# Patient Record
Sex: FEMALE | Race: OTHER | Employment: OTHER | URBAN - METROPOLITAN AREA
[De-identification: names, ages, dates, MRNs, and addresses within clinical notes are randomized per-mention and may not be internally consistent; named-entity substitution may affect disease eponyms.]

---

## 2023-12-21 ENCOUNTER — APPOINTMENT (OUTPATIENT)
Dept: CT IMAGING | Age: 78
DRG: 024 | End: 2023-12-21
Payer: MEDICARE

## 2023-12-21 ENCOUNTER — APPOINTMENT (OUTPATIENT)
Dept: GENERAL RADIOLOGY | Age: 78
DRG: 024 | End: 2023-12-21
Payer: MEDICARE

## 2023-12-21 ENCOUNTER — HOSPITAL ENCOUNTER (INPATIENT)
Age: 78
LOS: 5 days | Discharge: HOME HEALTH | DRG: 024 | End: 2023-12-26
Attending: EMERGENCY MEDICINE | Admitting: INTERNAL MEDICINE
Payer: MEDICARE

## 2023-12-21 ENCOUNTER — APPOINTMENT (OUTPATIENT)
Dept: INTERVENTIONAL RADIOLOGY/VASCULAR | Age: 78
DRG: 024 | End: 2023-12-21
Payer: MEDICARE

## 2023-12-21 DIAGNOSIS — I63.9 ISCHEMIC CEREBROVASCULAR ACCIDENT (CVA) (HCC): Primary | ICD-10-CM

## 2023-12-21 DIAGNOSIS — I63.412 STROKE DUE TO EMBOLISM OF LEFT MIDDLE CEREBRAL ARTERY (HCC): ICD-10-CM

## 2023-12-21 DIAGNOSIS — R00.0 WIDE-COMPLEX TACHYCARDIA: ICD-10-CM

## 2023-12-21 LAB
ALBUMIN SERPL-MCNC: 3.9 G/DL (ref 3.5–5.2)
ALP SERPL-CCNC: 80 U/L (ref 35–104)
ALT SERPL-CCNC: 25 U/L (ref 0–32)
ANION GAP SERPL CALCULATED.3IONS-SCNC: 18 MMOL/L (ref 7–16)
AST SERPL-CCNC: 28 U/L (ref 0–31)
BASOPHILS # BLD: 0.08 K/UL (ref 0–0.2)
BASOPHILS NFR BLD: 1 % (ref 0–2)
BILIRUB SERPL-MCNC: 1.2 MG/DL (ref 0–1.2)
BUN SERPL-MCNC: 26 MG/DL (ref 6–23)
CALCIUM SERPL-MCNC: 9.2 MG/DL (ref 8.6–10.2)
CHLORIDE SERPL-SCNC: 104 MMOL/L (ref 98–107)
CO2 SERPL-SCNC: 18 MMOL/L (ref 22–29)
CREAT SERPL-MCNC: 1.3 MG/DL (ref 0.5–1)
EOSINOPHIL # BLD: 0.22 K/UL (ref 0.05–0.5)
EOSINOPHILS RELATIVE PERCENT: 2 % (ref 0–6)
ERYTHROCYTE [DISTWIDTH] IN BLOOD BY AUTOMATED COUNT: 13.2 % (ref 11.5–15)
GFR SERPL CREATININE-BSD FRML MDRD: 42 ML/MIN/1.73M2
GLUCOSE SERPL-MCNC: 140 MG/DL (ref 74–99)
HCT VFR BLD AUTO: 40 % (ref 34–48)
HGB BLD-MCNC: 13.1 G/DL (ref 11.5–15.5)
IMM GRANULOCYTES # BLD AUTO: 0.06 K/UL (ref 0–0.58)
IMM GRANULOCYTES NFR BLD: 1 % (ref 0–5)
INR PPP: 1.3
LYMPHOCYTES NFR BLD: 1.61 K/UL (ref 1.5–4)
LYMPHOCYTES RELATIVE PERCENT: 13 % (ref 20–42)
MAGNESIUM SERPL-MCNC: 2.2 MG/DL (ref 1.6–2.6)
MCH RBC QN AUTO: 30.3 PG (ref 26–35)
MCHC RBC AUTO-ENTMCNC: 32.8 G/DL (ref 32–34.5)
MCV RBC AUTO: 92.6 FL (ref 80–99.9)
MONOCYTES NFR BLD: 0.63 K/UL (ref 0.1–0.95)
MONOCYTES NFR BLD: 5 % (ref 2–12)
NEUTROPHILS NFR BLD: 80 % (ref 43–80)
NEUTS SEG NFR BLD: 10.17 K/UL (ref 1.8–7.3)
PLATELET # BLD AUTO: 267 K/UL (ref 130–450)
PMV BLD AUTO: 10.1 FL (ref 7–12)
POTASSIUM SERPL-SCNC: 4.1 MMOL/L (ref 3.5–5)
PROT SERPL-MCNC: 7.4 G/DL (ref 6.4–8.3)
PROTHROMBIN TIME: 14 SEC (ref 9.3–12.4)
RBC # BLD AUTO: 4.32 M/UL (ref 3.5–5.5)
SODIUM SERPL-SCNC: 140 MMOL/L (ref 132–146)
TROPONIN I SERPL HS-MCNC: 24 NG/L (ref 0–9)
TROPONIN I SERPL HS-MCNC: 25 NG/L (ref 0–9)
WBC OTHER # BLD: 12.8 K/UL (ref 4.5–11.5)

## 2023-12-21 PROCEDURE — 84484 ASSAY OF TROPONIN QUANT: CPT

## 2023-12-21 PROCEDURE — 70450 CT HEAD/BRAIN W/O DYE: CPT

## 2023-12-21 PROCEDURE — 03CG3ZZ EXTIRPATION OF MATTER FROM INTRACRANIAL ARTERY, PERCUTANEOUS APPROACH: ICD-10-PCS | Performed by: PSYCHIATRY & NEUROLOGY

## 2023-12-21 PROCEDURE — 2060000000 HC ICU INTERMEDIATE R&B

## 2023-12-21 PROCEDURE — 70498 CT ANGIOGRAPHY NECK: CPT

## 2023-12-21 PROCEDURE — 7100000001 HC PACU RECOVERY - ADDTL 15 MIN

## 2023-12-21 PROCEDURE — 2000000000 HC ICU R&B

## 2023-12-21 PROCEDURE — G0269 OCCLUSIVE DEVICE IN VEIN ART: HCPCS

## 2023-12-21 PROCEDURE — 85025 COMPLETE CBC W/AUTO DIFF WBC: CPT

## 2023-12-21 PROCEDURE — 0042T CT BRAIN PERFUSION: CPT

## 2023-12-21 PROCEDURE — 7100000000 HC PACU RECOVERY - FIRST 15 MIN

## 2023-12-21 PROCEDURE — 76377 3D RENDER W/INTRP POSTPROCES: CPT

## 2023-12-21 PROCEDURE — 4A133B1 MONITORING OF ARTERIAL PRESSURE, PERIPHERAL, PERCUTANEOUS APPROACH: ICD-10-PCS | Performed by: ANESTHESIOLOGY

## 2023-12-21 PROCEDURE — 70496 CT ANGIOGRAPHY HEAD: CPT

## 2023-12-21 PROCEDURE — 6360000004 HC RX CONTRAST MEDICATION: Performed by: RADIOLOGY

## 2023-12-21 PROCEDURE — B41F1ZZ FLUOROSCOPY OF RIGHT LOWER EXTREMITY ARTERIES USING LOW OSMOLAR CONTRAST: ICD-10-PCS | Performed by: PSYCHIATRY & NEUROLOGY

## 2023-12-21 PROCEDURE — 96365 THER/PROPH/DIAG IV INF INIT: CPT

## 2023-12-21 PROCEDURE — 6360000002 HC RX W HCPCS: Performed by: EMERGENCY MEDICINE

## 2023-12-21 PROCEDURE — 80053 COMPREHEN METABOLIC PANEL: CPT

## 2023-12-21 PROCEDURE — 3700000001 HC ADD 15 MINUTES (ANESTHESIA)

## 2023-12-21 PROCEDURE — 2580000003 HC RX 258: Performed by: EMERGENCY MEDICINE

## 2023-12-21 PROCEDURE — 4A133J1 MONITORING OF ARTERIAL PULSE, PERIPHERAL, PERCUTANEOUS APPROACH: ICD-10-PCS | Performed by: ANESTHESIOLOGY

## 2023-12-21 PROCEDURE — 85610 PROTHROMBIN TIME: CPT

## 2023-12-21 PROCEDURE — 36005 INJECTION EXT VENOGRAPHY: CPT

## 2023-12-21 PROCEDURE — 93005 ELECTROCARDIOGRAM TRACING: CPT | Performed by: EMERGENCY MEDICINE

## 2023-12-21 PROCEDURE — 5A2204Z RESTORATION OF CARDIAC RHYTHM, SINGLE: ICD-10-PCS | Performed by: INTERNAL MEDICINE

## 2023-12-21 PROCEDURE — 2580000003 HC RX 258: Performed by: PSYCHIATRY & NEUROLOGY

## 2023-12-21 PROCEDURE — 99223 1ST HOSP IP/OBS HIGH 75: CPT | Performed by: PSYCHIATRY & NEUROLOGY

## 2023-12-21 PROCEDURE — 3700000000 HC ANESTHESIA ATTENDED CARE

## 2023-12-21 PROCEDURE — 6360000004 HC RX CONTRAST MEDICATION: Performed by: PSYCHIATRY & NEUROLOGY

## 2023-12-21 PROCEDURE — 6360000002 HC RX W HCPCS: Performed by: PSYCHIATRY & NEUROLOGY

## 2023-12-21 PROCEDURE — 83735 ASSAY OF MAGNESIUM: CPT

## 2023-12-21 PROCEDURE — 61645 PERQ ART M-THROMBECT &/NFS: CPT

## 2023-12-21 PROCEDURE — 75820 VEIN X-RAY ARM/LEG: CPT

## 2023-12-21 PROCEDURE — C1887 CATHETER, GUIDING: HCPCS

## 2023-12-21 PROCEDURE — 03HC33Z INSERTION OF INFUSION DEVICE INTO LEFT RADIAL ARTERY, PERCUTANEOUS APPROACH: ICD-10-PCS | Performed by: ANESTHESIOLOGY

## 2023-12-21 PROCEDURE — C1757 CATH, THROMBECTOMY/EMBOLECT: HCPCS

## 2023-12-21 PROCEDURE — 99285 EMERGENCY DEPT VISIT HI MDM: CPT

## 2023-12-21 PROCEDURE — 93005 ELECTROCARDIOGRAM TRACING: CPT

## 2023-12-21 RX ORDER — ONDANSETRON 2 MG/ML
4 INJECTION INTRAMUSCULAR; INTRAVENOUS EVERY 6 HOURS PRN
Status: DISCONTINUED | OUTPATIENT
Start: 2023-12-21 | End: 2023-12-26 | Stop reason: HOSPADM

## 2023-12-21 RX ORDER — ONDANSETRON 4 MG/1
4 TABLET, ORALLY DISINTEGRATING ORAL EVERY 8 HOURS PRN
Status: DISCONTINUED | OUTPATIENT
Start: 2023-12-21 | End: 2023-12-26 | Stop reason: HOSPADM

## 2023-12-21 RX ORDER — 0.9 % SODIUM CHLORIDE 0.9 %
1000 INTRAVENOUS SOLUTION INTRAVENOUS ONCE
Status: DISCONTINUED | OUTPATIENT
Start: 2023-12-21 | End: 2023-12-26 | Stop reason: HOSPADM

## 2023-12-21 RX ORDER — ATORVASTATIN CALCIUM 40 MG/1
80 TABLET, FILM COATED ORAL NIGHTLY
Status: DISCONTINUED | OUTPATIENT
Start: 2023-12-21 | End: 2023-12-26 | Stop reason: HOSPADM

## 2023-12-21 RX ORDER — ASPIRIN 300 MG/1
300 SUPPOSITORY RECTAL DAILY
Status: DISCONTINUED | OUTPATIENT
Start: 2023-12-22 | End: 2023-12-23

## 2023-12-21 RX ORDER — LABETALOL HYDROCHLORIDE 5 MG/ML
5 INJECTION, SOLUTION INTRAVENOUS EVERY 10 MIN PRN
Status: DISCONTINUED | OUTPATIENT
Start: 2023-12-21 | End: 2023-12-22

## 2023-12-21 RX ORDER — ACETAMINOPHEN 325 MG/1
650 TABLET ORAL EVERY 4 HOURS PRN
Status: DISCONTINUED | OUTPATIENT
Start: 2023-12-21 | End: 2023-12-26 | Stop reason: HOSPADM

## 2023-12-21 RX ORDER — AMIODARONE HYDROCHLORIDE 50 MG/ML
INJECTION, SOLUTION INTRAVENOUS
Status: DISPENSED
Start: 2023-12-21 | End: 2023-12-22

## 2023-12-21 RX ORDER — SODIUM CHLORIDE 9 MG/ML
INJECTION, SOLUTION INTRAVENOUS PRN
Status: DISCONTINUED | OUTPATIENT
Start: 2023-12-21 | End: 2023-12-26 | Stop reason: HOSPADM

## 2023-12-21 RX ORDER — SODIUM CHLORIDE 0.9 % (FLUSH) 0.9 %
5-40 SYRINGE (ML) INJECTION EVERY 12 HOURS SCHEDULED
Status: DISCONTINUED | OUTPATIENT
Start: 2023-12-21 | End: 2023-12-26 | Stop reason: HOSPADM

## 2023-12-21 RX ORDER — SODIUM CHLORIDE 9 MG/ML
INJECTION, SOLUTION INTRAVENOUS
Status: COMPLETED
Start: 2023-12-21 | End: 2023-12-21

## 2023-12-21 RX ORDER — DILTIAZEM HYDROCHLORIDE 5 MG/ML
10 INJECTION INTRAVENOUS ONCE
Status: DISCONTINUED | OUTPATIENT
Start: 2023-12-21 | End: 2023-12-22

## 2023-12-21 RX ORDER — SODIUM CHLORIDE 0.9 % (FLUSH) 0.9 %
5-40 SYRINGE (ML) INJECTION PRN
Status: DISCONTINUED | OUTPATIENT
Start: 2023-12-21 | End: 2023-12-26 | Stop reason: HOSPADM

## 2023-12-21 RX ADMIN — LABETALOL HYDROCHLORIDE 5 MG: 5 INJECTION, SOLUTION INTRAVENOUS at 20:53

## 2023-12-21 RX ADMIN — SODIUM CHLORIDE, PRESERVATIVE FREE 10 ML: 5 INJECTION INTRAVENOUS at 20:56

## 2023-12-21 RX ADMIN — AMIODARONE HYDROCHLORIDE 1 MG/MIN: 50 INJECTION, SOLUTION INTRAVENOUS at 19:06

## 2023-12-21 RX ADMIN — AMIODARONE HYDROCHLORIDE 150 MG: 50 INJECTION, SOLUTION INTRAVENOUS at 18:53

## 2023-12-21 RX ADMIN — IOPAMIDOL 30 ML: 612 INJECTION, SOLUTION INTRAVENOUS at 20:24

## 2023-12-21 RX ADMIN — IOPAMIDOL 75 ML: 755 INJECTION, SOLUTION INTRAVENOUS at 18:33

## 2023-12-22 ENCOUNTER — APPOINTMENT (OUTPATIENT)
Dept: CT IMAGING | Age: 78
DRG: 024 | End: 2023-12-22
Payer: MEDICARE

## 2023-12-22 ENCOUNTER — APPOINTMENT (OUTPATIENT)
Age: 78
DRG: 024 | End: 2023-12-22
Attending: PSYCHIATRY & NEUROLOGY
Payer: MEDICARE

## 2023-12-22 ENCOUNTER — APPOINTMENT (OUTPATIENT)
Dept: GENERAL RADIOLOGY | Age: 78
DRG: 024 | End: 2023-12-22
Payer: MEDICARE

## 2023-12-22 PROBLEM — I63.9 ISCHEMIC CEREBROVASCULAR ACCIDENT (CVA) (HCC): Status: ACTIVE | Noted: 2023-12-22

## 2023-12-22 LAB
ANION GAP SERPL CALCULATED.3IONS-SCNC: 12 MMOL/L (ref 7–16)
BASOPHILS # BLD: 0.07 K/UL (ref 0–0.2)
BASOPHILS NFR BLD: 1 % (ref 0–2)
BUN SERPL-MCNC: 22 MG/DL (ref 6–23)
CALCIUM SERPL-MCNC: 8.5 MG/DL (ref 8.6–10.2)
CHLORIDE SERPL-SCNC: 108 MMOL/L (ref 98–107)
CHOLEST SERPL-MCNC: 159 MG/DL
CO2 SERPL-SCNC: 21 MMOL/L (ref 22–29)
CREAT SERPL-MCNC: 1.1 MG/DL (ref 0.5–1)
ECHO AO ASC DIAM: 2.7 CM
ECHO AO ASCENDING AORTA INDEX: 1.59 CM/M2
ECHO AO ROOT DIAM: 2.4 CM
ECHO AO ROOT INDEX: 1.41 CM/M2
ECHO AV AREA PEAK VELOCITY: 1.4 CM2
ECHO AV AREA VTI: 1.4 CM2
ECHO AV AREA/BSA PEAK VELOCITY: 0.8 CM2/M2
ECHO AV AREA/BSA VTI: 0.8 CM2/M2
ECHO AV CUSP MM: 1.6 CM
ECHO AV MEAN GRADIENT: 2 MMHG
ECHO AV MEAN VELOCITY: 0.7 M/S
ECHO AV PEAK GRADIENT: 4 MMHG
ECHO AV PEAK VELOCITY: 1 M/S
ECHO AV VELOCITY RATIO: 0.6
ECHO AV VTI: 17.3 CM
ECHO BSA: 1.72 M2
ECHO EST RA PRESSURE: 3 MMHG
ECHO LA VOL A-L A2C: 74 ML (ref 22–52)
ECHO LA VOL A-L A4C: 45 ML (ref 22–52)
ECHO LA VOL MOD A2C: 68 ML (ref 22–52)
ECHO LA VOL MOD A4C: 43 ML (ref 22–52)
ECHO LA VOLUME AREA LENGTH: 58 ML
ECHO LA VOLUME INDEX A-L A2C: 44 ML/M2 (ref 16–34)
ECHO LA VOLUME INDEX A-L A4C: 26 ML/M2 (ref 16–34)
ECHO LA VOLUME INDEX AREA LENGTH: 34 ML/M2 (ref 16–34)
ECHO LA VOLUME INDEX MOD A2C: 40 ML/M2 (ref 16–34)
ECHO LA VOLUME INDEX MOD A4C: 25 ML/M2 (ref 16–34)
ECHO LV EDV A2C: 49 ML
ECHO LV EDV A4C: 65 ML
ECHO LV EDV BP: 58 ML (ref 56–104)
ECHO LV EDV INDEX A4C: 38 ML/M2
ECHO LV EDV INDEX BP: 34 ML/M2
ECHO LV EDV NDEX A2C: 29 ML/M2
ECHO LV EJECTION FRACTION A2C: 42 %
ECHO LV EJECTION FRACTION A4C: 47 %
ECHO LV EJECTION FRACTION BIPLANE: 46 % (ref 55–100)
ECHO LV ESV A2C: 28 ML
ECHO LV ESV A4C: 34 ML
ECHO LV ESV BP: 31 ML (ref 19–49)
ECHO LV ESV INDEX A2C: 16 ML/M2
ECHO LV ESV INDEX A4C: 20 ML/M2
ECHO LV ESV INDEX BP: 18 ML/M2
ECHO LV FRACTIONAL SHORTENING: 21 % (ref 28–44)
ECHO LV INTERNAL DIMENSION DIASTOLE INDEX: 2.47 CM/M2
ECHO LV INTERNAL DIMENSION DIASTOLIC: 4.2 CM (ref 3.9–5.3)
ECHO LV INTERNAL DIMENSION SYSTOLIC INDEX: 1.94 CM/M2
ECHO LV INTERNAL DIMENSION SYSTOLIC: 3.3 CM
ECHO LV ISOVOLUMETRIC RELAXATION TIME (IVRT): 87.7 MS
ECHO LV IVSD: 1.1 CM (ref 0.6–0.9)
ECHO LV IVSS: 1.2 CM
ECHO LV MASS 2D: 189.2 G (ref 67–162)
ECHO LV MASS INDEX 2D: 111.3 G/M2 (ref 43–95)
ECHO LV POSTERIOR WALL DIASTOLIC: 1.4 CM (ref 0.6–0.9)
ECHO LV POSTERIOR WALL SYSTOLIC: 1.4 CM
ECHO LV RELATIVE WALL THICKNESS RATIO: 0.67
ECHO LVOT AREA: 2.3 CM2
ECHO LVOT AV VTI INDEX: 0.64
ECHO LVOT DIAM: 1.7 CM
ECHO LVOT MEAN GRADIENT: 1 MMHG
ECHO LVOT PEAK GRADIENT: 2 MMHG
ECHO LVOT PEAK VELOCITY: 0.6 M/S
ECHO LVOT STROKE VOLUME INDEX: 14.7 ML/M2
ECHO LVOT SV: 25 ML
ECHO LVOT VTI: 11 CM
ECHO MV A VELOCITY: 0.28 M/S
ECHO MV AREA PHT: 4 CM2
ECHO MV AREA VTI: 0.8 CM2
ECHO MV E DECELERATION TIME (DT): 131 MS
ECHO MV E VELOCITY: 1.2 M/S
ECHO MV E/A RATIO: 4.29
ECHO MV LVOT VTI INDEX: 2.99
ECHO MV MAX VELOCITY: 1.5 M/S
ECHO MV MEAN GRADIENT: 3 MMHG
ECHO MV MEAN VELOCITY: 0.8 M/S
ECHO MV PEAK GRADIENT: 9 MMHG
ECHO MV PRESSURE HALF TIME (PHT): 55.2 MS
ECHO MV VTI: 32.9 CM
ECHO RIGHT VENTRICULAR SYSTOLIC PRESSURE (RVSP): 32 MMHG
ECHO RV TAPSE: 1.9 CM (ref 1.7–?)
ECHO TV REGURGITANT MAX VELOCITY: 2.7 M/S
ECHO TV REGURGITANT PEAK GRADIENT: 29 MMHG
EKG ATRIAL RATE: 127 BPM
EKG ATRIAL RATE: 186 BPM
EKG ATRIAL RATE: 72 BPM
EKG P AXIS: -4 DEGREES
EKG P AXIS: 71 DEGREES
EKG P-R INTERVAL: 180 MS
EKG P-R INTERVAL: 188 MS
EKG Q-T INTERVAL: 274 MS
EKG Q-T INTERVAL: 306 MS
EKG Q-T INTERVAL: 458 MS
EKG QRS DURATION: 120 MS
EKG QRS DURATION: 70 MS
EKG QRS DURATION: 88 MS
EKG QTC CALCULATION (BAZETT): 444 MS
EKG QTC CALCULATION (BAZETT): 482 MS
EKG QTC CALCULATION (BAZETT): 501 MS
EKG R AXIS: -128 DEGREES
EKG R AXIS: 40 DEGREES
EKG R AXIS: 60 DEGREES
EKG T AXIS: 110 DEGREES
EKG T AXIS: 83 DEGREES
EKG T AXIS: 89 DEGREES
EKG VENTRICULAR RATE: 127 BPM
EKG VENTRICULAR RATE: 186 BPM
EKG VENTRICULAR RATE: 72 BPM
EOSINOPHIL # BLD: 0.29 K/UL (ref 0.05–0.5)
EOSINOPHILS RELATIVE PERCENT: 3 % (ref 0–6)
ERYTHROCYTE [DISTWIDTH] IN BLOOD BY AUTOMATED COUNT: 13.3 % (ref 11.5–15)
GFR SERPL CREATININE-BSD FRML MDRD: 50 ML/MIN/1.73M2
GLUCOSE SERPL-MCNC: 98 MG/DL (ref 74–99)
HBA1C MFR BLD: 5.7 % (ref 4–5.6)
HCT VFR BLD AUTO: 34.7 % (ref 34–48)
HDLC SERPL-MCNC: 40 MG/DL
HGB BLD-MCNC: 11.3 G/DL (ref 11.5–15.5)
IMM GRANULOCYTES # BLD AUTO: 0.06 K/UL (ref 0–0.58)
IMM GRANULOCYTES NFR BLD: 1 % (ref 0–5)
LDLC SERPL CALC-MCNC: 97 MG/DL
LYMPHOCYTES NFR BLD: 1.78 K/UL (ref 1.5–4)
LYMPHOCYTES RELATIVE PERCENT: 16 % (ref 20–42)
MAGNESIUM SERPL-MCNC: 2.2 MG/DL (ref 1.6–2.6)
MCH RBC QN AUTO: 30.5 PG (ref 26–35)
MCHC RBC AUTO-ENTMCNC: 32.6 G/DL (ref 32–34.5)
MCV RBC AUTO: 93.8 FL (ref 80–99.9)
MONOCYTES NFR BLD: 0.8 K/UL (ref 0.1–0.95)
MONOCYTES NFR BLD: 7 % (ref 2–12)
NEUTROPHILS NFR BLD: 74 % (ref 43–80)
NEUTS SEG NFR BLD: 8.47 K/UL (ref 1.8–7.3)
PARTIAL THROMBOPLASTIN TIME: 28.5 SEC (ref 24.5–35.1)
PARTIAL THROMBOPLASTIN TIME: 38.3 SEC (ref 24.5–35.1)
PARTIAL THROMBOPLASTIN TIME: 45.7 SEC (ref 24.5–35.1)
PHOSPHATE SERPL-MCNC: 4.7 MG/DL (ref 2.5–4.5)
PLATELET # BLD AUTO: 236 K/UL (ref 130–450)
PMV BLD AUTO: 10.7 FL (ref 7–12)
POTASSIUM SERPL-SCNC: 3.9 MMOL/L (ref 3.5–5)
RBC # BLD AUTO: 3.7 M/UL (ref 3.5–5.5)
SODIUM SERPL-SCNC: 141 MMOL/L (ref 132–146)
TRIGL SERPL-MCNC: 112 MG/DL
TROPONIN I SERPL HS-MCNC: 25 NG/L (ref 0–9)
TSH SERPL DL<=0.05 MIU/L-ACNC: 2.96 UIU/ML (ref 0.27–4.2)
VLDLC SERPL CALC-MCNC: 22 MG/DL
WBC OTHER # BLD: 11.5 K/UL (ref 4.5–11.5)

## 2023-12-22 PROCEDURE — 99232 SBSQ HOSP IP/OBS MODERATE 35: CPT | Performed by: PSYCHIATRY & NEUROLOGY

## 2023-12-22 PROCEDURE — 92610 EVALUATE SWALLOWING FUNCTION: CPT | Performed by: SPEECH-LANGUAGE PATHOLOGIST

## 2023-12-22 PROCEDURE — 70450 CT HEAD/BRAIN W/O DYE: CPT

## 2023-12-22 PROCEDURE — 2580000003 HC RX 258: Performed by: PSYCHIATRY & NEUROLOGY

## 2023-12-22 PROCEDURE — 99291 CRITICAL CARE FIRST HOUR: CPT | Performed by: CLINICAL NURSE SPECIALIST

## 2023-12-22 PROCEDURE — 80048 BASIC METABOLIC PNL TOTAL CA: CPT

## 2023-12-22 PROCEDURE — 6360000002 HC RX W HCPCS: Performed by: CLINICAL NURSE SPECIALIST

## 2023-12-22 PROCEDURE — APPSS180 APP SPLIT SHARED TIME > 60 MINUTES: Performed by: NURSE PRACTITIONER

## 2023-12-22 PROCEDURE — 92523 SPEECH SOUND LANG COMPREHEN: CPT | Performed by: SPEECH-LANGUAGE PATHOLOGIST

## 2023-12-22 PROCEDURE — 6360000002 HC RX W HCPCS: Performed by: PSYCHIATRY & NEUROLOGY

## 2023-12-22 PROCEDURE — 97162 PT EVAL MOD COMPLEX 30 MIN: CPT

## 2023-12-22 PROCEDURE — 2580000003 HC RX 258: Performed by: EMERGENCY MEDICINE

## 2023-12-22 PROCEDURE — 80061 LIPID PANEL: CPT

## 2023-12-22 PROCEDURE — 51798 US URINE CAPACITY MEASURE: CPT

## 2023-12-22 PROCEDURE — 84100 ASSAY OF PHOSPHORUS: CPT

## 2023-12-22 PROCEDURE — 6370000000 HC RX 637 (ALT 250 FOR IP): Performed by: PSYCHIATRY & NEUROLOGY

## 2023-12-22 PROCEDURE — 51701 INSERT BLADDER CATHETER: CPT

## 2023-12-22 PROCEDURE — 93010 ELECTROCARDIOGRAM REPORT: CPT | Performed by: INTERNAL MEDICINE

## 2023-12-22 PROCEDURE — 6360000002 HC RX W HCPCS: Performed by: EMERGENCY MEDICINE

## 2023-12-22 PROCEDURE — 71045 X-RAY EXAM CHEST 1 VIEW: CPT

## 2023-12-22 PROCEDURE — 84484 ASSAY OF TROPONIN QUANT: CPT

## 2023-12-22 PROCEDURE — 93306 TTE W/DOPPLER COMPLETE: CPT | Performed by: INTERNAL MEDICINE

## 2023-12-22 PROCEDURE — 99223 1ST HOSP IP/OBS HIGH 75: CPT | Performed by: INTERNAL MEDICINE

## 2023-12-22 PROCEDURE — 85730 THROMBOPLASTIN TIME PARTIAL: CPT

## 2023-12-22 PROCEDURE — 97530 THERAPEUTIC ACTIVITIES: CPT

## 2023-12-22 PROCEDURE — 83735 ASSAY OF MAGNESIUM: CPT

## 2023-12-22 PROCEDURE — 2580000003 HC RX 258: Performed by: CLINICAL NURSE SPECIALIST

## 2023-12-22 PROCEDURE — 85025 COMPLETE CBC W/AUTO DIFF WBC: CPT

## 2023-12-22 PROCEDURE — 93005 ELECTROCARDIOGRAM TRACING: CPT

## 2023-12-22 PROCEDURE — C8929 TTE W OR WO FOL WCON,DOPPLER: HCPCS

## 2023-12-22 PROCEDURE — 6360000004 HC RX CONTRAST MEDICATION: Performed by: PSYCHIATRY & NEUROLOGY

## 2023-12-22 PROCEDURE — 84443 ASSAY THYROID STIM HORMONE: CPT

## 2023-12-22 PROCEDURE — 97166 OT EVAL MOD COMPLEX 45 MIN: CPT

## 2023-12-22 PROCEDURE — 83036 HEMOGLOBIN GLYCOSYLATED A1C: CPT

## 2023-12-22 PROCEDURE — 2000000000 HC ICU R&B

## 2023-12-22 RX ORDER — SODIUM CHLORIDE 9 MG/ML
INJECTION, SOLUTION INTRAVENOUS CONTINUOUS
Status: DISCONTINUED | OUTPATIENT
Start: 2023-12-22 | End: 2023-12-23

## 2023-12-22 RX ORDER — LABETALOL HYDROCHLORIDE 5 MG/ML
10 INJECTION, SOLUTION INTRAVENOUS EVERY 10 MIN PRN
Status: DISCONTINUED | OUTPATIENT
Start: 2023-12-22 | End: 2023-12-23

## 2023-12-22 RX ORDER — HEPARIN SODIUM 10000 [USP'U]/100ML
5-30 INJECTION, SOLUTION INTRAVENOUS CONTINUOUS
Status: DISCONTINUED | OUTPATIENT
Start: 2023-12-22 | End: 2023-12-23

## 2023-12-22 RX ADMIN — AMIODARONE HYDROCHLORIDE 0.5 MG/MIN: 50 INJECTION, SOLUTION INTRAVENOUS at 18:14

## 2023-12-22 RX ADMIN — SODIUM CHLORIDE, PRESERVATIVE FREE 10 ML: 5 INJECTION INTRAVENOUS at 20:20

## 2023-12-22 RX ADMIN — PERFLUTREN 1.5 ML: 6.52 INJECTION, SUSPENSION INTRAVENOUS at 08:14

## 2023-12-22 RX ADMIN — HEPARIN SODIUM 12 UNITS/KG/HR: 10000 INJECTION, SOLUTION INTRAVENOUS at 09:28

## 2023-12-22 RX ADMIN — AMIODARONE HYDROCHLORIDE 0.5 MG/MIN: 50 INJECTION, SOLUTION INTRAVENOUS at 00:33

## 2023-12-22 RX ADMIN — LABETALOL HYDROCHLORIDE 10 MG: 5 INJECTION, SOLUTION INTRAVENOUS at 23:45

## 2023-12-22 RX ADMIN — ASPIRIN 300 MG: 300 SUPPOSITORY RECTAL at 09:18

## 2023-12-22 RX ADMIN — SODIUM CHLORIDE: 9 INJECTION, SOLUTION INTRAVENOUS at 12:41

## 2023-12-22 NOTE — CONSULTS
Stroke Neurology Consult Note  12/21/2023 7:19 PM  Pt Name: Nikos Mojica  MRN: [id number]  YOB: 1945  Date of evaluation: 12/21/2023  Primary Care Physician: Poornima, Pcp          Stroke referral received from Nito Lehman, * based upon patient's presenting stroke like symptomology. Nikos Mojica is a 69 y.o. female who presents with . Acute Left MCA syndrome    Had to be cardioverted and was in afib   ABC needed to be perform before continuing stroke alert    LKW 1 per son, but grandson who is a minor saw her at 5    Does not see a doctor         Prior Functional Status(Modified Shen Scale):  0=No symptoms at all    No Known Allergies    Medications  Prior to Admission medications    Not on File         No past medical history on file. No past surgical history on file.   Social History     Socioeconomic History    Marital status: Single     Spouse name: Not on file    Number of children: Not on file    Years of education: Not on file    Highest education level: Not on file   Occupational History    Not on file   Tobacco Use    Smoking status: Not on file    Smokeless tobacco: Not on file   Substance and Sexual Activity    Alcohol use: Not on file    Drug use: Not on file    Sexual activity: Not on file   Other Topics Concern    Not on file   Social History Narrative    Not on file     Social Determinants of Health     Financial Resource Strain: Not on file   Food Insecurity: Not on file   Transportation Needs: Not on file   Physical Activity: Not on file   Stress: Not on file   Social Connections: Not on file   Intimate Partner Violence: Not on file   Housing Stability: Not on file       OBJECTIVE  Vitals:    12/21/23 1809 12/21/23 1844 12/21/23 1900 12/21/23 1907   BP: (!) 151/95 125/88 (!) 126/97 (!) 131/107   Pulse: (!) 126 91 95 89   Resp: 18 16 20 20   SpO2: 98% 98% 98% 99%     NIH Stroke Scale at time of initial evaluation:   NIHSS Stroke Scale  Interval: Baseline  Level of

## 2023-12-22 NOTE — CONSULTS
Inpatient Cardiology Consultation      Reason for Consult: Stroke, Atrial Fibrillation    Consulting Physician: Dr. Espinoza Blocker    Requesting Physician: Dr. Erik Torres     Date of Consultation: 12/22/2023    HISTORY OF PRESENT ILLNESS:   Ms. Nataliia Bryan is a 69year old  female who is previously not known to [facility] Physicians in [place], [place]. Her medical history includes remote tobacco use. Ms. Nataliia Bryan ago presented to [facility] ED on 12/21/2023 with complaints of right hemiparesis. Please note this information was obtained with the patient's current medical chart as Ms. Nataliia Bryan is unable to ascertain the events that led to her presentation and subsequent admission. There is no family present at the bedside. According to ED documentation she was LKW approximately 5 hours prior to presentation, and was found by her son to have right-sided hemiparesis with left eye deviation. EMS was summoned. Upon arrival to the ED her VS were oral temperature 97.4-187-23-99% RA-136/100. EKG with WCT with . She underwent CT of the brain, CT head, CTA head and neck with findings as stated below. She underwent a successful DCCV with 200 J and then received Amiodarone 150 mg bolus, 450 mg bolus and was placed on an IV Amiodarone infusion. She was deemed not to be candidate for TNK. For her left MCA occlusion interventional neurology was consulted and she underwent IR mechanical intracranial thrombectomy on 12/21/2023. She was recommended to have ASA post procedure only. She underwent repeat CT of the head on 12/22/2023 and was then placed on IV heparin per Interventional Radiology. She was admitted to the NICU. Cardiology was consulted for management of stroke and Atrial Fibrillation. She is currently on  mg suppository daily. Please note: past medical records were reviewed per electronic medical record (EMR) - see detailed reports under Past Medical/ Surgical History.    Past

## 2023-12-22 NOTE — PROCEDURES
NEUROINTERVENTION PROCEDURE NOTE    PATIENT NAME: Turner Grewal  MRN: [id number]  : 1945  DATE OF PROCEDURE: 23    Stroke Metrics  NIHSS prior to procedure: unknown  IV TNK Administered: [] Yes  [x]  No  Consent obtained: [x] Yes  []  No  by Dr Bonnei Levin from son  Pedal Pulses checked: +2 bilaterally    Neurointerventionalist: Brenton Valdivia MD  1st assistant Bhargavi Cruz      Time Event Device Notes    Access site puncture   Location: right femoral        1st pass Stent retriever and suction TICI Reperfusion ndgndrndanddndend:nd nd2nd  Access site closure  Sheath pulled and  Angioseal used to close arterial puncture. Puncture site cleansed and dry dressing applied. No bleeding, swelling or complications noted, no change in pulses. IA tPA adminstered: [] Yes  [x]  No         Post-procedure NIHSS unable to assess due to anesthesia/sedation      CT head completed. Patient transported to [facility]  and handoff report given to nurse at bedside    Family updated: [x] Yes  []  No    Blood pressure parameters: SBP <140    Antiplatelet Recommendations:  Aspirin am    Any additional follow up scans:   Follow up CT scan in the morning 0600 or 0700

## 2023-12-22 NOTE — H&P
posterior   portions of the left MCA territory surrounding a core of infarction in the   central left MCA territory. Volume of mismatch is 68 cc with a mismatch   ratio of 8.6.   3. Occlusion of the proximal M2 branch of the superior division of the left   MCA. 4. Minimal, less than 50% stenosis of the origin and proximal portions of the   right internal carotid artery from primarily noncalcified plaque along the   medial wall. 5. No stenosis of the left carotid bifurcation or the vertebral arteries. CTA NECK W CONTRAST   Final Result   Addendum (preliminary) 1 of 1   ADDENDUM:   The report attached for this examination is incorrect. The study was   interpreted as part of the complaining CTA of the head neck, but was not   linked to that exam.         Final   Unremarkable CTA of the neck. CT BRAIN PERFUSION   Final Result      XR CHEST PORTABLE    (Results Pending)   IR MECHANICAL ART THROMBECTOMY INTRACRANIAL    (Results Pending)   IR INJ VENOGRAM EXTREMITY    (Results Pending)   IR VENOGRAM LOWER EXTREMITY RIGHT    (Results Pending)   CT HEAD WO CONTRAST    (Results Pending)            Resident's Assessment and Plan     Assessment and Plan:    Acute Left MCA stroke, M2 Occlusion S/P Thrombectomy:   - Patient was found by her son with her last well known time at 1 PM, she had a fall with right sided weakness and left stroke. Patient had sponteanous eye opening but was aphasic and unable to follow commands. She had no abnormal body movements, no head trauma, no LOC, bowel/bladder incontinence. In the ED CT findings showed left MCA stroke. She had thrombectomy and was transferred to neuro ICU. Plan:   - follow neurovascular checks. - on aspirin and lipitor  - repeat CT tomorrow as per neurology  - follow a1c, TSH and lipid panel    Wide complex sustained V tach, S/P Cardioversion   - Patient had wide complex tachycardia in ED and was cardioverted.  (200 J) X1, She was given amiodarone

## 2023-12-22 NOTE — PLAN OF CARE
Problem: Discharge Planning  Goal: Discharge to home or other facility with appropriate resources  Recent Flowsheet Documentation  Taken 12/21/2023 2109 by Yobani Perez RN  Discharge to home or other facility with appropriate resources: Identify barriers to discharge with patient and caregiver     Problem: Safety - Adult  Goal: Free from fall injury  Outcome: Progressing     Problem: ABCDS Injury Assessment  Goal: Absence of physical injury  Outcome: Progressing

## 2023-12-22 NOTE — ACP (ADVANCE CARE PLANNING)
Advance Care Planning   Healthcare Decision Maker:    Primary Decision Maker: Arsalan Devi Child - [phone]    Secondary Decision Maker: Joseph Douglas - [phone]    Click here to complete Healthcare Decision Makers including selection of the Healthcare Decision Maker Relationship (ie \"Primary\"). Spoke with patient's son, he tells me patient is not , no other children.

## 2023-12-22 NOTE — BRIEF OP NOTE
Brief Postoperative Note      Patient: Floyd Delaney  YOB: 1945  MRN: [id number]    Date of Procedure: 2023      Neurointerventional POST Procedure Note    This is a brief post operative note that serves as immediate documentation to communicate with other clinicians and update them about the procedure  For Billing and complete documentation purposes the COMPLETE operative report/diagnostic imaging report is placed in PACS. To review this document please go under the images tab and click on the relevant IR procedure. Date of Service: 23      Patient Name: Floyd Delaney   : 1945  Medical record number:  [id number]        Procedure: Left MCA thrombectomy  Physician: Jorge Gabriel MD  Assistant: Shruthi JUDD  Access:Right Femoral artery       Right Femoral vein was accessed  Vessels injected: Left ICA   Hemostasis: achieved  8F angioseal  Anesthesia: Please see flowchart  Specimens:           Blood loss: 50 ml   Contrast Material:  please see dictation in PACS  Fluoro time: Please see dictation in PACS      Diagnosis/Findings:   1. Left MCA superior Dominant M2 occlusion s/p thrombectomy with TICI 3 recanalization   2. DynaCT negative for ICH     Plan:  1. Q15 min neuro checks x2 hours, Q30 for 6 hours and q1h for 24 hours and then q4h/q6h till discharge   2. Maintain SBP <140  4. CT head in the morning to decide on anticoagulation for Atrial Fibrillation   5. STAT CT head for any neurological decline and contact me immediately  6.  Antiplatelet Recs ASA alone          Electronically signed by Jorge Gabriel MD on 2023 at 8:15 PM

## 2023-12-22 NOTE — CARE COORDINATION
Patient with both expressive and receptive aphasia. Son at bedside with antione. Patient from home, lives with son, Edgar Turpin. Typically completely independent, she does not use assistive devices in the home. 4 steps to enter, bed and bath on the second floor. No PCP, son tells me she does not go to the doctors, no on any home medications. No GREGORIO history. We discussed possible need for rehab, he is in agreement with ARU at [facility]. E's. Will follow up after therapy evals. Son works out of town sometimes, but grandson, Sadia Callahan is 23 and able to offer hands on support at home during the time he is away. Case Management Assessment  Initial Evaluation    Date/Time of Evaluation: 12/22/2023 2:39 PM  Assessment Completed by: Theo Porras    If patient is discharged prior to next notation, then this note serves as note for discharge by case management. Patient Name: Herrera Huffman                   YOB: 1945  Diagnosis: Stroke due to embolism of left middle cerebral artery [facility]) [I63.412]  Ischemic cerebrovascular accident (CVA) [facility]) [I63.9]                   Date / Time: 12/21/2023  5:56 PM    Patient Admission Status: Inpatient   Readmission Risk (Low < 19, Mod (19-27), High > 27): Readmission Risk Score: 15    Current PCP: No, Pcp  PCP verified by CM? No    Chart Reviewed: Yes      History Provided by: Child/Family  Patient Orientation: Unable to Assess    Patient Cognition: Alert    Hospitalization in the last 30 days (Readmission):  No    If yes, Readmission Assessment in  Navigator will be completed.     Advance Directives:      Code Status: Full Code   Patient's Primary Decision Maker is: Legal Next of Kin    Primary Decision MakerJocristian Fairbanks Child - [phone]    Secondary Decision Maker: Eulogio Vallejo - [phone]    Discharge Planning:    Patient lives with: Children, Family Members Type of Home: House  Primary Care Giver: Family  Patient Support Systems include:

## 2023-12-22 NOTE — PLAN OF CARE
Problem: Discharge Planning  Goal: Discharge to home or other facility with appropriate resources  Outcome: Progressing     Problem: Pain  Goal: Verbalizes/displays adequate comfort level or baseline comfort level  Outcome: Progressing     Problem: Safety - Adult  Goal: Free from fall injury  12/22/2023 1113 by Roxana Begum RN  Outcome: Progressing  12/22/2023 0445 by Leonidas Jimenez RN  Outcome: Progressing     Problem: Skin/Tissue Integrity  Goal: Absence of new skin breakdown  Description: 1. Monitor for areas of redness and/or skin breakdown  2. Assess vascular access sites hourly  3. Every 4-6 hours minimum:  Change oxygen saturation probe site  4. Every 4-6 hours:  If on nasal continuous positive airway pressure, respiratory therapy assess nares and determine need for appliance change or resting period.   Outcome: Progressing     Problem: ABCDS Injury Assessment  Goal: Absence of physical injury  12/22/2023 1113 by Roxana Begum RN  Outcome: Progressing  12/22/2023 0445 by Leonidas Jimenez RN  Outcome: Progressing     Problem: Neurosensory - Adult  Goal: Achieves stable or improved neurological status  Outcome: Progressing  Goal: Absence of seizures  Outcome: Progressing  Goal: Achieves maximal functionality and self care  Outcome: Progressing     Problem: Chronic Conditions and Co-morbidities  Goal: Patient's chronic conditions and co-morbidity symptoms are monitored and maintained or improved  Outcome: Progressing

## 2023-12-23 LAB
ANION GAP SERPL CALCULATED.3IONS-SCNC: 13 MMOL/L (ref 7–16)
B PARAP IS1001 DNA NPH QL NAA+NON-PROBE: NOT DETECTED
B PERT DNA SPEC QL NAA+PROBE: NOT DETECTED
BASOPHILS # BLD: 0.06 K/UL (ref 0–0.2)
BASOPHILS NFR BLD: 1 % (ref 0–2)
BUN SERPL-MCNC: 12 MG/DL (ref 6–23)
C PNEUM DNA NPH QL NAA+NON-PROBE: NOT DETECTED
CALCIUM SERPL-MCNC: 8.3 MG/DL (ref 8.6–10.2)
CHLORIDE SERPL-SCNC: 112 MMOL/L (ref 98–107)
CO2 SERPL-SCNC: 17 MMOL/L (ref 22–29)
CREAT SERPL-MCNC: 0.8 MG/DL (ref 0.5–1)
EOSINOPHIL # BLD: 0.38 K/UL (ref 0.05–0.5)
EOSINOPHILS RELATIVE PERCENT: 4 % (ref 0–6)
ERYTHROCYTE [DISTWIDTH] IN BLOOD BY AUTOMATED COUNT: 13.4 % (ref 11.5–15)
FLUAV RNA NPH QL NAA+NON-PROBE: NOT DETECTED
FLUBV RNA NPH QL NAA+NON-PROBE: NOT DETECTED
GFR SERPL CREATININE-BSD FRML MDRD: >60 ML/MIN/1.73M2
GLUCOSE SERPL-MCNC: 96 MG/DL (ref 74–99)
HADV DNA NPH QL NAA+NON-PROBE: NOT DETECTED
HCOV 229E RNA NPH QL NAA+NON-PROBE: NOT DETECTED
HCOV HKU1 RNA NPH QL NAA+NON-PROBE: NOT DETECTED
HCOV NL63 RNA NPH QL NAA+NON-PROBE: NOT DETECTED
HCOV OC43 RNA NPH QL NAA+NON-PROBE: NOT DETECTED
HCT VFR BLD AUTO: 32.2 % (ref 34–48)
HGB BLD-MCNC: 10.3 G/DL (ref 11.5–15.5)
HMPV RNA NPH QL NAA+NON-PROBE: NOT DETECTED
HPIV1 RNA NPH QL NAA+NON-PROBE: NOT DETECTED
HPIV2 RNA NPH QL NAA+NON-PROBE: NOT DETECTED
HPIV3 RNA NPH QL NAA+NON-PROBE: NOT DETECTED
HPIV4 RNA NPH QL NAA+NON-PROBE: NOT DETECTED
IMM GRANULOCYTES # BLD AUTO: 0.05 K/UL (ref 0–0.58)
IMM GRANULOCYTES NFR BLD: 1 % (ref 0–5)
LYMPHOCYTES NFR BLD: 1.75 K/UL (ref 1.5–4)
LYMPHOCYTES RELATIVE PERCENT: 16 % (ref 20–42)
M PNEUMO DNA NPH QL NAA+NON-PROBE: NOT DETECTED
MAGNESIUM SERPL-MCNC: 2 MG/DL (ref 1.6–2.6)
MCH RBC QN AUTO: 30.7 PG (ref 26–35)
MCHC RBC AUTO-ENTMCNC: 32 G/DL (ref 32–34.5)
MCV RBC AUTO: 95.8 FL (ref 80–99.9)
MONOCYTES NFR BLD: 0.72 K/UL (ref 0.1–0.95)
MONOCYTES NFR BLD: 7 % (ref 2–12)
NEUTROPHILS NFR BLD: 73 % (ref 43–80)
NEUTS SEG NFR BLD: 7.92 K/UL (ref 1.8–7.3)
PARTIAL THROMBOPLASTIN TIME: 95.8 SEC (ref 24.5–35.1)
PHOSPHATE SERPL-MCNC: 3.1 MG/DL (ref 2.5–4.5)
PLATELET # BLD AUTO: 218 K/UL (ref 130–450)
PMV BLD AUTO: 10.7 FL (ref 7–12)
POTASSIUM SERPL-SCNC: 3.7 MMOL/L (ref 3.5–5)
RBC # BLD AUTO: 3.36 M/UL (ref 3.5–5.5)
RSV RNA NPH QL NAA+NON-PROBE: NOT DETECTED
RV+EV RNA NPH QL NAA+NON-PROBE: NOT DETECTED
SARS-COV-2 RNA NPH QL NAA+NON-PROBE: NOT DETECTED
SODIUM SERPL-SCNC: 142 MMOL/L (ref 132–146)
SPECIMEN DESCRIPTION: NORMAL
TROPONIN I SERPL HS-MCNC: 23 NG/L (ref 0–9)
WBC OTHER # BLD: 10.9 K/UL (ref 4.5–11.5)

## 2023-12-23 PROCEDURE — 6370000000 HC RX 637 (ALT 250 FOR IP): Performed by: CLINICAL NURSE SPECIALIST

## 2023-12-23 PROCEDURE — 31720 CLEARANCE OF AIRWAYS: CPT

## 2023-12-23 PROCEDURE — 2580000003 HC RX 258: Performed by: CLINICAL NURSE SPECIALIST

## 2023-12-23 PROCEDURE — 2580000003 HC RX 258: Performed by: PSYCHIATRY & NEUROLOGY

## 2023-12-23 PROCEDURE — 0202U NFCT DS 22 TRGT SARS-COV-2: CPT

## 2023-12-23 PROCEDURE — 80048 BASIC METABOLIC PNL TOTAL CA: CPT

## 2023-12-23 PROCEDURE — 6370000000 HC RX 637 (ALT 250 FOR IP): Performed by: INTERNAL MEDICINE

## 2023-12-23 PROCEDURE — 99231 SBSQ HOSP IP/OBS SF/LOW 25: CPT | Performed by: CLINICAL NURSE SPECIALIST

## 2023-12-23 PROCEDURE — 85025 COMPLETE CBC W/AUTO DIFF WBC: CPT

## 2023-12-23 PROCEDURE — 2060000000 HC ICU INTERMEDIATE R&B

## 2023-12-23 PROCEDURE — 83735 ASSAY OF MAGNESIUM: CPT

## 2023-12-23 PROCEDURE — 85730 THROMBOPLASTIN TIME PARTIAL: CPT

## 2023-12-23 PROCEDURE — 84100 ASSAY OF PHOSPHORUS: CPT

## 2023-12-23 PROCEDURE — 84484 ASSAY OF TROPONIN QUANT: CPT

## 2023-12-23 PROCEDURE — 6370000000 HC RX 637 (ALT 250 FOR IP)

## 2023-12-23 PROCEDURE — 99232 SBSQ HOSP IP/OBS MODERATE 35: CPT | Performed by: INTERNAL MEDICINE

## 2023-12-23 PROCEDURE — 92526 ORAL FUNCTION THERAPY: CPT | Performed by: SPEECH-LANGUAGE PATHOLOGIST

## 2023-12-23 RX ORDER — BENZONATATE 100 MG/1
100 CAPSULE ORAL 3 TIMES DAILY PRN
Status: DISCONTINUED | OUTPATIENT
Start: 2023-12-23 | End: 2023-12-26 | Stop reason: HOSPADM

## 2023-12-23 RX ORDER — AMIODARONE HYDROCHLORIDE 200 MG/1
400 TABLET ORAL 2 TIMES DAILY
Status: DISCONTINUED | OUTPATIENT
Start: 2023-12-23 | End: 2023-12-26 | Stop reason: HOSPADM

## 2023-12-23 RX ORDER — POTASSIUM CHLORIDE 20 MEQ/1
40 TABLET, EXTENDED RELEASE ORAL
Status: DISCONTINUED | OUTPATIENT
Start: 2023-12-23 | End: 2023-12-26 | Stop reason: HOSPADM

## 2023-12-23 RX ORDER — ASPIRIN 81 MG/1
81 TABLET, CHEWABLE ORAL DAILY
Status: DISCONTINUED | OUTPATIENT
Start: 2023-12-23 | End: 2023-12-23

## 2023-12-23 RX ORDER — METOPROLOL SUCCINATE 25 MG/1
12.5 TABLET, EXTENDED RELEASE ORAL DAILY
Status: DISCONTINUED | OUTPATIENT
Start: 2023-12-23 | End: 2023-12-26 | Stop reason: HOSPADM

## 2023-12-23 RX ORDER — LANOLIN ALCOHOL/MO/W.PET/CERES
3 CREAM (GRAM) TOPICAL NIGHTLY PRN
Status: DISCONTINUED | OUTPATIENT
Start: 2023-12-23 | End: 2023-12-26 | Stop reason: HOSPADM

## 2023-12-23 RX ORDER — GUAIFENESIN 400 MG/1
400 TABLET ORAL 3 TIMES DAILY
Status: DISCONTINUED | OUTPATIENT
Start: 2023-12-23 | End: 2023-12-26 | Stop reason: HOSPADM

## 2023-12-23 RX ORDER — LABETALOL HYDROCHLORIDE 5 MG/ML
10 INJECTION, SOLUTION INTRAVENOUS EVERY 6 HOURS PRN
Status: DISCONTINUED | OUTPATIENT
Start: 2023-12-23 | End: 2023-12-26 | Stop reason: HOSPADM

## 2023-12-23 RX ADMIN — GUAIFENESIN 400 MG: 400 TABLET ORAL at 14:58

## 2023-12-23 RX ADMIN — GUAIFENESIN 400 MG: 400 TABLET ORAL at 10:00

## 2023-12-23 RX ADMIN — MELATONIN 3 MG ORAL TABLET 3 MG: 3 TABLET ORAL at 21:56

## 2023-12-23 RX ADMIN — ASPIRIN 81 MG: 81 TABLET, CHEWABLE ORAL at 08:44

## 2023-12-23 RX ADMIN — SACUBITRIL AND VALSARTAN 1 TABLET: 24; 26 TABLET, FILM COATED ORAL at 08:44

## 2023-12-23 RX ADMIN — SACUBITRIL AND VALSARTAN 1 TABLET: 24; 26 TABLET, FILM COATED ORAL at 20:40

## 2023-12-23 RX ADMIN — POTASSIUM CHLORIDE 40 MEQ: 20 TABLET, EXTENDED RELEASE ORAL at 11:46

## 2023-12-23 RX ADMIN — AMIODARONE HYDROCHLORIDE 400 MG: 200 TABLET ORAL at 20:41

## 2023-12-23 RX ADMIN — APIXABAN 5 MG: 5 TABLET, FILM COATED ORAL at 20:42

## 2023-12-23 RX ADMIN — GUAIFENESIN 400 MG: 400 TABLET ORAL at 20:40

## 2023-12-23 RX ADMIN — ATORVASTATIN CALCIUM 80 MG: 40 TABLET, FILM COATED ORAL at 20:40

## 2023-12-23 RX ADMIN — SODIUM CHLORIDE, PRESERVATIVE FREE 10 ML: 5 INJECTION INTRAVENOUS at 08:44

## 2023-12-23 RX ADMIN — APIXABAN 5 MG: 5 TABLET, FILM COATED ORAL at 08:44

## 2023-12-23 RX ADMIN — SODIUM CHLORIDE, PRESERVATIVE FREE 10 ML: 5 INJECTION INTRAVENOUS at 20:43

## 2023-12-23 RX ADMIN — METOPROLOL SUCCINATE 12.5 MG: 25 TABLET, EXTENDED RELEASE ORAL at 08:44

## 2023-12-23 RX ADMIN — BENZONATATE 100 MG: 100 CAPSULE ORAL at 12:03

## 2023-12-23 RX ADMIN — AMIODARONE HYDROCHLORIDE 400 MG: 200 TABLET ORAL at 08:44

## 2023-12-23 NOTE — PLAN OF CARE
Problem: Discharge Planning  Goal: Discharge to home or other facility with appropriate resources  12/23/2023 0908 by Uzma Mcmahan RN  Outcome: Progressing  Flowsheets (Taken 12/23/2023 0800)  Discharge to home or other facility with appropriate resources: Identify barriers to discharge with patient and caregiver     Problem: Pain  Goal: Verbalizes/displays adequate comfort level or baseline comfort level  12/23/2023 0908 by Uzma Mcmahan RN  Outcome: Progressing  Flowsheets (Taken 12/23/2023 0800)  Verbalizes/displays adequate comfort level or baseline comfort level: Encourage patient to monitor pain and request assistance     Problem: Safety - Adult  Goal: Free from fall injury  12/23/2023 0908 by Uzma Mcmahan RN  Outcome: Progressing     Problem: Skin/Tissue Integrity  Goal: Absence of new skin breakdown  Description: 1. Monitor for areas of redness and/or skin breakdown  2. Assess vascular access sites hourly  3. Every 4-6 hours minimum:  Change oxygen saturation probe site  4. Every 4-6 hours:  If on nasal continuous positive airway pressure, respiratory therapy assess nares and determine need for appliance change or resting period.   Outcome: Progressing     Problem: ABCDS Injury Assessment  Goal: Absence of physical injury  Outcome: Progressing     Problem: Neurosensory - Adult  Goal: Achieves stable or improved neurological status  12/23/2023 0908 by Uzma Mcmahan RN  Outcome: Progressing     Problem: Neurosensory - Adult  Goal: Absence of seizures  12/23/2023 0908 by Uzma Mcmahan RN  Outcome: Progressing

## 2023-12-23 NOTE — PLAN OF CARE
Problem: Discharge Planning  Goal: Discharge to home or other facility with appropriate resources  12/22/2023 2041 by Linette Grier  Outcome: Progressing  12/22/2023 1113 by Yamel Borja RN  Outcome: Progressing     Problem: Pain  Goal: Verbalizes/displays adequate comfort level or baseline comfort level  12/22/2023 2041 by Linette Grier  Outcome: Progressing  12/22/2023 1113 by Yamel Borja RN  Outcome: Progressing     Problem: Safety - Adult  Goal: Free from fall injury  12/22/2023 2041 by Linette Grier  Outcome: Progressing  12/22/2023 1113 by Yamel Borja RN  Outcome: Progressing     Problem: Skin/Tissue Integrity  Goal: Absence of new skin breakdown  Description: 1. Monitor for areas of redness and/or skin breakdown  2. Assess vascular access sites hourly  3. Every 4-6 hours minimum:  Change oxygen saturation probe site  4. Every 4-6 hours:  If on nasal continuous positive airway pressure, respiratory therapy assess nares and determine need for appliance change or resting period.   12/22/2023 1113 by Yamel Borja RN  Outcome: Progressing     Problem: ABCDS Injury Assessment  Goal: Absence of physical injury  12/22/2023 1113 by Yamel Borja RN  Outcome: Progressing     Problem: Neurosensory - Adult  Goal: Achieves stable or improved neurological status  12/22/2023 2041 by Linette Grier  Outcome: Progressing  12/22/2023 1113 by Yamel Borja RN  Outcome: Progressing  Goal: Absence of seizures  12/22/2023 2041 by Linette Grier  Outcome: Progressing  12/22/2023 1113 by Yamel Borja RN  Outcome: Progressing  Goal: Achieves maximal functionality and self care  12/22/2023 2041 by Linette Grier  Outcome: Progressing  12/22/2023 1113 by Yamel Borja RN  Outcome: Progressing     Problem: Chronic Conditions and Co-morbidities  Goal: Patient's chronic conditions and co-morbidity symptoms are monitored and maintained or improved  12/22/2023 1113 by Yamel Borja RN  Outcome: Progressing

## 2023-12-24 LAB
ANION GAP SERPL CALCULATED.3IONS-SCNC: 13 MMOL/L (ref 7–16)
ANION GAP SERPL CALCULATED.3IONS-SCNC: 16 MMOL/L (ref 7–16)
BASOPHILS # BLD: 0.07 K/UL (ref 0–0.2)
BASOPHILS NFR BLD: 1 % (ref 0–2)
BUN SERPL-MCNC: 7 MG/DL (ref 6–23)
BUN SERPL-MCNC: 7 MG/DL (ref 6–23)
CALCIUM SERPL-MCNC: 8.6 MG/DL (ref 8.6–10.2)
CALCIUM SERPL-MCNC: 8.9 MG/DL (ref 8.6–10.2)
CHLORIDE SERPL-SCNC: 108 MMOL/L (ref 98–107)
CHLORIDE SERPL-SCNC: 109 MMOL/L (ref 98–107)
CO2 SERPL-SCNC: 17 MMOL/L (ref 22–29)
CO2 SERPL-SCNC: 19 MMOL/L (ref 22–29)
CREAT SERPL-MCNC: 0.8 MG/DL (ref 0.5–1)
CREAT SERPL-MCNC: 0.9 MG/DL (ref 0.5–1)
EOSINOPHIL # BLD: 0.45 K/UL (ref 0.05–0.5)
EOSINOPHILS RELATIVE PERCENT: 4 % (ref 0–6)
ERYTHROCYTE [DISTWIDTH] IN BLOOD BY AUTOMATED COUNT: 13.1 % (ref 11.5–15)
GFR SERPL CREATININE-BSD FRML MDRD: >60 ML/MIN/1.73M2
GFR SERPL CREATININE-BSD FRML MDRD: >60 ML/MIN/1.73M2
GLUCOSE SERPL-MCNC: 102 MG/DL (ref 74–99)
GLUCOSE SERPL-MCNC: 121 MG/DL (ref 74–99)
HCT VFR BLD AUTO: 37.2 % (ref 34–48)
HGB BLD-MCNC: 11.8 G/DL (ref 11.5–15.5)
IMM GRANULOCYTES # BLD AUTO: 0.06 K/UL (ref 0–0.58)
IMM GRANULOCYTES NFR BLD: 1 % (ref 0–5)
LYMPHOCYTES NFR BLD: 1.54 K/UL (ref 1.5–4)
LYMPHOCYTES RELATIVE PERCENT: 13 % (ref 20–42)
MAGNESIUM SERPL-MCNC: 2 MG/DL (ref 1.6–2.6)
MCH RBC QN AUTO: 29.7 PG (ref 26–35)
MCHC RBC AUTO-ENTMCNC: 31.7 G/DL (ref 32–34.5)
MCV RBC AUTO: 93.7 FL (ref 80–99.9)
MONOCYTES NFR BLD: 0.79 K/UL (ref 0.1–0.95)
MONOCYTES NFR BLD: 7 % (ref 2–12)
NEUTROPHILS NFR BLD: 76 % (ref 43–80)
NEUTS SEG NFR BLD: 9.22 K/UL (ref 1.8–7.3)
PHOSPHATE SERPL-MCNC: 2.5 MG/DL (ref 2.5–4.5)
PLATELET # BLD AUTO: 256 K/UL (ref 130–450)
PMV BLD AUTO: 10.2 FL (ref 7–12)
POTASSIUM SERPL-SCNC: 3.8 MMOL/L (ref 3.5–5)
POTASSIUM SERPL-SCNC: 4.4 MMOL/L (ref 3.5–5)
RBC # BLD AUTO: 3.97 M/UL (ref 3.5–5.5)
SODIUM SERPL-SCNC: 141 MMOL/L (ref 132–146)
SODIUM SERPL-SCNC: 141 MMOL/L (ref 132–146)
WBC OTHER # BLD: 12.1 K/UL (ref 4.5–11.5)

## 2023-12-24 PROCEDURE — 2060000000 HC ICU INTERMEDIATE R&B

## 2023-12-24 PROCEDURE — 36415 COLL VENOUS BLD VENIPUNCTURE: CPT

## 2023-12-24 PROCEDURE — 6370000000 HC RX 637 (ALT 250 FOR IP): Performed by: CLINICAL NURSE SPECIALIST

## 2023-12-24 PROCEDURE — 84100 ASSAY OF PHOSPHORUS: CPT

## 2023-12-24 PROCEDURE — 85025 COMPLETE CBC W/AUTO DIFF WBC: CPT

## 2023-12-24 PROCEDURE — 99231 SBSQ HOSP IP/OBS SF/LOW 25: CPT | Performed by: INTERNAL MEDICINE

## 2023-12-24 PROCEDURE — 97530 THERAPEUTIC ACTIVITIES: CPT

## 2023-12-24 PROCEDURE — 83735 ASSAY OF MAGNESIUM: CPT

## 2023-12-24 PROCEDURE — 2580000003 HC RX 258: Performed by: CLINICAL NURSE SPECIALIST

## 2023-12-24 PROCEDURE — 6370000000 HC RX 637 (ALT 250 FOR IP)

## 2023-12-24 PROCEDURE — 80048 BASIC METABOLIC PNL TOTAL CA: CPT

## 2023-12-24 RX ORDER — POTASSIUM CHLORIDE 7.45 MG/ML
10 INJECTION INTRAVENOUS
Status: DISCONTINUED | OUTPATIENT
Start: 2023-12-24 | End: 2023-12-24

## 2023-12-24 RX ORDER — LANOLIN ALCOHOL/MO/W.PET/CERES
3 CREAM (GRAM) TOPICAL ONCE
Status: COMPLETED | OUTPATIENT
Start: 2023-12-24 | End: 2023-12-24

## 2023-12-24 RX ADMIN — SODIUM CHLORIDE, PRESERVATIVE FREE 10 ML: 5 INJECTION INTRAVENOUS at 08:13

## 2023-12-24 RX ADMIN — SODIUM CHLORIDE, PRESERVATIVE FREE 10 ML: 5 INJECTION INTRAVENOUS at 20:08

## 2023-12-24 RX ADMIN — AMIODARONE HYDROCHLORIDE 400 MG: 200 TABLET ORAL at 08:13

## 2023-12-24 RX ADMIN — MELATONIN 3 MG ORAL TABLET 3 MG: 3 TABLET ORAL at 22:42

## 2023-12-24 RX ADMIN — APIXABAN 5 MG: 5 TABLET, FILM COATED ORAL at 20:08

## 2023-12-24 RX ADMIN — APIXABAN 5 MG: 5 TABLET, FILM COATED ORAL at 08:13

## 2023-12-24 RX ADMIN — GUAIFENESIN 400 MG: 400 TABLET ORAL at 20:07

## 2023-12-24 RX ADMIN — SACUBITRIL AND VALSARTAN 1 TABLET: 24; 26 TABLET, FILM COATED ORAL at 08:13

## 2023-12-24 RX ADMIN — METOPROLOL SUCCINATE 12.5 MG: 25 TABLET, EXTENDED RELEASE ORAL at 08:13

## 2023-12-24 RX ADMIN — AMIODARONE HYDROCHLORIDE 400 MG: 200 TABLET ORAL at 20:07

## 2023-12-24 RX ADMIN — GUAIFENESIN 400 MG: 400 TABLET ORAL at 08:13

## 2023-12-24 RX ADMIN — POTASSIUM CHLORIDE 40 MEQ: 20 TABLET, EXTENDED RELEASE ORAL at 08:13

## 2023-12-24 RX ADMIN — MELATONIN 3 MG ORAL TABLET 3 MG: 3 TABLET ORAL at 20:08

## 2023-12-24 RX ADMIN — ATORVASTATIN CALCIUM 80 MG: 40 TABLET, FILM COATED ORAL at 20:07

## 2023-12-24 RX ADMIN — GUAIFENESIN 400 MG: 400 TABLET ORAL at 13:27

## 2023-12-24 RX ADMIN — SACUBITRIL AND VALSARTAN 1 TABLET: 24; 26 TABLET, FILM COATED ORAL at 20:13

## 2023-12-24 RX ADMIN — BENZONATATE 100 MG: 100 CAPSULE ORAL at 22:46

## 2023-12-24 NOTE — PLAN OF CARE
Problem: Discharge Planning  Goal: Discharge to home or other facility with appropriate resources  Outcome: Progressing     Problem: Pain  Goal: Verbalizes/displays adequate comfort level or baseline comfort level  Outcome: Progressing     Problem: Safety - Adult  Goal: Free from fall injury  Outcome: Progressing     Problem: Skin/Tissue Integrity  Goal: Absence of new skin breakdown  Description: 1. Monitor for areas of redness and/or skin breakdown  2. Assess vascular access sites hourly  3. Every 4-6 hours minimum:  Change oxygen saturation probe site  4. Every 4-6 hours:  If on nasal continuous positive airway pressure, respiratory therapy assess nares and determine need for appliance change or resting period. Outcome: Progressing     Problem: ABCDS Injury Assessment  Goal: Absence of physical injury  Outcome: Progressing     Problem: Neurosensory - Adult  Goal: Achieves stable or improved neurological status  Outcome: Progressing  Goal: Absence of seizures  Outcome: Progressing  Goal: Achieves maximal functionality and self care  Outcome: Progressing     Problem: Chronic Conditions and Co-morbidities  Goal: Patient's chronic conditions and co-morbidity symptoms are monitored and maintained or improved  Outcome: Progressing     Problem: Confusion  Goal: Confusion, delirium, dementia, or psychosis is improved or at baseline  Description: INTERVENTIONS:  1. Assess for possible contributors to thought disturbance, including medications, impaired vision or hearing, underlying metabolic abnormalities, dehydration, psychiatric diagnoses, and notify attending LIP  2. [place] high risk fall precautions, as indicated  3. Provide frequent short contacts to provide reality reorientation, refocusing and direction  4. Decrease environmental stimuli, including noise as appropriate  5. Monitor and intervene to maintain adequate nutrition, hydration, elimination, sleep and activity  6.  If unable to ensure safety

## 2023-12-25 LAB
ANION GAP SERPL CALCULATED.3IONS-SCNC: 11 MMOL/L (ref 7–16)
BASOPHILS # BLD: 0.08 K/UL (ref 0–0.2)
BASOPHILS NFR BLD: 1 % (ref 0–2)
BNP SERPL-MCNC: 2212 PG/ML (ref 0–450)
BUN SERPL-MCNC: 8 MG/DL (ref 6–23)
CALCIUM SERPL-MCNC: 8.8 MG/DL (ref 8.6–10.2)
CHLORIDE SERPL-SCNC: 108 MMOL/L (ref 98–107)
CO2 SERPL-SCNC: 20 MMOL/L (ref 22–29)
CREAT SERPL-MCNC: 0.9 MG/DL (ref 0.5–1)
EOSINOPHIL # BLD: 0.64 K/UL (ref 0.05–0.5)
EOSINOPHILS RELATIVE PERCENT: 5 % (ref 0–6)
ERYTHROCYTE [DISTWIDTH] IN BLOOD BY AUTOMATED COUNT: 13.2 % (ref 11.5–15)
GFR SERPL CREATININE-BSD FRML MDRD: >60 ML/MIN/1.73M2
GLUCOSE SERPL-MCNC: 128 MG/DL (ref 74–99)
HCT VFR BLD AUTO: 39.1 % (ref 34–48)
HGB BLD-MCNC: 12.6 G/DL (ref 11.5–15.5)
IMM GRANULOCYTES # BLD AUTO: 0.07 K/UL (ref 0–0.58)
IMM GRANULOCYTES NFR BLD: 1 % (ref 0–5)
LYMPHOCYTES NFR BLD: 1.7 K/UL (ref 1.5–4)
LYMPHOCYTES RELATIVE PERCENT: 13 % (ref 20–42)
MAGNESIUM SERPL-MCNC: 1.8 MG/DL (ref 1.6–2.6)
MAGNESIUM SERPL-MCNC: 1.8 MG/DL (ref 1.6–2.6)
MAGNESIUM SERPL-MCNC: 2.2 MG/DL (ref 1.6–2.6)
MCH RBC QN AUTO: 30.1 PG (ref 26–35)
MCHC RBC AUTO-ENTMCNC: 32.2 G/DL (ref 32–34.5)
MCV RBC AUTO: 93.5 FL (ref 80–99.9)
MONOCYTES NFR BLD: 0.84 K/UL (ref 0.1–0.95)
MONOCYTES NFR BLD: 7 % (ref 2–12)
NEUTROPHILS NFR BLD: 74 % (ref 43–80)
NEUTS SEG NFR BLD: 9.46 K/UL (ref 1.8–7.3)
PHOSPHATE SERPL-MCNC: 3.5 MG/DL (ref 2.5–4.5)
PLATELET # BLD AUTO: 304 K/UL (ref 130–450)
PMV BLD AUTO: 9.9 FL (ref 7–12)
POTASSIUM SERPL-SCNC: 4.9 MMOL/L (ref 3.5–5)
PROCALCITONIN SERPL-MCNC: 0.05 NG/ML (ref 0–0.08)
RBC # BLD AUTO: 4.18 M/UL (ref 3.5–5.5)
SODIUM SERPL-SCNC: 139 MMOL/L (ref 132–146)
WBC OTHER # BLD: 12.8 K/UL (ref 4.5–11.5)

## 2023-12-25 PROCEDURE — 2060000000 HC ICU INTERMEDIATE R&B

## 2023-12-25 PROCEDURE — 6370000000 HC RX 637 (ALT 250 FOR IP): Performed by: CLINICAL NURSE SPECIALIST

## 2023-12-25 PROCEDURE — 84145 PROCALCITONIN (PCT): CPT

## 2023-12-25 PROCEDURE — 6370000000 HC RX 637 (ALT 250 FOR IP)

## 2023-12-25 PROCEDURE — 83735 ASSAY OF MAGNESIUM: CPT

## 2023-12-25 PROCEDURE — 6360000002 HC RX W HCPCS

## 2023-12-25 PROCEDURE — 2580000003 HC RX 258: Performed by: CLINICAL NURSE SPECIALIST

## 2023-12-25 PROCEDURE — 85025 COMPLETE CBC W/AUTO DIFF WBC: CPT

## 2023-12-25 PROCEDURE — 84100 ASSAY OF PHOSPHORUS: CPT

## 2023-12-25 PROCEDURE — 83880 ASSAY OF NATRIURETIC PEPTIDE: CPT

## 2023-12-25 PROCEDURE — 36415 COLL VENOUS BLD VENIPUNCTURE: CPT

## 2023-12-25 PROCEDURE — 80048 BASIC METABOLIC PNL TOTAL CA: CPT

## 2023-12-25 RX ORDER — MAGNESIUM SULFATE IN WATER 40 MG/ML
2000 INJECTION, SOLUTION INTRAVENOUS ONCE
Status: COMPLETED | OUTPATIENT
Start: 2023-12-25 | End: 2023-12-25

## 2023-12-25 RX ADMIN — METOPROLOL SUCCINATE 12.5 MG: 25 TABLET, EXTENDED RELEASE ORAL at 08:41

## 2023-12-25 RX ADMIN — GUAIFENESIN 400 MG: 400 TABLET ORAL at 15:45

## 2023-12-25 RX ADMIN — GUAIFENESIN 400 MG: 400 TABLET ORAL at 20:30

## 2023-12-25 RX ADMIN — APIXABAN 5 MG: 5 TABLET, FILM COATED ORAL at 20:30

## 2023-12-25 RX ADMIN — MAGNESIUM SULFATE HEPTAHYDRATE 2000 MG: 40 INJECTION, SOLUTION INTRAVENOUS at 08:34

## 2023-12-25 RX ADMIN — SACUBITRIL AND VALSARTAN 1 TABLET: 24; 26 TABLET, FILM COATED ORAL at 20:30

## 2023-12-25 RX ADMIN — POTASSIUM CHLORIDE 40 MEQ: 20 TABLET, EXTENDED RELEASE ORAL at 08:43

## 2023-12-25 RX ADMIN — AMIODARONE HYDROCHLORIDE 400 MG: 200 TABLET ORAL at 08:43

## 2023-12-25 RX ADMIN — MELATONIN 3 MG ORAL TABLET 3 MG: 3 TABLET ORAL at 21:28

## 2023-12-25 RX ADMIN — SODIUM CHLORIDE, PRESERVATIVE FREE 10 ML: 5 INJECTION INTRAVENOUS at 08:40

## 2023-12-25 RX ADMIN — SACUBITRIL AND VALSARTAN 1 TABLET: 24; 26 TABLET, FILM COATED ORAL at 08:44

## 2023-12-25 RX ADMIN — APIXABAN 5 MG: 5 TABLET, FILM COATED ORAL at 08:43

## 2023-12-25 RX ADMIN — AMIODARONE HYDROCHLORIDE 400 MG: 200 TABLET ORAL at 20:30

## 2023-12-25 RX ADMIN — ATORVASTATIN CALCIUM 80 MG: 40 TABLET, FILM COATED ORAL at 20:30

## 2023-12-25 NOTE — PLAN OF CARE
Problem: Discharge Planning  Goal: Discharge to home or other facility with appropriate resources  12/25/2023 1407 by Gisselle Juares RN  Outcome: Progressing     Problem: Pain  Goal: Verbalizes/displays adequate comfort level or baseline comfort level  12/25/2023 1407 by Gisselle Juares RN  Outcome: Progressing     Problem: Safety - Adult  Goal: Free from fall injury  12/25/2023 1407 by Gisselle Juares RN  Outcome: Progressing     Problem: Skin/Tissue Integrity  Goal: Absence of new skin breakdown  Description: 1. Monitor for areas of redness and/or skin breakdown  2. Assess vascular access sites hourly  3. Every 4-6 hours minimum:  Change oxygen saturation probe site  4. Every 4-6 hours:  If on nasal continuous positive airway pressure, respiratory therapy assess nares and determine need for appliance change or resting period. 12/25/2023 1407 by Gisselle Juares RN  Outcome: Progressing     Problem: ABCDS Injury Assessment  Goal: Absence of physical injury  12/25/2023 1407 by Gisselle Juares RN  Outcome: Progressing     Problem: Neurosensory - Adult  Goal: Achieves stable or improved neurological status  12/25/2023 1407 by Gisselle Juares RN  Outcome: Progressing     Problem: Neurosensory - Adult  Goal: Absence of seizures  12/25/2023 1407 by Gisselle Juares RN  Outcome: Progressing     Problem: Neurosensory - Adult  Goal: Achieves maximal functionality and self care  12/25/2023 1407 by Gisselle Juares RN  Outcome: Progressing     Problem: Chronic Conditions and Co-morbidities  Goal: Patient's chronic conditions and co-morbidity symptoms are monitored and maintained or improved  12/25/2023 1407 by Gisselle Juares RN  Outcome: Progressing     Problem: Confusion  Goal: Confusion, delirium, dementia, or psychosis is improved or at baseline  Description: INTERVENTIONS:  1.  Assess for possible contributors to thought disturbance, including medications, impaired vision

## 2023-12-26 VITALS
BODY MASS INDEX: 26.9 KG/M2 | WEIGHT: 151.8 LBS | HEART RATE: 70 BPM | RESPIRATION RATE: 16 BRPM | SYSTOLIC BLOOD PRESSURE: 119 MMHG | TEMPERATURE: 97.6 F | HEIGHT: 63 IN | DIASTOLIC BLOOD PRESSURE: 68 MMHG | OXYGEN SATURATION: 95 %

## 2023-12-26 LAB
ANION GAP SERPL CALCULATED.3IONS-SCNC: 15 MMOL/L (ref 7–16)
BASOPHILS # BLD: 0.09 K/UL (ref 0–0.2)
BASOPHILS NFR BLD: 1 % (ref 0–2)
BUN SERPL-MCNC: 8 MG/DL (ref 6–23)
CALCIUM SERPL-MCNC: 9.1 MG/DL (ref 8.6–10.2)
CHLORIDE SERPL-SCNC: 103 MMOL/L (ref 98–107)
CO2 SERPL-SCNC: 19 MMOL/L (ref 22–29)
CREAT SERPL-MCNC: 1 MG/DL (ref 0.5–1)
EOSINOPHIL # BLD: 0.47 K/UL (ref 0.05–0.5)
EOSINOPHILS RELATIVE PERCENT: 4 % (ref 0–6)
ERYTHROCYTE [DISTWIDTH] IN BLOOD BY AUTOMATED COUNT: 13.2 % (ref 11.5–15)
GFR SERPL CREATININE-BSD FRML MDRD: 59 ML/MIN/1.73M2
GLUCOSE SERPL-MCNC: 107 MG/DL (ref 74–99)
HCT VFR BLD AUTO: 42 % (ref 34–48)
HGB BLD-MCNC: 13.7 G/DL (ref 11.5–15.5)
IMM GRANULOCYTES # BLD AUTO: 0.06 K/UL (ref 0–0.58)
IMM GRANULOCYTES NFR BLD: 1 % (ref 0–5)
LYMPHOCYTES NFR BLD: 1.84 K/UL (ref 1.5–4)
LYMPHOCYTES RELATIVE PERCENT: 14 % (ref 20–42)
MAGNESIUM SERPL-MCNC: 2.4 MG/DL (ref 1.6–2.6)
MCH RBC QN AUTO: 30.1 PG (ref 26–35)
MCHC RBC AUTO-ENTMCNC: 32.6 G/DL (ref 32–34.5)
MCV RBC AUTO: 92.3 FL (ref 80–99.9)
MONOCYTES NFR BLD: 0.75 K/UL (ref 0.1–0.95)
MONOCYTES NFR BLD: 6 % (ref 2–12)
NEUTROPHILS NFR BLD: 76 % (ref 43–80)
NEUTS SEG NFR BLD: 9.92 K/UL (ref 1.8–7.3)
PHOSPHATE SERPL-MCNC: 4.3 MG/DL (ref 2.5–4.5)
PLATELET # BLD AUTO: 365 K/UL (ref 130–450)
PMV BLD AUTO: 9.8 FL (ref 7–12)
POTASSIUM SERPL-SCNC: 4.7 MMOL/L (ref 3.5–5)
RBC # BLD AUTO: 4.55 M/UL (ref 3.5–5.5)
SODIUM SERPL-SCNC: 137 MMOL/L (ref 132–146)
WBC OTHER # BLD: 13.1 K/UL (ref 4.5–11.5)

## 2023-12-26 PROCEDURE — 97535 SELF CARE MNGMENT TRAINING: CPT

## 2023-12-26 PROCEDURE — 97530 THERAPEUTIC ACTIVITIES: CPT

## 2023-12-26 PROCEDURE — 83735 ASSAY OF MAGNESIUM: CPT

## 2023-12-26 PROCEDURE — 36415 COLL VENOUS BLD VENIPUNCTURE: CPT

## 2023-12-26 PROCEDURE — 6370000000 HC RX 637 (ALT 250 FOR IP): Performed by: CLINICAL NURSE SPECIALIST

## 2023-12-26 PROCEDURE — 80048 BASIC METABOLIC PNL TOTAL CA: CPT

## 2023-12-26 PROCEDURE — 99239 HOSP IP/OBS DSCHRG MGMT >30: CPT | Performed by: INTERNAL MEDICINE

## 2023-12-26 PROCEDURE — 84100 ASSAY OF PHOSPHORUS: CPT

## 2023-12-26 PROCEDURE — 2580000003 HC RX 258: Performed by: CLINICAL NURSE SPECIALIST

## 2023-12-26 PROCEDURE — 85025 COMPLETE CBC W/AUTO DIFF WBC: CPT

## 2023-12-26 RX ORDER — AMIODARONE HYDROCHLORIDE 400 MG/1
400 TABLET ORAL 2 TIMES DAILY
Qty: 15 TABLET | Refills: 0 | Status: SHIPPED | OUTPATIENT
Start: 2023-12-26 | End: 2024-01-03

## 2023-12-26 RX ORDER — METOPROLOL SUCCINATE 25 MG/1
12.5 TABLET, EXTENDED RELEASE ORAL DAILY
Qty: 30 TABLET | Refills: 3 | Status: SHIPPED | OUTPATIENT
Start: 2023-12-27

## 2023-12-26 RX ORDER — ATORVASTATIN CALCIUM 80 MG/1
80 TABLET, FILM COATED ORAL NIGHTLY
Qty: 30 TABLET | Refills: 3 | Status: SHIPPED | OUTPATIENT
Start: 2023-12-26

## 2023-12-26 RX ORDER — AMIODARONE HYDROCHLORIDE 200 MG/1
200 TABLET ORAL 2 TIMES DAILY
Qty: 60 TABLET | Refills: 1 | Status: SHIPPED | OUTPATIENT
Start: 2023-12-26 | End: 2024-02-24

## 2023-12-26 RX ADMIN — AMIODARONE HYDROCHLORIDE 400 MG: 200 TABLET ORAL at 09:07

## 2023-12-26 RX ADMIN — APIXABAN 5 MG: 5 TABLET, FILM COATED ORAL at 09:07

## 2023-12-26 RX ADMIN — SODIUM CHLORIDE, PRESERVATIVE FREE 10 ML: 5 INJECTION INTRAVENOUS at 09:08

## 2023-12-26 RX ADMIN — GUAIFENESIN 400 MG: 400 TABLET ORAL at 09:07

## 2023-12-26 RX ADMIN — SACUBITRIL AND VALSARTAN 1 TABLET: 24; 26 TABLET, FILM COATED ORAL at 09:07

## 2023-12-26 RX ADMIN — ACETAMINOPHEN 650 MG: 325 TABLET ORAL at 14:33

## 2023-12-26 RX ADMIN — METOPROLOL SUCCINATE 12.5 MG: 25 TABLET, EXTENDED RELEASE ORAL at 09:07

## 2023-12-26 RX ADMIN — POTASSIUM CHLORIDE 40 MEQ: 20 TABLET, EXTENDED RELEASE ORAL at 09:07

## 2023-12-26 NOTE — DISCHARGE SUMMARY
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 Ischemic cardiomyopathy   Increased Troponin likely 2/2 demand ischemia vs ? 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 female  with no past medical hx, presented after Patient was found by her son with her last well known time at 1 PM, she had a fall with right sided weakness and facial droop. On ED presentation, Patient had sponteanous eye opening but was aphasic and unable to follow commands. She had no abnormal body movements, no head trauma, no LOC, bowel/bladder incontinence. In the ED, Patient had wide complex tachycardia in ED and was cardioverted. (200 J) X1, She was given amiodarone bolus and was on Amiodarone drip. She had A fib rate controlled on transfer to neuro ICU and was on amio drip. In the ED CT findings showed left MCA stroke. She had elevated troponin and ANTONIO on labs. She had thrombectomy and was transferred to neuro ICU     We admitted her for further evaluation and management for stroke.     Post

## 2023-12-26 NOTE — PLAN OF CARE
I called Patient's son to explain him of the medications prescribed to he over the course of hospital stay and her follow up hospital visits with PCP, cardiology and interventional neurology. The same was explained to the patient as well. Meds to beds was prescribed.     Electronically signed by Alvarez Shore MD on 12/26/2023 at 1:38 PM

## 2023-12-26 NOTE — PLAN OF CARE
Problem: Discharge Planning  Goal: Discharge to home or other facility with appropriate resources  12/25/2023 2252 by Owen Pereira RN  Outcome: Progressing  12/25/2023 1407 by Kristi Mayes RN  Outcome: Progressing     Problem: Pain  Goal: Verbalizes/displays adequate comfort level or baseline comfort level  12/25/2023 2252 by Owen Pereira RN  Outcome: Progressing  12/25/2023 1407 by Kristi Mayes RN  Outcome: Progressing     Problem: Safety - Adult  Goal: Free from fall injury  12/25/2023 2252 by Owen Pereira RN  Outcome: Progressing  12/25/2023 1407 by Kristi Mayes RN  Outcome: Progressing     Problem: Skin/Tissue Integrity  Goal: Absence of new skin breakdown  Description: 1. Monitor for areas of redness and/or skin breakdown  2. Assess vascular access sites hourly  3. Every 4-6 hours minimum:  Change oxygen saturation probe site  4. Every 4-6 hours:  If on nasal continuous positive airway pressure, respiratory therapy assess nares and determine need for appliance change or resting period.   12/25/2023 2252 by Owen Pereira RN  Outcome: Progressing  12/25/2023 1407 by Kristi Mayes RN  Outcome: Progressing     Problem: ABCDS Injury Assessment  Goal: Absence of physical injury  12/25/2023 2252 by Owen Pereira RN  Outcome: Progressing  12/25/2023 1407 by Kristi Mayes RN  Outcome: Progressing     Problem: Neurosensory - Adult  Goal: Achieves stable or improved neurological status  12/25/2023 2252 by Owen Pereira RN  Outcome: Progressing  12/25/2023 1407 by Kristi Mayes RN  Outcome: Progressing  Goal: Absence of seizures  12/25/2023 2252 by Owen Pereira RN  Outcome: Progressing  12/25/2023 1407 by Kristi Mayes RN  Outcome: Progressing  Goal: Achieves maximal functionality and self care  12/25/2023 2252 by Owen Pereira RN  Outcome: Progressing  12/25/2023 1407 by Kristi Mayes RN  Outcome: Progressing     Problem: Chronic

## 2023-12-26 NOTE — HOME CARE
[address] [facility] referral received. Spoke with patient's son, demographics verified. Plan to see after discharge. Jeffrey Smith  [facility].

## 2023-12-26 NOTE — CARE COORDINATION
Chart reviewed and case reviewed in IDR. Discharge orders noted. Spoke with therapy, patient is impulsive but is capable to to go with her family and [address] at this time. Discussed FWW and therapy felt the device was not necessary as patient was not using it properly. Patient does not have a PCP. New PCP appointment scheduled for January 11th with Dr Savanna Yanes and added to AVS.  Spoke with Dr Vianca Zhang and he will follow [address] until new PCP can be established. Call placed to [facility]. and they are out of network with the patient's insurance. Spoke with Sarah, gail SOC this week. Call placed to [facility] and spoke with Etta Adams, start of care will be Saturday and they are agreeable to Dr Vianca Zhang following until new PCP is established. Home care orders received. AVS updated appropriately. Charge nurse Ning Gunn updated. Will continue to follow.      Cherelle Benítez RN.  [facility]  [phone]

## 2023-12-27 LAB
EKG ATRIAL RATE: 76 BPM
EKG P AXIS: 62 DEGREES
EKG P-R INTERVAL: 192 MS
EKG Q-T INTERVAL: 408 MS
EKG QRS DURATION: 70 MS
EKG QTC CALCULATION (BAZETT): 459 MS
EKG R AXIS: 57 DEGREES
EKG T AXIS: 86 DEGREES
EKG VENTRICULAR RATE: 76 BPM

## 2024-01-09 ENCOUNTER — OFFICE VISIT (OUTPATIENT)
Dept: INTERNAL MEDICINE | Age: 79
End: 2024-01-09

## 2024-01-09 VITALS
RESPIRATION RATE: 16 BRPM | SYSTOLIC BLOOD PRESSURE: 126 MMHG | BODY MASS INDEX: 24.98 KG/M2 | OXYGEN SATURATION: 95 % | HEART RATE: 77 BPM | TEMPERATURE: 97.2 F | WEIGHT: 141 LBS | HEIGHT: 63 IN | DIASTOLIC BLOOD PRESSURE: 75 MMHG

## 2024-01-09 DIAGNOSIS — Z09 HOSPITAL DISCHARGE FOLLOW-UP: ICD-10-CM

## 2024-01-09 DIAGNOSIS — I48.92 ATRIAL FLUTTER, UNSPECIFIED TYPE (HCC): ICD-10-CM

## 2024-01-09 DIAGNOSIS — G47.9 DIFFICULTY SLEEPING: ICD-10-CM

## 2024-01-09 DIAGNOSIS — R05.3 CHRONIC COUGH: Primary | ICD-10-CM

## 2024-01-09 RX ORDER — AMIODARONE HYDROCHLORIDE 200 MG/1
200 TABLET ORAL 2 TIMES DAILY
Qty: 60 TABLET | Refills: 1 | Status: CANCELLED | OUTPATIENT
Start: 2024-01-09 | End: 2024-03-09

## 2024-01-09 RX ORDER — LANOLIN ALCOHOL/MO/W.PET/CERES
3 CREAM (GRAM) TOPICAL DAILY
Qty: 30 TABLET | Refills: 2 | Status: SHIPPED | OUTPATIENT
Start: 2024-01-09

## 2024-01-09 NOTE — PROGRESS NOTES
[facility]  Internal Medicine Residency Clinic    Attending Physician Statement  I have discussed the case, including pertinent history and exam findings with the resident physician.{Blank single:00770::\" \",\"I have seen and examined the patient and the key elements of the encounter have been performed by me.\"} I agree with the assessment, plan and orders as documented by the resident. I have reviewed the relevant PMHx, PSHx, FamHx, SocialHx, medications, and allergies and updated history as appropriate.    {Blank single:14098::\"Patient presents for a 1 week follow up appointment\",\"Patient presents for Medicare Annual Wellness visit\",\"Patient presents for hospital follow up appointment\",\"Patient presents for routine follow up of medical problems.\",\"Patient presents for a new patient appointment to establish care.\",\"Patient presents for emergency room follow up of medical problems.\"}     Remainder of medical problems as per resident note.    Howard Deluca MD  1/9/2024 3:44 PM

## 2024-01-09 NOTE — PROGRESS NOTES
Post-Discharge Transitional Care  Follow Up      Nikki Moffett   YOB: 1945    Date of Office Visit:  1/9/2024  Date of Hospital Admission: 12/21/23  Date of Hospital Discharge: 12/26/23  Risk of hospital readmission (high >=14%. Medium >=10%) :Readmission Risk Score: 10      Care management risk score Rising risk (score 2-5) and Complex Care (Scores >=6): No Risk Score On File     Non face to face  following discharge, date last encounter closed (first attempt may have been earlier): *No documented post hospital discharge outreach found in the last 14 days    Call initiated 2 business days of discharge: *No response recorded in the last 14 days    ASSESSMENT/PLAN:   Chronic cough  -     Full PFT Study With Bronchodilator; Future  Difficulty sleeping  -     melatonin (RA MELATONIN) 3 MG TABS tablet; Take 1 tablet by mouth daily, Disp-30 tablet, R-2Normal      Medical Decision Making: moderate complexity  Return in about 3 months (around 4/9/2024) for pcp follow up.           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    Interval history/Current status:     Nikki Moffett is a 78 y.o.female with PMH of acute left MCA stroke s/p thrombectomy (12/21/23, A flutter wit 2:1 block s/p cardioversion, HFmrEF  presenting to [facility] for hospital follow up visit. Patient was admitted from 12/21- 12/26 with concerns of fall and right sided weakness and facial droop. She underwent thrombectomy and was discharged with no neurological deficit.  Of note, \"Patient had wide complex tachycardia in

## 2024-01-09 NOTE — PROGRESS NOTES
[facility]  Internal Medicine Residency Clinic    Attending Physician Statement  I have discussed the case, including pertinent history and exam findings with the resident physician.I have seen and examined the patient and the key elements of the encounter have been performed by me. I agree with the assessment, plan and orders as documented by the resident. I have reviewed all pertinent PMHx, PSHx, FamHx, SocialHx, medications, and allergies and updated history as appropriate.    Patient here for hospital follow up. Patient is a new patient to the clinic. Patient admitted 12/21/23 - 12/26/23 for L MCA stroke and underwent thrombectomy. Noted to have new onset Aflutter and HFrEF. She currently has no focal deficits.    Chronic cough for years - minimal blood on coughing yesterday but no recurrence. No other signs of bleeding.     Discussed all medications.    Patient given number for Cardiology and Interventional Neurology to schedule an appointment. Patient's son thought he already had appointments scheduled but         Remainder of medical problems as per resident note.    Yesy Reed MD  1/9/2024 3:54 PM

## 2024-01-09 NOTE — PATIENT INSTRUCTIONS
Please call these phone numbers to schedule you appointment:     1.Please schedule an appointment with intervenional neurology, Dr. Castillo in 2 months after hospital discharge, please call [phone] for appointment.    2.Please schedule an appointment with cardiology, Dr. Carnes in 1 month after hospital discharge. Please call [phone] to make an appointment.    Continue your medications as listed   Take melatonin one tablet nightly before sleep  The clinic will call to schedule PFT test for you   Call for a sooner appointment if you have additional concerns.  3 month follow up     Cm Silveira MD  Internal Medicine

## 2024-01-10 ENCOUNTER — TELEPHONE (OUTPATIENT)
Dept: INTERNAL MEDICINE | Age: 79
End: 2024-01-10

## 2024-01-10 PROBLEM — I48.92 ATRIAL FLUTTER (HCC): Status: ACTIVE | Noted: 2024-01-10

## 2024-01-10 NOTE — TELEPHONE ENCOUNTER
Notified Patient of appointment for Full PFT Study With Bronchodilator on 1-18-24 at 2pm. Instructions and directions given.Patient advised, verbalized understanding.

## 2024-01-18 ENCOUNTER — HOSPITAL ENCOUNTER (OUTPATIENT)
Dept: PULMONOLOGY | Age: 79
Discharge: HOME | End: 2024-01-18
Attending: INTERNAL MEDICINE
Payer: MEDICARE

## 2024-01-18 ENCOUNTER — TELEPHONE (OUTPATIENT)
Dept: ADMINISTRATIVE | Age: 79
End: 2024-01-18

## 2024-01-18 DIAGNOSIS — R05.3 CHRONIC COUGH: ICD-10-CM

## 2024-01-18 PROCEDURE — 94726 PLETHYSMOGRAPHY LUNG VOLUMES: CPT

## 2024-01-18 PROCEDURE — 94729 DIFFUSING CAPACITY: CPT

## 2024-01-18 PROCEDURE — 94060 EVALUATION OF WHEEZING: CPT

## 2024-01-18 NOTE — PROCEDURES
No obstruction  No Bronchodilator response  Evidence of Air trapping  No Restriction or hyperinflation  No Diffusion defect.     Possible air trapping suggestive of restrictive lung disease however normal FEV1/FVC ratio  Repeat PFTs in 1 year    Recommend clinical correlation.    Ezio Zarco MD MS  Pulmonary & Critical Care Medicine  [facility]

## 2024-01-18 NOTE — TELEPHONE ENCOUNTER
Pt's son Shreyas calling for HFU apt/timing with Dr. Carnes dx: Afib RVR and stroke - discharged on: 12/26/23.

## 2024-01-23 NOTE — TELEPHONE ENCOUNTER
Patient's son (Shreyas) notified of Dr. Carnes's recommendation. F/U scheduled for 1/25/24 at 7:00 a.m.

## 2024-01-25 ENCOUNTER — OFFICE VISIT (OUTPATIENT)
Dept: CARDIOLOGY CLINIC | Age: 79
End: 2024-01-25
Payer: MEDICARE

## 2024-01-25 VITALS
HEIGHT: 62 IN | RESPIRATION RATE: 16 BRPM | DIASTOLIC BLOOD PRESSURE: 78 MMHG | SYSTOLIC BLOOD PRESSURE: 140 MMHG | BODY MASS INDEX: 25.4 KG/M2 | WEIGHT: 138 LBS | HEART RATE: 68 BPM

## 2024-01-25 DIAGNOSIS — I42.9 CARDIOMYOPATHY, UNSPECIFIED TYPE (HCC): ICD-10-CM

## 2024-01-25 DIAGNOSIS — I48.92 ATRIAL FLUTTER, UNSPECIFIED TYPE (HCC): ICD-10-CM

## 2024-01-25 DIAGNOSIS — I48.92 ATRIAL FLUTTER, UNSPECIFIED TYPE (HCC): Primary | ICD-10-CM

## 2024-01-25 PROCEDURE — G8419 CALC BMI OUT NRM PARAM NOF/U: HCPCS | Performed by: INTERNAL MEDICINE

## 2024-01-25 PROCEDURE — 1111F DSCHRG MED/CURRENT MED MERGE: CPT | Performed by: INTERNAL MEDICINE

## 2024-01-25 PROCEDURE — 1090F PRES/ABSN URINE INCON ASSESS: CPT | Performed by: INTERNAL MEDICINE

## 2024-01-25 PROCEDURE — 99214 OFFICE O/P EST MOD 30 MIN: CPT | Performed by: INTERNAL MEDICINE

## 2024-01-25 PROCEDURE — G8484 FLU IMMUNIZE NO ADMIN: HCPCS | Performed by: INTERNAL MEDICINE

## 2024-01-25 PROCEDURE — 93000 ELECTROCARDIOGRAM COMPLETE: CPT | Performed by: INTERNAL MEDICINE

## 2024-01-25 PROCEDURE — 1036F TOBACCO NON-USER: CPT | Performed by: INTERNAL MEDICINE

## 2024-01-25 PROCEDURE — G8427 DOCREV CUR MEDS BY ELIG CLIN: HCPCS | Performed by: INTERNAL MEDICINE

## 2024-01-25 PROCEDURE — 1123F ACP DISCUSS/DSCN MKR DOCD: CPT | Performed by: INTERNAL MEDICINE

## 2024-01-25 PROCEDURE — G8400 PT W/DXA NO RESULTS DOC: HCPCS | Performed by: INTERNAL MEDICINE

## 2024-01-25 RX ORDER — REGADENOSON 0.08 MG/ML
0.4 INJECTION, SOLUTION INTRAVENOUS
Status: SHIPPED | OUTPATIENT
Start: 2024-01-25

## 2024-01-25 RX ORDER — METOPROLOL SUCCINATE 25 MG/1
25 TABLET, EXTENDED RELEASE ORAL DAILY
Qty: 30 TABLET | Refills: 5 | Status: SHIPPED
Start: 2024-01-25 | End: 2024-01-25

## 2024-01-25 RX ORDER — METOPROLOL SUCCINATE 25 MG/1
25 TABLET, EXTENDED RELEASE ORAL DAILY
Qty: 90 TABLET | Refills: 3 | Status: SHIPPED | OUTPATIENT
Start: 2024-01-25

## 2024-01-25 NOTE — PROGRESS NOTES
OUTPATIENT CARDIOLOGY FOLLOW-UP    Name: Nikki Moffett    Age: 78 y.o.    Primary Care Physician: Thuan Santos MD    Date of Service: 1/25/2024    Chief Complaint:   Chief Complaint   Patient presents with    Follow-Up from Hospital       Interim History:   Ms. Moffett is a 78 year old female, admitted with CVA, has expressive aphasia.  Son found her with right side weakness and prior well-known time was 5 hours.  Patient was brought to the emergency room for evaluation and was diagnosed with a right hemiparesis with left MCA stroke and aphasia.  It was determined that she was not a candidate for tPA and underwent mechanical thrombectomy by IR team.  Prior to intervention, patient was diagnosed with A-fib with RVR heart rate in the 180s.  Patient underwent emergency cardioversion to normal sinus rhythm and was initiated on amiodarone for rhythm control.  After mechanical thrombectomy patient was initiated on IV heparin.  Initiated on  mg rectal supp and full dose heparin.  Now, she denies any chest pain dyspnea palpitations, lower limb weakness has improved.  She had expressive aphasia and that has completely resolved at this time. She was seen as a new consult for A flutter with 2:1 conduction and following emergency cardioversion.  She was initiated on Eliquis on anticoagulation without any bleeding issues. No neurological deficits at this time. She is a former smoker and quit 3 years ago.     Since she was discharged from the hospital, she has not had any ER visits or hospitalizations.  He is compliant with medications, as well as salt and fluid intake.  He does not take any over-the-counter arthritis medications.    No new cardiac complaints since last cardiology evaluation. She denies recent chest pain, SOB, palpitations, lightheadedness, dizziness, syncope, PND, or orthopnea. SR on EKG.    Review of Systems:   Cardiac: As per HPI  General: No fever, chills  Pulmonary: As per HPI  HEENT: No visual

## 2024-01-25 NOTE — PATIENT INSTRUCTIONS
Schedule for a lexiscan nuclear stress test for antiarrhythmic selection and to rule out ischemia due to LV dysfunction  Limited echo to assess LV function  Continue amiodarone 200 mg po daily for a short term only  Increase  Toprol XL to 25 mg po daily and Entresto to 49/51 mg po twice a day  Continue Eliquis 5 mg po twice a day  Follow up with me in one month

## 2024-02-07 DIAGNOSIS — R94.31 ABNORMAL EKG: Primary | ICD-10-CM

## 2024-02-07 DIAGNOSIS — I48.92 ATRIAL FLUTTER, UNSPECIFIED TYPE (HCC): ICD-10-CM

## 2024-02-07 DIAGNOSIS — I42.9 CARDIOMYOPATHY, UNSPECIFIED TYPE (HCC): ICD-10-CM

## 2024-02-07 RX ORDER — REGADENOSON 0.08 MG/ML
0.4 INJECTION, SOLUTION INTRAVENOUS
OUTPATIENT
Start: 2024-02-07

## 2024-02-08 ENCOUNTER — TELEPHONE (OUTPATIENT)
Dept: CARDIOLOGY | Age: 79
End: 2024-02-08

## 2024-02-08 NOTE — TELEPHONE ENCOUNTER
Spoke with patient's son Shreyas and confirmed chemical stress test appointment on 2/21/24 at 0900. Instructions for test and medication instructions reviewed with Shreyas, questions answered. Verbalized understanding.

## 2024-02-12 ENCOUNTER — TELEPHONE (OUTPATIENT)
Dept: CARDIOLOGY | Age: 79
End: 2024-02-12

## 2024-02-12 NOTE — TELEPHONE ENCOUNTER
Called patients son ( Shreyas ) and left message to reschedule stress test that was scheduled for this morning. Patient was a no show.    Electronically signed by Melissa Cantrell on 2/12/2024 at 9:32 AM

## 2024-02-22 ENCOUNTER — OFFICE VISIT (OUTPATIENT)
Dept: CARDIOLOGY CLINIC | Age: 79
End: 2024-02-22
Payer: MEDICARE

## 2024-02-22 ENCOUNTER — TELEPHONE (OUTPATIENT)
Dept: CARDIOLOGY | Age: 79
End: 2024-02-22

## 2024-02-22 VITALS
BODY MASS INDEX: 25.6 KG/M2 | SYSTOLIC BLOOD PRESSURE: 132 MMHG | DIASTOLIC BLOOD PRESSURE: 70 MMHG | WEIGHT: 139.1 LBS | RESPIRATION RATE: 18 BRPM | HEART RATE: 59 BPM | HEIGHT: 62 IN

## 2024-02-22 DIAGNOSIS — I63.9 ISCHEMIC CEREBROVASCULAR ACCIDENT (CVA) (HCC): Primary | ICD-10-CM

## 2024-02-22 DIAGNOSIS — I48.92 ATRIAL FLUTTER, UNSPECIFIED TYPE (HCC): ICD-10-CM

## 2024-02-22 DIAGNOSIS — I42.9 CARDIOMYOPATHY, UNSPECIFIED TYPE (HCC): ICD-10-CM

## 2024-02-22 PROCEDURE — 1036F TOBACCO NON-USER: CPT | Performed by: INTERNAL MEDICINE

## 2024-02-22 PROCEDURE — 1090F PRES/ABSN URINE INCON ASSESS: CPT | Performed by: INTERNAL MEDICINE

## 2024-02-22 PROCEDURE — 1123F ACP DISCUSS/DSCN MKR DOCD: CPT | Performed by: INTERNAL MEDICINE

## 2024-02-22 PROCEDURE — G8419 CALC BMI OUT NRM PARAM NOF/U: HCPCS | Performed by: INTERNAL MEDICINE

## 2024-02-22 PROCEDURE — G8400 PT W/DXA NO RESULTS DOC: HCPCS | Performed by: INTERNAL MEDICINE

## 2024-02-22 PROCEDURE — 93000 ELECTROCARDIOGRAM COMPLETE: CPT | Performed by: INTERNAL MEDICINE

## 2024-02-22 PROCEDURE — 99214 OFFICE O/P EST MOD 30 MIN: CPT | Performed by: INTERNAL MEDICINE

## 2024-02-22 PROCEDURE — G8427 DOCREV CUR MEDS BY ELIG CLIN: HCPCS | Performed by: INTERNAL MEDICINE

## 2024-02-22 PROCEDURE — G8484 FLU IMMUNIZE NO ADMIN: HCPCS | Performed by: INTERNAL MEDICINE

## 2024-02-22 RX ORDER — METOPROLOL SUCCINATE 25 MG/1
25 TABLET, EXTENDED RELEASE ORAL DAILY
Qty: 90 TABLET | Refills: 3 | Status: SHIPPED | OUTPATIENT
Start: 2024-02-22

## 2024-02-22 RX ORDER — ATORVASTATIN CALCIUM 80 MG/1
80 TABLET, FILM COATED ORAL NIGHTLY
Qty: 90 TABLET | Refills: 3 | Status: SHIPPED | OUTPATIENT
Start: 2024-02-22

## 2024-02-22 RX ORDER — AMIODARONE HYDROCHLORIDE 200 MG/1
200 TABLET ORAL DAILY
Qty: 90 TABLET | OUTPATIENT
Start: 2024-02-22 | End: 2024-04-22

## 2024-02-22 RX ORDER — AMIODARONE HYDROCHLORIDE 200 MG/1
200 TABLET ORAL DAILY
Qty: 30 TABLET | Refills: 1 | Status: SHIPPED | OUTPATIENT
Start: 2024-02-22 | End: 2024-04-22

## 2024-02-22 NOTE — TELEPHONE ENCOUNTER
CALLED PATIENT AND LEFT MESSAGE TO RESCHEDULE STRESS TEST.  THE NUMBER WE HAVE FOR PATIENT IS ALSO HER SON'S PHONE NUMBER.    Electronically signed by Melissa Cantrell on 2/22/2024 at 10:23 AM

## 2024-02-22 NOTE — PATIENT INSTRUCTIONS
Will Schedule again  for a lexiscan nuclear stress test for antiarrhythmic selection and to rule out ischemia due to LV dysfunction  Limited echo to assess LV function as scheduled.  Advised to follow up with speech pathologist and follow up with neurologist for speech related issues  Continue amiodarone 200 mg po daily for a short term only  Continue   Toprol XL to 25 mg po daily and Entresto to 49/51 mg po twice a day for LV dysfunction, will  titrate medications based on the stress test and echo results.   Continue Eliquis 5 mg po twice a day  Follow up with me in 2 months.

## 2024-02-22 NOTE — PROGRESS NOTES
OUTPATIENT CARDIOLOGY FOLLOW-UP    Name: Nikki Moffett    Age: 78 y.o.    Primary Care Physician: Thuan Santos MD    Date of Service: 2/22/2024    Chief Complaint:   No chief complaint on file.      Interim History:   Ms. Moffett is a 78 year old female, admitted with CVA, has expressive aphasia.  Son found her with right side weakness and prior well-known time was 5 hours.  Patient was brought to the emergency room for evaluation and was diagnosed with a right hemiparesis with left MCA stroke and aphasia.  It was determined that she was not a candidate for tPA and underwent mechanical thrombectomy by IR team.  Prior to intervention, patient was diagnosed with A-fib with RVR heart rate in the 180s.  Patient underwent emergency cardioversion to normal sinus rhythm and was initiated on amiodarone for rhythm control.  After mechanical thrombectomy patient was initiated on IV heparin.  Initiated on  mg rectal supp and full dose heparin.  Now, she denies any chest pain dyspnea palpitations, lower limb weakness has improved.  She had expressive aphasia and that has completely resolved at this time. She was seen as a new consult for A flutter with 2:1 conduction and following emergency cardioversion.  She was initiated on Eliquis on anticoagulation without any bleeding issues. No neurological deficits at this time. She is a former smoker and quit 3 years ago.     She was seen in the office on 1/25/2024, since last visit, she has not had any further hospitalizations or ER visits.  She is compliant with medications, as well as salt and fluid intake.  She does not take any over-the-counter arthritis medications. Patient was scheduled for a stress to rule out ischemia due to LV dysfunction. She noted difficulty starting a sentence which she noted after diagnosed with stroke but denies any focal weakness.     No new cardiac complaints since last cardiology evaluation. She denies recent chest pain, SOB,

## 2024-02-27 ENCOUNTER — TELEPHONE (OUTPATIENT)
Dept: CARDIOLOGY | Age: 79
End: 2024-02-27

## 2024-02-27 NOTE — TELEPHONE ENCOUNTER
Spoke with patient's son (Shreyas) and confirmed pharmacological stress test appointment on February 29, 2024 at 0930. Instructions for test,including holding toprol 24 hours prior to test, and COVID-19 preprocedure information reviewed with patient, questions answered. Patient verbalized understanding. Also instructed to call office if unable to keep appointment.

## 2024-02-29 ENCOUNTER — TELEPHONE (OUTPATIENT)
Dept: CARDIOLOGY CLINIC | Age: 79
End: 2024-02-29

## 2024-02-29 ENCOUNTER — HOSPITAL ENCOUNTER (OUTPATIENT)
Dept: CARDIOLOGY | Age: 79
Discharge: HOME | End: 2024-03-02
Attending: INTERNAL MEDICINE
Payer: MEDICARE

## 2024-02-29 VITALS
BODY MASS INDEX: 25.4 KG/M2 | DIASTOLIC BLOOD PRESSURE: 70 MMHG | WEIGHT: 138 LBS | SYSTOLIC BLOOD PRESSURE: 132 MMHG | HEIGHT: 62 IN

## 2024-02-29 VITALS
WEIGHT: 138 LBS | SYSTOLIC BLOOD PRESSURE: 132 MMHG | DIASTOLIC BLOOD PRESSURE: 76 MMHG | HEART RATE: 78 BPM | BODY MASS INDEX: 25.4 KG/M2 | HEIGHT: 62 IN | RESPIRATION RATE: 20 BRPM

## 2024-02-29 DIAGNOSIS — I48.92 ATRIAL FLUTTER, UNSPECIFIED TYPE (HCC): ICD-10-CM

## 2024-02-29 DIAGNOSIS — I42.9 CARDIOMYOPATHY, UNSPECIFIED TYPE (HCC): ICD-10-CM

## 2024-02-29 DIAGNOSIS — R94.31 ABNORMAL EKG: ICD-10-CM

## 2024-02-29 DIAGNOSIS — I42.9 CARDIOMYOPATHY, UNSPECIFIED TYPE (HCC): Primary | ICD-10-CM

## 2024-02-29 LAB
ECHO BSA: 1.65 M2
ECHO BSA: 1.65 M2
ECHO EST RA PRESSURE: 3 MMHG
ECHO LA VOL A-L A2C: 57 ML (ref 22–52)
ECHO LA VOL A-L A4C: 56 ML (ref 22–52)
ECHO LA VOL MOD A2C: 55 ML (ref 22–52)
ECHO LA VOL MOD A4C: 52 ML (ref 22–52)
ECHO LA VOLUME AREA LENGTH: 57 ML
ECHO LA VOLUME INDEX A-L A2C: 35 ML/M2 (ref 16–34)
ECHO LA VOLUME INDEX A-L A4C: 34 ML/M2 (ref 16–34)
ECHO LA VOLUME INDEX AREA LENGTH: 35 ML/M2 (ref 16–34)
ECHO LA VOLUME INDEX MOD A2C: 34 ML/M2 (ref 16–34)
ECHO LA VOLUME INDEX MOD A4C: 32 ML/M2 (ref 16–34)
ECHO LV FRACTIONAL SHORTENING: 32 % (ref 28–44)
ECHO LV INTERNAL DIMENSION DIASTOLE INDEX: 2.88 CM/M2
ECHO LV INTERNAL DIMENSION DIASTOLIC: 4.7 CM (ref 3.9–5.3)
ECHO LV INTERNAL DIMENSION SYSTOLIC INDEX: 1.96 CM/M2
ECHO LV INTERNAL DIMENSION SYSTOLIC: 3.2 CM
ECHO LV IVSD: 1.1 CM (ref 0.6–0.9)
ECHO LV IVSS: 1.4 CM
ECHO LV MASS 2D: 187.5 G (ref 67–162)
ECHO LV MASS INDEX 2D: 115.1 G/M2 (ref 43–95)
ECHO LV POSTERIOR WALL DIASTOLIC: 1.1 CM (ref 0.6–0.9)
ECHO LV POSTERIOR WALL SYSTOLIC: 1.6 CM
ECHO LV RELATIVE WALL THICKNESS RATIO: 0.47
ECHO MV "A" WAVE DURATION: 138.4 MSEC
ECHO MV A VELOCITY: 0.77 M/S
ECHO MV E DECELERATION TIME (DT): 190.4 MS
ECHO MV E VELOCITY: 0.86 M/S
ECHO MV E/A RATIO: 1.12
ECHO PVEIN PEAK D VELOCITY: 0.6 M/S
ECHO PVEIN PEAK S VELOCITY: 0.8 M/S
ECHO PVEIN S/D RATIO: 1.3
ECHO RIGHT VENTRICULAR SYSTOLIC PRESSURE (RVSP): 33 MMHG
ECHO RV INTERNAL DIMENSION: 3 CM
ECHO RV TAPSE: 2.5 CM (ref 1.7–?)
ECHO TV REGURGITANT MAX VELOCITY: 2.76 M/S
ECHO TV REGURGITANT PEAK GRADIENT: 30 MMHG
NUC STRESS EJECTION FRACTION: 69 %
STRESS BASELINE DIAS BP: 76 MMHG
STRESS BASELINE HR: 71 BPM
STRESS BASELINE SYS BP: 132 MMHG
STRESS ESTIMATED WORKLOAD: 1 METS
STRESS PEAK DIAS BP: 80 MMHG
STRESS PEAK SYS BP: 128 MMHG
STRESS PERCENT HR ACHIEVED: 63 %
STRESS POST PEAK HR: 90 BPM
STRESS RATE PRESSURE PRODUCT: NORMAL BPM*MMHG
STRESS TARGET HR: 143 BPM

## 2024-02-29 PROCEDURE — 3430000000 HC RX DIAGNOSTIC RADIOPHARMACEUTICAL: Performed by: INTERNAL MEDICINE

## 2024-02-29 PROCEDURE — 2580000003 HC RX 258: Performed by: INTERNAL MEDICINE

## 2024-02-29 PROCEDURE — 6360000002 HC RX W HCPCS: Performed by: INTERNAL MEDICINE

## 2024-02-29 PROCEDURE — 93017 CV STRESS TEST TRACING ONLY: CPT

## 2024-02-29 PROCEDURE — A9500 TC99M SESTAMIBI: HCPCS | Performed by: INTERNAL MEDICINE

## 2024-02-29 PROCEDURE — 93308 TTE F-UP OR LMTD: CPT

## 2024-02-29 RX ORDER — REGADENOSON 0.08 MG/ML
0.4 INJECTION, SOLUTION INTRAVENOUS
Status: COMPLETED | OUTPATIENT
Start: 2024-02-29 | End: 2024-02-29

## 2024-02-29 RX ORDER — TETRAKIS(2-METHOXYISOBUTYLISOCYANIDE)COPPER(I) TETRAFLUOROBORATE 1 MG/ML
35.5 INJECTION, POWDER, LYOPHILIZED, FOR SOLUTION INTRAVENOUS
Status: COMPLETED | OUTPATIENT
Start: 2024-02-29 | End: 2024-02-29

## 2024-02-29 RX ORDER — TETRAKIS(2-METHOXYISOBUTYLISOCYANIDE)COPPER(I) TETRAFLUOROBORATE 1 MG/ML
8 INJECTION, POWDER, LYOPHILIZED, FOR SOLUTION INTRAVENOUS
Status: COMPLETED | OUTPATIENT
Start: 2024-02-29 | End: 2024-02-29

## 2024-02-29 RX ORDER — SODIUM CHLORIDE 0.9 % (FLUSH) 0.9 %
10 SYRINGE (ML) INJECTION PRN
Status: DISCONTINUED | OUTPATIENT
Start: 2024-02-29 | End: 2024-03-03 | Stop reason: HOSPADM

## 2024-02-29 RX ADMIN — SODIUM CHLORIDE, PRESERVATIVE FREE 10 ML: 5 INJECTION INTRAVENOUS at 10:57

## 2024-02-29 RX ADMIN — REGADENOSON 0.4 MG: 0.08 INJECTION, SOLUTION INTRAVENOUS at 11:51

## 2024-02-29 RX ADMIN — SODIUM CHLORIDE, PRESERVATIVE FREE 10 ML: 5 INJECTION INTRAVENOUS at 10:03

## 2024-02-29 RX ADMIN — Medication 8 MILLICURIE: at 10:03

## 2024-02-29 RX ADMIN — SODIUM CHLORIDE, PRESERVATIVE FREE 10 ML: 5 INJECTION INTRAVENOUS at 10:56

## 2024-02-29 RX ADMIN — SODIUM CHLORIDE, PRESERVATIVE FREE 10 ML: 5 INJECTION INTRAVENOUS at 11:51

## 2024-02-29 RX ADMIN — SODIUM CHLORIDE, PRESERVATIVE FREE 10 ML: 5 INJECTION INTRAVENOUS at 11:52

## 2024-02-29 RX ADMIN — Medication 35.5 MILLICURIE: at 11:51

## 2024-02-29 NOTE — TELEPHONE ENCOUNTER
----- Message from Francheska Carnes MD sent at 2/29/2024  3:15 PM EST -----  Her stress test was normal. Follow up as scheduled.

## 2024-03-01 ENCOUNTER — TELEPHONE (OUTPATIENT)
Dept: CARDIOLOGY CLINIC | Age: 79
End: 2024-03-01

## 2024-03-01 PROBLEM — Z86.73 H/O: STROKE: Status: ACTIVE | Noted: 2024-03-01

## 2024-03-01 PROBLEM — I48.0 PAROXYSMAL ATRIAL FIBRILLATION (HCC): Status: ACTIVE | Noted: 2024-03-01

## 2024-03-01 NOTE — TELEPHONE ENCOUNTER
----- Message from Francheska Carnes MD sent at 3/1/2024  2:34 PM EST -----  Her echo showed normal function and small hole in the upper chamber of heart rate, 1 in 4 people will have this finding,very common finding, we do not have to do anything in people who never had any strokes.

## 2024-04-22 NOTE — PROGRESS NOTES
[facility]  Internal Medicine Residency Program  ACC Note      SUBJECTIVE:  CC: had concerns including Follow-up.    HPI:  Nikki Mfofett is a 78 y.o.female with PMH of CVA, left MCA stroke and aphasia s/p mechanical thrombectomy with recovery of speech.  Her CVA was due to A-fib.  Patient is presenting to [facility] for routine follow-up visit.      Moderate depression in setting of recent stroke  Patient grieving for her  spouse (2 years ago)  PHQ-9: 13 in clinic today  Patient tearful and emotional during the interview  Will refer to [facility]  Trial of citalopram 10 mg daily.  QTc reviewed and was normal  Patient to contact clinic in case of side effects from medication      Left MCA stroke S/p mechanical thrombectomy  No residual deficits  On Lipitor 80 mg nightly, LDL 97  Had follow-up with neurointervention on 3/1/2024  Continue Eliquis and Lipitor      New onset atrial flutter with 2:1 block, s/p emergency cardioversion  Has IVCD  [id number] Score: 5, on eliquis, no bruising or excessive bleeding episodes  Currently on Toprol-XL 25 daily, amiodarone 200 mg daily  Patient reminded of cardiology appointment on 24 advised to follow-up    New onset HFmrEF  EF 55-60% (24)  On Toprol 25 mg daily Entresto 49-51 mg twice daily    Prediabetes  A1c 5.7 (2023)  Lifestyle modification and regular exercise discussed      HCM  Pneumococcal vaccine-given this clinic visit  Tdap-given  Annual wellness visit  DEXA scan-prescription provided  Depression screen-done this visit    Patient is planning to move to [place] to stay with her sister likely next month.    Review of Systems   Constitutional:  Negative for chills, fatigue and fever.   HENT:  Negative for congestion, facial swelling, nosebleeds and voice change.    Eyes:  Negative for discharge and visual disturbance.   Respiratory:  Negative for cough, shortness of breath and wheezing.    Cardiovascular:  Negative for chest

## 2024-04-23 ENCOUNTER — OFFICE VISIT (OUTPATIENT)
Dept: INTERNAL MEDICINE | Age: 79
End: 2024-04-23
Payer: MEDICARE

## 2024-04-23 VITALS
OXYGEN SATURATION: 99 % | HEART RATE: 68 BPM | TEMPERATURE: 97.1 F | SYSTOLIC BLOOD PRESSURE: 105 MMHG | RESPIRATION RATE: 18 BRPM | BODY MASS INDEX: 24.29 KG/M2 | HEIGHT: 62 IN | WEIGHT: 132 LBS | DIASTOLIC BLOOD PRESSURE: 68 MMHG

## 2024-04-23 DIAGNOSIS — F32.1 CURRENT MODERATE EPISODE OF MAJOR DEPRESSIVE DISORDER WITHOUT PRIOR EPISODE (HCC): ICD-10-CM

## 2024-04-23 DIAGNOSIS — Z23 NEED FOR PROPHYLACTIC VACCINATION AGAINST STREPTOCOCCUS PNEUMONIAE (PNEUMOCOCCUS): Primary | ICD-10-CM

## 2024-04-23 DIAGNOSIS — Z23 NEED FOR DIPHTHERIA-TETANUS-PERTUSSIS (TDAP) VACCINE: ICD-10-CM

## 2024-04-23 DIAGNOSIS — Z78.0 POST-MENOPAUSAL: ICD-10-CM

## 2024-04-23 PROCEDURE — 90715 TDAP VACCINE 7 YRS/> IM: CPT | Performed by: INTERNAL MEDICINE

## 2024-04-23 PROCEDURE — 99212 OFFICE O/P EST SF 10 MIN: CPT | Performed by: STUDENT IN AN ORGANIZED HEALTH CARE EDUCATION/TRAINING PROGRAM

## 2024-04-23 PROCEDURE — 90677 PCV20 VACCINE IM: CPT | Performed by: INTERNAL MEDICINE

## 2024-04-23 RX ORDER — CITALOPRAM HYDROBROMIDE 10 MG/1
10 TABLET ORAL DAILY
Qty: 90 TABLET | Refills: 0 | Status: SHIPPED | OUTPATIENT
Start: 2024-04-23

## 2024-04-23 SDOH — ECONOMIC STABILITY: FOOD INSECURITY: WITHIN THE PAST 12 MONTHS, YOU WORRIED THAT YOUR FOOD WOULD RUN OUT BEFORE YOU GOT MONEY TO BUY MORE.: NEVER TRUE

## 2024-04-23 SDOH — ECONOMIC STABILITY: INCOME INSECURITY: HOW HARD IS IT FOR YOU TO PAY FOR THE VERY BASICS LIKE FOOD, HOUSING, MEDICAL CARE, AND HEATING?: SOMEWHAT HARD

## 2024-04-23 SDOH — ECONOMIC STABILITY: HOUSING INSECURITY
IN THE LAST 12 MONTHS, WAS THERE A TIME WHEN YOU DID NOT HAVE A STEADY PLACE TO SLEEP OR SLEPT IN A SHELTER (INCLUDING NOW)?: NO

## 2024-04-23 SDOH — ECONOMIC STABILITY: FOOD INSECURITY: WITHIN THE PAST 12 MONTHS, THE FOOD YOU BOUGHT JUST DIDN'T LAST AND YOU DIDN'T HAVE MONEY TO GET MORE.: NEVER TRUE

## 2024-04-23 ASSESSMENT — PATIENT HEALTH QUESTIONNAIRE - PHQ9
SUM OF ALL RESPONSES TO PHQ QUESTIONS 1-9: 13
7. TROUBLE CONCENTRATING ON THINGS, SUCH AS READING THE NEWSPAPER OR WATCHING TELEVISION: NOT AT ALL
SUM OF ALL RESPONSES TO PHQ9 QUESTIONS 1 & 2: 6
10. IF YOU CHECKED OFF ANY PROBLEMS, HOW DIFFICULT HAVE THESE PROBLEMS MADE IT FOR YOU TO DO YOUR WORK, TAKE CARE OF THINGS AT HOME, OR GET ALONG WITH OTHER PEOPLE: SOMEWHAT DIFFICULT
6. FEELING BAD ABOUT YOURSELF - OR THAT YOU ARE A FAILURE OR HAVE LET YOURSELF OR YOUR FAMILY DOWN: NOT AT ALL
3. TROUBLE FALLING OR STAYING ASLEEP: NEARLY EVERY DAY
1. LITTLE INTEREST OR PLEASURE IN DOING THINGS: NEARLY EVERY DAY
SUM OF ALL RESPONSES TO PHQ QUESTIONS 1-9: 13
2. FEELING DOWN, DEPRESSED OR HOPELESS: NEARLY EVERY DAY
9. THOUGHTS THAT YOU WOULD BE BETTER OFF DEAD, OR OF HURTING YOURSELF: NOT AT ALL
4. FEELING TIRED OR HAVING LITTLE ENERGY: SEVERAL DAYS
5. POOR APPETITE OR OVEREATING: NOT AT ALL
SUM OF ALL RESPONSES TO PHQ QUESTIONS 1-9: 13
8. MOVING OR SPEAKING SO SLOWLY THAT OTHER PEOPLE COULD HAVE NOTICED. OR THE OPPOSITE, BEING SO FIGETY OR RESTLESS THAT YOU HAVE BEEN MOVING AROUND A LOT MORE THAN USUAL: NEARLY EVERY DAY
SUM OF ALL RESPONSES TO PHQ QUESTIONS 1-9: 13

## 2024-04-23 ASSESSMENT — ENCOUNTER SYMPTOMS
SHORTNESS OF BREATH: 0
COUGH: 0
FACIAL SWELLING: 0
EYE DISCHARGE: 0
VOMITING: 0
VOICE CHANGE: 0
DIARRHEA: 0
WHEEZING: 0
NAUSEA: 0

## 2024-04-23 NOTE — PROGRESS NOTES
PCV 20 administered in left deltoid and TDaP administered in right deltoid. Pt tolerated well. No reactions noted.

## 2024-04-23 NOTE — PROGRESS NOTES
[facility]  Internal Medicine Residency Clinic    Attending Physician Statement  I have discussed the case, including pertinent history and exam findings with the resident physician.I have seen and examined the patient and the key elements of the encounter have been performed by me. I agree with the assessment, plan and orders as documented by the resident. I have reviewed the relevant PMHx, PSHx, FamHx, SocialHx, medications, and allergies and updated history as appropriate.    Patient presents for routine follow up of medical problems.    Pt sustained left MCV stroke in December 2023, SP thrombectomy and placed in Eliquis for atrial fib, BP remains good and neurologically pt has been stable.  PHQ sore 19 in this visit after she lost her  two years ago, cognitive examination shows intact short term memory but distorted clock drawing, needs to see counseling service in the clinic.         Remainder of medical problems as per resident note.    Howard Deluca MD  4/23/2024 2:32 PM

## 2024-04-23 NOTE — PATIENT INSTRUCTIONS
Dear Nikki Moffett,        Thank you for coming to your appointment today. I hope we have addressed all of your needs.       Please make sure to do the following:  - Continue your medications as prescribed  -Start taking tablet citalopram 10 mg daily.  Please contact the office in case you have side effects after starting the medication    - A referral was placed on your behalf during your visit to [facility], you should expect to receive information about the date and time of your referral appointment within 7-10 days.  If not, please call the nurse  line at [phone]  -Should you have further questions in regards to this visit, you can review your clinical note and after visit summary document on your [employer] account.  Other questions can be directed to our nurse line at [phone]   - We will see each other again in 1 month      Call for a sooner appointment if you develop any acute concerns    Have a great day!        Sincerely,  Thuan Santos M.D   4/23/2024  2:51 PM

## 2024-04-24 ENCOUNTER — TELEPHONE (OUTPATIENT)
Dept: INTERNAL MEDICINE | Age: 79
End: 2024-04-24

## 2024-04-24 NOTE — TELEPHONE ENCOUNTER
ANDRÉS made contact to pt son related to referral. Provided contact information for pt to return call.

## 2024-05-15 ENCOUNTER — TELEPHONE (OUTPATIENT)
Dept: INTERNAL MEDICINE | Age: 79
End: 2024-05-15

## 2024-05-15 NOTE — TELEPHONE ENCOUNTER
ANDRÉS left message for pt related to referral. [facility] provided contact information for return call.

## 2025-02-07 RX ORDER — APIXABAN 5 MG/1
5 TABLET, FILM COATED ORAL 2 TIMES DAILY
Qty: 180 TABLET | Refills: 3 | Status: SHIPPED | OUTPATIENT
Start: 2025-02-07

## 2025-03-03 ENCOUNTER — TELEPHONE (OUTPATIENT)
Dept: INTERNAL MEDICINE | Age: 80
End: 2025-03-03

## 2025-03-03 NOTE — TELEPHONE ENCOUNTER
Agree with recommendation for ED assessment and if patient/family defers I agree with urgent appointment tomorrow for in office assessment. Recommend EKG in office visit.    Electronically signed by Ulises Almanza DO on 3/3/2025 at 4:26 PM

## 2025-03-03 NOTE — TELEPHONE ENCOUNTER
Pt's son called in to state pt isn't doing well, he states she is not eating and her heart per pt beats \"funny'. Pt's son strongly encouraged to take pt to ED. He declined stating he feels they won't do anything there. And he would like the dr to check pt out, pt last seen in Apr 2024. Pt scheduled on Urgent care appt 3/4/25.

## 2025-03-04 ENCOUNTER — APPOINTMENT (OUTPATIENT)
Dept: GENERAL RADIOLOGY | Age: 80
DRG: 004 | End: 2025-03-04
Payer: MEDICARE

## 2025-03-04 ENCOUNTER — HOSPITAL ENCOUNTER (INPATIENT)
Age: 80
LOS: 31 days | Discharge: NURSING, REHAB OR LONG-TERM CARE | DRG: 004 | End: 2025-04-04
Attending: STUDENT IN AN ORGANIZED HEALTH CARE EDUCATION/TRAINING PROGRAM | Admitting: INTERNAL MEDICINE
Payer: MEDICARE

## 2025-03-04 ENCOUNTER — OFFICE VISIT (OUTPATIENT)
Dept: INTERNAL MEDICINE | Age: 80
End: 2025-03-04
Payer: MEDICARE

## 2025-03-04 VITALS
HEIGHT: 62 IN | SYSTOLIC BLOOD PRESSURE: 109 MMHG | OXYGEN SATURATION: 92 % | HEART RATE: 143 BPM | WEIGHT: 124.5 LBS | DIASTOLIC BLOOD PRESSURE: 78 MMHG | TEMPERATURE: 97.9 F | RESPIRATION RATE: 16 BRPM | BODY MASS INDEX: 22.91 KG/M2

## 2025-03-04 DIAGNOSIS — I48.19 PERSISTENT ATRIAL FIBRILLATION (HCC): ICD-10-CM

## 2025-03-04 DIAGNOSIS — R07.9 CHEST PAIN, UNSPECIFIED TYPE: ICD-10-CM

## 2025-03-04 DIAGNOSIS — N17.9 AKI (ACUTE KIDNEY INJURY): ICD-10-CM

## 2025-03-04 DIAGNOSIS — I50.9 OTHER CONGESTIVE HEART FAILURE: ICD-10-CM

## 2025-03-04 DIAGNOSIS — R62.51 FAILURE TO THRIVE IN CHILDHOOD: ICD-10-CM

## 2025-03-04 DIAGNOSIS — I48.92 ATRIAL FLUTTER, UNSPECIFIED TYPE (HCC): Primary | ICD-10-CM

## 2025-03-04 DIAGNOSIS — I48.92 ATRIAL FLUTTER WITH RAPID VENTRICULAR RESPONSE (HCC): ICD-10-CM

## 2025-03-04 DIAGNOSIS — D64.9 ANEMIA, UNSPECIFIED TYPE: ICD-10-CM

## 2025-03-04 DIAGNOSIS — R00.0 SINUS TACHYCARDIA BY ELECTROCARDIOGRAM: ICD-10-CM

## 2025-03-04 DIAGNOSIS — I48.20 CHRONIC ATRIAL FIBRILLATION (HCC): Primary | ICD-10-CM

## 2025-03-04 DIAGNOSIS — I48.0 PAROXYSMAL ATRIAL FIBRILLATION (HCC): ICD-10-CM

## 2025-03-04 DIAGNOSIS — F03.90 DEMENTIA, UNSPECIFIED DEMENTIA SEVERITY, UNSPECIFIED DEMENTIA TYPE, UNSPECIFIED WHETHER BEHAVIORAL, PSYCHOTIC, OR MOOD DISTURBANCE OR ANXIETY (HCC): ICD-10-CM

## 2025-03-04 DIAGNOSIS — I50.21 ACUTE SYSTOLIC CONGESTIVE HEART FAILURE (HCC): ICD-10-CM

## 2025-03-04 LAB
25(OH)D3 SERPL-MCNC: 13.8 NG/ML (ref 30–100)
ALBUMIN SERPL-MCNC: 3.8 G/DL (ref 3.5–5.2)
ALP SERPL-CCNC: 81 U/L (ref 35–104)
ALT SERPL-CCNC: 50 U/L (ref 0–32)
ANION GAP SERPL CALCULATED.3IONS-SCNC: 14 MMOL/L (ref 7–16)
ANION GAP SERPL CALCULATED.3IONS-SCNC: 14 MMOL/L (ref 7–16)
AST SERPL-CCNC: 43 U/L (ref 0–31)
B PARAP IS1001 DNA NPH QL NAA+NON-PROBE: NOT DETECTED
B PERT DNA SPEC QL NAA+PROBE: NOT DETECTED
BASOPHILS # BLD: 0.06 K/UL (ref 0–0.2)
BASOPHILS NFR BLD: 1 % (ref 0–2)
BILIRUB SERPL-MCNC: 1.3 MG/DL (ref 0–1.2)
BNP SERPL-MCNC: ABNORMAL PG/ML (ref 0–450)
BUN SERPL-MCNC: 33 MG/DL (ref 6–23)
BUN SERPL-MCNC: 36 MG/DL (ref 6–23)
C PNEUM DNA NPH QL NAA+NON-PROBE: NOT DETECTED
CALCIUM SERPL-MCNC: 8.6 MG/DL (ref 8.6–10.2)
CALCIUM SERPL-MCNC: 9.2 MG/DL (ref 8.6–10.2)
CHLORIDE SERPL-SCNC: 105 MMOL/L (ref 98–107)
CHLORIDE SERPL-SCNC: 110 MMOL/L (ref 98–107)
CO2 SERPL-SCNC: 18 MMOL/L (ref 22–29)
CO2 SERPL-SCNC: 19 MMOL/L (ref 22–29)
CREAT SERPL-MCNC: 1.6 MG/DL (ref 0.5–1)
CREAT SERPL-MCNC: 1.7 MG/DL (ref 0.5–1)
EOSINOPHIL # BLD: 0.06 K/UL (ref 0.05–0.5)
EOSINOPHILS RELATIVE PERCENT: 1 % (ref 0–6)
ERYTHROCYTE [DISTWIDTH] IN BLOOD BY AUTOMATED COUNT: 15.5 % (ref 11.5–15)
FLUAV RNA NPH QL NAA+NON-PROBE: NOT DETECTED
FLUBV RNA NPH QL NAA+NON-PROBE: NOT DETECTED
FOLATE SERPL-MCNC: 12.3 NG/ML (ref 4.8–24.2)
GFR, ESTIMATED: 30 ML/MIN/1.73M2
GFR, ESTIMATED: 33 ML/MIN/1.73M2
GLUCOSE SERPL-MCNC: 101 MG/DL (ref 74–99)
GLUCOSE SERPL-MCNC: 104 MG/DL (ref 74–99)
HADV DNA NPH QL NAA+NON-PROBE: NOT DETECTED
HBA1C MFR BLD: 6 % (ref 4–5.6)
HCOV 229E RNA NPH QL NAA+NON-PROBE: NOT DETECTED
HCOV HKU1 RNA NPH QL NAA+NON-PROBE: NOT DETECTED
HCOV NL63 RNA NPH QL NAA+NON-PROBE: NOT DETECTED
HCOV OC43 RNA NPH QL NAA+NON-PROBE: NOT DETECTED
HCT VFR BLD AUTO: 43.2 % (ref 34–48)
HGB BLD-MCNC: 13.2 G/DL (ref 11.5–15.5)
HMPV RNA NPH QL NAA+NON-PROBE: NOT DETECTED
HPIV1 RNA NPH QL NAA+NON-PROBE: NOT DETECTED
HPIV2 RNA NPH QL NAA+NON-PROBE: NOT DETECTED
HPIV3 RNA NPH QL NAA+NON-PROBE: NOT DETECTED
HPIV4 RNA NPH QL NAA+NON-PROBE: NOT DETECTED
IMM GRANULOCYTES # BLD AUTO: 0.03 K/UL (ref 0–0.58)
IMM GRANULOCYTES NFR BLD: 0 % (ref 0–5)
LACTATE BLDV-SCNC: 2.2 MMOL/L (ref 0.5–2.2)
LYMPHOCYTES NFR BLD: 1.48 K/UL (ref 1.5–4)
LYMPHOCYTES RELATIVE PERCENT: 19 % (ref 20–42)
M PNEUMO DNA NPH QL NAA+NON-PROBE: NOT DETECTED
MAGNESIUM SERPL-MCNC: 2.5 MG/DL (ref 1.6–2.6)
MCH RBC QN AUTO: 27.2 PG (ref 26–35)
MCHC RBC AUTO-ENTMCNC: 30.6 G/DL (ref 32–34.5)
MCV RBC AUTO: 89.1 FL (ref 80–99.9)
MONOCYTES NFR BLD: 0.51 K/UL (ref 0.1–0.95)
MONOCYTES NFR BLD: 7 % (ref 2–12)
NEUTROPHILS NFR BLD: 73 % (ref 43–80)
NEUTS SEG NFR BLD: 5.71 K/UL (ref 1.8–7.3)
PLATELET # BLD AUTO: 320 K/UL (ref 130–450)
PMV BLD AUTO: 10.1 FL (ref 7–12)
POTASSIUM SERPL-SCNC: 3.7 MMOL/L (ref 3.5–5)
POTASSIUM SERPL-SCNC: 5.9 MMOL/L (ref 3.5–5)
PROCALCITONIN SERPL-MCNC: 0.05 NG/ML (ref 0–0.08)
PROT SERPL-MCNC: 6.9 G/DL (ref 6.4–8.3)
RBC # BLD AUTO: 4.85 M/UL (ref 3.5–5.5)
RSV RNA NPH QL NAA+NON-PROBE: NOT DETECTED
RV+EV RNA NPH QL NAA+NON-PROBE: NOT DETECTED
SARS-COV-2 RNA NPH QL NAA+NON-PROBE: NOT DETECTED
SODIUM SERPL-SCNC: 138 MMOL/L (ref 132–146)
SODIUM SERPL-SCNC: 142 MMOL/L (ref 132–146)
SPECIMEN DESCRIPTION: NORMAL
T4 FREE SERPL-MCNC: 0.9 NG/DL (ref 0.9–1.7)
TROPONIN I SERPL HS-MCNC: 36 NG/L (ref 0–9)
TROPONIN I SERPL HS-MCNC: 43 NG/L (ref 0–9)
TSH SERPL DL<=0.05 MIU/L-ACNC: 19.08 UIU/ML (ref 0.27–4.2)
VIT B12 SERPL-MCNC: 1653 PG/ML (ref 211–946)
WBC OTHER # BLD: 7.9 K/UL (ref 4.5–11.5)

## 2025-03-04 PROCEDURE — 99285 EMERGENCY DEPT VISIT HI MDM: CPT

## 2025-03-04 PROCEDURE — 82746 ASSAY OF FOLIC ACID SERUM: CPT

## 2025-03-04 PROCEDURE — 84443 ASSAY THYROID STIM HORMONE: CPT

## 2025-03-04 PROCEDURE — 84484 ASSAY OF TROPONIN QUANT: CPT

## 2025-03-04 PROCEDURE — 80048 BASIC METABOLIC PNL TOTAL CA: CPT

## 2025-03-04 PROCEDURE — 2580000003 HC RX 258: Performed by: STUDENT IN AN ORGANIZED HEALTH CARE EDUCATION/TRAINING PROGRAM

## 2025-03-04 PROCEDURE — 96361 HYDRATE IV INFUSION ADD-ON: CPT

## 2025-03-04 PROCEDURE — 83605 ASSAY OF LACTIC ACID: CPT

## 2025-03-04 PROCEDURE — 93005 ELECTROCARDIOGRAM TRACING: CPT

## 2025-03-04 PROCEDURE — 96374 THER/PROPH/DIAG INJ IV PUSH: CPT

## 2025-03-04 PROCEDURE — 82607 VITAMIN B-12: CPT

## 2025-03-04 PROCEDURE — 2060000000 HC ICU INTERMEDIATE R&B

## 2025-03-04 PROCEDURE — 6360000002 HC RX W HCPCS

## 2025-03-04 PROCEDURE — 71045 X-RAY EXAM CHEST 1 VIEW: CPT

## 2025-03-04 PROCEDURE — 0202U NFCT DS 22 TRGT SARS-COV-2: CPT

## 2025-03-04 PROCEDURE — 84145 PROCALCITONIN (PCT): CPT

## 2025-03-04 PROCEDURE — 2500000003 HC RX 250 WO HCPCS

## 2025-03-04 PROCEDURE — 84439 ASSAY OF FREE THYROXINE: CPT

## 2025-03-04 PROCEDURE — 93005 ELECTROCARDIOGRAM TRACING: CPT | Performed by: STUDENT IN AN ORGANIZED HEALTH CARE EDUCATION/TRAINING PROGRAM

## 2025-03-04 PROCEDURE — 6370000000 HC RX 637 (ALT 250 FOR IP)

## 2025-03-04 PROCEDURE — 99212 OFFICE O/P EST SF 10 MIN: CPT

## 2025-03-04 PROCEDURE — 83036 HEMOGLOBIN GLYCOSYLATED A1C: CPT

## 2025-03-04 PROCEDURE — 83735 ASSAY OF MAGNESIUM: CPT

## 2025-03-04 PROCEDURE — 83880 ASSAY OF NATRIURETIC PEPTIDE: CPT

## 2025-03-04 PROCEDURE — 82306 VITAMIN D 25 HYDROXY: CPT

## 2025-03-04 PROCEDURE — 2500000003 HC RX 250 WO HCPCS: Performed by: STUDENT IN AN ORGANIZED HEALTH CARE EDUCATION/TRAINING PROGRAM

## 2025-03-04 PROCEDURE — 2580000003 HC RX 258

## 2025-03-04 PROCEDURE — 80053 COMPREHEN METABOLIC PANEL: CPT

## 2025-03-04 PROCEDURE — 85025 COMPLETE CBC W/AUTO DIFF WBC: CPT

## 2025-03-04 RX ORDER — ACETAMINOPHEN 650 MG/1
650 SUPPOSITORY RECTAL EVERY 6 HOURS PRN
Status: DISCONTINUED | OUTPATIENT
Start: 2025-03-04 | End: 2025-04-04 | Stop reason: HOSPADM

## 2025-03-04 RX ORDER — POLYETHYLENE GLYCOL 3350 17 G/17G
17 POWDER, FOR SOLUTION ORAL DAILY
Status: DISCONTINUED | OUTPATIENT
Start: 2025-03-04 | End: 2025-03-07

## 2025-03-04 RX ORDER — SODIUM CHLORIDE 9 MG/ML
INJECTION, SOLUTION INTRAVENOUS PRN
Status: DISCONTINUED | OUTPATIENT
Start: 2025-03-04 | End: 2025-03-10

## 2025-03-04 RX ORDER — DEXTROSE MONOHYDRATE 100 MG/ML
INJECTION, SOLUTION INTRAVENOUS CONTINUOUS PRN
Status: DISCONTINUED | OUTPATIENT
Start: 2025-03-04 | End: 2025-04-04 | Stop reason: HOSPADM

## 2025-03-04 RX ORDER — ACETAMINOPHEN 325 MG/1
650 TABLET ORAL EVERY 6 HOURS PRN
Status: DISCONTINUED | OUTPATIENT
Start: 2025-03-04 | End: 2025-04-04 | Stop reason: HOSPADM

## 2025-03-04 RX ORDER — ONDANSETRON 2 MG/ML
4 INJECTION INTRAMUSCULAR; INTRAVENOUS EVERY 6 HOURS PRN
Status: DISCONTINUED | OUTPATIENT
Start: 2025-03-04 | End: 2025-03-28

## 2025-03-04 RX ORDER — SODIUM CHLORIDE 0.9 % (FLUSH) 0.9 %
5-40 SYRINGE (ML) INJECTION EVERY 12 HOURS SCHEDULED
Status: DISCONTINUED | OUTPATIENT
Start: 2025-03-04 | End: 2025-03-07

## 2025-03-04 RX ORDER — ATORVASTATIN CALCIUM 40 MG/1
80 TABLET, FILM COATED ORAL NIGHTLY
Status: DISCONTINUED | OUTPATIENT
Start: 2025-03-04 | End: 2025-03-27

## 2025-03-04 RX ORDER — ONDANSETRON 4 MG/1
4 TABLET, ORALLY DISINTEGRATING ORAL EVERY 8 HOURS PRN
Status: DISCONTINUED | OUTPATIENT
Start: 2025-03-04 | End: 2025-03-20

## 2025-03-04 RX ORDER — AMIODARONE HYDROCHLORIDE 200 MG/1
200 TABLET ORAL DAILY
Status: DISCONTINUED | OUTPATIENT
Start: 2025-03-04 | End: 2025-03-06

## 2025-03-04 RX ORDER — METOPROLOL SUCCINATE 25 MG/1
25 TABLET, EXTENDED RELEASE ORAL DAILY
Status: DISCONTINUED | OUTPATIENT
Start: 2025-03-04 | End: 2025-03-05

## 2025-03-04 RX ORDER — 0.9 % SODIUM CHLORIDE 0.9 %
1000 INTRAVENOUS SOLUTION INTRAVENOUS ONCE
Status: COMPLETED | OUTPATIENT
Start: 2025-03-04 | End: 2025-03-04

## 2025-03-04 RX ORDER — SODIUM CHLORIDE 0.9 % (FLUSH) 0.9 %
5-40 SYRINGE (ML) INJECTION PRN
Status: DISCONTINUED | OUTPATIENT
Start: 2025-03-04 | End: 2025-04-04 | Stop reason: HOSPADM

## 2025-03-04 RX ORDER — GLUCAGON 1 MG/ML
1 KIT INJECTION PRN
Status: DISCONTINUED | OUTPATIENT
Start: 2025-03-04 | End: 2025-04-04 | Stop reason: HOSPADM

## 2025-03-04 RX ADMIN — DOXYCYCLINE 100 MG: 100 INJECTION, POWDER, LYOPHILIZED, FOR SOLUTION INTRAVENOUS at 21:31

## 2025-03-04 RX ADMIN — Medication 3 MG: at 20:41

## 2025-03-04 RX ADMIN — SODIUM CHLORIDE 5 MG/HR: 900 INJECTION, SOLUTION INTRAVENOUS at 19:01

## 2025-03-04 RX ADMIN — PIPERACILLIN AND TAZOBACTAM 3375 MG: 3; .375 INJECTION, POWDER, LYOPHILIZED, FOR SOLUTION INTRAVENOUS at 20:55

## 2025-03-04 RX ADMIN — APIXABAN 5 MG: 5 TABLET, FILM COATED ORAL at 20:41

## 2025-03-04 RX ADMIN — SODIUM CHLORIDE, PRESERVATIVE FREE 5 ML: 5 INJECTION INTRAVENOUS at 21:06

## 2025-03-04 RX ADMIN — METOPROLOL SUCCINATE 25 MG: 25 TABLET, EXTENDED RELEASE ORAL at 17:40

## 2025-03-04 RX ADMIN — SODIUM CHLORIDE 1000 ML: 0.9 INJECTION, SOLUTION INTRAVENOUS at 17:41

## 2025-03-04 SDOH — ECONOMIC STABILITY: FOOD INSECURITY: WITHIN THE PAST 12 MONTHS, YOU WORRIED THAT YOUR FOOD WOULD RUN OUT BEFORE YOU GOT MONEY TO BUY MORE.: NEVER TRUE

## 2025-03-04 SDOH — ECONOMIC STABILITY: FOOD INSECURITY: WITHIN THE PAST 12 MONTHS, THE FOOD YOU BOUGHT JUST DIDN'T LAST AND YOU DIDN'T HAVE MONEY TO GET MORE.: NEVER TRUE

## 2025-03-04 ASSESSMENT — PATIENT HEALTH QUESTIONNAIRE - PHQ9
8. MOVING OR SPEAKING SO SLOWLY THAT OTHER PEOPLE COULD HAVE NOTICED. OR THE OPPOSITE, BEING SO FIGETY OR RESTLESS THAT YOU HAVE BEEN MOVING AROUND A LOT MORE THAN USUAL: NOT AT ALL
SUM OF ALL RESPONSES TO PHQ QUESTIONS 1-9: 13
SUM OF ALL RESPONSES TO PHQ QUESTIONS 1-9: 13
3. TROUBLE FALLING OR STAYING ASLEEP: NEARLY EVERY DAY
10. IF YOU CHECKED OFF ANY PROBLEMS, HOW DIFFICULT HAVE THESE PROBLEMS MADE IT FOR YOU TO DO YOUR WORK, TAKE CARE OF THINGS AT HOME, OR GET ALONG WITH OTHER PEOPLE: NOT DIFFICULT AT ALL
SUM OF ALL RESPONSES TO PHQ QUESTIONS 1-9: 13
4. FEELING TIRED OR HAVING LITTLE ENERGY: NEARLY EVERY DAY
6. FEELING BAD ABOUT YOURSELF - OR THAT YOU ARE A FAILURE OR HAVE LET YOURSELF OR YOUR FAMILY DOWN: NOT AT ALL
2. FEELING DOWN, DEPRESSED OR HOPELESS: SEVERAL DAYS
9. THOUGHTS THAT YOU WOULD BE BETTER OFF DEAD, OR OF HURTING YOURSELF: NOT AT ALL
SUM OF ALL RESPONSES TO PHQ QUESTIONS 1-9: 13
1. LITTLE INTEREST OR PLEASURE IN DOING THINGS: NEARLY EVERY DAY
7. TROUBLE CONCENTRATING ON THINGS, SUCH AS READING THE NEWSPAPER OR WATCHING TELEVISION: NOT AT ALL
5. POOR APPETITE OR OVEREATING: NEARLY EVERY DAY

## 2025-03-04 NOTE — ED PROVIDER NOTES
[facility] EMERGENCY DEPARTMENT  EMERGENCY DEPARTMENT ENCOUNTER    Pt Name: Nikki Moffett  MRN: [id number]  Birthdate 1945  Date of evaluation: 3/4/2025  Provider: Duke Tirado MD  PCP: Tiesha Panda MD  Note Started: 5:51 PM EST 3/4/25    HPI     I personally saw Nikki Moffett and made/approved the management plan and take responsibility for the patient management.  This will serve as my supervisory note and shared attestation.  I did perform a substantiative portion of the visit including all aspects of the medical decision making.    In brief, Nikki Moffett is a 79 y.o. that presents to the emergency department patient presents for altered mental status.  Patient reportedly has been having mood swings and fatigue.  Patient has also been having chest pain.  Patient is present with her family.  Patient reportedly went to the internal medicine clinic and was noted to be in atrial fibrillation RVR.  Per the family patient has been taking her Eliquis but has not been taking her meds.  Patient denies any changes in urinary or bowel habits.  Patient just states that she has been feeling more fatigued over the past month..    Nursing Notes were all reviewed and agreed with or any disagreements were addressed in the HPI.    History From: Patient    Review of Systems   Pertinent positives and negatives as per HPI.     Focused exam:  Physical Exam  Vitals and nursing note reviewed.   Constitutional:       Appearance: She is well-developed.   HENT:      Head: Normocephalic and atraumatic.   Eyes:      Conjunctiva/sclera: Conjunctivae normal.   Cardiovascular:      Rate and Rhythm: Tachycardia present. Rhythm irregular.      Heart sounds: Normal heart sounds. No murmur heard.  Pulmonary:      Effort: Pulmonary effort is normal. No respiratory distress.      Breath sounds: Normal breath sounds. No wheezing or rales.   Abdominal:      General: Bowel sounds are normal.

## 2025-03-04 NOTE — ED PROVIDER NOTES
[facility] EMERGENCY DEPARTMENT  EMERGENCY DEPARTMENT ENCOUNTER      Pt Name: Nikki Moffett  MRN: [id number]  Birthdate 1945  Date of evaluation: 3/4/2025  Provider: Syed Green MD  PCP: Tiesha Panda MD  Note Started: 5:30 PM EST 3/4/25    CHIEF COMPLAINT       Chief Complaint   Patient presents with    Altered Mental Status     \"Mood swings, memory loss and fatigue\" per patients grandson    Chest Pain     Ongoing for a couple weeks per family       HISTORY OF PRESENT ILLNESS: 1 or more Elements   History From: patient grandson and patient   Limitations to history : demented    Nikki Moffett is a 79 y.o. female who presents to ED from clinic with complaints of fatigue and dizziness and intermittent chest pain in the clinic patient was found to be in A-fib RVR 150s with interventricular conduction delay, symptoms started a month ago she lost her appetite, she has past medical history of left MCA in February 2024 went under mechanical thrombectomy was found to be in A-fib RVR cardioverted to sinus rhythm started on metoprolol Eliquis and amiodarone later ischemic workup Lexiscan was negative, patient has not been taking metoprolol for long time and amiodarone  for last month Currently denies chest pain shortness of breath nausea fever chills and cough .    Nursing Notes were all reviewed and agreed with or any disagreements were addressed in the HPI.    REVIEW OF SYSTEMS :    Positives and Pertinent negatives as per HPI.     PAST MEDICAL HISTORY/Chronic Conditions Affecting Care    has a past medical history of Cerebral artery occlusion with cerebral infarction (HCC).     SURGICAL HISTORY     Past Surgical History:   Procedure Laterality Date    IR MECHANICAL ART THROMBECTOMY INTRACRANIAL  12/21/2023    IR MECHANICAL ART THROMBECTOMY INTRACRANIAL 12/21/2023 Dominik Castillo MD [facility]       CURRENTMEDICATIONS       Previous Medications    AMIODARONE  right lower lobe   cardiovascular: Irregular tachycardia. No murmurs, no gallops, no rubs. 2+ distal pulses. Equal extremity pulses.   Chest: No chest wall tenderness  GI:  Abdomen Soft, Non tender, Non distended.  No rebound, guarding, or rigidity. No pulsatile masses.  Musculoskeletal: Moves all extremities x 4.  Looks dry, no clubbing, no cyanosis, no edema.   Integument: skin dry  Neurologic: Residual right-sided weakness  Psychiatric: Normal Affect    DIAGNOSTIC RESULTS   LABS:    Labs Reviewed   CBC WITH AUTO DIFFERENTIAL - Abnormal; Notable for the following components:       Result Value    MCHC 30.6 (*)     RDW 15.5 (*)     Lymphocytes % 19 (*)     Lymphocytes Absolute 1.48 (*)     All other components within normal limits   COMPREHENSIVE METABOLIC PANEL - Abnormal; Notable for the following components:    Potassium 5.9 (*)     CO2 19 (*)     Glucose 101 (*)     BUN 36 (*)     Creatinine 1.7 (*)     Est, Glom Filt Rate 30 (*)     Total Bilirubin 1.3 (*)     ALT 50 (*)     AST 43 (*)     All other components within normal limits   TSH - Abnormal; Notable for the following components:    TSH 19.08 (*)     All other components within normal limits   LACTIC ACID   MAGNESIUM   URINALYSIS WITH MICROSCOPIC   TROPONIN   BASIC METABOLIC PANEL   PROCALCITONIN       As interpreted by me, the above displayed labs are abnormal. All other labs obtained during this visit were within normal range or not returned as of this dictation.    EKG Interpretation:  Interpreted by emergency department physician, Syed Green MD  Rate: 140  Rhythm: Atrial fibrillation  Interpretation: atrial fibrillation (chronic) with interventricular conduction delay  Comparison: changes compared to previous EKG    RADIOLOGY:   Non-plain film images such as CT, Ultrasound and MRI are read by the radiologist. Plain radiographic images are visualized and preliminarily interpreted by the ED Provider with the below findings:    Chest x-ray  amenable to the plan at this time.  All questions answered,, through shared decision making we will plan for admission.    Disposition Considerations (Tests not ordered but considered, Shared Decision Making, Pt Expectation of Test or Tx.): Admission  Admission    FINAL IMPRESSION      1. Chronic atrial fibrillation (HCC)    2. 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 ANTONIO (acute kidney injury)          DISPOSITION/PLAN     DISPOSITION Admitted 03/04/2025 07:42:55 PM    PATIENT REFERRED TO:  No follow-up provider specified.    DISCHARGE MEDICATIONS:  New Prescriptions    No medications on file       DISCONTINUED MEDICATIONS:  Discontinued Medications    No medications on file            (Please note that portions of this note were completed with a voice recognition program.  Efforts were made to edit the dictations but occasionally words are mis-transcribed.)    Syed Green MD (electronically signed)

## 2025-03-04 NOTE — PROGRESS NOTES
[facility]  Internal Medicine Residency Clinic    Attending Physician Statement  I have discussed the case, including pertinent history and exam findings with the resident physician.  I agree with the assessment, plan and orders as documented by the resident. I have reviewed the relevant PMHx, PSHx, FamHx, SocialHx, medications, and allergies and updated history as appropriate.    Patient presents for routine follow up of medical problems.  AF lutter and on amiodarone  etc on med list.  Some chest flutters   Ran out of Metoprolol, Lipitor and  Citalopram.  She has been agitated lately and currently today is agreeable and calm. Good support from Grandson with whom she lives.  Dx Vascular dementia: Plan Consult Dr Sosa and get B12/Folate  EKG today shows today 150 sinus mechanism.  Plan as above : Admit.    Remainder of medical problems as per resident note.    Reji Henriquez, DO  3/4/2025 4:12 PM  
Waited on hold for 18 minutes and gave nurse to nurse report to julian roberts and pt transported to er by radha Lion  
changes    Outpatient Medications Marked as Taking for the 3/4/25 encounter (Office Visit) with Oneal Jorge DO   Medication Sig Dispense Refill    ELIQUIS 5 MG TABS tablet TAKE 1 TABLET BY MOUTH TWICE DAILY 180 tablet 3    amiodarone (CORDARONE) 200 MG tablet Take 1 tablet by mouth daily 30 tablet 1    atorvastatin (LIPITOR) 80 MG tablet Take 1 tablet by mouth nightly 90 tablet 3       I have reviewed all pertinent PMHx, PSHx, FamHx, SocialHx, medications, and allergies and updated history as appropriate.    OBJECTIVE:    VS: /78 (Site: Left Upper Arm, Position: Sitting, Cuff Size: Small Adult)   Pulse (!) 143   Temp 97.9 °F (36.6 °C) (Temporal)   Resp 16   Ht 1.575 m (5' 2\")   Wt 56.5 kg (124 lb 8 oz)   SpO2 92% Comment: ra  BMI 22.77 kg/m²   General appearance: Alert, Awake, Oriented times 2.  Unable to recall date, no distress  Lungs: Lungs clear to auscultation bilaterally.  No rhonchi, crackles or wheezes  Heart:  Regular rate (90) and rhythm. No rub, murmur or gallop  Abdomen: Abdomen soft, non-tender. non-distended BS normal. No masses, organomegaly, no guarding rebound or rigidity.  Extremities: No edema, Peripheral pulses palpable 2/4  Neuro: Gross sensorimotor intact.  Memory recent impaired, unable to recall date, month, year.  Recalled 1 out of the 3 words.  Working memory diminished but not absent.    ASSESSMENT/PLAN:  1. Sinus tachycardia by electrocardiogram  - 143 on EKG- go to ED    2. Atrial flutter, unspecified type (HCC)  - EKG 12 Lead - Clinic Performed    3. Dementia, unspecified dementia severity, unspecified dementia type, unspecified whether behavioral, psychotic, or mood disturbance or anxiety (HCC)  - Follow up with Geriatrics.   Definite cognitive impairment in recent memory and working memory.      I have reviewed my findings and recommendations with Nikki Moffett and Dr Martinez Jorge DO PGY-1   3/4/2025 4:45 PM

## 2025-03-04 NOTE — PATIENT INSTRUCTIONS
Dear Nikki Moffett,    Please go to the emergency department for elevated heart rate  - Referrals have been made to Geriatric medicine after hospital visit. -Please get labs prior  - We will see each other again after hospital visit    Sincerely,  Oneal Jorge DO  3/4/2025  4:24 PM

## 2025-03-05 ENCOUNTER — APPOINTMENT (OUTPATIENT)
Dept: GENERAL RADIOLOGY | Age: 80
DRG: 004 | End: 2025-03-05
Payer: MEDICARE

## 2025-03-05 LAB
ALBUMIN SERPL-MCNC: 3.5 G/DL (ref 3.5–5.2)
ALP SERPL-CCNC: 90 U/L (ref 35–104)
ALT SERPL-CCNC: 46 U/L (ref 0–32)
ANION GAP SERPL CALCULATED.3IONS-SCNC: 21 MMOL/L (ref 7–16)
AST SERPL-CCNC: 51 U/L (ref 0–31)
BASOPHILS # BLD: 0.05 K/UL (ref 0–0.2)
BASOPHILS NFR BLD: 1 % (ref 0–2)
BILIRUB DIRECT SERPL-MCNC: 0.4 MG/DL (ref 0–0.3)
BILIRUB INDIRECT SERPL-MCNC: 1.1 MG/DL (ref 0–1)
BILIRUB SERPL-MCNC: 1.5 MG/DL (ref 0–1.2)
BUN SERPL-MCNC: 37 MG/DL (ref 6–23)
CALCIUM SERPL-MCNC: 8.9 MG/DL (ref 8.6–10.2)
CHLORIDE SERPL-SCNC: 105 MMOL/L (ref 98–107)
CO2 SERPL-SCNC: 12 MMOL/L (ref 22–29)
CREAT SERPL-MCNC: 1.9 MG/DL (ref 0.5–1)
EKG ATRIAL RATE: 64 BPM
EKG Q-T INTERVAL: 370 MS
EKG QRS DURATION: 110 MS
EKG QTC CALCULATION (BAZETT): 514 MS
EKG R AXIS: -63 DEGREES
EKG T AXIS: -168 DEGREES
EKG VENTRICULAR RATE: 116 BPM
EOSINOPHIL # BLD: 0.01 K/UL (ref 0.05–0.5)
EOSINOPHILS RELATIVE PERCENT: 0 % (ref 0–6)
ERYTHROCYTE [DISTWIDTH] IN BLOOD BY AUTOMATED COUNT: 15.4 % (ref 11.5–15)
GFR, ESTIMATED: 27 ML/MIN/1.73M2
GLUCOSE SERPL-MCNC: 124 MG/DL (ref 74–99)
HCT VFR BLD AUTO: 42.7 % (ref 34–48)
HGB BLD-MCNC: 12.7 G/DL (ref 11.5–15.5)
IMM GRANULOCYTES # BLD AUTO: 0.03 K/UL (ref 0–0.58)
IMM GRANULOCYTES NFR BLD: 0 % (ref 0–5)
LYMPHOCYTES NFR BLD: 1.29 K/UL (ref 1.5–4)
LYMPHOCYTES RELATIVE PERCENT: 18 % (ref 20–42)
MAGNESIUM SERPL-MCNC: 2.2 MG/DL (ref 1.6–2.6)
MCH RBC QN AUTO: 27.8 PG (ref 26–35)
MCHC RBC AUTO-ENTMCNC: 29.7 G/DL (ref 32–34.5)
MCV RBC AUTO: 93.4 FL (ref 80–99.9)
MONOCYTES NFR BLD: 0.37 K/UL (ref 0.1–0.95)
MONOCYTES NFR BLD: 5 % (ref 2–12)
NEUTROPHILS NFR BLD: 76 % (ref 43–80)
NEUTS SEG NFR BLD: 5.48 K/UL (ref 1.8–7.3)
PHOSPHATE SERPL-MCNC: 5.3 MG/DL (ref 2.5–4.5)
PLATELET # BLD AUTO: 273 K/UL (ref 130–450)
PMV BLD AUTO: 10.4 FL (ref 7–12)
POTASSIUM SERPL-SCNC: 4.5 MMOL/L (ref 3.5–5)
PROT SERPL-MCNC: 6.7 G/DL (ref 6.4–8.3)
RBC # BLD AUTO: 4.57 M/UL (ref 3.5–5.5)
SODIUM SERPL-SCNC: 138 MMOL/L (ref 132–146)
WBC OTHER # BLD: 7.2 K/UL (ref 4.5–11.5)

## 2025-03-05 PROCEDURE — 93010 ELECTROCARDIOGRAM REPORT: CPT | Performed by: INTERNAL MEDICINE

## 2025-03-05 PROCEDURE — 97161 PT EVAL LOW COMPLEX 20 MIN: CPT

## 2025-03-05 PROCEDURE — 84100 ASSAY OF PHOSPHORUS: CPT

## 2025-03-05 PROCEDURE — 82248 BILIRUBIN DIRECT: CPT

## 2025-03-05 PROCEDURE — 83735 ASSAY OF MAGNESIUM: CPT

## 2025-03-05 PROCEDURE — 71046 X-RAY EXAM CHEST 2 VIEWS: CPT

## 2025-03-05 PROCEDURE — 2500000003 HC RX 250 WO HCPCS: Performed by: STUDENT IN AN ORGANIZED HEALTH CARE EDUCATION/TRAINING PROGRAM

## 2025-03-05 PROCEDURE — 84540 ASSAY OF URINE/UREA-N: CPT

## 2025-03-05 PROCEDURE — 80053 COMPREHEN METABOLIC PANEL: CPT

## 2025-03-05 PROCEDURE — 6370000000 HC RX 637 (ALT 250 FOR IP): Performed by: INTERNAL MEDICINE

## 2025-03-05 PROCEDURE — 85025 COMPLETE CBC W/AUTO DIFF WBC: CPT

## 2025-03-05 PROCEDURE — 97530 THERAPEUTIC ACTIVITIES: CPT

## 2025-03-05 PROCEDURE — 2580000003 HC RX 258

## 2025-03-05 PROCEDURE — 82436 ASSAY OF URINE CHLORIDE: CPT

## 2025-03-05 PROCEDURE — 6370000000 HC RX 637 (ALT 250 FOR IP)

## 2025-03-05 PROCEDURE — 97166 OT EVAL MOD COMPLEX 45 MIN: CPT

## 2025-03-05 PROCEDURE — 36415 COLL VENOUS BLD VENIPUNCTURE: CPT

## 2025-03-05 PROCEDURE — 2500000003 HC RX 250 WO HCPCS

## 2025-03-05 PROCEDURE — 2580000003 HC RX 258: Performed by: INTERNAL MEDICINE

## 2025-03-05 PROCEDURE — 97535 SELF CARE MNGMENT TRAINING: CPT

## 2025-03-05 PROCEDURE — 6360000002 HC RX W HCPCS

## 2025-03-05 PROCEDURE — 84300 ASSAY OF URINE SODIUM: CPT

## 2025-03-05 PROCEDURE — 84133 ASSAY OF URINE POTASSIUM: CPT

## 2025-03-05 PROCEDURE — 99223 1ST HOSP IP/OBS HIGH 75: CPT | Performed by: INTERNAL MEDICINE

## 2025-03-05 PROCEDURE — 2580000003 HC RX 258: Performed by: STUDENT IN AN ORGANIZED HEALTH CARE EDUCATION/TRAINING PROGRAM

## 2025-03-05 PROCEDURE — 2060000000 HC ICU INTERMEDIATE R&B

## 2025-03-05 PROCEDURE — 82570 ASSAY OF URINE CREATININE: CPT

## 2025-03-05 RX ORDER — VITAMIN B COMPLEX
1000 TABLET ORAL DAILY
Status: DISCONTINUED | OUTPATIENT
Start: 2025-03-05 | End: 2025-03-21

## 2025-03-05 RX ORDER — DIGOXIN 125 MCG
250 TABLET ORAL ONCE
Status: COMPLETED | OUTPATIENT
Start: 2025-03-05 | End: 2025-03-05

## 2025-03-05 RX ORDER — AMIODARONE HYDROCHLORIDE 200 MG/1
200 TABLET ORAL ONCE
Status: COMPLETED | OUTPATIENT
Start: 2025-03-06 | End: 2025-03-05

## 2025-03-05 RX ORDER — DIGOXIN 125 MCG
125 TABLET ORAL DAILY
Status: DISCONTINUED | OUTPATIENT
Start: 2025-03-05 | End: 2025-03-06

## 2025-03-05 RX ORDER — 0.9 % SODIUM CHLORIDE 0.9 %
250 INTRAVENOUS SOLUTION INTRAVENOUS ONCE
Status: COMPLETED | OUTPATIENT
Start: 2025-03-05 | End: 2025-03-05

## 2025-03-05 RX ORDER — METOPROLOL SUCCINATE 25 MG/1
25 TABLET, EXTENDED RELEASE ORAL DAILY
Status: DISCONTINUED | OUTPATIENT
Start: 2025-03-06 | End: 2025-03-06

## 2025-03-05 RX ADMIN — APIXABAN 5 MG: 5 TABLET, FILM COATED ORAL at 08:29

## 2025-03-05 RX ADMIN — AMIODARONE HYDROCHLORIDE 200 MG: 200 TABLET ORAL at 23:54

## 2025-03-05 RX ADMIN — Medication 1000 UNITS: at 08:29

## 2025-03-05 RX ADMIN — SODIUM CHLORIDE, PRESERVATIVE FREE 10 ML: 5 INJECTION INTRAVENOUS at 19:56

## 2025-03-05 RX ADMIN — DIGOXIN 125 MCG: 125 TABLET ORAL at 18:58

## 2025-03-05 RX ADMIN — SODIUM CHLORIDE 250 ML: 0.9 INJECTION, SOLUTION INTRAVENOUS at 21:57

## 2025-03-05 RX ADMIN — SODIUM CHLORIDE 12.5 MG/HR: 900 INJECTION, SOLUTION INTRAVENOUS at 01:19

## 2025-03-05 RX ADMIN — DIGOXIN 250 MCG: 125 TABLET ORAL at 21:53

## 2025-03-05 RX ADMIN — APIXABAN 5 MG: 5 TABLET, FILM COATED ORAL at 19:56

## 2025-03-05 RX ADMIN — DOXYCYCLINE 100 MG: 100 INJECTION, POWDER, LYOPHILIZED, FOR SOLUTION INTRAVENOUS at 08:36

## 2025-03-05 RX ADMIN — SODIUM CHLORIDE 250 ML: 0.9 INJECTION, SOLUTION INTRAVENOUS at 14:44

## 2025-03-05 RX ADMIN — PIPERACILLIN AND TAZOBACTAM 3375 MG: 3; .375 INJECTION, POWDER, LYOPHILIZED, FOR SOLUTION INTRAVENOUS at 04:14

## 2025-03-05 NOTE — CARE COORDINATION
Here for AMS and chest pain.From home. Currently on cardizem drip IV vibramycin and zosyn. Bun 37 creat 1.9 Met with patient who is A&O X2. PT OT Patient from home, lives with son, Shreyas. 4 steps to enter, bed and bath on the second floor.PCP is at  clinic. Plan is to return home, Call placed to drea Pritchett and vm left  For return call. Electronically signed by Mayra Morley RN on 3/5/2025 at 10:33 AM

## 2025-03-05 NOTE — PROGRESS NOTES
4 Eyes Skin Assessment     NAME:  Nikki Moffett  YOB: 1945  MEDICAL RECORD NUMBER:  [id number]    The patient is being assessed for  Admission    I agree that at least one RN has performed a thorough Head to Toe Skin Assessment on the patient. ALL assessment sites listed below have been assessed.      Areas assessed by both nurses:    Head, Face, Ears, Shoulders, Back, Chest, and Arms, Elbows, Hands        Does the Patient have a Wound? No noted wound(s)       Marty Prevention initiated by RN: No  Wound Care Orders initiated by RN: No    Pressure Injury (Stage 3,4, Unstageable, DTI, NWPT, and Complex wounds) if present, place Wound referral order by RN under : No    New Ostomies, if present place, Ostomy referral order under : No     Nurse 1 eSignature: Electronically signed by Audrey Edwards RN on 3/5/25 at 7:03 AM EST    **SHARE this note so that the co-signing nurse can place an eSignature**    Nurse 2 eSignature: Electronically signed by Katherine Arrington RN on 3/5/25 at 10:16 AM EST

## 2025-03-05 NOTE — ED NOTES
Patient continues to attempt to get out of bed unassisted. Patient educated on fall risk. Patient verbalized understanding.

## 2025-03-05 NOTE — PROGRESS NOTES
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    Procedure/Surgery:  none this admission  Precautions: Falls,  monitor HR, alarms     SUBJECTIVE:    Pt lives with her family in a 2 story home with 4 stairs to enter and no rail(s).    Bed is on 2nd floor and bath is on 2nd floor.    Pt ambulated with no AD PTA.    Equipment Owned: none  Equipment Needs:  TBD - possible WW    OBJECTIVE:   Initial Evaluation  Date: 3/5/25 Treatment  Date:  Short Term/ Long Term   Goals   AM-PAC 6 Clicks      Was pt agreeable to Eval/treatment? Yes      Does pt have pain? Denies pain      Bed Mobility  Rolling: SBA  Supine to sit: SBA  Sit to supine: SBA  Scooting: SBA  Rolling: Independent  Supine to sit: Independent  Sit to supine: Independent  Scooting: Independent   Transfers Sit to stand: SBA  Stand to sit: SBA  Stand pivot: Bill WW  Sit to stand: supervision   Stand to sit: supervision  Stand pivot: supervision AAD   Ambulation    20 feet with WW Bill  >150 feet with AAD Supervision    Stair negotiation: ascended and descended  NT   8-12 steps with 1 rail(s) supervision    ROM BUE:  Defer to OT  BLE:  WFL     Strength BUE:  Defer to OT  BLE:  at least 3+/5  Improve 1 MMT   Balance Sitting EOB:  SBA  Dynamic Standing:  Bill at WW  Sitting EOB:  Independent  Dynamic Standing:  supervision AAD     Pt is A & O x 3-4. Pt follows directions.   Sensation: no abnormalities noted   Edema:  unremarkable     Vitals:    HR 60-80  Spo2% WNL    PRE  HR 80s, 1 short spike to 120

## 2025-03-05 NOTE — PROGRESS NOTES
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   Past Surgical History:   Procedure Laterality Date    IR MECHANICAL ART THROMBECTOMY INTRACRANIAL  2023    IR MECHANICAL ART THROMBECTOMY INTRACRANIAL 2023 Dominik Castillo MD [facility]       Pt presented to the hospital on 3/4 with fatigue, dizziness, and chest pain     Orders received, chart reviewed, eval complete.     Precautions:  Fall Risk, monitor HR     Assessment of current deficits    [x] Functional mobility   [x]ADLs  [x] Strength               []Cognition   [x] Functional transfers   [x] IADLs         [x] Safety Awareness   [x]Endurance   [] Fine Motor Coordination [x] Balance [] Vision/perception   []Sensation    [] Gross Motor Coordination [] ROM  [] Delirium                  [] Motor Control     OT PLAN OF CARE   OT POC based on physician orders, patient diagnosis and results of clinical assessment    Frequency/Duration 1-3 days/wk for 2 weeks PRN   Specific OT Treatment to include:   * Instruction/training on adapted ADL techniques and AE recommendations to increase functional independence within precautions       * Training on energy conservation strategies, correct breathing  Dressing Moderate assistance  Socks   Mod I    Bathing Moderate assistance  Simulated   Mod I    Toileting Minimal assistance  Simulated in standing for clothing management and raúl care   Mod I    Bed Mobility  Rolling L/R: Supervision  2/2 line safety   Supine to sit: Stand by assist    Sit to supine: Stand by assist    Supine to sit: Mod I   Sit to supine: Mod I    Functional Transfers Sit to stand: Stand by assist    Stand to sit: Stand by assist    Stand pivot: NT   Mod I    Functional Mobility Min A  With no AD and with FWW  in the room  in order to increase activity tolerance and strength for increased independence in ADLs and IADLs   Mod I   with AAD    Balance Sitting - static: Supervision    Sitting - dynamic: Stand by assist  during functional activity      Standing- static: Stand by assist    Standing- dynamic: Min A  during functional activity   Sitting: Mod I       Standing: Mod I    Activity Tolerance Fair -  fatigue   HR 60-80s  One spike to 120 with immediate return to 80s during functional mobility   Good   Visual/  Perceptual no glasses present               BUE  ROM/Strength/  Fine motor Coordination Hand dominance: right     RUE: ROM WFL      Strength: grossly 4-/5      Strength: WFL      Coordination:  WFL     LUE: ROM WFL      Strength: grossly 4-/5      Strength: WFL      Coordination: WFL   Pt will participate in BUE exercise with supervision to increase strength, ROM, and coordination for increased independence in functional mobility and ADLs    Safety   Fair  Good during functional activity      Hearing: WFL  Sensation: no c/o numbness or tingling   Tone: WFL   Edema: unremarkable     Comments:   RN cleared patient for OT.  Upon arrival, patient was semi-supine in bed  and agreeable to OT session. no visitors present throughout session . At end of session, patient  laying on L side ; with call light and phone within reach; all lines and tubes intact.   Patient presents with  decreased safety awareness, activity tolerance , balance , and strength . Pt demonstrated decreased independence during ADLs, bed mobility , functional transfers, and functional mobility. Pt would benefit from continued skilled OT to increase safety and independence with completion of ADL tasks and functional mobility for improved quality of life and return to PLOF.       Treatment: OT treatment provided this date includes:  OT edu pt/family on role of OT in the acute care setting. Pt  verbalized understanding   Therapist facilitated and instructed pt on adapted techniques & compensatory strategies to improve safety and independence with ADLs, bed mobility , functional transfers, and functional mobility as noted above to allow pt to achieve highest level of independence and safely. Pt provided  min cuing , verbal instructions , extended time , and encouragement  in order to promote maximum level of independence.   Pt edu on use of call light and waiting until staff present to attempt any functional mobility. Pt verbalized understanding and demonstrated ability to locate and press call button.     Rehab Potential: Good  for established goals     Patient / Family Goal: to get better     Patient and/or family were instructed on functional diagnosis, prognosis/goals and OT plan of care. Pt/family demonstrated understanding.      Eval Complexity:      Description  Performance deficits  Clinical decision making  Co-morbidities affecting occupational performance  Modification or assistance to complete eval    Low Complexity   1 to 3 []  Low []  None []  None []   Moderate Complexity   3 to 5 [x]  Mod [x]  Maybe [x]  Min to Mod [x]   High Complexity   5 or more []  High []  Yes []  Max []     The above evaluation is classified as moderate  complexity based off the noted performance deficits, personal factors, co-morbidities, assistance required, and other factors as noted in the clinical evaluation and functional testing.

## 2025-03-05 NOTE — PROGRESS NOTES
Progress  Note  Chief Complaint   Patient presents with    Altered Mental Status     \"Mood swings, memory loss and fatigue\" per patients grandson    Chest Pain     Ongoing for a couple weeks per family     Historical Issues:  Current Facility-Administered Medications   Medication Dose Route Frequency Provider Last Rate Last Admin    Vitamin D (CHOLECALCIFEROL) tablet 1,000 Units  1,000 Units Oral Daily Sandra Del Rosario MD   1,000 Units at 03/05/25 0829    [START ON 3/6/2025] metoprolol succinate (TOPROL XL) extended release tablet 25 mg  25 mg Oral Daily Kamlesh Ibanez MD        amiodarone (CORDARONE) tablet 200 mg  200 mg Oral Daily Sophia Morfin MD        apixaban (ELIQUIS) tablet 5 mg  5 mg Oral BID Syed Green MD   5 mg at 03/05/25 0829    sodium chloride flush 0.9 % injection 5-40 mL  5-40 mL IntraVENous 2 times per day Kamlesh Ibanez MD   5 mL at 03/04/25 2106    sodium chloride flush 0.9 % injection 5-40 mL  5-40 mL IntraVENous PRN Kamlesh Ibanez MD        0.9 % sodium chloride infusion   IntraVENous PRN Kamlesh Ibanez MD        ondansetron (ZOFRAN-ODT) disintegrating tablet 4 mg  4 mg Oral Q8H PRN Kamlesh Ibanez MD        Or    ondansetron (ZOFRAN) injection 4 mg  4 mg IntraVENous Q6H PRN Kamlesh Ibanez MD        melatonin tablet 3 mg  3 mg Oral Nightly Kamlesh Ibanez MD   3 mg at 03/04/25 2041    polyethylene glycol (GLYCOLAX) packet 17 g  17 g Oral Daily Kamlesh Ibanez MD        acetaminophen (TYLENOL) tablet 650 mg  650 mg Oral Q6H PRN Kamlesh Ibanez MD        Or    acetaminophen (TYLENOL) suppository 650 mg  650 mg Rectal Q6H PRN Kamlesh Ibanez MD        [Held by provider] atorvastatin (LIPITOR) tablet 80 mg  80 mg Oral Nightly Kamlesh Ibanez MD        [Held  above  Echo was done within the last year does not need repeated  Gentle diuresis  Rate control for atrial fibrillation was successful can wean diltiazem.    Patient was seen with medical residents      Case Discussed with:      Electronically signed by Prakash Parker MD on 3/5/2025 at 2:01 PM

## 2025-03-05 NOTE — PROGRESS NOTES
Dr. Sosa messaged regarding patients HR jumping to 169 and then back down.  Per Dr. Sosa give 0.25mg Digoxin.

## 2025-03-05 NOTE — PROGRESS NOTES
[facility]  Internal Medicine Residency Program  Progress Note - House Team 1    Patient:  Nikki Moffett 79 y.o. female MRN: [id number]     Date of Service: 3/5/2025     CC: Fatigue and dizziness   Overnight events: n/a     Subjective     Pt is a 80 yo female who presented to the hospital for fatigue and dizziness. She has a history of HFimpEF and ANTONIO stage 3. During the examination the pt reports that she is not in any pain. She responds to questions appropriately. She denies missing any doses of her medication that was prescribed to her.    Objective     Physical Exam:  Vitals: BP (!) 82/44   Pulse 83   Temp 98 °F (36.7 °C) (Oral)   Resp 17   Ht 1.575 m (5' 2\")   Wt 56.6 kg (124 lb 12.8 oz)   SpO2 98%   BMI 22.83 kg/m²     I & O - 24hr: No intake/output data recorded.   General Appearance: alert, appears stated age, and cooperative  HEENT:  Head: Normocephalic, no lesions, without obvious abnormality.  Neck: no adenopathy, no carotid bruit, no JVD, supple, symmetrical, trachea midline, and thyroid not enlarged, symmetric, no tenderness/mass/nodules  Lung: rales base - right>left  Heart: regular rate and rhythm, S1, S2 normal, no murmur, click, rub or gallop  Abdomen: soft, non-tender; bowel sounds normal; no masses,  no organomegaly  Extremities:  extremities normal, atraumatic, no cyanosis or edema  Musculokeletal: No joint swelling, no muscle tenderness. ROM normal in all joints of extremities.   Neurologic: Mental status: Alert, oriented, thought content appropriate  Subject  Pertinent Labs & Imaging Studies   abraham  CBC with Differential:    Lab Results   Component Value Date/Time    WBC 7.2 03/05/2025 06:45 AM    RBC 4.57 03/05/2025 06:45 AM    HGB 12.7 03/05/2025 06:45 AM    HCT 42.7 03/05/2025 06:45 AM     03/05/2025 06:45 AM    MCV 93.4 03/05/2025 06:45 AM    MCH 27.8 03/05/2025 06:45 AM    MCHC 29.7 03/05/2025 06:45 AM    RDW 15.4 03/05/2025 06:45 AM    LYMPHOPCT 18

## 2025-03-05 NOTE — ACP (ADVANCE CARE PLANNING)
Advance Care Planning   Healthcare Decision Maker:    Primary Decision Maker: Shreyas Moffett - Child - [phone]    Secondary Decision Maker: Mauricio Moffett - Grandchild - [phone]    Click here to complete Healthcare Decision Makers including selection of the Healthcare Decision Maker Relationship (ie \"Primary\").

## 2025-03-05 NOTE — H&P
[facility]  Internal Medicine Residency Program   History and Physical    Patient:  Nikki Moffett 79 y.o. female   MRN: [id number]       Date of Service: 3/4/2025          Chief complaint: had concerns including Altered Mental Status (\"Mood swings, memory loss and fatigue\" per patients grandson) and Chest Pain (Ongoing for a couple weeks per family).    History of Present Illness   The patient is a 79 y.o. female , with  has a past medical history of left MCA infarct s/p thrombectomy, atrial fibrillation, prediabetes, HFimpEF. who presented to the ER with CC of fatigue and dizziness    Patient was in her usual state of health until 1 month prior when she had sudden onset of fatigue and weakness. Patient also noted poor appetite and noted weight loss. Of note patient was last seen in Internal Medicine clinic on 4/23/2024 and was lost to follow-up. She reports taking apixaban but not amiodarone and metoprolol. Patient sought consult at urgent care today where EKG was done and found to be in atrial fibrillation in RVR. Patient was advised to seek consult at ED.    Upon arrival at the ED, patient was normotensive but tachycardic in 132s, afebrile, and saturating at 100% on room air. Patient appeared dry on exam, heart rate was irregularly irregular, lung sounds revealed crackles at bilateral lower fields. Pertinent labs include elevated creatinine at 1.7 (baseline 0.9), elevated TSH 19.08, elevated troponin at 43, pro calcitonin 0.05. Patient was started on diltiazem drip, apixaban was resumed. EKG revealed atrial fibrillation with interventricular conduction delay. CXR showed bilateral pleural effusions and possible right lower lobe opacity. Patient received piperacillin-tazobactam and doxycyline. Patient is admitted for further evaluation and management.       Past Medical History:      Diagnosis Date    Cerebral artery occlusion with cerebral infarction (HCC)        Past Surgical History:

## 2025-03-05 NOTE — PROGRESS NOTES
[facility]  Internal Medicine Residency Program  House Team Progress Note      Patient:  Nikki Moffett 79 y.o. female MRN: [id number] : 1945   Admitted:  3/4/2025  5:05 PM    Date of Service:  3/5/2025    Room: [address] [place] [address]    Chief complaint: had concerns including Altered Mental Status (\"Mood swings, memory loss and fatigue\" per patients grandson) and Chest Pain (Ongoing for a couple weeks per family).     The patient is a 79 y.o. female,  has a past medical history of Cerebral artery occlusion with cerebral infarction (HCC)., had concerns including Altered Mental Status (\"Mood swings, memory loss and fatigue\" per patients grandson) and Chest Pain (Ongoing for a couple weeks per family)., actively being managed for cc of fatigue and weakness    Subjective     Overnight events:  No significant overnight events     Hospital Day: 2  Today's Course:  Patient seen and evaluated bedside. Patient is awake, alert, oriented x3, does not appear to be in distress. Patient seen eating her breakfast. No active complaints at this time. Reports good urine output.     Objective     Vitals  Range   BP 98/87 BP  Min: 82/63  Max: 132/56   HR 64 Pulse  Av.3  Min: 60  Max: 137   RR 18 Resp  Av  Min: 16  Max: 28   Temp. 98.2 °F (36.8 °C) Temp  Min: 98.2 °F (36.8 °C)  Max: 98.2 °F (36.8 °C)   O2sat 93 % SpO2  Av.8 %  Min: 93 %  Max: 96 %   Weight      No intake or output data in the 24 hours ending 25  Net IO Since Admission: No IO data has been entered for this period [25]    Physical Exam  Constitutional:       Appearance: Normal appearance. She is not ill-appearing.   HENT:      Head: Normocephalic.      Nose: Nose normal.      Mouth/Throat:      Mouth: Mucous membranes are moist  Cardiovascular:      Rate and Rhythm: Rhythm irregularly irregular.      Heart sounds: No murmur heard.  Pulmonary:      Effort: Pulmonary effort is normal. No respiratory distress.       Completed Specimen: Nasopharyngeal Swab Updated: 03/04/25 2145     Specimen Description .NASOPHARYNGEAL SWAB     Adenovirus PCR Not Detected     Coronavirus 229E PCR Not Detected     Coronavirus HKU1 PCR Not Detected     Coronavirus NL63 PCR Not Detected     Coronavirus OC43 PCR Not Detected     SARS-CoV-2, PCR Not Detected     Human Metapneumovirus PCR Not Detected     Rhino/Enterovirus PCR Not Detected     Influenza A by PCR Not Detected     Influenza B by PCR Not Detected     Parainfluenza 1 PCR Not Detected     Parainfluenza 2 PCR Not Detected     Parainfluenza 3 PCR Not Detected     Parainfluenza 4 PCR Not Detected     Resp Syncytial Virus PCR Not Detected     Bordetella parapertussis by PCR Not Detected     B Pertussis by PCR Not Detected     Chlamydia pneumoniae By PCR Not Detected     Mycoplasma pneumo by PCR Not Detected     Comment: Performed by multiplexed nucleic acid assay.       Culture, Respiratory [[id number]]     Order Status: Sent Specimen: Sputum Expectorated            COVID-19 Labs:  Lab Results   Component Value Date/Time    COVID19 Not Detected 03/04/2025 09:25 PM     Imaging studies     XR CHEST PORTABLE   Final Result   Bilateral pleural effusions.      Possible right lower lobe opacity.               Scheduled Medications Continuous Medications PRN Medications     Vitamin D, 1,000 Units, Oral, Daily    [Held by provider] amiodarone, 200 mg, Oral, Daily    apixaban, 5 mg, Oral, BID    [Held by provider] metoprolol succinate, 25 mg, Oral, Daily    piperacillin-tazobactam, 3,375 mg, IntraVENous, Q8H    doxycycline (VIBRAMYCIN) IV, 100 mg, IntraVENous, Q12H    sodium chloride flush, 5-40 mL, IntraVENous, 2 times per day    melatonin, 3 mg, Oral, Nightly    polyethylene glycol, 17 g, Oral, Daily    [Held by provider] atorvastatin, 80 mg, Oral, Nightly    [Held by provider] sacubitril-valsartan, 1 tablet, Oral, BID   dilTIAZem 100 mg in sodium chloride 0.9 % 100 mL infusion (ADD-Bryant) Last

## 2025-03-06 LAB
ANION GAP SERPL CALCULATED.3IONS-SCNC: 18 MMOL/L (ref 7–16)
ANION GAP SERPL CALCULATED.3IONS-SCNC: 21 MMOL/L (ref 7–16)
BASOPHILS # BLD: 0.08 K/UL (ref 0–0.2)
BASOPHILS NFR BLD: 1 % (ref 0–2)
BUN SERPL-MCNC: 34 MG/DL (ref 6–23)
BUN SERPL-MCNC: 38 MG/DL (ref 6–23)
CALCIUM SERPL-MCNC: 8.3 MG/DL (ref 8.6–10.2)
CALCIUM SERPL-MCNC: 8.9 MG/DL (ref 8.6–10.2)
CHLORIDE SERPL-SCNC: 105 MMOL/L (ref 98–107)
CHLORIDE SERPL-SCNC: 107 MMOL/L (ref 98–107)
CHLORIDE UR-SCNC: 30 MMOL/L
CO2 SERPL-SCNC: 12 MMOL/L (ref 22–29)
CO2 SERPL-SCNC: 12 MMOL/L (ref 22–29)
CREAT SERPL-MCNC: 1.7 MG/DL (ref 0.5–1)
CREAT SERPL-MCNC: 2.2 MG/DL (ref 0.5–1)
CREAT UR-MCNC: 133.6 MG/DL (ref 29–226)
EOSINOPHIL # BLD: 0.1 K/UL (ref 0.05–0.5)
EOSINOPHILS RELATIVE PERCENT: 1 % (ref 0–6)
ERYTHROCYTE [DISTWIDTH] IN BLOOD BY AUTOMATED COUNT: 15.4 % (ref 11.5–15)
GFR, ESTIMATED: 22 ML/MIN/1.73M2
GFR, ESTIMATED: 30 ML/MIN/1.73M2
GLUCOSE SERPL-MCNC: 112 MG/DL (ref 74–99)
GLUCOSE SERPL-MCNC: 85 MG/DL (ref 74–99)
HCT VFR BLD AUTO: 37 % (ref 34–48)
HGB BLD-MCNC: 11.5 G/DL (ref 11.5–15.5)
IMM GRANULOCYTES # BLD AUTO: 0.03 K/UL (ref 0–0.58)
IMM GRANULOCYTES NFR BLD: 0 % (ref 0–5)
LYMPHOCYTES NFR BLD: 1.64 K/UL (ref 1.5–4)
LYMPHOCYTES RELATIVE PERCENT: 21 % (ref 20–42)
MAGNESIUM SERPL-MCNC: 1.9 MG/DL (ref 1.6–2.6)
MCH RBC QN AUTO: 27.4 PG (ref 26–35)
MCHC RBC AUTO-ENTMCNC: 31.1 G/DL (ref 32–34.5)
MCV RBC AUTO: 88.3 FL (ref 80–99.9)
MONOCYTES NFR BLD: 0.66 K/UL (ref 0.1–0.95)
MONOCYTES NFR BLD: 9 % (ref 2–12)
NEUTROPHILS NFR BLD: 67 % (ref 43–80)
NEUTS SEG NFR BLD: 5.16 K/UL (ref 1.8–7.3)
PHOSPHATE SERPL-MCNC: 4.2 MG/DL (ref 2.5–4.5)
PLATELET # BLD AUTO: 297 K/UL (ref 130–450)
PMV BLD AUTO: 10.2 FL (ref 7–12)
POTASSIUM SERPL-SCNC: 4.1 MMOL/L (ref 3.5–5)
POTASSIUM SERPL-SCNC: 4.3 MMOL/L (ref 3.5–5)
POTASSIUM, UR: 45.5 MMOL/L
RBC # BLD AUTO: 4.19 M/UL (ref 3.5–5.5)
SODIUM SERPL-SCNC: 137 MMOL/L (ref 132–146)
SODIUM SERPL-SCNC: 138 MMOL/L (ref 132–146)
SODIUM UR-SCNC: 25 MMOL/L
UUN UR-MCNC: 829 MG/DL (ref 800–1666)
WBC OTHER # BLD: 7.7 K/UL (ref 4.5–11.5)

## 2025-03-06 PROCEDURE — 6370000000 HC RX 637 (ALT 250 FOR IP)

## 2025-03-06 PROCEDURE — 2580000003 HC RX 258

## 2025-03-06 PROCEDURE — 2500000003 HC RX 250 WO HCPCS: Performed by: NURSE PRACTITIONER

## 2025-03-06 PROCEDURE — 83735 ASSAY OF MAGNESIUM: CPT

## 2025-03-06 PROCEDURE — 99233 SBSQ HOSP IP/OBS HIGH 50: CPT | Performed by: INTERNAL MEDICINE

## 2025-03-06 PROCEDURE — 2060000000 HC ICU INTERMEDIATE R&B

## 2025-03-06 PROCEDURE — 99291 CRITICAL CARE FIRST HOUR: CPT | Performed by: INTERNAL MEDICINE

## 2025-03-06 PROCEDURE — 2500000003 HC RX 250 WO HCPCS

## 2025-03-06 PROCEDURE — 6370000000 HC RX 637 (ALT 250 FOR IP): Performed by: INTERNAL MEDICINE

## 2025-03-06 PROCEDURE — 84100 ASSAY OF PHOSPHORUS: CPT

## 2025-03-06 PROCEDURE — 85025 COMPLETE CBC W/AUTO DIFF WBC: CPT

## 2025-03-06 PROCEDURE — 36415 COLL VENOUS BLD VENIPUNCTURE: CPT

## 2025-03-06 PROCEDURE — 97530 THERAPEUTIC ACTIVITIES: CPT

## 2025-03-06 PROCEDURE — 6370000000 HC RX 637 (ALT 250 FOR IP): Performed by: NURSE PRACTITIONER

## 2025-03-06 PROCEDURE — 80048 BASIC METABOLIC PNL TOTAL CA: CPT

## 2025-03-06 RX ORDER — METOPROLOL SUCCINATE 25 MG/1
25 TABLET, EXTENDED RELEASE ORAL DAILY
Status: DISCONTINUED | OUTPATIENT
Start: 2025-03-06 | End: 2025-04-01

## 2025-03-06 RX ORDER — 0.9 % SODIUM CHLORIDE 0.9 %
500 INTRAVENOUS SOLUTION INTRAVENOUS ONCE
Status: COMPLETED | OUTPATIENT
Start: 2025-03-06 | End: 2025-03-06

## 2025-03-06 RX ORDER — MIDODRINE HYDROCHLORIDE 5 MG/1
2.5 TABLET ORAL
Status: COMPLETED | OUTPATIENT
Start: 2025-03-06 | End: 2025-03-06

## 2025-03-06 RX ORDER — AMIODARONE HYDROCHLORIDE 200 MG/1
200 TABLET ORAL 2 TIMES DAILY
Status: DISCONTINUED | OUTPATIENT
Start: 2025-03-07 | End: 2025-03-24

## 2025-03-06 RX ORDER — GUAIFENESIN/DEXTROMETHORPHAN 100-10MG/5
5 SYRUP ORAL EVERY 6 HOURS PRN
Status: DISCONTINUED | OUTPATIENT
Start: 2025-03-06 | End: 2025-03-17

## 2025-03-06 RX ADMIN — Medication 3 MG: at 20:50

## 2025-03-06 RX ADMIN — MIDODRINE HYDROCHLORIDE 2.5 MG: 5 TABLET ORAL at 15:04

## 2025-03-06 RX ADMIN — ATORVASTATIN CALCIUM 80 MG: 40 TABLET, FILM COATED ORAL at 20:50

## 2025-03-06 RX ADMIN — GUAIFENESIN SYRUP AND DEXTROMETHORPHAN 5 ML: 100; 10 SYRUP ORAL at 18:02

## 2025-03-06 RX ADMIN — MIDODRINE HYDROCHLORIDE 2.5 MG: 5 TABLET ORAL at 18:21

## 2025-03-06 RX ADMIN — Medication 1000 UNITS: at 09:11

## 2025-03-06 RX ADMIN — SACUBITRIL AND VALSARTAN 1 TABLET: 49; 51 TABLET, FILM COATED ORAL at 09:17

## 2025-03-06 RX ADMIN — AMIODARONE HYDROCHLORIDE 0.5 MG/MIN: 1.8 INJECTION, SOLUTION INTRAVENOUS at 20:46

## 2025-03-06 RX ADMIN — SODIUM CHLORIDE, PRESERVATIVE FREE 10 ML: 5 INJECTION INTRAVENOUS at 09:11

## 2025-03-06 RX ADMIN — SODIUM CHLORIDE 500 ML: 0.9 INJECTION, SOLUTION INTRAVENOUS at 16:13

## 2025-03-06 RX ADMIN — APIXABAN 5 MG: 5 TABLET, FILM COATED ORAL at 20:50

## 2025-03-06 RX ADMIN — METOPROLOL SUCCINATE 25 MG: 25 TABLET, EXTENDED RELEASE ORAL at 12:13

## 2025-03-06 RX ADMIN — AMIODARONE HYDROCHLORIDE 1 MG/MIN: 1.8 INJECTION, SOLUTION INTRAVENOUS at 15:39

## 2025-03-06 RX ADMIN — APIXABAN 5 MG: 5 TABLET, FILM COATED ORAL at 09:11

## 2025-03-06 RX ADMIN — AMIODARONE HYDROCHLORIDE 200 MG: 200 TABLET ORAL at 09:11

## 2025-03-06 NOTE — PROGRESS NOTES
[facility]  Internal Medicine Residency Program  House Team Progress Note      Patient:  Nikki Moffett 79 y.o. female MRN: [id number] : 1945   Admitted:  3/4/2025  5:05 PM    Date of Service:  3/6/2025    Room: [address] [place] [address]    Chief complaint: had concerns including Altered Mental Status (\"Mood swings, memory loss and fatigue\" per patients grandson) and Chest Pain (Ongoing for a couple weeks per family).     The patient is a 79 y.o. female,  has a past medical history of Cerebral artery occlusion with cerebral infarction (HCC)., had concerns including Altered Mental Status (\"Mood swings, memory loss and fatigue\" per patients grandson) and Chest Pain (Ongoing for a couple weeks per family)., actively being managed for cc of fatigue and weakness    Subjective   Interval History:  3/5 - Patient is awake, alert, oriented x3, does not appear to be in distress. Patient seen eating her breakfast. No active complaints at this time. Reports good urine output. Discontinued diltiazem drip and resumed metoprolol.     Overnight events:  Patient's heart rate elevated at 127, given 1 dose of amiodarone 200 mg.  CXR - Bilateral pleural effusions. The 1 on the left is slightly smaller than the prior study.  Findings suggest resolving CHF    Hospital Day: 3  Today's Course:  Patient seen and evaluated at bedside. Patient still complains of weakness but denies chills, chest pain, abdominal pain, moving bowels appropriately, reports good urine output.    Objective     Vitals  Range   /83 BP  Min: 110/83  Max: 119/83   HR (!) 129 Pulse  Av.4  Min: 124  Max: 129   RR 17 Resp  Av  Min: 17  Max: 17   Temp. 98.4 °F (36.9 °C) No data recorded   O2sat 96 % No data recorded   Weight 56.6 kg (124 lb 12.8 oz)    No intake or output data in the 24 hours ending 25  Net IO Since Admission: No IO data has been entered for this period [25]    Physical Exam  Constitutional:        PGY-1  Attending physician: Prakash Parker MD    3/6/2025 7:26 AM

## 2025-03-06 NOTE — PLAN OF CARE
Received a PS from the nurse informing me that heart rate is 127. Patient was assessed, and denied major symptoms. Amiodarone was unheld by day day team, but the patient has not received it.  Ok with giving 1 pill amiodarone.  Nurse verbally informed with the plan.

## 2025-03-06 NOTE — PROGRESS NOTES
[facility]  Internal Medicine Residency Program  Progress Note - House Team 1    Patient:  Nikki Moffett 79 y.o. female MRN: [id number]     Date of Service: 3/6/2025     CC: Fatigue and Dizziness  Overnight events: Back in afib with hr at 169. Given .25mg digoxin which sustain hr at 127. Amiodarone 200 given if not below 110 hr. Bp dropped to 112/69 so metoprolol was held.    Subjective     Pt seen sitting up in bed and eating breakfast upon entering the room. She denies any chest pain and denies sob. She claims to have been urinating well. She discloses no complaints at this time.    Objective     Physical Exam:  Vitals: BP (!) 101/45 Comment: told nurse  Pulse 87   Temp 97.7 °F (36.5 °C) (Temporal)   Resp 27   Ht 1.575 m (5' 2\")   Wt 56.6 kg (124 lb 12.8 oz)   SpO2 96%   BMI 22.83 kg/m²     I & O - 24hr: No intake/output data recorded.   General Appearance: alert, appears stated age, and cooperative  HEENT:  Head: Normocephalic, no lesions, without obvious abnormality.  Neck: no adenopathy, no carotid bruit, no JVD, supple, symmetrical, trachea midline, and thyroid not enlarged, symmetric, no tenderness/mass/nodules  Lung: clear to auscultation bilaterally  Heart: regularly irregular rhythm  Abdomen: soft, non-tender; bowel sounds normal; no masses,  no organomegaly  Extremities:  extremities normal, atraumatic, no cyanosis or edema  Musculokeletal: No joint swelling, no muscle tenderness. ROM normal in all joints of extremities.   Neurologic: Mental status: Alert, oriented, thought content appropriate  Subject  Pertinent Labs & Imaging Studies   abraham  CBC with Differential:    Lab Results   Component Value Date/Time    WBC 7.7 03/06/2025 05:12 AM    RBC 4.19 03/06/2025 05:12 AM    HGB 11.5 03/06/2025 05:12 AM    HCT 37.0 03/06/2025 05:12 AM     03/06/2025 05:12 AM    MCV 88.3 03/06/2025 05:12 AM    MCH 27.4 03/06/2025 05:12 AM    MCHC 31.1 03/06/2025 05:12 AM    RDW 15.4

## 2025-03-06 NOTE — PLAN OF CARE
Problem: Discharge Planning  Goal: Discharge to home or other facility with appropriate resources  Outcome: Progressing     Problem: Safety - Adult  Goal: Free from fall injury  3/6/2025 0949 by Katherine Arrington RN  Outcome: Progressing  3/6/2025 0039 by Carol Gutierrez RN  Outcome: Progressing     Problem: Skin/Tissue Integrity  Goal: Skin integrity remains intact  Description: 1.  Monitor for areas of redness and/or skin breakdown  2.  Assess vascular access sites hourly  3.  Every 4-6 hours minimum:  Change oxygen saturation probe site  4.  Every 4-6 hours:  If on nasal continuous positive airway pressure, respiratory therapy assess nares and determine need for appliance change or resting period  3/6/2025 0949 by Katherine Arrington RN  Outcome: Progressing  3/6/2025 0039 by Carol Gutierrez RN  Outcome: Progressing     Problem: Chronic Conditions and Co-morbidities  Goal: Patient's chronic conditions and co-morbidity symptoms are monitored and maintained or improved  3/6/2025 0949 by Katherine Arrington RN  Outcome: Not Progressing  3/6/2025 0039 by Carol Gutierrez RN  Outcome: Not Progressing

## 2025-03-06 NOTE — CARE COORDINATION
Per nursing last evening went back into afib  and hear rate  running 120's  EP  consult  HALEIGH with possible cardioversion.   PT 16 OT 16  Await to speak with son regarding discharge plan.Electronically signed by Mayra Morley RN on 3/6/2025 at 12:56 PM

## 2025-03-06 NOTE — PLAN OF CARE
Problem: Safety - Adult  Goal: Free from fall injury  Outcome: Progressing     Problem: Skin/Tissue Integrity  Goal: Skin integrity remains intact  Description: 1.  Monitor for areas of redness and/or skin breakdown  2.  Assess vascular access sites hourly  3.  Every 4-6 hours minimum:  Change oxygen saturation probe site  4.  Every 4-6 hours:  If on nasal continuous positive airway pressure, respiratory therapy assess nares and determine need for appliance change or resting period  Outcome: Progressing     Problem: Chronic Conditions and Co-morbidities  Goal: Patient's chronic conditions and co-morbidity symptoms are monitored and maintained or improved  Outcome: Not Progressing

## 2025-03-06 NOTE — CONSULTS
[facility]  Consultation Report  PATIENT: Nikki Moffett  MEDICAL RECORD NUMBER: [id number]  DATE OF SERVICE:  3/6/2025  ATTENDING ELECTROPHYSIOLOGIST: Gelacio Hendricks MD  PRIMARY ELECTROPHYSIOLOGIST: Gelacio Hendricks MD  REFERRING PHYSICIAN: No ref. provider found and Tiesha Panda MD  CHIEF COMPLAINT: Altered mental status    HPI: This is a 79 y.o. 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 The patient has history of persistent atrial fibrillation and atrial flutter, cerebrovascular accident and hyperlipidemia. The patient presented to the hospital on 12/21/23 due to right sided weakness and facial droop. She diagnosed with acute left MCA stroke with M2 occlusion. She was not a candidate for tPA and underwent mechanical thrombectomy by IR team on 12/21/23. Prior to intervention, patient was diagnosed with AF with RVR with heart rate in the 180s. Patient underwent emergency DC-cardioversion to normal sinus rhythm and was initiated on IV Amiodarone which later on transited to oral Amiodarone. Echocardiogram 2/29/24 showed LV EF of 40-45%.  She was started on GDMT and was discharged home on Amiodarone loading dose, Eliquis and Metoprolol. She was last seen in Cardiology's office on 2/22/24. She subsequently underwent nuclear stress test on 2/29/24 which showed no ischemia and LV EF of 69%. Repeat echocardiogram on 2/29/24 showed LV EF 55-60%, moderate LVH and mild LAE. She was scheduled for follow up with Cardiology but did not followed through. Son called on 3/3/25 stated that the patient

## 2025-03-06 NOTE — PROGRESS NOTES
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    Procedure/Surgery:  none this admission  Precautions: Falls,  monitor HR, alarms     SUBJECTIVE:    Pt lives with her family in a 2 story home with 4 stairs to enter and no rail(s).    Bed is on 2nd floor and bath is on 2nd floor.    Pt ambulated with no AD PTA.    Equipment Owned: none  Equipment Needs:  TBD - possible WW    OBJECTIVE:   Initial Evaluation  Date: 3/5/25 Treatment  Date: 3/6/25 Short Term/ Long Term   Goals   AM-PAC 6 Clicks     Was pt agreeable to Eval/treatment? Yes  Yes     Does pt have pain? Denies pain  None     Bed Mobility  Rolling: SBA  Supine to sit: SBA  Sit to supine: SBA  Scooting: SBA NT pt sitting on edge of bed  Rolling: Independent  Supine to sit: Independent  Sit to supine: Independent  Scooting: Independent   Transfers Sit to stand: SBA  Stand to sit: SBA  Stand pivot: Bill WW Sit to stand SBA  Stand to sit SBA  Stand pivot with min A Sit to stand: supervision   Stand to sit: supervision  Stand pivot: supervision AAD   Ambulation    20 feet with WW Bill 10 feet x 2 without device with min A    50 feet x 2 without device with min A >150 feet with AAD Supervision    Stair negotiation: ascended and descended  NT   8-12 steps with 1 rail(s) supervision    ROM BUE:  Defer to OT  BLE:  WFL     Strength BUE:  Defer to OT  BLE:  at least 3+/5  Improve 1 MMT   Balance Sitting EOB:  SBA  Dynamic Standing:  Bill at WW  Sitting EOB:  Independent  Dynamic Standing:  supervision AAD

## 2025-03-06 NOTE — PROGRESS NOTES
Comprehensive Nutrition Assessment    Type and Reason for Visit:  Initial, Positive nutrition screen    Nutrition Recommendations/Plan:     1.) Continue Current Diet  2.) Start Oral Nutrition Supplement       Malnutrition Assessment:  Malnutrition Status:  At risk for malnutrition (03/06/25 5493)    Context:  Acute Illness     Findings of the 6 clinical characteristics of malnutrition:  Energy Intake:  75% or less of estimated energy requirements for 7 or more days (reported x 1 mon per H&P)  Weight Loss:  Mild weight loss (10.5% wt loss x 1 year, does not meet significant criteria)     Body Fat Loss:  Unable to assess   Muscle Mass Loss:  Unable to assess  Fluid Accumulation:  No fluid accumulation   Strength:  Not Performed    Nutrition Assessment:    Pt admit w/ fatigue/ weakness/ CP found to have Afib RVR & B/L Pleural effusion. Noted ANTONIO. PMHx Pre DM, L MCA infarct s/p thrombectomy, Medication noncompliance, & recent dx of dementia. Noted poor PO intake x 1 mon PTA. No current intakes documented. EMR reflects ~15lb wt loss x 1 year. Will add Ensure BID & monitor.    Nutrition Related Findings:    Pt alert/ disoriented at times, fluid bal WNL, no edema, Abd WDL Wound Type: None       Current Nutrition Intake & Therapies:    Average Meal Intake: 26-50% (decreased intake reported x 1 mon)    ADULT DIET; Regular  Diet NPO Exceptions are: Sips of Water with Meds    Anthropometric Measures:  Height: 157.5 cm (5' 2\")  Ideal Body Weight (IBW): 110 lbs (50 kg)    Admission Body Weight: 56.5 kg (124 lb 8 oz) (3/4 measured wt office vist)  Current Body Weight: 56.5 kg (124 lb 8 oz) (no method however measured at office visit day before admit), 113.2 % IBW. Weight Source: Not specified  Current BMI (kg/m2): 22.8  Usual Body Weight: 63.1 kg (139 lb 2 oz) (2/22/24 EMR measured wt x ~1 year ago)     % Weight Change (Calculated): -10.5 wt loss x 1 year  BMI Categories: Normal Weight (BMI 22.0 to 24.9) age over  65    Estimated Daily Nutrient Needs:  Energy Requirements Based On: Formula  Weight Used for Energy Requirements: Current  Energy (kcal/day): MSJ 1000 x 1.2 SF= 4634-1539  Weight Used for Protein Requirements: Current  Protein (g/day): 1.2-1.4 g/kg CBW; 70-80  Method Used for Fluid Requirements: 1 ml/kcal  Fluid (ml/day): 4769-5745    Nutrition Diagnosis:   Inadequate oral intake related to cognitive or neurological impairment (new dx dementia, hx CVA) as evidenced by poor intake prior to admission, weight loss    Nutrition Interventions:   Nutrition Education/Counseling: Education/Counseling not indicated  Coordination of Nutrition Care: Continue to monitor while inpatient    Goals:  Goals: PO intake 50% or greater, by next RD assessment  Type of Goal: New goal    Nutrition Monitoring and Evaluation:   Food/Nutrient Intake Outcomes: Food and Nutrient Intake, Supplement Intake  Physical Signs/Symptoms Outcomes: Biochemical Data, Nutrition Focused Physical Findings, Skin, Weight, GI Status, Fluid Status or Edema    Discharge Planning:    Continue Oral Nutrition Supplement     Ning Aguilar RD, LD  Contact: ext [phone]

## 2025-03-06 NOTE — PROGRESS NOTES
Notified IM resident patients HR still not below 110. Dr. Sosa wanted another dose of 125mcg digoxin if HR was still greater than 110 at 0100. I spoke with the pharmacist and they're recommending we not give her another dose due to creatinine of 1.9    Per Dr. Handy Mays continue to monitor and see if amiodarone begins to help.

## 2025-03-06 NOTE — PROGRESS NOTES
Spoke with Dr. Sosa regarding patients HR and BP.     Per Dr. Sosa give 250mcg digoxin once followed by a 250mL NS bolus.

## 2025-03-06 NOTE — PROGRESS NOTES
Spoke with Dr. Sosa regarding patients HR sustaining at 128.     Per Dr. Sosa, agree with giving the 200mg amiodarone. If HR still above 110 at 0100 can give another dose of 125mcg dogoxin. Monitor pressures.

## 2025-03-07 ENCOUNTER — ANESTHESIA EVENT (OUTPATIENT)
Age: 80
End: 2025-03-07
Payer: MEDICARE

## 2025-03-07 ENCOUNTER — APPOINTMENT (OUTPATIENT)
Age: 80
DRG: 004 | End: 2025-03-07
Payer: MEDICARE

## 2025-03-07 ENCOUNTER — APPOINTMENT (OUTPATIENT)
Dept: GENERAL RADIOLOGY | Age: 80
DRG: 004 | End: 2025-03-07
Payer: MEDICARE

## 2025-03-07 ENCOUNTER — APPOINTMENT (OUTPATIENT)
Age: 80
DRG: 004 | End: 2025-03-07
Attending: INTERNAL MEDICINE
Payer: MEDICARE

## 2025-03-07 ENCOUNTER — ANESTHESIA (OUTPATIENT)
Age: 80
End: 2025-03-07
Payer: MEDICARE

## 2025-03-07 PROBLEM — I48.20 CHRONIC ATRIAL FIBRILLATION (HCC): Status: ACTIVE | Noted: 2025-03-07

## 2025-03-07 PROBLEM — I50.9 DECOMPENSATED HEART FAILURE (HCC): Status: ACTIVE | Noted: 2025-03-07

## 2025-03-07 LAB
ALBUMIN SERPL-MCNC: 3.6 G/DL (ref 3.5–5.2)
ALP SERPL-CCNC: 116 U/L (ref 35–104)
ALT SERPL-CCNC: 106 U/L (ref 0–32)
ANION GAP SERPL CALCULATED.3IONS-SCNC: 19 MMOL/L (ref 7–16)
ANION GAP SERPL CALCULATED.3IONS-SCNC: 26 MMOL/L (ref 7–16)
AST SERPL-CCNC: 145 U/L (ref 0–31)
B-OH-BUTYR SERPL-MCNC: 0.19 MMOL/L (ref 0.02–0.27)
BASOPHILS # BLD: 0.07 K/UL (ref 0–0.2)
BASOPHILS NFR BLD: 1 % (ref 0–2)
BILIRUB SERPL-MCNC: 1.6 MG/DL (ref 0–1.2)
BNP SERPL-MCNC: ABNORMAL PG/ML (ref 0–450)
BUN SERPL-MCNC: 31 MG/DL (ref 6–23)
BUN SERPL-MCNC: 32 MG/DL (ref 6–23)
CALCIUM SERPL-MCNC: 8.1 MG/DL (ref 8.6–10.2)
CALCIUM SERPL-MCNC: 8.3 MG/DL (ref 8.6–10.2)
CHLORIDE SERPL-SCNC: 109 MMOL/L (ref 98–107)
CHLORIDE SERPL-SCNC: 97 MMOL/L (ref 98–107)
CO2 SERPL-SCNC: 11 MMOL/L (ref 22–29)
CO2 SERPL-SCNC: 13 MMOL/L (ref 22–29)
CREAT SERPL-MCNC: 1.7 MG/DL (ref 0.5–1)
CREAT SERPL-MCNC: 1.9 MG/DL (ref 0.5–1)
ECHO AO ASC DIAM: 3.2 CM
ECHO AO ASCENDING AORTA INDEX: 2.05 CM/M2
ECHO AO SINUS VALSALVA DIAM: 2.8 CM
ECHO AO SINUS VALSALVA INDEX: 1.79 CM/M2
ECHO AV AREA PEAK VELOCITY: 1.6 CM2
ECHO AV AREA VTI: 1.3 CM2
ECHO AV AREA/BSA PEAK VELOCITY: 1 CM2/M2
ECHO AV AREA/BSA VTI: 0.8 CM2/M2
ECHO AV CUSP MM: 1.1 CM
ECHO AV MEAN GRADIENT: 2 MMHG
ECHO AV MEAN VELOCITY: 0.7 M/S
ECHO AV PEAK GRADIENT: 3 MMHG
ECHO AV PEAK VELOCITY: 0.8 M/S
ECHO AV VELOCITY RATIO: 0.75
ECHO AV VTI: 11.8 CM
ECHO BSA: 1.57 M2
ECHO BSA: 1.57 M2
ECHO EST RA PRESSURE: 8 MMHG
ECHO LA DIAMETER INDEX: 3.01 CM/M2
ECHO LA DIAMETER: 4.7 CM
ECHO LA VOL A-L A2C: 101 ML (ref 22–52)
ECHO LA VOL A-L A4C: 71 ML (ref 22–52)
ECHO LA VOL MOD A2C: 96 ML (ref 22–52)
ECHO LA VOL MOD A4C: 66 ML (ref 22–52)
ECHO LA VOLUME AREA LENGTH: 86 ML
ECHO LA VOLUME INDEX A-L A2C: 65 ML/M2 (ref 16–34)
ECHO LA VOLUME INDEX A-L A4C: 46 ML/M2 (ref 16–34)
ECHO LA VOLUME INDEX AREA LENGTH: 55 ML/M2 (ref 16–34)
ECHO LA VOLUME INDEX MOD A2C: 62 ML/M2 (ref 16–34)
ECHO LA VOLUME INDEX MOD A4C: 42 ML/M2 (ref 16–34)
ECHO LV EDV A2C: 70 ML
ECHO LV EDV A4C: 70 ML
ECHO LV EDV BP: 70 ML (ref 56–104)
ECHO LV EDV INDEX A4C: 45 ML/M2
ECHO LV EDV INDEX BP: 45 ML/M2
ECHO LV EDV NDEX A2C: 45 ML/M2
ECHO LV EF PHYSICIAN: 12 %
ECHO LV EJECTION FRACTION A2C: 10 %
ECHO LV EJECTION FRACTION A4C: 11 %
ECHO LV EJECTION FRACTION BIPLANE: 10 % (ref 55–100)
ECHO LV ESV A2C: 63 ML
ECHO LV ESV A4C: 63 ML
ECHO LV ESV BP: 63 ML (ref 19–49)
ECHO LV ESV INDEX A2C: 40 ML/M2
ECHO LV ESV INDEX A4C: 40 ML/M2
ECHO LV ESV INDEX BP: 40 ML/M2
ECHO LV FRACTIONAL SHORTENING: 14 % (ref 28–44)
ECHO LV INTERNAL DIMENSION DIASTOLE INDEX: 3.14 CM/M2
ECHO LV INTERNAL DIMENSION DIASTOLIC: 4.9 CM (ref 3.9–5.3)
ECHO LV INTERNAL DIMENSION SYSTOLIC INDEX: 2.69 CM/M2
ECHO LV INTERNAL DIMENSION SYSTOLIC: 4.2 CM
ECHO LV ISOVOLUMETRIC RELAXATION TIME (IVRT): 96.9 MS
ECHO LV IVSD: 0.8 CM (ref 0.6–0.9)
ECHO LV IVSS: 0.9 CM
ECHO LV MASS 2D: 141.9 G (ref 67–162)
ECHO LV MASS INDEX 2D: 91 G/M2 (ref 43–95)
ECHO LV POSTERIOR WALL DIASTOLIC: 0.9 CM (ref 0.6–0.9)
ECHO LV POSTERIOR WALL SYSTOLIC: 1.3 CM
ECHO LV RELATIVE WALL THICKNESS RATIO: 0.37
ECHO LVOT AREA: 2.3 CM2
ECHO LVOT AV VTI INDEX: 0.53
ECHO LVOT DIAM: 1.7 CM
ECHO LVOT MEAN GRADIENT: 1 MMHG
ECHO LVOT PEAK GRADIENT: 1 MMHG
ECHO LVOT PEAK VELOCITY: 0.6 M/S
ECHO LVOT STROKE VOLUME INDEX: 9.2 ML/M2
ECHO LVOT SV: 14.3 ML
ECHO LVOT VTI: 6.3 CM
ECHO MV AREA PHT: 2.6 CM2
ECHO MV AREA VTI: 0.6 CM2
ECHO MV E DECELERATION TIME (DT): 109 MS
ECHO MV LVOT VTI INDEX: 3.63
ECHO MV MAX VELOCITY: 1.1 M/S
ECHO MV MEAN GRADIENT: 2 MMHG
ECHO MV MEAN VELOCITY: 0.6 M/S
ECHO MV PEAK GRADIENT: 5 MMHG
ECHO MV PRESSURE HALF TIME (PHT): 84.9 MS
ECHO MV VTI: 22.9 CM
ECHO PV MAX VELOCITY: 0.6 M/S
ECHO PV MEAN GRADIENT: 1 MMHG
ECHO PV MEAN VELOCITY: 0.5 M/S
ECHO PV PEAK GRADIENT: 1 MMHG
ECHO PV VTI: 8 CM
ECHO RIGHT VENTRICULAR SYSTOLIC PRESSURE (RVSP): 25 MMHG
ECHO RV INTERNAL DIMENSION: 3.3 CM
ECHO RV TAPSE: 1.4 CM (ref 1.7–?)
ECHO TV REGURGITANT MAX VELOCITY: 2.07 M/S
ECHO TV REGURGITANT PEAK GRADIENT: 17 MMHG
EKG ATRIAL RATE: 140 BPM
EKG P-R INTERVAL: 132 MS
EKG Q-T INTERVAL: 368 MS
EKG QRS DURATION: 118 MS
EKG QTC CALCULATION (BAZETT): 561 MS
EKG R AXIS: -115 DEGREES
EKG T AXIS: -3 DEGREES
EKG VENTRICULAR RATE: 140 BPM
EOSINOPHIL # BLD: 0 K/UL (ref 0.05–0.5)
EOSINOPHILS RELATIVE PERCENT: 0 % (ref 0–6)
ERYTHROCYTE [DISTWIDTH] IN BLOOD BY AUTOMATED COUNT: 15.4 % (ref 11.5–15)
GFR, ESTIMATED: 27 ML/MIN/1.73M2
GFR, ESTIMATED: 32 ML/MIN/1.73M2
GLUCOSE BLD-MCNC: 130 MG/DL (ref 74–99)
GLUCOSE SERPL-MCNC: 140 MG/DL (ref 74–99)
GLUCOSE SERPL-MCNC: 341 MG/DL (ref 74–99)
HCT VFR BLD AUTO: 40.9 % (ref 34–48)
HGB BLD-MCNC: 12.6 G/DL (ref 11.5–15.5)
LACTATE BLDV-SCNC: 7.1 MMOL/L (ref 0.5–2.2)
LYMPHOCYTES NFR BLD: 0.07 K/UL (ref 1.5–4)
LYMPHOCYTES RELATIVE PERCENT: 1 % (ref 20–42)
MAGNESIUM SERPL-MCNC: 1.8 MG/DL (ref 1.6–2.6)
MCH RBC QN AUTO: 27.1 PG (ref 26–35)
MCHC RBC AUTO-ENTMCNC: 30.8 G/DL (ref 32–34.5)
MCV RBC AUTO: 88 FL (ref 80–99.9)
MONOCYTES NFR BLD: 0.15 K/UL (ref 0.1–0.95)
MONOCYTES NFR BLD: 2 % (ref 2–12)
NEUTROPHILS NFR BLD: 97 % (ref 43–80)
NEUTS SEG NFR BLD: 8.11 K/UL (ref 1.8–7.3)
PHOSPHATE SERPL-MCNC: 3.2 MG/DL (ref 2.5–4.5)
PLATELET # BLD AUTO: 327 K/UL (ref 130–450)
PMV BLD AUTO: 10.2 FL (ref 7–12)
POTASSIUM SERPL-SCNC: 3.6 MMOL/L (ref 3.5–5)
POTASSIUM SERPL-SCNC: 4.4 MMOL/L (ref 3.5–5)
PROCALCITONIN SERPL-MCNC: 0.18 NG/ML (ref 0–0.08)
PROT SERPL-MCNC: 6.2 G/DL (ref 6.4–8.3)
RBC # BLD AUTO: 4.65 M/UL (ref 3.5–5.5)
RBC # BLD: ABNORMAL 10*6/UL
SODIUM SERPL-SCNC: 134 MMOL/L (ref 132–146)
SODIUM SERPL-SCNC: 141 MMOL/L (ref 132–146)
T4 FREE SERPL-MCNC: 1 NG/DL (ref 0.9–1.7)
TSH SERPL DL<=0.05 MIU/L-ACNC: 15.43 UIU/ML (ref 0.27–4.2)
WBC OTHER # BLD: 8.4 K/UL (ref 4.5–11.5)

## 2025-03-07 PROCEDURE — 2580000003 HC RX 258

## 2025-03-07 PROCEDURE — 93010 ELECTROCARDIOGRAM REPORT: CPT | Performed by: INTERNAL MEDICINE

## 2025-03-07 PROCEDURE — 2000000000 HC ICU R&B

## 2025-03-07 PROCEDURE — 6370000000 HC RX 637 (ALT 250 FOR IP): Performed by: NURSE PRACTITIONER

## 2025-03-07 PROCEDURE — 83880 ASSAY OF NATRIURETIC PEPTIDE: CPT

## 2025-03-07 PROCEDURE — 6370000000 HC RX 637 (ALT 250 FOR IP): Performed by: INTERNAL MEDICINE

## 2025-03-07 PROCEDURE — 84443 ASSAY THYROID STIM HORMONE: CPT

## 2025-03-07 PROCEDURE — 6360000002 HC RX W HCPCS

## 2025-03-07 PROCEDURE — 94640 AIRWAY INHALATION TREATMENT: CPT

## 2025-03-07 PROCEDURE — 6360000004 HC RX CONTRAST MEDICATION: Performed by: INTERNAL MEDICINE

## 2025-03-07 PROCEDURE — 3700000000 HC ANESTHESIA ATTENDED CARE

## 2025-03-07 PROCEDURE — 82010 KETONE BODYS QUAN: CPT

## 2025-03-07 PROCEDURE — 82962 GLUCOSE BLOOD TEST: CPT

## 2025-03-07 PROCEDURE — 99223 1ST HOSP IP/OBS HIGH 75: CPT | Performed by: INTERNAL MEDICINE

## 2025-03-07 PROCEDURE — 2709999900 HC NON-CHARGEABLE SUPPLY

## 2025-03-07 PROCEDURE — 80048 BASIC METABOLIC PNL TOTAL CA: CPT

## 2025-03-07 PROCEDURE — 6360000002 HC RX W HCPCS: Performed by: NURSE PRACTITIONER

## 2025-03-07 PROCEDURE — 7100000011 HC PHASE II RECOVERY - ADDTL 15 MIN

## 2025-03-07 PROCEDURE — 83605 ASSAY OF LACTIC ACID: CPT

## 2025-03-07 PROCEDURE — C8929 TTE W OR WO FOL WCON,DOPPLER: HCPCS

## 2025-03-07 PROCEDURE — 71045 X-RAY EXAM CHEST 1 VIEW: CPT

## 2025-03-07 PROCEDURE — 80053 COMPREHEN METABOLIC PANEL: CPT

## 2025-03-07 PROCEDURE — 2700000000 HC OXYGEN THERAPY PER DAY

## 2025-03-07 PROCEDURE — 2500000003 HC RX 250 WO HCPCS

## 2025-03-07 PROCEDURE — 3700000001 HC ADD 15 MINUTES (ANESTHESIA)

## 2025-03-07 PROCEDURE — 2500000003 HC RX 250 WO HCPCS: Performed by: INTERNAL MEDICINE

## 2025-03-07 PROCEDURE — 6370000000 HC RX 637 (ALT 250 FOR IP)

## 2025-03-07 PROCEDURE — 99291 CRITICAL CARE FIRST HOUR: CPT | Performed by: INTERNAL MEDICINE

## 2025-03-07 PROCEDURE — 84145 PROCALCITONIN (PCT): CPT

## 2025-03-07 PROCEDURE — 51702 INSERT TEMP BLADDER CATH: CPT

## 2025-03-07 PROCEDURE — 83735 ASSAY OF MAGNESIUM: CPT

## 2025-03-07 PROCEDURE — 92960 CARDIOVERSION ELECTRIC EXT: CPT

## 2025-03-07 PROCEDURE — 36415 COLL VENOUS BLD VENIPUNCTURE: CPT

## 2025-03-07 PROCEDURE — 5A2204Z RESTORATION OF CARDIAC RHYTHM, SINGLE: ICD-10-PCS | Performed by: INTERNAL MEDICINE

## 2025-03-07 PROCEDURE — 93306 TTE W/DOPPLER COMPLETE: CPT | Performed by: INTERNAL MEDICINE

## 2025-03-07 PROCEDURE — 02HV33Z INSERTION OF INFUSION DEVICE INTO SUPERIOR VENA CAVA, PERCUTANEOUS APPROACH: ICD-10-PCS | Performed by: RADIOLOGY

## 2025-03-07 PROCEDURE — P9047 ALBUMIN (HUMAN), 25%, 50ML: HCPCS

## 2025-03-07 PROCEDURE — 84439 ASSAY OF FREE THYROXINE: CPT

## 2025-03-07 PROCEDURE — 85025 COMPLETE CBC W/AUTO DIFF WBC: CPT

## 2025-03-07 PROCEDURE — 84100 ASSAY OF PHOSPHORUS: CPT

## 2025-03-07 PROCEDURE — 2500000003 HC RX 250 WO HCPCS: Performed by: NURSE PRACTITIONER

## 2025-03-07 PROCEDURE — 7100000010 HC PHASE II RECOVERY - FIRST 15 MIN

## 2025-03-07 PROCEDURE — 92960 CARDIOVERSION ELECTRIC EXT: CPT | Performed by: INTERNAL MEDICINE

## 2025-03-07 PROCEDURE — 99233 SBSQ HOSP IP/OBS HIGH 50: CPT | Performed by: INTERNAL MEDICINE

## 2025-03-07 RX ORDER — ALBUMIN (HUMAN) 12.5 G/50ML
25 SOLUTION INTRAVENOUS ONCE
Status: COMPLETED | OUTPATIENT
Start: 2025-03-07 | End: 2025-03-07

## 2025-03-07 RX ORDER — ACETAMINOPHEN 325 MG/1
650 TABLET ORAL EVERY 6 HOURS PRN
Status: DISCONTINUED | OUTPATIENT
Start: 2025-03-07 | End: 2025-03-07

## 2025-03-07 RX ORDER — SODIUM CHLORIDE 0.9 % (FLUSH) 0.9 %
5-40 SYRINGE (ML) INJECTION PRN
Status: DISCONTINUED | OUTPATIENT
Start: 2025-03-07 | End: 2025-03-10

## 2025-03-07 RX ORDER — MIDODRINE HYDROCHLORIDE 5 MG/1
5 TABLET ORAL
Status: DISCONTINUED | OUTPATIENT
Start: 2025-03-07 | End: 2025-03-07

## 2025-03-07 RX ORDER — ETOMIDATE 2 MG/ML
INJECTION INTRAVENOUS
Status: DISCONTINUED | OUTPATIENT
Start: 2025-03-07 | End: 2025-03-07 | Stop reason: SDUPTHER

## 2025-03-07 RX ORDER — SODIUM CHLORIDE 0.9 % (FLUSH) 0.9 %
5-40 SYRINGE (ML) INJECTION EVERY 12 HOURS SCHEDULED
Status: DISCONTINUED | OUTPATIENT
Start: 2025-03-07 | End: 2025-04-04 | Stop reason: HOSPADM

## 2025-03-07 RX ORDER — SODIUM CHLORIDE 9 MG/ML
INJECTION, SOLUTION INTRAVENOUS PRN
Status: DISCONTINUED | OUTPATIENT
Start: 2025-03-07 | End: 2025-03-10

## 2025-03-07 RX ORDER — POLYETHYLENE GLYCOL 3350 17 G/17G
17 POWDER, FOR SOLUTION ORAL DAILY PRN
Status: DISCONTINUED | OUTPATIENT
Start: 2025-03-07 | End: 2025-03-07

## 2025-03-07 RX ORDER — ARFORMOTEROL TARTRATE 15 UG/2ML
15 SOLUTION RESPIRATORY (INHALATION)
Status: DISCONTINUED | OUTPATIENT
Start: 2025-03-07 | End: 2025-03-24

## 2025-03-07 RX ORDER — SODIUM CHLORIDE 0.9 % (FLUSH) 0.9 %
5-40 SYRINGE (ML) INJECTION EVERY 12 HOURS SCHEDULED
Status: DISCONTINUED | OUTPATIENT
Start: 2025-03-07 | End: 2025-03-11

## 2025-03-07 RX ORDER — FUROSEMIDE 10 MG/ML
40 INJECTION INTRAMUSCULAR; INTRAVENOUS DAILY
Status: DISCONTINUED | OUTPATIENT
Start: 2025-03-07 | End: 2025-03-29

## 2025-03-07 RX ORDER — ACETAMINOPHEN 650 MG/1
650 SUPPOSITORY RECTAL EVERY 6 HOURS PRN
Status: DISCONTINUED | OUTPATIENT
Start: 2025-03-07 | End: 2025-03-07

## 2025-03-07 RX ORDER — MIDODRINE HYDROCHLORIDE 10 MG/1
10 TABLET ORAL
Status: DISCONTINUED | OUTPATIENT
Start: 2025-03-08 | End: 2025-03-17

## 2025-03-07 RX ORDER — 0.9 % SODIUM CHLORIDE 0.9 %
500 INTRAVENOUS SOLUTION INTRAVENOUS ONCE
Status: COMPLETED | OUTPATIENT
Start: 2025-03-07 | End: 2025-03-07

## 2025-03-07 RX ORDER — IPRATROPIUM BROMIDE AND ALBUTEROL SULFATE 2.5; .5 MG/3ML; MG/3ML
1 SOLUTION RESPIRATORY (INHALATION)
Status: DISCONTINUED | OUTPATIENT
Start: 2025-03-07 | End: 2025-03-28

## 2025-03-07 RX ORDER — PROCHLORPERAZINE MALEATE 10 MG
10 TABLET ORAL EVERY 8 HOURS PRN
Status: DISCONTINUED | OUTPATIENT
Start: 2025-03-07 | End: 2025-03-20

## 2025-03-07 RX ORDER — PROCHLORPERAZINE EDISYLATE 5 MG/ML
10 INJECTION INTRAMUSCULAR; INTRAVENOUS EVERY 6 HOURS PRN
Status: DISCONTINUED | OUTPATIENT
Start: 2025-03-07 | End: 2025-03-20

## 2025-03-07 RX ORDER — ONDANSETRON 2 MG/ML
4 INJECTION INTRAMUSCULAR; INTRAVENOUS EVERY 6 HOURS PRN
Status: DISCONTINUED | OUTPATIENT
Start: 2025-03-07 | End: 2025-03-07 | Stop reason: CLARIF

## 2025-03-07 RX ORDER — FUROSEMIDE 10 MG/ML
20 INJECTION INTRAMUSCULAR; INTRAVENOUS ONCE
Status: COMPLETED | OUTPATIENT
Start: 2025-03-07 | End: 2025-03-07

## 2025-03-07 RX ORDER — ONDANSETRON 4 MG/1
4 TABLET, ORALLY DISINTEGRATING ORAL EVERY 8 HOURS PRN
Status: DISCONTINUED | OUTPATIENT
Start: 2025-03-07 | End: 2025-03-07 | Stop reason: CLARIF

## 2025-03-07 RX ORDER — BUDESONIDE 0.5 MG/2ML
0.5 INHALANT ORAL
Status: DISCONTINUED | OUTPATIENT
Start: 2025-03-07 | End: 2025-03-24

## 2025-03-07 RX ORDER — INSULIN LISPRO 100 [IU]/ML
0-4 INJECTION, SOLUTION INTRAVENOUS; SUBCUTANEOUS EVERY 6 HOURS
Status: DISCONTINUED | OUTPATIENT
Start: 2025-03-07 | End: 2025-03-09

## 2025-03-07 RX ORDER — POLYETHYLENE GLYCOL 3350 17 G/17G
17 POWDER, FOR SOLUTION ORAL DAILY PRN
Status: DISCONTINUED | OUTPATIENT
Start: 2025-03-07 | End: 2025-04-04 | Stop reason: HOSPADM

## 2025-03-07 RX ORDER — SODIUM CHLORIDE 9 MG/ML
INJECTION, SOLUTION INTRAVENOUS
Status: DISCONTINUED | OUTPATIENT
Start: 2025-03-07 | End: 2025-03-07 | Stop reason: SDUPTHER

## 2025-03-07 RX ORDER — SODIUM CHLORIDE 9 MG/ML
INJECTION, SOLUTION INTRAVENOUS PRN
Status: DISCONTINUED | OUTPATIENT
Start: 2025-03-07 | End: 2025-04-04 | Stop reason: HOSPADM

## 2025-03-07 RX ADMIN — SODIUM CHLORIDE: 9 INJECTION, SOLUTION INTRAVENOUS at 11:41

## 2025-03-07 RX ADMIN — SODIUM CHLORIDE, PRESERVATIVE FREE 10 ML: 5 INJECTION INTRAVENOUS at 08:45

## 2025-03-07 RX ADMIN — ATORVASTATIN CALCIUM 80 MG: 40 TABLET, FILM COATED ORAL at 21:19

## 2025-03-07 RX ADMIN — APIXABAN 5 MG: 5 TABLET, FILM COATED ORAL at 08:45

## 2025-03-07 RX ADMIN — ALBUMIN (HUMAN) 25 G: 0.25 INJECTION, SOLUTION INTRAVENOUS at 20:27

## 2025-03-07 RX ADMIN — FUROSEMIDE 40 MG: 10 INJECTION, SOLUTION INTRAMUSCULAR; INTRAVENOUS at 15:31

## 2025-03-07 RX ADMIN — AMIODARONE HYDROCHLORIDE 1 MG/MIN: 1.8 INJECTION, SOLUTION INTRAVENOUS at 12:55

## 2025-03-07 RX ADMIN — MIDODRINE HYDROCHLORIDE 5 MG: 5 TABLET ORAL at 14:45

## 2025-03-07 RX ADMIN — APIXABAN 2.5 MG: 2.5 TABLET, FILM COATED ORAL at 21:19

## 2025-03-07 RX ADMIN — IPRATROPIUM BROMIDE AND ALBUTEROL SULFATE 1 DOSE: 2.5; .5 SOLUTION RESPIRATORY (INHALATION) at 20:15

## 2025-03-07 RX ADMIN — METOPROLOL SUCCINATE 25 MG: 25 TABLET, EXTENDED RELEASE ORAL at 08:45

## 2025-03-07 RX ADMIN — ETOMIDATE 4 MG: 2 INJECTION, SOLUTION INTRAVENOUS at 11:44

## 2025-03-07 RX ADMIN — AMIODARONE HYDROCHLORIDE 200 MG: 200 TABLET ORAL at 08:45

## 2025-03-07 RX ADMIN — FUROSEMIDE 20 MG: 10 INJECTION, SOLUTION INTRAMUSCULAR; INTRAVENOUS at 08:45

## 2025-03-07 RX ADMIN — BUDESONIDE INHALATION 500 MCG: 0.5 SUSPENSION RESPIRATORY (INHALATION) at 20:15

## 2025-03-07 RX ADMIN — ARFORMOTEROL TARTRATE 15 MCG: 15 SOLUTION RESPIRATORY (INHALATION) at 20:15

## 2025-03-07 RX ADMIN — AMIODARONE HYDROCHLORIDE 0.5 MG/MIN: 1.8 INJECTION, SOLUTION INTRAVENOUS at 08:55

## 2025-03-07 RX ADMIN — Medication 1000 UNITS: at 08:45

## 2025-03-07 RX ADMIN — MIDODRINE HYDROCHLORIDE 5 MG: 5 TABLET ORAL at 17:31

## 2025-03-07 RX ADMIN — SODIUM CHLORIDE 500 ML: 0.9 INJECTION, SOLUTION INTRAVENOUS at 03:07

## 2025-03-07 RX ADMIN — GUAIFENESIN SYRUP AND DEXTROMETHORPHAN 5 ML: 100; 10 SYRUP ORAL at 03:09

## 2025-03-07 RX ADMIN — SULFUR HEXAFLUORIDE 2 ML: 60.7; .19; .19 INJECTION, POWDER, LYOPHILIZED, FOR SUSPENSION INTRAVENOUS; INTRAVESICAL at 15:33

## 2025-03-07 RX ADMIN — IPRATROPIUM BROMIDE AND ALBUTEROL SULFATE 1 DOSE: 2.5; .5 SOLUTION RESPIRATORY (INHALATION) at 17:09

## 2025-03-07 RX ADMIN — AMIODARONE HYDROCHLORIDE 1 MG/MIN: 1.8 INJECTION, SOLUTION INTRAVENOUS at 19:06

## 2025-03-07 RX ADMIN — SODIUM BICARBONATE: 84 INJECTION, SOLUTION INTRAVENOUS at 21:00

## 2025-03-07 RX ADMIN — AMIODARONE HYDROCHLORIDE 200 MG: 200 TABLET ORAL at 21:19

## 2025-03-07 ASSESSMENT — ENCOUNTER SYMPTOMS: SHORTNESS OF BREATH: 1

## 2025-03-07 NOTE — PROGRESS NOTES
[facility]  Internal Medicine Residency Program  Progress Note - House Team 1    Patient:  Nikki Moffett 79 y.o. female MRN: [id number]     Date of Service: 3/7/2025     CC: AMS  Overnight events: patient remained tachycardic overnight     Subjective     Patient was seen and examined at bedside this morning. States she is feeling short of breath but oxygen saturation remains above 90%.     Objective     Physical Exam:  Vitals: BP (!) 83/58   Pulse (!) 126   Temp 98 °F (36.7 °C)   Resp 28   Ht 1.575 m (5' 2\")   Wt 56.6 kg (124 lb 12.8 oz)   SpO2 98%   BMI 22.83 kg/m²     I & O - 24hr: No intake/output data recorded.   General Appearance: alert and cooperative  HEENT:  Head: Normal, normocephalic, atraumatic.  Neck: supple, symmetrical, trachea midline  Lung: clear to auscultation bilaterally  Heart:  tachycardic, irregularly irregular rhythm  Abdomen: soft, non-tender; bowel sounds normal; no masses,  no organomegaly  Extremities:  extremities normal, atraumatic, no cyanosis or edema  Musculokeletal: No joint swelling, no muscle tenderness. ROM normal in all joints of extremities.   Neurologic: Mental status: Alert, oriented, thought content appropriate    Pertinent Labs & Imaging Studies     CBC:   Lab Results   Component Value Date/Time    WBC 8.4 03/07/2025 05:23 AM    RBC 4.65 03/07/2025 05:23 AM    HGB 12.6 03/07/2025 05:23 AM    HCT 40.9 03/07/2025 05:23 AM    MCV 88.0 03/07/2025 05:23 AM    MCH 27.1 03/07/2025 05:23 AM    MCHC 30.8 03/07/2025 05:23 AM    RDW 15.4 03/07/2025 05:23 AM     03/07/2025 05:23 AM    MPV 10.2 03/07/2025 05:23 AM     BMP:    Lab Results   Component Value Date/Time     03/07/2025 05:23 AM    K 3.6 03/07/2025 05:23 AM     03/07/2025 05:23 AM    CO2 13 03/07/2025 05:23 AM    BUN 32 03/07/2025 05:23 AM    CREATININE 1.7 03/07/2025 05:23 AM    CALCIUM 8.3 03/07/2025 05:23 AM    GFRAA >60 10/06/2017 12:12 PM    LABGLOM 32 03/07/2025 05:23 AM     LABGLOM 59 12/26/2023 09:18 AM    GLUCOSE 140 03/07/2025 05:23 AM       Resident's Assessment and Plan     Assessment  Atrial fibrillation  HFimpEF  ANTONIO  Hyperkalemia  Pre-diabetes, A1c 5.7%  Bilateral pleural effusion  Medication non-compliance  Hx left MCA stroke s/p mechanical thrombectomy  HAGMA    Plan  EP following   Continue amiodarone drip + amiodarone 200mg PO   Cardiology consult for HF management   Continue toprol 25mg daily    Continue eliquis 5 mg BID   For HALEIGH/DCCV today  Lactic acid sent    DVT prophylaxis/ GI prophylaxis: eliquis/diet  Disposition: continue current care    Sophia Morfin MD, PGY-3  Attending physician: Dr. Parker

## 2025-03-07 NOTE — PROGRESS NOTES
4 Eyes Skin Assessment     NAME:  Nikki Moffett  YOB: 1945  MEDICAL RECORD NUMBER:  [id number]    The patient is being assessed for  Transfer to New Unit    I agree that at least one RN has performed a thorough Head to Toe Skin Assessment on the patient. ALL assessment sites listed below have been assessed.      Areas assessed by both nurses:    Head, Face, Ears, Shoulders, Back, Chest, Arms, Elbows, Hands, Sacrum. Buttock, Coccyx, Ischium, Legs. Feet and Heels, and Under Medical Devices         Does the Patient have a Wound? No noted wound(s)       Generalized scabs    Marty Prevention initiated by RN: Yes  Wound Care Orders initiated by RN: No    Pressure Injury (Stage 3,4, Unstageable, DTI, NWPT, and Complex wounds) if present, place Wound referral order by RN under : No    New Ostomies, if present place, Ostomy referral order under : No     Nurse 1 eSignature: Electronically signed by Willie Jeff RN on 3/7/25 at 2:21 PM EST    **SHARE this note so that the co-signing nurse can place an eSignature**    Nurse 2 eSignature: Electronically signed by Yvonne Davis RN on 3/7/25 at 12:36 PM EST

## 2025-03-07 NOTE — PROGRESS NOTES
[facility] PHYSICIANS- [facility] Electrophysiology  Inpatient Progress Report  PATIENT: Nikki Moffett  MEDICAL RECORD NUMBER: [id number]  DATE OF SERVICE:  3/7/2025  ATTENDING ELECTROPHYSIOLOGIST: Gelacio Hendricks MD   PRIMARY ELECTROPHYSIOLOGIST: Gelacio Hendricks MD  REFERRING PHYSICIAN:  Tiesha Panda MD  CHIEF COMPLAINT: Altered mental status    HPI: This is a 79 y.o. 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 The patient has history of persistent atrial fibrillation and atrial flutter, cerebrovascular accident and hyperlipidemia. The patient presented to the hospital on 12/21/23 due to right sided weakness and facial droop. She diagnosed with acute left MCA stroke with M2 occlusion. She was not a candidate for tPA and underwent mechanical thrombectomy by IR team on 12/21/23. Prior to intervention, patient was diagnosed with AF with RVR with heart rate in the 180s. Patient underwent emergency DC-cardioversion to normal sinus rhythm and was initiated on IV Amiodarone which later on transited to oral Amiodarone. Echocardiogram 2/29/24 showed LV EF of 40-45%.  She was started on GDMT and was discharged home on Amiodarone loading dose, Eliquis and Metoprolol. She was last seen in Cardiology's office on 2/22/24. She subsequently underwent nuclear stress test on 2/29/24 which showed no ischemia and LV EF of 69%. Repeat echocardiogram on 2/29/24 showed LV EF 55-60%, moderate LVH and mild LAE. She was scheduled for follow up with Cardiology but did not followed through. Son called on 3/3/25 stated that the patient was not doing well  transited to oral Amiodarone at that time.  - Was discharged home on Amiodarone, Toprol XL and Eliquis.  - Has not been taking Amiodarone and Toprol XL for some time between 4/24/24 and now.  - Reported may miss Eliquis for 1 to 2 doses lately.  - Presented in AFL with RVR with ongoing AFL with RVR on Amiodarone.  - Education on importance of well controlled HTN (goal BP < 130/80), adequate weight control (goal BMI of < 27), physical activity consisting of moderate cardiopulmonary exercise up to a goal of 250 min/wk, daily compliance with CPAP in treating sleep apnea, smoking cessation and limited ETOH intake.     2. 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  - LV EF 40-45% on TTE 12/22/3.  - LV EF 55-60% on TTE 2/29/24.  - Decompensated.  - No I and O data but at least 2.5 litters of bolus.   - NYHA class III    3. Cerebrovascular accident   - Diagnosed with acute left MCA stroke with M2 occlusion.   - Not a candidate for tPA and underwent mechanical thrombectomy by IR team on 12/21/23.    4. Hyperlipidemia  - On Lipitor.    5. ANTONIO on CKD  Estimated Creatinine Clearance: 22 mL/min (A) (based on SCr of 1.7 mg/dL (H)).     6. Intermittent hypotension.   - Treated with Midodrine and IV fluids.     7. Hypothyroidism   - TSH 19.08, Free T4 0.9    Recommendations:  Continue IV Amiodarone.   Stat CXR   IV Lasix 20mg X 1 with possible additional doses pending response.   Consult cardiology for hear failure management.   Continue Toprol 25mg daily.   HALEIGH/DCCV today.   Transfer to PCC or cardiac focused unit.     I have spent a total of 10  minutes with the patient and the family reviewing the above stated recommendations.  And a total of >50% of that time involved face-to-face time providing counseling and or coordination of care with the other providers, reviewing records/tests, counseling/education of the patient, ordering medications/tests/procedures, coordinating care, and documenting clinical

## 2025-03-07 NOTE — ANESTHESIA PRE PROCEDURE
blood products discussed with patient whom consented to blood products.    Plan discussed with attending.                  Carol Reyna, APRN - CRNA   3/7/2025

## 2025-03-07 NOTE — PLAN OF CARE
Problem: Safety - Adult  Goal: Free from fall injury  Outcome: Progressing     Problem: Skin/Tissue Integrity  Goal: Skin integrity remains intact  Description: 1.  Monitor for areas of redness and/or skin breakdown  2.  Assess vascular access sites hourly  3.  Every 4-6 hours minimum:  Change oxygen saturation probe site  4.  Every 4-6 hours:  If on nasal continuous positive airway pressure, respiratory therapy assess nares and determine need for appliance change or resting period  Outcome: Progressing     Problem: Nutrition Deficit:  Goal: Optimize nutritional status  Outcome: Progressing

## 2025-03-07 NOTE — PROGRESS NOTES
Progress  Note  Chief Complaint   Patient presents with    Altered Mental Status     \"Mood swings, memory loss and fatigue\" per patients grandson    Chest Pain     Ongoing for a couple weeks per family     Historical Issues:  Current Facility-Administered Medications   Medication Dose Route Frequency Provider Last Rate Last Admin    amiodarone (NEXTERONE) 360 mg in dextrose 5% 200 ml  0.5 mg/min IntraVENous Continuous Bunny Allen APRN - CNP 16.7 mL/hr at 03/07/25 0855 0.5 mg/min at 03/07/25 0855    metoprolol succinate (TOPROL XL) extended release tablet 25 mg  25 mg Oral Daily Bunny Allen APRN - CNP   25 mg at 03/07/25 0845    amiodarone (CORDARONE) tablet 200 mg  200 mg Oral BID Bunny Allen APRN - CNP   200 mg at 03/07/25 0845    guaiFENesin-dextromethorphan (ROBITUSSIN DM) 100-10 MG/5ML syrup 5 mL  5 mL Oral Q6H PRN Dalton Sosa MD   5 mL at 03/07/25 0309    Vitamin D (CHOLECALCIFEROL) tablet 1,000 Units  1,000 Units Oral Daily Sandra Del Rosario MD   1,000 Units at 03/07/25 0845    apixaban (ELIQUIS) tablet 5 mg  5 mg Oral BID Syed Green MD   5 mg at 03/07/25 0845    sodium chloride flush 0.9 % injection 5-40 mL  5-40 mL IntraVENous 2 times per day Kamlesh Ibanez MD   10 mL at 03/07/25 0845    sodium chloride flush 0.9 % injection 5-40 mL  5-40 mL IntraVENous PRN Kamlesh Ibanez MD        0.9 % sodium chloride infusion   IntraVENous PRN Kamlesh Ibanez MD        ondansetron (ZOFRAN-ODT) disintegrating tablet 4 mg  4 mg Oral Q8H PRN Kamlesh Ibanez MD        Or    ondansetron (ZOFRAN) injection 4 mg  4 mg IntraVENous Q6H PRN Kamlesh Ibanez MD        melatonin tablet 3 mg  3 mg Oral Nightly Kamlesh Ibanez MD   3 mg at 03/06/25 2050    polyethylene glycol (GLYCOLAX) packet 17 g  17 g Oral Daily Kamlesh Ibanez MD

## 2025-03-07 NOTE — ANESTHESIA POSTPROCEDURE EVALUATION
Department of Anesthesiology  Postprocedure Note    Patient: Nikki Moffett  MRN: [id number]  YOB: 1945  Date of evaluation: 3/7/2025    Procedure Summary       Date: 03/07/25 Room / Location: [facility] Cardiac Cath Lab    Anesthesia Start: 1141 Anesthesia Stop: 1156    Procedure: CARDIOVERSION EXTERNAL Diagnosis: Persistent atrial fibrillation (HCC)    Scheduled Providers: Nikia Cm MD Responsible Provider: Nikia Cm MD    Anesthesia Type: MAC, general ASA Status: 4            Anesthesia Type: No value filed.    Salina Phase I: Salina Score: 8    Salina Phase II:      Anesthesia Post Evaluation    Patient location during evaluation: PACU  Patient participation: complete - patient participated  Level of consciousness: awake and alert  Airway patency: patent  Nausea & Vomiting: no nausea and no vomiting  Cardiovascular status: blood pressure returned to baseline and hemodynamically stable  Respiratory status: acceptable and spontaneous ventilation  Hydration status: euvolemic  Multimodal analgesia pain management approach  Pain management: adequate    No notable events documented.

## 2025-03-07 NOTE — PROGRESS NOTES
Notified Dr. Gelacio Hendricks regarding patients HR sustaining 131 with amio drip stopped at 0300.    Per Dr. Hendricks, no new orders.

## 2025-03-07 NOTE — PROGRESS NOTES
Message sent to IM resident regarding amio drip to stop at 0300 and HR now sustaining at 130. Bolus that was ordered is currently running.    Per IM resident, EP ordered drip. Continue bolus.

## 2025-03-07 NOTE — CARE COORDINATION
Per nursing today -  heart rate in 140's  Cardiology /EP consulted-Persistent atrial fibrillation and atypical atrial flutter Acute on chronic HF Continue IV Amiodarone. Stat CXR IV Lasix 20mg X 1 with possible additional doses pending response. HALEIGH/DCCV today. Tx ICU  Electronically signed by Mayra Morley RN on 3/7/2025 at 12:00 PM

## 2025-03-07 NOTE — CONSULTS
[facility]  Internal Medicine Residency Program  Consult  MICU    Patient:  Nikki Moffett 79 y.o. female MRN: [id number]     Date of Service: 3/7/2025    Hospital Day: 4      Chief complaint: shortness of breath  History of Present Illness   *History obtained from chart review and patient's son     The patient is a 79 y.o. female with a PMHx of L MCA infarct, atrial fibrillation, HFimpEF, who presented to the ED for altered mentation and shortness of breath.     Patient's son noted that patient had decreased oral intake over the past few weeks, accompanied by fatigue and weakness. Notes shortness of breath and chest tightness which worsened over the past few days. Also reports productive cough with difficulty expectorating sputum. Patient was seen in outpatient clinic and found to be in a fib RVR, advised to proceed to ED.     In the ED, patient was tachycardic up to 130s, saturating well on room air. CXR showed bilateral pleural effusions and possible RLL opacity. Noted ANTONIO 1.7 (baseline Cr 0.9). TSH elevated. Troponin trended down. Procal not elevated. Started on zosyn and doxycycline. While admitted, patient was started on amiodarone drip. Had episode of hypotension, started on midodrine. Antibiotics discontinued. EP was consulted, patient underwent cardioversion today. After procedure, patient noted to have increasing dyspnea and orthopnea, with hypotension. Was then transferred to MICU.     Past Medical History:      Diagnosis Date    Cerebral artery occlusion with cerebral infarction (HCC)        Past Surgical History:        Procedure Laterality Date    IR MECHANICAL ART THROMBECTOMY INTRACRANIAL  12/21/2023    IR MECHANICAL ART THROMBECTOMY INTRACRANIAL 12/21/2023 Dominik Castillo MD [facility]       Medications Prior to Admission:    Prior to Admission medications    Medication Sig Start Date End Date Taking? Authorizing Provider   ELIQUIS 5 MG TABS tablet TAKE 1  [facility]  Department of Internal Medicine  Attending Statement    This patient has a high probability of sudden clinically significant deterioration, which requires the highest level of physician preparedness to intervene urgently. I managed/supervised life or organ supporting interventions that required frequent physician assessment. I devoted my full attention to the direct care of this patient for the period of time indicated below. Time I spent with family of surrogate(s) is included only if the patient was incapable of providing the necessary information or participating in medical decision making. In addition to time devoted to teaching and to any procedure. CCT 70 minutes    Hermelindo Loredo DO, CURT, FACP, FCCP

## 2025-03-08 ENCOUNTER — APPOINTMENT (OUTPATIENT)
Dept: GENERAL RADIOLOGY | Age: 80
DRG: 004 | End: 2025-03-08
Payer: MEDICARE

## 2025-03-08 PROBLEM — I38 VHD (VALVULAR HEART DISEASE): Status: ACTIVE | Noted: 2025-03-08

## 2025-03-08 LAB
ABO + RH BLD: NORMAL
ALBUMIN SERPL-MCNC: 3 G/DL (ref 3.5–5.2)
ALBUMIN SERPL-MCNC: 3.8 G/DL (ref 3.5–5.2)
ALBUMIN SERPL-MCNC: 4 G/DL (ref 3.5–5.2)
ALP SERPL-CCNC: 118 U/L (ref 35–104)
ALP SERPL-CCNC: 133 U/L (ref 35–104)
ALP SERPL-CCNC: 90 U/L (ref 35–104)
ALT SERPL-CCNC: 1410 U/L (ref 0–32)
ALT SERPL-CCNC: 1681 U/L (ref 0–32)
ALT SERPL-CCNC: 771 U/L (ref 0–32)
ANION GAP SERPL CALCULATED.3IONS-SCNC: 18 MMOL/L (ref 7–16)
ANION GAP SERPL CALCULATED.3IONS-SCNC: 18 MMOL/L (ref 7–16)
ANION GAP SERPL CALCULATED.3IONS-SCNC: 23 MMOL/L (ref 7–16)
ANION GAP SERPL CALCULATED.3IONS-SCNC: 24 MMOL/L (ref 7–16)
ANION GAP SERPL CALCULATED.3IONS-SCNC: 25 MMOL/L (ref 7–16)
ARM BAND NUMBER: NORMAL
AST SERPL-CCNC: 1452 U/L (ref 0–31)
AST SERPL-CCNC: 2629 U/L (ref 0–31)
AST SERPL-CCNC: 4912 U/L (ref 0–31)
B PARAP IS1001 DNA NPH QL NAA+NON-PROBE: NOT DETECTED
B PERT DNA SPEC QL NAA+PROBE: NOT DETECTED
B.E.: -0.9 MMOL/L (ref -3–3)
B.E.: -2.8 MMOL/L (ref -3–3)
BACTERIA URNS QL MICRO: ABNORMAL
BASOPHILS # BLD: 0 K/UL (ref 0–0.2)
BASOPHILS # BLD: 0 K/UL (ref 0–0.2)
BASOPHILS NFR BLD: 0 % (ref 0–2)
BASOPHILS NFR BLD: 0 % (ref 0–2)
BILIRUB SERPL-MCNC: 1.6 MG/DL (ref 0–1.2)
BILIRUB SERPL-MCNC: 1.9 MG/DL (ref 0–1.2)
BILIRUB SERPL-MCNC: 1.9 MG/DL (ref 0–1.2)
BILIRUB UR QL STRIP: ABNORMAL
BLOOD BANK SAMPLE EXPIRATION: NORMAL
BLOOD GROUP ANTIBODIES SERPL: NEGATIVE
BUN SERPL-MCNC: 30 MG/DL (ref 6–23)
BUN SERPL-MCNC: 34 MG/DL (ref 6–23)
BUN SERPL-MCNC: 35 MG/DL (ref 6–23)
BUN SERPL-MCNC: 38 MG/DL (ref 6–23)
BUN SERPL-MCNC: 39 MG/DL (ref 6–23)
C PNEUM DNA NPH QL NAA+NON-PROBE: NOT DETECTED
CALCIUM SERPL-MCNC: 6.7 MG/DL (ref 8.6–10.2)
CALCIUM SERPL-MCNC: 7.4 MG/DL (ref 8.6–10.2)
CALCIUM SERPL-MCNC: 7.9 MG/DL (ref 8.6–10.2)
CALCIUM SERPL-MCNC: 8.2 MG/DL (ref 8.6–10.2)
CALCIUM SERPL-MCNC: 8.3 MG/DL (ref 8.6–10.2)
CHLORIDE SERPL-SCNC: 101 MMOL/L (ref 98–107)
CHLORIDE SERPL-SCNC: 102 MMOL/L (ref 98–107)
CHLORIDE SERPL-SCNC: 102 MMOL/L (ref 98–107)
CHLORIDE SERPL-SCNC: 93 MMOL/L (ref 98–107)
CHLORIDE SERPL-SCNC: 99 MMOL/L (ref 98–107)
CLARITY UR: ABNORMAL
CO2 SERPL-SCNC: 12 MMOL/L (ref 22–29)
CO2 SERPL-SCNC: 13 MMOL/L (ref 22–29)
CO2 SERPL-SCNC: 20 MMOL/L (ref 22–29)
CO2 SERPL-SCNC: 22 MMOL/L (ref 22–29)
CO2 SERPL-SCNC: 23 MMOL/L (ref 22–29)
COHB: 0.1 % (ref 0–1.5)
COHB: 0.3 % (ref 0–1.5)
COLOR UR: YELLOW
CREAT SERPL-MCNC: 2.1 MG/DL (ref 0.5–1)
CREAT SERPL-MCNC: 2.4 MG/DL (ref 0.5–1)
CREAT SERPL-MCNC: 2.6 MG/DL (ref 0.5–1)
CREAT SERPL-MCNC: 2.9 MG/DL (ref 0.5–1)
CREAT SERPL-MCNC: 2.9 MG/DL (ref 0.5–1)
CRITICAL: ABNORMAL
CRITICAL: ABNORMAL
DATE ANALYZED: ABNORMAL
DATE ANALYZED: ABNORMAL
DATE OF COLLECTION: ABNORMAL
DATE OF COLLECTION: ABNORMAL
EOSINOPHIL # BLD: 0 K/UL (ref 0.05–0.5)
EOSINOPHIL # BLD: 0 K/UL (ref 0.05–0.5)
EOSINOPHILS RELATIVE PERCENT: 0 % (ref 0–6)
EOSINOPHILS RELATIVE PERCENT: 0 % (ref 0–6)
ERYTHROCYTE [DISTWIDTH] IN BLOOD BY AUTOMATED COUNT: 15.7 % (ref 11.5–15)
ERYTHROCYTE [DISTWIDTH] IN BLOOD BY AUTOMATED COUNT: 15.7 % (ref 11.5–15)
FIBRINOGEN PPP-MCNC: 265 MG/DL (ref 200–400)
FLUAV H3 RNA NPH QL NAA+NON-PROBE: DETECTED
FLUAV RNA NPH QL NAA+NON-PROBE: DETECTED
FLUBV RNA NPH QL NAA+NON-PROBE: NOT DETECTED
GFR, ESTIMATED: 16 ML/MIN/1.73M2
GFR, ESTIMATED: 16 ML/MIN/1.73M2
GFR, ESTIMATED: 18 ML/MIN/1.73M2
GFR, ESTIMATED: 20 ML/MIN/1.73M2
GFR, ESTIMATED: 24 ML/MIN/1.73M2
GLUCOSE BLD-MCNC: 109 MG/DL (ref 74–99)
GLUCOSE BLD-MCNC: 128 MG/DL (ref 74–99)
GLUCOSE BLD-MCNC: 138 MG/DL (ref 74–99)
GLUCOSE BLD-MCNC: 147 MG/DL (ref 74–99)
GLUCOSE BLD-MCNC: 179 MG/DL (ref 74–99)
GLUCOSE SERPL-MCNC: 108 MG/DL (ref 74–99)
GLUCOSE SERPL-MCNC: 147 MG/DL (ref 74–99)
GLUCOSE SERPL-MCNC: 155 MG/DL (ref 74–99)
GLUCOSE SERPL-MCNC: 201 MG/DL (ref 74–99)
GLUCOSE SERPL-MCNC: 633 MG/DL (ref 74–99)
GLUCOSE UR STRIP-MCNC: NEGATIVE MG/DL
HADV DNA NPH QL NAA+NON-PROBE: NOT DETECTED
HCO3: 22.9 MMOL/L (ref 22–26)
HCO3: 24.9 MMOL/L (ref 22–26)
HCOV 229E RNA NPH QL NAA+NON-PROBE: NOT DETECTED
HCOV HKU1 RNA NPH QL NAA+NON-PROBE: NOT DETECTED
HCOV NL63 RNA NPH QL NAA+NON-PROBE: NOT DETECTED
HCOV OC43 RNA NPH QL NAA+NON-PROBE: NOT DETECTED
HCT VFR BLD AUTO: 32.7 % (ref 34–48)
HCT VFR BLD AUTO: 36.5 % (ref 34–48)
HGB BLD-MCNC: 10.3 G/DL (ref 11.5–15.5)
HGB BLD-MCNC: 11.3 G/DL (ref 11.5–15.5)
HGB UR QL STRIP.AUTO: ABNORMAL
HHB: 1.2 % (ref 0–5)
HHB: 6.9 % (ref 0–5)
HMPV RNA NPH QL NAA+NON-PROBE: NOT DETECTED
HPIV1 RNA NPH QL NAA+NON-PROBE: NOT DETECTED
HPIV2 RNA NPH QL NAA+NON-PROBE: NOT DETECTED
HPIV3 RNA NPH QL NAA+NON-PROBE: NOT DETECTED
HPIV4 RNA NPH QL NAA+NON-PROBE: NOT DETECTED
INR PPP: 5.5
INR PPP: 5.5
INR PPP: 6.3
INR PPP: 8.1
INR PPP: >10
KETONES UR STRIP-MCNC: ABNORMAL MG/DL
LAB: ABNORMAL
LAB: ABNORMAL
LACTATE BLDV-SCNC: 10 MMOL/L (ref 0.5–2.2)
LACTATE BLDV-SCNC: 2.6 MMOL/L (ref 0.5–2.2)
LACTATE BLDV-SCNC: 2.7 MMOL/L (ref 0.5–2.2)
LACTATE BLDV-SCNC: 5 MMOL/L (ref 0.5–2.2)
LEUKOCYTE ESTERASE UR QL STRIP: ABNORMAL
LYMPHOCYTES NFR BLD: 0.18 K/UL (ref 1.5–4)
LYMPHOCYTES NFR BLD: 0.58 K/UL (ref 1.5–4)
LYMPHOCYTES RELATIVE PERCENT: 3 % (ref 20–42)
LYMPHOCYTES RELATIVE PERCENT: 7 % (ref 20–42)
Lab: 2015
Lab: 2220
M PNEUMO DNA NPH QL NAA+NON-PROBE: NOT DETECTED
MAGNESIUM SERPL-MCNC: 1.8 MG/DL (ref 1.6–2.6)
MAGNESIUM SERPL-MCNC: 2 MG/DL (ref 1.6–2.6)
MCH RBC QN AUTO: 27 PG (ref 26–35)
MCH RBC QN AUTO: 27.1 PG (ref 26–35)
MCHC RBC AUTO-ENTMCNC: 31 G/DL (ref 32–34.5)
MCHC RBC AUTO-ENTMCNC: 31.5 G/DL (ref 32–34.5)
MCV RBC AUTO: 85.8 FL (ref 80–99.9)
MCV RBC AUTO: 87.5 FL (ref 80–99.9)
METHB: 0.4 % (ref 0–1.5)
METHB: 0.7 % (ref 0–1.5)
MODE: ABNORMAL
MODE: ABNORMAL
MONOCYTES NFR BLD: 0.29 K/UL (ref 0.1–0.95)
MONOCYTES NFR BLD: 0.55 K/UL (ref 0.1–0.95)
MONOCYTES NFR BLD: 4 % (ref 2–12)
MONOCYTES NFR BLD: 8 % (ref 2–12)
NEUTROPHILS NFR BLD: 90 % (ref 43–80)
NEUTROPHILS NFR BLD: 90 % (ref 43–80)
NEUTS SEG NFR BLD: 6.27 K/UL (ref 1.8–7.3)
NEUTS SEG NFR BLD: 7.34 K/UL (ref 1.8–7.3)
NITRITE UR QL STRIP: NEGATIVE
NUCLEATED RED BLOOD CELLS: 2 PER 100 WBC
NUCLEATED RED BLOOD CELLS: 3 PER 100 WBC
O2 SATURATION: 93.1 % (ref 92–98.5)
O2 SATURATION: 98.8 % (ref 92–98.5)
O2HB: 92.6 % (ref 94–97)
O2HB: 97.8 % (ref 94–97)
OPERATOR ID: 5134
OPERATOR ID: ABNORMAL
PARTIAL THROMBOPLASTIN TIME: 42 SEC (ref 24.5–35.1)
PARTIAL THROMBOPLASTIN TIME: 44.3 SEC (ref 24.5–35.1)
PARTIAL THROMBOPLASTIN TIME: 53.2 SEC (ref 24.5–35.1)
PATIENT TEMP: 37 C
PATIENT TEMP: 37 C
PCO2: 43.9 MMHG (ref 35–45)
PCO2: 46.2 MMHG (ref 35–45)
PH BLOOD GAS: 7.34 (ref 7.35–7.45)
PH BLOOD GAS: 7.35 (ref 7.35–7.45)
PH UR STRIP: 5.5 [PH] (ref 5–8)
PHOSPHATE SERPL-MCNC: 4.5 MG/DL (ref 2.5–4.5)
PHOSPHATE SERPL-MCNC: 4.6 MG/DL (ref 2.5–4.5)
PLATELET # BLD AUTO: 263 K/UL (ref 130–450)
PLATELET # BLD AUTO: 283 K/UL (ref 130–450)
PMV BLD AUTO: 10.3 FL (ref 7–12)
PMV BLD AUTO: 10.6 FL (ref 7–12)
PO2: 149.9 MMHG (ref 75–100)
PO2: 75.5 MMHG (ref 75–100)
POTASSIUM SERPL-SCNC: 3.16 MMOL/L (ref 3.5–5)
POTASSIUM SERPL-SCNC: 3.2 MMOL/L (ref 3.5–5)
POTASSIUM SERPL-SCNC: 3.5 MMOL/L (ref 3.5–5)
POTASSIUM SERPL-SCNC: 3.8 MMOL/L (ref 3.5–5)
POTASSIUM SERPL-SCNC: 3.9 MMOL/L (ref 3.5–5)
POTASSIUM SERPL-SCNC: 4.2 MMOL/L (ref 3.5–5)
PROCALCITONIN SERPL-MCNC: 0.67 NG/ML (ref 0–0.08)
PROT SERPL-MCNC: 4.8 G/DL (ref 6.4–8.3)
PROT SERPL-MCNC: 5.9 G/DL (ref 6.4–8.3)
PROT SERPL-MCNC: 6.3 G/DL (ref 6.4–8.3)
PROT UR STRIP-MCNC: 100 MG/DL
PROTHROMBIN TIME: 119 SEC (ref 9.3–12.4)
PROTHROMBIN TIME: 60.4 SEC (ref 9.3–12.4)
PROTHROMBIN TIME: 61.2 SEC (ref 9.3–12.4)
PROTHROMBIN TIME: 70.1 SEC (ref 9.3–12.4)
PROTHROMBIN TIME: 89.8 SEC (ref 9.3–12.4)
RBC # BLD AUTO: 3.81 M/UL (ref 3.5–5.5)
RBC # BLD AUTO: 4.17 M/UL (ref 3.5–5.5)
RBC # BLD: ABNORMAL 10*6/UL
RBC #/AREA URNS HPF: ABNORMAL /HPF
RSV RNA NPH QL NAA+NON-PROBE: NOT DETECTED
RV+EV RNA NPH QL NAA+NON-PROBE: NOT DETECTED
SARS-COV-2 RNA NPH QL NAA+NON-PROBE: NOT DETECTED
SODIUM SERPL-SCNC: 139 MMOL/L (ref 132–146)
SOURCE, BLOOD GAS: ABNORMAL
SOURCE, BLOOD GAS: ABNORMAL
SP GR UR STRIP: >1.03 (ref 1–1.03)
SPECIMEN DESCRIPTION: ABNORMAL
THB: 10.3 G/DL (ref 11.5–16.5)
THB: 10.8 G/DL (ref 11.5–16.5)
TIME ANALYZED: 2021
TIME ANALYZED: 2221
UROBILINOGEN UR STRIP-ACNC: 1 EU/DL (ref 0–1)
WBC #/AREA URNS HPF: ABNORMAL /HPF
WBC OTHER # BLD: 7 K/UL (ref 4.5–11.5)
WBC OTHER # BLD: 8.2 K/UL (ref 4.5–11.5)

## 2025-03-08 PROCEDURE — 0202U NFCT DS 22 TRGT SARS-COV-2: CPT

## 2025-03-08 PROCEDURE — 3E043XZ INTRODUCTION OF VASOPRESSOR INTO CENTRAL VEIN, PERCUTANEOUS APPROACH: ICD-10-PCS | Performed by: INTERNAL MEDICINE

## 2025-03-08 PROCEDURE — 36620 INSERTION CATHETER ARTERY: CPT

## 2025-03-08 PROCEDURE — 2700000000 HC OXYGEN THERAPY PER DAY

## 2025-03-08 PROCEDURE — 94640 AIRWAY INHALATION TREATMENT: CPT

## 2025-03-08 PROCEDURE — 85730 THROMBOPLASTIN TIME PARTIAL: CPT

## 2025-03-08 PROCEDURE — 36415 COLL VENOUS BLD VENIPUNCTURE: CPT

## 2025-03-08 PROCEDURE — 2500000003 HC RX 250 WO HCPCS

## 2025-03-08 PROCEDURE — 84132 ASSAY OF SERUM POTASSIUM: CPT

## 2025-03-08 PROCEDURE — 87040 BLOOD CULTURE FOR BACTERIA: CPT

## 2025-03-08 PROCEDURE — 81001 URINALYSIS AUTO W/SCOPE: CPT

## 2025-03-08 PROCEDURE — 36620 INSERTION CATHETER ARTERY: CPT | Performed by: INTERNAL MEDICINE

## 2025-03-08 PROCEDURE — 84100 ASSAY OF PHOSPHORUS: CPT

## 2025-03-08 PROCEDURE — 6360000002 HC RX W HCPCS

## 2025-03-08 PROCEDURE — 99291 CRITICAL CARE FIRST HOUR: CPT | Performed by: INTERNAL MEDICINE

## 2025-03-08 PROCEDURE — 82962 GLUCOSE BLOOD TEST: CPT

## 2025-03-08 PROCEDURE — 87086 URINE CULTURE/COLONY COUNT: CPT

## 2025-03-08 PROCEDURE — 80048 BASIC METABOLIC PNL TOTAL CA: CPT

## 2025-03-08 PROCEDURE — P9017 PLASMA 1 DONOR FRZ W/IN 8 HR: HCPCS

## 2025-03-08 PROCEDURE — 86900 BLOOD TYPING SEROLOGIC ABO: CPT

## 2025-03-08 PROCEDURE — 2000000000 HC ICU R&B

## 2025-03-08 PROCEDURE — 6370000000 HC RX 637 (ALT 250 FOR IP)

## 2025-03-08 PROCEDURE — 85025 COMPLETE CBC W/AUTO DIFF WBC: CPT

## 2025-03-08 PROCEDURE — 36556 INSERT NON-TUNNEL CV CATH: CPT

## 2025-03-08 PROCEDURE — 2500000003 HC RX 250 WO HCPCS: Performed by: INTERNAL MEDICINE

## 2025-03-08 PROCEDURE — 83735 ASSAY OF MAGNESIUM: CPT

## 2025-03-08 PROCEDURE — 36800 INSERTION OF CANNULA: CPT | Performed by: INTERNAL MEDICINE

## 2025-03-08 PROCEDURE — 6370000000 HC RX 637 (ALT 250 FOR IP): Performed by: INTERNAL MEDICINE

## 2025-03-08 PROCEDURE — 5A1D90Z PERFORMANCE OF URINARY FILTRATION, CONTINUOUS, GREATER THAN 18 HOURS PER DAY: ICD-10-PCS | Performed by: INTERNAL MEDICINE

## 2025-03-08 PROCEDURE — 85610 PROTHROMBIN TIME: CPT

## 2025-03-08 PROCEDURE — 71045 X-RAY EXAM CHEST 1 VIEW: CPT

## 2025-03-08 PROCEDURE — 86850 RBC ANTIBODY SCREEN: CPT

## 2025-03-08 PROCEDURE — 6370000000 HC RX 637 (ALT 250 FOR IP): Performed by: NURSE PRACTITIONER

## 2025-03-08 PROCEDURE — 30233N1 TRANSFUSION OF NONAUTOLOGOUS RED BLOOD CELLS INTO PERIPHERAL VEIN, PERCUTANEOUS APPROACH: ICD-10-PCS | Performed by: INTERNAL MEDICINE

## 2025-03-08 PROCEDURE — 84145 PROCALCITONIN (PCT): CPT

## 2025-03-08 PROCEDURE — 2580000003 HC RX 258

## 2025-03-08 PROCEDURE — 83605 ASSAY OF LACTIC ACID: CPT

## 2025-03-08 PROCEDURE — 80053 COMPREHEN METABOLIC PANEL: CPT

## 2025-03-08 PROCEDURE — 99233 SBSQ HOSP IP/OBS HIGH 50: CPT | Performed by: INTERNAL MEDICINE

## 2025-03-08 PROCEDURE — 36430 TRANSFUSION BLD/BLD COMPNT: CPT

## 2025-03-08 PROCEDURE — 82805 BLOOD GASES W/O2 SATURATION: CPT

## 2025-03-08 PROCEDURE — 86901 BLOOD TYPING SEROLOGIC RH(D): CPT

## 2025-03-08 PROCEDURE — 85384 FIBRINOGEN ACTIVITY: CPT

## 2025-03-08 PROCEDURE — 86927 PLASMA FRESH FROZEN: CPT

## 2025-03-08 PROCEDURE — 30233K1 TRANSFUSION OF NONAUTOLOGOUS FROZEN PLASMA INTO PERIPHERAL VEIN, PERCUTANEOUS APPROACH: ICD-10-PCS | Performed by: RADIOLOGY

## 2025-03-08 RX ORDER — POTASSIUM CHLORIDE 7.45 MG/ML
10 INJECTION INTRAVENOUS
Status: COMPLETED | OUTPATIENT
Start: 2025-03-08 | End: 2025-03-09

## 2025-03-08 RX ORDER — SODIUM CHLORIDE 9 MG/ML
INJECTION, SOLUTION INTRAVENOUS PRN
Status: DISCONTINUED | OUTPATIENT
Start: 2025-03-08 | End: 2025-03-10

## 2025-03-08 RX ORDER — OSELTAMIVIR PHOSPHATE 30 MG/1
30 CAPSULE ORAL DAILY
Status: DISPENSED | OUTPATIENT
Start: 2025-03-08 | End: 2025-03-13

## 2025-03-08 RX ORDER — LIDOCAINE HYDROCHLORIDE 10 MG/ML
INJECTION, SOLUTION INFILTRATION; PERINEURAL
Status: COMPLETED
Start: 2025-03-08 | End: 2025-03-08

## 2025-03-08 RX ORDER — FENTANYL CITRATE 50 UG/ML
INJECTION, SOLUTION INTRAMUSCULAR; INTRAVENOUS
Status: COMPLETED
Start: 2025-03-08 | End: 2025-03-08

## 2025-03-08 RX ORDER — INSULIN LISPRO 100 [IU]/ML
8 INJECTION, SOLUTION INTRAVENOUS; SUBCUTANEOUS ONCE
Status: DISCONTINUED | OUTPATIENT
Start: 2025-03-08 | End: 2025-03-08

## 2025-03-08 RX ORDER — CALCIUM GLUCONATE 20 MG/ML
1000 INJECTION, SOLUTION INTRAVENOUS PRN
Status: DISCONTINUED | OUTPATIENT
Start: 2025-03-08 | End: 2025-03-15

## 2025-03-08 RX ORDER — NOREPINEPHRINE BITARTRATE 0.06 MG/ML
1-100 INJECTION, SOLUTION INTRAVENOUS CONTINUOUS
Status: DISCONTINUED | OUTPATIENT
Start: 2025-03-08 | End: 2025-03-19

## 2025-03-08 RX ORDER — MAGNESIUM SULFATE 1 G/100ML
1000 INJECTION INTRAVENOUS PRN
Status: DISCONTINUED | OUTPATIENT
Start: 2025-03-08 | End: 2025-03-15

## 2025-03-08 RX ORDER — POTASSIUM CHLORIDE 29.8 MG/ML
20 INJECTION INTRAVENOUS PRN
Status: DISCONTINUED | OUTPATIENT
Start: 2025-03-08 | End: 2025-03-15

## 2025-03-08 RX ORDER — LIDOCAINE HYDROCHLORIDE 10 MG/ML
5 INJECTION, SOLUTION INFILTRATION; PERINEURAL ONCE
Status: COMPLETED | OUTPATIENT
Start: 2025-03-08 | End: 2025-03-08

## 2025-03-08 RX ORDER — CALCIUM GLUCONATE 20 MG/ML
2000 INJECTION, SOLUTION INTRAVENOUS PRN
Status: DISCONTINUED | OUTPATIENT
Start: 2025-03-08 | End: 2025-03-15

## 2025-03-08 RX ORDER — SODIUM CHLORIDE 9 MG/ML
INJECTION, SOLUTION INTRAVENOUS CONTINUOUS
Status: DISCONTINUED | OUTPATIENT
Start: 2025-03-08 | End: 2025-03-15

## 2025-03-08 RX ORDER — HEPARIN SODIUM 1000 [USP'U]/ML
INJECTION, SOLUTION INTRAVENOUS; SUBCUTANEOUS PRN
Status: DISCONTINUED | OUTPATIENT
Start: 2025-03-08 | End: 2025-04-04 | Stop reason: HOSPADM

## 2025-03-08 RX ORDER — HEPARIN 100 UNIT/ML
100 SYRINGE INTRAVENOUS PRN
Status: DISCONTINUED | OUTPATIENT
Start: 2025-03-08 | End: 2025-04-04 | Stop reason: HOSPADM

## 2025-03-08 RX ORDER — FENTANYL CITRATE 50 UG/ML
25 INJECTION, SOLUTION INTRAMUSCULAR; INTRAVENOUS ONCE
Status: COMPLETED | OUTPATIENT
Start: 2025-03-08 | End: 2025-03-08

## 2025-03-08 RX ORDER — 0.9 % SODIUM CHLORIDE 0.9 %
2000 INTRAVENOUS SOLUTION INTRAVENOUS PRN
Status: DISCONTINUED | OUTPATIENT
Start: 2025-03-08 | End: 2025-03-15

## 2025-03-08 RX ADMIN — FENTANYL CITRATE 25 MCG: 50 INJECTION INTRAMUSCULAR; INTRAVENOUS at 23:26

## 2025-03-08 RX ADMIN — AMIODARONE HYDROCHLORIDE 1 MG/MIN: 1.8 INJECTION, SOLUTION INTRAVENOUS at 06:52

## 2025-03-08 RX ADMIN — IPRATROPIUM BROMIDE AND ALBUTEROL SULFATE 1 DOSE: 2.5; .5 SOLUTION RESPIRATORY (INHALATION) at 09:41

## 2025-03-08 RX ADMIN — OSELTAMIVIR 30 MG: 30 CAPSULE ORAL at 16:03

## 2025-03-08 RX ADMIN — SODIUM CHLORIDE, PRESERVATIVE FREE 10 ML: 5 INJECTION INTRAVENOUS at 20:49

## 2025-03-08 RX ADMIN — AMIODARONE HYDROCHLORIDE 1 MG/MIN: 1.8 INJECTION, SOLUTION INTRAVENOUS at 01:05

## 2025-03-08 RX ADMIN — LIDOCAINE HYDROCHLORIDE 5 ML: 10 INJECTION, SOLUTION INFILTRATION; PERINEURAL at 09:37

## 2025-03-08 RX ADMIN — AMIODARONE HYDROCHLORIDE 200 MG: 200 TABLET ORAL at 08:13

## 2025-03-08 RX ADMIN — APIXABAN 2.5 MG: 2.5 TABLET, FILM COATED ORAL at 08:13

## 2025-03-08 RX ADMIN — WATER 1000 MG: 1 INJECTION INTRAMUSCULAR; INTRAVENOUS; SUBCUTANEOUS at 20:49

## 2025-03-08 RX ADMIN — IPRATROPIUM BROMIDE AND ALBUTEROL SULFATE 1 DOSE: 2.5; .5 SOLUTION RESPIRATORY (INHALATION) at 20:25

## 2025-03-08 RX ADMIN — IPRATROPIUM BROMIDE AND ALBUTEROL SULFATE 1 DOSE: 2.5; .5 SOLUTION RESPIRATORY (INHALATION) at 16:09

## 2025-03-08 RX ADMIN — Medication 5 MCG/MIN: at 02:56

## 2025-03-08 RX ADMIN — ARFORMOTEROL TARTRATE 15 MCG: 15 SOLUTION RESPIRATORY (INHALATION) at 20:25

## 2025-03-08 RX ADMIN — IPRATROPIUM BROMIDE AND ALBUTEROL SULFATE 1 DOSE: 2.5; .5 SOLUTION RESPIRATORY (INHALATION) at 12:14

## 2025-03-08 RX ADMIN — MIDODRINE HYDROCHLORIDE 10 MG: 10 TABLET ORAL at 08:13

## 2025-03-08 RX ADMIN — PHYTONADIONE 10 MG: 10 INJECTION, EMULSION INTRAMUSCULAR; INTRAVENOUS; SUBCUTANEOUS at 06:49

## 2025-03-08 RX ADMIN — ACETAMINOPHEN 650 MG: 650 SUPPOSITORY RECTAL at 18:51

## 2025-03-08 RX ADMIN — BUDESONIDE INHALATION 500 MCG: 0.5 SUSPENSION RESPIRATORY (INHALATION) at 20:25

## 2025-03-08 RX ADMIN — AMIODARONE HYDROCHLORIDE 0.5 MG/MIN: 1.8 INJECTION, SOLUTION INTRAVENOUS at 09:43

## 2025-03-08 RX ADMIN — Medication 1000 UNITS: at 08:13

## 2025-03-08 RX ADMIN — POTASSIUM CHLORIDE 10 MEQ: 7.46 INJECTION, SOLUTION INTRAVENOUS at 23:22

## 2025-03-08 RX ADMIN — POTASSIUM CHLORIDE 10 MEQ: 7.46 INJECTION, SOLUTION INTRAVENOUS at 22:31

## 2025-03-08 RX ADMIN — ACETAMINOPHEN 650 MG: 325 TABLET ORAL at 10:46

## 2025-03-08 RX ADMIN — ARFORMOTEROL TARTRATE 15 MCG: 15 SOLUTION RESPIRATORY (INHALATION) at 09:41

## 2025-03-08 RX ADMIN — FENTANYL CITRATE 25 MCG: 50 INJECTION, SOLUTION INTRAMUSCULAR; INTRAVENOUS at 23:26

## 2025-03-08 RX ADMIN — SODIUM CHLORIDE, PRESERVATIVE FREE 10 ML: 5 INJECTION INTRAVENOUS at 08:17

## 2025-03-08 RX ADMIN — FENTANYL CITRATE 25 MCG: 50 INJECTION INTRAMUSCULAR; INTRAVENOUS at 15:11

## 2025-03-08 RX ADMIN — SODIUM BICARBONATE: 84 INJECTION, SOLUTION INTRAVENOUS at 12:14

## 2025-03-08 RX ADMIN — BUDESONIDE INHALATION 500 MCG: 0.5 SUSPENSION RESPIRATORY (INHALATION) at 09:41

## 2025-03-08 ASSESSMENT — PAIN SCALES - GENERAL
PAINLEVEL_OUTOF10: 0
PAINLEVEL_OUTOF10: 0

## 2025-03-08 NOTE — PROGRESS NOTES
[facility]   Department of Internal Medicine   Internal Medicine Residency  MICU Progress Note    Patient:  Nikki Moffett 79 y.o. female   MRN: [id number]       Date of Service: 3/8/2025    Allergy: Patient has no known allergies.    Subjective     Patient was seen and examined this morning at bedside in no acute distress.  Patient is alert oriented to birthday, month, place.  She mentions she is feeling so-so.  Denies any chest pain, shortness of breath.  Patient has awareness of heart beating but no palpitation.  Patient has some cough.  Overnight,   Patient had worsening renal and hepatic function, urine output was only 120 cc after second dose of Lasix yesterday.  Lactic acid elevated to 7.1, proBNP 18,745.  Nephrology was consulted.  Night team spoke with Dr. Winkler, patient was started on bicarb drip at 70 cc/h, 1 dose albumin was given.  No diuretics.  Recommended to reevaluate next day.  MAP 65, echo shows EF 10 to 15%.  Cardio recommended increasing midodrine to 10 mg 3 times daily which was ordered.  Blood glucose 130.  Patient's MAP was consistently <65.  Levophed was started.  Night team spoke with patient's son who approved art line and wished to continue as full code.  Patient's son was aware of possible need for hemodialysis and mention he will be here in the morning.  Lactic acid went up to 10 with worsening hepatic and renal function.  No urine output overnight.  Hemodialysis was discussed again with nephrologist .  Agreed to start hemodialysis.  There was a long discussion with patient's son again and he wanted to continue all aggressive care and consented for HD line.  Coagulation workup was ordered  INR >10.  Blood consent was obtained and patient was ordered 2 unit FFP.  10 mg vitamin K was given      Objective       I & O - 24hr:    Intake/Output Summary (Last 24 hours) at 3/8/2025 1245  Last data filed at 3/8/2025 1153  Gross per 24 hour   Intake 4052.75 ml   Output  None  PT/OT evaluation: not indicated at this time   DVT prophylaxis: Eliquis  GI prophylaxis: None  Diet:   ADULT DIET; Regular; Low Fat/Low Chol/High Fiber/2 gm Na   Bowel regimen: GlycoLax  Pain management: as needed  Code status: Full Code   Disposition: Admitted to ICU  Family: updated as available    Americo Vargas MD, PGY-2   Attending physician: Dr. Loredo     [facility]  Department of Pulmonary, Critical Care and Sleep Medicine  [facility]  Department of Internal Medicine  Attending Statement    This patient has a high probability of sudden clinically significant deterioration, which requires the highest level of physician preparedness to intervene urgently. I managed/supervised life or organ supporting interventions that required frequent physician assessment. I devoted my full attention to the direct care of this patient for the period of time indicated below. Time I spent with family of surrogate(s) is included only if the patient was incapable of providing the necessary information or participating in medical decision making. In addition to time devoted to teaching and to any procedure. CCT 44 minutes    Hermelindo Loredo DO, MACOI, FACP, FCCP

## 2025-03-08 NOTE — PLAN OF CARE
Problem: Skin/Tissue Integrity  Goal: Skin integrity remains intact  Description: 1.  Monitor for areas of redness and/or skin breakdown  2.  Assess vascular access sites hourly  3.  Every 4-6 hours minimum:  Change oxygen saturation probe site  4.  Every 4-6 hours:  If on nasal continuous positive airway pressure, respiratory therapy assess nares and determine need for appliance change or resting period  Outcome: Not Progressing     Problem: Nutrition Deficit:  Goal: Optimize nutritional status  Outcome: Not Progressing     Problem: Safety - Medical Restraint  Goal: Remains free of injury from restraints (Restraint for Interference with Medical Device)  Description: INTERVENTIONS:  1. Determine that other, less restrictive measures have been tried or would not be effective before applying the restraint  2. Evaluate the patient's condition at the time of restraint application  3. Inform patient/family regarding the reason for restraint  4. Q2H: Monitor safety, psychosocial status, comfort, nutrition and hydration  Outcome: Not Progressing

## 2025-03-08 NOTE — CONSULTS
[facility] Nephrology Consult       Patient's Name:  Nikki Moffett  Date of Service:  3/8/2025  Requesting    Tiesha Panda MD    Reason for Consult:              ANTONIO     Chief Complaint:                    Fatigue , palpitation , AMS     Information obtained from:   Chart     History of Present Illness:     79 yrs old HF     Known h/o prior CVA s/p intracranial thrombectomy 2023 December     Came in with above mentioned issues , noted to be in Rapid A fib , hypotensive , lethargic , already known to   Cardiology team on enteresto , eliquis , toprol , amiodarone .     Patient remained in rapid a fib , hypotensive , was diuresed , developed ANTONIO , Acidosis , , Lactic acidosis , shock liver   Coagulopathy , her LV function detoriorated to 10-15% due to a fib related Cardiomyopathy , possible myocardial injury ( high troponin )    Her HR now is better , she appears hemodynamically stable , alert and responsive in no distress         Past Medical History:   Diagnosis Date    Cerebral artery occlusion with cerebral infarction (HCC)          Past Surgical History:   Procedure Laterality Date    IR MECHANICAL ART THROMBECTOMY INTRACRANIAL  12/21/2023    IR MECHANICAL ART THROMBECTOMY INTRACRANIAL 12/21/2023 Dominik Castillo MD [facility]         Social History     Socioeconomic History    Marital status: Single     Spouse name: Not on file    Number of children: Not on file    Years of education: Not on file    Highest education level: Not on file   Occupational History    Not on file   Tobacco Use    Smoking status: Former     Types: Cigarettes    Smokeless tobacco: Never    Tobacco comments:     Stopped in 2021   Vaping Use    Vaping status: Never Used   Substance and Sexual Activity    Alcohol use: Yes     Alcohol/week: 1.0 standard drink of alcohol     Types: 1 Glasses of wine per week     Comment: occasional    Drug use: Never    Sexual activity: Not on file   Other Topics Concern    Not  no rhonchi. Equal chest excursion with respirations.   Heart RRR. pmi not laterally displaced. No S3 or S4, no rub  Abdomen:  Soft, ND, NT. Bowel sounds present. No HSM. No epigastric bruit, no increased Ao pulsation,  Extremities: warm to touch. No LE edema or cyanosis. No fem bruit. 2+ upstrokes and equal bilaterally. PT/Pedal 2+ equal bilat  Neuro: Cr N 2-12 grossly intact. No focal motor neuro deficit. No alteration in recent remote memory.    Assessment/Plan    1) antonio superimposed on CKD 3A ( baseline cr at 1-1.3 range 2023) , in the setting of hypotensive episode  underlying        Uncontrolled a fib , progressive rate related cardiomyopathy , possible myocardial injury and significant drop in EF         To 10-15% , diuretics on board , enteresto on board on arrival -- she most likely has ATN ( shock related) and currently         Anuric . Antihypertensives , diuretics now are on hold , Her HR is better , BP appears to be better on low dose pressors         Most recent BUN , Cr at 34/2.6 , K+ 3.9 , co2 better at 13 on bicarbonate drip , Lactic acidosis better at 5.0            Continue holding diuretics , amiodarone and Entresto , continue IV bicarb drip at present rate            No immidiate need for dialysis as yet -- will wait and monitor her labs and urine output , if needed will intervene     2)   Hypotension induced shock liver , Coagulopathy , eliquis on hold , INR corrected with FFP to some extent         Amiodarone on hold     3)   A Fib with rapid venticular rate , progressive cardiomyopathy as a result , HR better at present , follow Cardio         Recommendations     4)  Blood c/s , viral panel so far negative     5)   Mild to moderate todd PL effusion possible HF related , , not in significant distress at present     6) High TSH     7) Metabolic acidosis and Lactic acidosis of ANTONIO and shock respectively , improving on bicarb supplement and        Better H Rate control , better BP       Thank you

## 2025-03-08 NOTE — PROGRESS NOTES
[facility] PHYSICI[facility]  Inpatient Progress Report  PATIENT: Nikki Moffett  MEDICAL RECORD NUMBER: [id number]  DATE OF SERVICE:  3/8/2025  ATTENDING ELECTROPHYSIOLOGIST: Gelacio Hendricks MD   PRIMARY ELECTROPHYSIOLOGIST: Gelacio Hendricks MD  REFERRING PHYSICIAN:  Tiesha Panda MD  CHIEF COMPLAINT: Altered mental status    HPI: This is a 79 y.o. 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 The patient has history of persistent atrial fibrillation and atrial flutter, cerebrovascular accident and hyperlipidemia. The patient presented to the hospital on 12/21/23 due to right sided weakness and facial droop. She diagnosed with acute left MCA stroke with M2 occlusion. She was not a candidate for tPA and underwent mechanical thrombectomy by IR team on 12/21/23. Prior to intervention, patient was diagnosed with AF with RVR with heart rate in the 180s. Patient underwent emergency DC-cardioversion to normal sinus rhythm and was initiated on IV Amiodarone which later on transited to oral Amiodarone. Echocardiogram 2/29/24 showed LV EF of 40-45%.  She was started on GDMT and was discharged home on Amiodarone loading dose, Eliquis and Metoprolol. She was last seen in Cardiology's office on 2/22/24. She subsequently underwent nuclear stress test on 2/29/24 which showed no ischemia and LV EF of 69%. Repeat echocardiogram on 2/29/24 showed LV EF 55-60%, moderate LVH and mild LAE. She was scheduled for follow up with Cardiology but did not followed

## 2025-03-08 NOTE — PROCEDURES
Arterial line placement    Procedure: Left/Right radial arterial line placement.     Indications: Continuous monitoring of blood pressure in a patient with hypotension +/- shock, on Levophed.     Anesthesia: Local infiltration of 1% lidocaine.     Consent:  The patient provided verbal consent for this procedure.    Technique: Time Out: Immediately prior to the procedure a \"timeout\" was called to verify the correct patient and procedure. Procedure was done using strict aseptic technique. Rei's test was performed and was normal. left radial site was cleaned with chloraprep and draped.  Radial artery was identified, then Lidocaine 1% was infiltrated locally.  Radial arterial line was inserted, a good blood flow was obtained, after which guidewire was inserted all the way with no resistance. Then the canula was inserted and needle with guidewire was withdrawn. Pulsatile bright red blood flow was observed. The canula was connected to BP monitoring apparatus and a good quality waveform was noted. Then the canula was secured with 2 stay sutures of 3-0 silk after Lidocaine infiltration, following which dressing was applied.     Number of sticks: 1.     Number of Kits used: 1.     Complications: No immediate complication.      Estimated blood loss: About 1 ml.     Comment: Patient tolerated the procedure well.     Jay Galvez MD PGY-1  3/8/2025 9:23 AM      [facility]  Department of Pulmonary, Critical Care and Sleep Medicine  [facility]  Department of Internal Medicine  Attending Procedural Note Addendum Statement    This procedure was supervised. The critical elements of this procedure was monitored and, if needed, I was available for the needs of the procedure.     Hermelindo Loredo DO, FCCP, FACOI, FACP

## 2025-03-08 NOTE — PROCEDURES
PROCEDURE NOTE  Date: 3/8/2025   Name: Nikki Moffett  YOB: 1945    Procedures    Hemodialysis catheter Insertion     Procedure: left femoral vein  Hemodialysis Catheter placement.    Indications: Hemodialysis     Consent: The patient's son counseled regarding the procedure, its indications, risks, potential complications and alternatives, and any questions were answered. Consent was obtained to proceed.    Number of sticks: 2    Number of Kits used: 1    Procedure: Time Out: Immediately prior to the procedure a \"timeout\" was called to verify the correct patient and procedure. The patient was place in the trendelenburg position and the skin over the left femoral vein was prepped with betadine and draped in a sterile fashion. Local anesthesia was obtained by infiltration using 1% Lidocaine without epinephrine.  With Ultrasound guidance a large bore needle was used to identify the vein, dark non pulsatile blood returned. The guide wire was then inserted through the needle with minimal resistance. 2 mm nick was made in the skin beside the guidewire. Then a dilator was inserted and removed. Second dilator was inserted. Temporary HD catheter was then inserted into the vessel over the guide wire using the Seldinger technique .  All ports showed good, free flowing blood return and were flushed with saline solution.  The catheter was then securely fastened to the skin with sutures and covered with a bio patch and sterile dressing. Heprin was pushed into each line as per recommended mls. Clamps locked and caps placed      Complications: None   The patient tolerated the procedure well.    Estimated blood loss: 2 ml.    Heparin was placed into both lumens and locked .    Americo Vargas MD PGY-2  3/8/2025  3:56 PM      Attending: Dr. Loredo      [facility]  Department of Pulmonary, Critical Care and Sleep Medicine  [facility]  Department of  Internal Medicine  Attending Procedural Note Addendum Statement    This procedure was supervised. The critical elements of this procedure was monitored and, if needed, I was available for the needs of the procedure.     Hermelindo Loredo DO, FCCP, FACOI, FACP

## 2025-03-08 NOTE — PLAN OF CARE
Problem: Chronic Conditions and Co-morbidities  Goal: Patient's chronic conditions and co-morbidity symptoms are monitored and maintained or improved  3/8/2025 1104 by Obdulia Bradford  Outcome: Progressing     Problem: Discharge Planning  Goal: Discharge to home or other facility with appropriate resources  3/8/2025 1103 by Obdulia Bradford  Outcome: Progressing     Problem: Safety - Adult  Goal: Free from fall injury  3/8/2025 1104 by Obdulia Bradford  Outcome: Progressing     Problem: Skin/Tissue Integrity  Goal: Skin integrity remains intact  Description: 1.  Monitor for areas of redness and/or skin breakdown  2.  Assess vascular access sites hourly  3.  Every 4-6 hours minimum:  Change oxygen saturation probe site  4.  Every 4-6 hours:  If on nasal continuous positive airway pressure, respiratory therapy assess nares and determine need for appliance change or resting period  3/8/2025 1104 by Obdulia Bradford  Outcome: Progressing     Problem: ABCDS Injury Assessment  Goal: Absence of physical injury  3/8/2025 1104 by Obdulia Bradford  Outcome: Progressing     Problem: Metabolic/Fluid and Electrolytes - Adult  Goal: Electrolytes maintained within normal limits  Outcome: Progressing     Problem: Metabolic/Fluid and Electrolytes - Adult  Goal: Hemodynamic stability and optimal renal function maintained  Outcome: Progressing     Problem: Hematologic - Adult  Goal: Maintains hematologic stability  Outcome: Progressing     Problem: Skin/Tissue Integrity  Goal: Skin integrity remains intact  Description: 1.  Monitor for areas of redness and/or skin breakdown  2.  Assess vascular access sites hourly  3.  Every 4-6 hours minimum:  Change oxygen saturation probe site  4.  Every 4-6 hours:  If on nasal continuous positive airway pressure, respiratory therapy assess nares and determine need for appliance change or resting period  3/8/2025 1104 by Obdulia Bradford  Outcome: Progressing  3/8/2025 1103 by Sanchez  Obdulia  Outcome: Progressing  3/8/2025 0029 by Allan Torres RN  Outcome: Not Progressing     Problem: Nutrition Deficit:  Goal: Optimize nutritional status  3/8/2025 1104 by Obdulia Bradford  Outcome: Not Progressing  3/8/2025 1103 by Obdulia Bradford  Outcome: Not Progressing  3/8/2025 0029 by Allan Torres, RN  Outcome: Not Progressing     Problem: Safety - Medical Restraint  Goal: Remains free of injury from restraints (Restraint for Interference with Medical Device)  Description: INTERVENTIONS:  1. Determine that other, less restrictive measures have been tried or would not be effective before applying the restraint  2. Evaluate the patient's condition at the time of restraint application  3. Inform patient/family regarding the reason for restraint  4. Q2H: Monitor safety, psychosocial status, comfort, nutrition and hydration  3/8/2025 1103 by Obdulia Bradford  Outcome: Completed  3/8/2025 0029 by Allan Torres, RN  Outcome: Not Progressing

## 2025-03-08 NOTE — CONSENT
Informed Consent for Blood Component Transfusion Note    I have discussed with the son the rationale for blood component transfusion; its benefits in treating or preventing fatigue, organ damage, or death; and its risk which includes mild transfusion reactions, rare risk of blood borne infection, or more serious but rare reactions. I have discussed the alternatives to transfusion, including the risk and consequences of not receiving transfusion. The son had an opportunity to ask questions and had agreed to proceed with transfusion of blood components.    Electronically signed by Rosetta Lopez MD on 3/8/25 at 5:24 AM EST

## 2025-03-08 NOTE — PROGRESS NOTES
INPATIENT CARDIOLOGY FOLLOW-UP    Name: Nikki Moffett    Age: 79 y.o.    Date of Admission: 3/4/2025  5:05 PM    Date of Service: 3/8/2025      Chief Complaint: Follow-up for atrial fibrillation with RVR, worsening decompensated heart failure    Interim History:  No new overnight cardiac complaints. Currently with no complaints of CP, SOB, palpitations, dizziness, or lightheadedness. SR on telemetry.  No recurrent arrhythmia seen.  On nasal cannula.  Appears comfortable.    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)    Ischemic cerebrovascular accident (CVA) (HCC)    Atrial flutter (HCC)    Paroxysmal atrial fibrillation (HCC)    H/O: stroke    Atrial flutter with rapid ventricular response (HCC)    Decompensated heart failure (HCC)    Chronic atrial fibrillation (HCC)       Current Medications:    Current Facility-Administered Medications:     norepinephrine (LEVOPHED) 16 mg in sodium chloride 0.9 % 250 mL infusion (premix), 1-100 mcg/min, IntraVENous, Continuous, Rosetta Lopez MD, Last Rate: 4.7 mL/hr at 03/08/25 0606, 5 mcg/min at 03/08/25 0606    0.9 % sodium chloride infusion, , IntraVENous, PRN, Rosetta Lopez MD    amiodarone (NEXTERONE) 360 mg in dextrose 5% 200 ml, 0.5 mg/min, IntraVENous, Continuous, Gelacio Hendricks MD, Last Rate: 16.7 mL/hr at 03/08/25 0751, 0.5 mg/min at 03/08/25 0751    sodium chloride flush 0.9 % injection 5-40 mL, 5-40 mL, IntraVENous, 2 times per day, Gelacio Hendricks MD, 10 mL at 03/08/25 0817    sodium chloride flush 0.9 % injection 5-40 mL, 5-40 mL, IntraVENous, PRN, Gelacio Hendricks MD    0.9 % sodium chloride infusion, , IntraVENous, PRN, Gelacio Hendricks MD    apixaban (ELIQUIS) tablet 2.5 mg, 2.5 mg, Oral, BID, Chandni Garrido CONG - CNP, 2.5 mg at 03/08/25 0813    [Held by provider] furosemide (LASIX)  injection 40 mg, 40 mg, IntraVENous, Daily, Chandni Garrido, APRN - CNP, 40 mg at 03/07/25 1531    ipratropium 0.5 mg-albuterol 2.5 mg (DUONEB) nebulizer solution 1 Dose, 1 Dose, Inhalation, Q4H WA RT, Kilo Dumont MD, 1 Dose at 03/07/25 2015    arformoterol tartrate (BROVANA) nebulizer solution 15 mcg, 15 mcg, Nebulization, BID RT, Kilo Dumont MD, 15 mcg at 03/07/25 2015    budesonide (PULMICORT) nebulizer suspension 500 mcg, 0.5 mg, Nebulization, BID RT, Kilo Dumont MD, 500 mcg at 03/07/25 2015    sodium chloride flush 0.9 % injection 5-40 mL, 5-40 mL, IntraVENous, 2 times per day, Kilo Dumont MD, 10 mL at 03/08/25 0817    sodium chloride flush 0.9 % injection 5-40 mL, 5-40 mL, IntraVENous, PRN, Kilo Dumont MD    0.9 % sodium chloride infusion, , IntraVENous, PRN, Kilo Dumont MD    polyethylene glycol (GLYCOLAX) packet 17 g, 17 g, Oral, Daily PRN, Kilo Dumont MD    prochlorperazine (COMPAZINE) injection 10 mg, 10 mg, IntraVENous, Q6H PRN **OR** prochlorperazine (COMPAZINE) tablet 10 mg, 10 mg, Oral, Q8H PRN, Kilo Dumont MD    insulin lispro (HUMALOG,ADMELOG) injection vial 0-4 Units, 0-4 Units, SubCUTAneous, Q6H, Rosetta Lopez MD    sodium bicarbonate 150 mEq in dextrose 5 % 1,000 mL infusion, , IntraVENous, Continuous, Rosetta Lopez MD, Last Rate: 70 mL/hr at 03/08/25 0606, Rate Verify at 03/08/25 0606    midodrine (PROAMATINE) tablet 10 mg, 10 mg, Oral, TID WC, Rosetta Lopez MD, 10 mg at 03/08/25 0813    [Held by provider] metoprolol succinate (TOPROL XL) extended release tablet 25 mg, 25 mg, Oral, Daily, Gelacio Hendricks MD, 25 mg at 03/07/25 0845    amiodarone (CORDARONE) tablet 200 mg, 200 mg, Oral, BID, Gelacio Hendricks MD, 200 mg at 03/08/25 0813    guaiFENesin-dextromethorphan (ROBITUSSIN DM) 100-10 MG/5ML syrup 5 mL, 5 mL, Oral, Q6H PRN, Gelacio Hendricks MD, 5 mL at 03/07/25 0309

## 2025-03-09 ENCOUNTER — APPOINTMENT (OUTPATIENT)
Dept: GENERAL RADIOLOGY | Age: 80
DRG: 004 | End: 2025-03-09
Payer: MEDICARE

## 2025-03-09 ENCOUNTER — APPOINTMENT (OUTPATIENT)
Age: 80
DRG: 004 | End: 2025-03-09
Attending: INTERNAL MEDICINE
Payer: MEDICARE

## 2025-03-09 LAB
AADO2: 319.9 MMHG
ALBUMIN SERPL-MCNC: 3.2 G/DL (ref 3.5–5.2)
ALBUMIN SERPL-MCNC: 3.5 G/DL (ref 3.5–5.2)
ALBUMIN SERPL-MCNC: 3.6 G/DL (ref 3.5–5.2)
ALP SERPL-CCNC: 129 U/L (ref 35–104)
ALP SERPL-CCNC: 131 U/L (ref 35–104)
ALP SERPL-CCNC: 131 U/L (ref 35–104)
ALT SERPL-CCNC: 1423 U/L (ref 0–32)
ALT SERPL-CCNC: 1473 U/L (ref 0–32)
ALT SERPL-CCNC: 1476 U/L (ref 0–32)
ANION GAP SERPL CALCULATED.3IONS-SCNC: 11 MMOL/L (ref 7–16)
ANION GAP SERPL CALCULATED.3IONS-SCNC: 13 MMOL/L (ref 7–16)
ANION GAP SERPL CALCULATED.3IONS-SCNC: 18 MMOL/L (ref 7–16)
ARM BAND NUMBER: NORMAL
ARM BAND NUMBER: NORMAL
AST SERPL-CCNC: 4139 U/L (ref 0–31)
AST SERPL-CCNC: 4475 U/L (ref 0–31)
AST SERPL-CCNC: 4484 U/L (ref 0–31)
B.E.: -1.2 MMOL/L (ref -3–3)
B.E.: -1.5 MMOL/L (ref -3–3)
BASOPHILS # BLD: 0 K/UL (ref 0–0.2)
BASOPHILS NFR BLD: 0 % (ref 0–2)
BILIRUB DIRECT SERPL-MCNC: 1 MG/DL (ref 0–0.3)
BILIRUB INDIRECT SERPL-MCNC: 1 MG/DL (ref 0–1)
BILIRUB SERPL-MCNC: 1.9 MG/DL (ref 0–1.2)
BILIRUB SERPL-MCNC: 2 MG/DL (ref 0–1.2)
BILIRUB SERPL-MCNC: 2 MG/DL (ref 0–1.2)
BLOOD BANK BLOOD PRODUCT EXPIRATION DATE: NORMAL
BLOOD BANK DISPENSE STATUS: NORMAL
BLOOD BANK ISBT PRODUCT BLOOD TYPE: 6200
BLOOD BANK PRODUCT CODE: NORMAL
BLOOD BANK UNIT TYPE AND RH: NORMAL
BPU ID: NORMAL
BUN SERPL-MCNC: 19 MG/DL (ref 6–23)
BUN SERPL-MCNC: 26 MG/DL (ref 6–23)
BUN SERPL-MCNC: 39 MG/DL (ref 6–23)
CA-I BLD-SCNC: 0.99 MMOL/L (ref 1.15–1.33)
CA-I BLD-SCNC: 1.06 MMOL/L (ref 1.15–1.33)
CA-I BLD-SCNC: 1.13 MMOL/L (ref 1.15–1.33)
CA-I BLD-SCNC: 1.17 MMOL/L (ref 1.15–1.33)
CALCIUM SERPL-MCNC: 7.4 MG/DL (ref 8.6–10.2)
CALCIUM SERPL-MCNC: 7.6 MG/DL (ref 8.6–10.2)
CALCIUM SERPL-MCNC: 7.8 MG/DL (ref 8.6–10.2)
CHLORIDE SERPL-SCNC: 103 MMOL/L (ref 98–107)
CHLORIDE SERPL-SCNC: 105 MMOL/L (ref 98–107)
CHLORIDE SERPL-SCNC: 99 MMOL/L (ref 98–107)
CO2 SERPL-SCNC: 22 MMOL/L (ref 22–29)
CO2 SERPL-SCNC: 23 MMOL/L (ref 22–29)
CO2 SERPL-SCNC: 23 MMOL/L (ref 22–29)
COHB: 0.3 % (ref 0–1.5)
COHB: 0.3 % (ref 0–1.5)
COMPONENT: NORMAL
CREAT SERPL-MCNC: 1.4 MG/DL (ref 0.5–1)
CREAT SERPL-MCNC: 1.9 MG/DL (ref 0.5–1)
CREAT SERPL-MCNC: 3.1 MG/DL (ref 0.5–1)
CRITICAL: ABNORMAL
CRITICAL: ABNORMAL
DATE ANALYZED: ABNORMAL
DATE ANALYZED: ABNORMAL
DATE OF COLLECTION: ABNORMAL
DATE OF COLLECTION: ABNORMAL
ECHO BSA: 1.68 M2
ECHO LV EDV A2C: 118 ML
ECHO LV EDV A4C: 105 ML
ECHO LV EDV BP: 115 ML (ref 56–104)
ECHO LV EDV INDEX A4C: 63 ML/M2
ECHO LV EDV INDEX BP: 69 ML/M2
ECHO LV EDV NDEX A2C: 71 ML/M2
ECHO LV EF PHYSICIAN: 10 %
ECHO LV EJECTION FRACTION A2C: 5 %
ECHO LV EJECTION FRACTION A4C: 15 %
ECHO LV EJECTION FRACTION BIPLANE: 12 % (ref 55–100)
ECHO LV ESV A2C: 112 ML
ECHO LV ESV A4C: 90 ML
ECHO LV ESV BP: 101 ML (ref 19–49)
ECHO LV ESV INDEX A2C: 67 ML/M2
ECHO LV ESV INDEX A4C: 54 ML/M2
ECHO LV ESV INDEX BP: 61 ML/M2
ECHO LV FRACTIONAL SHORTENING: 6 % (ref 28–44)
ECHO LV INTERNAL DIMENSION DIASTOLE INDEX: 2.89 CM/M2
ECHO LV INTERNAL DIMENSION DIASTOLIC: 4.8 CM (ref 3.9–5.3)
ECHO LV INTERNAL DIMENSION SYSTOLIC INDEX: 2.71 CM/M2
ECHO LV INTERNAL DIMENSION SYSTOLIC: 4.5 CM
ECHO LV IVSD: 1 CM (ref 0.6–0.9)
ECHO LV MASS 2D: 158.8 G (ref 67–162)
ECHO LV MASS INDEX 2D: 95.7 G/M2 (ref 43–95)
ECHO LV POSTERIOR WALL DIASTOLIC: 0.9 CM (ref 0.6–0.9)
ECHO LV RELATIVE WALL THICKNESS RATIO: 0.38
ECHO MV AREA PHT: 2.4 CM2
ECHO MV MAX VELOCITY: 1.2 M/S
ECHO MV MEAN GRADIENT: 2 MMHG
ECHO MV MEAN VELOCITY: 0.6 M/S
ECHO MV PEAK GRADIENT: 6 MMHG
ECHO MV PRESSURE HALF TIME (PHT): 91.9 MS
ECHO MV VTI: 25.8 CM
ECHO RV INTERNAL DIMENSION: 3.2 CM
ECHO TV REGURGITANT MAX VELOCITY: 2.85 M/S
ECHO TV REGURGITANT PEAK GRADIENT: 32 MMHG
EOSINOPHIL # BLD: 0 K/UL (ref 0.05–0.5)
EOSINOPHILS RELATIVE PERCENT: 0 % (ref 0–6)
ERYTHROCYTE [DISTWIDTH] IN BLOOD BY AUTOMATED COUNT: 15.9 % (ref 11.5–15)
FIO2: 100 %
GFR, ESTIMATED: 15 ML/MIN/1.73M2
GFR, ESTIMATED: 26 ML/MIN/1.73M2
GFR, ESTIMATED: 39 ML/MIN/1.73M2
GLUCOSE BLD-MCNC: 118 MG/DL (ref 74–99)
GLUCOSE BLD-MCNC: 119 MG/DL (ref 74–99)
GLUCOSE BLD-MCNC: 91 MG/DL (ref 74–99)
GLUCOSE SERPL-MCNC: 119 MG/DL (ref 74–99)
GLUCOSE SERPL-MCNC: 127 MG/DL (ref 74–99)
GLUCOSE SERPL-MCNC: 134 MG/DL (ref 74–99)
HCO3: 25.2 MMOL/L (ref 22–26)
HCO3: 25.7 MMOL/L (ref 22–26)
HCT VFR BLD AUTO: 31.6 % (ref 34–48)
HGB BLD-MCNC: 9.7 G/DL (ref 11.5–15.5)
HHB: 0.4 % (ref 0–5)
HHB: 0.7 % (ref 0–5)
IMM RETICS NFR: 30.5 % (ref 3–15.9)
INR PPP: 5.6
L PNEUMO1 AG UR QL IA.RAPID: NEGATIVE
LAB: ABNORMAL
LAB: ABNORMAL
LACTATE BLDV-SCNC: 1.4 MMOL/L (ref 0.5–2.2)
LYMPHOCYTES NFR BLD: 0.71 K/UL (ref 1.5–4)
LYMPHOCYTES RELATIVE PERCENT: 5 % (ref 20–42)
Lab: 1100
Lab: 805
MAGNESIUM SERPL-MCNC: 1.7 MG/DL (ref 1.6–2.6)
MAGNESIUM SERPL-MCNC: 1.9 MG/DL (ref 1.6–2.6)
MCH RBC QN AUTO: 27.2 PG (ref 26–35)
MCHC RBC AUTO-ENTMCNC: 30.7 G/DL (ref 32–34.5)
MCV RBC AUTO: 88.8 FL (ref 80–99.9)
METAMYELOCYTES ABSOLUTE COUNT: 0.12 K/UL (ref 0–0.12)
METAMYELOCYTES: 1 % (ref 0–1)
METHB: 0.6 % (ref 0–1.5)
METHB: 0.8 % (ref 0–1.5)
MICROORGANISM SPEC CULT: NO GROWTH
MODE: ABNORMAL
MODE: ABNORMAL
MONOCYTES NFR BLD: 0.6 K/UL (ref 0.1–0.95)
MONOCYTES NFR BLD: 4 % (ref 2–12)
NEUTROPHILS NFR BLD: 90 % (ref 43–80)
NEUTS SEG NFR BLD: 12.27 K/UL (ref 1.8–7.3)
NUCLEATED RED BLOOD CELLS: 1 PER 100 WBC
O2 SATURATION: 99.3 % (ref 92–98.5)
O2 SATURATION: 99.6 % (ref 92–98.5)
O2HB: 98.2 % (ref 94–97)
O2HB: 98.7 % (ref 94–97)
OPERATOR ID: 3342
OPERATOR ID: 5134
PATIENT TEMP: 37 C
PATIENT TEMP: 37 C
PCO2: 51.7 MMHG (ref 35–45)
PCO2: 54 MMHG (ref 35–45)
PEEP/CPAP: 12 CMH2O
PFO2: 3.41 MMHG/%
PH BLOOD GAS: 7.3 (ref 7.35–7.45)
PH BLOOD GAS: 7.3 (ref 7.35–7.45)
PHOSPHATE SERPL-MCNC: 2.5 MG/DL (ref 2.5–4.5)
PHOSPHATE SERPL-MCNC: 2.6 MG/DL (ref 2.5–4.5)
PHOSPHATE SERPL-MCNC: 3.3 MG/DL (ref 2.5–4.5)
PHOSPHATE SERPL-MCNC: 5.2 MG/DL (ref 2.5–4.5)
PLATELET # BLD AUTO: 242 K/UL (ref 130–450)
PMV BLD AUTO: 10.5 FL (ref 7–12)
PO2: 230.5 MMHG (ref 75–100)
PO2: 341.4 MMHG (ref 75–100)
POTASSIUM SERPL-SCNC: 4.1 MMOL/L (ref 3.5–5)
POTASSIUM SERPL-SCNC: 4.1 MMOL/L (ref 3.5–5)
POTASSIUM SERPL-SCNC: 4.4 MMOL/L (ref 3.5–5)
PROT SERPL-MCNC: 5.5 G/DL (ref 6.4–8.3)
PROT SERPL-MCNC: 6.1 G/DL (ref 6.4–8.3)
PROT SERPL-MCNC: 6.1 G/DL (ref 6.4–8.3)
PROTHROMBIN TIME: 62.2 SEC (ref 9.3–12.4)
PS: 8 CMH20
RBC # BLD AUTO: 3.56 M/UL (ref 3.5–5.5)
RBC # BLD: ABNORMAL 10*6/UL
RETIC HEMOGLOBIN: 20.1 PG (ref 28.2–36.6)
RETICS # AUTO: 0.05 M/UL
RETICS/RBC NFR AUTO: 0.9 % (ref 0.4–1.9)
RI(T): 0.94
RR MECHANICAL: 12 B/MIN
S PNEUM AG SPEC QL: NEGATIVE
SERVICE CMNT-IMP: NORMAL
SODIUM SERPL-SCNC: 138 MMOL/L (ref 132–146)
SODIUM SERPL-SCNC: 139 MMOL/L (ref 132–146)
SODIUM SERPL-SCNC: 140 MMOL/L (ref 132–146)
SOURCE, BLOOD GAS: ABNORMAL
SOURCE, BLOOD GAS: ABNORMAL
SPECIMEN DESCRIPTION: NORMAL
SPECIMEN SOURCE: NORMAL
THB: 10.4 G/DL (ref 11.5–16.5)
THB: 10.4 G/DL (ref 11.5–16.5)
TIME ANALYZED: 1103
TIME ANALYZED: 816
TRANSFUSION STATUS: NORMAL
UNIT DIVISION: 0
UNIT ISSUE DATE/TIME: NORMAL
WBC # BLD: ABNORMAL 10*3/UL
WBC OTHER # BLD: 13.7 K/UL (ref 4.5–11.5)

## 2025-03-09 PROCEDURE — 93321 DOPPLER ECHO F-UP/LMTD STD: CPT | Performed by: INTERNAL MEDICINE

## 2025-03-09 PROCEDURE — 82805 BLOOD GASES W/O2 SATURATION: CPT

## 2025-03-09 PROCEDURE — 82330 ASSAY OF CALCIUM: CPT

## 2025-03-09 PROCEDURE — 06HY33Z INSERTION OF INFUSION DEVICE INTO LOWER VEIN, PERCUTANEOUS APPROACH: ICD-10-PCS | Performed by: INTERNAL MEDICINE

## 2025-03-09 PROCEDURE — 2500000003 HC RX 250 WO HCPCS: Performed by: INTERNAL MEDICINE

## 2025-03-09 PROCEDURE — 71045 X-RAY EXAM CHEST 1 VIEW: CPT

## 2025-03-09 PROCEDURE — 85025 COMPLETE CBC W/AUTO DIFF WBC: CPT

## 2025-03-09 PROCEDURE — 82962 GLUCOSE BLOOD TEST: CPT

## 2025-03-09 PROCEDURE — 6360000002 HC RX W HCPCS

## 2025-03-09 PROCEDURE — 2580000003 HC RX 258: Performed by: INTERNAL MEDICINE

## 2025-03-09 PROCEDURE — 83735 ASSAY OF MAGNESIUM: CPT

## 2025-03-09 PROCEDURE — 36556 INSERT NON-TUNNEL CV CATH: CPT | Performed by: INTERNAL MEDICINE

## 2025-03-09 PROCEDURE — 37799 UNLISTED PX VASCULAR SURGERY: CPT

## 2025-03-09 PROCEDURE — 99291 CRITICAL CARE FIRST HOUR: CPT | Performed by: INTERNAL MEDICINE

## 2025-03-09 PROCEDURE — 6370000000 HC RX 637 (ALT 250 FOR IP)

## 2025-03-09 PROCEDURE — 87899 AGENT NOS ASSAY W/OPTIC: CPT

## 2025-03-09 PROCEDURE — 84100 ASSAY OF PHOSPHORUS: CPT

## 2025-03-09 PROCEDURE — 99233 SBSQ HOSP IP/OBS HIGH 50: CPT | Performed by: INTERNAL MEDICINE

## 2025-03-09 PROCEDURE — 6360000002 HC RX W HCPCS: Performed by: INTERNAL MEDICINE

## 2025-03-09 PROCEDURE — 83605 ASSAY OF LACTIC ACID: CPT

## 2025-03-09 PROCEDURE — 87081 CULTURE SCREEN ONLY: CPT

## 2025-03-09 PROCEDURE — 85610 PROTHROMBIN TIME: CPT

## 2025-03-09 PROCEDURE — 80053 COMPREHEN METABOLIC PANEL: CPT

## 2025-03-09 PROCEDURE — 94640 AIRWAY INHALATION TREATMENT: CPT

## 2025-03-09 PROCEDURE — 6370000000 HC RX 637 (ALT 250 FOR IP): Performed by: INTERNAL MEDICINE

## 2025-03-09 PROCEDURE — 2700000000 HC OXYGEN THERAPY PER DAY

## 2025-03-09 PROCEDURE — 2580000003 HC RX 258

## 2025-03-09 PROCEDURE — 90945 DIALYSIS ONE EVALUATION: CPT

## 2025-03-09 PROCEDURE — 82248 BILIRUBIN DIRECT: CPT

## 2025-03-09 PROCEDURE — 93308 TTE F-UP OR LMTD: CPT

## 2025-03-09 PROCEDURE — 2500000003 HC RX 250 WO HCPCS

## 2025-03-09 PROCEDURE — 85045 AUTOMATED RETICULOCYTE COUNT: CPT

## 2025-03-09 PROCEDURE — 93325 DOPPLER ECHO COLOR FLOW MAPG: CPT | Performed by: INTERNAL MEDICINE

## 2025-03-09 PROCEDURE — 93308 TTE F-UP OR LMTD: CPT | Performed by: INTERNAL MEDICINE

## 2025-03-09 PROCEDURE — 2000000000 HC ICU R&B

## 2025-03-09 PROCEDURE — 36556 INSERT NON-TUNNEL CV CATH: CPT

## 2025-03-09 PROCEDURE — 87449 NOS EACH ORGANISM AG IA: CPT

## 2025-03-09 RX ORDER — FENTANYL CITRATE 50 UG/ML
25 INJECTION, SOLUTION INTRAMUSCULAR; INTRAVENOUS ONCE
Refills: 0 | Status: COMPLETED | OUTPATIENT
Start: 2025-03-09 | End: 2025-03-09

## 2025-03-09 RX ORDER — 0.9 % SODIUM CHLORIDE 0.9 %
500 INTRAVENOUS SOLUTION INTRAVENOUS ONCE
Status: COMPLETED | OUTPATIENT
Start: 2025-03-09 | End: 2025-03-09

## 2025-03-09 RX ORDER — INSULIN LISPRO 100 [IU]/ML
0-4 INJECTION, SOLUTION INTRAVENOUS; SUBCUTANEOUS EVERY 6 HOURS
Status: DISCONTINUED | OUTPATIENT
Start: 2025-03-09 | End: 2025-04-04 | Stop reason: HOSPADM

## 2025-03-09 RX ADMIN — BUDESONIDE INHALATION 500 MCG: 0.5 SUSPENSION RESPIRATORY (INHALATION) at 09:55

## 2025-03-09 RX ADMIN — SODIUM CHLORIDE: 9 INJECTION, SOLUTION INTRAVENOUS at 03:48

## 2025-03-09 RX ADMIN — Medication 3 MG: at 21:04

## 2025-03-09 RX ADMIN — CALCIUM GLUCONATE 1000 MG: 20 INJECTION, SOLUTION INTRAVENOUS at 11:35

## 2025-03-09 RX ADMIN — ARFORMOTEROL TARTRATE 15 MCG: 15 SOLUTION RESPIRATORY (INHALATION) at 21:25

## 2025-03-09 RX ADMIN — OSELTAMIVIR 30 MG: 30 CAPSULE ORAL at 08:14

## 2025-03-09 RX ADMIN — SODIUM CHLORIDE: 9 INJECTION, SOLUTION INTRAVENOUS at 20:35

## 2025-03-09 RX ADMIN — ARFORMOTEROL TARTRATE 15 MCG: 15 SOLUTION RESPIRATORY (INHALATION) at 09:55

## 2025-03-09 RX ADMIN — CALCIUM GLUCONATE 1000 MG: 20 INJECTION, SOLUTION INTRAVENOUS at 06:17

## 2025-03-09 RX ADMIN — POLYETHYLENE GLYCOL 3350 17 G: 17 POWDER, FOR SOLUTION ORAL at 17:03

## 2025-03-09 RX ADMIN — Medication: at 23:33

## 2025-03-09 RX ADMIN — BUDESONIDE INHALATION 500 MCG: 0.5 SUSPENSION RESPIRATORY (INHALATION) at 21:25

## 2025-03-09 RX ADMIN — Medication: at 06:30

## 2025-03-09 RX ADMIN — Medication: at 19:05

## 2025-03-09 RX ADMIN — CEFEPIME 2000 MG: 2 INJECTION, POWDER, FOR SOLUTION INTRAVENOUS at 17:28

## 2025-03-09 RX ADMIN — Medication 19 MCG/MIN: at 06:24

## 2025-03-09 RX ADMIN — SODIUM CHLORIDE, PRESERVATIVE FREE 10 ML: 5 INJECTION INTRAVENOUS at 08:24

## 2025-03-09 RX ADMIN — IPRATROPIUM BROMIDE AND ALBUTEROL SULFATE 1 DOSE: 2.5; .5 SOLUTION RESPIRATORY (INHALATION) at 12:03

## 2025-03-09 RX ADMIN — CEFEPIME 2000 MG: 2 INJECTION, POWDER, FOR SOLUTION INTRAVENOUS at 08:35

## 2025-03-09 RX ADMIN — SODIUM BICARBONATE: 84 INJECTION, SOLUTION INTRAVENOUS at 04:10

## 2025-03-09 RX ADMIN — FENTANYL CITRATE 25 MCG: 50 INJECTION INTRAMUSCULAR; INTRAVENOUS at 00:01

## 2025-03-09 RX ADMIN — SODIUM CHLORIDE, PRESERVATIVE FREE 10 ML: 5 INJECTION INTRAVENOUS at 19:59

## 2025-03-09 RX ADMIN — IPRATROPIUM BROMIDE AND ALBUTEROL SULFATE 1 DOSE: 2.5; .5 SOLUTION RESPIRATORY (INHALATION) at 21:25

## 2025-03-09 RX ADMIN — IPRATROPIUM BROMIDE AND ALBUTEROL SULFATE 1 DOSE: 2.5; .5 SOLUTION RESPIRATORY (INHALATION) at 17:20

## 2025-03-09 RX ADMIN — CALCIUM CHLORIDE 8000 MG: 100 INJECTION INTRAVENOUS; INTRAVENTRICULAR at 04:07

## 2025-03-09 RX ADMIN — CALCIUM CHLORIDE 8000 MG: 100 INJECTION INTRAVENOUS; INTRAVENTRICULAR at 20:59

## 2025-03-09 RX ADMIN — IPRATROPIUM BROMIDE AND ALBUTEROL SULFATE 1 DOSE: 2.5; .5 SOLUTION RESPIRATORY (INHALATION) at 09:55

## 2025-03-09 RX ADMIN — Medication 1000 UNITS: at 08:14

## 2025-03-09 RX ADMIN — Medication: at 03:48

## 2025-03-09 RX ADMIN — Medication: at 21:06

## 2025-03-09 RX ADMIN — POTASSIUM CHLORIDE 10 MEQ: 7.46 INJECTION, SOLUTION INTRAVENOUS at 00:25

## 2025-03-09 RX ADMIN — VANCOMYCIN HYDROCHLORIDE 1000 MG: 1 INJECTION, POWDER, LYOPHILIZED, FOR SOLUTION INTRAVENOUS at 09:20

## 2025-03-09 RX ADMIN — Medication: at 09:21

## 2025-03-09 RX ADMIN — SODIUM CHLORIDE 500 ML: 0.9 INJECTION, SOLUTION INTRAVENOUS at 11:09

## 2025-03-09 RX ADMIN — POTASSIUM CHLORIDE 10 MEQ: 7.46 INJECTION, SOLUTION INTRAVENOUS at 01:26

## 2025-03-09 ASSESSMENT — PAIN SCALES - GENERAL: PAINLEVEL_OUTOF10: 0

## 2025-03-09 NOTE — PLAN OF CARE
Problem: Chronic Conditions and Co-morbidities  Goal: Patient's chronic conditions and co-morbidity symptoms are monitored and maintained or improved  3/9/2025 0055 by Allan Torres RN  Outcome: Not Progressing  3/8/2025 1104 by Obdulia Bradford  Outcome: Progressing  3/8/2025 1103 by Obdulia Bradford  Outcome: Progressing     Problem: Discharge Planning  Goal: Discharge to home or other facility with appropriate resources  3/9/2025 0055 by Allan Torres RN  Outcome: Not Progressing  3/8/2025 1104 by Obdulia Bradford  Outcome: Progressing  3/8/2025 1103 by Obdulia Bradford  Outcome: Progressing     Problem: Nutrition Deficit:  Goal: Optimize nutritional status  3/9/2025 0055 by Allan Torres RN  Outcome: Not Progressing  3/8/2025 1104 by Obdulia Bradford  Outcome: Not Progressing  3/8/2025 1103 by Obdulia Bradford  Outcome: Not Progressing     Problem: Metabolic/Fluid and Electrolytes - Adult  Goal: Electrolytes maintained within normal limits  3/9/2025 0055 by Allan Torres RN  Outcome: Not Progressing  3/8/2025 1104 by Obdulia Bradford  Outcome: Progressing  Goal: Hemodynamic stability and optimal renal function maintained  3/9/2025 0055 by Allan Torres RN  Outcome: Not Progressing  3/8/2025 1104 by Obdulia Bradford  Outcome: Progressing

## 2025-03-09 NOTE — PROGRESS NOTES
INPATIENT CARDIOLOGY FOLLOW-UP    Name: Nikki Moffett    Age: 79 y.o.    Date of Admission: 3/4/2025  5:05 PM    Date of Service: 3/9/2025      Chief Complaint: Follow-up for atrial fibrillation with RVR, worsening decompensated heart failure    Interim History:  No new overnight cardiac complaints. Currently with no complaints of CP, SOB, palpitations, dizziness, or lightheadedness. SR on telemetry.  No recurrent arrhythmia seen.  On nasal cannula.  Appears comfortable.    Initiated on CRRT today.  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)    Ischemic cerebrovascular accident (CVA) (HCC)    Atrial flutter (HCC)    Paroxysmal atrial fibrillation (HCC)    H/O: stroke    Atrial flutter with rapid ventricular response (HCC)    Decompensated heart failure (HCC)    Chronic atrial fibrillation (HCC)    VHD (valvular heart disease)       Current Medications:    Current Facility-Administered Medications:     norepinephrine (LEVOPHED) 16 mg in sodium chloride 0.9 % 250 mL infusion (premix), 1-100 mcg/min, IntraVENous, Continuous, Rosetta Lopez MD, Last Rate: 17.8 mL/hr at 03/09/25 0624, 19 mcg/min at 03/09/25 0624    0.9 % sodium chloride infusion, , IntraVENous, PRN, Rosetta Lopez MD    [Held by provider] amiodarone (NEXTERONE) 360 mg in dextrose 5% 200 ml, 0.5 mg/min, IntraVENous, Continuous, Americo Vargas MD, Stopped at 03/08/25 1035    0.9 % sodium chloride infusion, , IntraVENous, PRN, Americo Vargas MD    oseltamivir (TAMIFLU) capsule 30 mg, 30 mg, Oral, Daily, Americo Vargas MD, 30 mg at 03/08/25 1603    0.9 % sodium chloride infusion, , IntraVENous, PRN, Americo Vargas MD    heparin (PF) 100 UNIT/ML injection 100 Units, 100 Units, IntraCATHeter, PRNAlicia Farah Naz, MD    sodium chloride 0.9 % bolus 2,000 mL, 2,000 mL,  PROTIME 62.2 (H) 03/09/2025    PROTIME 60.4 (H) 03/08/2025    PROTIME 61.2 (H) 03/08/2025     Lab Results   Component Value Date    TSH 15.43 (H) 03/07/2025     Lab Results   Component Value Date    LABA1C 6.0 (H) 03/04/2025     No results found for: \"EAG\"  Lab Results   Component Value Date    CHOL 159 12/22/2023    CHOL 278 (H) 05/11/2017    CHOL 300 (H) 04/07/2016     Lab Results   Component Value Date    TRIG 112 12/22/2023    TRIG 193 (H) 05/11/2017    TRIG 150 (H) 04/07/2016     Lab Results   Component Value Date    HDL 40 (L) 12/22/2023    HDL 84 05/11/2017     04/07/2016     No components found for: \"LDLCALC\", \"LDLCHOLESTEROL\"  Lab Results   Component Value Date    VLDL 22 12/22/2023    VLDL 39 05/11/2017    VLDL 30 04/07/2016     No results found for: \"CHOLHDLRATIO\"  Recent Labs     03/07/25  1803   PROBNP 18,745*       Cardiac Tests:    TTE Dr. Carnes 12/22/2023    Left Ventricle: Moderately reduced left ventricular systolic function with a visually estimated EF of 40 - 45%. Left ventricle size is normal. Mildly increased ventricular mass. Findings consistent with mild concentric hypertrophy. Moderate global hypokinesis present. Moderate hypokinesis of the following segments: inferior and inferoseptal walls. Grade III diastolic dysfunction with increased LAP.    Right Ventricle: Right ventricle size is normal. Normal systolic function. TAPSE is 1.9 cm.    Aortic Valve: No significant stenosis. COLLEEN by direct planimetry is 2 cm2.    Mitral Valve: Mild regurgitation.    Tricuspid Valve: Normal RVSP. The estimated RVSP is 32 mmHg.    Left Atrium: Left atrium is mildly dilated.    Interatrial Septum: Agitated saline study was negative with and without provocation.    Right Atrium: Right atrium is mildly dilated.    Pericardium: No pericardial effusion.    Image quality is suboptimal. Contrast used: Definity. Technically difficult study with poor endocardial visualization.     Limited TTE Dr. Carnes  Pericardium: No pericardial effusion.    Image quality is suboptimal. Contrast used: Lumason.     Assessment:  Acute HFrEF  Decompensated heart failure  History of heart failure with improved ejection fraction  Continue LV dysfunction drop in EF to 10 to 15% secondary to tachycardia induced cardiomyopathy versus underlying ischemic etiology.  Persistent atrial fibrillation and atypical flutter status post cardioversion on 3/7/2025 and was successfully converted to normal sinus rhythm  CHADS2 Vasc score-5 on Eliquis for anticoagulation  Status post CVA  Hyperlipidemia on Lipitor  CKD with ANTONIO  Intermittent hypotension  Hypothyroidism HRT        Plan:   All medications currently being held on account of transaminitis.  Resume medications when able  Currently patient has soft blood pressure will restart on GDMT if blood pressure and renal functions allows.  Was on Toprol-XL which is also being held  Resume Eliquis when able.  Lipitor and amiodarone being appropriately held currently  Started on CRRT.  Defer further volume status management to nephrology  Echo results were reviewed, as above EF significantly dropped to 10 to 15%.  Proceed with repeat limited echocardiogram.  Should significant systolic dysfunction persist, will need left heart catheterization at some point after resolution of above issues.  We will be available as needed for any questions or concerns.  Needs outpatient follow-up within 1 to 2 weeks     Greater than 50 minutes was spent counseling the patient, reviewing the rationale for the above recommendations and reviewing the patient's current medication list, problem list and results of all previously ordered testing.      Niko Alexandre MD, [facility]    NOTE: This report was transcribed using voice recognition software. Every effort was made to ensure accuracy; however, inadvertent computerized transcription errors may be present.

## 2025-03-09 NOTE — PROCEDURES
Central Line Insertion     Procedure: right internal jugular vein Triple Lumen Catheter placement.    Indications: vascular access    Consent: The patient provided verbal consent for this procedure.    Number of sticks: 2    Number of Kits used: 1    Procedure: Time Out: Immediately prior to the procedure a \"timeout\" was called to verify the correct patient and procedure. The patient was place in the trendelenburg position and the skin over the right internal jugular vein was prepped with betadine and draped in a sterile fashion. Local anesthesia was obtained by infiltration using 1% Lidocaine without epinephrine.  With Ultrasound guidance a large bore needle was used to identify the vein, dark non pulsatile blood returned. The guide wire was then inserted through the needle with minimal resistance. 2 mm nick was made in the skin beside the guidewire. Then a dilator was inserted and removed. A triple lumen catheter was then inserted into the vessel over the guide wire using the Seldinger technique to the  18 cm robyn.  All ports showed good, free flowing blood return and were flushed with saline solution.  The catheter was then securely fastened to the skin with sutures and covered with a bio patch and sterile dressing.  A post procedure X-ray was ordered and showed good line position.    Complications: None   The patient tolerated the procedure well.    Estimated blood loss: 5 ml.    Jay Galvez MD PGY-1  3/9/2025  3:16 AM        [facility]  Department of Pulmonary, Critical Care and Sleep Medicine  [facility] [place]  Department of Internal Medicine  Attending Procedural Note Addendum Statement    This procedure was supervised. The critical elements of this procedure was monitored and, if needed, I was available for the needs of the procedure.     Hermelindo Loredo DO, FCCP, FACOI, FACP

## 2025-03-09 NOTE — PROGRESS NOTES
Resident called to bedside to evaluate patient.  Increased work of breathing noted with bilateral rhonci and coarse crackles throughout lung fields.  Increased oxygen and pressor demand at this time.  ABG and cxr done.

## 2025-03-09 NOTE — PROGRESS NOTES
[facility]  Internal Medicine Residency Program  Progress Note - House Team 1    Patient:  Nikki Moffett 79 y.o. female MRN: [id number]     Date of Service: 3/9/2025     CC: AMS, chest pain  Overnight events: febrile with 101.5 temperature. New leukocytosis (13.7), started on tamiflu for influenza and ceftriaxone. Procal increasing to 0.67 from 0.18. Infectious workup sent. At 2000, began having stridor, worsenting crackles, desaturating to the 90s and oxygen requirement increased to 6L. Stat ABG completed and and CXR without change. Still anuric and bernabe was placed. Was given a total of 6 unit FFP and INR this morning 5.6. Liver function worsening. Again began to desaturate to the 80s, started on non-rebreather and saturation improved. Overnight resident spoke with patient's son, who consented to intubation if needed. Central line was placed and confirmed with XR. Patient will be started on CVVHD.     Subjective     Patient seen and evaluated at bedside. She is alert and oriented, denying any pain. Per nursing staff she is not eating well. Is starting CVVHD and nicom.     Objective     Physical Exam:  Vitals: /65   Pulse 72   Temp 98.4 °F (36.9 °C) (Bladder)   Resp 16   Ht 1.575 m (5' 2\")   Wt 65 kg (143 lb 4.8 oz)   SpO2 100%   BMI 26.21 kg/m²     I & O - 24hr: No intake/output data recorded.   General Appearance:  resting in bed comfortably, ill-appearing  HEENT:  Head: Normocephalic, no lesions, without obvious abnormality.  Neck: no adenopathy  Lung: wheezes bilaterally  Heart: regular rate and rhythm, S1, S2 normal, no murmur, click, rub or gallop  Abdomen: soft, non-tender; bowel sounds normal; no masses,  no organomegaly  Extremities:  extremities normal, atraumatic, no cyanosis or edema  Musculokeletal: No joint swelling, no muscle tenderness. ROM normal in all joints of extremities.   Neurologic: Mental status: Alert, oriented, thought content  O2SAT 98.8 03/08/2025 10:20 PM     TSH:    Lab Results   Component Value Date/Time    TSH 15.43 03/07/2025 06:03 PM     FOLATE:    Lab Results   Component Value Date/Time    FOLATE 12.3 03/04/2025 09:25 PM       Resident's Assessment and Plan     Nikki Moffett is a 79 y.o. female    Atrial fibrillation s/p cardioversion 3/7/2025  HFrEF, LVEF 10-15%, Severe global hypokinesis  ANTONIO Stage III likely hypovolemic  Decompensated Heart Failure  Elevated Transaminase 2/2 shock liver  Leukocytosis   HAGMA  Hyperlipidemia  Prediabetes, A1c 5.7%  Bilateral pleural effusion  Medication non-compliance  Hx of Left MCA stroke s/p Mechanical Thrombectomy (12/2023)     Plan  Amio drip off and oral held   Furosemide, toprol XL, midodrine held  Cardiology following, rec continue midodrine TID, restart GDMT if BP and renal function improves, continue amiodarone, toprol XL, Eliquis, resume diuretics when appropriate, will get a repeat echo and will need left heart cath at some point   EP following, rec decreasing amiodarone to 0.5 mg/min and continue oral amio 200 mg BID, Eliquis, midodrine, toprol XL, WCD LifeVest at discharge   Nephro following, rec CVVHD, hold diuretics   Continue norepinephrine, wean as able  LDSS   Monitor respiratory status; continue breathing treatments  Strict I/Os  Daily labs, replace electrolytes as needed   Breathing treatments  Follow infectious workup  Started tamiflu and ceftriaxone 3/8 but ceftriaxone discontinued this morning and started on cefepime and vancomycin   Follow INR  Blood products received:   FFP: 6 units 3/8       PT/OT evaluation: not indicated at this time  DVT prophylaxis: heparin  GI prophylaxis: diet   Disposition: continue current care in MICU     Alisa Loyola DO, PGY-1  Attending physician: Dr. Bunny Gillespie     English

## 2025-03-09 NOTE — PROGRESS NOTES
Pharmacy Consultation Note  (Antibiotic Dosing and Monitoring)    Initial consult date: 3/9/25  Consulting physician/provider: Tim  Drug: Vancomycin  Indication: Sepsis    Age/  Gender Height Weight IBW  Allergy Information   79 y.o./female 157.5 cm (5' 2\") 56.6 kg (124 lb 12.8 oz)     Ideal body weight: 50.1 kg (110 lb 7.2 oz)  Adjusted ideal body weight: 56.1 kg (123 lb 9.4 oz)   Patient has no known allergies.      Renal Function:  Recent Labs     03/08/25  1605 03/08/25  2120 03/09/25  0357   BUN 39* 38* 39*   CREATININE 2.9* 2.9* 3.1*       Intake/Output Summary (Last 24 hours) at 3/9/2025 0904  Last data filed at 3/9/2025 0800  Gross per 24 hour   Intake 3354.93 ml   Output 712 ml   Net 2642.93 ml       Vancomycin Monitoring:  Trough:  No results for input(s): \"VANCOTROUGH\" in the last 72 hours.  Random:  No results for input(s): \"VANCORANDOM\" in the last 72 hours.    Vancomycin Administration Times:  Recent vancomycin administrations        No vancomycin IV orders with administrations found.            Orders not given:            vancomycin (VANCOCIN) 1,000 mg in sodium chloride 0.9 % 250 mL IVPB ([id number])                        Assessment:  Patient is a 79 y.o. female who has been initiated on vancomycin  Estimated Creatinine Clearance: 13 mL/min (A) (based on SCr of 3.1 mg/dL (H)).  To dose vancomycin, pharmacy will be utilizing dosing based off of levels due to patient requiring hemodialysis - CRRT     Plan:  Vancomycin 1000 mg IV every 18 hours  Will check vancomycin levels when appropriate  Will continue to monitor renal function   Pharmacy to follow      Daya Vega, PharmD  PGY1 Pharmacy Practice Resident [phone]  3/9/2025 9:06 AM      SEB: [phone]  SEY: [phone]  SJW: [phone]

## 2025-03-09 NOTE — PROGRESS NOTES
Progress Note  3/9/2025 10:17 AM  Subjective:   Admit Date: 3/4/2025  PCP: Tiesha Panda MD  Interval History: Patient examined , doing ok , alert , in no distress , CVVHDF in progress , stable vitals     Diet: Diet NPO    Data:   Scheduled Meds:   cefepime  2,000 mg IntraVENous Q8H    vancomycin  1,000 mg IntraVENous Q18H    oseltamivir  30 mg Oral Daily    sodium chloride flush  5-40 mL IntraVENous 2 times per day    [Held by provider] apixaban  2.5 mg Oral BID    [Held by provider] furosemide  40 mg IntraVENous Daily    ipratropium 0.5 mg-albuterol 2.5 mg  1 Dose Inhalation Q4H WA RT    arformoterol tartrate  15 mcg Nebulization BID RT    budesonide  0.5 mg Nebulization BID RT    sodium chloride flush  5-40 mL IntraVENous 2 times per day    insulin lispro  0-4 Units SubCUTAneous Q6H    [Held by provider] midodrine  10 mg Oral TID WC    [Held by provider] metoprolol succinate  25 mg Oral Daily    [Held by provider] amiodarone  200 mg Oral BID    Vitamin D  1,000 Units Oral Daily    melatonin  3 mg Oral Nightly    [Held by provider] atorvastatin  80 mg Oral Nightly     Continuous Infusions:   norepinephrine 12 mcg/min (03/09/25 1002)    sodium chloride      sodium chloride      sodium chloride      prismaSATE BGK 4/0/1.2 2,000 mL/hr at 03/09/25 0921    sodium chloride 100 mL/hr at 03/09/25 0348    calcium chloride 8,000 mg in sodium chloride 0.9 % 1,000 mL infusion 60 mL/hr at 03/09/25 0614    sodium chloride      sodium chloride      sodium bicarbonate 150 mEq in dextrose 5 % 1,000 mL infusion 70 mL/hr at 03/09/25 1002    sodium chloride      dextrose       PRN Meds:sodium chloride, sodium chloride, sodium chloride, heparin (PF), sodium chloride, heparin (porcine) **AND** heparin (porcine), potassium chloride, magnesium sulfate, calcium gluconate **OR** calcium gluconate **OR** calcium gluconate **OR** calcium gluconate, sodium phosphate 6 mmol in sodium chloride 0.9 % 250 mL IVPB **OR** sodium phosphate 12  cerebrovascular accident (CVA) (HCC)     Atrial flutter (HCC)     Paroxysmal atrial fibrillation (HCC)     H/O: stroke     Atrial flutter with rapid ventricular response (HCC)     Decompensated heart failure (HCC)     Chronic atrial fibrillation (HCC)     VHD (valvular heart disease)    Plan:     Assessment/Plan     1) antonio superimposed on CKD 3A ( baseline cr at 1-1.3 range 2023) , in the setting of hypotensive episode  underlying        Uncontrolled a fib , progressive rate related cardiomyopathy , possible myocardial injury and significant drop in EF         To 10-15% , diuretics on board , enteresto on board on arrival -- she most likely has ATN ( shock related) and currently         Anuric . Antihypertensives , diuretics now are on hold , Her HR is better , BP appears to be better on low dose pressors                Continue holding diuretics , amiodarone and Entresto , continue IV bicarb drip at present rate       Patient failed to produce any urine till late PM yesterday , was more drowsy hence CVVHDF initiated     Last PM , tolerating procedure well , left femoral line      Today more alert , acidosis better , K+ controlled           `     2)   Hypotension induced shock liver , Coagulopathy , eliquis on hold , INR corrected with FFP to some extent         Amiodarone on hold      3)   A Fib with rapid venticular rate , progressive cardiomyopathy as a result , HR better at present , follow Cardio         Recommendations      4)  Blood c/s , viral panel Influenza A Positive      5)   Mild to moderate todd PL effusion possible HF related , , not in significant distress at present      6) High TSH      7) Metabolic acidosis and Lactic acidosis of ANTONIO and shock respectively , improving on bicarb supplement and        Better H Rate control , better BP , and CVVHDF          Discontinue Bicarb drip         Thank you for allowing us to participate in the care of Nikki Winkler MD

## 2025-03-09 NOTE — FLOWSHEET NOTE
Gold necklace removed from patient for cvc placement. Placed in belongings bags with purse.  CRRT initiated  VSS.

## 2025-03-09 NOTE — PROGRESS NOTES
[facility]  [facility] Report  PATIENT: Nikki Moffett  MEDICAL RECORD NUMBER: [id number]  DATE OF SERVICE:  3/9/2025  ATTENDING ELECTROPHYSIOLOGIST: Gelacio Hendricks MD   PRIMARY ELECTROPHYSIOLOGIST: Gelacio Hendricks MD  REFERRING PHYSICIAN:  Tiesha Panda MD  CHIEF COMPLAINT: Altered mental status    HPI: This is a 79 y.o. 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 The patient has history of persistent atrial fibrillation and atrial flutter, cerebrovascular accident and hyperlipidemia. The patient presented to the hospital on 12/21/23 due to right sided weakness and facial droop. She diagnosed with acute left MCA stroke with M2 occlusion. She was not a candidate for tPA and underwent mechanical thrombectomy by IR team on 12/21/23. Prior to intervention, patient was diagnosed with AF with RVR with heart rate in the 180s. Patient underwent emergency DC-cardioversion to normal sinus rhythm and was initiated on IV Amiodarone which later on transited to oral Amiodarone. Echocardiogram 2/29/24 showed LV EF of 40-45%.  She was started on GDMT and was discharged home on Amiodarone loading dose, Eliquis and Metoprolol. She was last seen in Cardiology's office on 2/22/24. She subsequently underwent nuclear stress test on 2/29/24 which showed no ischemia and LV EF of 69%. Repeat echocardiogram on 2/29/24 showed LV EF 55-60%, moderate LVH and mild LAE. She was scheduled for follow up with  or concerns.    I have spent a total of 35 CCT minutes with the patient and the family reviewing the above stated recommendations.  And a total of >50% of that time involved face-to-face time providing counseling and/or coordination of care with the other providers, reviewing records/tests, counseling/education of the patient, ordering medications/tests/procedures, coordinating care, and documenting clinical information in the EHR.     Thank you for allowing me to participate in your patient's care.  Please call me if there are any questions or concerns.      Gelacio Hendricks MD  Cardiac Electrophysiology  [facility]  [facility] [place]: [place] [facility]  8:12 AM  3/9/2025

## 2025-03-09 NOTE — FLOWSHEET NOTE
Patient repeatedly removing oxygen and picking at cvc.  Verbal redirection and education attempted.  Patient refused to comply despite multiple attempts to redirect.

## 2025-03-09 NOTE — PROGRESS NOTES
lispro  0-4 Units SubCUTAneous Q6H    oseltamivir  30 mg Oral Daily    sodium chloride flush  5-40 mL IntraVENous 2 times per day    [Held by provider] apixaban  2.5 mg Oral BID    [Held by provider] furosemide  40 mg IntraVENous Daily    ipratropium 0.5 mg-albuterol 2.5 mg  1 Dose Inhalation Q4H WA RT    arformoterol tartrate  15 mcg Nebulization BID RT    budesonide  0.5 mg Nebulization BID RT    sodium chloride flush  5-40 mL IntraVENous 2 times per day    [Held by provider] midodrine  10 mg Oral TID WC    [Held by provider] metoprolol succinate  25 mg Oral Daily    [Held by provider] amiodarone  200 mg Oral BID    Vitamin D  1,000 Units Oral Daily    melatonin  3 mg Oral Nightly    [Held by provider] atorvastatin  80 mg Oral Nightly     Continuous Infusions:   norepinephrine 14 mcg/min (03/09/25 1122)    sodium chloride      sodium chloride      sodium chloride      prismaSATE BGK 4/0/1.2 2,000 mL/hr at 03/09/25 0921    sodium chloride 100 mL/hr at 03/09/25 0348    calcium chloride 8,000 mg in sodium chloride 0.9 % 1,000 mL infusion 70 mL/hr at 03/09/25 1126    sodium chloride      sodium chloride      sodium chloride      dextrose       PRN Meds:.sodium chloride, sodium chloride, sodium chloride, heparin (PF), sodium chloride, heparin (porcine) **AND** heparin (porcine), potassium chloride, magnesium sulfate, calcium gluconate **OR** calcium gluconate **OR** calcium gluconate **OR** calcium gluconate, sodium phosphate 6 mmol in sodium chloride 0.9 % 250 mL IVPB **OR** sodium phosphate 12 mmol in sodium chloride 0.9 % 250 mL IVPB **OR** sodium phosphate 18 mmol in sodium chloride 0.9 % 500 mL IVPB **OR** sodium phosphate 24 mmol in sodium chloride 0.9 % 500 mL IVPB, sodium chloride flush, sodium chloride, sodium chloride flush, sodium chloride, polyethylene glycol, prochlorperazine **OR** prochlorperazine, guaiFENesin-dextromethorphan, sodium chloride flush, sodium chloride, ondansetron **OR** ondansetron,  vascular catheter tip overlies the region of the right atrium or   cavoatrial junction.      Cardiomediastinal silhouette is enlarged.      Perihilar and bibasilar opacities similar to prior.  Bilateral small pleural   effusions.  No gross pneumothorax.         XR CHEST PORTABLE   Final Result   Further progression of findings that indicate volume overload since March 7   study.  Please correlate clinically.         XR CHEST PORTABLE   Final Result   Borderline size cardiac area with mild to moderate bilateral pleural   effusions.         XR CHEST PORTABLE   Final Result   1. No acute cardiopulmonary process.   2. Bibasilar scarring and pleural thickening, unchanged.         XR CHEST (2 VW)   Final Result   Bilateral pleural effusions. The 1 on the left is slightly smaller than the   prior study.  Findings suggest resolving CHF         XR CHEST PORTABLE   Final Result   Bilateral pleural effusions.      Possible right lower lobe opacity.             Cultures  Results       Procedure Component Value Units Date/Time    Culture, Respiratory [[id number]]     Order Status: Sent Specimen: Sputum Expectorated     LEGIONELLA ANTIGEN, URINE [[id number]] Collected: 03/09/25 0842    Order Status: Completed Specimen: Urine Updated: 03/09/25 1035     Legionella Pneumophilia Ag, Urine NEGATIVE     Comment:       L. pneumophila serogroup 1 antigen not detected.  A negative result does not exclude infection with Leginella pnemophila serogroup 1 nor does   it rule out other microbial-caused respiratory infections of disease caused by other   serogroups of Legionella pneumophila.         STREP PNEUMONIAE ANTIGEN [[id number]] Collected: 03/09/25 0841    Order Status: Completed Specimen: Urine, clean catch Updated: 03/09/25 1035     Source .URINE     Strep pneumo Ag NEGATIVE     Comment:       Presumptive Negative  suggests no current or recent pneumococcal infection. Infection due to Strep pneumoniae   cannot be ruled out since the  currently hyperglycemic likely 2/2 steroid  Vitamin D deficiency  Subclinical hypothyroidism on labs    New onset leukocytosis   Elevated INR (peak >10)  S/p 2u FFP, 10 mg Vit K   S/p 2 more units FFP   History of L MCA stroke status post mechanical thrombectomy (12/2023)    Plan:  - On levophed. Wean to maintain MAP >65  - Midodrine held for now, will resume once patient is off pressors  - Follow final cultures  - For NICOM - showed cardiac index 2.1. Given 500 cc bolus.   - Amiodarone IV stopped. Amiodarone PO held due to worsening LFTs, will resume once LFTs normalize.   - Metoprolol held due to hypotension  - Eliquis held due to elevated INR. PCDs ordered   - Hold lipitor due to elevated LFTs   - Monitor daily LFTs  - Nephrology following, started on CVVHD. On sodium bicarbonate drip.    - Strict I/O   - Lasix held   - Discontinued trending lactic acid   - Will need LifeVest on discharge  - Cardiology following. For repeat echo now that sinus rhythm is restored.   - ABG showing respiratory acidosis. BIPAP ordered.     --------  Sedation: None  Antimicrobials: Tamiflu, Ceftriaxone  Lines: CVC, HD line, a line  I/O: Net IO Since Admission: 5,019.98 mL [03/09/25 1245] , 95 cc (0.1 ml/kg/hr) in 24h  Nutrition: Diet NPO   DVT Prophylaxis: PCDs. Eliquis held due to elevated INR  GI prophylaxis: Diet  Bowel regimen: Glycolax   PT/OT: Consulted  Code status: Full Code      Kilo Dumont MD, PGY-2  Attending physician: Dr. Loredo    [facility]  Department of Pulmonary, Critical Care and Sleep Medicine  [facility] [place]  Department of Internal Medicine  Attending Statement    This patient has a high probability of sudden clinically significant deterioration, which requires the highest level of physician preparedness to intervene urgently. I managed/supervised life or organ supporting interventions that required frequent physician assessment. I devoted my

## 2025-03-09 NOTE — PROCEDURES
PROCEDURE NOTE  Date: 3/9/2025   Name: Nikki Moffett  YOB: 1945    Procedures    Failed Central Line Insertion     Procedure: right femoral vein triple lumen catheter placement.    Indications: vascular access    Consent: The son counseled regarding the procedure, its indications, risks, potential complications and alternatives, and any questions were answered. Consent was obtained to proceed.    Number of sticks: 4    Number of Kits used: 1    Procedure: Time Out: Immediately prior to the procedure a \"timeout\" was called to verify the correct patient and procedure. The patient was place in the trendelenburg position and the skin over the right femoral vein was prepped with betadine and draped in a sterile fashion. Local anesthesia was obtained by infiltration using 1% Lidocaine without epinephrine.  With ultrasound guidance a large bore needle was used to identify the vein, dark non pulsatile blood returned. The guide wire was then inserted through the needle with resistance .  Guidewire could not be inserted further.  Procedure aborted  Complications: None   The patient tolerated the procedure well.    Estimated blood loss: 3 ml.    Americo Vargas MD PGY-2  3/9/2025  12:49 AM        [facility]  Department of Pulmonary, Critical Care and Sleep Medicine  [facility] [place]  Department of Internal Medicine  Attending Procedural Note Addendum Statement    This procedure was supervised. The critical elements of this procedure was monitored and, if needed, I was available for the needs of the procedure.     Hermelindo Loredo DO, FCCP, FACOI, FACP

## 2025-03-10 ENCOUNTER — APPOINTMENT (OUTPATIENT)
Dept: GENERAL RADIOLOGY | Age: 80
DRG: 004 | End: 2025-03-10
Payer: MEDICARE

## 2025-03-10 LAB
AADO2: 173.5 MMHG
AADO2: 188.6 MMHG
ALBUMIN SERPL-MCNC: 3.3 G/DL (ref 3.5–5.2)
ALBUMIN SERPL-MCNC: 3.4 G/DL (ref 3.5–5.2)
ALBUMIN SERPL-MCNC: 3.5 G/DL (ref 3.5–5.2)
ALP SERPL-CCNC: 148 U/L (ref 35–104)
ALP SERPL-CCNC: 162 U/L (ref 35–104)
ALP SERPL-CCNC: 189 U/L (ref 35–104)
ALT SERPL-CCNC: 1737 U/L (ref 0–32)
ALT SERPL-CCNC: 1789 U/L (ref 0–32)
ALT SERPL-CCNC: 1883 U/L (ref 0–32)
ANION GAP SERPL CALCULATED.3IONS-SCNC: 10 MMOL/L (ref 7–16)
ANION GAP SERPL CALCULATED.3IONS-SCNC: 10 MMOL/L (ref 7–16)
ANION GAP SERPL CALCULATED.3IONS-SCNC: 11 MMOL/L (ref 7–16)
ANION GAP SERPL CALCULATED.3IONS-SCNC: 11 MMOL/L (ref 7–16)
ANION GAP SERPL CALCULATED.3IONS-SCNC: 12 MMOL/L (ref 7–16)
ANION GAP SERPL CALCULATED.3IONS-SCNC: 12 MMOL/L (ref 7–16)
ANION GAP SERPL CALCULATED.3IONS-SCNC: 13 MMOL/L (ref 7–16)
AST SERPL-CCNC: 3677 U/L (ref 0–31)
AST SERPL-CCNC: 3847 U/L (ref 0–31)
AST SERPL-CCNC: 4112 U/L (ref 0–31)
B.E.: -2.5 MMOL/L (ref -3–3)
B.E.: -3.7 MMOL/L (ref -3–3)
B.E.: -4 MMOL/L (ref -3–3)
B.E.: -4.4 MMOL/L (ref -3–3)
BASOPHILS # BLD: 0 K/UL (ref 0–0.2)
BASOPHILS NFR BLD: 0 % (ref 0–2)
BILIRUB SERPL-MCNC: 2.6 MG/DL (ref 0–1.2)
BILIRUB SERPL-MCNC: 2.8 MG/DL (ref 0–1.2)
BILIRUB SERPL-MCNC: 3 MG/DL (ref 0–1.2)
BUN SERPL-MCNC: 10 MG/DL (ref 6–23)
BUN SERPL-MCNC: 11 MG/DL (ref 6–23)
BUN SERPL-MCNC: 11 MG/DL (ref 6–23)
BUN SERPL-MCNC: 13 MG/DL (ref 6–23)
BUN SERPL-MCNC: 16 MG/DL (ref 6–23)
CA-I BLD-SCNC: 1.2 MMOL/L (ref 1.15–1.33)
CA-I BLD-SCNC: 1.23 MMOL/L (ref 1.15–1.33)
CA-I BLD-SCNC: 1.23 MMOL/L (ref 1.15–1.33)
CA-I BLD-SCNC: 1.25 MMOL/L (ref 1.15–1.33)
CALCIUM SERPL-MCNC: 8.2 MG/DL (ref 8.6–10.2)
CALCIUM SERPL-MCNC: 8.4 MG/DL (ref 8.6–10.2)
CALCIUM SERPL-MCNC: 8.5 MG/DL (ref 8.6–10.2)
CALCIUM SERPL-MCNC: 8.5 MG/DL (ref 8.6–10.2)
CALCIUM SERPL-MCNC: 8.8 MG/DL (ref 8.6–10.2)
CALCIUM SERPL-MCNC: 8.8 MG/DL (ref 8.6–10.2)
CALCIUM SERPL-MCNC: 8.9 MG/DL (ref 8.6–10.2)
CHLORIDE SERPL-SCNC: 105 MMOL/L (ref 98–107)
CHLORIDE SERPL-SCNC: 105 MMOL/L (ref 98–107)
CHLORIDE SERPL-SCNC: 106 MMOL/L (ref 98–107)
CHLORIDE SERPL-SCNC: 107 MMOL/L (ref 98–107)
CO2 SERPL-SCNC: 19 MMOL/L (ref 22–29)
CO2 SERPL-SCNC: 20 MMOL/L (ref 22–29)
CO2 SERPL-SCNC: 21 MMOL/L (ref 22–29)
COHB: 0.2 % (ref 0–1.5)
COHB: 0.3 % (ref 0–1.5)
CREAT SERPL-MCNC: 0.9 MG/DL (ref 0.5–1)
CREAT SERPL-MCNC: 1 MG/DL (ref 0.5–1)
CREAT SERPL-MCNC: 1 MG/DL (ref 0.5–1)
CREAT SERPL-MCNC: 1.1 MG/DL (ref 0.5–1)
CREAT SERPL-MCNC: 1.2 MG/DL (ref 0.5–1)
CRITICAL: ABNORMAL
DATE ANALYZED: ABNORMAL
DATE OF COLLECTION: ABNORMAL
EOSINOPHIL # BLD: 0 K/UL (ref 0.05–0.5)
EOSINOPHILS RELATIVE PERCENT: 0 % (ref 0–6)
ERYTHROCYTE [DISTWIDTH] IN BLOOD BY AUTOMATED COUNT: 15.9 % (ref 11.5–15)
FIO2: 50 %
FIO2: 60 %
GFR, ESTIMATED: 47 ML/MIN/1.73M2
GFR, ESTIMATED: 54 ML/MIN/1.73M2
GFR, ESTIMATED: 59 ML/MIN/1.73M2
GFR, ESTIMATED: 61 ML/MIN/1.73M2
GFR, ESTIMATED: 64 ML/MIN/1.73M2
GFR, ESTIMATED: 67 ML/MIN/1.73M2
GFR, ESTIMATED: 69 ML/MIN/1.73M2
GLUCOSE BLD-MCNC: 116 MG/DL (ref 74–99)
GLUCOSE BLD-MCNC: 67 MG/DL (ref 74–99)
GLUCOSE BLD-MCNC: 73 MG/DL (ref 74–99)
GLUCOSE BLD-MCNC: 78 MG/DL (ref 74–99)
GLUCOSE BLD-MCNC: 92 MG/DL (ref 74–99)
GLUCOSE BLD-MCNC: 94 MG/DL (ref 74–99)
GLUCOSE BLD-MCNC: 99 MG/DL (ref 74–99)
GLUCOSE SERPL-MCNC: 106 MG/DL (ref 74–99)
GLUCOSE SERPL-MCNC: 112 MG/DL (ref 74–99)
GLUCOSE SERPL-MCNC: 113 MG/DL (ref 74–99)
GLUCOSE SERPL-MCNC: 79 MG/DL (ref 74–99)
GLUCOSE SERPL-MCNC: 80 MG/DL (ref 74–99)
GLUCOSE SERPL-MCNC: 83 MG/DL (ref 74–99)
GLUCOSE SERPL-MCNC: 99 MG/DL (ref 74–99)
HCO3: 22.9 MMOL/L (ref 22–26)
HCO3: 23.3 MMOL/L (ref 22–26)
HCO3: 23.8 MMOL/L (ref 22–26)
HCO3: 23.9 MMOL/L (ref 22–26)
HCT VFR BLD AUTO: 32.2 % (ref 34–48)
HGB BLD-MCNC: 9.6 G/DL (ref 11.5–15.5)
HHB: 1 % (ref 0–5)
HHB: 1.6 % (ref 0–5)
HHB: 2.2 % (ref 0–5)
HHB: 3.7 % (ref 0–5)
INR PPP: 3.5
LAB: ABNORMAL
LYMPHOCYTES NFR BLD: 0 K/UL (ref 1.5–4)
LYMPHOCYTES RELATIVE PERCENT: 0 % (ref 20–42)
Lab: 1120
Lab: 1704
Lab: 545
Lab: 900
MAGNESIUM SERPL-MCNC: 1.8 MG/DL (ref 1.6–2.6)
MAGNESIUM SERPL-MCNC: 1.9 MG/DL (ref 1.6–2.6)
MCH RBC QN AUTO: 27 PG (ref 26–35)
MCHC RBC AUTO-ENTMCNC: 29.8 G/DL (ref 32–34.5)
MCV RBC AUTO: 90.4 FL (ref 80–99.9)
METHB: 0.7 % (ref 0–1.5)
METHB: 0.8 % (ref 0–1.5)
MICROORGANISM SPEC CULT: NORMAL
MODE: ABNORMAL
MONOCYTES NFR BLD: 0.1 K/UL (ref 0.1–0.95)
MONOCYTES NFR BLD: 1 % (ref 2–12)
NEUTROPHILS NFR BLD: 99 % (ref 43–80)
NEUTS SEG NFR BLD: 11.2 K/UL (ref 1.8–7.3)
NUCLEATED RED BLOOD CELLS: 1 PER 100 WBC
O2 SATURATION: 96.3 % (ref 92–98.5)
O2 SATURATION: 97.8 % (ref 92–98.5)
O2 SATURATION: 98.4 % (ref 92–98.5)
O2 SATURATION: 99 % (ref 92–98.5)
O2HB: 95.4 % (ref 94–97)
O2HB: 96.8 % (ref 94–97)
O2HB: 97.4 % (ref 94–97)
O2HB: 97.9 % (ref 94–97)
OPERATOR ID: 1768
OPERATOR ID: 2593
OPERATOR ID: 789
OPERATOR ID: 882
PATIENT TEMP: 37 C
PCO2: 48.1 MMHG (ref 35–45)
PCO2: 52 MMHG (ref 35–45)
PCO2: 53.2 MMHG (ref 35–45)
PCO2: 55.1 MMHG (ref 35–45)
PEEP/CPAP: 8 CMH2O
PEEP/CPAP: 8 CMH2O
PFO2: 2.49 MMHG/%
PFO2: 2.98 MMHG/%
PH BLOOD GAS: 7.25 (ref 7.35–7.45)
PH BLOOD GAS: 7.26 (ref 7.35–7.45)
PH BLOOD GAS: 7.26 (ref 7.35–7.45)
PH BLOOD GAS: 7.31 (ref 7.35–7.45)
PHOSPHATE SERPL-MCNC: 2.1 MG/DL (ref 2.5–4.5)
PHOSPHATE SERPL-MCNC: 2.3 MG/DL (ref 2.5–4.5)
PHOSPHATE SERPL-MCNC: 2.3 MG/DL (ref 2.5–4.5)
PHOSPHATE SERPL-MCNC: 2.7 MG/DL (ref 2.5–4.5)
PIP: 15 CMH2O
PLATELET # BLD AUTO: 210 K/UL (ref 130–450)
PMV BLD AUTO: 10 FL (ref 7–12)
PO2: 111 MMHG (ref 75–100)
PO2: 124.5 MMHG (ref 75–100)
PO2: 178.6 MMHG (ref 75–100)
PO2: 82.5 MMHG (ref 75–100)
POTASSIUM SERPL-SCNC: 4.3 MMOL/L (ref 3.5–5)
POTASSIUM SERPL-SCNC: 4.4 MMOL/L (ref 3.5–5)
POTASSIUM SERPL-SCNC: 4.5 MMOL/L (ref 3.5–5)
PROT SERPL-MCNC: 5.4 G/DL (ref 6.4–8.3)
PROT SERPL-MCNC: 5.7 G/DL (ref 6.4–8.3)
PROT SERPL-MCNC: 5.7 G/DL (ref 6.4–8.3)
PROTHROMBIN TIME: 38.5 SEC (ref 9.3–12.4)
PS: 12 CMH20
RBC # BLD AUTO: 3.56 M/UL (ref 3.5–5.5)
RBC # BLD: ABNORMAL 10*6/UL
RI(T): 1.06
RI(T): 1.39
SODIUM SERPL-SCNC: 137 MMOL/L (ref 132–146)
SODIUM SERPL-SCNC: 138 MMOL/L (ref 132–146)
SODIUM SERPL-SCNC: 139 MMOL/L (ref 132–146)
SODIUM SERPL-SCNC: 139 MMOL/L (ref 132–146)
SOURCE, BLOOD GAS: ABNORMAL
SPECIMEN DESCRIPTION: NORMAL
THB: 10.6 G/DL (ref 11.5–16.5)
THB: 10.7 G/DL (ref 11.5–16.5)
THB: 10.8 G/DL (ref 11.5–16.5)
THB: 11 G/DL (ref 11.5–16.5)
TIME ANALYZED: 1129
TIME ANALYZED: 1704
TIME ANALYZED: 553
TIME ANALYZED: 905
WBC OTHER # BLD: 11.3 K/UL (ref 4.5–11.5)

## 2025-03-10 PROCEDURE — 2500000003 HC RX 250 WO HCPCS: Performed by: INTERNAL MEDICINE

## 2025-03-10 PROCEDURE — 6370000000 HC RX 637 (ALT 250 FOR IP)

## 2025-03-10 PROCEDURE — 2580000003 HC RX 258: Performed by: INTERNAL MEDICINE

## 2025-03-10 PROCEDURE — 82805 BLOOD GASES W/O2 SATURATION: CPT

## 2025-03-10 PROCEDURE — 2500000003 HC RX 250 WO HCPCS

## 2025-03-10 PROCEDURE — 99291 CRITICAL CARE FIRST HOUR: CPT | Performed by: INTERNAL MEDICINE

## 2025-03-10 PROCEDURE — 83735 ASSAY OF MAGNESIUM: CPT

## 2025-03-10 PROCEDURE — 84100 ASSAY OF PHOSPHORUS: CPT

## 2025-03-10 PROCEDURE — 85025 COMPLETE CBC W/AUTO DIFF WBC: CPT

## 2025-03-10 PROCEDURE — 82330 ASSAY OF CALCIUM: CPT

## 2025-03-10 PROCEDURE — 2700000000 HC OXYGEN THERAPY PER DAY

## 2025-03-10 PROCEDURE — 2000000000 HC ICU R&B

## 2025-03-10 PROCEDURE — 6360000002 HC RX W HCPCS

## 2025-03-10 PROCEDURE — 71045 X-RAY EXAM CHEST 1 VIEW: CPT

## 2025-03-10 PROCEDURE — 6370000000 HC RX 637 (ALT 250 FOR IP): Performed by: INTERNAL MEDICINE

## 2025-03-10 PROCEDURE — 99232 SBSQ HOSP IP/OBS MODERATE 35: CPT | Performed by: INTERNAL MEDICINE

## 2025-03-10 PROCEDURE — 37799 UNLISTED PX VASCULAR SURGERY: CPT

## 2025-03-10 PROCEDURE — 2580000003 HC RX 258

## 2025-03-10 PROCEDURE — 90945 DIALYSIS ONE EVALUATION: CPT

## 2025-03-10 PROCEDURE — 80048 BASIC METABOLIC PNL TOTAL CA: CPT

## 2025-03-10 PROCEDURE — 85610 PROTHROMBIN TIME: CPT

## 2025-03-10 PROCEDURE — 82962 GLUCOSE BLOOD TEST: CPT

## 2025-03-10 PROCEDURE — 94640 AIRWAY INHALATION TREATMENT: CPT

## 2025-03-10 PROCEDURE — 80053 COMPREHEN METABOLIC PANEL: CPT

## 2025-03-10 PROCEDURE — 94660 CPAP INITIATION&MGMT: CPT

## 2025-03-10 RX ORDER — HYDROCORTISONE SODIUM SUCCINATE 100 MG/2ML
100 INJECTION INTRAMUSCULAR; INTRAVENOUS EVERY 8 HOURS
Status: DISCONTINUED | OUTPATIENT
Start: 2025-03-10 | End: 2025-03-19

## 2025-03-10 RX ORDER — DOBUTAMINE HYDROCHLORIDE 200 MG/100ML
2.5-1 INJECTION INTRAVENOUS CONTINUOUS
Status: DISCONTINUED | OUTPATIENT
Start: 2025-03-10 | End: 2025-03-10

## 2025-03-10 RX ORDER — HYDROXYZINE PAMOATE 25 MG/1
25 CAPSULE ORAL ONCE
Status: COMPLETED | OUTPATIENT
Start: 2025-03-10 | End: 2025-03-10

## 2025-03-10 RX ADMIN — Medication: at 14:09

## 2025-03-10 RX ADMIN — Medication: at 23:52

## 2025-03-10 RX ADMIN — HYDROCORTISONE SODIUM SUCCINATE 100 MG: 100 INJECTION, POWDER, FOR SOLUTION INTRAMUSCULAR; INTRAVENOUS at 18:56

## 2025-03-10 RX ADMIN — Medication: at 21:24

## 2025-03-10 RX ADMIN — CEFEPIME 2000 MG: 2 INJECTION, POWDER, FOR SOLUTION INTRAVENOUS at 00:17

## 2025-03-10 RX ADMIN — ARFORMOTEROL TARTRATE 15 MCG: 15 SOLUTION RESPIRATORY (INHALATION) at 09:02

## 2025-03-10 RX ADMIN — SODIUM PHOSPHATE, MONOBASIC, MONOHYDRATE AND SODIUM PHOSPHATE, DIBASIC, ANHYDROUS 6 MMOL: 142; 276 INJECTION, SOLUTION INTRAVENOUS at 19:42

## 2025-03-10 RX ADMIN — VANCOMYCIN HYDROCHLORIDE 1000 MG: 1 INJECTION, POWDER, LYOPHILIZED, FOR SOLUTION INTRAVENOUS at 21:24

## 2025-03-10 RX ADMIN — EPINEPHRINE 1 MCG/MIN: 1 INJECTION INTRAMUSCULAR; INTRAVENOUS; SUBCUTANEOUS at 16:39

## 2025-03-10 RX ADMIN — CEFEPIME 2000 MG: 2 INJECTION, POWDER, FOR SOLUTION INTRAVENOUS at 16:07

## 2025-03-10 RX ADMIN — SODIUM PHOSPHATE, MONOBASIC, MONOHYDRATE AND SODIUM PHOSPHATE, DIBASIC, ANHYDROUS 6 MMOL: 142; 276 INJECTION, SOLUTION INTRAVENOUS at 08:17

## 2025-03-10 RX ADMIN — VANCOMYCIN HYDROCHLORIDE 1000 MG: 1 INJECTION, POWDER, LYOPHILIZED, FOR SOLUTION INTRAVENOUS at 03:45

## 2025-03-10 RX ADMIN — Medication: at 01:56

## 2025-03-10 RX ADMIN — SODIUM CHLORIDE, PRESERVATIVE FREE 10 ML: 5 INJECTION INTRAVENOUS at 09:20

## 2025-03-10 RX ADMIN — BUDESONIDE INHALATION 500 MCG: 0.5 SUSPENSION RESPIRATORY (INHALATION) at 22:09

## 2025-03-10 RX ADMIN — SODIUM CHLORIDE: 9 INJECTION, SOLUTION INTRAVENOUS at 05:11

## 2025-03-10 RX ADMIN — Medication: at 06:48

## 2025-03-10 RX ADMIN — HYDROXYZINE PAMOATE 25 MG: 25 CAPSULE ORAL at 05:12

## 2025-03-10 RX ADMIN — IPRATROPIUM BROMIDE AND ALBUTEROL SULFATE 1 DOSE: 2.5; .5 SOLUTION RESPIRATORY (INHALATION) at 13:55

## 2025-03-10 RX ADMIN — ARFORMOTEROL TARTRATE 15 MCG: 15 SOLUTION RESPIRATORY (INHALATION) at 22:09

## 2025-03-10 RX ADMIN — HYDROCORTISONE SODIUM SUCCINATE 100 MG: 100 INJECTION, POWDER, FOR SOLUTION INTRAMUSCULAR; INTRAVENOUS at 11:57

## 2025-03-10 RX ADMIN — IPRATROPIUM BROMIDE AND ALBUTEROL SULFATE 1 DOSE: 2.5; .5 SOLUTION RESPIRATORY (INHALATION) at 09:02

## 2025-03-10 RX ADMIN — CALCIUM CHLORIDE 8000 MG: 100 INJECTION INTRAVENOUS; INTRAVENTRICULAR at 12:10

## 2025-03-10 RX ADMIN — SODIUM CHLORIDE, PRESERVATIVE FREE 10 ML: 5 INJECTION INTRAVENOUS at 20:31

## 2025-03-10 RX ADMIN — Medication: at 16:41

## 2025-03-10 RX ADMIN — Medication: at 04:22

## 2025-03-10 RX ADMIN — BUDESONIDE INHALATION 500 MCG: 0.5 SUSPENSION RESPIRATORY (INHALATION) at 09:02

## 2025-03-10 RX ADMIN — SODIUM CHLORIDE: 9 INJECTION, SOLUTION INTRAVENOUS at 22:29

## 2025-03-10 RX ADMIN — IPRATROPIUM BROMIDE AND ALBUTEROL SULFATE 1 DOSE: 2.5; .5 SOLUTION RESPIRATORY (INHALATION) at 22:09

## 2025-03-10 RX ADMIN — DEXTROSE MONOHYDRATE 125 ML: 100 INJECTION, SOLUTION INTRAVENOUS at 12:07

## 2025-03-10 RX ADMIN — SODIUM CHLORIDE: 9 INJECTION, SOLUTION INTRAVENOUS at 13:50

## 2025-03-10 RX ADMIN — CEFEPIME 2000 MG: 2 INJECTION, POWDER, FOR SOLUTION INTRAVENOUS at 09:41

## 2025-03-10 RX ADMIN — Medication: at 11:57

## 2025-03-10 RX ADMIN — SODIUM PHOSPHATE, MONOBASIC, MONOHYDRATE AND SODIUM PHOSPHATE, DIBASIC, ANHYDROUS 6 MMOL: 142; 276 INJECTION, SOLUTION INTRAVENOUS at 14:19

## 2025-03-10 RX ADMIN — Medication: at 09:45

## 2025-03-10 RX ADMIN — Medication 3 MG: at 20:26

## 2025-03-10 RX ADMIN — DEXMEDETOMIDINE 0.2 MCG/KG/HR: 100 INJECTION, SOLUTION INTRAVENOUS at 15:04

## 2025-03-10 RX ADMIN — Medication 30 MCG/MIN: at 17:48

## 2025-03-10 RX ADMIN — Medication 9 MCG/MIN: at 03:41

## 2025-03-10 RX ADMIN — OSELTAMIVIR 30 MG: 30 CAPSULE ORAL at 12:32

## 2025-03-10 ASSESSMENT — PAIN SCALES - GENERAL
PAINLEVEL_OUTOF10: 0

## 2025-03-10 NOTE — PROGRESS NOTES
[facility]  Internal Medicine Residency / House Medicine Service    Attending Physician Statement  I have discussed the case, including pertinent history and exam findings with the resident and the team.  I have seen and examined the patient and the key elements of the encounter have been performed by me.  I agree with the assessment, plan and orders as documented by the resident.      Very low LV EF  VS very low BP, on Llevophed and Epinephrine drip  On Vancomycin and Cefepime  And Tamiflu  S/P Cardioversion  HR 88 , ABG respiratory acidosis   Impression; Cardiogenic shock                      Influenza, Sepsis  Plan; Same ICU care       Remainder of medical problems as per resident note.      Dalton Sosa MD  Internal Medicine Residency Faculty

## 2025-03-10 NOTE — PLAN OF CARE
Problem: Chronic Conditions and Co-morbidities  Goal: Patient's chronic conditions and co-morbidity symptoms are monitored and maintained or improved  3/10/2025 0759 by Carol Mendes RN  Outcome: Not Progressing  3/10/2025 0158 by Allan Torres RN  Outcome: Not Progressing     Problem: Discharge Planning  Goal: Discharge to home or other facility with appropriate resources  3/10/2025 0759 by Carol Mendes RN  Outcome: Not Progressing  3/10/2025 0158 by Allan Torres RN  Outcome: Not Progressing     Problem: Nutrition Deficit:  Goal: Optimize nutritional status  3/10/2025 0759 by Carol Mendes RN  Outcome: Not Progressing  3/10/2025 0158 by Allan Torres RN  Outcome: Not Progressing     Problem: Metabolic/Fluid and Electrolytes - Adult  Goal: Electrolytes maintained within normal limits  3/10/2025 0759 by Carol Mendes RN  Outcome: Not Progressing  3/10/2025 0158 by Allan Torres RN  Outcome: Not Progressing  Goal: Hemodynamic stability and optimal renal function maintained  3/10/2025 0759 by Carol Mendes RN  Outcome: Not Progressing  3/10/2025 0158 by Allan Torres RN  Outcome: Not Progressing     Problem: Hematologic - Adult  Goal: Maintains hematologic stability  3/10/2025 0759 by Carol Mendes RN  Outcome: Not Progressing  3/10/2025 0158 by Allan Torres RN  Outcome: Progressing     Problem: Neurosensory - Adult  Goal: Achieves stable or improved neurological status  Outcome: Not Progressing  Goal: Achieves maximal functionality and self care  Outcome: Not Progressing     Problem: Respiratory - Adult  Goal: Achieves optimal ventilation and oxygenation  Outcome: Not Progressing     Problem: Cardiovascular - Adult  Goal: Maintains optimal cardiac output and hemodynamic stability  Outcome: Not Progressing  Goal: Absence of cardiac dysrhythmias or at baseline  Outcome: Not Progressing     Problem: Musculoskeletal - Adult  Goal: Return mobility to safest level of  function  Outcome: Not Progressing  Goal: Return ADL status to a safe level of function  Outcome: Not Progressing     Problem: Gastrointestinal - Adult  Goal: Maintains or returns to baseline bowel function  Outcome: Not Progressing  Goal: Maintains adequate nutritional intake  Outcome: Not Progressing     Problem: Infection - Adult  Goal: Absence of infection during hospitalization  Outcome: Not Progressing

## 2025-03-10 NOTE — PROGRESS NOTES
[facility]  Department of Internal Medicine   Internal Medicine Residency   MICU Progress Note    Patient:  Nikki Moffett 79 y.o. female  MRN: [id number]     Date of Service: 3/10/2025    Allergy: Patient has no known allergies.    Subjective   ICU day 2d 17h    Overnight, intermittent BIPAP. Repeat echo showed EF 10-15%. No recurrence of fevers.   Patient seen and examined at bedside this morning, on BIPAP. Ongoin CVVHD, still on levophed .     Objective     VS: /65   Pulse 82   Temp 98.8 °F (37.1 °C) (Bladder)   Resp 17   Ht 1.575 m (5' 2\")   Wt 65.2 kg (143 lb 11.8 oz)   SpO2 95%   BMI 26.29 kg/m²   ABP (Arterial line BP): 95/42ABP Mean (Arterial Line Mean): (!) 59 mmHg    Arterial line BP: ABP (Arterial line BP): 95/42 ABP Mean (Arterial Line Mean): (!) 59 mmHg    Physical Exam:  General Appearance: Lethargic, opens eyes but unable to sustain wakefulness  Neck: no adenopathy, no carotid bruit, no JVD, and supple, symmetrical, trachea midline  Lung: Rales R>L  Heart: Irregular rhythm, normal rate  Abdomen: Soft nontender  Extremities:  Gr 1-2 lower extremity edema    Medications     Infusions: (Fluid, Sedation, Vasopressors)  Scheduled Meds:   cefepime  2,000 mg IntraVENous Q8H    vancomycin  1,000 mg IntraVENous Q18H    insulin lispro  0-4 Units SubCUTAneous Q6H    oseltamivir  30 mg Oral Daily    sodium chloride flush  5-40 mL IntraVENous 2 times per day    [Held by provider] apixaban  2.5 mg Oral BID    [Held by provider] furosemide  40 mg IntraVENous Daily    ipratropium 0.5 mg-albuterol 2.5 mg  1 Dose Inhalation Q4H WA RT    arformoterol tartrate  15 mcg Nebulization BID RT    budesonide  0.5 mg Nebulization BID RT    sodium chloride flush  5-40 mL IntraVENous 2 times per day    [Held by provider] midodrine  10 mg Oral TID WC    [Held by provider] metoprolol succinate  25 mg Oral Daily    [Held by provider] amiodarone  200 mg Oral BID    Vitamin D  1,000 Units Oral Daily       Bilateral pleural effusions. The 1 on the left is slightly smaller than the   prior study.  Findings suggest resolving CHF         XR CHEST PORTABLE   Final Result   Bilateral pleural effusions.      Possible right lower lobe opacity.             Cultures  Results       Procedure Component Value Units Date/Time    Culture, Respiratory [[id number]]     Order Status: Sent Specimen: Sputum Expectorated     LEGIONELLA ANTIGEN, URINE [[id number]] Collected: 03/09/25 0842    Order Status: Completed Specimen: Urine Updated: 03/09/25 1035     Legionella Pneumophilia Ag, Urine NEGATIVE     Comment:       L. pneumophila serogroup 1 antigen not detected.  A negative result does not exclude infection with Leginella pnemophila serogroup 1 nor does   it rule out other microbial-caused respiratory infections of disease caused by other   serogroups of Legionella pneumophila.         STREP PNEUMONIAE ANTIGEN [[id number]] Collected: 03/09/25 0841    Order Status: Completed Specimen: Urine, clean catch Updated: 03/09/25 1035     Source .URINE     Strep pneumo Ag NEGATIVE     Comment:       Presumptive Negative  suggests no current or recent pneumococcal infection. Infection due to Strep pneumoniae   cannot be ruled out since the antigen present in the sample may be below the detection limit   of the test.         Culture, MRSA, Screening [[id number]] Collected: 03/09/25 0841    Order Status: Sent Specimen: Nares Updated: 03/09/25 0848    Culture, Blood 2 [[id number]] Collected: 03/08/25 1756    Order Status: Completed Specimen: Blood Updated: 03/09/25 2028     Specimen Description .BLOOD RIGHT     Special Requests          Culture NO GROWTH 1 DAY    Respiratory Panel, Molecular, with COVID-19 (Restricted: peds pts or suitable admitted adults) [[id number]]  (Abnormal) Collected: 03/08/25 1216    Order Status: Completed Specimen: Nasopharyngeal Swab Updated: 03/08/25 1346     Specimen Description .NASOPHARYNGEAL SWAB     Adenovirus

## 2025-03-10 NOTE — PROGRESS NOTES
[facility]  Nephrology Attending Progress Note  Alexandru Owen MD        SUBJECTIVE:     HPI:   3/9:  79 yrs old HF , Known h/o prior CVA s/p intracranial thrombectomy 2023 December .      Came in with above mentioned issues , noted to be in Rapid A fib , hypotensive , lethargic , already known to   Cardiology team on enteresto , eliquis , toprol , amiodarone .      Patient remained in rapid a fib , hypotensive , was diuresed , developed ANTONIO , Acidosis , , Lactic acidosis , shock liver   Coagulopathy , her LV function detoriorated to 10-15% due to a fib related Cardiomyopathy , possible myocardial injury ( high troponin )     Her HR now is better , she appears hemodynamically stable , alert and responsive in no distress       SUBJECTIVE:    3/10: pt seen and examined in the icu, chart reviewed. On cvvh, dw icu nurse, tolerating -50 ml/hr, in bipap. 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)    Ischemic cerebrovascular accident (CVA) (HCC)    Atrial flutter (HCC)    Paroxysmal atrial fibrillation (HCC)    H/O: stroke    Atrial flutter with rapid ventricular response (HCC)    Decompensated heart failure (HCC)    Chronic atrial fibrillation (HCC)    VHD (valvular heart disease)        PAST MEDICAL HISTORY:    Past Medical History:   Diagnosis Date    Cerebral artery occlusion with cerebral infarction (HCC)        DIET:    ADULT DIET; Regular; Low Sodium (2 gm)  ADULT ORAL NUTRITION SUPPLEMENT; Breakfast, Lunch, Dinner, HS Snack, AM Snack, PM Snack; Low Calorie/High Protein Oral Supplement     PHYSICAL EXAM:     Patient Vitals for the past 24 hrs:   BP Temp Temp src Pulse Resp SpO2 Height Weight   03/10/25 0910 -- -- -- 88 16 -- -- --   03/10/25 0909 -- -- -- 91 16 -- -- --   03/10/25 0908 -- -- -- 89 14 -- -- --   03/10/25 0800 -- 99.3 °F (37.4 °C) Bladder 93 20 98 % -- --   03/10/25 0700 -- -- --  cr at 1-1.3 range 2023   setting of hypotension, aib, HFrEF, entresto, diuretics  Anuric   Antihypertensives , diuretics now are on hold  Continues on levo        Started cvvh  Uo 110 ml     2)  shock liver   Coagulopathy   eliquis on hold   NR corrected with FFP to some extent   Amiodarone on hold      3)   A Fib with rapid venticular rate  progressive cardiomyopathy as a result  HR better at present   Per cardiology     4)  hypotension/sepsis  Influenza pos  On empiric abx  Pressor support  Underlying HFrEF     5)   Mild to moderate todd PL effusion  possible HF related  Monitor with diuretics on hold     6) High TSH   Per IM         Alexandru Owen MD

## 2025-03-10 NOTE — DISCHARGE INSTRUCTIONS
Internal medicine    Follow ups  Please follow up with the internal medicine clinic at [facility] within 12 days of discharge.You will receive a phone call within 7 days from discharge.  Please keep all other follow up appointments:  Future Appointments   Date Time Provider Department Center   3/13/2026 12:00 PM Dominik Castillo MD [facility] NEURO Neurology -        Changes in healthcare   Please take all medications as indicated  Diet: renal diet   Activity: bedrest  New Medications started during this hospital stay  Digoxin 62.5 mcg every other day  Metoprolol tartrate 25 mg tab 0.5 tab every 8 hours  Midodrine 10 mg tab 2 tablets 3 times daily  Pantoprazole 40 mg tab 2 times daily  Entresto 24-26 mg 0.5 tab daily  Thiamine 100 mg tab daily  Vitamin D 2,000 units by PEG tube daily  Ointment topically  Changes to your medications  Atorvastatin 80 mg tab by PEG tube  Medications you should stop taking   Amiodarone  Citalopram  Melatonin  Metoprolol succinate  Entresto 49-51  Additional labs, testing or imaging needed after discharge   Digoxin level    Please contact us if you have any concerns, wish to change or make an appointment:  Internal medicine clinic   Phone: [phone]  Fax: [phone]  [address] [place] [address]  Should you have further questions in regards to this visit, you can review your clinical note and after visit summary document on your [email or web address] account.     Other than any new prescriptions given to you today, the list of home medications on this After Visit Summary are based on information provided to us from you and your healthcare providers. This information, including the list, dose, and frequency of medications is only as accurate as the information you provided. If you have any questions or concerns about your home medications, please contact your Primary Care Physician for further clarification.    HEART FAILURE  / CONGESTIVE HEART FAILURE  DISCHARGE  INSTRUCTIONS:  GUIDELINES TO FOLLOW AT HOME    Self- Managed Care:     MEDICATIONS:  Take your medication as directed. If you are experiencing any side effects, inform your doctor, Do not stop taking any of your medications without letting your doctor know.   Check with your doctor before taking any over-the-counter medications / herbal / or dietary supplements. They may interfere with your other medications.  Do not take ibuprofen (Advil or Motrin) and naproxen (Aleve) without talking to your doctor first. They could make your heart failure worse.         WEIGHT MONITORING:   Weigh yourself everyday (with the same scale) around the same time of the day and write it down. (you can chart them on a calendar or keep track of them on paper.   Notify your doctor of a weight gain of 3 pounds or more in 1 day   OR a total of 5 pounds or more in 1 week    Take your weight record to your doctor visits  Also, the same goes if you loose more than 3# in one day, let your heart doctor know.         DIET:   Cardiac heart healthy diet- Low saturated / low trans fat, no added salt, caffeine restricted, Low sodium diet-   No more than 2,000mg (2 grams) of salt / sodium per day (which equals to a little less than  a teaspoon of salt)  If your doctor wants you on a fluid restriction...it is usually recommended a fluid limit of 2,000cc -  Fluid restriction- 2,000 ml (milliliters) = 64 ounces = you can have 8 glasses of fluid per day (each glass 8 ounces)    Follow a low salt diet - avoid using salt at the table, avoid / limit use of canned soups, processed / packaged foods, salted snacks, olives and pickles.  Do not use a salt substitute without checking with your doctor, they may contain a high amount of potassioum. (Mrs. Dash is safe to use).    Limit the use of alcohol       CALL YOUR DOCTOR THE FIRST DAY YOU NOTICE ANY OF THESE   SYMPTOMS:  You have a weight gain of 3 pounds or more in 1 day         OR 5 pounds or more in one

## 2025-03-10 NOTE — PROGRESS NOTES
[facility]        CARDIOLOGY                 INPATIENT PROGRESS NOTE          PATIENT SEEN IN FOLLOW UP FOR: CMP, CHF    Hospital Day: 7     Nikki Moffett is a 79  year old patient known to Dr. Carnes      SUBJECTIVE: On BiPAP - No distress. Open her eyes, nods head    ROS: Review of rest of 10 systems limited since she is on BiPAP    OBJECTIVE: No acute distress.  See Assessment     Diagnostics:       Telemetry: Reviewed     TTE-3/7/2025:    Left Ventricle: Normal left ventricular systolic function with a visually estimated EF of 10 -15%. Left ventricle size is normal. Normal wall thickness. Severe global hypokinesis present. E/e' ratio >11, suggesting increased LAP.    Right Ventricle: Right ventricle size is normal. Reduced systolic function.    Aortic Valve: No significant stenosis. 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    Mitral Valve: Mild regurgitation.    Tricuspid Valve: Moderate regurgitation. Normal RVSP. The estimated RVSP is 25 mmHg.    Left Atrium: Left atrium is severely dilated.    Right Atrium: Right atrium is mildly dilated.    Pericardium: No pericardial effusion.    Image quality is suboptimal. Contrast used: Lumason.    Limited TTE Dr. Carnes 2/29/2024    Left Ventricle: Normal left ventricular systolic function with a visually estimated EF of 55 - 60%. Left ventricle size is normal. Moderately increased ventricular mass. Findings consistent with moderate concentric hypertrophy. Normal wall motion. Indeterminate diastolic function.    Right Ventricle: Right ventricle size is normal. Normal systolic function. TAPSE is 2.5 cm.    Aortic Valve: No stenosis.    Tricuspid Valve: Normal RVSP. The estimated RVSP is 33 mmHg.    Left Atrium: Left atrium is mildly dilated.    Interatrial Septum: Interatrial shunt visualized with color Doppler with left to right shunt.  No right to left shunt on bubble study.    Pericardium: No pericardial            ASSESSMENT/PLAN:    Acute HFrEF - Continue RRT for fluid management. Diuretics as her BP/Renal Fn tolerate  Hypotensive shock - On Inotropic support - wean as her BP tolerates. Was on Midodrine  CMP  - EF reduced to 10 to 15% ?secondary to tachycardia induced cardiomyopathy versus underlying ischemic etiology. GDMT limited due to low BP/Renal failure. Ischemia w/u when stable. EP following - ?Life Vest upon discharge.  Paroxysmal Atrial fibrillation and Atypical flutter - s/p DCCV on 3/7/2025. EP following - OAC on hold. Brief Tachycardia on Tele.   PFO with left to right shunt  ANTONIO/CKD - On RRT per Nephrology following  Hx of  CVA - 12/2023  Hyperlipidemia - On Lipitor-Held  VHD - Mild MR, mod TR  Hypothyroidism - On HRT  Chronic Anemia - per Primary          Cardiology will follow as needed    F/u with Dr Carnes after discharge.    Above briefly d/w her -  No family at bed side    Electronically signed by Darlene Wiseman MD on 3/10/2025 at 10:58 AM  [facility] Cardiology

## 2025-03-10 NOTE — PLAN OF CARE
Problem: Chronic Conditions and Co-morbidities  Goal: Patient's chronic conditions and co-morbidity symptoms are monitored and maintained or improved  Outcome: Not Progressing     Problem: Discharge Planning  Goal: Discharge to home or other facility with appropriate resources  Outcome: Not Progressing     Problem: Nutrition Deficit:  Goal: Optimize nutritional status  Outcome: Not Progressing     Problem: Metabolic/Fluid and Electrolytes - Adult  Goal: Electrolytes maintained within normal limits  Outcome: Not Progressing  Goal: Hemodynamic stability and optimal renal function maintained  Outcome: Not Progressing

## 2025-03-10 NOTE — PROGRESS NOTES
[facility]  Internal Medicine Residency Program  Progress Note - House Team       Patient:  Nikki Moffett 79 y.o. female   MRN: [id number]       Date of Service: 3/10/2025    CC: AMS, chest pain  Overnight events: No acute event.    Subjective     Patient was sleeping when seen this morning. Wearing BiPAP.      Objective       Physical Exam  Vitals: /65   Pulse 88   Temp 99.3 °F (37.4 °C) (Bladder)   Resp 16   Ht 1.575 m (5' 2\")   Wt 65.2 kg (143 lb 11.8 oz)   SpO2 98% Comment: on ABG, SaO2 not reading  BMI 26.29 kg/m²     I & O - 24hr: I/O this shift:  In: -   Out: 483    General Appearance: resting in bed  HEENT:  Head: Normocephalic, no lesions, without obvious abnormality.  Neck: no adenopathy and supple, symmetrical, trachea midline  Lung: wheezes bilaterally  Heart: regular rate and rhythm, S1, S2 normal, no murmur, click, rub or gallop  Abdomen: soft, non-tender; bowel sounds normal; no masses,  no organomegaly  Extremities:  extremities normal, atraumatic, no cyanosis or edema  Musculokeletal: No joint swelling, no muscle tenderness. ROM normal in all joints of extremities.   Neurologic: Mental status: alertness: lethargic, orientation: person    Diet:   ADULT DIET; Regular; Low Sodium (2 gm)  ADULT ORAL NUTRITION SUPPLEMENT; Breakfast, Lunch, Dinner, HS Snack, AM Snack, PM Snack; Low Calorie/High Protein Oral Supplement      Pertinent Labs & Imaging Studies     Labs    CBC:   Lab Results   Component Value Date/Time    WBC 11.3 03/10/2025 05:39 AM    RBC 3.56 03/10/2025 05:39 AM    HGB 9.6 03/10/2025 05:39 AM    HCT 32.2 03/10/2025 05:39 AM    MCV 90.4 03/10/2025 05:39 AM    MCH 27.0 03/10/2025 05:39 AM    MCHC 29.8 03/10/2025 05:39 AM    RDW 15.9 03/10/2025 05:39 AM     03/10/2025 05:39 AM    MPV 10.0 03/10/2025 05:39 AM     CMP:    Lab Results   Component Value Date/Time     03/10/2025 05:39 AM    K 4.3 03/10/2025 05:39 AM     03/10/2025 05:39 AM    CO2

## 2025-03-10 NOTE — PLAN OF CARE
Problem: Safety - Medical Restraint  Goal: Remains free of injury from restraints (Restraint for Interference with Medical Device)  Description: INTERVENTIONS:  1. Determine that other, less restrictive measures have been tried or would not be effective before applying the restraint  2. Evaluate the patient's condition at the time of restraint application  3. Inform patient/family regarding the reason for restraint  4. Q2H: Monitor safety, psychosocial status, comfort, nutrition and hydration  Recent Flowsheet Documentation  Taken 3/10/2025 1254 by Carol Mendes RN  Remains free of injury from restraints (restraint for interference with medical device): Every 2 hours: Monitor safety, psychosocial status, comfort, nutrition and hydration     Problem: Chronic Conditions and Co-morbidities  Goal: Patient's chronic conditions and co-morbidity symptoms are monitored and maintained or improved  3/10/2025 0759 by Carol Mendes RN  Outcome: Not Progressing  3/10/2025 0158 by Allan Torres RN  Outcome: Not Progressing     Problem: Discharge Planning  Goal: Discharge to home or other facility with appropriate resources  3/10/2025 0759 by Carol Mendes RN  Outcome: Not Progressing  3/10/2025 0158 by Allan Torres RN  Outcome: Not Progressing     Problem: Nutrition Deficit:  Goal: Optimize nutritional status  3/10/2025 0759 by Carol Mendes RN  Outcome: Not Progressing  3/10/2025 0158 by Allan Torres RN  Outcome: Not Progressing     Problem: Metabolic/Fluid and Electrolytes - Adult  Goal: Electrolytes maintained within normal limits  3/10/2025 0759 by Carol Mendes RN  Outcome: Not Progressing  3/10/2025 0158 by Allan Torres RN  Outcome: Not Progressing  Goal: Hemodynamic stability and optimal renal function maintained  3/10/2025 0759 by Carol Mendes RN  Outcome: Not Progressing  3/10/2025 0158 by Allan Torres RN  Outcome: Not Progressing     Problem: Hematologic - Adult  Goal:  Maintains hematologic stability  3/10/2025 0759 by Carol Mendes, RN  Outcome: Not Progressing  3/10/2025 0158 by Allan Torres RN  Outcome: Progressing     Problem: Neurosensory - Adult  Goal: Achieves stable or improved neurological status  Outcome: Not Progressing  Goal: Achieves maximal functionality and self care  Outcome: Not Progressing     Problem: Respiratory - Adult  Goal: Achieves optimal ventilation and oxygenation  Outcome: Not Progressing     Problem: Cardiovascular - Adult  Goal: Maintains optimal cardiac output and hemodynamic stability  Outcome: Not Progressing  Goal: Absence of cardiac dysrhythmias or at baseline  Outcome: Not Progressing     Problem: Musculoskeletal - Adult  Goal: Return mobility to safest level of function  Outcome: Not Progressing  Goal: Return ADL status to a safe level of function  Outcome: Not Progressing     Problem: Gastrointestinal - Adult  Goal: Maintains or returns to baseline bowel function  Outcome: Not Progressing  Goal: Maintains adequate nutritional intake  Outcome: Not Progressing     Problem: Infection - Adult  Goal: Absence of infection during hospitalization  Outcome: Not Progressing

## 2025-03-10 NOTE — PROGRESS NOTES
Pharmacy Consultation Note  (Antibiotic Dosing and Monitoring)    Initial consult date: 3/9/25  Consulting physician/provider: Tim  Drug: Vancomycin  Indication: Sepsis    Age/  Gender Height Weight IBW  Allergy Information   79 y.o./female 157.5 cm (5' 2\") 56.6 kg (124 lb 12.8 oz)     Ideal body weight: 50.1 kg (110 lb 7.2 oz)  Adjusted ideal body weight: 56.1 kg (123 lb 12.3 oz)   Patient has no known allergies.      Renal Function:  Recent Labs     03/09/25  1733 03/09/25  2304 03/10/25  0539   BUN 19 16 13   CREATININE 1.4* 1.2* 1.1*       Intake/Output Summary (Last 24 hours) at 3/10/2025 1227  Last data filed at 3/10/2025 1200  Gross per 24 hour   Intake 2138 ml   Output 4669 ml   Net -2531 ml       Vancomycin Monitoring:  Trough:  No results for input(s): \"VANCOTROUGH\" in the last 72 hours.  Random:  No results for input(s): \"VANCORANDOM\" in the last 72 hours.    Vancomycin Administration Times:  Recent vancomycin administrations        No vancomycin IV orders with administrations found.            Orders not given:            vancomycin (VANCOCIN) 1,000 mg in sodium chloride 0.9 % 250 mL IVPB ([id number])                        Assessment:  Patient is a 79 y.o. female who has been initiated on vancomycin  Estimated Creatinine Clearance: 37 mL/min (A) (based on SCr of 1.1 mg/dL (H)).  To dose vancomycin, pharmacy will be utilizing dosing based off of levels due to patient requiring hemodialysis - CRRT     Plan:  Will continue Vancomycin 1000 mg IV every 18 hours  Will check vancomycin level tomorrow AM 3/11.   Will continue to monitor renal function   Pharmacy to follow    López Winter D Candidate 2025    JERONIMO: [phone]  SEY: [phone]  SJW: [phone]

## 2025-03-10 NOTE — CARE COORDINATION
Care Coordination: LOS 6 day. Tx to MICU from 85 on 3/7/25- EP and cardiology following- s/p DCCV 3/7/25.  Nephrology consulted for ANTONIO, CVVHD started on 3/8. Currently on Levophed, IVF. Plan per prior CM is home at discharge. Lives with son Shreyas. Will continue to follow for transition of care needs.    Electronically signed by Cristela Bradford RN on 3/10/2025 at 1:14 PM

## 2025-03-10 NOTE — PROGRESS NOTES
Date: 3/10/2025    Time: 5:59 AM    Patient Placed On BIPAP/CPAP/ Non-Invasive Ventilation?  Yes    If no must comment.  Facial area red/color change? No           If YES are Blister/Lesion present?No   If yes must notify nursing staff  BIPAP/CPAP skin barrier?  Yes    Skin barrier type:mepilexlite       Comments:        Ez Goodman RCP

## 2025-03-11 ENCOUNTER — APPOINTMENT (OUTPATIENT)
Dept: ULTRASOUND IMAGING | Age: 80
DRG: 004 | End: 2025-03-11
Payer: MEDICARE

## 2025-03-11 ENCOUNTER — APPOINTMENT (OUTPATIENT)
Dept: GENERAL RADIOLOGY | Age: 80
DRG: 004 | End: 2025-03-11
Payer: MEDICARE

## 2025-03-11 LAB
ALBUMIN SERPL-MCNC: 3 G/DL (ref 3.5–5.2)
ALBUMIN SERPL-MCNC: 3.3 G/DL (ref 3.5–5.2)
ALP SERPL-CCNC: 187 U/L (ref 35–104)
ALP SERPL-CCNC: 189 U/L (ref 35–104)
ALT SERPL-CCNC: 1706 U/L (ref 0–32)
ALT SERPL-CCNC: 1838 U/L (ref 0–32)
ANION GAP SERPL CALCULATED.3IONS-SCNC: 10 MMOL/L (ref 7–16)
ANION GAP SERPL CALCULATED.3IONS-SCNC: 13 MMOL/L (ref 7–16)
ANION GAP SERPL CALCULATED.3IONS-SCNC: 9 MMOL/L (ref 7–16)
AST SERPL-CCNC: 3124 U/L (ref 0–31)
AST SERPL-CCNC: 3444 U/L (ref 0–31)
B.E.: -6.8 MMOL/L (ref -3–3)
BASOPHILS # BLD: 0 K/UL (ref 0–0.2)
BASOPHILS NFR BLD: 0 % (ref 0–2)
BILIRUB DIRECT SERPL-MCNC: 2 MG/DL (ref 0–0.3)
BILIRUB INDIRECT SERPL-MCNC: 0.7 MG/DL (ref 0–1)
BILIRUB SERPL-MCNC: 2.6 MG/DL (ref 0–1.2)
BILIRUB SERPL-MCNC: 2.7 MG/DL (ref 0–1.2)
BNP SERPL-MCNC: 8211 PG/ML (ref 0–450)
BUN SERPL-MCNC: 10 MG/DL (ref 6–23)
BUN SERPL-MCNC: 11 MG/DL (ref 6–23)
BUN SERPL-MCNC: 11 MG/DL (ref 6–23)
CA-I BLD-SCNC: 1.27 MMOL/L (ref 1.15–1.33)
CA-I BLD-SCNC: 1.29 MMOL/L (ref 1.15–1.33)
CA-I BLD-SCNC: 1.31 MMOL/L (ref 1.15–1.33)
CALCIUM SERPL-MCNC: 9 MG/DL (ref 8.6–10.2)
CALCIUM SERPL-MCNC: 9.1 MG/DL (ref 8.6–10.2)
CALCIUM SERPL-MCNC: 9.5 MG/DL (ref 8.6–10.2)
CHLORIDE SERPL-SCNC: 107 MMOL/L (ref 98–107)
CHLORIDE SERPL-SCNC: 108 MMOL/L (ref 98–107)
CHLORIDE SERPL-SCNC: 110 MMOL/L (ref 98–107)
CO2 SERPL-SCNC: 18 MMOL/L (ref 22–29)
CO2 SERPL-SCNC: 20 MMOL/L (ref 22–29)
CO2 SERPL-SCNC: 20 MMOL/L (ref 22–29)
COHB: 0.4 % (ref 0–1.5)
CREAT SERPL-MCNC: 0.8 MG/DL (ref 0.5–1)
CREAT SERPL-MCNC: 0.9 MG/DL (ref 0.5–1)
CREAT SERPL-MCNC: 0.9 MG/DL (ref 0.5–1)
CRITICAL: ABNORMAL
DATE ANALYZED: ABNORMAL
DATE OF COLLECTION: ABNORMAL
EOSINOPHIL # BLD: 0 K/UL (ref 0.05–0.5)
EOSINOPHILS RELATIVE PERCENT: 0 % (ref 0–6)
ERYTHROCYTE [DISTWIDTH] IN BLOOD BY AUTOMATED COUNT: 16.4 % (ref 11.5–15)
GFR, ESTIMATED: 65 ML/MIN/1.73M2
GFR, ESTIMATED: 65 ML/MIN/1.73M2
GFR, ESTIMATED: 72 ML/MIN/1.73M2
GLUCOSE BLD-MCNC: 101 MG/DL (ref 74–99)
GLUCOSE BLD-MCNC: 108 MG/DL (ref 74–99)
GLUCOSE BLD-MCNC: 73 MG/DL (ref 74–99)
GLUCOSE BLD-MCNC: 84 MG/DL (ref 74–99)
GLUCOSE BLD-MCNC: 92 MG/DL (ref 74–99)
GLUCOSE SERPL-MCNC: 104 MG/DL (ref 74–99)
GLUCOSE SERPL-MCNC: 112 MG/DL (ref 74–99)
GLUCOSE SERPL-MCNC: 118 MG/DL (ref 74–99)
HCO3: 20.8 MMOL/L (ref 22–26)
HCT VFR BLD AUTO: 34.3 % (ref 34–48)
HGB BLD-MCNC: 10.5 G/DL (ref 11.5–15.5)
HHB: 2.2 % (ref 0–5)
INR PPP: 1.9
LAB: ABNORMAL
LACTATE BLDV-SCNC: 2 MMOL/L (ref 0.5–2.2)
LYMPHOCYTES NFR BLD: 0.26 K/UL (ref 1.5–4)
LYMPHOCYTES RELATIVE PERCENT: 2 % (ref 20–42)
Lab: 509
MAGNESIUM SERPL-MCNC: 1.7 MG/DL (ref 1.6–2.6)
MAGNESIUM SERPL-MCNC: 1.8 MG/DL (ref 1.6–2.6)
MAGNESIUM SERPL-MCNC: 1.8 MG/DL (ref 1.6–2.6)
MCH RBC QN AUTO: 27.3 PG (ref 26–35)
MCHC RBC AUTO-ENTMCNC: 30.6 G/DL (ref 32–34.5)
MCV RBC AUTO: 89.3 FL (ref 80–99.9)
METHB: 0.6 % (ref 0–1.5)
MODE: ABNORMAL
MONOCYTES NFR BLD: 0.13 K/UL (ref 0.1–0.95)
MONOCYTES NFR BLD: 1 % (ref 2–12)
NEUTROPHILS NFR BLD: 97 % (ref 43–80)
NEUTS SEG NFR BLD: 14.5 K/UL (ref 1.8–7.3)
NUCLEATED RED BLOOD CELLS: 4 PER 100 WBC
O2 SATURATION: 97.8 % (ref 92–98.5)
O2HB: 96.8 % (ref 94–97)
OPERATOR ID: ABNORMAL
PATIENT TEMP: 37 C
PCO2: 50.5 MMHG (ref 35–45)
PH BLOOD GAS: 7.23 (ref 7.35–7.45)
PHOSPHATE SERPL-MCNC: 2.2 MG/DL (ref 2.5–4.5)
PHOSPHATE SERPL-MCNC: 2.3 MG/DL (ref 2.5–4.5)
PHOSPHATE SERPL-MCNC: 2.9 MG/DL (ref 2.5–4.5)
PLATELET # BLD AUTO: 213 K/UL (ref 130–450)
PMV BLD AUTO: 10 FL (ref 7–12)
PO2: 111.1 MMHG (ref 75–100)
POTASSIUM SERPL-SCNC: 4.4 MMOL/L (ref 3.5–5)
POTASSIUM SERPL-SCNC: 4.4 MMOL/L (ref 3.5–5)
POTASSIUM SERPL-SCNC: 4.6 MMOL/L (ref 3.5–5)
PROT SERPL-MCNC: 5.5 G/DL (ref 6.4–8.3)
PROT SERPL-MCNC: 5.7 G/DL (ref 6.4–8.3)
PROTHROMBIN TIME: 20.4 SEC (ref 9.3–12.4)
RBC # BLD AUTO: 3.84 M/UL (ref 3.5–5.5)
RBC # BLD: ABNORMAL 10*6/UL
SODIUM SERPL-SCNC: 137 MMOL/L (ref 132–146)
SODIUM SERPL-SCNC: 139 MMOL/L (ref 132–146)
SODIUM SERPL-SCNC: 139 MMOL/L (ref 132–146)
SOURCE, BLOOD GAS: ABNORMAL
THB: 11.6 G/DL (ref 11.5–16.5)
TIME ANALYZED: 525
WBC OTHER # BLD: 14.9 K/UL (ref 4.5–11.5)

## 2025-03-11 PROCEDURE — 6360000002 HC RX W HCPCS

## 2025-03-11 PROCEDURE — 80053 COMPREHEN METABOLIC PANEL: CPT

## 2025-03-11 PROCEDURE — 76705 ECHO EXAM OF ABDOMEN: CPT

## 2025-03-11 PROCEDURE — 82330 ASSAY OF CALCIUM: CPT

## 2025-03-11 PROCEDURE — 84100 ASSAY OF PHOSPHORUS: CPT

## 2025-03-11 PROCEDURE — 99291 CRITICAL CARE FIRST HOUR: CPT | Performed by: INTERNAL MEDICINE

## 2025-03-11 PROCEDURE — 85025 COMPLETE CBC W/AUTO DIFF WBC: CPT

## 2025-03-11 PROCEDURE — 99232 SBSQ HOSP IP/OBS MODERATE 35: CPT | Performed by: INTERNAL MEDICINE

## 2025-03-11 PROCEDURE — 6370000000 HC RX 637 (ALT 250 FOR IP)

## 2025-03-11 PROCEDURE — 2500000003 HC RX 250 WO HCPCS

## 2025-03-11 PROCEDURE — 83735 ASSAY OF MAGNESIUM: CPT

## 2025-03-11 PROCEDURE — 80048 BASIC METABOLIC PNL TOTAL CA: CPT

## 2025-03-11 PROCEDURE — 82248 BILIRUBIN DIRECT: CPT

## 2025-03-11 PROCEDURE — 2580000003 HC RX 258: Performed by: INTERNAL MEDICINE

## 2025-03-11 PROCEDURE — 94640 AIRWAY INHALATION TREATMENT: CPT

## 2025-03-11 PROCEDURE — 6370000000 HC RX 637 (ALT 250 FOR IP): Performed by: INTERNAL MEDICINE

## 2025-03-11 PROCEDURE — 2500000003 HC RX 250 WO HCPCS: Performed by: INTERNAL MEDICINE

## 2025-03-11 PROCEDURE — 83880 ASSAY OF NATRIURETIC PEPTIDE: CPT

## 2025-03-11 PROCEDURE — 51798 US URINE CAPACITY MEASURE: CPT

## 2025-03-11 PROCEDURE — 2000000000 HC ICU R&B

## 2025-03-11 PROCEDURE — 2580000003 HC RX 258

## 2025-03-11 PROCEDURE — 2700000000 HC OXYGEN THERAPY PER DAY

## 2025-03-11 PROCEDURE — 85610 PROTHROMBIN TIME: CPT

## 2025-03-11 PROCEDURE — 83605 ASSAY OF LACTIC ACID: CPT

## 2025-03-11 PROCEDURE — 82805 BLOOD GASES W/O2 SATURATION: CPT

## 2025-03-11 PROCEDURE — 82962 GLUCOSE BLOOD TEST: CPT

## 2025-03-11 PROCEDURE — 36430 TRANSFUSION BLD/BLD COMPNT: CPT

## 2025-03-11 PROCEDURE — 74018 RADEX ABDOMEN 1 VIEW: CPT

## 2025-03-11 RX ADMIN — SODIUM CHLORIDE: 9 INJECTION, SOLUTION INTRAVENOUS at 06:58

## 2025-03-11 RX ADMIN — Medication: at 21:36

## 2025-03-11 RX ADMIN — CALCIUM CHLORIDE 8000 MG: 100 INJECTION INTRAVENOUS; INTRAVENTRICULAR at 04:45

## 2025-03-11 RX ADMIN — Medication: at 04:52

## 2025-03-11 RX ADMIN — SODIUM CHLORIDE, PRESERVATIVE FREE 10 ML: 5 INJECTION INTRAVENOUS at 20:03

## 2025-03-11 RX ADMIN — EPINEPHRINE 3 MCG/MIN: 1 INJECTION INTRAMUSCULAR; INTRAVENOUS; SUBCUTANEOUS at 14:07

## 2025-03-11 RX ADMIN — IPRATROPIUM BROMIDE AND ALBUTEROL SULFATE 1 DOSE: 2.5; .5 SOLUTION RESPIRATORY (INHALATION) at 21:04

## 2025-03-11 RX ADMIN — APIXABAN 2.5 MG: 2.5 TABLET, FILM COATED ORAL at 22:36

## 2025-03-11 RX ADMIN — Medication 30 MCG/MIN: at 10:50

## 2025-03-11 RX ADMIN — BUDESONIDE INHALATION 500 MCG: 0.5 SUSPENSION RESPIRATORY (INHALATION) at 09:34

## 2025-03-11 RX ADMIN — CEFEPIME 2000 MG: 2 INJECTION, POWDER, FOR SOLUTION INTRAVENOUS at 16:03

## 2025-03-11 RX ADMIN — Medication: at 11:58

## 2025-03-11 RX ADMIN — CALCIUM CHLORIDE 8000 MG: 100 INJECTION INTRAVENOUS; INTRAVENTRICULAR at 21:34

## 2025-03-11 RX ADMIN — SODIUM PHOSPHATE, MONOBASIC, MONOHYDRATE AND SODIUM PHOSPHATE, DIBASIC, ANHYDROUS 6 MMOL: 142; 276 INJECTION, SOLUTION INTRAVENOUS at 16:27

## 2025-03-11 RX ADMIN — IPRATROPIUM BROMIDE AND ALBUTEROL SULFATE 1 DOSE: 2.5; .5 SOLUTION RESPIRATORY (INHALATION) at 13:05

## 2025-03-11 RX ADMIN — HYDROCORTISONE SODIUM SUCCINATE 100 MG: 100 INJECTION, POWDER, FOR SOLUTION INTRAMUSCULAR; INTRAVENOUS at 02:44

## 2025-03-11 RX ADMIN — HYDROCORTISONE SODIUM SUCCINATE 100 MG: 100 INJECTION, POWDER, FOR SOLUTION INTRAMUSCULAR; INTRAVENOUS at 14:33

## 2025-03-11 RX ADMIN — CEFEPIME 2000 MG: 2 INJECTION, POWDER, FOR SOLUTION INTRAVENOUS at 00:26

## 2025-03-11 RX ADMIN — HYDROCORTISONE SODIUM SUCCINATE 100 MG: 100 INJECTION, POWDER, FOR SOLUTION INTRAMUSCULAR; INTRAVENOUS at 20:03

## 2025-03-11 RX ADMIN — Medication: at 19:14

## 2025-03-11 RX ADMIN — BUDESONIDE INHALATION 500 MCG: 0.5 SUSPENSION RESPIRATORY (INHALATION) at 21:04

## 2025-03-11 RX ADMIN — Medication 27 MCG/MIN: at 19:56

## 2025-03-11 RX ADMIN — CEFEPIME 2000 MG: 2 INJECTION, POWDER, FOR SOLUTION INTRAVENOUS at 08:58

## 2025-03-11 RX ADMIN — Medication: at 09:30

## 2025-03-11 RX ADMIN — IPRATROPIUM BROMIDE AND ALBUTEROL SULFATE 1 DOSE: 2.5; .5 SOLUTION RESPIRATORY (INHALATION) at 09:34

## 2025-03-11 RX ADMIN — Medication 3 MG: at 22:36

## 2025-03-11 RX ADMIN — Medication: at 06:59

## 2025-03-11 RX ADMIN — SODIUM CHLORIDE: 9 INJECTION, SOLUTION INTRAVENOUS at 15:43

## 2025-03-11 RX ADMIN — Medication: at 02:15

## 2025-03-11 RX ADMIN — Medication: at 14:10

## 2025-03-11 RX ADMIN — ARFORMOTEROL TARTRATE 15 MCG: 15 SOLUTION RESPIRATORY (INHALATION) at 21:04

## 2025-03-11 RX ADMIN — ARFORMOTEROL TARTRATE 15 MCG: 15 SOLUTION RESPIRATORY (INHALATION) at 09:34

## 2025-03-11 RX ADMIN — SODIUM CHLORIDE, PRESERVATIVE FREE 10 ML: 5 INJECTION INTRAVENOUS at 09:07

## 2025-03-11 RX ADMIN — Medication 30 MCG/MIN: at 02:44

## 2025-03-11 ASSESSMENT — PAIN SCALES - GENERAL
PAINLEVEL_OUTOF10: 0

## 2025-03-11 NOTE — PROGRESS NOTES
[facility]        CARDIOLOGY                 INPATIENT PROGRESS NOTE          PATIENT SEEN IN FOLLOW UP FOR: CMP, CHF    Hospital Day: 8     Nikki Moffett is a 79  year old patient known to Dr. Carnes      SUBJECTIVE: No distress. Open her eyes, nods head - No distress, no family at bed side    ROS: Review of rest of 10 systems limited due to her overall mentation.    OBJECTIVE: No acute distress.  See Assessment     Diagnostics:       Telemetry: Reviewed     TTE-3/7/2025:    Left Ventricle: Normal left ventricular systolic function with a visually estimated EF of 10 -15%. Left ventricle size is normal. Normal wall thickness. Severe global hypokinesis present. E/e' ratio >11, suggesting increased LAP.    Right Ventricle: Right ventricle size is normal. Reduced systolic function.    Aortic Valve: No significant stenosis. 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    Mitral Valve: Mild regurgitation.    Tricuspid Valve: Moderate regurgitation. Normal RVSP. The estimated RVSP is 25 mmHg.    Left Atrium: Left atrium is severely dilated.    Right Atrium: Right atrium is mildly dilated.    Pericardium: No pericardial effusion.    Image quality is suboptimal. Contrast used: Lumason.    Limited TTE Dr. Carnes 2/29/2024    Left Ventricle: Normal left ventricular systolic function with a visually estimated EF of 55 - 60%. Left ventricle size is normal. Moderately increased ventricular mass. Findings consistent with moderate concentric hypertrophy. Normal wall motion. Indeterminate diastolic function.    Right Ventricle: Right ventricle size is normal. Normal systolic function. TAPSE is 2.5 cm.    Aortic Valve: No stenosis.    Tricuspid Valve: Normal RVSP. The estimated RVSP is 33 mmHg.    Left Atrium: Left atrium is mildly dilated.    Interatrial Septum: Interatrial shunt visualized with color Doppler with left to right shunt.  No right to left shunt on bubble study.     palpable   ABDOMEN: Bowel sounds present.    - Deferred  SKIN: Warm and dry   NEURO Oriented to person, place           ASSESSMENT/PLAN:    Acute HFrEF - Continue RRT for fluid management. Diuretics as her BP/Renal Fn tolerate  Hypotensive shock - On Inotropic support - wean as her BP tolerates. Was on Midodrine  CMP  - EF reduced to 10 to 15% ?secondary to tachycardia induced cardiomyopathy versus underlying ischemic etiology. GDMT limited due to low BP/Renal failure.  Plan to to up-titration of meds as her BP/renal Fn tolerate, re assess her EF in 3-6 months, then ischemia w/u.  ?Life Vest upon discharge-defer to EP consultants.  Paroxysmal Atrial fibrillation and Atypical flutter - s/p DCCV on 3/7/2025. EP following - On OAC. Brief Tachycardia on Tele.   Influenza - Per Primary  PFO with left to right shunt  ANTONIO/CKD - On RRT per Nephrology following  Hx of  CVA - 12/2023  Hyperlipidemia - On Lipitor-Held  VHD - Mild MR, mod TR  Hypothyroidism - On HRT  Chronic Anemia - per Primary          Cardiology will follow as needed    F/u with Dr Carnes after discharge.    Above briefly d/w her -  No family at bed side    d/w her Nursing    Electronically signed by Darlene Wiseman MD on 3/11/2025 at 9:49 AM  [facility] Cardiology    Addendum:    Case d/w admitting medical team  No [facility] at this time.    Darlene Wiseman MD

## 2025-03-11 NOTE — PROGRESS NOTES
[facility]  Internal Medicine Residency / House Medicine Service    Attending Physician Statement  I have discussed the case, including pertinent history and exam findings with the resident and the team.  I have seen and examined the patient and the key elements of the encounter have been performed by me.  I agree with the assessment, plan and orders as documented by the resident.      A Fib controlled   On Eliquis  And Remains on Levophed and Epinephriine for BP support  Has a very low EF of LV  Resolving Influenza A   On Vancomycin as well  Impression; Cardiomyopathy and cardiogenic shock                      Acute Influenza A   Plan; ICU support    Remainder of medical problems as per resident note.      Dalton Sosa MD FRCP [facility]  Internal Medicine Residency Faculty

## 2025-03-11 NOTE — PLAN OF CARE
Problem: Chronic Conditions and Co-morbidities  Goal: Patient's chronic conditions and co-morbidity symptoms are monitored and maintained or improved  Outcome: Progressing     Problem: Discharge Planning  Goal: Discharge to home or other facility with appropriate resources  Outcome: Progressing     Problem: Safety - Adult  Goal: Free from fall injury  Outcome: Progressing     Problem: Skin/Tissue Integrity  Goal: Skin integrity remains intact  Description: 1.  Monitor for areas of redness and/or skin breakdown  2.  Assess vascular access sites hourly  3.  Every 4-6 hours minimum:  Change oxygen saturation probe site  4.  Every 4-6 hours:  If on nasal continuous positive airway pressure, respiratory therapy assess nares and determine need for appliance change or resting period  Outcome: Progressing     Problem: Nutrition Deficit:  Goal: Optimize nutritional status  Outcome: Progressing     Problem: ABCDS Injury Assessment  Goal: Absence of physical injury  Outcome: Progressing     Problem: Metabolic/Fluid and Electrolytes - Adult  Goal: Electrolytes maintained within normal limits  Outcome: Progressing  Goal: Hemodynamic stability and optimal renal function maintained  Outcome: Progressing  Goal: Glucose maintained within prescribed range  Outcome: Progressing     Problem: Hematologic - Adult  Goal: Maintains hematologic stability  Outcome: Progressing     Problem: Neurosensory - Adult  Goal: Achieves stable or improved neurological status  Outcome: Progressing  Goal: Achieves maximal functionality and self care  Outcome: Progressing     Problem: Respiratory - Adult  Goal: Achieves optimal ventilation and oxygenation  Outcome: Progressing     Problem: Cardiovascular - Adult  Goal: Maintains optimal cardiac output and hemodynamic stability  Outcome: Progressing  Goal: Absence of cardiac dysrhythmias or at baseline  Outcome: Progressing     Problem: Musculoskeletal - Adult  Goal: Return mobility to safest level of

## 2025-03-11 NOTE — PROGRESS NOTES
Pharmacy Consultation Note  (Antibiotic Dosing and Monitoring)    Initial consult date: 3/9/25  Consulting physician/provider: Tim  Drug: Vancomycin  Indication: Sepsis    Vancomycin has been discontinued. Clinical pharmacy will sign off, please reconsult if further assistance is needed.     Carla Cagle, PharmD, BCPS, BCCCP 3/11/2025 9:49 AM   [phone]

## 2025-03-11 NOTE — PROGRESS NOTES
[facility]  Internal Medicine Residency Program  Progress Note - House Team       Patient:  Nikki Moffett 79 y.o. female   MRN: [id number]       Date of Service: 3/11/2025    CC: AMS, chest pain  Overnight events: No acute event.    Subjective     Patient was sleeping when seen this morning. Retrains in place. On 4 L oxygen by NC.    Objective       Physical Exam  Vitals: BP (!) 125/47   Pulse 91   Temp 97.3 °F (36.3 °C)   Resp 26   Ht 1.575 m (5' 2\")   Wt 65.2 kg (143 lb 11.8 oz)   SpO2 98%   BMI 26.29 kg/m²     I & O - 24hr: I/O this shift:  In: -   Out: 553    General Appearance: resting in bed  HEENT:  Head: Normocephalic, no lesions, without obvious abnormality.  Neck: no adenopathy and supple, symmetrical, trachea midline  Lung: wheezes bilaterally  Heart: regular rate and rhythm, S1, S2 normal, no murmur, click, rub or gallop  Abdomen: soft, non-tender; bowel sounds normal; no masses,  no organomegaly  Extremities:  extremities normal, atraumatic, no cyanosis or edema  Musculokeletal: No joint swelling, no muscle tenderness. ROM normal in all joints of extremities. Retrains in place.  Neurologic: Mental status: alertness: lethargic, orientation: person    Diet:   ADULT DIET; Regular; Low Sodium (2 gm)  ADULT ORAL NUTRITION SUPPLEMENT; Breakfast, Lunch, Dinner, HS Snack, AM Snack, PM Snack; Low Calorie/High Protein Oral Supplement      Pertinent Labs & Imaging Studies     Labs    CBC:   Lab Results   Component Value Date/Time    WBC 14.9 03/11/2025 05:05 AM    RBC 3.84 03/11/2025 05:05 AM    HGB 10.5 03/11/2025 05:05 AM    HCT 34.3 03/11/2025 05:05 AM    MCV 89.3 03/11/2025 05:05 AM    MCH 27.3 03/11/2025 05:05 AM    MCHC 30.6 03/11/2025 05:05 AM    RDW 16.4 03/11/2025 05:05 AM     03/11/2025 05:05 AM    MPV 10.0 03/11/2025 05:05 AM     CMP:    Lab Results   Component Value Date/Time     03/11/2025 05:05 AM    K 4.6 03/11/2025 05:05 AM     03/11/2025 05:05 AM     CVVHD  Hold Lasix   Bicarbonate drip discontinued    Elevated Transaminase 2/2 Shock Liver  Leukocytosis   HAGMA  Follow infectious workup  Follow INR and LFTs daily  Amiodarone on hold, resume when able  INR 1.9 on 3.11, unheld Eliquis  On Heparin drip  PCDs in place  RUQ US ordered, pending  BiPAP ordered    Hyperlipidemia  Prediabetes, A1c 5.7%  Lipitor on hold  LDSS     Bilateral Pleural Effusion Likely 2/2 HF  Monitor respiratory status  Continue breathing treatments: Brovana, Pulmicort, guaiFENesin-Dextromethorphan, and Duoneb    Influenza A Infection  Sepsis  Started Tamiflu and Ceftriaxone on 3/8 but Ceftriaxone discontinued and started on Cefepime and Vancomycin renally titrated by Pharmacy on 3.9, regimen of 7 days  Vancomycin discontinued      Antibiotics: Cefepime day 3/7  PT/OT evaluation: not indicated at this time   DVT prophylaxis: Eliquis and PCDs in place  GI prophylaxis: N/A  Diet:  ADULT DIET; Regular; Low Sodium (2 gm)  ADULT ORAL NUTRITION SUPPLEMENT; Breakfast, Lunch, Dinner, HS Snack, AM Snack, PM Snack; Low Calorie/High Protein Oral Supplement   Bowel regimen: Glycolax  Pain management: as needed  Code status: Full Code   Disposition: continue current ICU care, plan to discharge home when medically stable  Family: updated as available      Maria Isabel Hays MD, PGY-1 FM  Attending physician: Dr. Sosa

## 2025-03-11 NOTE — PROGRESS NOTES
[facility]  Department of Internal Medicine   Internal Medicine Residency   MICU Progress Note    Patient:  Nikki Moffett 79 y.o. female  MRN: [id number]     Date of Service: 3/11/2025    Allergy: Patient has no known allergies.    Subjective   ICU day 3d 17h    Overnight, started on epinephrine drip. Patient seen and examined at bedside this morning, lethargic but arousable to noxious stimuli. Still on CVVHD, still anuric.      Objective     VS: BP (!) 113/40   Pulse (!) 102   Temp 97 °F (36.1 °C) (Temporal)   Resp (!) 31   Ht 1.575 m (5' 2\")   Wt 65.2 kg (143 lb 11.8 oz)   SpO2 100%   BMI 26.29 kg/m²   ABP (Arterial line BP): 110/42ABP Mean (Arterial Line Mean): 65 mmHg    Arterial line BP: ABP (Arterial line BP): 110/42 ABP Mean (Arterial Line Mean): 65 mmHg    Physical Exam:  General Appearance: Lethargic, opens eyes but unable to sustain wakefulness  Neck: no adenopathy, no carotid bruit, no JVD, and supple, symmetrical, trachea midline  Lung: Rales R>L  Heart: Irregular rhythm, normal rate  Abdomen: Soft nontender  Extremities:  Gr 1 lower extremity edema    Medications     Infusions: (Fluid, Sedation, Vasopressors)  Scheduled Meds:   hydrocortisone sodium succinate PF  100 mg IntraVENous Q8H    cefepime  2,000 mg IntraVENous Q8H    vancomycin  1,000 mg IntraVENous Q18H    insulin lispro  0-4 Units SubCUTAneous Q6H    oseltamivir  30 mg Oral Daily    sodium chloride flush  5-40 mL IntraVENous 2 times per day    apixaban  2.5 mg Oral BID    [Held by provider] furosemide  40 mg IntraVENous Daily    ipratropium 0.5 mg-albuterol 2.5 mg  1 Dose Inhalation Q4H WA RT    arformoterol tartrate  15 mcg Nebulization BID RT    budesonide  0.5 mg Nebulization BID RT    sodium chloride flush  5-40 mL IntraVENous 2 times per day    [Held by provider] midodrine  10 mg Oral TID WC    [Held by provider] metoprolol succinate  25 mg Oral Daily    [Held by provider] amiodarone  200 mg Oral BID    Vitamin D   107 107 105 107   MG 1.7   < > 1.7 1.9 1.8 1.8 1.8  --  1.8   PHOS 3.3   < > 2.6 2.1* 2.3* 2.3* 2.7  --  2.9   CO2 23   < > 21* 21* 21*  21* 21* 19* 20* 20*   BUN 26*   < > 16 13 11  11 10 10 10 10   CREATININE 1.9*   < > 1.2* 1.1* 1.0  1.0 0.9 0.9 0.9 0.9   ANIONGAP 13   < > 11 12 11  10 10 13 12 10   GLUCOSE 119*   < > 99 83 79  80 106* 112* 113* 118*   CALCIUM 7.6*   < > 8.2* 8.4* 8.5*  8.5* 8.8 8.9 8.8 9.0   BILITOT 2.0*  --  2.6*  --  2.8*  --   --  3.0* 2.7*   ALKPHOS 131*  --  148*  --  162*  --   --  189* 187*   AST 4,139*  --  4,112*  --  3,847*  --   --  3,677* 3,444*   ALT 1,423*  --  1,737*  --  1,789*  --   --  1,883* 1,838*    < > = values in this interval not displayed.     Recent Labs     03/08/25  0929 03/08/25  1216 03/08/25  1605 03/09/25  0357   LACTA 5.0* 2.6* 2.7* 1.4     No results for input(s): \"TROPHS\", \"PROBNP\" in the last 72 hours.    Recent Labs     03/10/25  1120 03/10/25  1704 03/11/25  0509   PH 7.261* 7.314* 7.232*   PCO2 52.0* 48.1* 50.5*   PO2 124.5* 82.5 111.1*   HCO3 22.9 23.9 20.8*   BE -4.4* -2.5 -6.8*   O2SAT 98.4 96.3 97.8       Imaging Studies:  XR CHEST PORTABLE   Final Result   No significant change over the past 24 hours.         XR CHEST PORTABLE   Final Result   Right-sided vascular catheter tip overlies the region of the right atrium or   cavoatrial junction.      Cardiomediastinal silhouette is enlarged.      Perihilar and bibasilar opacities similar to prior.  Bilateral small pleural   effusions.  No gross pneumothorax.         XR CHEST PORTABLE   Final Result   Further progression of findings that indicate volume overload since March 7   study.  Please correlate clinically.         XR CHEST PORTABLE   Final Result   Borderline size cardiac area with mild to moderate bilateral pleural   effusions.         XR CHEST PORTABLE   Final Result   1. No acute cardiopulmonary process.   2. Bibasilar scarring and pleural thickening, unchanged.         XR CHEST (2 VW)

## 2025-03-11 NOTE — PLAN OF CARE
Problem: Chronic Conditions and Co-morbidities  Goal: Patient's chronic conditions and co-morbidity symptoms are monitored and maintained or improved  3/11/2025 1131 by Krystyna Mireles RN  Outcome: Progressing     Problem: Discharge Planning  Goal: Discharge to home or other facility with appropriate resources  3/11/2025 1131 by Krystyna Mireles RN  Outcome: Not Progressing     Problem: Safety - Adult  Goal: Free from fall injury  3/11/2025 1131 by Krystyna Mireles RN  Outcome: Progressing     Problem: Skin/Tissue Integrity  Goal: Skin integrity remains intact  Description: 1.  Monitor for areas of redness and/or skin breakdown  2.  Assess vascular access sites hourly  3.  Every 4-6 hours minimum:  Change oxygen saturation probe site  4.  Every 4-6 hours:  If on nasal continuous positive airway pressure, respiratory therapy assess nares and determine need for appliance change or resting period  3/11/2025 1131 by Krystyna Mireles RN  Outcome: Progressing     Problem: Nutrition Deficit:  Goal: Optimize nutritional status  3/11/2025 1131 by Krystyna Mireles RN  Outcome: Progressing     Problem: Safety - Medical Restraint  Goal: Remains free of injury from restraints (Restraint for Interference with Medical Device)  Description: INTERVENTIONS:  1. Determine that other, less restrictive measures have been tried or would not be effective before applying the restraint  2. Evaluate the patient's condition at the time of restraint application  3. Inform patient/family regarding the reason for restraint  4. Q2H: Monitor safety, psychosocial status, comfort, nutrition and hydration  3/11/2025 1131 by Krystyna Mireles RN  Outcome: Progressing     Problem: ABCDS Injury Assessment  Goal: Absence of physical injury  3/11/2025 1131 by Krystyna Mireles RN  Outcome: Progressing     Problem: Metabolic/Fluid and Electrolytes - Adult  Goal: Electrolytes maintained within normal  limits  3/11/2025 1131 by Krystyna Mireles RN  Outcome: Progressing     Problem: Hematologic - Adult  Goal: Maintains hematologic stability  3/11/2025 1131 by Krystyna Mireles RN  Outcome: Progressing     Problem: Neurosensory - Adult  Goal: Achieves stable or improved neurological status  3/11/2025 1131 by Krystyna Mireles RN  Outcome: Progressing     Problem: Cardiovascular - Adult  Goal: Maintains optimal cardiac output and hemodynamic stability  3/11/2025 1131 by Krystyna Mireles RN  Outcome: Progressing     Problem: Gastrointestinal - Adult  Goal: Maintains or returns to baseline bowel function  3/11/2025 1131 by Krystyna Mireles RN  Outcome: Progressing     Problem: Genitourinary - Adult  Goal: Absence of urinary retention  3/11/2025 1131 by Krystyna Mireles RN  Outcome: Not Progressing     Problem: Genitourinary - Adult  Goal: Urinary catheter remains patent  3/11/2025 1131 by Krystyna Mireles RN  Outcome: Completed     Problem: Infection - Adult  Goal: Absence of infection at discharge  3/11/2025 1131 by Krystyna Mireles RN  Outcome: Progressing     Problem: Pain  Goal: Verbalizes/displays adequate comfort level or baseline comfort level  Outcome: Progressing     Problem: Discharge Planning  Goal: Discharge to home or other facility with appropriate resources  3/11/2025 1131 by Krystyna Mierles RN  Outcome: Not Progressing  3/11/2025 0208 by Monique Menchaca RN  Outcome: Progressing     Problem: Genitourinary - Adult  Goal: Absence of urinary retention  3/11/2025 1131 by Krystyna Mireles RN  Outcome: Not Progressing  3/11/2025 0208 by Monique Menchaca RN  Outcome: Progressing

## 2025-03-11 NOTE — PROGRESS NOTES
[facility]  Nephrology Attending Progress Note  Alexandru Owen MD        SUBJECTIVE:     HPI:   3/9:  79 yrs old HF , Known h/o prior CVA s/p intracranial thrombectomy 2023 December .      Came in with above mentioned issues , noted to be in Rapid A fib , hypotensive , lethargic , already known to   Cardiology team on enteresto , eliquis , toprol , amiodarone .      Patient remained in rapid a fib , hypotensive , was diuresed , developed ANTONIO , Acidosis , , Lactic acidosis , shock liver   Coagulopathy , her LV function detoriorated to 10-15% due to a fib related Cardiomyopathy , possible myocardial injury ( high troponin )     Her HR now is better , she appears hemodynamically stable , alert and responsive in no distress       SUBJECTIVE:    3/10: pt seen and examined in the icu, chart reviewed. On cvvh, kurtis icu nurse, tolerating -50 ml/hr, in bipap. Lethargic, mumbles that she is ok    3/11: pt seen and examined. On cvvh, intubated. 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)    Ischemic cerebrovascular accident (CVA) (HCC)    Atrial flutter (HCC)    Paroxysmal atrial fibrillation (HCC)    H/O: stroke    Atrial flutter with rapid ventricular response (HCC)    Decompensated heart failure (HCC)    Chronic atrial fibrillation (HCC)    VHD (valvular heart disease)        PAST MEDICAL HISTORY:    Past Medical History:   Diagnosis Date    Cerebral artery occlusion with cerebral infarction (HCC)        DIET:    ADULT DIET; Regular; Low Sodium (2 gm)  ADULT ORAL NUTRITION SUPPLEMENT; Breakfast, Lunch, Dinner, HS Snack, AM Snack, PM Snack; Low Calorie/High Protein Oral Supplement     PHYSICAL EXAM:     Patient Vitals for the past 24 hrs:   BP Temp Temp src Pulse Resp SpO2   03/11/25 1100 (!) 125/47 -- -- 91 26 98 %   03/11/25 1030 -- -- -- 95 26 92 %   03/11/25 1000 (!)  NEGATIVE 03/08/2025 09:29 AM    KETUA TRACE 03/08/2025 09:29 AM    UROBILINOGEN 1.0 03/08/2025 09:29 AM    BILIRUBINUR SMALL 03/08/2025 09:29 AM       No results found for: \"LIPIDPAN\"      IMPRESSION/RECOMMENDATIONS:      1) mayra superimposed on CKD 3a  baseline cr at 1-1.3 range 2023   setting of hypotension, aib, HFrEF, entresto, diuretics  Anuric   Antihypertensives , diuretics now are on hold  Continues on levo  and is now on epi       Started cvvh  Uo 0     2)  shock liver   Coagulopathy   eliquis on hold   INR corrected with FFP to some extent   Amiodarone on hold      3)   A Fib with rapid venticular rate  Off amio due to lfts  HR better at present   Per cardiology     4)  hypotension/sepsis  Influenza pos  On empiric abx  Pressor support  Underlying HFrEF     5)   Mild to moderate todd PL effusion  possible HF related  Monitor with diuretics on hold     6) High TSH   Per IM         Alexandru Owen MD

## 2025-03-11 NOTE — PROGRESS NOTES
Patient reaches for oxygen tubing and IV lines when not restrained. Patient in bilateral soft wrist restraints for safety.

## 2025-03-12 ENCOUNTER — APPOINTMENT (OUTPATIENT)
Dept: GENERAL RADIOLOGY | Age: 80
DRG: 004 | End: 2025-03-12
Payer: MEDICARE

## 2025-03-12 ENCOUNTER — APPOINTMENT (OUTPATIENT)
Dept: CT IMAGING | Age: 80
DRG: 004 | End: 2025-03-12
Payer: MEDICARE

## 2025-03-12 LAB
ALBUMIN SERPL-MCNC: 2.9 G/DL (ref 3.5–5.2)
ALP SERPL-CCNC: 171 U/L (ref 35–104)
ALT SERPL-CCNC: 1279 U/L (ref 0–32)
AMMONIA PLAS-SCNC: 24 UMOL/L (ref 11–51)
ANION GAP SERPL CALCULATED.3IONS-SCNC: 10 MMOL/L (ref 7–16)
ANION GAP SERPL CALCULATED.3IONS-SCNC: 11 MMOL/L (ref 7–16)
ANION GAP SERPL CALCULATED.3IONS-SCNC: 14 MMOL/L (ref 7–16)
AST SERPL-CCNC: 1849 U/L (ref 0–31)
B.E.: -5.5 MMOL/L (ref -3–3)
BASOPHILS # BLD: 0 K/UL (ref 0–0.2)
BASOPHILS NFR BLD: 0 % (ref 0–2)
BILIRUB DIRECT SERPL-MCNC: 1.5 MG/DL (ref 0–0.3)
BILIRUB INDIRECT SERPL-MCNC: 0.6 MG/DL (ref 0–1)
BILIRUB SERPL-MCNC: 2.1 MG/DL (ref 0–1.2)
BUN SERPL-MCNC: 11 MG/DL (ref 6–23)
BUN SERPL-MCNC: 11 MG/DL (ref 6–23)
BUN SERPL-MCNC: 12 MG/DL (ref 6–23)
BUN SERPL-MCNC: 12 MG/DL (ref 6–23)
BUN SERPL-MCNC: 13 MG/DL (ref 6–23)
CA-I BLD-SCNC: 1.26 MMOL/L (ref 1.15–1.33)
CA-I BLD-SCNC: 1.27 MMOL/L (ref 1.15–1.33)
CA-I BLD-SCNC: 1.33 MMOL/L (ref 1.15–1.33)
CALCIUM SERPL-MCNC: 8.6 MG/DL (ref 8.6–10.2)
CALCIUM SERPL-MCNC: 8.9 MG/DL (ref 8.6–10.2)
CALCIUM SERPL-MCNC: 9.1 MG/DL (ref 8.6–10.2)
CALCIUM SERPL-MCNC: 9.1 MG/DL (ref 8.6–10.2)
CALCIUM SERPL-MCNC: 9.2 MG/DL (ref 8.6–10.2)
CHLORIDE SERPL-SCNC: 104 MMOL/L (ref 98–107)
CHLORIDE SERPL-SCNC: 107 MMOL/L (ref 98–107)
CHLORIDE SERPL-SCNC: 109 MMOL/L (ref 98–107)
CO2 SERPL-SCNC: 17 MMOL/L (ref 22–29)
CO2 SERPL-SCNC: 19 MMOL/L (ref 22–29)
CO2 SERPL-SCNC: 20 MMOL/L (ref 22–29)
COHB: 0.3 % (ref 0–1.5)
CREAT SERPL-MCNC: 0.8 MG/DL (ref 0.5–1)
CREAT SERPL-MCNC: 0.9 MG/DL (ref 0.5–1)
CRITICAL: ABNORMAL
DATE ANALYZED: ABNORMAL
DATE OF COLLECTION: ABNORMAL
EOSINOPHIL # BLD: 0 K/UL (ref 0.05–0.5)
EOSINOPHILS RELATIVE PERCENT: 0 % (ref 0–6)
ERYTHROCYTE [DISTWIDTH] IN BLOOD BY AUTOMATED COUNT: 16.5 % (ref 11.5–15)
GFR, ESTIMATED: 70 ML/MIN/1.73M2
GFR, ESTIMATED: 72 ML/MIN/1.73M2
GFR, ESTIMATED: 73 ML/MIN/1.73M2
GFR, ESTIMATED: 73 ML/MIN/1.73M2
GFR, ESTIMATED: 79 ML/MIN/1.73M2
GLUCOSE BLD-MCNC: 114 MG/DL (ref 74–99)
GLUCOSE BLD-MCNC: 87 MG/DL (ref 74–99)
GLUCOSE BLD-MCNC: 87 MG/DL (ref 74–99)
GLUCOSE BLD-MCNC: 94 MG/DL (ref 74–99)
GLUCOSE SERPL-MCNC: 106 MG/DL (ref 74–99)
GLUCOSE SERPL-MCNC: 107 MG/DL (ref 74–99)
GLUCOSE SERPL-MCNC: 109 MG/DL (ref 74–99)
GLUCOSE SERPL-MCNC: 124 MG/DL (ref 74–99)
GLUCOSE SERPL-MCNC: 99 MG/DL (ref 74–99)
HAV IGM SERPL QL IA: NONREACTIVE
HBV CORE IGM SERPL QL IA: NONREACTIVE
HBV SURFACE AG SERPL QL IA: NONREACTIVE
HCO3: 18.8 MMOL/L (ref 22–26)
HCT VFR BLD AUTO: 33.1 % (ref 34–48)
HCV AB SERPL QL IA: NONREACTIVE
HGB BLD-MCNC: 10.4 G/DL (ref 11.5–15.5)
HHB: 2.7 % (ref 0–5)
INR PPP: 1.6
LAB: ABNORMAL
LYMPHOCYTES NFR BLD: 0.38 K/UL (ref 1.5–4)
LYMPHOCYTES RELATIVE PERCENT: 2 % (ref 20–42)
Lab: 1745
MAGNESIUM SERPL-MCNC: 1.8 MG/DL (ref 1.6–2.6)
MAGNESIUM SERPL-MCNC: 1.9 MG/DL (ref 1.6–2.6)
MAGNESIUM SERPL-MCNC: 1.9 MG/DL (ref 1.6–2.6)
MCH RBC QN AUTO: 26.8 PG (ref 26–35)
MCHC RBC AUTO-ENTMCNC: 31.4 G/DL (ref 32–34.5)
MCV RBC AUTO: 85.3 FL (ref 80–99.9)
METHB: 0.7 % (ref 0–1.5)
MODE: ABNORMAL
MONOCYTES NFR BLD: 0.57 K/UL (ref 0.1–0.95)
MONOCYTES NFR BLD: 3 % (ref 2–12)
NEUTROPHILS NFR BLD: 95 % (ref 43–80)
NEUTS SEG NFR BLD: 17.96 K/UL (ref 1.8–7.3)
NUCLEATED RED BLOOD CELLS: 3 PER 100 WBC
O2 SATURATION: 97.3 % (ref 92–98.5)
O2HB: 96.3 % (ref 94–97)
OPERATOR ID: 405
PATIENT TEMP: 37 C
PCO2: 32.4 MMHG (ref 35–45)
PH BLOOD GAS: 7.38 (ref 7.35–7.45)
PHOSPHATE SERPL-MCNC: 1.9 MG/DL (ref 2.5–4.5)
PHOSPHATE SERPL-MCNC: 2 MG/DL (ref 2.5–4.5)
PHOSPHATE SERPL-MCNC: 2 MG/DL (ref 2.5–4.5)
PHOSPHATE SERPL-MCNC: 2.4 MG/DL (ref 2.5–4.5)
PHOSPHATE SERPL-MCNC: 2.7 MG/DL (ref 2.5–4.5)
PLATELET # BLD AUTO: 142 K/UL (ref 130–450)
PMV BLD AUTO: 10.1 FL (ref 7–12)
PO2: 99.8 MMHG (ref 75–100)
POTASSIUM SERPL-SCNC: 3.6 MMOL/L (ref 3.5–5)
POTASSIUM SERPL-SCNC: 3.7 MMOL/L (ref 3.5–5)
POTASSIUM SERPL-SCNC: 3.9 MMOL/L (ref 3.5–5)
POTASSIUM SERPL-SCNC: 3.9 MMOL/L (ref 3.5–5)
POTASSIUM SERPL-SCNC: 4.2 MMOL/L (ref 3.5–5)
PROT SERPL-MCNC: 4.8 G/DL (ref 6.4–8.3)
PROTHROMBIN TIME: 17.5 SEC (ref 9.3–12.4)
RBC # BLD AUTO: 3.88 M/UL (ref 3.5–5.5)
RBC # BLD: ABNORMAL 10*6/UL
SODIUM SERPL-SCNC: 137 MMOL/L (ref 132–146)
SODIUM SERPL-SCNC: 137 MMOL/L (ref 132–146)
SODIUM SERPL-SCNC: 138 MMOL/L (ref 132–146)
SODIUM SERPL-SCNC: 138 MMOL/L (ref 132–146)
SODIUM SERPL-SCNC: 141 MMOL/L (ref 132–146)
SOURCE, BLOOD GAS: ABNORMAL
T3 SERPL-MCNC: 48 NG/DL (ref 80–200)
T4 FREE SERPL-MCNC: 0.7 NG/DL (ref 0.9–1.7)
THB: 10.2 G/DL (ref 11.5–16.5)
TIME ANALYZED: 1805
TSH SERPL DL<=0.05 MIU/L-ACNC: 2.39 UIU/ML (ref 0.27–4.2)
WBC OTHER # BLD: 18.9 K/UL (ref 4.5–11.5)

## 2025-03-12 PROCEDURE — 6360000002 HC RX W HCPCS: Performed by: INTERNAL MEDICINE

## 2025-03-12 PROCEDURE — 6370000000 HC RX 637 (ALT 250 FOR IP)

## 2025-03-12 PROCEDURE — 99291 CRITICAL CARE FIRST HOUR: CPT | Performed by: INTERNAL MEDICINE

## 2025-03-12 PROCEDURE — 85025 COMPLETE CBC W/AUTO DIFF WBC: CPT

## 2025-03-12 PROCEDURE — 82330 ASSAY OF CALCIUM: CPT

## 2025-03-12 PROCEDURE — 84443 ASSAY THYROID STIM HORMONE: CPT

## 2025-03-12 PROCEDURE — 82140 ASSAY OF AMMONIA: CPT

## 2025-03-12 PROCEDURE — 2500000003 HC RX 250 WO HCPCS

## 2025-03-12 PROCEDURE — 99231 SBSQ HOSP IP/OBS SF/LOW 25: CPT | Performed by: INTERNAL MEDICINE

## 2025-03-12 PROCEDURE — 80074 ACUTE HEPATITIS PANEL: CPT

## 2025-03-12 PROCEDURE — 6360000002 HC RX W HCPCS

## 2025-03-12 PROCEDURE — 83735 ASSAY OF MAGNESIUM: CPT

## 2025-03-12 PROCEDURE — 90945 DIALYSIS ONE EVALUATION: CPT

## 2025-03-12 PROCEDURE — 82962 GLUCOSE BLOOD TEST: CPT

## 2025-03-12 PROCEDURE — 2580000003 HC RX 258

## 2025-03-12 PROCEDURE — 85610 PROTHROMBIN TIME: CPT

## 2025-03-12 PROCEDURE — 2580000003 HC RX 258: Performed by: INTERNAL MEDICINE

## 2025-03-12 PROCEDURE — 51798 US URINE CAPACITY MEASURE: CPT

## 2025-03-12 PROCEDURE — P9047 ALBUMIN (HUMAN), 25%, 50ML: HCPCS | Performed by: INTERNAL MEDICINE

## 2025-03-12 PROCEDURE — 84439 ASSAY OF FREE THYROXINE: CPT

## 2025-03-12 PROCEDURE — 71045 X-RAY EXAM CHEST 1 VIEW: CPT

## 2025-03-12 PROCEDURE — 99222 1ST HOSP IP/OBS MODERATE 55: CPT

## 2025-03-12 PROCEDURE — 6370000000 HC RX 637 (ALT 250 FOR IP): Performed by: INTERNAL MEDICINE

## 2025-03-12 PROCEDURE — 2500000003 HC RX 250 WO HCPCS: Performed by: INTERNAL MEDICINE

## 2025-03-12 PROCEDURE — 80053 COMPREHEN METABOLIC PANEL: CPT

## 2025-03-12 PROCEDURE — 94640 AIRWAY INHALATION TREATMENT: CPT

## 2025-03-12 PROCEDURE — 84480 ASSAY TRIIODOTHYRONINE (T3): CPT

## 2025-03-12 PROCEDURE — 2700000000 HC OXYGEN THERAPY PER DAY

## 2025-03-12 PROCEDURE — 84100 ASSAY OF PHOSPHORUS: CPT

## 2025-03-12 PROCEDURE — 2000000000 HC ICU R&B

## 2025-03-12 PROCEDURE — 82248 BILIRUBIN DIRECT: CPT

## 2025-03-12 PROCEDURE — 70450 CT HEAD/BRAIN W/O DYE: CPT

## 2025-03-12 PROCEDURE — 82805 BLOOD GASES W/O2 SATURATION: CPT

## 2025-03-12 PROCEDURE — 80048 BASIC METABOLIC PNL TOTAL CA: CPT

## 2025-03-12 RX ORDER — WATER 10 ML/10ML
INJECTION INTRAMUSCULAR; INTRAVENOUS; SUBCUTANEOUS
Status: COMPLETED
Start: 2025-03-12 | End: 2025-03-12

## 2025-03-12 RX ORDER — THIAMINE HYDROCHLORIDE 100 MG/ML
250 INJECTION, SOLUTION INTRAMUSCULAR; INTRAVENOUS DAILY
Status: COMPLETED | OUTPATIENT
Start: 2025-03-12 | End: 2025-03-16

## 2025-03-12 RX ORDER — VITAMIN B COMPLEX
1000 TABLET ORAL DAILY
Status: DISCONTINUED | OUTPATIENT
Start: 2025-03-12 | End: 2025-03-12

## 2025-03-12 RX ORDER — ALBUMIN (HUMAN) 12.5 G/50ML
25 SOLUTION INTRAVENOUS EVERY 6 HOURS
Status: COMPLETED | OUTPATIENT
Start: 2025-03-12 | End: 2025-03-13

## 2025-03-12 RX ORDER — LACTULOSE 10 G/15ML
20 SOLUTION ORAL 3 TIMES DAILY
Status: DISCONTINUED | OUTPATIENT
Start: 2025-03-12 | End: 2025-03-30

## 2025-03-12 RX ADMIN — IPRATROPIUM BROMIDE AND ALBUTEROL SULFATE 1 DOSE: 2.5; .5 SOLUTION RESPIRATORY (INHALATION) at 09:07

## 2025-03-12 RX ADMIN — Medication 31 MCG/MIN: at 13:53

## 2025-03-12 RX ADMIN — Medication: at 14:54

## 2025-03-12 RX ADMIN — SODIUM CHLORIDE: 9 INJECTION, SOLUTION INTRAVENOUS at 12:24

## 2025-03-12 RX ADMIN — Medication 1000 UNITS: at 08:59

## 2025-03-12 RX ADMIN — HYDROCORTISONE SODIUM SUCCINATE 100 MG: 100 INJECTION, POWDER, FOR SOLUTION INTRAMUSCULAR; INTRAVENOUS at 04:17

## 2025-03-12 RX ADMIN — CEFEPIME 2000 MG: 2 INJECTION, POWDER, FOR SOLUTION INTRAVENOUS at 08:08

## 2025-03-12 RX ADMIN — Medication: at 05:10

## 2025-03-12 RX ADMIN — ARFORMOTEROL TARTRATE 15 MCG: 15 SOLUTION RESPIRATORY (INHALATION) at 09:07

## 2025-03-12 RX ADMIN — APIXABAN 2.5 MG: 2.5 TABLET, FILM COATED ORAL at 19:55

## 2025-03-12 RX ADMIN — WATER 2 ML: 1 INJECTION INTRAMUSCULAR; INTRAVENOUS; SUBCUTANEOUS at 11:34

## 2025-03-12 RX ADMIN — BUDESONIDE INHALATION 500 MCG: 0.5 SUSPENSION RESPIRATORY (INHALATION) at 21:04

## 2025-03-12 RX ADMIN — Medication: at 10:05

## 2025-03-12 RX ADMIN — METHYLENE BLUE 98 MG: 10 INJECTION INTRAVENOUS at 13:36

## 2025-03-12 RX ADMIN — HEPARIN SODIUM 1200 UNITS: 1000 INJECTION INTRAVENOUS; SUBCUTANEOUS at 22:14

## 2025-03-12 RX ADMIN — Medication 27 MCG/MIN: at 04:59

## 2025-03-12 RX ADMIN — IPRATROPIUM BROMIDE AND ALBUTEROL SULFATE 1 DOSE: 2.5; .5 SOLUTION RESPIRATORY (INHALATION) at 11:49

## 2025-03-12 RX ADMIN — THIAMINE HYDROCHLORIDE 250 MG: 100 INJECTION, SOLUTION INTRAMUSCULAR; INTRAVENOUS at 13:22

## 2025-03-12 RX ADMIN — OSELTAMIVIR 30 MG: 30 CAPSULE ORAL at 08:59

## 2025-03-12 RX ADMIN — LACTULOSE 20 G: 20 SOLUTION ORAL at 19:55

## 2025-03-12 RX ADMIN — SODIUM CHLORIDE: 9 INJECTION, SOLUTION INTRAVENOUS at 05:10

## 2025-03-12 RX ADMIN — SODIUM CHLORIDE: 9 INJECTION, SOLUTION INTRAVENOUS at 00:03

## 2025-03-12 RX ADMIN — SODIUM PHOSPHATE, MONOBASIC, MONOHYDRATE AND SODIUM PHOSPHATE, DIBASIC, ANHYDROUS 6 MMOL: 142; 276 INJECTION, SOLUTION INTRAVENOUS at 20:37

## 2025-03-12 RX ADMIN — LACTULOSE 20 G: 20 SOLUTION ORAL at 13:53

## 2025-03-12 RX ADMIN — Medication: at 02:31

## 2025-03-12 RX ADMIN — IPRATROPIUM BROMIDE AND ALBUTEROL SULFATE 1 DOSE: 2.5; .5 SOLUTION RESPIRATORY (INHALATION) at 21:04

## 2025-03-12 RX ADMIN — PIPERACILLIN AND TAZOBACTAM 4500 MG: 4; .5 INJECTION, POWDER, FOR SOLUTION INTRAVENOUS at 17:49

## 2025-03-12 RX ADMIN — ALBUMIN (HUMAN) 25 G: 0.25 INJECTION, SOLUTION INTRAVENOUS at 13:40

## 2025-03-12 RX ADMIN — SODIUM PHOSPHATE, MONOBASIC, MONOHYDRATE AND SODIUM PHOSPHATE, DIBASIC, ANHYDROUS 6 MMOL: 142; 276 INJECTION, SOLUTION INTRAVENOUS at 08:10

## 2025-03-12 RX ADMIN — CEFEPIME 2000 MG: 2 INJECTION, POWDER, FOR SOLUTION INTRAVENOUS at 00:07

## 2025-03-12 RX ADMIN — SODIUM CHLORIDE, PRESERVATIVE FREE 10 ML: 5 INJECTION INTRAVENOUS at 19:55

## 2025-03-12 RX ADMIN — APIXABAN 2.5 MG: 2.5 TABLET, FILM COATED ORAL at 08:59

## 2025-03-12 RX ADMIN — Medication: at 00:04

## 2025-03-12 RX ADMIN — SODIUM PHOSPHATE, MONOBASIC, MONOHYDRATE AND SODIUM PHOSPHATE, DIBASIC, ANHYDROUS 12 MMOL: 142; 276 INJECTION, SOLUTION INTRAVENOUS at 14:58

## 2025-03-12 RX ADMIN — SODIUM CHLORIDE, PRESERVATIVE FREE 10 ML: 5 INJECTION INTRAVENOUS at 08:59

## 2025-03-12 RX ADMIN — Medication: at 17:22

## 2025-03-12 RX ADMIN — CALCIUM CHLORIDE: 100 INJECTION INTRAVENOUS; INTRAVENTRICULAR at 15:06

## 2025-03-12 RX ADMIN — ARFORMOTEROL TARTRATE 15 MCG: 15 SOLUTION RESPIRATORY (INHALATION) at 21:04

## 2025-03-12 RX ADMIN — HEPARIN SODIUM 1600 UNITS: 1000 INJECTION INTRAVENOUS; SUBCUTANEOUS at 22:15

## 2025-03-12 RX ADMIN — IPRATROPIUM BROMIDE AND ALBUTEROL SULFATE 1 DOSE: 2.5; .5 SOLUTION RESPIRATORY (INHALATION) at 16:45

## 2025-03-12 RX ADMIN — HYDROCORTISONE SODIUM SUCCINATE 100 MG: 100 INJECTION, POWDER, FOR SOLUTION INTRAMUSCULAR; INTRAVENOUS at 19:55

## 2025-03-12 RX ADMIN — Medication: at 07:34

## 2025-03-12 RX ADMIN — SODIUM PHOSPHATE, MONOBASIC, MONOHYDRATE AND SODIUM PHOSPHATE, DIBASIC, ANHYDROUS 6 MMOL: 142; 276 INJECTION, SOLUTION INTRAVENOUS at 01:19

## 2025-03-12 RX ADMIN — ALBUMIN (HUMAN) 25 G: 0.25 INJECTION, SOLUTION INTRAVENOUS at 17:25

## 2025-03-12 RX ADMIN — SODIUM CHLORIDE: 9 INJECTION, SOLUTION INTRAVENOUS at 14:52

## 2025-03-12 RX ADMIN — HYDROCORTISONE SODIUM SUCCINATE 100 MG: 100 INJECTION, POWDER, FOR SOLUTION INTRAMUSCULAR; INTRAVENOUS at 11:34

## 2025-03-12 RX ADMIN — Medication: at 19:44

## 2025-03-12 RX ADMIN — BUDESONIDE INHALATION 500 MCG: 0.5 SUSPENSION RESPIRATORY (INHALATION) at 09:07

## 2025-03-12 RX ADMIN — SODIUM CHLORIDE: 9 INJECTION, SOLUTION INTRAVENOUS at 21:29

## 2025-03-12 ASSESSMENT — PAIN SCALES - GENERAL
PAINLEVEL_OUTOF10: 0

## 2025-03-12 NOTE — CONSULTS
Palliative Care Department  [phone]  Palliative Care Initial Consult  Provider Kim Schofield, CONG - CNP      PATIENT: Nikki Moffett  : 1945  MRN: [id number]  ADMISSION DATE: 3/4/2025  5:05 PM  Referring Provider: Kilo Dumont MD     Palliative Medicine was consulted on hospital day 8 for assistance with Goals of care    HPI:     Clinical Summary:Nikki Moffett is a 79 y.o. y/o female with a history of left MCA infarct SP thrombectomy, A-fib, diabetes, heart failure, who presented to [facility] on 3/4/2025 with fatigue and dizziness, ongoing for a month.  At ED, found tachycardic and dry on exam.  Chest x-ray shows bilateral pleural effusion and possible right lower lobe opacity, was treated initially with Zosyn and doxycycline and admitted for further medical evaluation.  Was seen by cardiology underwent cardioversion, became dyspneic and  hypotension postprocedure, was transferred to MICU for close monitoring.  Seen by cardiology SP TTE, on 3/7/2025, with EF 10 to 15%, mild mitral valve regurgitation, moderate transcript about regurgitation.    ASSESSMENT/PLAN:     Pertinent Hospital Diagnoses     Altered mental status  A-fib with RVR  Bilateral pleural effusion  Acute on chronic CHF  Anuric  ANTONIO on CKD on CVVH  History of left MCA stroke with M2 occlusion s/p thrombectomy      Palliative Care Encounter / Counseling Regarding Goals of Care  Please see detailed goals of care discussion as below  At this time, Nikki Moffett, Does Not have capacity for medical decision-making.  Capacity is time limited and situation/question specific  During encounter Shreyas was surrogate medical decision-maker  Outcome of goals of care meeting:  Continue with current medical treatment  Continue full code  Code status Full Code  Advanced Directives: no POA or living will in epic  Surrogate/Legal NOK:  Shreyas Moffett (Son) [phone]    Spiritual assessment: no spiritual distress identified  Bereavement

## 2025-03-12 NOTE — PROGRESS NOTES
[facility]  Internal Medicine Residency Program  Progress Note - House Team     Patient:  Nikki Moffett 79 y.o. female MRN: [id number]     Date of Service: 3/12/2025     CC:   Chief Complaint   Patient presents with    Altered Mental Status     \"Mood swings, memory loss and fatigue\" per patients grandson    Chest Pain     Ongoing for a couple weeks per family     Overnight events: none     Subjective     Patient seen and examined at bedside. She is confused does not respond to question. She does withdrawal to pain.     Objective     Physical Exam:  Vitals: BP (!) 108/43   Pulse 86   Temp 97.6 °F (36.4 °C) (Temporal)   Resp 24   Ht 1.575 m (5' 2\")   Wt 65.2 kg (143 lb 11.8 oz)   SpO2 97%   BMI 26.29 kg/m²     I & O - 24hr: No intake/output data recorded.   General appearance: No acute distress.    Neuro: Mental status: Confused, does not answer questions.   Cardiovascular: Regular rate and rhythm, S1 and S2  heard,   Respiratory: Clear to auscultation bilaterally.  No wheezing or crackles appreciated.  Abdomen: Soft, non tender, bowel sounds normal; masses, no organomegaly, rebound or guarding  Extremities: Extremities normal, no cyanosis, distal pulses intact,no edema      Pertinent Labs & Imaging Studies      Complete Blood Count:   Recent Labs     03/10/25  0539 03/11/25  0505 03/12/25  0528   WBC 11.3 14.9* 18.9*   HGB 9.6* 10.5* 10.4*   HCT 32.2* 34.3 33.1*    213 142        Last 3 Blood Glucose:   Recent Labs     03/11/25  1709 03/11/25  2300 03/12/25  0528   GLUCOSE 104* 106* 99        PT/INR:    Lab Results   Component Value Date/Time    PROTIME 20.4 03/11/2025 05:05 AM    INR 1.9 03/11/2025 05:05 AM     PTT:    Lab Results   Component Value Date/Time    APTT 42.0 03/08/2025 05:50 PM       Comprehensive Metabolic Profile:   Recent Labs     03/11/25  1709 03/11/25  2300 03/12/25  0528    138 138   K 4.4 4.2 3.9   * 109* 107   CO2 20* 19* 17*   BUN 11 11 12

## 2025-03-12 NOTE — PLAN OF CARE
Problem: Chronic Conditions and Co-morbidities  Goal: Patient's chronic conditions and co-morbidity symptoms are monitored and maintained or improved  3/11/2025 2216 by Monique Menchaca RN  Outcome: Progressing  3/11/2025 1131 by Krystyna Mireles RN  Outcome: Progressing     Problem: Discharge Planning  Goal: Discharge to home or other facility with appropriate resources  3/11/2025 2216 by Monique Menchaca RN  Outcome: Progressing  3/11/2025 1131 by Krystyna Mireles RN  Outcome: Not Progressing     Problem: Safety - Adult  Goal: Free from fall injury  3/11/2025 2216 by Monique Menchaca RN  Outcome: Progressing  3/11/2025 1131 by Krystyna Mireles RN  Outcome: Progressing     Problem: Skin/Tissue Integrity  Goal: Skin integrity remains intact  Description: 1.  Monitor for areas of redness and/or skin breakdown  2.  Assess vascular access sites hourly  3.  Every 4-6 hours minimum:  Change oxygen saturation probe site  4.  Every 4-6 hours:  If on nasal continuous positive airway pressure, respiratory therapy assess nares and determine need for appliance change or resting period  3/11/2025 2216 by Monique Menchaca RN  Outcome: Progressing  3/11/2025 1131 by Krystyna Mireles RN  Outcome: Progressing     Problem: Nutrition Deficit:  Goal: Optimize nutritional status  3/11/2025 2216 by Monique Menchaca RN  Outcome: Progressing  3/11/2025 1131 by Krystyna Mireles RN  Outcome: Progressing     Problem: ABCDS Injury Assessment  Goal: Absence of physical injury  3/11/2025 2216 by Monique Menchaca RN  Outcome: Progressing  3/11/2025 1131 by Krystyna Mireles RN  Outcome: Progressing     Problem: Metabolic/Fluid and Electrolytes - Adult  Goal: Electrolytes maintained within normal limits  3/11/2025 2216 by Monique Menchaca RN  Outcome: Progressing  3/11/2025 1131 by Krystyna Mireles RN  Outcome: Progressing  Goal: Hemodynamic stability and optimal renal function  maintained  Outcome: Progressing  Goal: Glucose maintained within prescribed range  Outcome: Progressing     Problem: Hematologic - Adult  Goal: Maintains hematologic stability  3/11/2025 2216 by Monique Menchaca RN  Outcome: Progressing  3/11/2025 1131 by Krystnya Mireles RN  Outcome: Progressing     Problem: Neurosensory - Adult  Goal: Achieves stable or improved neurological status  3/11/2025 2216 by Monique Menchaca RN  Outcome: Progressing  3/11/2025 1131 by Krystyna Mireles RN  Outcome: Progressing  Goal: Achieves maximal functionality and self care  Outcome: Progressing     Problem: Respiratory - Adult  Goal: Achieves optimal ventilation and oxygenation  Outcome: Progressing     Problem: Cardiovascular - Adult  Goal: Maintains optimal cardiac output and hemodynamic stability  3/11/2025 2216 by Monique Menchaca RN  Outcome: Progressing  3/11/2025 1131 by Krystyna Mireles RN  Outcome: Progressing  Goal: Absence of cardiac dysrhythmias or at baseline  Outcome: Progressing     Problem: Musculoskeletal - Adult  Goal: Return mobility to safest level of function  Outcome: Progressing  Goal: Return ADL status to a safe level of function  Outcome: Progressing     Problem: Gastrointestinal - Adult  Goal: Maintains or returns to baseline bowel function  3/11/2025 2216 by Monique Menchaca RN  Outcome: Progressing  3/11/2025 1131 by Krystyna Mireles RN  Outcome: Progressing  Goal: Maintains adequate nutritional intake  Outcome: Progressing     Problem: Genitourinary - Adult  Goal: Absence of urinary retention  3/11/2025 2216 by Monique Menchaca RN  Outcome: Progressing  3/11/2025 1131 by Krystyna Mireles RN  Outcome: Not Progressing     Problem: Infection - Adult  Goal: Absence of infection at discharge  3/11/2025 2216 by Monique Menchaca RN  Outcome: Progressing  3/11/2025 1131 by Krystyna Mireles RN  Outcome: Progressing  Goal: Absence of infection during  Monique Menchaca, RN  Outcome: Progressing  3/11/2025 1131 by Krystyna Mireles, RN  Outcome: Not Progressing     Problem: Genitourinary - Adult  Goal: Absence of urinary retention  3/11/2025 2216 by Monique Menchaca, RN  Outcome: Progressing  3/11/2025 1131 by Krystyna Mireles, RN  Outcome: Not Progressing

## 2025-03-12 NOTE — PLAN OF CARE
Attempted to call patient's son Shreyas Moffett but unable to go through. Will attempt to call again later.     Electronically signed by Kilo Dumont MD on 3/12/2025 at 3:10 PM

## 2025-03-12 NOTE — PROGRESS NOTES
Physical Therapy    PT orders received for evaluation/treatment.  Pt chart reviewed and discussed with nursing staff.  Pt is not currently appropriate for PT services d/t not medically appropriate.  PT will sign off at this time.  Please re-consult when needed.  Thank you.     Chema Coleman, PT, DPT  [id number]

## 2025-03-12 NOTE — PROGRESS NOTES
[facility] [place]  Internal Medicine Residency / House Medicine Service    Attending Physician Statement  I have discussed the case, including pertinent history and exam findings with the resident and the team.  I have seen and examined the patient and the key elements of the encounter have been performed by me.  I agree with the assessment, plan and orders as documented by the resident.      Resting well  BP supported on Levophed  Resolving Influenza A  Entering into MOF, Acute renal and hepatic failure  Optimize BP with very low EF, HR controlled  Plan; Cefepime and Addisonian replacement \  Eliquis 2.5 mg bid for AFib  Plan; Same ICU care           Considering SG catheter placement   Remainder of medical problems as per resident note.      Dalton Sosa MD FRCP [facility]  Internal Medicine Residency Faculty

## 2025-03-12 NOTE — PROGRESS NOTES
[facility]  Nephrology Attending Progress Note  Alexandru Owen MD        SUBJECTIVE:     HPI:   3/9:  79 yrs old HF , Known h/o prior CVA s/p intracranial thrombectomy 2023 December .      Came in with above mentioned issues , noted to be in Rapid A fib , hypotensive , lethargic , already known to   Cardiology team on enteresto , eliquis , toprol , amiodarone .      Patient remained in rapid a fib , hypotensive , was diuresed , developed ANTONIO , Acidosis , , Lactic acidosis , shock liver   Coagulopathy , her LV function detoriorated to 10-15% due to a fib related Cardiomyopathy , possible myocardial injury ( high troponin )     Her HR now is better , she appears hemodynamically stable , alert and responsive in no distress       SUBJECTIVE:    3/10: pt seen and examined in the icu, chart reviewed. On cvvh, dw icu nurse, tolerating -50 ml/hr, in bipap. Lethargic, mumbles that she is ok    3/11: pt seen and examined. On cvvh, intubated. On epi and levo, dw icu nurse, keeping even with cvvh at this time    3/12: pt seen in icu, intubated on cvvh. Levo, running even. 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)    Ischemic cerebrovascular accident (CVA) (HCC)    Atrial flutter (HCC)    Paroxysmal atrial fibrillation (HCC)    H/O: stroke    Atrial flutter with rapid ventricular response (HCC)    Decompensated heart failure (HCC)    Chronic atrial fibrillation (HCC)    VHD (valvular heart disease)        PAST MEDICAL HISTORY:    Past Medical History:   Diagnosis Date    Cerebral artery occlusion with cerebral infarction (HCC)        DIET:    ADULT DIET; Regular; Low Sodium (2 gm)  ADULT ORAL NUTRITION SUPPLEMENT; Breakfast, Lunch, Dinner, HS Snack, AM Snack, PM Snack; Low Calorie/High Protein Oral Supplement     PHYSICAL EXAM:     Patient Vitals for the past 24 hrs:   BP Temp Temp src  [facility]  INFECTIOUS DISEASES AND INTERNAL MEDICINE at [place]  =======================================================  Carlo Hernandez MD  Diplomates American Board of Internal Medicine and Infectious Diseases  Telephone [phone]  Fax            [phone]  =======================================================    [facility]-[id number]  MIRANDA RAYO   follow up for:  + lyme test; pericarditis  patient seen and examined.     s/p midline right arm. some bleeding.  reinforcement done to dressing    I have personally reviewed the labs and data; pertinent labs and data are listed in this note; please see below.   ===================================================  REVIEW OF SYSTEMS:  CONSTITUTIONAL:  No Fever or chills  HEENT:  No diplopia or blurred vision.  No earache, sore throat or runny nose.  CARDIOVASCULAR:  No pressure, squeezing, strangling, tightness, heaviness or aching about the chest, neck, axilla or epigastrium.  RESPIRATORY:  No cough, shortness of breath  GASTROINTESTINAL:  No nausea, vomiting or diarrhea.  GENITOURINARY:  No dysuria, frequency or urgency. No Blood in urine  MUSCULOSKELETAL:  no joint aches, no muscle pain  SKIN:  No change in skin, hair or nails.  NEUROLOGIC:  No Headaches, seizures or weakness.  PSYCHIATRIC:  No disorder of thought or mood.  ENDOCRINE:  No heat or cold intolerance  HEMATOLOGICAL:  No easy bruising or bleeding.   =======================================================  Allergies  No Known Allergies    Antibiotics:  cefTRIAXone   IVPB 2000 milliGRAM(s) IV Intermittent every 24 hours    Other medications:  chlorhexidine 2% Cloths 1 Application(s) Topical <User Schedule>  colchicine 0.6 milliGRAM(s) Oral two times a day  enoxaparin Injectable 40 milliGRAM(s) SubCutaneous every 12 hours  furosemide   Injectable 20 milliGRAM(s) IV Push two times a day  ibuprofen  Tablet. 600 milliGRAM(s) Oral every 6 hours  insulin lispro (HumaLOG) corrective regimen sliding scale   SubCutaneous Before meals and at bedtime  metFORMIN 1000 milliGRAM(s) Oral two times a day  metoprolol tartrate 25 milliGRAM(s) Oral every 6 hours  pantoprazole    Tablet 40 milliGRAM(s) Oral before breakfast  saccharomyces boulardii 250 milliGRAM(s) Oral two times a day  sodium chloride 0.9% lock flush 3 milliLiter(s) IV Push every 8 hours  sodium chloride 0.9% lock flush 3 milliLiter(s) IV Push every 8 hours      ======================================================  Physical Exam:  ============  T(F): 98.5 (03 Oct 2019 05:44), Max: 98.7 (02 Oct 2019 21:51)  HR: 91 (03 Oct 2019 05:44)  BP: 131/82 (03 Oct 2019 05:44)  RR: 20 (03 Oct 2019 05:44)  SpO2: 97% (03 Oct 2019 05:44) (96% - 99%)  temp max in last 48H T(F): , Max: 99.4 (10-01-19 @ 21:00)    General:  No acute distress.  OBESE  Eye: Pupils are equal, round and reactive to light, Extraocular movements are intact, Normal conjunctiva.  HENT: Normocephalic, Oral mucosa is moist, No pharyngeal erythema, No sinus tenderness.  Neck: Supple, No lymphadenopathy.  Respiratory: Lungs are clear to auscultation, Respirations are non-labored.  LEFT sided chest tube  Cardiovascular: Normal rate, Regular rhythm,   Gastrointestinal: Soft, Non-tender, Non-distended, Normal bowel sounds.  Genitourinary: No costovertebral angle tenderness.  Lymphatics: No lymphadenopathy neck,   Musculoskeletal: Normal range of motion, Normal strength.  Integumentary: No rash.  Neurologic: Alert, Oriented, No focal deficits, Cranial Nerves II-XII are grossly intact.  Psychiatric: Appropriate mood & affect.    =======================================================  Labs:                        10.9   15.44 )-----------( 531      ( 03 Oct 2019 06:13 )             36.1       WBC Count: 15.44 K/uL (10-03-19 @ 06:13)  WBC Count: 15.88 K/uL (10-02-19 @ 06:14)  WBC Count: 13.73 K/uL (10-01-19 @ 03:05)  WBC Count: 15.20 K/uL (09-30-19 @ 10:50)  WBC Count: 16.58 K/uL (09-30-19 @ 02:14)  WBC Count: 15.49 K/uL (09-29-19 @ 08:32)      10-03    135  |  100  |  15.0  ----------------------------<  126<H>  4.2   |  25.0  |  0.75    Ca    8.6      03 Oct 2019 06:13  Phos  3.5     10-03  Mg     1.9     10-03    TPro  6.1<L>  /  Alb  2.5<L>  /  TBili  0.5  /  DBili  x   /  AST  14  /  ALT  14  /  AlkPhos  118  10-03      Culture - Body Fluid with Gram Stain (collected 09-30-19 @ 12:24)  Source: .Body Fluid Pericardial Effusion  Gram Stain (09-30-19 @ 12:48):    Few White blood cells    No organisms seen Pulse Resp SpO2   03/12/25 1000 -- -- -- 92 23 97 %   03/12/25 0930 -- -- -- 86 30 99 %   03/12/25 0914 -- -- -- 83 25 100 %   03/12/25 0909 -- -- -- 81 23 96 %   03/12/25 0900 -- -- -- 82 27 90 %   03/12/25 0830 -- -- -- 83 27 96 %   03/12/25 0800 (!) 105/41 97.3 °F (36.3 °C) Temporal 89 20 96 %   03/12/25 0730 -- -- -- 85 25 96 %   03/12/25 0700 -- -- -- 86 24 97 %   03/12/25 0600 -- -- -- 85 (!) 32 95 %   03/12/25 0500 -- -- -- 94 (!) 35 96 %   03/12/25 0400 (!) 108/43 97.6 °F (36.4 °C) Temporal 84 20 97 %   03/12/25 0300 -- -- -- 84 26 97 %   03/12/25 0200 -- -- -- 88 (!) 33 98 %   03/12/25 0100 -- -- -- 84 30 100 %   03/12/25 0000 (!) 114/43 97.7 °F (36.5 °C) Temporal 85 26 97 %   03/11/25 2300 -- -- -- 86 23 97 %   03/11/25 2200 -- -- -- 83 30 98 %   03/11/25 2100 -- -- -- 84 (!) 33 98 %   03/11/25 2000 (!) 121/48 98 °F (36.7 °C) Temporal 82 28 98 %   03/11/25 1900 (!) 106/45 -- -- 87 (!) 39 90 %   03/11/25 1830 -- -- -- 87 (!) 38 98 %   03/11/25 1800 (!) 127/47 -- -- 88 23 98 %   03/11/25 1730 -- -- -- 96 (!) 31 97 %   03/11/25 1700 (!) 124/49 -- -- 95 (!) 32 98 %   03/11/25 1630 -- -- -- 86 (!) 36 97 %   03/11/25 1600 (!) 123/49 98.2 °F (36.8 °C) Temporal 92 18 97 %   03/11/25 1530 -- -- -- 94 23 97 %   03/11/25 1500 -- -- -- 94 14 96 %   03/11/25 1430 -- -- -- 95 (!) 31 97 %   03/11/25 1400 (!) 124/48 (!) 73 °F (22.8 °C) -- 94 30 97 %   03/11/25 1330 -- -- -- 93 24 96 %   03/11/25 1305 -- -- -- 96 29 98 %   03/11/25 1300 (!) 120/46 (!) 74 °F (23.3 °C) -- 98 28 98 %   03/11/25 1230 -- -- -- 99 22 97 %   03/11/25 1200 (!) 123/46 97.1 °F (36.2 °C) Tympanic 94 27 98 %   03/11/25 1138 -- -- -- -- -- 97 %   03/11/25 1130 -- -- -- 98 (!) 36 98 %   03/11/25 1100 (!) 125/47 -- -- 91 26 98 %   @      Intake/Output Summary (Last 24 hours) at 3/12/2025 1054  Last data filed at 3/12/2025 1000  Gross per 24 hour   Intake 3057.64 ml   Output 3217 ml   Net -159.36 ml         Wt Readings from Last 3 Encounters:   03/10/25  65.2 kg (143 lb 11.8 oz)   03/04/25 56.5 kg (124 lb 8 oz)   04/23/24 59.9 kg (132 lb)       Constitutional:  Pt is in no acute distress  Head: normocephalic, atraumatic  Neck: no JVD  Cardiovascular: regular rate and rhythm, no murmurs, gallops, or rubs  Respiratory:  No rales, rhochi, or wheezes  Gastrointestinal:  Soft, nontender, nondistended, bowel sounds x 4  Ext: no edema  Skin: dry, no rash    MEDS (scheduled):    sterile water        hydrocortisone sodium succinate PF  100 mg IntraVENous Q8H    cefepime  2,000 mg IntraVENous Q8H    insulin lispro  0-4 Units SubCUTAneous Q6H    oseltamivir  30 mg Oral Daily    sodium chloride flush  5-40 mL IntraVENous 2 times per day    apixaban  2.5 mg Oral BID    [Held by provider] furosemide  40 mg IntraVENous Daily    ipratropium 0.5 mg-albuterol 2.5 mg  1 Dose Inhalation Q4H WA RT    arformoterol tartrate  15 mcg Nebulization BID RT    budesonide  0.5 mg Nebulization BID RT    [Held by provider] midodrine  10 mg Oral TID WC    [Held by provider] metoprolol succinate  25 mg Oral Daily    [Held by provider] amiodarone  200 mg Oral BID    Vitamin D  1,000 Units Oral Daily    melatonin  3 mg Oral Nightly    [Held by provider] atorvastatin  80 mg Oral Nightly       MEDS (infusions):   norepinephrine 27 mcg/min (03/12/25 0652)    prismaSATE BGK 4/0/1.2 2,000 mL/hr at 03/12/25 1005    sodium chloride 100 mL/hr at 03/12/25 0510    calcium chloride 8,000 mg in sodium chloride 0.9 % 1,000 mL infusion 60 mL/hr at 03/12/25 0121    sodium chloride      dextrose         MEDS (prn):  sterile water, sulfur hexafluoride microspheres, heparin (PF), sodium chloride, heparin (porcine) **AND** heparin (porcine), potassium chloride, magnesium sulfate, calcium gluconate **OR** calcium gluconate **OR** calcium gluconate **OR** calcium gluconate, sodium phosphate 6 mmol in sodium chloride 0.9 % 250 mL IVPB **OR** sodium phosphate 12 mmol in sodium chloride 0.9 % 250 mL IVPB **OR** sodium

## 2025-03-12 NOTE — PLAN OF CARE
Problem: ABCDS Injury Assessment  Goal: Absence of physical injury  3/12/2025 0843 by Krystyna Mireles RN  Outcome: Progressing     Problem: Metabolic/Fluid and Electrolytes - Adult  Goal: Electrolytes maintained within normal limits  3/12/2025 0843 by Krystyna Mireles RN  Outcome: Progressing     Problem: Metabolic/Fluid and Electrolytes - Adult  Goal: Hemodynamic stability and optimal renal function maintained  3/12/2025 0843 by Krystyna Mireles RN  Outcome: Progressing     Problem: Metabolic/Fluid and Electrolytes - Adult  Goal: Glucose maintained within prescribed range  3/12/2025 0843 by Krystyna Mireles RN  Outcome: Progressing     Problem: Discharge Planning  Goal: Discharge to home or other facility with appropriate resources  3/12/2025 0842 by Krystyna Mireles RN  Outcome: Not Progressing  Note: CRRT continues  3/11/2025 2216 by Monique Menchaca RN  Outcome: Progressing     Problem: Safety - Medical Restraint  Goal: Remains free of injury from restraints (Restraint for Interference with Medical Device)  Description: INTERVENTIONS:  1. Determine that other, less restrictive measures have been tried or would not be effective before applying the restraint  2. Evaluate the patient's condition at the time of restraint application  3. Inform patient/family regarding the reason for restraint  4. Q2H: Monitor safety, psychosocial status, comfort, nutrition and hydration  3/12/2025 0841 by Krystyna Mireles RN  Outcome: Not Progressing  3/12/2025 0841 by Krystyna Mireles RN  Outcome: Progressing  Flowsheets (Taken 3/12/2025 0800)  Remains free of injury from restraints (restraint for interference with medical device):   Determine that other, less restrictive measures have been tried or would not be effective before applying the restraint   Evaluate the patient's condition at the time of restraint application   Inform patient/family regarding the reason for restraint

## 2025-03-12 NOTE — PLAN OF CARE
Problem: Safety - Medical Restraint  Goal: Remains free of injury from restraints (Restraint for Interference with Medical Device)  Description: INTERVENTIONS:  1. Determine that other, less restrictive measures have been tried or would not be effective before applying the restraint  2. Evaluate the patient's condition at the time of restraint application  3. Inform patient/family regarding the reason for restraint  4. Q2H: Monitor safety, psychosocial status, comfort, nutrition and hydration  3/12/2025 0841 by Krystyna Mireles RN  Outcome: Not Progressing  3/12/2025 0841 by Krystyna Mireles RN  Outcome: Progressing  Flowsheets (Taken 3/12/2025 0800)  Remains free of injury from restraints (restraint for interference with medical device):   Determine that other, less restrictive measures have been tried or would not be effective before applying the restraint   Evaluate the patient's condition at the time of restraint application   Inform patient/family regarding the reason for restraint   Every 2 hours: Monitor safety, psychosocial status, comfort, nutrition and hydration  3/11/2025 2216 by Monique Menchaca, RN  Outcome: Progressing  Flowsheets (Taken 3/11/2025 2000)  Remains free of injury from restraints (restraint for interference with medical device):   Every 2 hours: Monitor safety, psychosocial status, comfort, nutrition and hydration   Inform patient/family regarding the reason for restraint   Evaluate the patient's condition at the time of restraint application   Determine that other, less restrictive measures have been tried or would not be effective before applying the restraint

## 2025-03-12 NOTE — PROGRESS NOTES
[facility]  Department of Internal Medicine   Internal Medicine Residency   MICU Progress Note    Patient:  Nikki Moffett 79 y.o. female  MRN: [id number]     Date of Service: 3/12/2025    Allergy: Patient has no known allergies.    Subjective   ICU day 4d 19h    Overnight, off epi drip. Still on levophed but going down on pressor requirements. Still on CVVHD, still anuric. Patient seen and examined at bedside this morning, opens eyes but does not follow commands, does not respond appropriately. Moves all extremities nonpurposefully, occasionally grimaces.     Objective     VS: BP (!) 105/41   Pulse 82   Temp 97.3 °F (36.3 °C) (Temporal)   Resp 27   Ht 1.575 m (5' 2\")   Wt 65.2 kg (143 lb 11.8 oz)   SpO2 90%   BMI 26.29 kg/m²   ABP (Arterial line BP): 115/48ABP Mean (Arterial Line Mean): 69 mmHg    Arterial line BP: ABP (Arterial line BP): 115/48 ABP Mean (Arterial Line Mean): 69 mmHg    Physical Exam:  General Appearance: Lethargic, opens eyes but unable to sustain wakefulness  Neck: no adenopathy, no carotid bruit, no JVD, and supple, symmetrical, trachea midline  Lung: Rales bilateral  Heart: Irregular rhythm, normal rate  Abdomen: Soft nontender  Extremities:  Gr 1 lower extremity edema    Medications     Infusions: (Fluid, Sedation, Vasopressors)  Scheduled Meds:   hydrocortisone sodium succinate PF  100 mg IntraVENous Q8H    cefepime  2,000 mg IntraVENous Q8H    insulin lispro  0-4 Units SubCUTAneous Q6H    oseltamivir  30 mg Oral Daily    sodium chloride flush  5-40 mL IntraVENous 2 times per day    apixaban  2.5 mg Oral BID    [Held by provider] furosemide  40 mg IntraVENous Daily    ipratropium 0.5 mg-albuterol 2.5 mg  1 Dose Inhalation Q4H WA RT    arformoterol tartrate  15 mcg Nebulization BID RT    budesonide  0.5 mg Nebulization BID RT    [Held by provider] midodrine  10 mg Oral TID WC    [Held by provider] metoprolol succinate  25 mg Oral Daily    [Held by provider] amiodarone   200 mg Oral BID    Vitamin D  1,000 Units Oral Daily    melatonin  3 mg Oral Nightly    [Held by provider] atorvastatin  80 mg Oral Nightly     Continuous Infusions:   EPINEPHrine Stopped (03/11/25 1603)    norepinephrine 27 mcg/min (03/12/25 0652)    prismaSATE BGK 4/0/1.2 2,000 mL/hr at 03/12/25 0734    sodium chloride 100 mL/hr at 03/12/25 0510    calcium chloride 8,000 mg in sodium chloride 0.9 % 1,000 mL infusion 60 mL/hr at 03/12/25 0121    sodium chloride      dextrose       PRN Meds:.sulfur hexafluoride microspheres, heparin (PF), sodium chloride, heparin (porcine) **AND** heparin (porcine), potassium chloride, magnesium sulfate, calcium gluconate **OR** calcium gluconate **OR** calcium gluconate **OR** calcium gluconate, sodium phosphate 6 mmol in sodium chloride 0.9 % 250 mL IVPB **OR** sodium phosphate 12 mmol in sodium chloride 0.9 % 250 mL IVPB **OR** sodium phosphate 18 mmol in sodium chloride 0.9 % 500 mL IVPB **OR** sodium phosphate 24 mmol in sodium chloride 0.9 % 500 mL IVPB, sodium chloride, polyethylene glycol, prochlorperazine **OR** prochlorperazine, guaiFENesin-dextromethorphan, sodium chloride flush, ondansetron **OR** ondansetron, acetaminophen **OR** acetaminophen, glucose, dextrose bolus **OR** dextrose bolus, glucagon (rDNA), dextrose    Workup   Labs  Recent Labs     03/10/25  0539 03/11/25  0505 03/12/25  0528   WBC 11.3 14.9* 18.9*   RBC 3.56 3.84 3.88   HGB 9.6* 10.5* 10.4*   HCT 32.2* 34.3 33.1*   MCV 90.4 89.3 85.3   MCH 27.0 27.3 26.8   MCHC 29.8* 30.6* 31.4*   RDW 15.9* 16.4* 16.5*    213 142   MPV 10.0 10.0 10.1     Recent Labs     03/10/25  1115 03/10/25  1657 03/10/25  2307 03/11/25  0505 03/11/25  1202 03/11/25  1709 03/11/25  2300 03/12/25  0528     138   < > 137 137 139 139 138 138   K 4.5  4.5   < > 4.4 4.6 4.4 4.4 4.2 3.9     107   < > 105 107 108* 110* 109* 107   MG 1.8   < >  --  1.8 1.8 1.7 1.8 1.8   PHOS 2.3*   < >  --  2.9 2.2* 2.3* 2.0* 2.0*  Culture, MRSA, Screening [[id number]] Collected: 03/09/25 0841    Order Status: Completed Specimen: Nares Updated: 03/10/25 0959     Specimen Description .NARES     Culture NEGATIVE FOR: METHICILLIN RESISTANT STAPHYLOCOCCUS AUREUS    Culture, Blood 2 [[id number]] Collected: 03/08/25 1756    Order Status: Completed Specimen: Blood Updated: 03/11/25 2028     Specimen Description .BLOOD RIGHT     Special Requests          Culture NO GROWTH 3 DAYS    Respiratory Panel, Molecular, with COVID-19 (Restricted: peds pts or suitable admitted adults) [[id number]]  (Abnormal) Collected: 03/08/25 1216    Order Status: Completed Specimen: Nasopharyngeal Swab Updated: 03/08/25 1346     Specimen Description .NASOPHARYNGEAL SWAB     Adenovirus PCR Not Detected     Coronavirus 229E PCR Not Detected     Coronavirus HKU1 PCR Not Detected     Coronavirus NL63 PCR Not Detected     Coronavirus OC43 PCR Not Detected     SARS-CoV-2, PCR Not Detected     Human Metapneumovirus PCR Not Detected     Rhino/Enterovirus PCR Not Detected     Influenza A by PCR DETECTED     Influenza A H3 PCR DETECTED     Influenza B by PCR Not Detected     Parainfluenza 1 PCR Not Detected     Parainfluenza 2 PCR Not Detected     Parainfluenza 3 PCR Not Detected     Parainfluenza 4 PCR Not Detected     Resp Syncytial Virus PCR Not Detected     Bordetella parapertussis by PCR Not Detected     B Pertussis by PCR Not Detected     Chlamydia pneumoniae By PCR Not Detected     Mycoplasma pneumo by PCR Not Detected     Comment: Performed by multiplexed nucleic acid assay.       Culture, Blood 1 [[id number]] Collected: 03/08/25 1140    Order Status: Completed Specimen: Blood Updated: 03/11/25 1300     Specimen Description .BLOOD RIGHT     Special Requests          Culture NO GROWTH 3 DAYS    Culture, Urine [[id number]] Collected: 03/08/25 0929    Order Status: Completed Specimen: Urine, clean catch Updated: 03/09/25 1241     Specimen Description .CLEAN CATCH URINE

## 2025-03-12 NOTE — PROGRESS NOTES
Patient reaches for oxygen tubing, NG tube, and IV lines when not restrained. Patient in bilateral soft wrist restraints for safety.

## 2025-03-12 NOTE — PROGRESS NOTES
OT consult received and appreciated. Chart reviewed. Pt discussed at rounds and consulted RN. Pt is not appropriate for occupational therapy services at this time. Will d/c orders- please re-consult as indicated. Thank you. Mary Reynolds, OTR/L # [id number]

## 2025-03-13 LAB
ANION GAP SERPL CALCULATED.3IONS-SCNC: 12 MMOL/L (ref 7–16)
ANION GAP SERPL CALCULATED.3IONS-SCNC: 13 MMOL/L (ref 7–16)
ANION GAP SERPL CALCULATED.3IONS-SCNC: 14 MMOL/L (ref 7–16)
BASOPHILS # BLD: 0.01 K/UL (ref 0–0.2)
BASOPHILS NFR BLD: 0 % (ref 0–2)
BUN SERPL-MCNC: 12 MG/DL (ref 6–23)
BUN SERPL-MCNC: 12 MG/DL (ref 6–23)
BUN SERPL-MCNC: 13 MG/DL (ref 6–23)
CA-I BLD-SCNC: 1.24 MMOL/L (ref 1.15–1.33)
CA-I BLD-SCNC: 1.25 MMOL/L (ref 1.15–1.33)
CA-I BLD-SCNC: 1.25 MMOL/L (ref 1.15–1.33)
CA-I BLD-SCNC: 1.27 MMOL/L (ref 1.15–1.33)
CALCIUM SERPL-MCNC: 8.8 MG/DL (ref 8.6–10.2)
CALCIUM SERPL-MCNC: 8.9 MG/DL (ref 8.6–10.2)
CALCIUM SERPL-MCNC: 9 MG/DL (ref 8.6–10.2)
CHLORIDE SERPL-SCNC: 106 MMOL/L (ref 98–107)
CHLORIDE SERPL-SCNC: 107 MMOL/L (ref 98–107)
CHLORIDE SERPL-SCNC: 109 MMOL/L (ref 98–107)
CO2 SERPL-SCNC: 20 MMOL/L (ref 22–29)
CO2 SERPL-SCNC: 20 MMOL/L (ref 22–29)
CO2 SERPL-SCNC: 22 MMOL/L (ref 22–29)
CREAT SERPL-MCNC: 0.8 MG/DL (ref 0.5–1)
EKG ATRIAL RATE: 134 BPM
EKG ATRIAL RATE: 134 BPM
EKG P AXIS: 109 DEGREES
EKG P AXIS: 126 DEGREES
EKG P-R INTERVAL: 112 MS
EKG P-R INTERVAL: 98 MS
EKG Q-T INTERVAL: 336 MS
EKG Q-T INTERVAL: 350 MS
EKG QRS DURATION: 128 MS
EKG QRS DURATION: 138 MS
EKG QTC CALCULATION (BAZETT): 501 MS
EKG QTC CALCULATION (BAZETT): 522 MS
EKG R AXIS: 144 DEGREES
EKG R AXIS: 148 DEGREES
EKG T AXIS: -21 DEGREES
EKG T AXIS: -34 DEGREES
EKG VENTRICULAR RATE: 134 BPM
EKG VENTRICULAR RATE: 134 BPM
EOSINOPHIL # BLD: 0 K/UL (ref 0.05–0.5)
EOSINOPHILS RELATIVE PERCENT: 0 % (ref 0–6)
ERYTHROCYTE [DISTWIDTH] IN BLOOD BY AUTOMATED COUNT: 16.7 % (ref 11.5–15)
GFR, ESTIMATED: 71 ML/MIN/1.73M2
GFR, ESTIMATED: 73 ML/MIN/1.73M2
GFR, ESTIMATED: 75 ML/MIN/1.73M2
GLUCOSE BLD-MCNC: 117 MG/DL (ref 74–99)
GLUCOSE BLD-MCNC: 121 MG/DL (ref 74–99)
GLUCOSE BLD-MCNC: 128 MG/DL (ref 74–99)
GLUCOSE BLD-MCNC: 144 MG/DL (ref 74–99)
GLUCOSE SERPL-MCNC: 127 MG/DL (ref 74–99)
GLUCOSE SERPL-MCNC: 129 MG/DL (ref 74–99)
GLUCOSE SERPL-MCNC: 132 MG/DL (ref 74–99)
HCT VFR BLD AUTO: 26.2 % (ref 34–48)
HGB BLD-MCNC: 8.1 G/DL (ref 11.5–15.5)
IMM GRANULOCYTES # BLD AUTO: 0.25 K/UL (ref 0–0.58)
IMM GRANULOCYTES NFR BLD: 1 % (ref 0–5)
LYMPHOCYTES NFR BLD: 0.85 K/UL (ref 1.5–4)
LYMPHOCYTES RELATIVE PERCENT: 5 % (ref 20–42)
MAGNESIUM SERPL-MCNC: 1.9 MG/DL (ref 1.6–2.6)
MCH RBC QN AUTO: 26.8 PG (ref 26–35)
MCHC RBC AUTO-ENTMCNC: 30.9 G/DL (ref 32–34.5)
MCV RBC AUTO: 86.8 FL (ref 80–99.9)
MICROORGANISM SPEC CULT: NORMAL
MICROORGANISM SPEC CULT: NORMAL
MONOCYTES NFR BLD: 0.76 K/UL (ref 0.1–0.95)
MONOCYTES NFR BLD: 4 % (ref 2–12)
NEUTROPHILS NFR BLD: 90 % (ref 43–80)
NEUTS SEG NFR BLD: 16.3 K/UL (ref 1.8–7.3)
PHOSPHATE SERPL-MCNC: 1.9 MG/DL (ref 2.5–4.5)
PHOSPHATE SERPL-MCNC: 2 MG/DL (ref 2.5–4.5)
PHOSPHATE SERPL-MCNC: 2.9 MG/DL (ref 2.5–4.5)
PLATELET # BLD AUTO: 102 K/UL (ref 130–450)
PMV BLD AUTO: 10.2 FL (ref 7–12)
POTASSIUM SERPL-SCNC: 3.5 MMOL/L (ref 3.5–5)
POTASSIUM SERPL-SCNC: 3.8 MMOL/L (ref 3.5–5)
POTASSIUM SERPL-SCNC: 3.9 MMOL/L (ref 3.5–5)
RBC # BLD AUTO: 3.02 M/UL (ref 3.5–5.5)
SERVICE CMNT-IMP: NORMAL
SERVICE CMNT-IMP: NORMAL
SODIUM SERPL-SCNC: 139 MMOL/L (ref 132–146)
SODIUM SERPL-SCNC: 141 MMOL/L (ref 132–146)
SODIUM SERPL-SCNC: 143 MMOL/L (ref 132–146)
SPECIMEN DESCRIPTION: NORMAL
SPECIMEN DESCRIPTION: NORMAL
WBC OTHER # BLD: 18.2 K/UL (ref 4.5–11.5)

## 2025-03-13 PROCEDURE — 6360000002 HC RX W HCPCS: Performed by: INTERNAL MEDICINE

## 2025-03-13 PROCEDURE — 82962 GLUCOSE BLOOD TEST: CPT

## 2025-03-13 PROCEDURE — 2500000003 HC RX 250 WO HCPCS: Performed by: INTERNAL MEDICINE

## 2025-03-13 PROCEDURE — 6370000000 HC RX 637 (ALT 250 FOR IP): Performed by: INTERNAL MEDICINE

## 2025-03-13 PROCEDURE — 85018 HEMOGLOBIN: CPT

## 2025-03-13 PROCEDURE — 94640 AIRWAY INHALATION TREATMENT: CPT

## 2025-03-13 PROCEDURE — 36415 COLL VENOUS BLD VENIPUNCTURE: CPT

## 2025-03-13 PROCEDURE — 2700000000 HC OXYGEN THERAPY PER DAY

## 2025-03-13 PROCEDURE — 83735 ASSAY OF MAGNESIUM: CPT

## 2025-03-13 PROCEDURE — 2580000003 HC RX 258: Performed by: INTERNAL MEDICINE

## 2025-03-13 PROCEDURE — 2500000003 HC RX 250 WO HCPCS

## 2025-03-13 PROCEDURE — 99291 CRITICAL CARE FIRST HOUR: CPT | Performed by: INTERNAL MEDICINE

## 2025-03-13 PROCEDURE — 2580000003 HC RX 258

## 2025-03-13 PROCEDURE — 6360000002 HC RX W HCPCS

## 2025-03-13 PROCEDURE — 87522 HEPATITIS C REVRS TRNSCRPJ: CPT

## 2025-03-13 PROCEDURE — 6370000000 HC RX 637 (ALT 250 FOR IP)

## 2025-03-13 PROCEDURE — 85014 HEMATOCRIT: CPT

## 2025-03-13 PROCEDURE — 99231 SBSQ HOSP IP/OBS SF/LOW 25: CPT | Performed by: INTERNAL MEDICINE

## 2025-03-13 PROCEDURE — 84100 ASSAY OF PHOSPHORUS: CPT

## 2025-03-13 PROCEDURE — 90945 DIALYSIS ONE EVALUATION: CPT

## 2025-03-13 PROCEDURE — 2000000000 HC ICU R&B

## 2025-03-13 PROCEDURE — P9047 ALBUMIN (HUMAN), 25%, 50ML: HCPCS | Performed by: INTERNAL MEDICINE

## 2025-03-13 PROCEDURE — 80048 BASIC METABOLIC PNL TOTAL CA: CPT

## 2025-03-13 PROCEDURE — 82330 ASSAY OF CALCIUM: CPT

## 2025-03-13 PROCEDURE — 85025 COMPLETE CBC W/AUTO DIFF WBC: CPT

## 2025-03-13 PROCEDURE — 99232 SBSQ HOSP IP/OBS MODERATE 35: CPT | Performed by: INTERNAL MEDICINE

## 2025-03-13 RX ADMIN — LACTULOSE 20 G: 20 SOLUTION ORAL at 08:22

## 2025-03-13 RX ADMIN — Medication: at 22:58

## 2025-03-13 RX ADMIN — PIPERACILLIN AND TAZOBACTAM 4500 MG: 4; .5 INJECTION, POWDER, FOR SOLUTION INTRAVENOUS at 17:26

## 2025-03-13 RX ADMIN — Medication: at 01:13

## 2025-03-13 RX ADMIN — SODIUM CHLORIDE, PRESERVATIVE FREE 10 ML: 5 INJECTION INTRAVENOUS at 19:53

## 2025-03-13 RX ADMIN — LACTULOSE 20 G: 20 SOLUTION ORAL at 19:52

## 2025-03-13 RX ADMIN — Medication: at 15:37

## 2025-03-13 RX ADMIN — APIXABAN 2.5 MG: 2.5 TABLET, FILM COATED ORAL at 20:30

## 2025-03-13 RX ADMIN — POTASSIUM CHLORIDE 20 MEQ: 29.8 INJECTION, SOLUTION INTRAVENOUS at 06:10

## 2025-03-13 RX ADMIN — BUDESONIDE INHALATION 500 MCG: 0.5 SUSPENSION RESPIRATORY (INHALATION) at 20:47

## 2025-03-13 RX ADMIN — SODIUM PHOSPHATE, MONOBASIC, MONOHYDRATE AND SODIUM PHOSPHATE, DIBASIC, ANHYDROUS 12 MMOL: 142; 276 INJECTION, SOLUTION INTRAVENOUS at 14:00

## 2025-03-13 RX ADMIN — ARFORMOTEROL TARTRATE 15 MCG: 15 SOLUTION RESPIRATORY (INHALATION) at 20:47

## 2025-03-13 RX ADMIN — SODIUM PHOSPHATE, MONOBASIC, MONOHYDRATE AND SODIUM PHOSPHATE, DIBASIC, ANHYDROUS 6 MMOL: 142; 276 INJECTION, SOLUTION INTRAVENOUS at 07:44

## 2025-03-13 RX ADMIN — PIPERACILLIN AND TAZOBACTAM 4500 MG: 4; .5 INJECTION, POWDER, FOR SOLUTION INTRAVENOUS at 08:28

## 2025-03-13 RX ADMIN — THIAMINE HYDROCHLORIDE 250 MG: 100 INJECTION, SOLUTION INTRAMUSCULAR; INTRAVENOUS at 08:23

## 2025-03-13 RX ADMIN — ALBUMIN (HUMAN) 25 G: 0.25 INJECTION, SOLUTION INTRAVENOUS at 01:23

## 2025-03-13 RX ADMIN — IPRATROPIUM BROMIDE AND ALBUTEROL SULFATE 1 DOSE: 2.5; .5 SOLUTION RESPIRATORY (INHALATION) at 16:18

## 2025-03-13 RX ADMIN — POTASSIUM CHLORIDE 20 MEQ: 29.8 INJECTION, SOLUTION INTRAVENOUS at 07:00

## 2025-03-13 RX ADMIN — Medication: at 21:00

## 2025-03-13 RX ADMIN — HYDROCORTISONE SODIUM SUCCINATE 100 MG: 100 INJECTION, POWDER, FOR SOLUTION INTRAMUSCULAR; INTRAVENOUS at 19:52

## 2025-03-13 RX ADMIN — Medication 1000 UNITS: at 08:23

## 2025-03-13 RX ADMIN — IPRATROPIUM BROMIDE AND ALBUTEROL SULFATE 1 DOSE: 2.5; .5 SOLUTION RESPIRATORY (INHALATION) at 20:47

## 2025-03-13 RX ADMIN — PIPERACILLIN AND TAZOBACTAM 4500 MG: 4; .5 INJECTION, POWDER, FOR SOLUTION INTRAVENOUS at 01:25

## 2025-03-13 RX ADMIN — ARFORMOTEROL TARTRATE 15 MCG: 15 SOLUTION RESPIRATORY (INHALATION) at 09:28

## 2025-03-13 RX ADMIN — Medication: at 03:18

## 2025-03-13 RX ADMIN — HYDROCORTISONE SODIUM SUCCINATE 100 MG: 100 INJECTION, POWDER, FOR SOLUTION INTRAMUSCULAR; INTRAVENOUS at 04:03

## 2025-03-13 RX ADMIN — BUDESONIDE INHALATION 500 MCG: 0.5 SUSPENSION RESPIRATORY (INHALATION) at 09:28

## 2025-03-13 RX ADMIN — SODIUM CHLORIDE: 9 INJECTION, SOLUTION INTRAVENOUS at 01:13

## 2025-03-13 RX ADMIN — Medication: at 13:00

## 2025-03-13 RX ADMIN — HYDROCORTISONE SODIUM SUCCINATE 100 MG: 100 INJECTION, POWDER, FOR SOLUTION INTRAMUSCULAR; INTRAVENOUS at 11:38

## 2025-03-13 RX ADMIN — IPRATROPIUM BROMIDE AND ALBUTEROL SULFATE 1 DOSE: 2.5; .5 SOLUTION RESPIRATORY (INHALATION) at 11:47

## 2025-03-13 RX ADMIN — CALCIUM CHLORIDE 8000 MG: 100 INJECTION INTRAVENOUS; INTRAVENTRICULAR at 12:19

## 2025-03-13 RX ADMIN — Medication 20 MCG/MIN: at 01:32

## 2025-03-13 RX ADMIN — IPRATROPIUM BROMIDE AND ALBUTEROL SULFATE 1 DOSE: 2.5; .5 SOLUTION RESPIRATORY (INHALATION) at 09:28

## 2025-03-13 RX ADMIN — Medication 12 MCG/MIN: at 22:15

## 2025-03-13 RX ADMIN — APIXABAN 2.5 MG: 2.5 TABLET, FILM COATED ORAL at 08:23

## 2025-03-13 RX ADMIN — Medication: at 08:09

## 2025-03-13 RX ADMIN — Medication: at 05:43

## 2025-03-13 RX ADMIN — Medication: at 10:39

## 2025-03-13 RX ADMIN — LACTULOSE 20 G: 20 SOLUTION ORAL at 13:58

## 2025-03-13 ASSESSMENT — PAIN SCALES - GENERAL
PAINLEVEL_OUTOF10: 0

## 2025-03-13 NOTE — PLAN OF CARE
Problem: Safety - Adult  Goal: Free from fall injury  Outcome: Progressing     Problem: Skin/Tissue Integrity  Goal: Skin integrity remains intact  Description: 1.  Monitor for areas of redness and/or skin breakdown  2.  Assess vascular access sites hourly  3.  Every 4-6 hours minimum:  Change oxygen saturation probe site  4.  Every 4-6 hours:  If on nasal continuous positive airway pressure, respiratory therapy assess nares and determine need for appliance change or resting period  Outcome: Progressing     Problem: Nutrition Deficit:  Goal: Optimize nutritional status  Outcome: Progressing     Problem: ABCDS Injury Assessment  Goal: Absence of physical injury  Outcome: Progressing     Problem: Gastrointestinal - Adult  Goal: Maintains adequate nutritional intake  Outcome: Progressing     Problem: Safety - Medical Restraint  Goal: Remains free of injury from restraints (Restraint for Interference with Medical Device)  Description: INTERVENTIONS:  1. Determine that other, less restrictive measures have been tried or would not be effective before applying the restraint  2. Evaluate the patient's condition at the time of restraint application  3. Inform patient/family regarding the reason for restraint  4. Q2H: Monitor safety, psychosocial status, comfort, nutrition and hydration  Outcome: Progressing  Flowsheets  Taken 3/13/2025 1800  Remains free of injury from restraints (restraint for interference with medical device): Every 2 hours: Monitor safety, psychosocial status, comfort, nutrition and hydration  Taken 3/13/2025 1600  Remains free of injury from restraints (restraint for interference with medical device): Every 2 hours: Monitor safety, psychosocial status, comfort, nutrition and hydration  Taken 3/13/2025 1400  Remains free of injury from restraints (restraint for interference with medical device): Every 2 hours: Monitor safety, psychosocial status, comfort, nutrition and hydration  Taken 3/13/2025

## 2025-03-13 NOTE — PROGRESS NOTES
[facility]  Internal Medicine Residency / House Medicine Service    Attending Physician Statement  I have discussed the case, including pertinent history and exam findings with the resident and the team.  I have seen and examined the patient and the key elements of the encounter have been performed by me.  I agree with the assessment, plan and orders as documented by the resident.      More alert but very drowsy   and better BP  On methylene blue plus Levophed  Renal support  On Zosyn  Labs reviewed  Resolving Influenza and MOF  Plan: Same ICU care    Remainder of medical problems as per resident note.      Dalton Sosa MD  Internal Medicine Residency Faculty

## 2025-03-13 NOTE — PROGRESS NOTES
[facility]        CARDIOLOGY                 INPATIENT PROGRESS NOTE          PATIENT SEEN IN FOLLOW UP FOR: CMP, CHF    Hospital Day: 10     Nikki Moffett is a 79  year old patient known to Dr. Carnes      SUBJECTIVE: No distress. No distress, no family at bed side. Open her eyes.    ROS: Review of rest of 10 systems limited due to her overall mentation.    OBJECTIVE: No acute distress.  See Assessment     Diagnostics:       Telemetry: Reviewed     TTE-3/7/2025:    Left Ventricle: Normal left ventricular systolic function with a visually estimated EF of 10 -15%. Left ventricle size is normal. Normal wall thickness. Severe global hypokinesis present. E/e' ratio >11, suggesting increased LAP.    Right Ventricle: Right ventricle size is normal. Reduced systolic function.    Aortic Valve: No significant stenosis. 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    Mitral Valve: Mild regurgitation.    Tricuspid Valve: Moderate regurgitation. Normal RVSP. The estimated RVSP is 25 mmHg.    Left Atrium: Left atrium is severely dilated.    Right Atrium: Right atrium is mildly dilated.    Pericardium: No pericardial effusion.    Image quality is suboptimal. Contrast used: Lumason.    Limited TTE Dr. Carnes 2/29/2024    Left Ventricle: Normal left ventricular systolic function with a visually estimated EF of 55 - 60%. Left ventricle size is normal. Moderately increased ventricular mass. Findings consistent with moderate concentric hypertrophy. Normal wall motion. Indeterminate diastolic function.    Right Ventricle: Right ventricle size is normal. Normal systolic function. TAPSE is 2.5 cm.    Aortic Valve: No stenosis.    Tricuspid Valve: Normal RVSP. The estimated RVSP is 33 mmHg.    Left Atrium: Left atrium is mildly dilated.    Interatrial Septum: Interatrial shunt visualized with color Doppler with left to right shunt.  No right to left shunt on bubble study.    Pericardium:  lactulose  20 g Oral TID    piperacillin-tazobactam  4,500 mg IntraVENous Q8H    hydrocortisone sodium succinate PF  100 mg IntraVENous Q8H    insulin lispro  0-4 Units SubCUTAneous Q6H    sodium chloride flush  5-40 mL IntraVENous 2 times per day    apixaban  2.5 mg Oral BID    [Held by provider] furosemide  40 mg IntraVENous Daily    ipratropium 0.5 mg-albuterol 2.5 mg  1 Dose Inhalation Q4H WA RT    arformoterol tartrate  15 mcg Nebulization BID RT    budesonide  0.5 mg Nebulization BID RT    [Held by provider] midodrine  10 mg Oral TID WC    [Held by provider] metoprolol succinate  25 mg Oral Daily    [Held by provider] amiodarone  200 mg Oral BID    Vitamin D  1,000 Units Oral Daily    [Held by provider] atorvastatin  80 mg Oral Nightly       IV Infusions (if any):   norepinephrine 14 mcg/min (25 0613)    prismaSATE BGK 4/0/1.2 2,000 mL/hr at 25 1039    sodium chloride 100 mL/hr at 25 0113    calcium chloride 8,000 mg in sodium chloride 0.9 % 1,000 mL infusion 8,000 mg (25 1219)    sodium chloride 10 mL/hr at 25 2219    dextrose           PHYSICAL EXAM:     CONSTITUTIONAL:   BP (!) 111/47   Pulse (!) 102   Temp 98 °F (36.7 °C)   Resp 25   Ht 1.575 m (5' 2\")   Wt 64.2 kg (141 lb 8.6 oz)   SpO2 96%   BMI 25.89 kg/m²   Pulse  Av.1  Min: 93  Max: 114  Systolic (24hrs), Av , Min:109 , Max:134    Diastolic (24hrs), Av, Min:47, Max:60      CONST:  Awake, alert and no apparent distress. On BiPAP  Neck-  No stridor, no jugular venous distention.  CHEST: No accessory muscle use  RESPIRATORY: Lung sounds - Few rhonchi  CARDIOVASCULAR: Regular rate, 2/6 systolic murmur.  EXT: Trace lower extremity edema. Distal pulses palpable   ABDOMEN: Bowel sounds present.    - Deferred  SKIN: Warm and dry   NEURO Oriented to person, place           ASSESSMENT/PLAN:  Acute HFrEF - Continue CVVH for fluid management. Diuretics as her BP/Renal Fn tolerate  Hypotensive shock - On

## 2025-03-13 NOTE — PROGRESS NOTES
[facility]  Department of Internal Medicine   Internal Medicine Residency   MICU Progress Note    Patient:  Nikki Moffett 79 y.o. female  MRN: [id number]     Date of Service: 3/13/2025    Allergy: Patient has no known allergies.    Subjective   ICU day 5d 17h    Patient still on levophed but decreasing pressor requirements. Still on CVVHD. Started on lactulose yesterday with 2 bowel movements documented.   Patient seen and examined at bedside this morning, opens eyes to voice but unable to respond appropriately. Does not follow commands, still with altered mentation.     Objective     VS: BP (!) 111/47   Pulse (!) 102   Temp 97.9 °F (36.6 °C) (Temporal)   Resp 24   Ht 1.575 m (5' 2\")   Wt 64.2 kg (141 lb 8.6 oz)   SpO2 99%   BMI 25.89 kg/m²   ABP (Arterial line BP): 112/49ABP Mean (Arterial Line Mean): 73 mmHg    Arterial line BP: ABP (Arterial line BP): 112/49 ABP Mean (Arterial Line Mean): 73 mmHg    Physical Exam:  General Appearance: Lethargic, opens eyes but unable to sustain wakefulness  Neck: no adenopathy, no carotid bruit, no JVD, and supple, symmetrical, trachea midline  Lung: Rales bilateral  Heart: Irregular rhythm, normal rate  Abdomen: Soft nontender  Extremities:  Gr 1 lower extremity edema    Medications     Infusions: (Fluid, Sedation, Vasopressors)  Scheduled Meds:   thiamine  250 mg IntraVENous Daily    lactulose  20 g Oral TID    piperacillin-tazobactam  4,500 mg IntraVENous Q8H    hydrocortisone sodium succinate PF  100 mg IntraVENous Q8H    insulin lispro  0-4 Units SubCUTAneous Q6H    oseltamivir  30 mg Oral Daily    sodium chloride flush  5-40 mL IntraVENous 2 times per day    apixaban  2.5 mg Oral BID    [Held by provider] furosemide  40 mg IntraVENous Daily    ipratropium 0.5 mg-albuterol 2.5 mg  1 Dose Inhalation Q4H WA RT    arformoterol tartrate  15 mcg Nebulization BID RT    budesonide  0.5 mg Nebulization BID RT    [Held by provider] midodrine  10 mg Oral TID

## 2025-03-13 NOTE — PLAN OF CARE
Problem: Chronic Conditions and Co-morbidities  Goal: Patient's chronic conditions and co-morbidity symptoms are monitored and maintained or improved  3/12/2025 2230 by Monique Menchaca RN  Outcome: Progressing  3/12/2025 0842 by Krystyna Mireles RN  Outcome: Progressing     Problem: Discharge Planning  Goal: Discharge to home or other facility with appropriate resources  3/12/2025 2230 by Monique Menchaca RN  Outcome: Progressing  3/12/2025 0842 by Krystyna Mireles RN  Outcome: Not Progressing  Note: CRRT continues     Problem: Safety - Adult  Goal: Free from fall injury  3/12/2025 2230 by Monique Menchaca RN  Outcome: Progressing  3/12/2025 0843 by Krystyna Mireles RN  Outcome: Progressing     Problem: Skin/Tissue Integrity  Goal: Skin integrity remains intact  Description: 1.  Monitor for areas of redness and/or skin breakdown  2.  Assess vascular access sites hourly  3.  Every 4-6 hours minimum:  Change oxygen saturation probe site  4.  Every 4-6 hours:  If on nasal continuous positive airway pressure, respiratory therapy assess nares and determine need for appliance change or resting period  3/12/2025 2230 by Monique Menchaca RN  Outcome: Progressing  3/12/2025 0843 by Krystyna Mireles RN  Outcome: Progressing     Problem: Nutrition Deficit:  Goal: Optimize nutritional status  Outcome: Progressing     Problem: ABCDS Injury Assessment  Goal: Absence of physical injury  3/12/2025 2230 by Monique Menchaca RN  Outcome: Progressing  3/12/2025 0843 by Krystyna Mireles RN  Outcome: Progressing     Problem: Metabolic/Fluid and Electrolytes - Adult  Goal: Electrolytes maintained within normal limits  3/12/2025 2230 by Monique Menchaca RN  Outcome: Progressing  3/12/2025 0843 by Krystyna Mireles RN  Outcome: Progressing  Goal: Hemodynamic stability and optimal renal function maintained  3/12/2025 2230 by Monique Menchaca RN  Outcome: Progressing  3/12/2025 0843 by  Monitor safety, psychosocial status, comfort, nutrition and hydration  3/12/2025 2230 by Monique Menchaca, RN  Outcome: Progressing  Flowsheets (Taken 3/12/2025 2000)  Remains free of injury from restraints (restraint for interference with medical device):   Every 2 hours: Monitor safety, psychosocial status, comfort, nutrition and hydration   Inform patient/family regarding the reason for restraint   Evaluate the patient's condition at the time of restraint application   Determine that other, less restrictive measures have been tried or would not be effective before applying the restraint  3/12/2025 0841 by Krystyna Mireles, RN  Outcome: Not Progressing  3/12/2025 0841 by Krystyna Mireles, RN  Outcome: Progressing  Flowsheets (Taken 3/12/2025 0800)  Remains free of injury from restraints (restraint for interference with medical device):   Determine that other, less restrictive measures have been tried or would not be effective before applying the restraint   Evaluate the patient's condition at the time of restraint application   Inform patient/family regarding the reason for restraint   Every 2 hours: Monitor safety, psychosocial status, comfort, nutrition and hydration

## 2025-03-13 NOTE — PROGRESS NOTES
03/12/25 2113   NIV Type   NIV Started/Stopped Off  (niv on standby in room, patient in restraints)   Assessment   Pulse (!) 107   Respirations 21   SpO2 100 %

## 2025-03-13 NOTE — PROGRESS NOTES
[facility]  Internal Medicine Residency Program  Progress Note - House Team     Patient:  Nikki Moffett 79 y.o. female MRN: [id number]     Date of Service: 3/13/2025     CC:   Chief Complaint   Patient presents with    Altered Mental Status     \"Mood swings, memory loss and fatigue\" per patients grandson    Chest Pain     Ongoing for a couple weeks per family     Overnight events: No acute event.     Subjective     Patient seen and examined at bedside. Patient is confused, does not respond to questions, does not follow commands, but does withdrawal to pain. NG tube output of 400 mL this morning.    Objective     Physical Exam:  Vitals: BP (!) 111/47   Pulse (!) 102   Temp 97.9 °F (36.6 °C) (Temporal)   Resp 24   Ht 1.575 m (5' 2\")   Wt 64.2 kg (141 lb 8.6 oz)   SpO2 99%   BMI 25.89 kg/m²     I & O - 24hr: No intake/output data recorded.   General appearance: No acute distress.    ENT: NGT in place.  Neuro: Mental status: Confused, does not answer questions, does not follow commands.  Cardiovascular: Regular rate and rhythm, S1 and S2  heard,   Respiratory: Clear to auscultation bilaterally.  No wheezing or crackles appreciated.  Abdomen: Soft, non tender, bowel sounds normal; masses, no organomegaly, rebound or guarding  Extremities: Extremities normal, no cyanosis, distal pulses intact,no edema    Pertinent Labs & Imaging Studies      Complete Blood Count:   Recent Labs     03/11/25  0505 03/12/25  0528 03/13/25  0507   WBC 14.9* 18.9* 18.2*   HGB 10.5* 10.4* 8.1*   HCT 34.3 33.1* 26.2*    142 102*        Last 3 Blood Glucose:   Recent Labs     03/12/25  1741 03/12/25  2300 03/13/25  0507   GLUCOSE 109* 124* 129*        PT/INR:    Lab Results   Component Value Date/Time    PROTIME 17.5 03/12/2025 11:01 AM    INR 1.6 03/12/2025 11:01 AM     PTT:    Lab Results   Component Value Date/Time    APTT 42.0 03/08/2025 05:50 PM       Comprehensive Metabolic Profile:   Recent Labs      03/12/25  0528 03/12/25  1101 03/12/25  1741 03/12/25  2300 03/13/25  0507      < > 137 141 141   K 3.9   < > 3.7 3.6 3.5      < > 104 107 107   CO2 17*   < > 19* 20* 20*   BUN 12   < > 12 13 13   CREATININE 0.8   < > 0.8 0.9 0.8   GLUCOSE 99   < > 109* 124* 129*   CALCIUM 8.6   < > 9.1 9.1 9.0   BILITOT 2.1*  --   --   --   --    ALKPHOS 171*  --   --   --   --    AST 1,849*  --   --   --   --    ALT 1,279*  --   --   --   --     < > = values in this interval not displayed.      Magnesium:   Lab Results   Component Value Date/Time    MG 1.9 03/13/2025 05:07 AM     Phosphorus:   Lab Results   Component Value Date/Time    PHOS 2.0 03/13/2025 05:07 AM     Ionized Calcium:   Lab Results   Component Value Date/Time    CAION 1.25 03/13/2025 05:07 AM      Troponin: No results for input(s): \"TROPONINI\" in the last 72 hours.    No results found for: \"LVEF\", \"LVEFMODE\"      Resident's Assessment and Plan        Assessment:   Shock, likely cardiogenic versus rule out septic  Altered mental status likely 2/2 #1-improving  Atrial fibrillation with RVR, s/p cardioversion on 3/7/2025, currently sinus, [id number], on Eliquis, amiodarone, metoprolol   Acute hypoxic respiratory insufficiency likely 2/2 cardiogenic edema and influenza A  Influenza A infection  Acute exacerbation of HFrEF with EF 10 to 15% on 3/7/2025 (previous echo 2/29/2024, EF 55 to 60%, 12/2023 EF 40 to 45%), elevated proBNP  Bilateral pleural effusion  Elevated INR, last INR 10  ANTONIO stage III, worsening creatinine today 2.6 (baseline 0.8-1)  HAGMA likely 2/2 lactic acidosis  Lactic acidosis  Transaminitis likely 2/2 shock liver  Hyperlipidemia, on Lipitor 80 mg daily  Prediabetes, currently hyperglycemic likely 2/2 steroid  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 Daily

## 2025-03-13 NOTE — PROGRESS NOTES
[facility]  Nephrology Attending Progress Note  Alexandru Owen MD        SUBJECTIVE:     HPI:   3/9:  79 yrs old HF , Known h/o prior CVA s/p intracranial thrombectomy 2023 December .      Came in with above mentioned issues , noted to be in Rapid A fib , hypotensive , lethargic , already known to   Cardiology team on enteresto , eliquis , toprol , amiodarone .      Patient remained in rapid a fib , hypotensive , was diuresed , developed ANTONIO , Acidosis , , Lactic acidosis , shock liver   Coagulopathy , her LV function detoriorated to 10-15% due to a fib related Cardiomyopathy , possible myocardial injury ( high troponin )     Her HR now is better , she appears hemodynamically stable , alert and responsive in no distress       SUBJECTIVE:    3/10: pt seen and examined in the icu, chart reviewed. On cvvh, dw icu nurse, tolerating -50 ml/hr, in bipap. Lethargic, mumbles that she is ok    3/11: pt seen and examined. On cvvh, intubated. On epi and levo, dw icu nurse, keeping even with cvvh at this time    3/12: pt seen in icu, intubated on cvvh. Levo, running even. Dw icu nurse, pt lethargic and not answering questions    3/13: pt seen in icu, remains on cvvh, lethargic, not conversant, on tube feeds    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)    Ischemic cerebrovascular accident (CVA) (HCC)    Atrial flutter (HCC)    Paroxysmal atrial fibrillation (HCC)    H/O: stroke    Atrial flutter with rapid ventricular response (HCC)    Decompensated heart failure (HCC)    Chronic atrial fibrillation (HCC)    VHD (valvular heart disease)        PAST MEDICAL HISTORY:    Past Medical History:   Diagnosis Date    Cerebral artery occlusion with cerebral infarction (HCC)        DIET:    ADULT TUBE FEEDING; Nasogastric; Renal Formula; Continuous; 10; No; 30; Q 4 hours     PHYSICAL EXAM:     Patient Vitals for the past 24 hrs:   BP Temp Temp src Pulse  2.39 03/12/2025       Iron Studies  No results found for: \"IRON\", \"TIBC\", \"FERRITIN\"  Vitamin B-12   Date Value Ref Range Status   03/04/2025 1653 (H) 211 - 946 pg/mL Final     Folate   Date Value Ref Range Status   03/04/2025 12.3 4.8 - 24.2 ng/mL Final       Vit D, 25-Hydroxy   Date Value Ref Range Status   03/04/2025 13.8 (L) 30.0 - 100.0 ng/mL Final     No results found for: \"PTH\"    No components found for: \"URIC\"    Lab Results   Component Value Date/Time    COLORU Yellow 03/08/2025 09:29 AM    NITRU NEGATIVE 03/08/2025 09:29 AM    GLUCOSEU NEGATIVE 03/08/2025 09:29 AM    KETUA TRACE 03/08/2025 09:29 AM    UROBILINOGEN 1.0 03/08/2025 09:29 AM    BILIRUBINUR SMALL 03/08/2025 09:29 AM       No results found for: \"LIPIDPAN\"      IMPRESSION/RECOMMENDATIONS:      1) mayra superimposed on CKD 3a  baseline cr at 1-1.3 range 2023   setting of hypotension, aib, HFrEF, entresto, diuretics  Anuric   Antihypertensives , diuretics now are on hold  Continues on levo  and is now on epi       Started cvvh  Uo 0     2)  shock liver   Coagulopathy   eliquis on hold   INR corrected with FFP to some extent   Amiodarone on hold      3)   A Fib with rapid venticular rate  Off amio due to lfts  Per cardiology     4)  hypotension/sepsis  Influenza pos  On empiric abx  Pressor support  Underlying HFrEF     5)   Mild to moderate todd PL effusion  possible HF related  Monitor with diuretics on hold     6) High TSH   Per IM         Alexandru Owen MD

## 2025-03-14 ENCOUNTER — APPOINTMENT (OUTPATIENT)
Dept: GENERAL RADIOLOGY | Age: 80
DRG: 004 | End: 2025-03-14
Payer: MEDICARE

## 2025-03-14 LAB
AADO2: 145.5 MMHG
AADO2: 158.7 MMHG
ANION GAP SERPL CALCULATED.3IONS-SCNC: 11 MMOL/L (ref 7–16)
ANION GAP SERPL CALCULATED.3IONS-SCNC: 11 MMOL/L (ref 7–16)
ANION GAP SERPL CALCULATED.3IONS-SCNC: 12 MMOL/L (ref 7–16)
B.E.: -2.1 MMOL/L (ref -3–3)
B.E.: -2.2 MMOL/L (ref -3–3)
B.E.: -2.9 MMOL/L (ref -3–3)
BASOPHILS # BLD: 0.02 K/UL (ref 0–0.2)
BASOPHILS NFR BLD: 0 % (ref 0–2)
BUN SERPL-MCNC: 10 MG/DL (ref 6–23)
BUN SERPL-MCNC: 11 MG/DL (ref 6–23)
BUN SERPL-MCNC: 12 MG/DL (ref 6–23)
CA-I BLD-SCNC: 1.25 MMOL/L (ref 1.15–1.33)
CA-I BLD-SCNC: 1.25 MMOL/L (ref 1.15–1.33)
CA-I BLD-SCNC: 1.26 MMOL/L (ref 1.15–1.33)
CALCIUM SERPL-MCNC: 8.6 MG/DL (ref 8.6–10.2)
CALCIUM SERPL-MCNC: 8.7 MG/DL (ref 8.6–10.2)
CALCIUM SERPL-MCNC: 8.8 MG/DL (ref 8.6–10.2)
CALCIUM SERPL-MCNC: 8.9 MG/DL (ref 8.6–10.2)
CALCIUM SERPL-MCNC: 9 MG/DL (ref 8.6–10.2)
CHLORIDE SERPL-SCNC: 105 MMOL/L (ref 98–107)
CHLORIDE SERPL-SCNC: 107 MMOL/L (ref 98–107)
CHLORIDE SERPL-SCNC: 107 MMOL/L (ref 98–107)
CHLORIDE SERPL-SCNC: 108 MMOL/L (ref 98–107)
CHLORIDE SERPL-SCNC: 109 MMOL/L (ref 98–107)
CO2 SERPL-SCNC: 21 MMOL/L (ref 22–29)
CO2 SERPL-SCNC: 21 MMOL/L (ref 22–29)
CO2 SERPL-SCNC: 22 MMOL/L (ref 22–29)
COHB: 0.4 % (ref 0–1.5)
COHB: 0.4 % (ref 0–1.5)
COHB: 0.6 % (ref 0–1.5)
CREAT SERPL-MCNC: 0.7 MG/DL (ref 0.5–1)
CREAT SERPL-MCNC: 0.8 MG/DL (ref 0.5–1)
CRITICAL: ABNORMAL
DATE ANALYZED: ABNORMAL
DATE OF COLLECTION: ABNORMAL
EOSINOPHIL # BLD: 0 K/UL (ref 0.05–0.5)
EOSINOPHILS RELATIVE PERCENT: 0 % (ref 0–6)
ERYTHROCYTE [DISTWIDTH] IN BLOOD BY AUTOMATED COUNT: 17 % (ref 11.5–15)
FIO2: 50 %
FIO2: 50 %
FLOW RATE: 4
GFR, ESTIMATED: 77 ML/MIN/1.73M2
GFR, ESTIMATED: 77 ML/MIN/1.73M2
GFR, ESTIMATED: 79 ML/MIN/1.73M2
GFR, ESTIMATED: 81 ML/MIN/1.73M2
GFR, ESTIMATED: 84 ML/MIN/1.73M2
GLUCOSE BLD-MCNC: 117 MG/DL (ref 74–99)
GLUCOSE BLD-MCNC: 120 MG/DL (ref 74–99)
GLUCOSE BLD-MCNC: 122 MG/DL (ref 74–99)
GLUCOSE BLD-MCNC: 131 MG/DL (ref 74–99)
GLUCOSE BLD-MCNC: 137 MG/DL (ref 74–99)
GLUCOSE SERPL-MCNC: 133 MG/DL (ref 74–99)
GLUCOSE SERPL-MCNC: 134 MG/DL (ref 74–99)
GLUCOSE SERPL-MCNC: 140 MG/DL (ref 74–99)
GLUCOSE SERPL-MCNC: 143 MG/DL (ref 74–99)
GLUCOSE SERPL-MCNC: 158 MG/DL (ref 74–99)
HCO3: 22.8 MMOL/L (ref 22–26)
HCO3: 23.8 MMOL/L (ref 22–26)
HCO3: 24.5 MMOL/L (ref 22–26)
HCT VFR BLD AUTO: 26.9 % (ref 34–48)
HCT VFR BLD AUTO: 28.1 % (ref 34–48)
HGB BLD-MCNC: 8 G/DL (ref 11.5–15.5)
HGB BLD-MCNC: 8.4 G/DL (ref 11.5–15.5)
HHB: 0.8 % (ref 0–5)
HHB: 0.9 % (ref 0–5)
HHB: 2.6 % (ref 0–5)
IMM GRANULOCYTES # BLD AUTO: 0.29 K/UL (ref 0–0.58)
IMM GRANULOCYTES NFR BLD: 2 % (ref 0–5)
LAB: ABNORMAL
LYMPHOCYTES NFR BLD: 0.67 K/UL (ref 1.5–4)
LYMPHOCYTES RELATIVE PERCENT: 4 % (ref 20–42)
Lab: 1656
Lab: 2310
Lab: 835
MAGNESIUM SERPL-MCNC: 1.9 MG/DL (ref 1.6–2.6)
MCH RBC QN AUTO: 26.4 PG (ref 26–35)
MCHC RBC AUTO-ENTMCNC: 29.7 G/DL (ref 32–34.5)
MCV RBC AUTO: 88.8 FL (ref 80–99.9)
METHB: 0.7 % (ref 0–1.5)
METHB: 0.8 % (ref 0–1.5)
METHB: 0.9 % (ref 0–1.5)
MICROORGANISM/AGENT SPEC: ABNORMAL
MODE: ABNORMAL
MONOCYTES NFR BLD: 0.85 K/UL (ref 0.1–0.95)
MONOCYTES NFR BLD: 5 % (ref 2–12)
NEUTROPHILS NFR BLD: 89 % (ref 43–80)
NEUTS SEG NFR BLD: 15.51 K/UL (ref 1.8–7.3)
O2 DELIVERY DEVICE: ABNORMAL
O2 SATURATION: 97.4 % (ref 92–98.5)
O2 SATURATION: 99.1 % (ref 92–98.5)
O2 SATURATION: 99.2 % (ref 92–98.5)
O2HB: 96.1 % (ref 94–97)
O2HB: 97.8 % (ref 94–97)
O2HB: 98 % (ref 94–97)
OPERATOR ID: 7490
PATIENT TEMP: 37 C
PCO2: 43.7 MMHG (ref 35–45)
PCO2: 46.3 MMHG (ref 35–45)
PCO2: 51.1 MMHG (ref 35–45)
PEEP/CPAP: 8 CMH2O
PEEP/CPAP: 8 CMH2O
PFO2: 2.91 MMHG/%
PFO2: 3.24 MMHG/%
PH BLOOD GAS: 7.3 (ref 7.35–7.45)
PH BLOOD GAS: 7.33 (ref 7.35–7.45)
PH BLOOD GAS: 7.33 (ref 7.35–7.45)
PHOSPHATE SERPL-MCNC: 1.7 MG/DL (ref 2.5–4.5)
PHOSPHATE SERPL-MCNC: 1.8 MG/DL (ref 2.5–4.5)
PHOSPHATE SERPL-MCNC: 1.9 MG/DL (ref 2.5–4.5)
PHOSPHATE SERPL-MCNC: 2.2 MG/DL (ref 2.5–4.5)
PIP: 15 CMH2O
PIP: 15 CMH2O
PLATELET # BLD AUTO: 95 K/UL (ref 130–450)
PLATELET CONFIRMATION: NORMAL
PMV BLD AUTO: 10.8 FL (ref 7–12)
PO2: 145.7 MMHG (ref 75–100)
PO2: 161.8 MMHG (ref 75–100)
PO2: 99.7 MMHG (ref 75–100)
POC HCO3: 30.8 MMOL/L (ref 22–26)
POC O2 SATURATION: 95.9 % (ref 92–98.5)
POC PCO2: 63.2 MM HG (ref 35–45)
POC PH: 7.29 (ref 7.35–7.45)
POC PO2: 92.7 MM HG (ref 60–80)
POSITIVE BASE EXCESS, ART: 3.2 MMOL/L (ref 0–3)
POTASSIUM SERPL-SCNC: 3.6 MMOL/L (ref 3.5–5)
POTASSIUM SERPL-SCNC: 3.8 MMOL/L (ref 3.5–5)
POTASSIUM SERPL-SCNC: 3.9 MMOL/L (ref 3.5–5)
RBC # BLD AUTO: 3.03 M/UL (ref 3.5–5.5)
RI(T): 0.9
RI(T): 1.09
SODIUM SERPL-SCNC: 138 MMOL/L (ref 132–146)
SODIUM SERPL-SCNC: 139 MMOL/L (ref 132–146)
SODIUM SERPL-SCNC: 141 MMOL/L (ref 132–146)
SODIUM SERPL-SCNC: 141 MMOL/L (ref 132–146)
SODIUM SERPL-SCNC: 143 MMOL/L (ref 132–146)
SOURCE, BLOOD GAS: ABNORMAL
SPECIMEN DESCRIPTION: ABNORMAL
THB: 9.3 G/DL (ref 11.5–16.5)
THB: 9.3 G/DL (ref 11.5–16.5)
THB: 9.4 G/DL (ref 11.5–16.5)
TIME ANALYZED: 1659
TIME ANALYZED: 2324
TIME ANALYZED: 838
WBC OTHER # BLD: 17.3 K/UL (ref 4.5–11.5)

## 2025-03-14 PROCEDURE — 2700000000 HC OXYGEN THERAPY PER DAY

## 2025-03-14 PROCEDURE — 85025 COMPLETE CBC W/AUTO DIFF WBC: CPT

## 2025-03-14 PROCEDURE — 82962 GLUCOSE BLOOD TEST: CPT

## 2025-03-14 PROCEDURE — 2500000003 HC RX 250 WO HCPCS

## 2025-03-14 PROCEDURE — 2500000003 HC RX 250 WO HCPCS: Performed by: INTERNAL MEDICINE

## 2025-03-14 PROCEDURE — 2580000003 HC RX 258: Performed by: INTERNAL MEDICINE

## 2025-03-14 PROCEDURE — 94640 AIRWAY INHALATION TREATMENT: CPT

## 2025-03-14 PROCEDURE — 99291 CRITICAL CARE FIRST HOUR: CPT | Performed by: INTERNAL MEDICINE

## 2025-03-14 PROCEDURE — 6360000002 HC RX W HCPCS

## 2025-03-14 PROCEDURE — 82805 BLOOD GASES W/O2 SATURATION: CPT

## 2025-03-14 PROCEDURE — 94660 CPAP INITIATION&MGMT: CPT

## 2025-03-14 PROCEDURE — 82330 ASSAY OF CALCIUM: CPT

## 2025-03-14 PROCEDURE — 82803 BLOOD GASES ANY COMBINATION: CPT

## 2025-03-14 PROCEDURE — 99231 SBSQ HOSP IP/OBS SF/LOW 25: CPT | Performed by: INTERNAL MEDICINE

## 2025-03-14 PROCEDURE — 2580000003 HC RX 258

## 2025-03-14 PROCEDURE — 2000000000 HC ICU R&B

## 2025-03-14 PROCEDURE — 6370000000 HC RX 637 (ALT 250 FOR IP)

## 2025-03-14 PROCEDURE — 83735 ASSAY OF MAGNESIUM: CPT

## 2025-03-14 PROCEDURE — 87205 SMEAR GRAM STAIN: CPT

## 2025-03-14 PROCEDURE — 84100 ASSAY OF PHOSPHORUS: CPT

## 2025-03-14 PROCEDURE — 90945 DIALYSIS ONE EVALUATION: CPT

## 2025-03-14 PROCEDURE — 80048 BASIC METABOLIC PNL TOTAL CA: CPT

## 2025-03-14 PROCEDURE — 31720 CLEARANCE OF AIRWAYS: CPT

## 2025-03-14 PROCEDURE — 6370000000 HC RX 637 (ALT 250 FOR IP): Performed by: INTERNAL MEDICINE

## 2025-03-14 PROCEDURE — 71045 X-RAY EXAM CHEST 1 VIEW: CPT

## 2025-03-14 PROCEDURE — 6360000002 HC RX W HCPCS: Performed by: INTERNAL MEDICINE

## 2025-03-14 RX ORDER — POTASSIUM CHLORIDE 29.8 MG/ML
20 INJECTION INTRAVENOUS ONCE
Status: COMPLETED | OUTPATIENT
Start: 2025-03-14 | End: 2025-03-14

## 2025-03-14 RX ADMIN — Medication 9 MCG/MIN: at 21:19

## 2025-03-14 RX ADMIN — Medication: at 15:30

## 2025-03-14 RX ADMIN — Medication 1000 UNITS: at 08:23

## 2025-03-14 RX ADMIN — CALCIUM CHLORIDE 8000 MG: 100 INJECTION INTRAVENOUS; INTRAVENTRICULAR at 21:20

## 2025-03-14 RX ADMIN — PIPERACILLIN AND TAZOBACTAM 4500 MG: 4; .5 INJECTION, POWDER, FOR SOLUTION INTRAVENOUS at 00:49

## 2025-03-14 RX ADMIN — LACTULOSE 20 G: 20 SOLUTION ORAL at 21:17

## 2025-03-14 RX ADMIN — ARFORMOTEROL TARTRATE 15 MCG: 15 SOLUTION RESPIRATORY (INHALATION) at 09:26

## 2025-03-14 RX ADMIN — Medication: at 03:55

## 2025-03-14 RX ADMIN — LACTULOSE 20 G: 20 SOLUTION ORAL at 08:23

## 2025-03-14 RX ADMIN — IPRATROPIUM BROMIDE AND ALBUTEROL SULFATE 1 DOSE: 2.5; .5 SOLUTION RESPIRATORY (INHALATION) at 12:37

## 2025-03-14 RX ADMIN — SODIUM CHLORIDE: 9 INJECTION, SOLUTION INTRAVENOUS at 18:57

## 2025-03-14 RX ADMIN — APIXABAN 2.5 MG: 2.5 TABLET, FILM COATED ORAL at 08:23

## 2025-03-14 RX ADMIN — SODIUM PHOSPHATE, MONOBASIC, MONOHYDRATE AND SODIUM PHOSPHATE, DIBASIC, ANHYDROUS 6 MMOL: 142; 276 INJECTION, SOLUTION INTRAVENOUS at 08:23

## 2025-03-14 RX ADMIN — IPRATROPIUM BROMIDE AND ALBUTEROL SULFATE 1 DOSE: 2.5; .5 SOLUTION RESPIRATORY (INHALATION) at 16:42

## 2025-03-14 RX ADMIN — HYDROCORTISONE SODIUM SUCCINATE 100 MG: 100 INJECTION, POWDER, FOR SOLUTION INTRAMUSCULAR; INTRAVENOUS at 11:09

## 2025-03-14 RX ADMIN — IPRATROPIUM BROMIDE AND ALBUTEROL SULFATE 1 DOSE: 2.5; .5 SOLUTION RESPIRATORY (INHALATION) at 09:26

## 2025-03-14 RX ADMIN — HYDROCORTISONE SODIUM SUCCINATE 100 MG: 100 INJECTION, POWDER, FOR SOLUTION INTRAMUSCULAR; INTRAVENOUS at 19:52

## 2025-03-14 RX ADMIN — HYDROCORTISONE SODIUM SUCCINATE 100 MG: 100 INJECTION, POWDER, FOR SOLUTION INTRAMUSCULAR; INTRAVENOUS at 02:36

## 2025-03-14 RX ADMIN — Medication: at 13:06

## 2025-03-14 RX ADMIN — PIPERACILLIN AND TAZOBACTAM 4500 MG: 4; .5 INJECTION, POWDER, FOR SOLUTION INTRAVENOUS at 08:30

## 2025-03-14 RX ADMIN — SODIUM PHOSPHATE, MONOBASIC, MONOHYDRATE AND SODIUM PHOSPHATE, DIBASIC, ANHYDROUS 12 MMOL: 142; 276 INJECTION, SOLUTION INTRAVENOUS at 16:54

## 2025-03-14 RX ADMIN — BUDESONIDE INHALATION 500 MCG: 0.5 SUSPENSION RESPIRATORY (INHALATION) at 19:59

## 2025-03-14 RX ADMIN — IPRATROPIUM BROMIDE AND ALBUTEROL SULFATE 1 DOSE: 2.5; .5 SOLUTION RESPIRATORY (INHALATION) at 19:59

## 2025-03-14 RX ADMIN — Medication: at 20:32

## 2025-03-14 RX ADMIN — SODIUM PHOSPHATE, MONOBASIC, MONOHYDRATE AND SODIUM PHOSPHATE, DIBASIC, ANHYDROUS 12 MMOL: 142; 276 INJECTION, SOLUTION INTRAVENOUS at 01:11

## 2025-03-14 RX ADMIN — CALCIUM CHLORIDE 8000 MG: 100 INJECTION INTRAVENOUS; INTRAVENTRICULAR at 05:07

## 2025-03-14 RX ADMIN — ARFORMOTEROL TARTRATE 15 MCG: 15 SOLUTION RESPIRATORY (INHALATION) at 19:59

## 2025-03-14 RX ADMIN — SODIUM CHLORIDE, PRESERVATIVE FREE 10 ML: 5 INJECTION INTRAVENOUS at 21:12

## 2025-03-14 RX ADMIN — Medication: at 18:20

## 2025-03-14 RX ADMIN — POTASSIUM CHLORIDE 20 MEQ: 29.8 INJECTION, SOLUTION INTRAVENOUS at 19:54

## 2025-03-14 RX ADMIN — THIAMINE HYDROCHLORIDE 250 MG: 100 INJECTION, SOLUTION INTRAMUSCULAR; INTRAVENOUS at 08:23

## 2025-03-14 RX ADMIN — PIPERACILLIN AND TAZOBACTAM 4500 MG: 4; .5 INJECTION, POWDER, FOR SOLUTION INTRAVENOUS at 16:14

## 2025-03-14 RX ADMIN — LACTULOSE 20 G: 20 SOLUTION ORAL at 14:07

## 2025-03-14 RX ADMIN — SODIUM CHLORIDE, PRESERVATIVE FREE 10 ML: 5 INJECTION INTRAVENOUS at 08:23

## 2025-03-14 RX ADMIN — Medication: at 10:40

## 2025-03-14 RX ADMIN — APIXABAN 2.5 MG: 2.5 TABLET, FILM COATED ORAL at 21:17

## 2025-03-14 RX ADMIN — BUDESONIDE INHALATION 500 MCG: 0.5 SUSPENSION RESPIRATORY (INHALATION) at 09:26

## 2025-03-14 RX ADMIN — Medication: at 22:59

## 2025-03-14 RX ADMIN — Medication: at 08:00

## 2025-03-14 RX ADMIN — Medication: at 01:32

## 2025-03-14 ASSESSMENT — PAIN SCALES - GENERAL
PAINLEVEL_OUTOF10: 0

## 2025-03-14 NOTE — PLAN OF CARE
Problem: Chronic Conditions and Co-morbidities  Goal: Patient's chronic conditions and co-morbidity symptoms are monitored and maintained or improved  Outcome: Progressing     Problem: Discharge Planning  Goal: Discharge to home or other facility with appropriate resources  Outcome: Progressing     Problem: Safety - Adult  Goal: Free from fall injury  3/13/2025 2042 by Azucena Byrd RN  Outcome: Progressing  3/13/2025 1843 by Nikia Jansen RN  Outcome: Progressing     Problem: Skin/Tissue Integrity  Goal: Skin integrity remains intact  Description: 1.  Monitor for areas of redness and/or skin breakdown  2.  Assess vascular access sites hourly  3.  Every 4-6 hours minimum:  Change oxygen saturation probe site  4.  Every 4-6 hours:  If on nasal continuous positive airway pressure, respiratory therapy assess nares and determine need for appliance change or resting period  3/13/2025 2042 by Azucena Byrd RN  Outcome: Progressing  3/13/2025 1843 by Nikia Jansen RN  Outcome: Progressing     Problem: Nutrition Deficit:  Goal: Optimize nutritional status  3/13/2025 2042 by Azucena Byrd RN  Outcome: Progressing  3/13/2025 1843 by Nikia Jansen RN  Outcome: Progressing     Problem: ABCDS Injury Assessment  Goal: Absence of physical injury  3/13/2025 2042 by Azucena Byrd RN  Outcome: Progressing  3/13/2025 1843 by Nikia Jansen RN  Outcome: Progressing     Problem: Metabolic/Fluid and Electrolytes - Adult  Goal: Electrolytes maintained within normal limits  Outcome: Progressing  Goal: Hemodynamic stability and optimal renal function maintained  Outcome: Progressing  Goal: Glucose maintained within prescribed range  Outcome: Progressing     Problem: Hematologic - Adult  Goal: Maintains hematologic stability  Outcome: Progressing     Problem: Neurosensory - Adult  Goal: Achieves stable or improved neurological status  Outcome: Progressing  Goal: Achieves maximal

## 2025-03-14 NOTE — PROGRESS NOTES
[facility]  Department of Internal Medicine   Internal Medicine Residency   MICU Progress Note    Patient:  Nikki Moffett 79 y.o. female  MRN: [id number]     Date of Service: 3/13/2025    Allergy: Patient has no known allergies.    Subjective   ICU day 6d 9h    Patient noted to have high residuals on tube feeds. Tube feeds held and placed on low intermittent suction.     Seen and examined at bedside this morning, still requiring levophed. Opens eyes to voice and tracks, but does not sustain wakefulness, does not respond appropriately or follow commands. ABG this morning showing respiratory acidosis, will place on BIPAP.    Objective     VS: BP (!) 111/47   Pulse 76   Temp 98.2 °F (36.8 °C) (Temporal)   Resp 21   Ht 1.575 m (5' 2\")   Wt 64.2 kg (141 lb 8.6 oz)   SpO2 95%   BMI 25.89 kg/m²   ABP (Arterial line BP): 95/42ABP Mean (Arterial Line Mean): (!) 61 mmHg    Arterial line BP: ABP (Arterial line BP): 95/42 ABP Mean (Arterial Line Mean): (!) 61 mmHg    Physical Exam:  General Appearance: Lethargic, opens eyes but unable to sustain wakefulness  Neck: no adenopathy, no carotid bruit, no JVD, and supple, symmetrical, trachea midline  Lung: Rales bilateral  Heart: Irregular rhythm, normal rate  Abdomen: Soft nontender  Extremities:  No lower extremity edema    Medications     Infusions: (Fluid, Sedation, Vasopressors)  Scheduled Meds:   thiamine  250 mg IntraVENous Daily    lactulose  20 g Oral TID    piperacillin-tazobactam  4,500 mg IntraVENous Q8H    hydrocortisone sodium succinate PF  100 mg IntraVENous Q8H    insulin lispro  0-4 Units SubCUTAneous Q6H    sodium chloride flush  5-40 mL IntraVENous 2 times per day    apixaban  2.5 mg Oral BID    [Held by provider] furosemide  40 mg IntraVENous Daily    ipratropium 0.5 mg-albuterol 2.5 mg  1 Dose Inhalation Q4H WA RT    arformoterol tartrate  15 mcg Nebulization BID RT    budesonide  0.5 mg Nebulization BID RT    [Held by provider] midodrine

## 2025-03-14 NOTE — PROGRESS NOTES
[facility]  Internal Medicine Residency Program  Progress Note - House Team     Patient:  Nikki Moffett 79 y.o. female MRN: [id number]     Date of Service: 3/14/2025     CC:   Chief Complaint   Patient presents with    Altered Mental Status     \"Mood swings, memory loss and fatigue\" per patients grandson    Chest Pain     Ongoing for a couple weeks per family     Overnight events:   Increased Levo to 12  Stopped tube feeding due to 500cc+ dark brown residual    Subjective     Patient was seen at bedside this morning. Patient awakens to name, but does not follow commands.    Objective     Physical Exam:  Vitals: BP (!) 111/47   Pulse 84   Temp (!) 96.4 °F (35.8 °C) (Temporal)   Resp 27   Ht 1.575 m (5' 2\")   Wt 65.9 kg (145 lb 4.5 oz)   SpO2 99%   BMI 26.57 kg/m²     I & O - 24hr: In: 2867   Out: 4283   General appearance: Appears lethargic.  Awakens to name, but does not follow commands.  Neuro: Mental status: Awakens to name, but does not follow commands.   Cardiovascular: Regular rate and rhythm, S1 and S2  heard, no murmurs appreciated  Respiratory: Rales present bilaterally   Abdomen: Soft, non tender, bowel sounds normal; masses, no organomegaly, rebound or guarding  Extremities: Extremities normal, no cyanosis, distal pulses intact, no edema    Pertinent Labs & Imaging Studies      Complete Blood Count:   Recent Labs     03/12/25  0528 03/13/25  0507 03/13/25 2335 03/14/25  0534   WBC 18.9* 18.2*  --  17.3*   HGB 10.4* 8.1* 8.4* 8.0*   HCT 33.1* 26.2* 28.1* 26.9*    102*  --  95*        Last 3 Blood Glucose:   Recent Labs     03/13/25  2335 03/14/25  0534 03/14/25  1124   GLUCOSE 158* 143* 140*        PT/INR:    Lab Results   Component Value Date/Time    PROTIME 17.5 03/12/2025 11:01 AM    INR 1.6 03/12/2025 11:01 AM     PTT:    Lab Results   Component Value Date/Time    APTT 42.0 03/08/2025 05:50 PM     Comprehensive Metabolic Profile:   Recent Labs     03/12/25  0536  change in prominent left lower lobe pneumonia and moderate right lower   lobe pneumonia.   3. Slight increase in size of small bilateral pleural effusions.   4. Stable mild congestive failure.         XR ABDOMEN FOR NG/OG/NE TUBE PLACEMENT   Final Result   Enteric tube in the stomach as above.      No evidence of bowel obstruction.      Right pleural effusion with right basilar opacity with consolidation that may   represent pneumonia.         US ABDOMEN LIMITED   Final Result   1.  Echogenic liver parenchyma with somewhat scalloped liver margins.  Small   volume simple appearing intra-abdominal ascites in the right upper quadrant.      (Could reflect changes related to an underlying infiltrative pathology.   Hepatic steatosis or possibly early cirrhosis.  Please correlate with   patient's liver function test.)      2.  Right pleural effusion present.      3.  No intrahepatic or extrahepatic biliary dilatation.  Gallbladder is   contracted on this study.      4.  Normal appearance of the imaged kidney.  No hydronephrosis.         XR CHEST PORTABLE   Final Result   No significant change over the past 24 hours.         XR CHEST PORTABLE   Final Result   Right-sided vascular catheter tip overlies the region of the right atrium or   cavoatrial junction.      Cardiomediastinal silhouette is enlarged.      Perihilar and bibasilar opacities similar to prior.  Bilateral small pleural   effusions.  No gross pneumothorax.         XR CHEST PORTABLE   Final Result   Further progression of findings that indicate volume overload since March 7   study.  Please correlate clinically.         XR CHEST PORTABLE   Final Result   Borderline size cardiac area with mild to moderate bilateral pleural   effusions.         XR CHEST PORTABLE   Final Result   1. No acute cardiopulmonary process.   2. Bibasilar scarring and pleural thickening, unchanged.         XR CHEST (2 VW)   Final Result   Bilateral pleural effusions. The 1 on the left is

## 2025-03-14 NOTE — CARE COORDINATION
Care Coordination: LOS 10 day, Levophed, Zosyn, CVVHD, FMS, restraints, NGT/TF, Bipap/ Nc.  Needs unclear, May need HD, will follow for referral. Pt lived with son at home prior, Back door to [place]    Electronically signed by Cristela Bradford RN on 3/14/2025 at 4:51 PM

## 2025-03-14 NOTE — PROGRESS NOTES
[facility]  Internal Medicine Residency / House Medicine Service    Attending Physician Statement  I have discussed the case, including pertinent history and exam findings with the resident and the team.  I have seen and examined the patient and the key elements of the encounter have been performed by me.  I agree with the assessment, plan and orders as documented by the resident.      VS all normal  Not very alert  Eye contact only  Resolving influenza  Remains on Levophed  TF Nepro on hold, had high RV (Ipatroprion bromide?)  On PPI and Eliquis and Hydrocortisone  and Dark gastric contents, to be checked for hemoccult  AFib stable presently with low EF  Plan: Same ICU care    Remainder of medical problems as per resident note.      Dalton Sosa MD FRCP [facility]  Internal Medicine Residency Faculty

## 2025-03-14 NOTE — PROGRESS NOTES
Notified Dr. Mcknight of patient's 500cc+ dark brown residual pulled from NGT. Order to stop tube feed and connect NGT to low-intermittent suction.

## 2025-03-14 NOTE — PROGRESS NOTES
[facility]  Nephrology Attending Progress Note  Alexandru Owen MD        SUBJECTIVE:     HPI:   3/9:  79 yrs old HF , Known h/o prior CVA s/p intracranial thrombectomy 2023 December .      Came in with above mentioned issues , noted to be in Rapid A fib , hypotensive , lethargic , already known to   Cardiology team on enteresto , eliquis , toprol , amiodarone .      Patient remained in rapid a fib , hypotensive , was diuresed , developed ANTONIO , Acidosis , , Lactic acidosis , shock liver   Coagulopathy , her LV function detoriorated to 10-15% due to a fib related Cardiomyopathy , possible myocardial injury ( high troponin )     Her HR now is better , she appears hemodynamically stable , alert and responsive in no distress       SUBJECTIVE:    3/10: pt seen and examined in the icu, chart reviewed. On cvvh, dw icu nurse, tolerating -50 ml/hr, in bipap. Lethargic, mumbles that she is ok    3/11: pt seen and examined. On cvvh, intubated. On epi and levo, dw icu nurse, keeping even with cvvh at this time    3/12: pt seen in icu, intubated on cvvh. Levo, running even. Dw icu nurse, pt lethargic and not answering questions    3/13: pt seen in icu, remains on cvvh, lethargic, not conversant, on tube feeds    3/14: pt seen in icu, on cvvh. 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)    Ischemic cerebrovascular accident (CVA) (HCC)    Atrial flutter (HCC)    Paroxysmal atrial fibrillation (HCC)    H/O: stroke    Atrial flutter with rapid ventricular response (HCC)    Decompensated heart failure (HCC)    Chronic atrial fibrillation (HCC)    VHD (valvular heart disease)        PAST MEDICAL HISTORY:    Past Medical History:   Diagnosis Date    Cerebral artery occlusion with cerebral infarction (HCC)        DIET:    ADULT TUBE FEEDING; Nasogastric; Renal Formula; Continuous; 10; Yes; 10; Q 4 hours; 40; 30; Q 4  (145 lb 4.5 oz)   03/04/25 56.5 kg (124 lb 8 oz)   04/23/24 59.9 kg (132 lb)       Constitutional:  Pt is in no acute distress  Head: normocephalic, atraumatic  Neck: no JVD  Cardiovascular: regular rate and rhythm, no murmurs, gallops, or rubs  Respiratory:  No rales, rhochi, or wheezes  Gastrointestinal:  Soft, nontender, nondistended, bowel sounds x 4  Ext: no edema  Skin: dry, no rash    MEDS (scheduled):    thiamine  250 mg IntraVENous Daily    lactulose  20 g Oral TID    piperacillin-tazobactam  4,500 mg IntraVENous Q8H    hydrocortisone sodium succinate PF  100 mg IntraVENous Q8H    insulin lispro  0-4 Units SubCUTAneous Q6H    sodium chloride flush  5-40 mL IntraVENous 2 times per day    apixaban  2.5 mg Oral BID    [Held by provider] furosemide  40 mg IntraVENous Daily    ipratropium 0.5 mg-albuterol 2.5 mg  1 Dose Inhalation Q4H WA RT    arformoterol tartrate  15 mcg Nebulization BID RT    budesonide  0.5 mg Nebulization BID RT    [Held by provider] midodrine  10 mg Oral TID WC    [Held by provider] metoprolol succinate  25 mg Oral Daily    [Held by provider] amiodarone  200 mg Oral BID    Vitamin D  1,000 Units Oral Daily    [Held by provider] atorvastatin  80 mg Oral Nightly       MEDS (infusions):   norepinephrine 11 mcg/min (03/14/25 0630)    prismaSATE BGK 4/0/1.2 2,000 mL/hr at 03/14/25 0355    sodium chloride 100 mL/hr at 03/13/25 0113    calcium chloride 8,000 mg in sodium chloride 0.9 % 1,000 mL infusion 8,000 mg (03/14/25 0507)    sodium chloride 10 mL/hr at 03/12/25 2219    dextrose         MEDS (prn):  sulfur hexafluoride microspheres, heparin (PF), sodium chloride, heparin (porcine) **AND** heparin (porcine), potassium chloride, magnesium sulfate, calcium gluconate **OR** calcium gluconate **OR** calcium gluconate **OR** calcium gluconate, sodium phosphate 6 mmol in sodium chloride 0.9 % 250 mL IVPB **OR** sodium phosphate 12 mmol in sodium chloride 0.9 % 250 mL IVPB **OR** sodium phosphate 18  mmol in sodium chloride 0.9 % 500 mL IVPB **OR** sodium phosphate 24 mmol in sodium chloride 0.9 % 500 mL IVPB, sodium chloride, polyethylene glycol, prochlorperazine **OR** prochlorperazine, guaiFENesin-dextromethorphan, sodium chloride flush, ondansetron **OR** ondansetron, acetaminophen **OR** acetaminophen, glucose, dextrose bolus **OR** dextrose bolus, glucagon (rDNA), dextrose    DATA:    Recent Labs     03/12/25  0528 03/13/25  0507 03/13/25  2335 03/14/25  0534   WBC 18.9* 18.2*  --  17.3*   HGB 10.4* 8.1* 8.4* 8.0*   HCT 33.1* 26.2* 28.1* 26.9*   MCV 85.3 86.8  --  88.8    102*  --  95*     Recent Labs     03/11/25  1202 03/11/25  1709 03/12/25  0528 03/12/25  1101 03/13/25  1746 03/13/25  2335 03/14/25  0534      < > 138   < > 143 141 143   K 4.4   < > 3.9   < > 3.8 3.6 3.8   *   < > 107   < > 109* 108* 109*   CO2 18*   < > 17*   < > 22 21* 22   BUN 11   < > 12   < > 12 12 11   CREATININE 0.9   < > 0.8   < > 0.8 0.8 0.8   LABGLOM 65   < > 72   < > 75 77 77   GLUCOSE 112*   < > 99   < > 132* 158* 143*   CALCIUM 9.5   < > 8.6   < > 8.9 8.8 8.7   ALT 1,706*  --  1,279*  --   --   --   --    AST 3,124*  --  1,849*  --   --   --   --    BILIDIR  --   --  1.5*  --   --   --   --    BILITOT 2.6*  --  2.1*  --   --   --   --    ALKPHOS 189*  --  171*  --   --   --   --    MG 1.8   < > 1.8   < > 1.9 1.9 1.9   PHOS 2.2*   < > 2.0*   < > 2.9 1.7* 2.2*    < > = values in this interval not displayed.       No results found for: \"LABALBU\"  Lab Results   Component Value Date    TSH 2.39 03/12/2025       Iron Studies  No results found for: \"IRON\", \"TIBC\", \"FERRITIN\"  Vitamin B-12   Date Value Ref Range Status   03/04/2025 1653 (H) 211 - 946 pg/mL Final     Folate   Date Value Ref Range Status   03/04/2025 12.3 4.8 - 24.2 ng/mL Final       Vit D, 25-Hydroxy   Date Value Ref Range Status   03/04/2025 13.8 (L) 30.0 - 100.0 ng/mL Final     No results found for: \"PTH\"    No components found for:

## 2025-03-14 NOTE — PROGRESS NOTES
Comprehensive Nutrition Assessment    Type and Reason for Visit:  Reassess    Nutrition Recommendations/Plan:     Continue NPO. TF held this AM d/t high +    When resumed recommend formula change to Peptide Based   *Renal formula not necessary w/ ANTONIO as K WNL & Phos low    Updated TF Recs when needed:  Vital AF 1.2 @ 40 ml/hr + 1 protein mod daily  Will provide 960 ml tv, 1152 kcals, 72 gm pro (1252 kcals & 98 gm pro w/ mod), 778 ml free water       Malnutrition Assessment:  Malnutrition Status:  At risk for malnutrition (03/14/25 1136)    Context:  Acute Illness     Findings of the 6 clinical characteristics of malnutrition:  Energy Intake:  50% or less of estimated energy requirements for 5 or more days (decreased intakes x 1 mon PTA per H&P)  Weight Loss:  Mild weight loss (10.5% wt loss x 1 year, does not meet significant criteria)     Body Fat Loss:  Unable to assess (droplet iso)   Muscle Mass Loss:  Unable to assess (droplet iso)  Fluid Accumulation:  No fluid accumulation    Strength:  Not Performed    Nutrition Assessment:    Pt status declined now transferred to MICU for post-procedure AMS & Hypotension. Admit initially w/ fatigue/ weakness/ CP found to have Afib RVR & B/L Pleural effusion s/p cardioversion 3/7. Pt also now +Influenza A. Noted Shock Liver & ANTONIO now CVVHD initiated. PMHx Pre DM, L MCA infarct s/p thrombectomy, Medication noncompliance, & recent dx of dementia. Noted poor PO intake x 1 mon PTA. EN support was initiated however held this AM d/t +. NGT to LIS. Will monitor & follow.    Nutrition Related Findings:    Pt disoriented/ does not follow commads, hypotension on pressor x 1, +I/O's 3L, +1 gen edema, hypoactive BS, FMS w/ diarrhea, NGT LIS for high GRV of TF, CVVHD (K WNL, Phos 2.2), Jaundice (elevated LFTs/Bili 2.1, Ammonia WNL 3/12 draw) Wound Type: None       Current Nutrition Intake & Therapies:    Average Meal Intake: NPO (noted poor intakes prior)    Current

## 2025-03-14 NOTE — PLAN OF CARE
Problem: Chronic Conditions and Co-morbidities  Goal: Patient's chronic conditions and co-morbidity symptoms are monitored and maintained or improved  3/14/2025 0850 by Carol Mendes RN  Outcome: Not Progressing  3/13/2025 2042 by Azucena Byrd RN  Outcome: Progressing     Problem: Discharge Planning  Goal: Discharge to home or other facility with appropriate resources  3/14/2025 0850 by Carol Mendes RN  Outcome: Not Progressing  3/13/2025 2042 by Azucena Byrd RN  Outcome: Progressing     Problem: Nutrition Deficit:  Goal: Optimize nutritional status  3/14/2025 0850 by Carol Mendes RN  Outcome: Not Progressing  3/13/2025 2042 by Azucena Byrd RN  Outcome: Progressing     Problem: Metabolic/Fluid and Electrolytes - Adult  Goal: Electrolytes maintained within normal limits  3/14/2025 0850 by Carol Mendes RN  Outcome: Not Progressing  3/13/2025 2042 by Azucena Byrd RN  Outcome: Progressing  Goal: Hemodynamic stability and optimal renal function maintained  3/14/2025 0850 by Carol Mendes RN  Outcome: Not Progressing  3/13/2025 2042 by Azucena Byrd RN  Outcome: Progressing     Problem: Neurosensory - Adult  Goal: Achieves stable or improved neurological status  3/14/2025 0850 by Carol Mendes RN  Outcome: Not Progressing  3/13/2025 2042 by Azucena Byrd RN  Outcome: Progressing  Goal: Achieves maximal functionality and self care  3/14/2025 0850 by Carol Mendes RN  Outcome: Not Progressing  3/13/2025 2042 by Azucena Byrd RN  Outcome: Progressing     Problem: Respiratory - Adult  Goal: Achieves optimal ventilation and oxygenation  3/14/2025 0850 by Carol Mendes RN  Outcome: Not Progressing  3/13/2025 2042 by Azucena Byrd RN  Outcome: Progressing     Problem: Cardiovascular - Adult  Goal: Maintains optimal cardiac output and hemodynamic stability  3/14/2025 0850 by Carol Mendes RN  Outcome: Not Progressing  3/13/2025 2042 by Azucena Byrd  RN  Outcome: Progressing     Problem: Musculoskeletal - Adult  Goal: Return mobility to safest level of function  3/14/2025 0850 by Carol Mendes RN  Outcome: Not Progressing  3/13/2025 2042 by Azucena Byrd RN  Outcome: Progressing  Goal: Return ADL status to a safe level of function  3/14/2025 0850 by Carol Mendes RN  Outcome: Not Progressing  3/13/2025 2042 by Azucena Byrd RN  Outcome: Progressing     Problem: Gastrointestinal - Adult  Goal: Maintains or returns to baseline bowel function  3/14/2025 0850 by Carol Mendes RN  Outcome: Not Progressing  3/13/2025 2042 by Azucena Byrd RN  Outcome: Progressing  Goal: Maintains adequate nutritional intake  3/14/2025 0850 by Carol Mendes RN  Outcome: Not Progressing  3/13/2025 2042 by Azucena Byrd RN  Outcome: Progressing     Problem: Infection - Adult  Goal: Absence of infection at discharge  3/14/2025 0850 by Carol Mendes RN  Outcome: Not Progressing  3/13/2025 2042 by Azucena Byrd RN  Outcome: Progressing  Goal: Absence of infection during hospitalization  3/14/2025 0850 by Carol Mendes RN  Outcome: Not Progressing  3/13/2025 2042 by Azucena Byrd RN  Outcome: Progressing

## 2025-03-15 ENCOUNTER — APPOINTMENT (OUTPATIENT)
Dept: MRI IMAGING | Age: 80
DRG: 004 | End: 2025-03-15
Payer: MEDICARE

## 2025-03-15 ENCOUNTER — APPOINTMENT (OUTPATIENT)
Dept: GENERAL RADIOLOGY | Age: 80
DRG: 004 | End: 2025-03-15
Payer: MEDICARE

## 2025-03-15 LAB
AADO2: 144.6 MMHG
ALBUMIN SERPL-MCNC: 3.2 G/DL (ref 3.5–5.2)
ALP SERPL-CCNC: 151 U/L (ref 35–104)
ALT SERPL-CCNC: 460 U/L (ref 0–32)
AMMONIA PLAS-SCNC: 23 UMOL/L (ref 11–51)
ANION GAP SERPL CALCULATED.3IONS-SCNC: 10 MMOL/L (ref 7–16)
ANION GAP SERPL CALCULATED.3IONS-SCNC: 14 MMOL/L (ref 7–16)
AST SERPL-CCNC: 270 U/L (ref 0–31)
B.E.: -4.3 MMOL/L (ref -3–3)
BASOPHILS # BLD: 0.02 K/UL (ref 0–0.2)
BASOPHILS NFR BLD: 0 % (ref 0–2)
BILIRUB DIRECT SERPL-MCNC: 1.3 MG/DL (ref 0–0.3)
BILIRUB INDIRECT SERPL-MCNC: 0.8 MG/DL (ref 0–1)
BILIRUB SERPL-MCNC: 2.1 MG/DL (ref 0–1.2)
BUN SERPL-MCNC: 10 MG/DL (ref 6–23)
BUN SERPL-MCNC: 12 MG/DL (ref 6–23)
CA-I BLD-SCNC: 1.21 MMOL/L (ref 1.15–1.33)
CA-I BLD-SCNC: 1.25 MMOL/L (ref 1.15–1.33)
CALCIUM SERPL-MCNC: 8.3 MG/DL (ref 8.6–10.2)
CALCIUM SERPL-MCNC: 8.9 MG/DL (ref 8.6–10.2)
CHLORIDE SERPL-SCNC: 107 MMOL/L (ref 98–107)
CHLORIDE SERPL-SCNC: 111 MMOL/L (ref 98–107)
CO2 SERPL-SCNC: 19 MMOL/L (ref 22–29)
CO2 SERPL-SCNC: 22 MMOL/L (ref 22–29)
COHB: 0.8 % (ref 0–1.5)
CREAT SERPL-MCNC: 0.8 MG/DL (ref 0.5–1)
CREAT SERPL-MCNC: 0.9 MG/DL (ref 0.5–1)
CRITICAL: ABNORMAL
DATE ANALYZED: ABNORMAL
DATE OF COLLECTION: ABNORMAL
EOSINOPHIL # BLD: 0 K/UL (ref 0.05–0.5)
EOSINOPHILS RELATIVE PERCENT: 0 % (ref 0–6)
ERYTHROCYTE [DISTWIDTH] IN BLOOD BY AUTOMATED COUNT: 17.2 % (ref 11.5–15)
FIO2: 50 %
GFR, ESTIMATED: 66 ML/MIN/1.73M2
GFR, ESTIMATED: 79 ML/MIN/1.73M2
GLUCOSE BLD-MCNC: 114 MG/DL (ref 74–99)
GLUCOSE BLD-MCNC: 119 MG/DL (ref 74–99)
GLUCOSE BLD-MCNC: 122 MG/DL (ref 74–99)
GLUCOSE BLD-MCNC: 127 MG/DL (ref 74–99)
GLUCOSE SERPL-MCNC: 129 MG/DL (ref 74–99)
GLUCOSE SERPL-MCNC: 130 MG/DL (ref 74–99)
HCO3: 21.9 MMOL/L (ref 22–26)
HCT VFR BLD AUTO: 26 % (ref 34–48)
HCV RNA SERPL NAA+PROBE-ACNC: NOT DETECTED IU/ML
HCV RNA SERPL NAA+PROBE-LOG IU: NOT DETECTED LOG IU/ML
HCV RNA SERPL QL NAA+PROBE: NOT DETECTED
HGB BLD-MCNC: 7.8 G/DL (ref 11.5–15.5)
HHB: 0.7 % (ref 0–5)
IMM GRANULOCYTES # BLD AUTO: 0.13 K/UL (ref 0–0.58)
IMM GRANULOCYTES NFR BLD: 1 % (ref 0–5)
LAB: ABNORMAL
LYMPHOCYTES NFR BLD: 0.72 K/UL (ref 1.5–4)
LYMPHOCYTES RELATIVE PERCENT: 5 % (ref 20–42)
Lab: 513
MAGNESIUM SERPL-MCNC: 1.7 MG/DL (ref 1.6–2.6)
MAGNESIUM SERPL-MCNC: 1.9 MG/DL (ref 1.6–2.6)
MCH RBC QN AUTO: 26.4 PG (ref 26–35)
MCHC RBC AUTO-ENTMCNC: 30 G/DL (ref 32–34.5)
MCV RBC AUTO: 88.1 FL (ref 80–99.9)
METHB: 0.7 % (ref 0–1.5)
MODE: ABNORMAL
MONOCYTES NFR BLD: 0.72 K/UL (ref 0.1–0.95)
MONOCYTES NFR BLD: 5 % (ref 2–12)
NEUTROPHILS NFR BLD: 90 % (ref 43–80)
NEUTS SEG NFR BLD: 14.58 K/UL (ref 1.8–7.3)
O2 SATURATION: 99.3 % (ref 92–98.5)
O2HB: 97.8 % (ref 94–97)
OPERATOR ID: 2962
PATIENT TEMP: 37 C
PCO2: 45.9 MMHG (ref 35–45)
PEEP/CPAP: 8 CMH2O
PFO2: 3.21 MMHG/%
PH BLOOD GAS: 7.3 (ref 7.35–7.45)
PHOSPHATE SERPL-MCNC: 2.1 MG/DL (ref 2.5–4.5)
PHOSPHATE SERPL-MCNC: 2.4 MG/DL (ref 2.5–4.5)
PIP: 15 CMH2O
PLATELET CONFIRMATION: NORMAL
PLATELET, FLUORESCENCE: 83 K/UL (ref 130–450)
PMV BLD AUTO: 11.4 FL (ref 7–12)
PO2: 160.3 MMHG (ref 75–100)
POTASSIUM SERPL-SCNC: 3.8 MMOL/L (ref 3.5–5)
POTASSIUM SERPL-SCNC: 3.9 MMOL/L (ref 3.5–5)
PROT SERPL-MCNC: 5.4 G/DL (ref 6.4–8.3)
RBC # BLD AUTO: 2.95 M/UL (ref 3.5–5.5)
RBC # BLD: ABNORMAL 10*6/UL
RI(T): 0.9
SODIUM SERPL-SCNC: 140 MMOL/L (ref 132–146)
SODIUM SERPL-SCNC: 143 MMOL/L (ref 132–146)
SOURCE, BLOOD GAS: ABNORMAL
THB: 8.7 G/DL (ref 11.5–16.5)
TIME ANALYZED: 518
WBC OTHER # BLD: 16.2 K/UL (ref 4.5–11.5)

## 2025-03-15 PROCEDURE — 82330 ASSAY OF CALCIUM: CPT

## 2025-03-15 PROCEDURE — 6370000000 HC RX 637 (ALT 250 FOR IP): Performed by: INTERNAL MEDICINE

## 2025-03-15 PROCEDURE — 84100 ASSAY OF PHOSPHORUS: CPT

## 2025-03-15 PROCEDURE — 90945 DIALYSIS ONE EVALUATION: CPT

## 2025-03-15 PROCEDURE — 80048 BASIC METABOLIC PNL TOTAL CA: CPT

## 2025-03-15 PROCEDURE — 6370000000 HC RX 637 (ALT 250 FOR IP)

## 2025-03-15 PROCEDURE — 2580000003 HC RX 258

## 2025-03-15 PROCEDURE — 74018 RADEX ABDOMEN 1 VIEW: CPT

## 2025-03-15 PROCEDURE — 70551 MRI BRAIN STEM W/O DYE: CPT

## 2025-03-15 PROCEDURE — 82962 GLUCOSE BLOOD TEST: CPT

## 2025-03-15 PROCEDURE — 6360000002 HC RX W HCPCS

## 2025-03-15 PROCEDURE — 6360000002 HC RX W HCPCS: Performed by: INTERNAL MEDICINE

## 2025-03-15 PROCEDURE — 83735 ASSAY OF MAGNESIUM: CPT

## 2025-03-15 PROCEDURE — 85025 COMPLETE CBC W/AUTO DIFF WBC: CPT

## 2025-03-15 PROCEDURE — 80053 COMPREHEN METABOLIC PANEL: CPT

## 2025-03-15 PROCEDURE — 2000000000 HC ICU R&B

## 2025-03-15 PROCEDURE — 70547 MR ANGIOGRAPHY NECK W/O DYE: CPT

## 2025-03-15 PROCEDURE — 2500000003 HC RX 250 WO HCPCS

## 2025-03-15 PROCEDURE — 99291 CRITICAL CARE FIRST HOUR: CPT | Performed by: INTERNAL MEDICINE

## 2025-03-15 PROCEDURE — 2500000003 HC RX 250 WO HCPCS: Performed by: INTERNAL MEDICINE

## 2025-03-15 PROCEDURE — 6360000002 HC RX W HCPCS: Performed by: PSYCHIATRY & NEUROLOGY

## 2025-03-15 PROCEDURE — 2580000003 HC RX 258: Performed by: INTERNAL MEDICINE

## 2025-03-15 PROCEDURE — 37799 UNLISTED PX VASCULAR SURGERY: CPT

## 2025-03-15 PROCEDURE — 82248 BILIRUBIN DIRECT: CPT

## 2025-03-15 PROCEDURE — 99223 1ST HOSP IP/OBS HIGH 75: CPT | Performed by: PSYCHIATRY & NEUROLOGY

## 2025-03-15 PROCEDURE — 70544 MR ANGIOGRAPHY HEAD W/O DYE: CPT

## 2025-03-15 PROCEDURE — 82805 BLOOD GASES W/O2 SATURATION: CPT

## 2025-03-15 PROCEDURE — 94660 CPAP INITIATION&MGMT: CPT

## 2025-03-15 PROCEDURE — 2700000000 HC OXYGEN THERAPY PER DAY

## 2025-03-15 PROCEDURE — 82140 ASSAY OF AMMONIA: CPT

## 2025-03-15 PROCEDURE — 94640 AIRWAY INHALATION TREATMENT: CPT

## 2025-03-15 RX ORDER — LEVETIRACETAM 500 MG/5ML
500 INJECTION, SOLUTION, CONCENTRATE INTRAVENOUS 2 TIMES DAILY
Status: DISCONTINUED | OUTPATIENT
Start: 2025-03-15 | End: 2025-03-24 | Stop reason: CLARIF

## 2025-03-15 RX ORDER — WATER 10 ML/10ML
INJECTION INTRAMUSCULAR; INTRAVENOUS; SUBCUTANEOUS
Status: COMPLETED
Start: 2025-03-15 | End: 2025-03-15

## 2025-03-15 RX ORDER — LEVETIRACETAM 500 MG/5ML
1000 INJECTION, SOLUTION, CONCENTRATE INTRAVENOUS ONCE
Status: COMPLETED | OUTPATIENT
Start: 2025-03-15 | End: 2025-03-15

## 2025-03-15 RX ORDER — PANTOPRAZOLE SODIUM 40 MG/1
40 TABLET, DELAYED RELEASE ORAL
Status: DISCONTINUED | OUTPATIENT
Start: 2025-03-16 | End: 2025-03-16

## 2025-03-15 RX ADMIN — THIAMINE HYDROCHLORIDE 250 MG: 100 INJECTION, SOLUTION INTRAMUSCULAR; INTRAVENOUS at 10:15

## 2025-03-15 RX ADMIN — PIPERACILLIN AND TAZOBACTAM 4500 MG: 4; .5 INJECTION, POWDER, FOR SOLUTION INTRAVENOUS at 10:18

## 2025-03-15 RX ADMIN — LACTULOSE 20 G: 20 SOLUTION ORAL at 13:06

## 2025-03-15 RX ADMIN — SODIUM PHOSPHATE, MONOBASIC, MONOHYDRATE AND SODIUM PHOSPHATE, DIBASIC, ANHYDROUS 6 MMOL: 142; 276 INJECTION, SOLUTION INTRAVENOUS at 07:51

## 2025-03-15 RX ADMIN — HYDROCORTISONE SODIUM SUCCINATE 100 MG: 100 INJECTION, POWDER, FOR SOLUTION INTRAMUSCULAR; INTRAVENOUS at 19:47

## 2025-03-15 RX ADMIN — HYDROCORTISONE SODIUM SUCCINATE 100 MG: 100 INJECTION, POWDER, FOR SOLUTION INTRAMUSCULAR; INTRAVENOUS at 10:15

## 2025-03-15 RX ADMIN — LACTULOSE 20 G: 20 SOLUTION ORAL at 10:14

## 2025-03-15 RX ADMIN — IPRATROPIUM BROMIDE AND ALBUTEROL SULFATE 1 DOSE: 2.5; .5 SOLUTION RESPIRATORY (INHALATION) at 16:20

## 2025-03-15 RX ADMIN — IPRATROPIUM BROMIDE AND ALBUTEROL SULFATE 1 DOSE: 2.5; .5 SOLUTION RESPIRATORY (INHALATION) at 20:00

## 2025-03-15 RX ADMIN — SODIUM CHLORIDE: 9 INJECTION, SOLUTION INTRAVENOUS at 03:31

## 2025-03-15 RX ADMIN — SODIUM CHLORIDE, PRESERVATIVE FREE 10 ML: 5 INJECTION INTRAVENOUS at 10:07

## 2025-03-15 RX ADMIN — APIXABAN 2.5 MG: 2.5 TABLET, FILM COATED ORAL at 10:14

## 2025-03-15 RX ADMIN — PIPERACILLIN AND TAZOBACTAM 4500 MG: 4; .5 INJECTION, POWDER, FOR SOLUTION INTRAVENOUS at 17:27

## 2025-03-15 RX ADMIN — APIXABAN 2.5 MG: 2.5 TABLET, FILM COATED ORAL at 21:30

## 2025-03-15 RX ADMIN — HEPARIN SODIUM 1600 UNITS: 1000 INJECTION INTRAVENOUS; SUBCUTANEOUS at 08:45

## 2025-03-15 RX ADMIN — LACTULOSE 20 G: 20 SOLUTION ORAL at 21:48

## 2025-03-15 RX ADMIN — SODIUM CHLORIDE: 9 INJECTION, SOLUTION INTRAVENOUS at 11:00

## 2025-03-15 RX ADMIN — ARFORMOTEROL TARTRATE 15 MCG: 15 SOLUTION RESPIRATORY (INHALATION) at 20:00

## 2025-03-15 RX ADMIN — WATER 10 ML: 1 INJECTION INTRAMUSCULAR; INTRAVENOUS; SUBCUTANEOUS at 19:47

## 2025-03-15 RX ADMIN — MIDODRINE HYDROCHLORIDE 10 MG: 10 TABLET ORAL at 17:23

## 2025-03-15 RX ADMIN — IPRATROPIUM BROMIDE AND ALBUTEROL SULFATE 1 DOSE: 2.5; .5 SOLUTION RESPIRATORY (INHALATION) at 11:58

## 2025-03-15 RX ADMIN — Medication: at 06:22

## 2025-03-15 RX ADMIN — SODIUM CHLORIDE, PRESERVATIVE FREE 10 ML: 5 INJECTION INTRAVENOUS at 21:51

## 2025-03-15 RX ADMIN — MIDODRINE HYDROCHLORIDE 10 MG: 10 TABLET ORAL at 13:06

## 2025-03-15 RX ADMIN — LEVETIRACETAM 500 MG: 100 INJECTION INTRAVENOUS at 22:59

## 2025-03-15 RX ADMIN — Medication 2000 ML/HR: at 11:00

## 2025-03-15 RX ADMIN — Medication 1000 UNITS: at 10:14

## 2025-03-15 RX ADMIN — BUDESONIDE INHALATION 500 MCG: 0.5 SUSPENSION RESPIRATORY (INHALATION) at 11:58

## 2025-03-15 RX ADMIN — HEPARIN SODIUM 1200 UNITS: 1000 INJECTION INTRAVENOUS; SUBCUTANEOUS at 08:49

## 2025-03-15 RX ADMIN — POTASSIUM PHOSPHATE, MONOBASIC AND POTASSIUM PHOSPHATE, DIBASIC 15 MMOL: 224; 236 INJECTION, SOLUTION, CONCENTRATE INTRAVENOUS at 13:03

## 2025-03-15 RX ADMIN — Medication: at 01:24

## 2025-03-15 RX ADMIN — LEVETIRACETAM 1000 MG: 100 INJECTION INTRAVENOUS at 14:16

## 2025-03-15 RX ADMIN — PIPERACILLIN AND TAZOBACTAM 4500 MG: 4; .5 INJECTION, POWDER, FOR SOLUTION INTRAVENOUS at 01:03

## 2025-03-15 RX ADMIN — SODIUM PHOSPHATE, MONOBASIC, MONOHYDRATE AND SODIUM PHOSPHATE, DIBASIC, ANHYDROUS 12 MMOL: 142; 276 INJECTION, SOLUTION INTRAVENOUS at 00:33

## 2025-03-15 RX ADMIN — BUDESONIDE INHALATION 500 MCG: 0.5 SUSPENSION RESPIRATORY (INHALATION) at 20:00

## 2025-03-15 RX ADMIN — HYDROCORTISONE SODIUM SUCCINATE 100 MG: 100 INJECTION, POWDER, FOR SOLUTION INTRAMUSCULAR; INTRAVENOUS at 03:29

## 2025-03-15 RX ADMIN — ARFORMOTEROL TARTRATE 15 MCG: 15 SOLUTION RESPIRATORY (INHALATION) at 11:58

## 2025-03-15 RX ADMIN — Medication: at 03:53

## 2025-03-15 ASSESSMENT — PAIN SCALES - GENERAL
PAINLEVEL_OUTOF10: 0

## 2025-03-15 NOTE — PROGRESS NOTES
Date: 3/15/2025    Time: 1:55 AM    Patient Placed On BIPAP/CPAP/ Non-Invasive Ventilation?  Patient continues on bipap    If no must comment.  Facial area red/color change? No           If YES are Blister/Lesion present?No   If yes must notify nursing staff  BIPAP/CPAP skin barrier?  Yes    Skin barrier type:mepilexlite       Comments:        Sruthi Butt RCP

## 2025-03-15 NOTE — PROGRESS NOTES
[facility]  Internal Medicine Residency Program  Progress Note - House Team     Patient:  Nikki Moffett 79 y.o. female MRN: [id number]     Date of Service: 3/15/2025     CC:   Chief Complaint   Patient presents with    Altered Mental Status     \"Mood swings, memory loss and fatigue\" per patients grandson    Chest Pain     Ongoing for a couple weeks per family     Overnight events:   K 3.6 but CVVHD order start at 3.5, 20 meq was given   No acute event for the rest of the night    Subjective     Patient was seen and examined at bedside this morning. Patient does not awake to name, and does not follow commands. Patient was taken for a brain MRI after being seen.     Objective     Physical Exam:  Vitals: BP (!) 111/47   Pulse 66   Temp 98.4 °F (36.9 °C) (Axillary)   Resp 14   Ht 1.575 m (5' 2\")   Wt 61.7 kg (136 lb 0.4 oz)   SpO2 98%   BMI 24.88 kg/m²     I & O - 24hr:  In: 3191   Out: 3718   General appearance: Appears lethargic.  Awakens to name, but does not follow commands.  Neuro: Mental status: Awakens to name, but does not follow commands.   Cardiovascular: Regular rate and rhythm, S1 and S2  heard, no murmurs appreciated  Respiratory: Rales present bilaterally   Abdomen: Soft, non tender, bowel sounds normal; masses, no organomegaly, rebound or guarding  Extremities: Extremities normal, no cyanosis, distal pulses intact, no edema    Pertinent Labs & Imaging Studies      Complete Blood Count:   Recent Labs     03/13/25  0507 03/13/25  2335 03/14/25  0534 03/15/25  0509   WBC 18.2*  --  17.3* 16.2*   HGB 8.1* 8.4* 8.0* 7.8*   HCT 26.2* 28.1* 26.9* 26.0*   *  --  95*  --         Last 3 Blood Glucose:   Recent Labs     03/14/25  1655 03/14/25  2308 03/15/25  0509   GLUCOSE 133* 134* 130*        PT/INR:    Lab Results   Component Value Date/Time    PROTIME 17.5 03/12/2025 11:01 AM    INR 1.6 03/12/2025 11:01 AM     PTT:    Lab Results   Component Value Date/Time    APTT 42.0  Culture, Urine [[id number]] Collected: 03/08/25 0929    Order Status: Completed Specimen: Urine, clean catch Updated: 03/09/25 1241     Specimen Description .CLEAN CATCH URINE     Special Requests Site: Urine     Culture NO GROWTH    Culture, Respiratory [[id number]] Collected: 03/05/25 0730    Order Status: Canceled Specimen: Sputum Expectorated     Respiratory Panel, Molecular, with COVID-19 (Restricted: peds pts or suitable admitted adults) [[id number]] Collected: 03/04/25 2125    Order Status: Completed Specimen: Nasopharyngeal Swab Updated: 03/04/25 2145     Specimen Description .NASOPHARYNGEAL SWAB     Adenovirus PCR Not Detected     Coronavirus 229E PCR Not Detected     Coronavirus HKU1 PCR Not Detected     Coronavirus NL63 PCR Not Detected     Coronavirus OC43 PCR Not Detected     SARS-CoV-2, PCR Not Detected     Human Metapneumovirus PCR Not Detected     Rhino/Enterovirus PCR Not Detected     Influenza A by PCR Not Detected     Influenza B by PCR Not Detected     Parainfluenza 1 PCR Not Detected     Parainfluenza 2 PCR Not Detected     Parainfluenza 3 PCR Not Detected     Parainfluenza 4 PCR Not Detected     Resp Syncytial Virus PCR Not Detected     Bordetella parapertussis by PCR Not Detected     B Pertussis by PCR Not Detected     Chlamydia pneumoniae By PCR Not Detected     Mycoplasma pneumo by PCR Not Detected     Comment: Performed by multiplexed nucleic acid assay.             XR CHEST PORTABLE   Final Result   Cardiomegaly with stable increased markings seen diffusely throughout the mid   and lower lung fields bilaterally with small bilateral pleural effusions.         CT HEAD WO CONTRAST   Final Result   Small insult in the left frontal parietal region of indeterminate chronicity.   Correlate with MRI if there is concern for acute infarction.      Remote appearing insult in the left occipital lobe.      Findings of chronic small vessel ischemic change and cerebral volume loss.         XR CHEST

## 2025-03-15 NOTE — PROGRESS NOTES
[facility]  Nephrology Attending Progress Note  Alexandru Owen MD        SUBJECTIVE:     HPI:   3/9:  79 yrs old HF , Known h/o prior CVA s/p intracranial thrombectomy 2023 December .      Came in with above mentioned issues , noted to be in Rapid A fib , hypotensive , lethargic , already known to   Cardiology team on enteresto , eliquis , toprol , amiodarone .      Patient remained in rapid a fib , hypotensive , was diuresed , developed ANTONIO , Acidosis , , Lactic acidosis , shock liver   Coagulopathy , her LV function detoriorated to 10-15% due to a fib related Cardiomyopathy , possible myocardial injury ( high troponin )     Her HR now is better , she appears hemodynamically stable , alert and responsive in no distress       SUBJECTIVE:    3/10: pt seen and examined in the icu, chart reviewed. On cvvh, dw icu nurse, tolerating -50 ml/hr, in bipap. Lethargic, mumbles that she is ok    3/11: pt seen and examined. On cvvh, intubated. On epi and levo, dw icu nurse, keeping even with cvvh at this time    3/12: pt seen in icu, intubated on cvvh. Levo, running even. Dw icu nurse, pt lethargic and not answering questions    3/13: pt seen in icu, remains on cvvh, lethargic, not conversant, on tube feeds    3/14: pt seen in icu, on cvvh. 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)    Ischemic cerebrovascular accident (CVA) (HCC)    Atrial flutter (HCC)    Paroxysmal atrial fibrillation (HCC)    H/O: stroke    Atrial flutter with rapid ventricular response (HCC)    Decompensated heart failure (HCC)    Chronic atrial fibrillation (HCC)    VHD (valvular heart disease)        PAST MEDICAL HISTORY:    Past Medical History:   Diagnosis Date    Cerebral artery occlusion with cerebral  Yellow 03/08/2025 09:29 AM    NITRU NEGATIVE 03/08/2025 09:29 AM    GLUCOSEU NEGATIVE 03/08/2025 09:29 AM    KETUA TRACE 03/08/2025 09:29 AM    UROBILINOGEN 1.0 03/08/2025 09:29 AM    BILIRUBINUR SMALL 03/08/2025 09:29 AM       No results found for: \"LIPIDPAN\"      IMPRESSION/RECOMMENDATIONS:      1) mayra superimposed on CKD 3a  baseline cr at 1-1.3 range 2023   setting of hypotension, aib, HFrEF, entresto, diuretics  Anuric   Antihypertensives , diuretics now are on hold  Continues on levo  and is now on epi       Started cvvh  Uo 0  If stable send for tdc next week  Will dc cvvh  Likely will need intermittent hd on mon     2)  shock liver   Coagulopathy   eliquis on hold   INR corrected with FFP to some extent   Amiodarone on hold      3)   A Fib with rapid venticular rate  Off amio due to lfts  Per cardiology     4)  hypotension/sepsis  Influenza pos  On empiric abx  Pressor support  Underlying HFrEF     5)   Mild to moderate todd PL effusion  possible HF related  Monitor with diuretics on hold     6) High TSH   Per IM         Alexandru Owen MD

## 2025-03-15 NOTE — CONSULTS
[facility] Neurology    Date:  3/15/2025  Patient Name:  Nikki Moffett  YOB: 1945  MRN: [id number]     PCP:  Tiesha Panda MD   Referring:  No ref. provider found      Chief Complaint: Abnormal MRI, concern for stroke    History obtained from: Staff, chart    Assessment  Nikki Moffett is a 79 y.o. 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  The size of this infarct is not sufficient to explain the patient's alteration in mental status and it also appears that there is an area of encephalomalacia suggesting the majority of this area was infarcted in the past.     This does not explain well the pupillary dilation in the right eye.  She was noted to have respiratory treatments today, but reportedly this pupillary change occurred prior to that.  Another possibility to consider could be seizures related to this area of occipital encephalomalacia as these may cause fluctuations in pupillary size.    The altered mental status is likely secondary to her multiple medical issues which have been treated in the ICU, but seizures should be evaluated for.    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  These can sometimes be implicated in embolic appearing strokes.    Plan  MRA head and neck  EEG  Will start Keppra 500 mg twice daily empirically at this time  Neurology will follow        History of Present Illness:  Nikki Moffett is a 79 y.o. female presenting for evaluation of altered mental status and stroke.  The patient has a previous history of stroke in the left MCA territory s/p thrombectomy.  She was found to have A-fib at that time and was placed on anticoagulation.    She was admitted on March 4 with altered mental status and chest  ([id number])  4,500 mg IntraVENous Q8H Kilo Dumont MD   Stopped at 03/15/25 1418    sulfur hexafluoride microspheres (LUMASON) 60.7-25 MG injection 2 mL  2 mL IntraVENous ONCE PRN Niko Alexandre MD        hydrocortisone sodium succinate PF (SOLU-CORTEF) injection 100 mg  100 mg IntraVENous Q8H Kilo Dumont MD   100 mg at 03/15/25 1015    insulin lispro (HUMALOG,ADMELOG) injection vial 0-4 Units  0-4 Units SubCUTAneous Q6H Hermelindo Loredo DO        norepinephrine (LEVOPHED) 16 mg in sodium chloride 0.9 % 250 mL infusion (premix)  1-100 mcg/min IntraVENous Continuous Rosetta Lopez MD 8.4 mL/hr at 03/15/25 0729 9 mcg/min at 03/15/25 0729    heparin (PF) 100 UNIT/ML injection 100 Units  100 Units IntraCATHeter PRN Americo Vargas MD        heparin (porcine) injection 1,100-1,900 Units  1,100-1,900 Units IntraCATHeter PRN Princess Winkler MD   1,600 Units at 03/15/25 0845    And    heparin (porcine) injection 1,100-1,900 Units  1,100-1,900 Units IntraCATHeter PRN Princess Winkler MD   1,200 Units at 03/15/25 0849    sodium chloride flush 0.9 % injection 5-40 mL  5-40 mL IntraVENous 2 times per day Gelacio Hendricks MD   10 mL at 03/15/25 1007    apixaban (ELIQUIS) tablet 2.5 mg  2.5 mg Oral BID Kilo Dumont MD   2.5 mg at 03/15/25 1014    [Held by provider] furosemide (LASIX) injection 40 mg  40 mg IntraVENous Daily Chandni Garrido APRN - CNP   40 mg at 03/07/25 1531    ipratropium 0.5 mg-albuterol 2.5 mg (DUONEB) nebulizer solution 1 Dose  1 Dose Inhalation Q4H WA RT Kilo Dumont MD   1 Dose at 03/15/25 1158    arformoterol tartrate (BROVANA) nebulizer solution 15 mcg  15 mcg Nebulization BID RT Kilo Dumont MD   15 mcg at 03/15/25 1158    budesonide (PULMICORT) nebulizer suspension 500 mcg  0.5 mg Nebulization BID RT Ben Dumont. MD Lucrecia   500 mcg at 03/15/25 1158    0.9 % sodium chloride infusion   IntraVENous PRN Kilo Dumont MD 10  of the   stomach.   2. No change in prominent left lower lobe pneumonia and moderate right lower   lobe pneumonia.   3. Slight increase in size of small bilateral pleural effusions.   4. Stable mild congestive failure.         XR ABDOMEN FOR NG/OG/NE TUBE PLACEMENT   Final Result   Enteric tube in the stomach as above.      No evidence of bowel obstruction.      Right pleural effusion with right basilar opacity with consolidation that may   represent pneumonia.         US ABDOMEN LIMITED   Final Result   1.  Echogenic liver parenchyma with somewhat scalloped liver margins.  Small   volume simple appearing intra-abdominal ascites in the right upper quadrant.      (Could reflect changes related to an underlying infiltrative pathology.   Hepatic steatosis or possibly early cirrhosis.  Please correlate with   patient's liver function test.)      2.  Right pleural effusion present.      3.  No intrahepatic or extrahepatic biliary dilatation.  Gallbladder is   contracted on this study.      4.  Normal appearance of the imaged kidney.  No hydronephrosis.         XR CHEST PORTABLE   Final Result   No significant change over the past 24 hours.         XR CHEST PORTABLE   Final Result   Right-sided vascular catheter tip overlies the region of the right atrium or   cavoatrial junction.      Cardiomediastinal silhouette is enlarged.      Perihilar and bibasilar opacities similar to prior.  Bilateral small pleural   effusions.  No gross pneumothorax.         XR CHEST PORTABLE   Final Result   Further progression of findings that indicate volume overload since March 7   study.  Please correlate clinically.         XR CHEST PORTABLE   Final Result   Borderline size cardiac area with mild to moderate bilateral pleural   effusions.         XR CHEST PORTABLE   Final Result   1. No acute cardiopulmonary process.   2. Bibasilar scarring and pleural thickening, unchanged.         XR CHEST (2 VW)   Final Result   Bilateral pleural

## 2025-03-15 NOTE — PROGRESS NOTES
[facility]  Department of Internal Medicine   Internal Medicine Residency   MICU Progress Note    Patient:  Nikki Moffett 79 y.o. female  MRN: [id number]     Date of Service: 3/15/2025    Allergy: Patient has no known allergies.    Overnight Events: potassium was replaced.     Subjective     Patient seen and examined at bedside. She is still confused on exam. CRRT is off at this time. She is still on levo of 9mcg/min.     Objective     VS: BP (!) 111/47   Pulse 74   Temp 98.4 °F (36.9 °C) (Axillary)   Resp 17   Ht 1.575 m (5' 2\")   Wt 61.7 kg (136 lb 0.4 oz)   SpO2 99%   BMI 24.88 kg/m²   ABP (Arterial line BP): 102/50  ABP Mean (Arterial Line Mean): 69 mmHg    I & O - 24hr:   Intake/Output Summary (Last 24 hours) at 3/15/2025 0723  Last data filed at 3/15/2025 0700  Gross per 24 hour   Intake 3191 ml   Output 3718 ml   Net -527 ml       Pressors: Levo 9 mcg/ min        Oxygen: 4L NC     ABG:     Lab Results   Component Value Date/Time    PH 7.297 03/15/2025 05:13 AM    PCO2 45.9 03/15/2025 05:13 AM    PO2 160.3 03/15/2025 05:13 AM    HCO3 21.9 03/15/2025 05:13 AM    BE -4.3 03/15/2025 05:13 AM    THB 8.7 03/15/2025 05:13 AM    O2SAT 99.3 03/15/2025 05:13 AM       Physical Exam:  General Appearance: No acute distress. Does not follow commands   Neuro: Round symmetric pupil that react to light bilaterally    Cardiovascular: regular rate and rhythm, S1 and S2 heard, no murmurs appreciated   Respiratory: Clear to auscultation bilaterally. No wheezing or crackles appreciated.  Abdomen: distended abdomen, non-tender; bowel sounds normal; no masses, no organomegaly, rebound or guarding  Extremities: Extremities normal, no cyanosis, distal pulses intact, no edema.     Lines: arterial line 3/8, HD 3/8, CVC RIJ 3/9    FMS      Labs     Basic Labs    Complete Blood Count:   Recent Labs     03/13/25  0507 03/13/25  2335 03/14/25  0534 03/15/25  0509   WBC 18.2*  --  17.3* 16.2*   HGB 8.1* 8.4* 8.0* 7.8*  shock liver  Hyperlipidemia, on Lipitor 80 mg daily  Prediabetes, currently hyperglycemic likely 2/2 steroid  History of L MCA stroke status post mechanical thrombectomy (12/2023)  Vitamin D deficiency      Plan:   Resume Amiodarone & Lipitor once with improved LFT. LFTs improving   Holding Metoprolol & Lasix due to hypotension  Eliquis resumed, monitor hgb  Monitor INR  Strict I/O  On Zosyn  On CVVHD  Continue lactulose  Neuro consulted   Follow KUB       PT/OT evaluation: not indicated at this time   DVT prophylaxis: eliquis  GI prophylaxis:   Diet:   ADULT TUBE FEEDING; Nasogastric; Renal Formula; Continuous; 10; Yes; 10; Q 4 hours; 40; 30; Q 4 hours   Bowel regimen: glycolax  Pain management: as needed  Code status: Full Code   Disposition: continue current care   Family: updated as available    Sandra Del Rosario MD, PGY-2   Attending physician: Dr. Sandoval

## 2025-03-15 NOTE — PROGRESS NOTES
[facility]  Internal Medicine Residency / House Medicine Service    Attending Physician Statement  I have discussed the case, including pertinent history and exam findings with the resident and the team.  I have seen and examined the patient and the key elements of the encounter have been performed by me.  I agree with the assessment, plan and orders as documented by the resident.      Having MMRI studies of brain after   Alteration of mental status and anisocoria  Plan; R/O transtentorial herniation , etc  Plan; Same ICU care and follow            R/O seizure  Remainder of medical problems as per resident note.      Dalton Sosa MD FRCP  Internal Medicine Residency Faculty

## 2025-03-16 ENCOUNTER — ANESTHESIA EVENT (OUTPATIENT)
Dept: ICU | Age: 80
End: 2025-03-16
Payer: MEDICARE

## 2025-03-16 ENCOUNTER — APPOINTMENT (OUTPATIENT)
Dept: GENERAL RADIOLOGY | Age: 80
DRG: 004 | End: 2025-03-16
Payer: MEDICARE

## 2025-03-16 ENCOUNTER — ANESTHESIA (OUTPATIENT)
Dept: ICU | Age: 80
End: 2025-03-16
Payer: MEDICARE

## 2025-03-16 LAB
AADO2: 121.4 MMHG
AADO2: 206.1 MMHG
ALBUMIN SERPL-MCNC: 3.2 G/DL (ref 3.5–5.2)
ALP SERPL-CCNC: 143 U/L (ref 35–104)
ALT SERPL-CCNC: 354 U/L (ref 0–32)
ANION GAP SERPL CALCULATED.3IONS-SCNC: 13 MMOL/L (ref 7–16)
AST SERPL-CCNC: 167 U/L (ref 0–31)
B.E.: -4.4 MMOL/L (ref -3–3)
B.E.: -6.3 MMOL/L (ref -3–3)
B.E.: -7.3 MMOL/L (ref -3–3)
BASOPHILS # BLD: 0.02 K/UL (ref 0–0.2)
BASOPHILS NFR BLD: 0 % (ref 0–2)
BILIRUB DIRECT SERPL-MCNC: 1 MG/DL (ref 0–0.3)
BILIRUB INDIRECT SERPL-MCNC: 0.6 MG/DL (ref 0–1)
BILIRUB SERPL-MCNC: 1.6 MG/DL (ref 0–1.2)
BUN SERPL-MCNC: 22 MG/DL (ref 6–23)
CALCIUM SERPL-MCNC: 8.5 MG/DL (ref 8.6–10.2)
CHLORIDE SERPL-SCNC: 109 MMOL/L (ref 98–107)
CHOLEST SERPL-MCNC: 85 MG/DL
CO2 SERPL-SCNC: 20 MMOL/L (ref 22–29)
COHB: 0.6 % (ref 0–1.5)
COHB: 0.6 % (ref 0–1.5)
COHB: 0.9 % (ref 0–1.5)
CREAT SERPL-MCNC: 1.7 MG/DL (ref 0.5–1)
CRITICAL: ABNORMAL
DATE ANALYZED: ABNORMAL
DATE OF COLLECTION: ABNORMAL
EOSINOPHIL # BLD: 0 K/UL (ref 0.05–0.5)
EOSINOPHILS RELATIVE PERCENT: 0 % (ref 0–6)
ERYTHROCYTE [DISTWIDTH] IN BLOOD BY AUTOMATED COUNT: 17.3 % (ref 11.5–15)
FIO2: 40 %
FIO2: 60 %
GFR, ESTIMATED: 31 ML/MIN/1.73M2
GLUCOSE BLD-MCNC: 116 MG/DL (ref 74–99)
GLUCOSE BLD-MCNC: 119 MG/DL (ref 74–99)
GLUCOSE SERPL-MCNC: 128 MG/DL (ref 74–99)
HCO3: 19.1 MMOL/L (ref 22–26)
HCO3: 20.6 MMOL/L (ref 22–26)
HCO3: 22.2 MMOL/L (ref 22–26)
HCT VFR BLD AUTO: 24.4 % (ref 34–48)
HCT VFR BLD AUTO: 26.1 % (ref 34–48)
HDLC SERPL-MCNC: 24 MG/DL
HGB BLD-MCNC: 7.3 G/DL (ref 11.5–15.5)
HGB BLD-MCNC: 7.7 G/DL (ref 11.5–15.5)
HHB: 0.7 % (ref 0–5)
HHB: 0.9 % (ref 0–5)
HHB: 1.9 % (ref 0–5)
IMM GRANULOCYTES # BLD AUTO: 0.17 K/UL (ref 0–0.58)
IMM GRANULOCYTES NFR BLD: 1 % (ref 0–5)
INR PPP: 1.5
LAB: ABNORMAL
LDLC SERPL CALC-MCNC: 41 MG/DL
LYMPHOCYTES NFR BLD: 0.7 K/UL (ref 1.5–4)
LYMPHOCYTES RELATIVE PERCENT: 3 % (ref 20–42)
Lab: 2340
Lab: 244
Lab: 505
MAGNESIUM SERPL-MCNC: 2 MG/DL (ref 1.6–2.6)
MCH RBC QN AUTO: 26.6 PG (ref 26–35)
MCHC RBC AUTO-ENTMCNC: 29.5 G/DL (ref 32–34.5)
MCV RBC AUTO: 90.3 FL (ref 80–99.9)
METHB: 0.7 % (ref 0–1.5)
MODE: ABNORMAL
MODE: AC
MODE: AC
MONOCYTES NFR BLD: 0.87 K/UL (ref 0.1–0.95)
MONOCYTES NFR BLD: 4 % (ref 2–12)
NEUTROPHILS NFR BLD: 92 % (ref 43–80)
NEUTS SEG NFR BLD: 19.26 K/UL (ref 1.8–7.3)
O2 SATURATION: 98.1 % (ref 92–98.5)
O2 SATURATION: 99.1 % (ref 92–98.5)
O2 SATURATION: 99.3 % (ref 92–98.5)
O2HB: 96.8 % (ref 94–97)
O2HB: 97.7 % (ref 94–97)
O2HB: 97.8 % (ref 94–97)
OPERATOR ID: 2962
OPERATOR ID: 2962
OPERATOR ID: ABNORMAL
PATIENT TEMP: 37 C
PCO2: 42.2 MMHG (ref 35–45)
PCO2: 48.2 MMHG (ref 35–45)
PCO2: 48.2 MMHG (ref 35–45)
PEEP/CPAP: 5 CMH2O
PEEP/CPAP: 5 CMH2O
PFO2: 2.81 MMHG/%
PFO2: 2.88 MMHG/%
PH BLOOD GAS: 7.25 (ref 7.35–7.45)
PH BLOOD GAS: 7.27 (ref 7.35–7.45)
PH BLOOD GAS: 7.28 (ref 7.35–7.45)
PHOSPHATE SERPL-MCNC: 3.8 MG/DL (ref 2.5–4.5)
PLATELET, FLUORESCENCE: 106 K/UL (ref 130–450)
PMV BLD AUTO: 11.4 FL (ref 7–12)
PO2: 115.3 MMHG (ref 75–100)
PO2: 168.7 MMHG (ref 75–100)
PO2: 172.7 MMHG (ref 75–100)
POTASSIUM SERPL-SCNC: 3.76 MMOL/L (ref 3.5–5)
POTASSIUM SERPL-SCNC: 3.8 MMOL/L (ref 3.5–5)
PROT SERPL-MCNC: 5.2 G/DL (ref 6.4–8.3)
PROTHROMBIN TIME: 16 SEC (ref 9.3–12.4)
RBC # BLD AUTO: 2.89 M/UL (ref 3.5–5.5)
RI(T): 1.05
RI(T): 1.22
RR MECHANICAL: 12 B/MIN
RR MECHANICAL: 12 B/MIN
SODIUM SERPL-SCNC: 142 MMOL/L (ref 132–146)
SOURCE, BLOOD GAS: ABNORMAL
THB: 8.5 G/DL (ref 11.5–16.5)
THB: 8.6 G/DL (ref 11.5–16.5)
THB: 8.8 G/DL (ref 11.5–16.5)
TIME ANALYZED: 2348
TIME ANALYZED: 247
TIME ANALYZED: 519
TRIGL SERPL-MCNC: 98 MG/DL
TROPONIN I SERPL HS-MCNC: 48 NG/L (ref 0–9)
VLDLC SERPL CALC-MCNC: 20 MG/DL
VT MECHANICAL: 325 ML
VT MECHANICAL: 325 ML
WBC OTHER # BLD: 21 K/UL (ref 4.5–11.5)

## 2025-03-16 PROCEDURE — 93005 ELECTROCARDIOGRAM TRACING: CPT

## 2025-03-16 PROCEDURE — 2500000003 HC RX 250 WO HCPCS

## 2025-03-16 PROCEDURE — 31500 INSERT EMERGENCY AIRWAY: CPT

## 2025-03-16 PROCEDURE — 6360000002 HC RX W HCPCS

## 2025-03-16 PROCEDURE — 6370000000 HC RX 637 (ALT 250 FOR IP)

## 2025-03-16 PROCEDURE — 82962 GLUCOSE BLOOD TEST: CPT

## 2025-03-16 PROCEDURE — 6360000002 HC RX W HCPCS: Performed by: INTERNAL MEDICINE

## 2025-03-16 PROCEDURE — 0BH17EZ INSERTION OF ENDOTRACHEAL AIRWAY INTO TRACHEA, VIA NATURAL OR ARTIFICIAL OPENING: ICD-10-PCS

## 2025-03-16 PROCEDURE — 84100 ASSAY OF PHOSPHORUS: CPT

## 2025-03-16 PROCEDURE — 37799 UNLISTED PX VASCULAR SURGERY: CPT

## 2025-03-16 PROCEDURE — 85014 HEMATOCRIT: CPT

## 2025-03-16 PROCEDURE — 85025 COMPLETE CBC W/AUTO DIFF WBC: CPT

## 2025-03-16 PROCEDURE — 87106 FUNGI IDENTIFICATION YEAST: CPT

## 2025-03-16 PROCEDURE — 76937 US GUIDE VASCULAR ACCESS: CPT

## 2025-03-16 PROCEDURE — 85018 HEMOGLOBIN: CPT

## 2025-03-16 PROCEDURE — 71045 X-RAY EXAM CHEST 1 VIEW: CPT

## 2025-03-16 PROCEDURE — 99291 CRITICAL CARE FIRST HOUR: CPT | Performed by: INTERNAL MEDICINE

## 2025-03-16 PROCEDURE — 36569 INSJ PICC 5 YR+ W/O IMAGING: CPT

## 2025-03-16 PROCEDURE — 80048 BASIC METABOLIC PNL TOTAL CA: CPT

## 2025-03-16 PROCEDURE — 84484 ASSAY OF TROPONIN QUANT: CPT

## 2025-03-16 PROCEDURE — 2580000003 HC RX 258

## 2025-03-16 PROCEDURE — 6370000000 HC RX 637 (ALT 250 FOR IP): Performed by: INTERNAL MEDICINE

## 2025-03-16 PROCEDURE — 83735 ASSAY OF MAGNESIUM: CPT

## 2025-03-16 PROCEDURE — 94660 CPAP INITIATION&MGMT: CPT

## 2025-03-16 PROCEDURE — 2500000003 HC RX 250 WO HCPCS: Performed by: INTERNAL MEDICINE

## 2025-03-16 PROCEDURE — 82248 BILIRUBIN DIRECT: CPT

## 2025-03-16 PROCEDURE — 80053 COMPREHEN METABOLIC PANEL: CPT

## 2025-03-16 PROCEDURE — 82805 BLOOD GASES W/O2 SATURATION: CPT

## 2025-03-16 PROCEDURE — 2000000000 HC ICU R&B

## 2025-03-16 PROCEDURE — 94002 VENT MGMT INPAT INIT DAY: CPT

## 2025-03-16 PROCEDURE — 2500000003 HC RX 250 WO HCPCS: Performed by: ANESTHESIOLOGIST ASSISTANT

## 2025-03-16 PROCEDURE — 87205 SMEAR GRAM STAIN: CPT

## 2025-03-16 PROCEDURE — 94640 AIRWAY INHALATION TREATMENT: CPT

## 2025-03-16 PROCEDURE — 87070 CULTURE OTHR SPECIMN AEROBIC: CPT

## 2025-03-16 PROCEDURE — 6360000002 HC RX W HCPCS: Performed by: PSYCHIATRY & NEUROLOGY

## 2025-03-16 PROCEDURE — C1751 CATH, INF, PER/CENT/MIDLINE: HCPCS

## 2025-03-16 PROCEDURE — 99233 SBSQ HOSP IP/OBS HIGH 50: CPT | Performed by: NURSE PRACTITIONER

## 2025-03-16 PROCEDURE — 2700000000 HC OXYGEN THERAPY PER DAY

## 2025-03-16 PROCEDURE — 31500 INSERT EMERGENCY AIRWAY: CPT | Performed by: ANESTHESIOLOGIST ASSISTANT

## 2025-03-16 PROCEDURE — 85610 PROTHROMBIN TIME: CPT

## 2025-03-16 PROCEDURE — 80061 LIPID PANEL: CPT

## 2025-03-16 PROCEDURE — 84132 ASSAY OF SERUM POTASSIUM: CPT

## 2025-03-16 PROCEDURE — 5A1955Z RESPIRATORY VENTILATION, GREATER THAN 96 CONSECUTIVE HOURS: ICD-10-PCS

## 2025-03-16 RX ORDER — CHLORHEXIDINE GLUCONATE ORAL RINSE 1.2 MG/ML
15 SOLUTION DENTAL 2 TIMES DAILY
Status: DISCONTINUED | OUTPATIENT
Start: 2025-03-16 | End: 2025-03-26

## 2025-03-16 RX ORDER — WATER 10 ML/10ML
INJECTION INTRAMUSCULAR; INTRAVENOUS; SUBCUTANEOUS
Status: COMPLETED
Start: 2025-03-16 | End: 2025-03-16

## 2025-03-16 RX ORDER — ETOMIDATE 2 MG/ML
INJECTION INTRAVENOUS
Status: COMPLETED
Start: 2025-03-16 | End: 2025-03-16

## 2025-03-16 RX ORDER — SUCCINYLCHOLINE CHLORIDE 20 MG/ML
INJECTION INTRAMUSCULAR; INTRAVENOUS
Status: DISPENSED
Start: 2025-03-16 | End: 2025-03-16

## 2025-03-16 RX ORDER — METOPROLOL TARTRATE 1 MG/ML
5 INJECTION, SOLUTION INTRAVENOUS ONCE
Status: COMPLETED | OUTPATIENT
Start: 2025-03-17 | End: 2025-03-16

## 2025-03-16 RX ORDER — ETOMIDATE 2 MG/ML
INJECTION INTRAVENOUS
Status: DISCONTINUED | OUTPATIENT
Start: 2025-03-16 | End: 2025-03-17 | Stop reason: HOSPADM

## 2025-03-16 RX ORDER — FENTANYL CITRATE-0.9 % NACL/PF 10 MCG/ML
25-200 PLASTIC BAG, INJECTION (ML) INTRAVENOUS CONTINUOUS
Refills: 0 | Status: DISCONTINUED | OUTPATIENT
Start: 2025-03-16 | End: 2025-03-19

## 2025-03-16 RX ADMIN — IPRATROPIUM BROMIDE AND ALBUTEROL SULFATE 1 DOSE: 2.5; .5 SOLUTION RESPIRATORY (INHALATION) at 09:03

## 2025-03-16 RX ADMIN — HYDROCORTISONE SODIUM SUCCINATE 100 MG: 100 INJECTION, POWDER, FOR SOLUTION INTRAMUSCULAR; INTRAVENOUS at 12:23

## 2025-03-16 RX ADMIN — CHLORHEXIDINE GLUCONATE, 0.12% ORAL RINSE 15 ML: 1.2 SOLUTION DENTAL at 08:44

## 2025-03-16 RX ADMIN — ARFORMOTEROL TARTRATE 15 MCG: 15 SOLUTION RESPIRATORY (INHALATION) at 09:03

## 2025-03-16 RX ADMIN — LEVETIRACETAM 500 MG: 100 INJECTION INTRAVENOUS at 08:43

## 2025-03-16 RX ADMIN — IPRATROPIUM BROMIDE AND ALBUTEROL SULFATE 1 DOSE: 2.5; .5 SOLUTION RESPIRATORY (INHALATION) at 16:27

## 2025-03-16 RX ADMIN — ETOMIDATE 8 MG: 2 INJECTION, SOLUTION INTRAVENOUS at 03:49

## 2025-03-16 RX ADMIN — PIPERACILLIN AND TAZOBACTAM 4500 MG: 4; .5 INJECTION, POWDER, FOR SOLUTION INTRAVENOUS at 01:33

## 2025-03-16 RX ADMIN — BUDESONIDE INHALATION 500 MCG: 0.5 SUSPENSION RESPIRATORY (INHALATION) at 20:17

## 2025-03-16 RX ADMIN — SODIUM CHLORIDE, PRESERVATIVE FREE 10 ML: 5 INJECTION INTRAVENOUS at 09:09

## 2025-03-16 RX ADMIN — HYDROCORTISONE SODIUM SUCCINATE 100 MG: 100 INJECTION, POWDER, FOR SOLUTION INTRAMUSCULAR; INTRAVENOUS at 04:09

## 2025-03-16 RX ADMIN — METOPROLOL TARTRATE 5 MG: 5 INJECTION, SOLUTION INTRAVENOUS at 23:48

## 2025-03-16 RX ADMIN — IPRATROPIUM BROMIDE AND ALBUTEROL SULFATE 1 DOSE: 2.5; .5 SOLUTION RESPIRATORY (INHALATION) at 20:17

## 2025-03-16 RX ADMIN — THIAMINE HYDROCHLORIDE 250 MG: 100 INJECTION, SOLUTION INTRAMUSCULAR; INTRAVENOUS at 08:44

## 2025-03-16 RX ADMIN — PANTOPRAZOLE SODIUM 40 MG: 40 INJECTION, POWDER, FOR SOLUTION INTRAVENOUS at 08:44

## 2025-03-16 RX ADMIN — LEVETIRACETAM 500 MG: 100 INJECTION INTRAVENOUS at 21:02

## 2025-03-16 RX ADMIN — APIXABAN 2.5 MG: 2.5 TABLET, FILM COATED ORAL at 21:02

## 2025-03-16 RX ADMIN — Medication 25 MCG/HR: at 04:01

## 2025-03-16 RX ADMIN — PIPERACILLIN AND TAZOBACTAM 4500 MG: 4; .5 INJECTION, POWDER, FOR SOLUTION INTRAVENOUS at 09:04

## 2025-03-16 RX ADMIN — HYDROCORTISONE SODIUM SUCCINATE 100 MG: 100 INJECTION, POWDER, FOR SOLUTION INTRAMUSCULAR; INTRAVENOUS at 19:42

## 2025-03-16 RX ADMIN — APIXABAN 2.5 MG: 2.5 TABLET, FILM COATED ORAL at 08:43

## 2025-03-16 RX ADMIN — BUDESONIDE INHALATION 500 MCG: 0.5 SUSPENSION RESPIRATORY (INHALATION) at 09:03

## 2025-03-16 RX ADMIN — PIPERACILLIN AND TAZOBACTAM 4500 MG: 4; .5 INJECTION, POWDER, FOR SOLUTION INTRAVENOUS at 17:38

## 2025-03-16 RX ADMIN — IPRATROPIUM BROMIDE AND ALBUTEROL SULFATE 1 DOSE: 2.5; .5 SOLUTION RESPIRATORY (INHALATION) at 12:24

## 2025-03-16 RX ADMIN — MIDODRINE HYDROCHLORIDE 10 MG: 10 TABLET ORAL at 17:38

## 2025-03-16 RX ADMIN — SODIUM CHLORIDE, PRESERVATIVE FREE 10 ML: 5 INJECTION INTRAVENOUS at 19:48

## 2025-03-16 RX ADMIN — ETOMIDATE 8 MG: 2 INJECTION, SOLUTION INTRAVENOUS at 03:51

## 2025-03-16 RX ADMIN — MIDODRINE HYDROCHLORIDE 10 MG: 10 TABLET ORAL at 12:23

## 2025-03-16 RX ADMIN — Medication 1000 UNITS: at 08:43

## 2025-03-16 RX ADMIN — Medication 75 MCG/HR: at 15:03

## 2025-03-16 RX ADMIN — WATER 10 ML: 1 INJECTION INTRAMUSCULAR; INTRAVENOUS; SUBCUTANEOUS at 04:09

## 2025-03-16 RX ADMIN — Medication 2 MCG/MIN: at 16:55

## 2025-03-16 RX ADMIN — ARFORMOTEROL TARTRATE 15 MCG: 15 SOLUTION RESPIRATORY (INHALATION) at 20:17

## 2025-03-16 RX ADMIN — MIDODRINE HYDROCHLORIDE 10 MG: 10 TABLET ORAL at 08:43

## 2025-03-16 RX ADMIN — CHLORHEXIDINE GLUCONATE, 0.12% ORAL RINSE 15 ML: 1.2 SOLUTION DENTAL at 19:48

## 2025-03-16 ASSESSMENT — PULMONARY FUNCTION TESTS
PIF_VALUE: 24
PIF_VALUE: 26
PIF_VALUE: 24
PIF_VALUE: 23
PIF_VALUE: 25
PIF_VALUE: 25
PIF_VALUE: 26
PIF_VALUE: 28
PIF_VALUE: 25
PIF_VALUE: 26
PIF_VALUE: 25
PIF_VALUE: 26
PIF_VALUE: 23
PIF_VALUE: 27
PIF_VALUE: 24
PIF_VALUE: 25
PIF_VALUE: 25
PIF_VALUE: 23
PIF_VALUE: 23
PIF_VALUE: 24
PIF_VALUE: 26
PIF_VALUE: 25
PIF_VALUE: 30
PIF_VALUE: 25
PIF_VALUE: 26
PIF_VALUE: 25
PIF_VALUE: 24
PIF_VALUE: 25
PIF_VALUE: 25
PIF_VALUE: 23
PIF_VALUE: 24
PIF_VALUE: 26
PIF_VALUE: 25
PIF_VALUE: 24
PIF_VALUE: 26
PIF_VALUE: 24
PIF_VALUE: 24
PIF_VALUE: 27
PIF_VALUE: 25
PIF_VALUE: 25
PIF_VALUE: 26
PIF_VALUE: 24

## 2025-03-16 ASSESSMENT — PAIN SCALES - GENERAL
PAINLEVEL_OUTOF10: 0

## 2025-03-16 NOTE — PROGRESS NOTES
Power chg 2 lumen picc Placement 3/16/2025    Product number: [id number]   Lot Number: [id number]      Ultrasound: yes/sonosite   Right Brachial vein:                Upper Arm Circumference: (CM) 25    Size:(FR)/GUAGE 5.5    Exposed Length: (CM) 5    Internal Length: (CM) 31   Cut: (CM) 19   Vein Measurement: 0.50              Lidocaine Given: yes/1 % 3 ml from kit.  Fracisco Duarte RN  3/16/2025  11:30 AM

## 2025-03-16 NOTE — PROGRESS NOTES
[facility]  Nephrology Attending Progress Note  Alexandru Owen MD        SUBJECTIVE:     HPI:   3/9:  79 yrs old HF , Known h/o prior CVA s/p intracranial thrombectomy 2023 December .      Came in with above mentioned issues , noted to be in Rapid A fib , hypotensive , lethargic , already known to   Cardiology team on enteresto , eliquis , toprol , amiodarone .      Patient remained in rapid a fib , hypotensive , was diuresed , developed ANTONIO , Acidosis , , Lactic acidosis , shock liver   Coagulopathy , her LV function detoriorated to 10-15% due to a fib related Cardiomyopathy , possible myocardial injury ( high troponin )     Her HR now is better , she appears hemodynamically stable , alert and responsive in no distress       SUBJECTIVE:    3/10: pt seen and examined in the icu, chart reviewed. On cvvh, dw icu nurse, tolerating -50 ml/hr, in bipap. Lethargic, mumbles that she is ok    3/11: pt seen and examined. On cvvh, intubated. On epi and levo, dw icu nurse, keeping even with cvvh at this time    3/12: pt seen in icu, intubated on cvvh. Levo, running even. Dw icu nurse, pt lethargic and not answering questions    3/13: pt seen in icu, remains on cvvh, lethargic, not conversant, on tube feeds    3/14: pt seen in icu, on cvvh. For mri, not conversant, on levo    3/15: pt seen in icu, getting mri today, not conversant, on cvvh, on tube feeds, kurtis icu nurse, will stop cvvh, on low dose levo    3/16: pt seen in icu, intubated, off cvvh since yesterday, on tube feeds    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)    Ischemic cerebrovascular accident (CVA) (HCC)    Atrial flutter (HCC)    Paroxysmal atrial fibrillation (HCC)    H/O: stroke    Atrial flutter with rapid ventricular response (HCC)    Decompensated heart failure (HCC)    Chronic atrial fibrillation (HCC)    VHD (valvular heart disease)        PAST MEDICAL HISTORY:    Past  Medical History:   Diagnosis Date    Cerebral artery occlusion with cerebral infarction (HCC)        DIET:    ADULT TUBE FEEDING; Nasogastric; Renal Formula; Continuous; 10; Yes; 10; Q 4 hours; 40; 30; Q 4 hours     PHYSICAL EXAM:     Patient Vitals for the past 24 hrs:   Temp Temp src Pulse Resp SpO2 Weight   03/16/25 0903 -- -- 67 18 100 % --   03/16/25 0900 -- -- 68 14 100 % --   03/16/25 0856 -- -- 69 14 100 % --   03/16/25 0800 96.9 °F (36.1 °C) Axillary 66 16 100 % --   03/16/25 0700 -- -- (!) 49 15 100 % --   03/16/25 0638 -- -- 67 24 100 % --   03/16/25 0600 -- -- 56 24 100 % --   03/16/25 0536 -- -- 56 15 100 % --   03/16/25 0531 -- -- 55 19 100 % 62 kg (136 lb 11 oz)   03/16/25 0500 -- -- 59 18 100 % --   03/16/25 0450 -- -- 68 23 99 % --   03/16/25 0412 -- -- 74 16 99 % --   03/16/25 0404 -- -- 68 19 100 % --   03/16/25 0400 97.6 °F (36.4 °C) Axillary 68 19 99 % --   03/16/25 0300 -- -- 71 18 100 % --   03/16/25 0223 -- -- 58 16 100 % --   03/16/25 0200 -- -- 59 16 100 % --   03/16/25 0100 -- -- 63 17 100 % --   03/16/25 0029 -- -- 69 (!) 35 100 % --   03/16/25 0000 97.6 °F (36.4 °C) Temporal 69 26 100 % --   03/15/25 2300 -- -- 73 17 -- --   03/15/25 2223 -- -- 70 -- -- --   03/15/25 2200 -- -- 75 17 -- --   03/15/25 2100 -- -- 78 19 -- --   03/15/25 2000 97.5 °F (36.4 °C) Axillary 74 16 98 % --   03/15/25 1900 -- -- 81 17 -- --   03/15/25 1800 -- -- 78 (!) 38 97 % --   03/15/25 1700 -- -- 78 25 100 % --   03/15/25 1653 -- -- 83 30 100 % --   03/15/25 1620 -- -- 83 14 -- --   03/15/25 1600 97.6 °F (36.4 °C) Axillary -- -- -- --   03/15/25 1500 -- -- 82 18 100 % --   03/15/25 1400 -- -- 91 18 100 % --   03/15/25 1300 -- -- 87 21 100 % --   03/15/25 1205 -- -- 85 28 100 % --   03/15/25 1200 -- -- 78 20 100 % --   03/15/25 1157 -- -- 74 23 100 % --   03/15/25 1143 -- -- 80 29 100 % --   03/15/25 1100 -- -- 87 17 100 % --   03/15/25 1004 -- -- 85 18 -- --   03/15/25 1000 -- -- 90 14 100 % --  hold  Continues on levo  and is now on epi       Stopped cvvh 3/15  Uo 0  Plan for hd tomorrow  Order tdc     2)  shock liver   Coagulopathy   eliquis on hold   Amiodarone on hold   Lfts improving     3)   A Fib with rapid venticular rate  Off amio due to lfts  Per cardiology     4)  hypotension/sepsis  Influenza pos  On empiric abx  Pressor support  Underlying HFrEF     5)   Mild to moderate todd PL effusion  possible HF related  Monitor with diuretics on hold     6) High TSH   Per IM         Alexandru Owen MD

## 2025-03-16 NOTE — PROGRESS NOTES
[facility] Neurology  Inpatient progress note    Date:  3/16/2025  Patient Name:  Nikki Moffett  YOB: 1945  MRN: [id number]     PCP:  Tiesha Panda MD   Referring:  No ref. provider found      Chief Complaint: Abnormal MRI, concern for stroke    History obtained from: Staff, chart    Assessment  Nikki Moffett is a 79 y.o. female now found to have left occipital stroke on MRI brain after presenting with altered mental status in the setting of shock, influenza and ANTONIO.  The size of this infarct is not sufficient to explain the patient's alteration in mental status and it also appears that there is an area of encephalomalacia suggesting the majority of this area was infarcted in the past.     This also does not explain the pupillary dilation in the right eye.  She was noted to have respiratory treatments but reportedly this pupillary change occurred prior to that.  Another possibility to consider could be seizures related to this area of occipital encephalomalacia as these may cause fluctuations in pupillary size.    The altered mental status is likely secondary to her multiple medical issues which have been treated in the ICU, but seizures should be evaluated for.  Overnight patient was intubated after she became less responsive.    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  These can sometimes be implicated in embolic appearing strokes.    Plan  Continue supportive care  Await EEG  Continue Keppra 500 mg twice daily empirically  A1c 6.0, lipid panel ordered  SCDs      History of Present Illness:  Nikki Moffett is a 79 y.o. female presenting for evaluation of altered mental status and stroke.  The patient has a previous history of stroke in the left MCA territory s/p thrombectomy.  She was found to have A-fib at that time and was placed on anticoagulation.    Prior history of left MCA stroke with thrombectomy, A-fib on anticoagulation with  pleural effusions.         CT HEAD WO CONTRAST   Final Result   Small insult in the left frontal parietal region of indeterminate chronicity.   Correlate with MRI if there is concern for acute infarction.      Remote appearing insult in the left occipital lobe.      Findings of chronic small vessel ischemic change and cerebral volume loss.         XR CHEST PORTABLE   Final Result   1. Interval placement of a nasogastric tube which passes into the body of the   stomach.   2. No change in prominent left lower lobe pneumonia and moderate right lower   lobe pneumonia.   3. Slight increase in size of small bilateral pleural effusions.   4. Stable mild congestive failure.         XR ABDOMEN FOR NG/OG/NE TUBE PLACEMENT   Final Result   Enteric tube in the stomach as above.      No evidence of bowel obstruction.      Right pleural effusion with right basilar opacity with consolidation that may   represent pneumonia.         US ABDOMEN LIMITED   Final Result   1.  Echogenic liver parenchyma with somewhat scalloped liver margins.  Small   volume simple appearing intra-abdominal ascites in the right upper quadrant.      (Could reflect changes related to an underlying infiltrative pathology.   Hepatic steatosis or possibly early cirrhosis.  Please correlate with   patient's liver function test.)      2.  Right pleural effusion present.      3.  No intrahepatic or extrahepatic biliary dilatation.  Gallbladder is   contracted on this study.      4.  Normal appearance of the imaged kidney.  No hydronephrosis.         XR CHEST PORTABLE   Final Result   No significant change over the past 24 hours.         XR CHEST PORTABLE   Final Result   Right-sided vascular catheter tip overlies the region of the right atrium or   cavoatrial junction.      Cardiomediastinal silhouette is enlarged.      Perihilar and bibasilar opacities similar to prior.  Bilateral small pleural   effusions.  No gross pneumothorax.         XR CHEST PORTABLE  Contrast used: Lumason.       I have personally reviewed all pertinent labs, neuroimaging and complete echo today    Electronically signed by CONG Berg - NP on 3/16/2025 at 10:32 AM

## 2025-03-16 NOTE — PROGRESS NOTES
Inpatient Cardiology Progress note        SUBJECTIVE/OBJECTIVE:   Patient was found to have significant stroke  She is uncommunicative.  Slightly open eyes to verbal stimuli but does not track or talks       PHYSICAL EXAM:   BP (!) 111/47   Pulse 70   Temp 97.5 °F (36.4 °C) (Axillary)   Resp 17   Ht 1.575 m (5' 2\")   Wt 61.7 kg (136 lb 0.4 oz)   SpO2 97%   BMI 24.88 kg/m²    B/P Range last 24 hours: No data recorded.   No data recorded.      GENERAL: Not in distress.   HEAD: Normocephalic, Atraumatic.   NECK: No JVD. No carotid bruits. No neck masses.?   CARDIOVASCULAR: Normal rate, regular rhythm,  normal S1, S2, no MRG    LUNGS: Mild rhonchi, no wheezing.?   ABDOMEN: Abdomen is soft and not distended. No tenderness.  EXTREMITIES:There is no pedal edema.   MUSCULOSKELETAL: No joint swelling. Normal range of motion?   SKIN: Skins is moist. No ulcers or lesions.   NEUROLOGICAL: Opens eyes to verbal stimuli but does not track      Intake/Output Summary (Last 24 hours) at 3/15/2025 2318  Last data filed at 3/15/2025 2244  Gross per 24 hour   Intake 4810.7 ml   Output 1682 ml   Net 3128.7 ml       Weight:   Wt Readings from Last 3 Encounters:   03/15/25 61.7 kg (136 lb 0.4 oz)   03/04/25 56.5 kg (124 lb 8 oz)   04/23/24 59.9 kg (132 lb)       Current Inpatient Medications:   [START ON 3/16/2025] pantoprazole  40 mg Oral QAM AC    levETIRAcetam  500 mg IntraVENous BID    thiamine  250 mg IntraVENous Daily    lactulose  20 g Oral TID    piperacillin-tazobactam  4,500 mg IntraVENous Q8H    hydrocortisone sodium succinate PF  100 mg IntraVENous Q8H    insulin lispro  0-4 Units SubCUTAneous Q6H    sodium chloride flush  5-40 mL IntraVENous 2 times per day    apixaban  2.5 mg Oral BID    [Held by provider] furosemide  40 mg IntraVENous Daily    ipratropium 0.5 mg-albuterol 2.5 mg  1 Dose Inhalation Q4H WA RT    arformoterol tartrate  15 mcg Nebulization BID RT    budesonide  0.5 mg Nebulization BID RT    midodrine   to have stroke    HFrEF:  EF 10-15%  Etiology is unclear at this point, could be related to tachycardia versus stroke  GDMT as tolerated, currently on midodrine for hypotension  Can consider ischemic workup once neurological recovery is achieved  CVA:  Neurology following  A-fib/A-flutter:  S/p DCCV 3/7/2025:  Continue Eliquis  Consider beta-blockers if BP tolerates      Rafael Holbrook MD  Interventional Cardiology/ Structural heart disease    I spent a total of 37 minutes of critical care time on the date of the service which included preparing to see the patient, face-to-face patient care, completing clinical documentation, obtaining and/or reviewing separately obtained history, performing a medically appropriate examination, counseling and educating the patient/family/caregiver, and ordering medications, tests, or procedures.

## 2025-03-16 NOTE — PLAN OF CARE
Problem: Chronic Conditions and Co-morbidities  Goal: Patient's chronic conditions and co-morbidity symptoms are monitored and maintained or improved  3/16/2025 0958 by Wilber Bernardo RN  Outcome: Not Progressing     Problem: Discharge Planning  Goal: Discharge to home or other facility with appropriate resources  3/16/2025 0958 by Wilber Bernardo RN  Outcome: Not Progressing     Problem: Nutrition Deficit:  Goal: Optimize nutritional status  3/16/2025 0958 by Wilber Bernardo RN  Outcome: Not Progressing     Problem: Metabolic/Fluid and Electrolytes - Adult  Goal: Electrolytes maintained within normal limits  3/16/2025 0958 by Wilber Bernardo RN  Outcome: Not Progressing     Problem: Metabolic/Fluid and Electrolytes - Adult  Goal: Hemodynamic stability and optimal renal function maintained  3/16/2025 0958 by Wilber Bernardo RN  Outcome: Not Progressing     Problem: Hematologic - Adult  Goal: Maintains hematologic stability  3/16/2025 0958 by Wilber Bernardo RN  Outcome: Not Progressing     Problem: Neurosensory - Adult  Goal: Achieves stable or improved neurological status  3/16/2025 0958 by Wilber Bernardo RN  Outcome: Not Progressing     Problem: Neurosensory - Adult  Goal: Achieves maximal functionality and self care  3/16/2025 0958 by Wilber Bernardo RN  Outcome: Not Progressing     Problem: Cardiovascular - Adult  Goal: Maintains optimal cardiac output and hemodynamic stability  3/16/2025 0958 by Wilber Bernardo RN  Outcome: Not Progressing     Problem: Cardiovascular - Adult  Goal: Absence of cardiac dysrhythmias or at baseline  3/16/2025 0958 by Wilber Bernardo RN  Outcome: Not Progressing     Problem: Musculoskeletal - Adult  Goal: Return mobility to safest level of function  3/16/2025 0958 by Wilber Bernardo RN  Outcome: Not Progressing     Problem: Musculoskeletal - Adult  Goal: Return ADL status to a safe level of function  3/16/2025 0958 by Wilber Bernardo RN  Outcome: Not Progressing     Problem:  Electrolytes - Adult  Goal: Glucose maintained within prescribed range  3/16/2025 0958 by Wilber Bernardo RN  Outcome: Progressing     Problem: Respiratory - Adult  Goal: Achieves optimal ventilation and oxygenation  3/16/2025 0958 by Wilber Bernardo RN  Outcome: Progressing     Problem: Infection - Adult  Goal: Absence of infection at discharge  3/16/2025 0958 by Wilber Bernardo RN  Outcome: Progressing     Problem: Infection - Adult  Goal: Absence of infection during hospitalization  3/16/2025 0958 by Wilber Bernardo RN  Outcome: Progressing     Problem: Pain  Goal: Verbalizes/displays adequate comfort level or baseline comfort level  3/16/2025 0958 by Wilber Bernardo RN  Outcome: Progressing

## 2025-03-16 NOTE — PLAN OF CARE
Problem: Chronic Conditions and Co-morbidities  Goal: Patient's chronic conditions and co-morbidity symptoms are monitored and maintained or improved  Outcome: Not Progressing     Problem: Discharge Planning  Goal: Discharge to home or other facility with appropriate resources  Outcome: Not Progressing     Problem: Nutrition Deficit:  Goal: Optimize nutritional status  Outcome: Not Progressing     Problem: Metabolic/Fluid and Electrolytes - Adult  Goal: Electrolytes maintained within normal limits  Outcome: Not Progressing  Goal: Hemodynamic stability and optimal renal function maintained  Outcome: Not Progressing     Problem: Hematologic - Adult  Goal: Maintains hematologic stability  Outcome: Not Progressing     Problem: Neurosensory - Adult  Goal: Achieves stable or improved neurological status  Outcome: Not Progressing  Goal: Achieves maximal functionality and self care  Outcome: Not Progressing     Problem: Cardiovascular - Adult  Goal: Maintains optimal cardiac output and hemodynamic stability  Outcome: Not Progressing     Problem: Musculoskeletal - Adult  Goal: Return mobility to safest level of function  Outcome: Not Progressing  Goal: Return ADL status to a safe level of function  Outcome: Not Progressing     Problem: Gastrointestinal - Adult  Goal: Maintains or returns to baseline bowel function  Outcome: Not Progressing  Goal: Maintains adequate nutritional intake  Outcome: Not Progressing     Problem: Genitourinary - Adult  Goal: Absence of urinary retention  Outcome: Not Progressing

## 2025-03-16 NOTE — PROGRESS NOTES
Nutrition Note    Tube feeding recommendation per physician consult.    Recommend Renal (Nepro) @20ml/hr plus 2 protein modulars daily to provide 480ml, 864 calories, 39g protein, 348ml water (1072 calories and 91g protein w/ 2 protein modulars daily), with flushes per critical care.    See most recent full nutrition assessment on 3/14 if needed.    Estimated Daily Nutrient Needs:  Energy Requirements Based On: Formula  Weight Used for Energy Requirements: Other (recent dry office visit wt 124lb)  Energy (kcal/day): PS3B 900-1100 (minute volume 3.98 ; Tmax 97.6)   Weight Used for Protein Requirements: Other (recent dry office visit wt 124lb)  Protein (g/day):  (1.5-1.8g/kg)   Method Used for Fluid Requirements: Defer to provider  Fluid (ml/day): per critical care/ renal management    Electronically signed by Abimael Jones RD, LD on 3/16/25 at 11:18 AM EDT    Contact: [phone]

## 2025-03-16 NOTE — PROGRESS NOTES
Inpatient Cardiology Progress note        SUBJECTIVE/OBJECTIVE:   Patient was found to have significant stroke  Continues to be intubated and sedated, on pressors   She is uncommunicative.  Slightly open eyes to verbal stimuli but does not track or talks       PHYSICAL EXAM:   BP (!) 111/47   Pulse 63   Temp 97.3 °F (36.3 °C) (Axillary)   Resp 15   Ht 1.575 m (5' 2\")   Wt 62 kg (136 lb 11 oz)   SpO2 100%   BMI 25.00 kg/m²    B/P Range last 24 hours: No data recorded.   No data recorded.      GENERAL: Not in distress.   HEAD: Normocephalic, Atraumatic.   NECK: No JVD. No carotid bruits. No neck masses.?   CARDIOVASCULAR: Normal rate, regular rhythm,  normal S1, S2, no MRG    LUNGS: Mild rhonchi, no wheezing.?   ABDOMEN: Abdomen is soft and not distended. No tenderness.  EXTREMITIES:There is no pedal edema.   MUSCULOSKELETAL: No joint swelling. Normal range of motion?   SKIN: Skins is moist. No ulcers or lesions.   NEUROLOGICAL: Opens eyes to verbal stimuli but does not track      Intake/Output Summary (Last 24 hours) at 3/16/2025 2124  Last data filed at 3/16/2025 1910  Gross per 24 hour   Intake 1800 ml   Output 940 ml   Net 860 ml       Weight:   Wt Readings from Last 3 Encounters:   03/16/25 62 kg (136 lb 11 oz)   03/04/25 56.5 kg (124 lb 8 oz)   04/23/24 59.9 kg (132 lb)       Current Inpatient Medications:   chlorhexidine  15 mL Mouth/Throat BID    pantoprazole (PROTONIX) 40 mg in sodium chloride (PF) 0.9 % 10 mL injection  40 mg IntraVENous Daily    levETIRAcetam  500 mg IntraVENous BID    [Held by provider] lactulose  20 g Oral TID    piperacillin-tazobactam  4,500 mg IntraVENous Q8H    hydrocortisone sodium succinate PF  100 mg IntraVENous Q8H    insulin lispro  0-4 Units SubCUTAneous Q6H    sodium chloride flush  5-40 mL IntraVENous 2 times per day    apixaban  2.5 mg Oral BID    [Held by provider] furosemide  40 mg IntraVENous Daily    ipratropium 0.5 mg-albuterol 2.5 mg  1 Dose Inhalation Q4H WA  generalized fatigue, poor appetite, palpitations and dyspnea, was found to have A-fib with RVR and decompensated heart failure, also was found to have stroke    HFrEF:  EF 10-15%  Etiology is unclear at this point, could be related to tachycardia versus stroke  GDMT as tolerated, currently on midodrine for hypotension  Can consider ischemic workup once neurological recovery is achieved  CVA:  Neurology following  A-fib/A-flutter:  S/p DCCV 3/7/2025:  Continue Eliquis  Consider beta-blockers if BP tolerates      Rafael Holbrook MD  Interventional Cardiology/ Structural heart disease    I spent a total of 37 minutes of critical care time on the date of the service which included preparing to see the patient, face-to-face patient care, completing clinical documentation, obtaining and/or reviewing separately obtained history, performing a medically appropriate examination, counseling and educating the patient/family/caregiver, and ordering medications, tests, or procedures.

## 2025-03-16 NOTE — PROGRESS NOTES
[facility]  Department of Internal Medicine   Internal Medicine Residency   MICU Progress Note    Patient:  Nikki Moffett 79 y.o. female  MRN: [id number]     Date of Service: 3/16/2025    Allergy: Patient has no known allergies.    Subjective   ICU day 8d 17h    Overnight, patient intubated due to worsening mentation. Noted episodes of bradycardia.   Patient seen and examined at bedside this morning, intubated and on sedation. Opens eyes to noxious stimuli. On levophed. With episodes of bradycardia to 50s during examination.        Objective     VS: BP (!) 111/47   Pulse 67   Temp 97.6 °F (36.4 °C) (Axillary)   Resp 24   Ht 1.575 m (5' 2\")   Wt 62 kg (136 lb 11 oz)   SpO2 100%   BMI 25.00 kg/m²   ABP (Arterial line BP): 114/47ABP Mean (Arterial Line Mean): 67 mmHg    Arterial line BP: ABP (Arterial line BP): 114/47 ABP Mean (Arterial Line Mean): 67 mmHg    Physical Exam:  General Appearance: Intubated and on sedation, opens eyes to noxious stimuli   Neck: no adenopathy, no carotid bruit, no JVD, and supple, symmetrical, trachea midline  Lung: Rales bilateral  Heart: Regular rhythm, normal rate  Abdomen: Soft nontender  Extremities:  Gr 1-2 lower extremity edema    Medications     Infusions: (Fluid, Sedation, Vasopressors)  Scheduled Meds:   succinylcholine        chlorhexidine  15 mL Mouth/Throat BID    pantoprazole (PROTONIX) 40 mg in sodium chloride (PF) 0.9 % 10 mL injection  40 mg IntraVENous Daily    levETIRAcetam  500 mg IntraVENous BID    thiamine  250 mg IntraVENous Daily    lactulose  20 g Oral TID    piperacillin-tazobactam  4,500 mg IntraVENous Q8H    hydrocortisone sodium succinate PF  100 mg IntraVENous Q8H    insulin lispro  0-4 Units SubCUTAneous Q6H    sodium chloride flush  5-40 mL IntraVENous 2 times per day    apixaban  2.5 mg Oral BID    [Held by provider] furosemide  40 mg IntraVENous Daily    ipratropium 0.5 mg-albuterol 2.5 mg  1 Dose Inhalation Q4H WA RT    arformoterol  tartrate  15 mcg Nebulization BID RT    budesonide  0.5 mg Nebulization BID RT    midodrine  10 mg Oral TID WC    [Held by provider] metoprolol succinate  25 mg Oral Daily    [Held by provider] amiodarone  200 mg Oral BID    Vitamin D  1,000 Units Oral Daily    [Held by provider] atorvastatin  80 mg Oral Nightly     Continuous Infusions:   fentaNYL 100 mcg/hr (03/16/25 0552)    norepinephrine 4 mcg/min (03/16/25 0412)    sodium chloride 10 mL/hr at 03/12/25 1342    dextrose       PRN Meds:.succinylcholine, sulfur hexafluoride microspheres, heparin (PF), heparin (porcine) **AND** heparin (porcine), sodium chloride, polyethylene glycol, prochlorperazine **OR** prochlorperazine, guaiFENesin-dextromethorphan, sodium chloride flush, ondansetron **OR** ondansetron, acetaminophen **OR** acetaminophen, glucose, dextrose bolus **OR** dextrose bolus, glucagon (rDNA), dextrose    Workup   Labs  Recent Labs     03/14/25  0534 03/15/25  0509 03/16/25  0432   WBC 17.3* 16.2* 21.0*   RBC 3.03* 2.95* 2.89*   HGB 8.0* 7.8* 7.7*   HCT 26.9* 26.0* 26.1*   MCV 88.8 88.1 90.3   MCH 26.4 26.4 26.6   MCHC 29.7* 30.0* 29.5*   RDW 17.0* 17.2* 17.3*   PLT 95*  --   --    MPV 10.8 11.4 11.4     Recent Labs     03/14/25  1124 03/14/25  1655 03/14/25  2308 03/15/25  0509 03/15/25  1112 03/16/25  0244 03/16/25  0432    139 138 140 143  --  142   K 3.6 3.6 3.9 3.8 3.9 3.76 3.8    107 105 107 111*  --  109*   MG 1.9 1.9 1.9 1.9 1.7  --  2.0   PHOS 1.9*  --  1.8* 2.1* 2.4*  --  3.8   CO2 22 21* 22 19* 22  --  20*   BUN 10 10 10 10 12  --  22   CREATININE 0.7 0.8 0.8 0.8 0.9  --  1.7*   ANIONGAP 12 11 11 14 10  --  13   GLUCOSE 140* 133* 134* 130* 129*  --  128*   CALCIUM 8.6 8.9 9.0 8.9 8.3*  --  8.5*   BILITOT  --   --   --  2.1*  --   --  1.6*   ALKPHOS  --   --   --  151*  --   --  143*   AST  --   --   --  270*  --   --  167*   ALT  --   --   --  460*  --   --  354*     No results for input(s): \"LACTA\" in the last 72 hours.    No  indicate volume overload since March 7   study.  Please correlate clinically.         XR CHEST PORTABLE   Final Result   Borderline size cardiac area with mild to moderate bilateral pleural   effusions.         XR CHEST PORTABLE   Final Result   1. No acute cardiopulmonary process.   2. Bibasilar scarring and pleural thickening, unchanged.         XR CHEST (2 VW)   Final Result   Bilateral pleural effusions. The 1 on the left is slightly smaller than the   prior study.  Findings suggest resolving CHF         XR CHEST PORTABLE   Final Result   Bilateral pleural effusions.      Possible right lower lobe opacity.         XR CHEST PORTABLE    (Results Pending)       Cultures  Results       Procedure Component Value Units Date/Time    Culture, Respiratory [[id number]] Collected: 03/16/25 0520    Order Status: Sent Specimen: Sputum Expectorated Updated: 03/16/25 0525    Culture, Respiratory [[id number]]  (Abnormal) Collected: 03/14/25 1124    Order Status: Completed Specimen: Sputum, Suctioned Updated: 03/14/25 1915     Specimen Description .SUCTIONED SPUTUM     Direct Exam The sputum Gram stain indicates that the specimen is not representative of lower respiratory secretions. The culture has been cancelled. Please recollect.    Culture, Respiratory [[id number]]     Order Status: Sent Specimen: Sputum Expectorated     LEGIONELLA ANTIGEN, URINE [[id number]] Collected: 03/09/25 0842    Order Status: Completed Specimen: Urine Updated: 03/09/25 1035     Legionella Pneumophilia Ag, Urine NEGATIVE     Comment:       L. pneumophila serogroup 1 antigen not detected.  A negative result does not exclude infection with Leginella pnemophila serogroup 1 nor does   it rule out other microbial-caused respiratory infections of disease caused by other   serogroups of Legionella pneumophila.         STREP PNEUMONIAE ANTIGEN [[id number]] Collected: 03/09/25 0841    Order Status: Completed Specimen: Urine, clean catch Updated: 03/09/25 1035

## 2025-03-16 NOTE — PROGRESS NOTES
[facility]  Internal Medicine Residency Program  Progress Note - House Team     Patient:  Nikki Moffett 79 y.o. female MRN: [id number]     Date of Service: 3/16/2025     CC:   Chief Complaint   Patient presents with    Altered Mental Status     \"Mood swings, memory loss and fatigue\" per patients grandson    Chest Pain     Ongoing for a couple weeks per family     Overnight events:   Bradycardic in the 40s - ordered EKG  Not repsonsive to sternal rub, not opening eyes  > intubated   Bradycardic in 40s > EKG     Subjective     Patient was seen at bedside this morning. Patient is intubated and sedated.    Objective     Physical Exam:  Vitals: BP (!) 111/47   Pulse 67   Temp 97.6 °F (36.4 °C) (Axillary)   Resp 24   Ht 1.575 m (5' 2\")   Wt 62 kg (136 lb 11 oz)   SpO2 100%   BMI 25.00 kg/m²     I & O - 24hr: In: 3998.5   Out: 1180   General appearance: Intubated and sedated.  Appears lethargic.    Neuro: Mental status: Intubated and sedated. Does not follow commands.  Cardiovascular: Regular rate and rhythm, S1 and S2  heard, no murmurs appreciated  Respiratory: Rales present bilaterally   Abdomen: Soft, non tender, bowel sounds normal; masses, no organomegaly, rebound or guarding  Extremities: Extremities normal, no cyanosis, distal pulses intact, no edema    Pertinent Labs & Imaging Studies      Complete Blood Count:   Recent Labs     03/14/25  0534 03/15/25  0509 03/16/25  0432   WBC 17.3* 16.2* 21.0*   HGB 8.0* 7.8* 7.7*   HCT 26.9* 26.0* 26.1*   PLT 95*  --   --         Last 3 Blood Glucose:   Recent Labs     03/15/25  0509 03/15/25  1112 03/16/25  0432   GLUCOSE 130* 129* 128*        PT/INR:    Lab Results   Component Value Date/Time    PROTIME 17.5 03/12/2025 11:01 AM    INR 1.6 03/12/2025 11:01 AM     PTT:    Lab Results   Component Value Date/Time    APTT 42.0 03/08/2025 05:50 PM     Comprehensive Metabolic Profile:   Recent Labs     03/15/25  0509 03/15/25  1112 03/16/25  0244  1307     Specimen Description .BLOOD RIGHT     Special Requests          Culture NO GROWTH 5 DAYS    Culture, Urine [[id number]] Collected: 03/08/25 0929    Order Status: Completed Specimen: Urine, clean catch Updated: 03/09/25 1241     Specimen Description .CLEAN CATCH URINE     Special Requests Site: Urine     Culture NO GROWTH    Culture, Respiratory [[id number]] Collected: 03/05/25 0730    Order Status: Canceled Specimen: Sputum Expectorated     Respiratory Panel, Molecular, with COVID-19 (Restricted: peds pts or suitable admitted adults) [[id number]] Collected: 03/04/25 2125    Order Status: Completed Specimen: Nasopharyngeal Swab Updated: 03/04/25 2145     Specimen Description .NASOPHARYNGEAL SWAB     Adenovirus PCR Not Detected     Coronavirus 229E PCR Not Detected     Coronavirus HKU1 PCR Not Detected     Coronavirus NL63 PCR Not Detected     Coronavirus OC43 PCR Not Detected     SARS-CoV-2, PCR Not Detected     Human Metapneumovirus PCR Not Detected     Rhino/Enterovirus PCR Not Detected     Influenza A by PCR Not Detected     Influenza B by PCR Not Detected     Parainfluenza 1 PCR Not Detected     Parainfluenza 2 PCR Not Detected     Parainfluenza 3 PCR Not Detected     Parainfluenza 4 PCR Not Detected     Resp Syncytial Virus PCR Not Detected     Bordetella parapertussis by PCR Not Detected     B Pertussis by PCR Not Detected     Chlamydia pneumoniae By PCR Not Detected     Mycoplasma pneumo by PCR Not Detected     Comment: Performed by multiplexed nucleic acid assay.             XR CHEST PORTABLE   Final Result   Endotracheal tube tip now with improved positioning 2.5 cm above the lew.         XR CHEST PORTABLE   Final Result   1. Endotracheal tube tip in low but satisfactory positioning 9 mm above the   lew.   2. Unchanged right greater than left basilar effusions and perihilar   pulmonary edema.         MRA NECK WO CONTRAST   Final Result   Limited examination due to motion. No evidence for      Resident's Assessment and Plan     Assessment and Plan:  Nikki Moffett is a 79 y.o. female    Assessment    CVA  Shock, likely cardiogenic   Altered mental status likely 2/2 #2  Atrial fibrillation with RVR, s/p DCCV (03/07/2025)  Acute hypoxic respiratory insufficiency likely 2/2 cardiogenic edema and influenza A  Influenza A  Completed Tamiflu for 5 days  Acute exacerbation of HFrEF, EF 10-15% (03/07/2025)  Pleural effusion, bilateral  Elevated INR  S/p 6 units FFP  ANTONIO superimposed on CKD, stage IIIa  Normocytic anemia  Leukocytosis  Transaminitis likely 2/2 shock liver  Hyperlipidemia  History of L MCA stroke, s/p mechanical thrombectomy (12/2023)    Plan    S/p intubation on 03/16  On sedation, wean as able  On Zosyn  Day 5  On Levophed, wean as tolerated  On Solu-Cortef 100 mg IV every 8 hours  Day 7  On Eliquis 2.5 mg twice daily  On lactulose  Breathing treatments with Brovana, Pulmicort, DuoNeb  Follow neurology recommendations  EEG  On Keppra 500 mg twice daily  Follow cardiology recommendations  Follow nephrology recommendations  Palliative care following  Strict Is/Os    PT evaluation: not indicated at this time   OT evaluation: not indicated at this time   DVT prophylaxis:  Eliquis  GI prophylaxis: not indicated at this time  Diet:   ADULT TUBE FEEDING; Nasogastric; Renal Formula; Continuous; 10; Yes; 10; Q 4 hours; 40; 30; Q 4 hours   Code status: Full Code   Disposition: Continue Current Care    Siddhartha Todd DO, PGY-1  Attending physician: YANELY Doss

## 2025-03-16 NOTE — ANESTHESIA PROCEDURE NOTES
Airway  Date/Time: 3/16/2025 3:50 AM  Urgency: emergent    Airway not difficult    General Information and Staff    Patient location during procedure: ICU  Anesthesiologist: Patrick Talley DO  Resident/CRNA: Kimani Santiago AA  Performed: resident/CRNA/CAA   Performed by: Kimani Santiago AA  Authorized by: Kimani Santiago AA      Consent for Airway (if performed for an anesthetic, see related documentation for consents)  Patient identity confirmed: per hospital policy  Consent: The procedure was performed in an emergent situation. Verbal consent not obtained. Written consent not obtained.  Risks and benefits: risks, benefits and alternatives were not discussed      Indications and Patient Condition  Indications for airway management: respiratory failure and airway protection  Spontaneous ventilation: present  Sedation level: deep  Preoxygenated: yes  Patient position: reverse Trendelenburg  Mask difficulty assessment: vent by bag mask    Final Airway Details  Final airway type: endotracheal airway      Successful airway: ETT  Cuffed: yes   Successful intubation technique: video laryngoscopy  Facilitating devices/methods: intubating stylet  Endotracheal tube insertion site: oral  Blade size: #3  ETT size (mm): 7.5  Cormack-Lehane Classification: grade IIa - partial view of glottis  Placement verified by: chest auscultation and capnometry   Measured from: lips  ETT to lips (cm): 22  Number of attempts at approach: 1

## 2025-03-16 NOTE — PROGRESS NOTES
[facility]  Internal Medicine Residency / House Medicine Service    Attending Physician Statement  I have discussed the case, including pertinent history and exam findings with the resident and the team.  I have seen and examined the patient and the key elements of the encounter have been performed by me.  I agree with the assessment, plan and orders as documented by the resident.      Intubated and sedated  VS stable on levophed  Also on Zosyn  No evisence on MRI of herniation  EEG ORDERED   AND TREATING POSSIBILITY OF UNDERLYING SEIZURES WITH kEPPRA  Neurology following  Hx of A Fib and CARDIOMYOPATHY  On Eliquis   Plan; Same ICU care  Remainder of medical problems as per resident note.      Dalton Sosa MD FRCP [facility]  Internal Medicine Residency Faculty

## 2025-03-16 NOTE — FLOWSHEET NOTE
RSI initiated due to patient being less responsive to protect the airway.  Etomidate given per CRNA.  #7.5 ETT placed 23 at the lip.  ETco2 29 and bilateral breath sounds ascultated.  CXR pending.

## 2025-03-17 ENCOUNTER — APPOINTMENT (OUTPATIENT)
Dept: NEUROLOGY | Age: 80
DRG: 004 | End: 2025-03-17
Payer: MEDICARE

## 2025-03-17 ENCOUNTER — APPOINTMENT (OUTPATIENT)
Dept: GENERAL RADIOLOGY | Age: 80
DRG: 004 | End: 2025-03-17
Payer: MEDICARE

## 2025-03-17 PROBLEM — N17.9 AKI (ACUTE KIDNEY INJURY): Status: ACTIVE | Noted: 2025-03-17

## 2025-03-17 LAB
AADO2: 112.5 MMHG
ALBUMIN SERPL-MCNC: 3.1 G/DL (ref 3.5–5.2)
ALP SERPL-CCNC: 129 U/L (ref 35–104)
ALT SERPL-CCNC: 295 U/L (ref 0–32)
ANION GAP SERPL CALCULATED.3IONS-SCNC: 16 MMOL/L (ref 7–16)
ANION GAP SERPL CALCULATED.3IONS-SCNC: 16 MMOL/L (ref 7–16)
AST SERPL-CCNC: 90 U/L (ref 0–31)
B.E.: -8.4 MMOL/L (ref -3–3)
BASOPHILS # BLD: 0.03 K/UL (ref 0–0.2)
BASOPHILS NFR BLD: 0 % (ref 0–2)
BILIRUB DIRECT SERPL-MCNC: 1.1 MG/DL (ref 0–0.3)
BILIRUB INDIRECT SERPL-MCNC: 0.5 MG/DL (ref 0–1)
BILIRUB SERPL-MCNC: 1.6 MG/DL (ref 0–1.2)
BUN SERPL-MCNC: 34 MG/DL (ref 6–23)
BUN SERPL-MCNC: 39 MG/DL (ref 6–23)
CALCIUM SERPL-MCNC: 8.5 MG/DL (ref 8.6–10.2)
CALCIUM SERPL-MCNC: 8.6 MG/DL (ref 8.6–10.2)
CHLORIDE SERPL-SCNC: 109 MMOL/L (ref 98–107)
CHLORIDE SERPL-SCNC: 110 MMOL/L (ref 98–107)
CO2 SERPL-SCNC: 17 MMOL/L (ref 22–29)
CO2 SERPL-SCNC: 17 MMOL/L (ref 22–29)
COHB: 0.9 % (ref 0–1.5)
CREAT SERPL-MCNC: 2.4 MG/DL (ref 0.5–1)
CREAT SERPL-MCNC: 2.7 MG/DL (ref 0.5–1)
CRITICAL: ABNORMAL
DATE ANALYZED: ABNORMAL
DATE OF COLLECTION: ABNORMAL
EKG ATRIAL RATE: 468 BPM
EKG ATRIAL RATE: 47 BPM
EKG ATRIAL RATE: 52 BPM
EKG ATRIAL RATE: 64 BPM
EKG P AXIS: 16 DEGREES
EKG P AXIS: 27 DEGREES
EKG P AXIS: 79 DEGREES
EKG P-R INTERVAL: 156 MS
EKG P-R INTERVAL: 160 MS
EKG P-R INTERVAL: 176 MS
EKG Q-T INTERVAL: 348 MS
EKG Q-T INTERVAL: 470 MS
EKG Q-T INTERVAL: 488 MS
EKG Q-T INTERVAL: 490 MS
EKG QRS DURATION: 120 MS
EKG QRS DURATION: 130 MS
EKG QRS DURATION: 132 MS
EKG QRS DURATION: 134 MS
EKG QTC CALCULATION (BAZETT): 433 MS
EKG QTC CALCULATION (BAZETT): 453 MS
EKG QTC CALCULATION (BAZETT): 484 MS
EKG QTC CALCULATION (BAZETT): 519 MS
EKG R AXIS: -52 DEGREES
EKG R AXIS: -52 DEGREES
EKG R AXIS: -54 DEGREES
EKG R AXIS: -85 DEGREES
EKG T AXIS: 29 DEGREES
EKG T AXIS: 55 DEGREES
EKG T AXIS: 56 DEGREES
EKG T AXIS: 66 DEGREES
EKG VENTRICULAR RATE: 134 BPM
EKG VENTRICULAR RATE: 47 BPM
EKG VENTRICULAR RATE: 52 BPM
EKG VENTRICULAR RATE: 64 BPM
EOSINOPHIL # BLD: 0 K/UL (ref 0.05–0.5)
EOSINOPHILS RELATIVE PERCENT: 0 % (ref 0–6)
ERYTHROCYTE [DISTWIDTH] IN BLOOD BY AUTOMATED COUNT: 17.8 % (ref 11.5–15)
FIO2: 40 %
GFR, ESTIMATED: 18 ML/MIN/1.73M2
GFR, ESTIMATED: 20 ML/MIN/1.73M2
GLUCOSE BLD-MCNC: 105 MG/DL (ref 74–99)
GLUCOSE BLD-MCNC: 134 MG/DL (ref 74–99)
GLUCOSE SERPL-MCNC: 125 MG/DL (ref 74–99)
GLUCOSE SERPL-MCNC: 147 MG/DL (ref 74–99)
HCO3: 17.6 MMOL/L (ref 22–26)
HCT VFR BLD AUTO: 26.8 % (ref 34–48)
HGB BLD-MCNC: 8 G/DL (ref 11.5–15.5)
HHB: 1.3 % (ref 0–5)
IMM GRANULOCYTES # BLD AUTO: 0.33 K/UL (ref 0–0.58)
IMM GRANULOCYTES NFR BLD: 1 % (ref 0–5)
INR PPP: 1.5
LAB: ABNORMAL
LACTATE BLDV-SCNC: 1 MMOL/L (ref 0.5–2.2)
LYMPHOCYTES NFR BLD: 0.61 K/UL (ref 1.5–4)
LYMPHOCYTES RELATIVE PERCENT: 2 % (ref 20–42)
Lab: 418
MAGNESIUM SERPL-MCNC: 1.9 MG/DL (ref 1.6–2.6)
MAGNESIUM SERPL-MCNC: 2.3 MG/DL (ref 1.6–2.6)
MCH RBC QN AUTO: 26.5 PG (ref 26–35)
MCHC RBC AUTO-ENTMCNC: 29.9 G/DL (ref 32–34.5)
MCV RBC AUTO: 88.7 FL (ref 80–99.9)
METHB: 0.4 % (ref 0–1.5)
MICROORGANISM SPEC CULT: ABNORMAL
MICROORGANISM SPEC CULT: ABNORMAL
MICROORGANISM/AGENT SPEC: ABNORMAL
MODE: AC
MONOCYTES NFR BLD: 0.85 K/UL (ref 0.1–0.95)
MONOCYTES NFR BLD: 3 % (ref 2–12)
NEUTROPHILS NFR BLD: 93 % (ref 43–80)
NEUTS SEG NFR BLD: 24.9 K/UL (ref 1.8–7.3)
O2 SATURATION: 98.7 % (ref 92–98.5)
O2HB: 97.4 % (ref 94–97)
OPERATOR ID: ABNORMAL
PATIENT TEMP: 37 C
PCO2: 38.5 MMHG (ref 35–45)
PEEP/CPAP: 5 CMH2O
PFO2: 3.21 MMHG/%
PH BLOOD GAS: 7.28 (ref 7.35–7.45)
PHOSPHATE SERPL-MCNC: 3.9 MG/DL (ref 2.5–4.5)
PLATELET # BLD AUTO: 134 K/UL (ref 130–450)
PMV BLD AUTO: 11.3 FL (ref 7–12)
PO2: 128.4 MMHG (ref 75–100)
POTASSIUM SERPL-SCNC: 4 MMOL/L (ref 3.5–5)
POTASSIUM SERPL-SCNC: 4.1 MMOL/L (ref 3.5–5)
PROT SERPL-MCNC: 5.5 G/DL (ref 6.4–8.3)
PROTHROMBIN TIME: 16.6 SEC (ref 9.3–12.4)
RBC # BLD AUTO: 3.02 M/UL (ref 3.5–5.5)
RBC # BLD: ABNORMAL 10*6/UL
RI(T): 0.88
RR MECHANICAL: 12 B/MIN
SODIUM SERPL-SCNC: 142 MMOL/L (ref 132–146)
SODIUM SERPL-SCNC: 143 MMOL/L (ref 132–146)
SOURCE, BLOOD GAS: ABNORMAL
SPECIMEN DESCRIPTION: ABNORMAL
THB: 8.9 G/DL (ref 11.5–16.5)
TIME ANALYZED: 425
TROPONIN I SERPL HS-MCNC: 49 NG/L (ref 0–9)
VT MECHANICAL: 325 ML
WBC OTHER # BLD: 26.7 K/UL (ref 4.5–11.5)

## 2025-03-17 PROCEDURE — 82787 IGG 1 2 3 OR 4 EACH: CPT

## 2025-03-17 PROCEDURE — 71045 X-RAY EXAM CHEST 1 VIEW: CPT

## 2025-03-17 PROCEDURE — 6360000002 HC RX W HCPCS: Performed by: PSYCHIATRY & NEUROLOGY

## 2025-03-17 PROCEDURE — 6360000002 HC RX W HCPCS

## 2025-03-17 PROCEDURE — 80053 COMPREHEN METABOLIC PANEL: CPT

## 2025-03-17 PROCEDURE — 6370000000 HC RX 637 (ALT 250 FOR IP)

## 2025-03-17 PROCEDURE — 83735 ASSAY OF MAGNESIUM: CPT

## 2025-03-17 PROCEDURE — 99231 SBSQ HOSP IP/OBS SF/LOW 25: CPT | Performed by: NURSE PRACTITIONER

## 2025-03-17 PROCEDURE — 84100 ASSAY OF PHOSPHORUS: CPT

## 2025-03-17 PROCEDURE — 99232 SBSQ HOSP IP/OBS MODERATE 35: CPT | Performed by: INTERNAL MEDICINE

## 2025-03-17 PROCEDURE — 2000000000 HC ICU R&B

## 2025-03-17 PROCEDURE — 86256 FLUORESCENT ANTIBODY TITER: CPT

## 2025-03-17 PROCEDURE — 86901 BLOOD TYPING SEROLOGIC RH(D): CPT

## 2025-03-17 PROCEDURE — 2580000003 HC RX 258

## 2025-03-17 PROCEDURE — 2500000003 HC RX 250 WO HCPCS: Performed by: INTERNAL MEDICINE

## 2025-03-17 PROCEDURE — 86039 ANTINUCLEAR ANTIBODIES (ANA): CPT

## 2025-03-17 PROCEDURE — 85025 COMPLETE CBC W/AUTO DIFF WBC: CPT

## 2025-03-17 PROCEDURE — 03HY32Z INSERTION OF MONITORING DEVICE INTO UPPER ARTERY, PERCUTANEOUS APPROACH: ICD-10-PCS | Performed by: RADIOLOGY

## 2025-03-17 PROCEDURE — 99233 SBSQ HOSP IP/OBS HIGH 50: CPT | Performed by: INTERNAL MEDICINE

## 2025-03-17 PROCEDURE — 99232 SBSQ HOSP IP/OBS MODERATE 35: CPT | Performed by: NURSE PRACTITIONER

## 2025-03-17 PROCEDURE — 86900 BLOOD TYPING SEROLOGIC ABO: CPT

## 2025-03-17 PROCEDURE — 86255 FLUORESCENT ANTIBODY SCREEN: CPT

## 2025-03-17 PROCEDURE — 86850 RBC ANTIBODY SCREEN: CPT

## 2025-03-17 PROCEDURE — 86038 ANTINUCLEAR ANTIBODIES: CPT

## 2025-03-17 PROCEDURE — 2500000003 HC RX 250 WO HCPCS

## 2025-03-17 PROCEDURE — 94640 AIRWAY INHALATION TREATMENT: CPT

## 2025-03-17 PROCEDURE — 83605 ASSAY OF LACTIC ACID: CPT

## 2025-03-17 PROCEDURE — 86923 COMPATIBILITY TEST ELECTRIC: CPT

## 2025-03-17 PROCEDURE — 82805 BLOOD GASES W/O2 SATURATION: CPT

## 2025-03-17 PROCEDURE — 94003 VENT MGMT INPAT SUBQ DAY: CPT

## 2025-03-17 PROCEDURE — 90935 HEMODIALYSIS ONE EVALUATION: CPT

## 2025-03-17 PROCEDURE — 85610 PROTHROMBIN TIME: CPT

## 2025-03-17 PROCEDURE — 82962 GLUCOSE BLOOD TEST: CPT

## 2025-03-17 PROCEDURE — 37799 UNLISTED PX VASCULAR SURGERY: CPT

## 2025-03-17 PROCEDURE — 82248 BILIRUBIN DIRECT: CPT

## 2025-03-17 RX ORDER — MAGNESIUM SULFATE 1 G/100ML
1000 INJECTION INTRAVENOUS ONCE
Status: COMPLETED | OUTPATIENT
Start: 2025-03-17 | End: 2025-03-17

## 2025-03-17 RX ORDER — FLUDROCORTISONE ACETATE 0.1 MG/1
0.05 TABLET ORAL DAILY
Status: COMPLETED | OUTPATIENT
Start: 2025-03-17 | End: 2025-03-23

## 2025-03-17 RX ADMIN — BUDESONIDE INHALATION 500 MCG: 0.5 SUSPENSION RESPIRATORY (INHALATION) at 08:46

## 2025-03-17 RX ADMIN — MIDODRINE HYDROCHLORIDE 15 MG: 10 TABLET ORAL at 17:24

## 2025-03-17 RX ADMIN — ARFORMOTEROL TARTRATE 15 MCG: 15 SOLUTION RESPIRATORY (INHALATION) at 08:46

## 2025-03-17 RX ADMIN — SODIUM CHLORIDE, PRESERVATIVE FREE 10 ML: 5 INJECTION INTRAVENOUS at 20:23

## 2025-03-17 RX ADMIN — HYDROCORTISONE SODIUM SUCCINATE 100 MG: 100 INJECTION, POWDER, FOR SOLUTION INTRAMUSCULAR; INTRAVENOUS at 12:00

## 2025-03-17 RX ADMIN — FLUDROCORTISONE ACETATE 0.05 MG: 0.1 TABLET ORAL at 17:25

## 2025-03-17 RX ADMIN — CHLORHEXIDINE GLUCONATE, 0.12% ORAL RINSE 15 ML: 1.2 SOLUTION DENTAL at 20:23

## 2025-03-17 RX ADMIN — BUDESONIDE INHALATION 500 MCG: 0.5 SUSPENSION RESPIRATORY (INHALATION) at 20:55

## 2025-03-17 RX ADMIN — IPRATROPIUM BROMIDE AND ALBUTEROL SULFATE 1 DOSE: 2.5; .5 SOLUTION RESPIRATORY (INHALATION) at 08:46

## 2025-03-17 RX ADMIN — HYDROCORTISONE SODIUM SUCCINATE 100 MG: 100 INJECTION, POWDER, FOR SOLUTION INTRAMUSCULAR; INTRAVENOUS at 18:31

## 2025-03-17 RX ADMIN — LEVETIRACETAM 500 MG: 100 INJECTION INTRAVENOUS at 20:24

## 2025-03-17 RX ADMIN — LEVETIRACETAM 500 MG: 100 INJECTION INTRAVENOUS at 09:47

## 2025-03-17 RX ADMIN — ARFORMOTEROL TARTRATE 15 MCG: 15 SOLUTION RESPIRATORY (INHALATION) at 20:55

## 2025-03-17 RX ADMIN — Medication 75 MCG/HR: at 03:52

## 2025-03-17 RX ADMIN — MAGNESIUM SULFATE HEPTAHYDRATE 1000 MG: 1 INJECTION, SOLUTION INTRAVENOUS at 01:10

## 2025-03-17 RX ADMIN — Medication 75 MCG/HR: at 18:28

## 2025-03-17 RX ADMIN — PANTOPRAZOLE SODIUM 40 MG: 40 INJECTION, POWDER, FOR SOLUTION INTRAVENOUS at 09:43

## 2025-03-17 RX ADMIN — PIPERACILLIN AND TAZOBACTAM 4500 MG: 4; .5 INJECTION, POWDER, FOR SOLUTION INTRAVENOUS at 09:43

## 2025-03-17 RX ADMIN — HYDROCORTISONE SODIUM SUCCINATE 100 MG: 100 INJECTION, POWDER, FOR SOLUTION INTRAMUSCULAR; INTRAVENOUS at 03:52

## 2025-03-17 RX ADMIN — IPRATROPIUM BROMIDE AND ALBUTEROL SULFATE 1 DOSE: 2.5; .5 SOLUTION RESPIRATORY (INHALATION) at 20:55

## 2025-03-17 RX ADMIN — PIPERACILLIN AND TAZOBACTAM 4500 MG: 4; .5 INJECTION, POWDER, FOR SOLUTION INTRAVENOUS at 00:18

## 2025-03-17 RX ADMIN — IPRATROPIUM BROMIDE AND ALBUTEROL SULFATE 1 DOSE: 2.5; .5 SOLUTION RESPIRATORY (INHALATION) at 12:26

## 2025-03-17 RX ADMIN — PIPERACILLIN AND TAZOBACTAM 4500 MG: 4; .5 INJECTION, POWDER, FOR SOLUTION INTRAVENOUS at 18:29

## 2025-03-17 ASSESSMENT — PULMONARY FUNCTION TESTS
PIF_VALUE: 24
PIF_VALUE: 22
PIF_VALUE: 23
PIF_VALUE: 22
PIF_VALUE: 24
PIF_VALUE: 25
PIF_VALUE: 28
PIF_VALUE: 24
PIF_VALUE: 25
PIF_VALUE: 24
PIF_VALUE: 25
PIF_VALUE: 24
PIF_VALUE: 24
PIF_VALUE: 22
PIF_VALUE: 24
PIF_VALUE: 25
PIF_VALUE: 24
PIF_VALUE: 23
PIF_VALUE: 25
PIF_VALUE: 25
PIF_VALUE: 22
PIF_VALUE: 27
PIF_VALUE: 24
PIF_VALUE: 25
PIF_VALUE: 24
PIF_VALUE: 24
PIF_VALUE: 25
PIF_VALUE: 22
PIF_VALUE: 24
PIF_VALUE: 25
PIF_VALUE: 24
PIF_VALUE: 22
PIF_VALUE: 25
PIF_VALUE: 23

## 2025-03-17 ASSESSMENT — PAIN SCALES - GENERAL
PAINLEVEL_OUTOF10: 0
PAINLEVEL_OUTOF10: 0

## 2025-03-17 NOTE — PROGRESS NOTES
[facility]   Department of Internal Medicine   Internal Medicine Residency  MICU Progress Note    Patient:  Nikki Moffett 79 y.o. female   MRN: [id number]       Date of Service: 3/17/2025    Allergy: Patient has no known allergies.    Subjective     Overnight, new arterial line attempted x2 however unsuccessful, pt went into a fib RVR with rate in 120s, not cardioverted due to interruption in AC during ICU stay, broke with 5 lopressor.   Patient was seen and examined this morning at bedside in no acute distress, no family present, remains intubated and sedated on fentanyl, continues on 4 of levo.   Responds to noxious stimuli, does not follow commands.     Objective     I & O - 24hr:    Intake/Output Summary (Last 24 hours) at 3/17/2025 1310  Last data filed at 3/17/2025 0541  Gross per 24 hour   Intake 768.75 ml   Output 290 ml   Net 478.75 ml       Physical Exam  Vitals: BP (!) 111/47   Pulse 56   Temp 97.2 °F (36.2 °C) (Temporal)   Resp 14   Ht 1.575 m (5' 2\")   Wt 64.6 kg (142 lb 6.7 oz)   SpO2 95%   BMI 26.05 kg/m²     General Appearance: intubated sedated no distress   HEENT: ETT present, pupils equal and sluggish to react   Neck: trachea midline   Lung: mechanical breath sounds, no wheezing no rales appreciated   Heart: sinus valentín,   Abdomen: soft, BS+   Extremities: +1 pitting edema   Neurologic: responds to noxious stim, does not follow commands,    Medications     Continuous Infusions:   fentaNYL 75 mcg/hr (03/17/25 0541)    norepinephrine 4 mcg/min (03/17/25 0548)    sodium chloride 10 mL/hr at 03/12/25 2219    dextrose       Scheduled Meds:   fludrocortisone  0.05 mg Per NG tube Daily    chlorhexidine  15 mL Mouth/Throat BID    pantoprazole (PROTONIX) 40 mg in sodium chloride (PF) 0.9 % 10 mL injection  40 mg IntraVENous Daily    levETIRAcetam  500 mg IntraVENous BID    [Held by provider] lactulose  20 g Oral TID    piperacillin-tazobactam  4,500 mg IntraVENous Q8H     CREATININE 2.7 03/17/2025 04:15 AM    CALCIUM 8.6 03/17/2025 04:15 AM    GFRAA >60 10/06/2017 12:12 PM    LABGLOM 18 03/17/2025 04:15 AM    LABGLOM 59 12/26/2023 09:18 AM    GLUCOSE 147 03/17/2025 04:15 AM     Hepatic Function Panel:    Lab Results   Component Value Date/Time    ALKPHOS 129 03/17/2025 04:15 AM     03/17/2025 04:15 AM    AST 90 03/17/2025 04:15 AM    BILITOT 1.6 03/17/2025 04:15 AM    BILIDIR 1.1 03/17/2025 04:15 AM    IBILI 0.5 03/17/2025 04:15 AM     Magnesium:    Lab Results   Component Value Date/Time    MG 2.3 03/17/2025 04:15 AM     Phosphorus:    Lab Results   Component Value Date/Time    PHOS 3.9 03/17/2025 04:15 AM       Imaging Studies:    XR CHEST PORTABLE   Final Result   1. Bilateral small pleural effusions with moderate cardiomegaly and bibasilar   patchy infiltrates.   2. Life lines remain in good position.         XR CHEST PORTABLE   Final Result   1.  Endotracheal tube extends to the distal thoracic trachea approximately 20   mm above the lew.      2.  Right IJ central venous catheter and enteric tube.      3.  There may be catheter tubing projecting over the left upper lung.  The   distal tip projects over the aortic arch.  It is unclear if this represents   an indwelling catheter and if indwelling it is unclear the course of this   catheter.      4.  Atherosclerotic disease, cardiomegaly, and central pulmonary vascular   congestion.      5.  Diffuse ill-defined opacities in the lungs more pronounced in the lower   lungs with noted right greater than left pleural effusions.      6.  No pneumothorax.         XR CHEST PORTABLE   Final Result   Endotracheal tube tip now with improved positioning 2.5 cm above the lew.         XR CHEST PORTABLE   Final Result   1. Endotracheal tube tip in low but satisfactory positioning 9 mm above the   lew.   2. Unchanged right greater than left basilar effusions and perihilar   pulmonary edema.         MRA NECK WO CONTRAST   Final

## 2025-03-17 NOTE — PROGRESS NOTES
[facility]  Neuro Inpatient Follow Up        Nikki Moffett is a 79 y.o. female     Neuro is following for stroke    Significant PMH: Previous left MCA stroke SP thrombectomy, A-fib, prediabetes, heart failure    HPI:  She initially presented on 3/4 with an altered mental status and chest pain in the setting of shock, respiratory failure requiring intubation, influenza, and ANTONIO--requiring CRRT until 3/15.  Ultrasound of the abdomen did show possible early cirrhosis    On 3/15 she was noted to have a dilated, but sluggishly reactive pupil on the right and mental status persistently altered    MRI of the brain showed acute left occipital stroke--not felt to be the cause of her pupillary abnormality--as well as evidence of old stroke in her left occipital region as well as old stroke in her left MCA stroke from 2023, for which she received mechanical thrombectomy.    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  These can sometimes be implicated in embolic appearing strokes.    She is on Eliquis     3/15: EEG ordered to rule out seizures  Placed on empiric Keppra    Subjective:  She remains in ICU.  She is intubated and on fentanyl, Levophed.  She will open her eyes to voice but follows no commands for me or staff.  There is no family present    Unable to complete ROS due to her mental status    Current Facility-Administered Medications   Medication Dose Route Frequency Provider Last Rate Last Admin    chlorhexidine (PERIDEX) 0.12 % solution 15 mL  15 mL Mouth/Throat BID Sandra Del Rosario MD   15 mL at 03/16/25 1948    fentaNYL (SUBLIMAZE) 1,000 mcg in sodium chloride 0.9% 100 mL infusion   mcg/hr IntraVENous Continuous Sandra Del Rosario MD 7.5 mL/hr at 03/17/25 0541 75 mcg/hr at 03/17/25 0541    pantoprazole (PROTONIX) 40 mg in sodium chloride (PF) 0.9 % 10 mL injection  40 mg IntraVENous Daily Sandra Del Rosario MD   40 mg at

## 2025-03-17 NOTE — PROCEDURES
Arterial line placement attempt    Procedure: Right Radial arterial line placement.     Indications: Continuous monitoring of blood pressure in a patient with hypotension +/- shock, on Levophed.     Anesthesia: Local infiltration of 1% lidocaine.     Consent:  The family members were counseled regarding the procedure, its indications, risks, potential complications and alternatives, and any questions were answered. Consent was obtained to proceed.    Technique: Time Out: Immediately prior to the procedure a \"timeout\" was called to verify the correct patient and procedure. Procedure was done using strict aseptic technique. Rei's test was performed and was normal. Right Radial site was cleaned with chloraprep and draped. Right Radial was identified, then Lidocaine 1% was infiltrated locally.  Arterial line was inserted, a good blood flow was obtained, after which guidewire was inserted all the way with no resistance. Then the canula was inserted and needle with guidewire was withdrawn. Pulsatile bright red blood flow was observed. The canula was connected to BP monitoring apparatus and a subpar quality waveform was noted; there was also a significant discrepancy with BP recorded from the left radial arterial line which has a better waveform. Therefore, the canula was withdrawn and pressure was applied. This was attempted twice distally and twice proximal forearm.    Number of sticks: 4.     Number of Kits used: 4.     Complications: No immediate complication.      Estimated blood loss: About 1 ml.     Comment: Patient tolerated the procedure well however it was unsuccessful so will keep left radial arterial line until another one is secured.    Marko Prater MD PGY-2  3/17/2025 12:55 AM

## 2025-03-17 NOTE — FLOWSHEET NOTE
Patient in Afib RVR rate 130-140's.  Resident at bedside, EKG done labs sent. Dr. Sandoval notified. See new orders.

## 2025-03-17 NOTE — FLOWSHEET NOTE
03/17/25 1828   Vital Signs   BP (!) 130/58   Temp 97.4 °F (36.3 °C)   Pulse 79   Respirations 12   Weight - Scale 64.1 kg (141 lb 5 oz)   Weight Method Bed scale   Percent Weight Change -0.77   Pain Assessment   Pain Assessment None - Denies Pain   Post-Hemodialysis Assessment   Post-Treatment Procedures Blood returned;Catheter capped, clamped and heparinized x 2 ports   Machine Disinfection Process Exterior Machine Disinfection   Rinseback Volume (ml) 300 ml   Blood Volume Processed (Liters) 63 L   Dialyzer Clearance Lightly streaked   Duration of Treatment (minutes) 210 minutes   Hemodialysis Intake (ml) 300 ml   Hemodialysis Output (ml) 800 ml   NET Removed (ml) 500   Tolerated Treatment Fair   Bilateral Breath Sounds Diminished   Edema Right upper extremity;Left upper extremity   RUE Edema +1;+2  (hands)   LUE Edema +1;+2  (hands)   Time Off 1813   Observations & Evaluations   Level of Consciousness 3  (medically sedated)   Heart Rhythm Irregular   Respiratory Quality/Effort Unlabored   O2 Device Ventilator   Skin Color Pink   Skin Condition/Temp Warm;Dry   Abdomen Inspection Soft   Comments tolerated fair. 500 ml net UF.   Access   Add Access? Yes  (L groin temporary catheter)

## 2025-03-17 NOTE — PROGRESS NOTES
[facility]  Internal Medicine Residency Program  Progress Note - House Team     Patient:  Nikki Moffett 79 y.o. female MRN: [id number]     Date of Service: 3/17/2025     CC:   Chief Complaint   Patient presents with    Altered Mental Status     \"Mood swings, memory loss and fatigue\" per patients grandson    Chest Pain     Ongoing for a couple weeks per family     Subjective     Patient was seen at bedside this morning. Patient is intubated and sedated.  Patient still does not follow-up any commands.  She opens her eyes on painful stimuli.  Patient had a fever.  Overnight, he did have Afib RVR, given Lopressor 5 mg IV.  Patient is scheduled for hemodialysis today.    Objective     Physical Exam:  Vitals: /73   Pulse 84   Temp 97.3 °F (36.3 °C) (Temporal)   Resp 19   Ht 1.575 m (5' 2\")   Wt 64.6 kg (142 lb 6.7 oz)   SpO2 94%   BMI 26.05 kg/m²     I & O - 24hr: In: 852.5   Out: 290   General appearance: Intubated and sedated.  Appears lethargic.    Neuro: Mental status: Intubated and sedated. Does not follow commands.  Cardiovascular: Regular rate and rhythm, S1 and S2  heard, no murmurs appreciated  Respiratory: Rales present bilaterally   Abdomen: Soft, non tender, bowel sounds normal; masses, no organomegaly, rebound or guarding  Extremities: Extremities normal, no cyanosis, distal pulses intact, no edema    Pertinent Labs & Imaging Studies      Complete Blood Count:   Recent Labs     03/15/25  0509 03/16/25  0432 03/16/25  1243 03/17/25  0415   WBC 16.2* 21.0*  --  26.7*   HGB 7.8* 7.7* 7.3* 8.0*   HCT 26.0* 26.1* 24.4* 26.8*   PLT  --   --   --  134        Last 3 Blood Glucose:   Recent Labs     03/16/25  0432 03/16/25  2340 03/17/25  0415   GLUCOSE 128* 125* 147*        PT/INR:    Lab Results   Component Value Date/Time    PROTIME 16.6 03/17/2025 07:56 AM    INR 1.5 03/17/2025 07:56 AM     PTT:    Lab Results   Component Value Date/Time    APTT 42.0 03/08/2025 05:50 PM    region and left frontal operculum.   3. Partial opacification of the mastoid air cells bilaterally.   Radiology communication note placed to have information called to the   ordering physician 10:54 a.m. EST 03/15/2025         XR CHEST PORTABLE   Final Result   Cardiomegaly with stable increased markings seen diffusely throughout the mid   and lower lung fields bilaterally with small bilateral pleural effusions.         CT HEAD WO CONTRAST   Final Result   Small insult in the left frontal parietal region of indeterminate chronicity.   Correlate with MRI if there is concern for acute infarction.      Remote appearing insult in the left occipital lobe.      Findings of chronic small vessel ischemic change and cerebral volume loss.         XR CHEST PORTABLE   Final Result   1. Interval placement of a nasogastric tube which passes into the body of the   stomach.   2. No change in prominent left lower lobe pneumonia and moderate right lower   lobe pneumonia.   3. Slight increase in size of small bilateral pleural effusions.   4. Stable mild congestive failure.         XR ABDOMEN FOR NG/OG/NE TUBE PLACEMENT   Final Result   Enteric tube in the stomach as above.      No evidence of bowel obstruction.      Right pleural effusion with right basilar opacity with consolidation that may   represent pneumonia.         US ABDOMEN LIMITED   Final Result   1.  Echogenic liver parenchyma with somewhat scalloped liver margins.  Small   volume simple appearing intra-abdominal ascites in the right upper quadrant.      (Could reflect changes related to an underlying infiltrative pathology.   Hepatic steatosis or possibly early cirrhosis.  Please correlate with   patient's liver function test.)      2.  Right pleural effusion present.      3.  No intrahepatic or extrahepatic biliary dilatation.  Gallbladder is   contracted on this study.      4.  Normal appearance of the imaged kidney.  No hydronephrosis.         XR CHEST PORTABLE  mg IV every 8 hours  Day 7  On Eliquis 2.5 mg twice daily  On lactulose  Breathing treatments with Brovana, Pulmicort, DuoNeb  Follow neurology recommendations  EEG  On Keppra 500 mg twice daily  Follow cardiology recommendations  Follow nephrology recommendations  Palliative care following  Strict Is/Os  Autoimmune cirrhosis work up sent.    PT evaluation: not indicated at this time   OT evaluation: not indicated at this time   DVT prophylaxis:  Eliquis  GI prophylaxis: not indicated at this time  Diet:   ADULT TUBE FEEDING; Nasogastric; Renal Formula; Continuous; 10; Yes; 10; Q 4 hours; 40; 30; Q 4 hours   Code status: Full Code   Disposition: Continue Current Care    Giovanni Gutierrez MD, PGY-1  Attending physician: SIMONE Bowers      [facility]  Internal Medicine Faculty   Attending Physician Statement    Nikki Moffett is a 79 y.o. female was seen, examined and discussed with the multi-disciplinary teaching team during rounds. I have personally seen and examined the patient and the key elements of the encounter were performed by me. The data collected below information that was obtained, reviewed, analyzed and interpreted today. Imaging test are reviewed during rounds. Comparison to previous images are always explored.     CBC:   Lab Results   Component Value Date/Time    WBC 26.7 03/17/2025 04:15 AM    RBC 3.02 03/17/2025 04:15 AM    HGB 8.0 03/17/2025 04:15 AM    HCT 26.8 03/17/2025 04:15 AM     03/17/2025 04:15 AM    MCV 88.7 03/17/2025 04:15 AM       BMP:    Lab Results   Component Value Date/Time     03/17/2025 04:15 AM    K 4.1 03/17/2025 04:15 AM     03/17/2025 04:15 AM    CO2 17 03/17/2025 04:15 AM    BUN 39 03/17/2025 04:15 AM    CREATININE 2.7 03/17/2025 04:15 AM    GLUCOSE 147 03/17/2025 04:15 AM    CALCIUM 8.6 03/17/2025 04:15 AM       TSH:    Lab Results   Component Value Date/Time    TSH 2.39 03/12/2025 11:01 AM         Imaging Studies:    RADIOLOGY:

## 2025-03-17 NOTE — PLAN OF CARE
Problem: Chronic Conditions and Co-morbidities  Goal: Patient's chronic conditions and co-morbidity symptoms are monitored and maintained or improved  3/17/2025 1040 by Sukhi Zuñiga RN  Outcome: Progressing  3/17/2025 0607 by Allan Torres RN  Outcome: Not Progressing     Problem: Chronic Conditions and Co-morbidities  Goal: Patient's chronic conditions and co-morbidity symptoms are monitored and maintained or improved  3/17/2025 1040 by Sukhi Zuñiga RN  Outcome: Progressing  3/17/2025 0607 by Allan Torres RN  Outcome: Not Progressing     Problem: Discharge Planning  Goal: Discharge to home or other facility with appropriate resources  3/17/2025 1040 by Sukhi Zuñiga RN  Outcome: Progressing  3/17/2025 0607 by Allan Torres RN  Outcome: Not Progressing     Problem: Nutrition Deficit:  Goal: Optimize nutritional status  3/17/2025 1040 by Sukhi Zuñiga RN  Outcome: Progressing  3/17/2025 0607 by Allan Torres RN  Outcome: Not Progressing     Problem: Metabolic/Fluid and Electrolytes - Adult  Goal: Electrolytes maintained within normal limits  3/17/2025 1040 by Sukhi Zuñiga RN  Outcome: Progressing  3/17/2025 0607 by Allan Torres RN  Outcome: Not Progressing  Goal: Hemodynamic stability and optimal renal function maintained  3/17/2025 1040 by Sukhi Zuñiga RN  Outcome: Progressing  3/17/2025 0607 by Allan Torres RN  Outcome: Not Progressing     Problem: Neurosensory - Adult  Goal: Achieves stable or improved neurological status  3/17/2025 1040 by Sukhi Zuñiga RN  Outcome: Progressing  3/17/2025 0607 by Allan Torres RN  Outcome: Not Progressing  Goal: Achieves maximal functionality and self care  3/17/2025 1040 by Sukhi Zuñiga RN  Outcome: Progressing  3/17/2025 0607 by Allan Torres RN  Outcome: Not Progressing     Problem: Respiratory - Adult  Goal: Achieves optimal ventilation and oxygenation  3/17/2025 1040 by  Sukhi Zuñiga RN  Outcome: Progressing  3/17/2025 0607 by Allan Torres RN  Outcome: Not Progressing     Problem: Cardiovascular - Adult  Goal: Maintains optimal cardiac output and hemodynamic stability  3/17/2025 1040 by Sukhi Zuñiga RN  Outcome: Progressing  3/17/2025 0607 by Allan Torres RN  Outcome: Not Progressing  Goal: Absence of cardiac dysrhythmias or at baseline  3/17/2025 1040 by Sukhi Zuñiga RN  Outcome: Progressing  3/17/2025 0607 by Allan Torres RN  Outcome: Not Progressing     Problem: Musculoskeletal - Adult  Goal: Return mobility to safest level of function  3/17/2025 1040 by Sukhi Zuñiga RN  Outcome: Progressing  3/17/2025 0607 by Allan Torres RN  Outcome: Not Progressing  Goal: Return ADL status to a safe level of function  3/17/2025 1040 by Sukhi Zuñiga RN  Outcome: Progressing  3/17/2025 0607 by Allan Torres RN  Outcome: Not Progressing     Problem: Gastrointestinal - Adult  Goal: Maintains or returns to baseline bowel function  3/17/2025 1040 by Sukhi Zuñiga RN  Outcome: Progressing  3/17/2025 0607 by Allan Torres RN  Outcome: Not Progressing  Goal: Maintains adequate nutritional intake  3/17/2025 1040 by Sukhi Zuñiga RN  Outcome: Progressing  3/17/2025 0607 by Allan Torres RN  Outcome: Not Progressing     Problem: Genitourinary - Adult  Goal: Absence of urinary retention  3/17/2025 1040 by Sukhi Zuñiga RN  Outcome: Progressing  3/17/2025 0607 by Allan Torres RN  Outcome: Not Progressing     Problem: Infection - Adult  Goal: Absence of infection at discharge  3/17/2025 1040 by Sukhi Zuñiga RN  Outcome: Progressing  3/17/2025 0607 by Allan Torres RN  Outcome: Not Progressing  Goal: Absence of infection during hospitalization  3/17/2025 1040 by Nuskievicz, Sukhi, RN  Outcome: Progressing  3/17/2025 0607 by Allan Torres RN  Outcome: Not Progressing

## 2025-03-17 NOTE — PROGRESS NOTES
[facility]  Nephrology Attending Progress Note  Alexandru Owen MD        SUBJECTIVE:     HPI:   3/9:  79 yrs old HF , Known h/o prior CVA s/p intracranial thrombectomy 2023 December .      Came in with above mentioned issues , noted to be in Rapid A fib , hypotensive , lethargic , already known to   Cardiology team on enteresto , eliquis , toprol , amiodarone .      Patient remained in rapid a fib , hypotensive , was diuresed , developed ANTONIO , Acidosis , , Lactic acidosis , shock liver   Coagulopathy , her LV function detoriorated to 10-15% due to a fib related Cardiomyopathy , possible myocardial injury ( high troponin )     Her HR now is better , she appears hemodynamically stable , alert and responsive in no distress       SUBJECTIVE:    3/10: pt seen and examined in the icu, chart reviewed. On cvvh, dw icu nurse, tolerating -50 ml/hr, in bipap. Lethargic, mumbles that she is ok    3/11: pt seen and examined. On cvvh, intubated. On epi and levo, dw icu nurse, keeping even with cvvh at this time    3/12: pt seen in icu, intubated on cvvh. Levo, running even. Dw icu nurse, pt lethargic and not answering questions    3/13: pt seen in icu, remains on cvvh, lethargic, not conversant, on tube feeds    3/14: pt seen in icu, on cvvh. For mri, not conversant, on levo    3/15: pt seen in icu, getting mri today, not conversant, on cvvh, on tube feeds, kurtis icu nurse, will stop cvvh, on low dose levo    3/16: pt seen in icu, intubated, off cvvh since yesterday, on tube feeds    3/17: pt seen in icu, remains intubated, for hd today. On tube feeds, on levo    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)    Occipital stroke (HCC)    Atrial flutter (HCC)    Paroxysmal atrial fibrillation (HCC)    H/O: stroke    Atrial flutter with rapid ventricular response (HCC)    Decompensated heart failure (HCC)    Chronic atrial fibrillation (HCC)    VHD  (valvular heart disease)        PAST MEDICAL HISTORY:    Past Medical History:   Diagnosis Date    A-fib (HCC)     HFrEF (heart failure with reduced ejection fraction) (HCC)     HLD (hyperlipidemia)     HTN (hypertension)     Interatrial cardiac shunt     on echo 2023    L MCA stroke 2023    s/p thrombectomy       DIET:    ADULT TUBE FEEDING; Nasogastric; Renal Formula; Continuous; 10; Yes; 10; Q 4 hours; 40; 30; Q 4 hours     PHYSICAL EXAM:     Patient Vitals for the past 24 hrs:   Temp Temp src Pulse Resp SpO2 Weight   03/17/25 0845 -- -- 58 15 -- --   03/17/25 0700 -- -- 51 12 100 % --   03/17/25 0600 -- -- 54 13 99 % --   03/17/25 0548 -- -- 51 12 100 % --   03/17/25 0541 -- -- 53 12 -- --   03/17/25 0501 -- -- -- -- -- 64.6 kg (142 lb 6.7 oz)   03/17/25 0500 -- -- 56 13 100 % --   03/17/25 0400 97.4 °F (36.3 °C) Skin 54 21 100 % --   03/17/25 0300 -- -- 51 22 100 % --   03/17/25 0200 -- -- 51 18 100 % --   03/17/25 0100 -- -- 51 13 100 % --   03/17/25 0029 -- -- (!) 48 21 100 % --   03/17/25 0019 -- -- (!) 132 12 98 % --   03/17/25 0014 -- -- (!) 134 12 99 % --   03/17/25 0010 -- -- (!) 132 12 99 % --   03/17/25 0000 97.8 °F (36.6 °C) Axillary (!) 108 12 100 % --   03/16/25 2355 -- -- (!) 103 12 100 % --   03/16/25 2350 -- -- (!) 137 12 99 % --   03/16/25 2338 -- -- (!) 135 12 99 % --   03/16/25 2309 -- -- (!) 130 12 99 % --   03/16/25 2300 -- -- (!) 128 12 100 % --   03/16/25 2257 -- -- (!) 127 12 99 % --   03/16/25 2200 -- -- 65 19 100 % --   03/16/25 2100 -- -- 63 15 100 % --   03/16/25 2021 -- -- 69 14 100 % --   03/16/25 2007 -- -- 52 -- -- --   03/16/25 2000 97.3 °F (36.3 °C) Axillary 62 19 95 % --   03/16/25 1900 -- -- 61 15 96 % --   03/16/25 1800 -- -- 63 28 96 % --   03/16/25 1713 -- -- 83 23 92 % --   03/16/25 1710 -- -- 50 17 96 % --   03/16/25 1707 -- -- 57 17 97 % --   03/16/25 1702 -- -- 58 21 96 % --   03/16/25 1700 -- -- 61 16 97 % --   03/16/25 1655 -- -- 55 18 97 % --   03/16/25 1628 -- -- 62

## 2025-03-17 NOTE — PLAN OF CARE
Problem: Chronic Conditions and Co-morbidities  Goal: Patient's chronic conditions and co-morbidity symptoms are monitored and maintained or improved  Outcome: Not Progressing     Problem: Discharge Planning  Goal: Discharge to home or other facility with appropriate resources  Outcome: Not Progressing     Problem: Nutrition Deficit:  Goal: Optimize nutritional status  Outcome: Not Progressing     Problem: Metabolic/Fluid and Electrolytes - Adult  Goal: Electrolytes maintained within normal limits  Outcome: Not Progressing  Goal: Hemodynamic stability and optimal renal function maintained  Outcome: Not Progressing     Problem: Neurosensory - Adult  Goal: Achieves stable or improved neurological status  Outcome: Not Progressing  Goal: Achieves maximal functionality and self care  Outcome: Not Progressing     Problem: Respiratory - Adult  Goal: Achieves optimal ventilation and oxygenation  Outcome: Not Progressing     Problem: Cardiovascular - Adult  Goal: Maintains optimal cardiac output and hemodynamic stability  Outcome: Not Progressing  Goal: Absence of cardiac dysrhythmias or at baseline  Outcome: Not Progressing     Problem: Musculoskeletal - Adult  Goal: Return mobility to safest level of function  Outcome: Not Progressing  Goal: Return ADL status to a safe level of function  Outcome: Not Progressing     Problem: Gastrointestinal - Adult  Goal: Maintains or returns to baseline bowel function  Outcome: Not Progressing  Goal: Maintains adequate nutritional intake  Outcome: Not Progressing     Problem: Genitourinary - Adult  Goal: Absence of urinary retention  Outcome: Not Progressing     Problem: Infection - Adult  Goal: Absence of infection at discharge  Outcome: Not Progressing  Goal: Absence of infection during hospitalization  Outcome: Not Progressing

## 2025-03-17 NOTE — PROGRESS NOTES
Spoke to DEBRA Orosco, regarding ordered Tunneled HD.  Notified that d/t STAT/Emergent cases in Angio department, patient cannot be done today.   Requested to keep pt NPO after midnight and to verify with ordering provider that it is ok to continue to hold all blood thinning medications.  Angio staff will call in am to proceed with procedure.

## 2025-03-17 NOTE — PROGRESS NOTES
INPATIENT CARDIOLOGY FOLLOW-UP    Name: Nikki Moffett    Age: 79 y.o.    Date of Admission: 3/4/2025  5:05 PM    Date of Service: 3/17/2025      Chief Complaint: Follow-up for atrial fibrillation with RVR, worsening decompensated heart failure    Interim History:  No new overnight cardiac complaints. Currently with no complaints of CP, SOB, palpitations, dizziness, or lightheadedness. SR on telemetry.  No recurrent arrhythmia seen.  Intubated.     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)    Occipital stroke (HCC)    Atrial flutter (HCC)    Paroxysmal atrial fibrillation (HCC)    H/O: stroke    Atrial flutter with rapid ventricular response (HCC)    Decompensated heart failure (HCC)    Chronic atrial fibrillation (HCC)    VHD (valvular heart disease)       Current Medications:    Current Facility-Administered Medications:     chlorhexidine (PERIDEX) 0.12 % solution 15 mL, 15 mL, Mouth/Throat, BID, Sandra Del Rosario MD, 15 mL at 03/16/25 1948    fentaNYL (SUBLIMAZE) 1,000 mcg in sodium chloride 0.9% 100 mL infusion,  mcg/hr, IntraVENous, Continuous, Sandra Del Rosario MD, Last Rate: 7.5 mL/hr at 03/17/25 0541, 75 mcg/hr at 03/17/25 0541    pantoprazole (PROTONIX) 40 mg in sodium chloride (PF) 0.9 % 10 mL injection, 40 mg, IntraVENous, Daily, Sandra Del Rosario MD, 40 mg at 03/16/25 0844    levETIRAcetam (KEPPRA) injection 500 mg, 500 mg, IntraVENous, BID, Kyaw Au DO, 500 mg at 03/16/25 2102    [Held by provider] lactulose (CHRONULAC) 10 GM/15ML solution 20 g, 20 g, Oral, TID, Kilo Dumont MD, 20 g at 03/15/25 2148    piperacillin-tazobactam (ZOSYN) 4,500 mg in sodium chloride 0.9 % 100 mL IVPB ([id number]), 4,500 mg, IntraVENous, Q8H, Kilo Dumonte, MD, Stopped at 03/17/25 0419    sulfur hexafluoride  RVSP. The estimated RVSP is 25 mmHg.    Left Atrium: Left atrium is severely dilated.    Right Atrium: Right atrium is mildly dilated.    Pericardium: No pericardial effusion.    Image quality is suboptimal. Contrast used: Lumason.    LTD TTE 3/9/25:    Limited study to assess biventricular function    Left Ventricle: Severely reduced left ventricular systolic function with a visually estimated EF of 10 -15%.    Right Ventricle: Mildly reduced systolic function.    Mitral Valve: Mild annular calcification.    Tricuspid Valve: Moderate to severe regurgitation.    Image quality is adequate.     Assessment:  Acute HFrEF  Decompensated heart failure  History of heart failure with improved ejection fraction  Continue LV dysfunction drop in EF to 10 to 15% secondary to tachycardia induced cardiomyopathy versus underlying ischemic etiology.  Persistent atrial fibrillation and atypical flutter status post cardioversion on 3/7/2025 and was successfully converted to normal sinus rhythm  CHADS2 Vasc score-5 on Eliquis for anticoagulation  Status post CVA  Hyperlipidemia on Lipitor  CKD with ANTONIO  Intermittent hypotension  Hypothyroidism HRT        Plan:   Tenuous hemodynamics.  GDMT obviously being held currently.  On adjusted dose Eliquis  On Florinef 0.25 mg for total of 7 doses.  On midodrine now at 15 3 times daily  Lipitor and amiodarone being appropriately held currently  Echo results were reviewed, as above EF significantly dropped to 10 to 15%.  Will need ischemic evaluation at some point with resolution of acute issues  Guarded prognosis with evidence of significant multiorgan dysfunction     Greater than 50 minutes was spent counseling the patient, reviewing the rationale for the above recommendations and reviewing the patient's current medication list, problem list and results of all previously ordered testing.      Niko Alexandre MD, [facility] Cardiology    NOTE: This report was transcribed using voice  recognition software. Every effort was made to ensure accuracy; however, inadvertent computerized transcription errors may be present.

## 2025-03-17 NOTE — PROGRESS NOTES
Palliative Care Department  [phone]  Palliative Care Progress Note  Provider Mickie Lozano, APRN - CNP      PATIENT: Nikki Moffett  : 1945  MRN: [id number]  ADMISSION DATE: 3/4/2025  5:05 PM  Referring Provider: Ben Dumont. MD Lucrecia     Palliative Medicine was consulted on hospital day 13 for assistance with Goals of care    HPI:     Clinical Summary:Nikki Moffett is a 79 y.o. y/o female with a history of left MCA infarct SP thrombectomy, A-fib, diabetes, heart failure, who presented to [facility] on 3/4/2025 with fatigue and dizziness, ongoing for a month.  At ED, found tachycardic and dry on exam.  Chest x-ray shows bilateral pleural effusion and possible right lower lobe opacity, was treated initially with Zosyn and doxycycline and admitted for further medical evaluation.  Was seen by cardiology underwent cardioversion, became dyspneic and  hypotension postprocedure, was transferred to MICU for close monitoring.  Seen by cardiology SP TTE, on 3/7/2025, with EF 10 to 15%, mild mitral valve regurgitation, moderate transcript about regurgitation.    ASSESSMENT/PLAN:     Pertinent Hospital Diagnoses     Altered mental status  A-fib with RVR  Bilateral pleural effusion  Acute on chronic CHF  Anuric  ANTONIO on CKD on CVVH  History of left MCA stroke with M2 occlusion s/p thrombectomy      Palliative Care Encounter / Counseling Regarding Goals of Care  Please see detailed goals of care discussion as below  At this time, Nikki Moffett, Does Not have capacity for medical decision-making.  Capacity is time limited and situation/question specific  During encounter Shreyas was surrogate medical decision-maker  Outcome of goals of care meeting:  Continue with current medical treatment  No family present for meeting today. We will follow up again tomorrow.   Code status Full Code  Advanced Directives: no POA or living will in [facility]  Surrogate/Legal NOK:  Shreyas Moffett (Son) [phone]    Spiritual

## 2025-03-17 NOTE — PRE-PROCEDURE INSTRUCTIONS
Arterial line placement    Procedure: Right Radial arterial line placement.     Indications: Continuous monitoring of blood pressure in a patient with hypotension +/- shock, on Levophed.     Anesthesia: Local infiltration of 1% lidocaine.     Consent:  The family members were counseled regarding the procedure, its indications, risks, potential complications and alternatives, and any questions were answered. Consent was obtained to proceed.    Technique: Time Out: Immediately prior to the procedure a \"timeout\" was called to verify the correct patient and procedure. Procedure was done using strict aseptic technique. Rei's test was performed and was normal. Right Radial site was cleaned with chloraprep and draped. Right Radial was identified, then Lidocaine 1% was infiltrated locally.  Arterial line was inserted, a good blood flow was obtained, after which guidewire was inserted all the way with no resistance. Then the canula was inserted and needle with guidewire was withdrawn. Pulsatile bright red blood flow was observed. The canula was connected to BP monitoring apparatus and a good quality waveform was noted. Then the canula was secured with 2 stay sutures of 2-0 silk after Lidocaine infiltration, following which dressing was applied.     Number of sticks: 1.     Number of Kits used: 1    Complications: No immediate complication.      Estimated blood loss: About 1 ml.     Comment: Patient tolerated the procedure well.     Rosetta Lopez MD PGY-2  3/17/2025 2:33 PM    I was present to provide direct supervision for the above services.    Kyaw Jimenez MD  3/17/2025 6:00 PM

## 2025-03-17 NOTE — PROCEDURES
PROCEDURE NOTE  Date: 3/17/2025   Name: Nikki Moffett  YOB: 1945    Procedures: Tunneled HD  Discussed patient and IR procedure with bedside RN, all questions answered. Tube feed turned off at 0815, needs to be off for atleast 6 hours. Eliquis needs to be held per ordering physician. Will call if able to send for patient, patient is an add on case.

## 2025-03-17 NOTE — CARE COORDINATION
Care Coordination: LOS 13 day. Intubated 3/16/25, Levophed,Fentanyl, Zosyn,NGT/TF. Plan for Tunneled HD cath in IR. Referral sent to [facility]. Select following, needs unclear, no family currently at bedside    Electronically signed by Cristela Bradford RN on 3/17/2025 at 12:26 PM

## 2025-03-18 ENCOUNTER — APPOINTMENT (OUTPATIENT)
Dept: GENERAL RADIOLOGY | Age: 80
DRG: 004 | End: 2025-03-18
Payer: MEDICARE

## 2025-03-18 ENCOUNTER — APPOINTMENT (OUTPATIENT)
Dept: NEUROLOGY | Age: 80
DRG: 004 | End: 2025-03-18
Payer: MEDICARE

## 2025-03-18 PROBLEM — G93.41 METABOLIC ENCEPHALOPATHY: Status: ACTIVE | Noted: 2025-03-18

## 2025-03-18 PROBLEM — Z51.5 PALLIATIVE CARE ENCOUNTER: Status: ACTIVE | Noted: 2025-03-18

## 2025-03-18 LAB
AADO2: 124.1 MMHG
ALBUMIN SERPL-MCNC: 3.2 G/DL (ref 3.5–5.2)
ALP SERPL-CCNC: 122 U/L (ref 35–104)
ALT SERPL-CCNC: 232 U/L (ref 0–32)
ANA SER QL IA: NEGATIVE
ANION GAP SERPL CALCULATED.3IONS-SCNC: 17 MMOL/L (ref 7–16)
AST SERPL-CCNC: 59 U/L (ref 0–31)
B.E.: -2.8 MMOL/L (ref -3–3)
BASOPHILS # BLD: 0.03 K/UL (ref 0–0.2)
BASOPHILS NFR BLD: 0 % (ref 0–2)
BILIRUB DIRECT SERPL-MCNC: 0.9 MG/DL (ref 0–0.3)
BILIRUB INDIRECT SERPL-MCNC: 0.7 MG/DL (ref 0–1)
BILIRUB SERPL-MCNC: 1.6 MG/DL (ref 0–1.2)
BUN SERPL-MCNC: 24 MG/DL (ref 6–23)
CALCIUM SERPL-MCNC: 8.2 MG/DL (ref 8.6–10.2)
CHLORIDE SERPL-SCNC: 101 MMOL/L (ref 98–107)
CO2 SERPL-SCNC: 21 MMOL/L (ref 22–29)
COHB: 0.7 % (ref 0–1.5)
CREAT SERPL-MCNC: 1.9 MG/DL (ref 0.5–1)
CRITICAL: ABNORMAL
DATE ANALYZED: ABNORMAL
DATE OF COLLECTION: ABNORMAL
EOSINOPHIL # BLD: 0 K/UL (ref 0.05–0.5)
EOSINOPHILS RELATIVE PERCENT: 0 % (ref 0–6)
ERYTHROCYTE [DISTWIDTH] IN BLOOD BY AUTOMATED COUNT: 18 % (ref 11.5–15)
FIO2: 40 %
GFR, ESTIMATED: 27 ML/MIN/1.73M2
GLUCOSE BLD-MCNC: 117 MG/DL (ref 74–99)
GLUCOSE BLD-MCNC: 119 MG/DL (ref 74–99)
GLUCOSE BLD-MCNC: 126 MG/DL (ref 74–99)
GLUCOSE BLD-MCNC: 127 MG/DL (ref 74–99)
GLUCOSE SERPL-MCNC: 114 MG/DL (ref 74–99)
HCO3: 21.8 MMOL/L (ref 22–26)
HCT VFR BLD AUTO: 24.9 % (ref 34–48)
HGB BLD-MCNC: 7.7 G/DL (ref 11.5–15.5)
HHB: 1.7 % (ref 0–5)
IMM GRANULOCYTES # BLD AUTO: 0.41 K/UL (ref 0–0.58)
IMM GRANULOCYTES NFR BLD: 2 % (ref 0–5)
LAB: ABNORMAL
LYMPHOCYTES NFR BLD: 0.83 K/UL (ref 1.5–4)
LYMPHOCYTES RELATIVE PERCENT: 4 % (ref 20–42)
Lab: 414
MAGNESIUM SERPL-MCNC: 1.9 MG/DL (ref 1.6–2.6)
MCH RBC QN AUTO: 26.8 PG (ref 26–35)
MCHC RBC AUTO-ENTMCNC: 30.9 G/DL (ref 32–34.5)
MCV RBC AUTO: 86.8 FL (ref 80–99.9)
METHB: 0.6 % (ref 0–1.5)
MODE: AC
MONOCYTES NFR BLD: 1.18 K/UL (ref 0.1–0.95)
MONOCYTES NFR BLD: 5 % (ref 2–12)
NEUTROPHILS NFR BLD: 89 % (ref 43–80)
NEUTS SEG NFR BLD: 19.5 K/UL (ref 1.8–7.3)
O2 SATURATION: 98.3 % (ref 92–98.5)
O2HB: 97 % (ref 94–97)
OPERATOR ID: ABNORMAL
PATIENT TEMP: 37 C
PCO2: 36.9 MMHG (ref 35–45)
PEEP/CPAP: 5 CMH2O
PFO2: 2.97 MMHG/%
PH BLOOD GAS: 7.39 (ref 7.35–7.45)
PHOSPHATE SERPL-MCNC: 2.6 MG/DL (ref 2.5–4.5)
PLATELET # BLD AUTO: 138 K/UL (ref 130–450)
PMV BLD AUTO: 11.4 FL (ref 7–12)
PO2: 118.7 MMHG (ref 75–100)
POTASSIUM SERPL-SCNC: 3.2 MMOL/L (ref 3.5–5)
PROT SERPL-MCNC: 5.3 G/DL (ref 6.4–8.3)
RBC # BLD AUTO: 2.87 M/UL (ref 3.5–5.5)
RI(T): 1.05
RR MECHANICAL: 12 B/MIN
SMA IGG SER-ACNC: NEGATIVE
SODIUM SERPL-SCNC: 139 MMOL/L (ref 132–146)
SOURCE, BLOOD GAS: ABNORMAL
THB: 8.5 G/DL (ref 11.5–16.5)
TIME ANALYZED: 421
VT MECHANICAL: 325 ML
WBC OTHER # BLD: 22 K/UL (ref 4.5–11.5)

## 2025-03-18 PROCEDURE — 82962 GLUCOSE BLOOD TEST: CPT

## 2025-03-18 PROCEDURE — 6370000000 HC RX 637 (ALT 250 FOR IP)

## 2025-03-18 PROCEDURE — 71045 X-RAY EXAM CHEST 1 VIEW: CPT

## 2025-03-18 PROCEDURE — 95822 EEG COMA OR SLEEP ONLY: CPT | Performed by: PSYCHIATRY & NEUROLOGY

## 2025-03-18 PROCEDURE — 82248 BILIRUBIN DIRECT: CPT

## 2025-03-18 PROCEDURE — 6360000002 HC RX W HCPCS

## 2025-03-18 PROCEDURE — 4A10X4Z MONITORING OF CENTRAL NERVOUS ELECTRICAL ACTIVITY, EXTERNAL APPROACH: ICD-10-PCS | Performed by: INTERNAL MEDICINE

## 2025-03-18 PROCEDURE — 6370000000 HC RX 637 (ALT 250 FOR IP): Performed by: INTERNAL MEDICINE

## 2025-03-18 PROCEDURE — 6360000002 HC RX W HCPCS: Performed by: PSYCHIATRY & NEUROLOGY

## 2025-03-18 PROCEDURE — 2580000003 HC RX 258

## 2025-03-18 PROCEDURE — 2500000003 HC RX 250 WO HCPCS: Performed by: INTERNAL MEDICINE

## 2025-03-18 PROCEDURE — 85025 COMPLETE CBC W/AUTO DIFF WBC: CPT

## 2025-03-18 PROCEDURE — 83735 ASSAY OF MAGNESIUM: CPT

## 2025-03-18 PROCEDURE — 99233 SBSQ HOSP IP/OBS HIGH 50: CPT | Performed by: INTERNAL MEDICINE

## 2025-03-18 PROCEDURE — 94003 VENT MGMT INPAT SUBQ DAY: CPT

## 2025-03-18 PROCEDURE — 95822 EEG COMA OR SLEEP ONLY: CPT

## 2025-03-18 PROCEDURE — 99232 SBSQ HOSP IP/OBS MODERATE 35: CPT | Performed by: INTERNAL MEDICINE

## 2025-03-18 PROCEDURE — 94640 AIRWAY INHALATION TREATMENT: CPT

## 2025-03-18 PROCEDURE — 2000000000 HC ICU R&B

## 2025-03-18 PROCEDURE — 80053 COMPREHEN METABOLIC PANEL: CPT

## 2025-03-18 PROCEDURE — 99231 SBSQ HOSP IP/OBS SF/LOW 25: CPT | Performed by: NURSE PRACTITIONER

## 2025-03-18 PROCEDURE — 84100 ASSAY OF PHOSPHORUS: CPT

## 2025-03-18 PROCEDURE — 99232 SBSQ HOSP IP/OBS MODERATE 35: CPT | Performed by: PHYSICIAN ASSISTANT

## 2025-03-18 PROCEDURE — 82805 BLOOD GASES W/O2 SATURATION: CPT

## 2025-03-18 RX ORDER — LANOLIN ALCOHOL/MO/W.PET/CERES
400 CREAM (GRAM) TOPICAL ONCE
Status: COMPLETED | OUTPATIENT
Start: 2025-03-18 | End: 2025-03-18

## 2025-03-18 RX ADMIN — SODIUM CHLORIDE, PRESERVATIVE FREE 10 ML: 5 INJECTION INTRAVENOUS at 08:18

## 2025-03-18 RX ADMIN — MIDODRINE HYDROCHLORIDE 15 MG: 10 TABLET ORAL at 11:24

## 2025-03-18 RX ADMIN — MIDODRINE HYDROCHLORIDE 15 MG: 10 TABLET ORAL at 16:30

## 2025-03-18 RX ADMIN — HYDROCORTISONE SODIUM SUCCINATE 100 MG: 100 INJECTION, POWDER, FOR SOLUTION INTRAMUSCULAR; INTRAVENOUS at 11:12

## 2025-03-18 RX ADMIN — IPRATROPIUM BROMIDE AND ALBUTEROL SULFATE 1 DOSE: 2.5; .5 SOLUTION RESPIRATORY (INHALATION) at 07:45

## 2025-03-18 RX ADMIN — CHLORHEXIDINE GLUCONATE, 0.12% ORAL RINSE 15 ML: 1.2 SOLUTION DENTAL at 21:12

## 2025-03-18 RX ADMIN — Medication 400 MG: at 08:15

## 2025-03-18 RX ADMIN — HYDROCORTISONE SODIUM SUCCINATE 100 MG: 100 INJECTION, POWDER, FOR SOLUTION INTRAMUSCULAR; INTRAVENOUS at 04:18

## 2025-03-18 RX ADMIN — APIXABAN 2.5 MG: 2.5 TABLET, FILM COATED ORAL at 21:12

## 2025-03-18 RX ADMIN — IPRATROPIUM BROMIDE AND ALBUTEROL SULFATE 1 DOSE: 2.5; .5 SOLUTION RESPIRATORY (INHALATION) at 10:58

## 2025-03-18 RX ADMIN — PIPERACILLIN AND TAZOBACTAM 4500 MG: 4; .5 INJECTION, POWDER, FOR SOLUTION INTRAVENOUS at 16:31

## 2025-03-18 RX ADMIN — Medication 1000 UNITS: at 08:15

## 2025-03-18 RX ADMIN — SODIUM CHLORIDE, PRESERVATIVE FREE 10 ML: 5 INJECTION INTRAVENOUS at 21:12

## 2025-03-18 RX ADMIN — CHLORHEXIDINE GLUCONATE, 0.12% ORAL RINSE 15 ML: 1.2 SOLUTION DENTAL at 08:17

## 2025-03-18 RX ADMIN — POTASSIUM BICARBONATE 40 MEQ: 782 TABLET, EFFERVESCENT ORAL at 08:16

## 2025-03-18 RX ADMIN — PIPERACILLIN AND TAZOBACTAM 4500 MG: 4; .5 INJECTION, POWDER, FOR SOLUTION INTRAVENOUS at 08:27

## 2025-03-18 RX ADMIN — LEVETIRACETAM 500 MG: 100 INJECTION INTRAVENOUS at 08:17

## 2025-03-18 RX ADMIN — IPRATROPIUM BROMIDE AND ALBUTEROL SULFATE 1 DOSE: 2.5; .5 SOLUTION RESPIRATORY (INHALATION) at 15:04

## 2025-03-18 RX ADMIN — LEVETIRACETAM 500 MG: 100 INJECTION INTRAVENOUS at 21:15

## 2025-03-18 RX ADMIN — ARFORMOTEROL TARTRATE 15 MCG: 15 SOLUTION RESPIRATORY (INHALATION) at 19:08

## 2025-03-18 RX ADMIN — MIDODRINE HYDROCHLORIDE 15 MG: 10 TABLET ORAL at 08:16

## 2025-03-18 RX ADMIN — IPRATROPIUM BROMIDE AND ALBUTEROL SULFATE 1 DOSE: 2.5; .5 SOLUTION RESPIRATORY (INHALATION) at 19:08

## 2025-03-18 RX ADMIN — PIPERACILLIN AND TAZOBACTAM 4500 MG: 4; .5 INJECTION, POWDER, FOR SOLUTION INTRAVENOUS at 01:26

## 2025-03-18 RX ADMIN — FLUDROCORTISONE ACETATE 0.05 MG: 0.1 TABLET ORAL at 08:15

## 2025-03-18 RX ADMIN — BUDESONIDE INHALATION 500 MCG: 0.5 SUSPENSION RESPIRATORY (INHALATION) at 07:45

## 2025-03-18 RX ADMIN — BUDESONIDE INHALATION 500 MCG: 0.5 SUSPENSION RESPIRATORY (INHALATION) at 19:08

## 2025-03-18 RX ADMIN — PANTOPRAZOLE SODIUM 40 MG: 40 INJECTION, POWDER, FOR SOLUTION INTRAVENOUS at 08:17

## 2025-03-18 RX ADMIN — HYDROCORTISONE SODIUM SUCCINATE 100 MG: 100 INJECTION, POWDER, FOR SOLUTION INTRAMUSCULAR; INTRAVENOUS at 19:57

## 2025-03-18 RX ADMIN — ARFORMOTEROL TARTRATE 15 MCG: 15 SOLUTION RESPIRATORY (INHALATION) at 07:45

## 2025-03-18 ASSESSMENT — PULMONARY FUNCTION TESTS
PIF_VALUE: 25
PIF_VALUE: 26
PIF_VALUE: 25
PIF_VALUE: 23
PIF_VALUE: 25
PIF_VALUE: 24
PIF_VALUE: 25
PIF_VALUE: 25
PIF_VALUE: 24
PIF_VALUE: 25
PIF_VALUE: 24
PIF_VALUE: 26
PIF_VALUE: 24
PIF_VALUE: 24
PIF_VALUE: 23
PIF_VALUE: 25
PIF_VALUE: 24
PIF_VALUE: 24
PIF_VALUE: 23
PIF_VALUE: 21
PIF_VALUE: 24
PIF_VALUE: 24
PIF_VALUE: 25
PIF_VALUE: 27
PIF_VALUE: 25
PIF_VALUE: 26
PIF_VALUE: 24
PIF_VALUE: 25
PIF_VALUE: 26
PIF_VALUE: 24
PIF_VALUE: 24
PIF_VALUE: 23
PIF_VALUE: 25
PIF_VALUE: 25

## 2025-03-18 ASSESSMENT — PAIN SCALES - GENERAL
PAINLEVEL_OUTOF10: 0

## 2025-03-18 NOTE — PROGRESS NOTES
Palliative Care Department  [phone]  Palliative Care Progress Note  Provider Mickie Lozano, APRN - CNP      PATIENT: Nikki Moffett  : 1945  MRN: [id number]  ADMISSION DATE: 3/4/2025  5:05 PM  Referring Provider: Ben Dumont. MD Lucrecia     Palliative Medicine was consulted on hospital day 14 for assistance with Goals of care    HPI:     Clinical Summary:Nikki Moffett is a 79 y.o. y/o female with a history of left MCA infarct SP thrombectomy, A-fib, diabetes, heart failure, who presented to [facility] on 3/4/2025 with fatigue and dizziness, ongoing for a month.  At ED, found tachycardic and dry on exam.  Chest x-ray shows bilateral pleural effusion and possible right lower lobe opacity, was treated initially with Zosyn and doxycycline and admitted for further medical evaluation.  Was seen by cardiology underwent cardioversion, became dyspneic and  hypotension postprocedure, was transferred to MICU for close monitoring.  Seen by cardiology SP TTE, on 3/7/2025, with EF 10 to 15%, mild mitral valve regurgitation, moderate transcript about regurgitation.    ASSESSMENT/PLAN:     Pertinent Hospital Diagnoses     Acute hypoxic respiratory failure   Acute on chronic HFrEF (EF 10-15%)   Bilateral pleural effusions   Suspected pneumonia   Acute renal failure on CKD now on HD- for tunneled catheter   Shock liver   History of left MCA stroke with M2 occlusion s/p thrombectomy      Palliative Care Encounter / Counseling Regarding Goals of Care  Please see detailed goals of care discussion as below  At this time, Nikki Moffett, Does Not have capacity for medical decision-making.  Capacity is time limited and situation/question specific  During encounter Shreyas was surrogate medical decision-maker  Outcome of goals of care meeting:  Continue with current medical treatment  Again missed patient's son who was visiting.  Code status Full Code  Advanced Directives: no POA or living will in epic  Surrogate/Legal  in the care of Nikki Moffett.    Note: This report was completed using computerQosmos voiced recognition software.  Every effort has been made to ensure accuracy; however, inadvertent computerized transcription errors may be present.

## 2025-03-18 NOTE — PROGRESS NOTES
Spoke to patients RN regarding ordered Tunneled HD Catheter.  Notified that d/t STAT/Emergent cases in Angio department, patient cannot be done today.   Requested to keep pt NPO after midnight and to verify with ordering provider that it is ok to continue to hold all blood thinning medications.  Angio staff will call in am to proceed with procedure.

## 2025-03-18 NOTE — PROGRESS NOTES
[facility]  Neuro Inpatient Follow Up        Nikki Moffett is a 79 y.o. female     Neuro is following for stroke    Significant PMH: Previous left MCA stroke SP thrombectomy, A-fib, prediabetes, heart failure    HPI:  She initially presented on 3/4 with an altered mental status and chest pain in the setting of shock, respiratory failure requiring intubation, influenza, and ANTONIO--requiring CRRT until 3/15.  Ultrasound of the abdomen did show possible early cirrhosis    On 3/15 she was noted to have a dilated, but sluggishly reactive pupil on the right and mental status persistently altered    MRI of the brain showed acute left occipital stroke--not felt to be the cause of her pupillary abnormality--as well as evidence of old stroke in her left occipital region as well as old stroke in her left MCA stroke from 2023, for which she received mechanical thrombectomy.    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  These can sometimes be implicated in embolic appearing strokes.    She is on Eliquis     3/15: EEG ordered to rule out seizures  Placed on empiric Keppra    Subjective:  She remains in ICU.  She is intubated and on fentanyl, Levophed. EEG attempted yesterday, but patient was starting dialysis. She will open her eyes to voice but follows no commands for me or staff.  There is no family present    Unable to complete ROS due to her mental status    Current Facility-Administered Medications   Medication Dose Route Frequency Provider Last Rate Last Admin    fludrocortisone (FLORINEF) tablet 0.05 mg  0.05 mg Per NG tube Daily Rosetta Lopez MD   0.05 mg at 03/18/25 0815    midodrine (PROAMATINE) tablet 15 mg  15 mg Per NG tube TID  Rosetta Lopez MD   15 mg at 03/18/25 0816    chlorhexidine (PERIDEX) 0.12 % solution 15 mL  15 mL Mouth/Throat BID Sandra Del Rosario MD   15 mL at 03/18/25 0817    fentaNYL (SUBLIMAZE) 1,000 mcg in  LDL already at baseline    Neuro team will follow up tomorrow    DICKSON Bill  8:21 AM  3/18/2025

## 2025-03-18 NOTE — PLAN OF CARE
Problem: Chronic Conditions and Co-morbidities  Goal: Patient's chronic conditions and co-morbidity symptoms are monitored and maintained or improved  Outcome: Progressing     Problem: Discharge Planning  Goal: Discharge to home or other facility with appropriate resources  Outcome: Progressing     Problem: Safety - Adult  Goal: Free from fall injury  Outcome: Progressing     Problem: Skin/Tissue Integrity  Goal: Skin integrity remains intact  Description: 1.  Monitor for areas of redness and/or skin breakdown  2.  Assess vascular access sites hourly  3.  Every 4-6 hours minimum:  Change oxygen saturation probe site  4.  Every 4-6 hours:  If on nasal continuous positive airway pressure, respiratory therapy assess nares and determine need for appliance change or resting period  Outcome: Progressing     Problem: Nutrition Deficit:  Goal: Optimize nutritional status  Outcome: Progressing     Problem: ABCDS Injury Assessment  Goal: Absence of physical injury  Outcome: Progressing     Problem: Metabolic/Fluid and Electrolytes - Adult  Goal: Electrolytes maintained within normal limits  Outcome: Progressing  Goal: Hemodynamic stability and optimal renal function maintained  Outcome: Progressing  Goal: Glucose maintained within prescribed range  Outcome: Progressing     Problem: Hematologic - Adult  Goal: Maintains hematologic stability  Outcome: Progressing     Problem: Neurosensory - Adult  Goal: Achieves stable or improved neurological status  Outcome: Progressing  Goal: Achieves maximal functionality and self care  Outcome: Progressing     Problem: Respiratory - Adult  Goal: Achieves optimal ventilation and oxygenation  Outcome: Progressing     Problem: Cardiovascular - Adult  Goal: Maintains optimal cardiac output and hemodynamic stability  Outcome: Progressing  Goal: Absence of cardiac dysrhythmias or at baseline  Outcome: Progressing     Problem: Musculoskeletal - Adult  Goal: Return mobility to safest level of  function  Outcome: Progressing  Goal: Return ADL status to a safe level of function  Outcome: Progressing     Problem: Gastrointestinal - Adult  Goal: Maintains or returns to baseline bowel function  Outcome: Progressing  Goal: Maintains adequate nutritional intake  Outcome: Progressing     Problem: Genitourinary - Adult  Goal: Absence of urinary retention  Outcome: Progressing     Problem: Infection - Adult  Goal: Absence of infection at discharge  Outcome: Progressing  Goal: Absence of infection during hospitalization  Outcome: Progressing     Problem: Pain  Goal: Verbalizes/displays adequate comfort level or baseline comfort level  Outcome: Progressing

## 2025-03-18 NOTE — PROGRESS NOTES
DAILY VENTILATOR WEANING ASSESSMENT PERFORMED    P/FIO2 Ratio =   297       (<100= do not Wean)                  Cs =         24                 (<32= Instability)  Plat. Pressure = 19  MV =3.28      Instabilities:       Cardiovascular =1       CNS =       Respiratory =       Metabolic =2    Parameters    no    Ask Physician for a weaning plan no    Was SAT completed no    Was SBT completed no    Additional Comments:     Performed by Galina Espinosa RCP RRT      Reference Table:    Cardiovascular     CNS      1. Mean BP less than or equal to 75   1. Neuromuscular blockade  2. Heart Rate greater than 130   2. RASS of -3, -4, -5  3. Myocardial Ischemia    3. RASS of +3, +4  4. Mechanical Assist Device    4. ICP greater than 15 or             Intracranial Hypertension         Respiratory      Metabolic  1. PEEP equal to or greater than 10cm/H20  1.Temp. (8hrs) less than 95 or > 103  2. Respiratory Rate greater than 35   2. WBC < 5000 or > 61405  3. Minute Volume greater than 15L  4. pH less than 7.30  5. Deteriorating chest X-ray

## 2025-03-18 NOTE — PROCEDURES
Failed Arterial line placement    Procedure: Left/Right radial arterial line placement.     Indications: Continuous monitoring of blood pressure in a patient with hypotension +/- shock, on Levophed.     Anesthesia: Local infiltration of 1% lidocaine.     Consent:  The family members were counseled regarding the procedure, its indications, risks, potential complications and alternatives, and any questions were answered. Consent was obtained to proceed.    Technique: Time Out: Couldn't place arterial line despite taking several attempts     Number of sticks: 2.     Number of Kits used: 2.     Complications: No immediate complication.      Estimated blood loss: About 1 ml.     Comment: Patient tolerated the procedure well.     Arlen Golden MD PGY-3  3/17/2025 9:47 PM

## 2025-03-18 NOTE — PROGRESS NOTES
INPATIENT CARDIOLOGY FOLLOW-UP    Name: Nikki Moffett    Age: 79 y.o.    Date of Admission: 3/4/2025  5:05 PM    Date of Service: 3/18/2025      Chief Complaint: Follow-up for atrial fibrillation with RVR, worsening decompensated heart failure    Interim History:  No new overnight cardiac complaints. Currently with no complaints of CP, SOB, palpitations, dizziness, or lightheadedness. SR on telemetry.  No recurrent arrhythmia seen.  Intubated.     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)    Occipital stroke (HCC)    Atrial flutter (HCC)    Paroxysmal atrial fibrillation (HCC)    H/O: stroke    Atrial flutter with rapid ventricular response (HCC)    Decompensated heart failure (HCC)    Chronic atrial fibrillation (HCC)    VHD (valvular heart disease)    ANTONIO (acute kidney injury)       Current Medications:    Current Facility-Administered Medications:     fludrocortisone (FLORINEF) tablet 0.05 mg, 0.05 mg, Per NG tube, Daily, Rosetta Lopez MD, 0.05 mg at 03/18/25 0815    midodrine (PROAMATINE) tablet 15 mg, 15 mg, Per NG tube, TID WC, Rosetta Lopez MD, 15 mg at 03/18/25 0816    chlorhexidine (PERIDEX) 0.12 % solution 15 mL, 15 mL, Mouth/Throat, BID, Sandra Del Rosario MD, 15 mL at 03/18/25 0817    fentaNYL (SUBLIMAZE) 1,000 mcg in sodium chloride 0.9% 100 mL infusion,  mcg/hr, IntraVENous, Continuous, Sandra Del Rosario MD, Stopped at 03/18/25 0822    pantoprazole (PROTONIX) 40 mg in sodium chloride (PF) 0.9 % 10 mL injection, 40 mg, IntraVENous, Daily, aSndra Del Rosario MD, 40 mg at 03/18/25 0817    levETIRAcetam (KEPPRA) injection 500 mg, 500 mg, IntraVENous, BID, Kyaw Au DO, 500 mg at 03/18/25 0817    [Held by provider] lactulose (CHRONULAC) 10 GM/15ML solution 20 g, 20 g, Oral, TID, Kilo Dumont,  stenosis. 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    Mitral Valve: Mild regurgitation.    Tricuspid Valve: Moderate regurgitation. Normal RVSP. The estimated RVSP is 25 mmHg.    Left Atrium: Left atrium is severely dilated.    Right Atrium: Right atrium is mildly dilated.    Pericardium: No pericardial effusion.    Image quality is suboptimal. Contrast used: Lumason.    LTD TTE 3/9/25:    Limited study to assess biventricular function    Left Ventricle: Severely reduced left ventricular systolic function with a visually estimated EF of 10 -15%.    Right Ventricle: Mildly reduced systolic function.    Mitral Valve: Mild annular calcification.    Tricuspid Valve: Moderate to severe regurgitation.    Image quality is adequate.     Assessment:  Acute HFrEF  Decompensated heart failure  History of heart failure with improved ejection fraction  Continue LV dysfunction drop in EF to 10 to 15% secondary to tachycardia induced cardiomyopathy versus underlying ischemic etiology.  Persistent atrial fibrillation and atypical flutter status post cardioversion on 3/7/2025 and was successfully converted to normal sinus rhythm  CHADS2 Vasc score-5 on Eliquis for anticoagulation  Status post CVA  Hyperlipidemia on Lipitor  CKD with ANTONIO  Intermittent hypotension  Hypothyroidism HRT        Plan:   Tenuous hemodynamics.  GDMT obviously being held currently.  On adjusted dose Eliquis. On low dose levophed.   On Florinef 0.25 mg for total of 7 doses.  On midodrine now at 15 3 times daily  Lipitor and amiodarone being appropriately held currently  Echo results were reviewed, as above EF significantly dropped to 10 to 15%.  Will need ischemic evaluation at some point with resolution of acute issues  Guarded prognosis with evidence of significant multiorgan dysfunction     Greater than 50 minutes was spent counseling the patient, reviewing the rationale for the above recommendations and reviewing the patient's  current medication list, problem list and results of all previously ordered testing.      Niko Alexandre MD, [facility]    NOTE: This report was transcribed using voice recognition software. Every effort was made to ensure accuracy; however, inadvertent computerized transcription errors may be present.

## 2025-03-18 NOTE — PROGRESS NOTES
[facility]   Department of Internal Medicine   Internal Medicine Residency  MICU Progress Note    Patient:  Nikki Moffett 79 y.o. female   MRN: [id number]       Date of Service: 3/18/2025    Allergy: Patient has no known allergies.    Subjective     Overnight, multiple attempts to replace arterial line unsuccessful.   Patient was seen and examined this morning at bedside in no acute distress, no family present, remains intubated and sedated on fentanyl, continues on 4 of levo. Warming blanket now on. 500cc total removed HD 3/17.     Objective     I & O - 24hr:    Intake/Output Summary (Last 24 hours) at 3/18/2025 0847  Last data filed at 3/18/2025 0823  Gross per 24 hour   Intake 921.08 ml   Output 950 ml   Net -28.92 ml       Physical Exam  Vitals: BP (!) 127/57   Pulse 71   Temp 98.1 °F (36.7 °C)   Resp 12   Ht 1.575 m (5' 2\")   Wt 65.2 kg (143 lb 11.8 oz)   SpO2 97%   BMI 26.29 kg/m²     General Appearance: intubated sedated no distress, warming blanket on   HEENT: ETT present, pupils equal and sluggish to react, responds noxious stimuli   Neck: trachea midline   Lung: mechanical breath sounds, rhonchi R>L   Heart: a fib bradycardic, capillary refill <2   Abdomen: soft, BS+   Extremities: +1 pitting edema     Medications     Continuous Infusions:   fentaNYL Stopped (03/18/25 0822)    norepinephrine 4 mcg/min (03/18/25 0823)    sodium chloride 10 mL/hr at 03/12/25 2219    dextrose       Scheduled Meds:   fludrocortisone  0.05 mg Per NG tube Daily    midodrine  15 mg Per NG tube TID WC    chlorhexidine  15 mL Mouth/Throat BID    pantoprazole (PROTONIX) 40 mg in sodium chloride (PF) 0.9 % 10 mL injection  40 mg IntraVENous Daily    levETIRAcetam  500 mg IntraVENous BID    [Held by provider] lactulose  20 g Oral TID    piperacillin-tazobactam  4,500 mg IntraVENous Q8H    hydrocortisone sodium succinate PF  100 mg IntraVENous Q8H    insulin lispro  0-4 Units SubCUTAneous Q6H    sodium chloride  BID   GI prophylaxis: protonix IV daily  Diet:   ADULT TUBE FEEDING; Nasogastric; Renal Formula; Continuous; 10; Yes; 10; Q 4 hours; 40; 30; Q 4 hours   Bowel regimen: lactulose held, glycolax daily PRN   Pain management: as needed  Code status: Full Code   Disposition: Continue current care.   Family: updated as available    Rosetta Lopez MD, PGY-2   Attending physician: Dr. Jimenez     I personally saw, examined and provided care for the patient. Radiographs, labs and medication list were reviewed by me independently. I spoke with bedside nursing, therapists and consultants. Critical care services and times documented are independent of procedures and multidisciplinary rounds with Residents. Additionally comprehensive, multidisciplinary rounds were conducted with the MICU team. The case was discussed in detail and plans for care were established. Review of Resident/RODNEY documentation was conducted and revisions were made as appropriate. I agree with the above documented exam, problem list and plan of care with the following additions:    IR for tunneled HD line procedure is pushed until tomorrow, needs to be done as current temp line has been in too long but trying to save patient from additional procedure of placing another temp line for a single day's use. Cont midodrine and stress steroids, weaning levo down. MAP goal 60.    During multidisciplinary team rounds the patient was seen, examined and discussed. This is confirmation that I have personally seen and examined the patient and that the key elements of the encounter were performed by me (> 85 % time).  The medications & laboratory data and imagery was discussed and adjusted where necessary. Key issues of the case were discussed among consultants.       This patient has a high probability of sudden clinically significant deterioration. I managed/supervised life or organ supporting interventions that required frequent physician assessment. I devoted my  full attention to the direct care of this patient for the period of time indicated below. In addition to above, time was devoted to teaching and to any procedure.     NOTE: This report, in part or full, may have been transcribed using voice recognition software. Every effort was made to ensure accuracy; however, inadvertent computerized transcription errors may be present. Please excuse any transcriptional grammatical or spelling errors that may have escaped my editorial review.    Total critical care time caring for this patient with life threatening, unstable organ failure, including direct patient contact, management of life support systems, review of data including imaging and labs, discussions with other team members and physicians is at least 40 Min so far today, excluding procedures.    Kyaw Jimenez MD 5:04 PM 3/18/2025

## 2025-03-18 NOTE — PROGRESS NOTES
[facility]  Internal Medicine Residency Program  Progress Note - House Team     Patient:  Nikki Moffett 79 y.o. female MRN: [id number]     Date of Service: 3/18/2025     CC:   Chief Complaint   Patient presents with    Altered Mental Status     \"Mood swings, memory loss and fatigue\" per patients grandson    Chest Pain     Ongoing for a couple weeks per family     Subjective       Patient was seen and examined this morning.  Patient remains intubated, sedated and currently on Levophed.  Patient had dialysis yesterday, was scheduled for tunneled HD today.      Objective     Physical Exam:  Vitals: BP (!) 127/57   Pulse 69   Temp 98.8 °F (37.1 °C)   Resp 16   Ht 1.575 m (5' 2\")   Wt 65.2 kg (143 lb 11.8 oz)   SpO2 96%   BMI 26.29 kg/m²     I & O - 24hr: In: 1058.7   Out: 950   General appearance: Intubated and sedated.  Appears lethargic.    Neuro: Mental status: Intubated and sedated. Does not follow commands.  Cardiovascular: Regular rate and rhythm, S1 and S2  heard, no murmurs appreciated  Respiratory: Rales present bilaterally   Abdomen: Soft, non tender, bowel sounds normal; masses, no organomegaly, rebound or guarding  Extremities: Extremities normal, no cyanosis, distal pulses intact, no edema    Pertinent Labs & Imaging Studies      Complete Blood Count:   Recent Labs     03/16/25  0432 03/16/25  1243 03/17/25  0415 03/18/25  0407   WBC 21.0*  --  26.7* 22.0*   HGB 7.7* 7.3* 8.0* 7.7*   HCT 26.1* 24.4* 26.8* 24.9*   PLT  --   --  134 138        Last 3 Blood Glucose:   Recent Labs     03/16/25  2340 03/17/25  0415 03/18/25  0407   GLUCOSE 125* 147* 114*        PT/INR:    Lab Results   Component Value Date/Time    PROTIME 16.6 03/17/2025 07:56 AM    INR 1.5 03/17/2025 07:56 AM     PTT:    Lab Results   Component Value Date/Time    APTT 42.0 03/08/2025 05:50 PM     Comprehensive Metabolic Profile:   Recent Labs     03/16/25  2340 03/17/25  0415 03/18/25  0407    143 139   K 4.0  hours  Day 8  Also started on Florinef.  On Eliquis 2.5 mg twice daily  On lactulose  Patient is on intermittent hemodialysis, had intermittent dialysis last night.  Patient was scheduled for tunneled HD today.  Breathing treatments with Brovana, Pulmicort, DuoNeb  Follow neurology recommendations  EEG  On Keppra 500 mg twice daily  Follow cardiology recommendations  Follow nephrology recommendations  Palliative care following  Strict Is/Os  Autoimmune cirrhosis work up sent.    PT evaluation: not indicated at this time   OT evaluation: not indicated at this time   DVT prophylaxis:  Eliquis  GI prophylaxis: not indicated at this time  Diet:   ADULT TUBE FEEDING; Nasogastric; Renal Formula; Continuous; 10; Yes; 10; Q 4 hours; 40; 30; Q 4 hours   Code status: Full Code   Disposition: Continue Current Care    Giovanni Gutierrez MD, PGY-1  Attending physician: SIMONE Bowers      [facility]  Internal Medicine Faculty   Attending Physician Statement    Nikki Moffett is a 79 y.o. female was seen, examined and discussed with the multi-disciplinary teaching team during rounds. I have personally seen and examined the patient and the key elements of the encounter were performed by me. The data collected below information that was obtained, reviewed, analyzed and interpreted today. Imaging test are reviewed during rounds. Comparison to previous images are always explored.     CBC:   Lab Results   Component Value Date/Time    WBC 21.6 03/19/2025 04:19 AM    RBC 2.66 03/19/2025 04:19 AM    HGB 7.1 03/19/2025 04:19 AM    HCT 22.8 03/19/2025 04:19 AM     03/19/2025 04:19 AM    MCV 85.7 03/19/2025 04:19 AM       BMP:    Lab Results   Component Value Date/Time     03/19/2025 04:19 AM    K 4.2 03/19/2025 04:19 AM     03/19/2025 04:19 AM    CO2 19 03/19/2025 04:19 AM    BUN 41 03/19/2025 04:19 AM    CREATININE 3.1 03/19/2025 04:19 AM    GLUCOSE 119 03/19/2025 04:19 AM    CALCIUM 8.4  Female

## 2025-03-18 NOTE — PROGRESS NOTES
[facility]  Nephrology Attending Progress Note  Alexandru Owen MD        SUBJECTIVE:     HPI:   3/9:  79 yrs old HF , Known h/o prior CVA s/p intracranial thrombectomy 2023 December .      Came in with above mentioned issues , noted to be in Rapid A fib , hypotensive , lethargic , already known to   Cardiology team on enteresto , eliquis , toprol , amiodarone .      Patient remained in rapid a fib , hypotensive , was diuresed , developed ANTONIO , Acidosis , , Lactic acidosis , shock liver   Coagulopathy , her LV function detoriorated to 10-15% due to a fib related Cardiomyopathy , possible myocardial injury ( high troponin )     Her HR now is better , she appears hemodynamically stable , alert and responsive in no distress       SUBJECTIVE:    3/10: pt seen and examined in the icu, chart reviewed. On cvvh, dw icu nurse, tolerating -50 ml/hr, in bipap. Lethargic, mumbles that she is ok    3/11: pt seen and examined. On cvvh, intubated. On epi and levo, kurtis icu nurse, keeping even with cvvh at this time    3/12: pt seen in icu, intubated on cvvh. Levo, running even. Dw icu nurse, pt lethargic and not answering questions    3/13: pt seen in icu, remains on cvvh, lethargic, not conversant, on tube feeds    3/14: pt seen in icu, on cvvh. For mri, not conversant, on levo    3/15: pt seen in icu, getting mri today, not conversant, on cvvh, on tube feeds, kurtis icu nurse, will stop cvvh, on low dose levo    3/16: pt seen in icu, intubated, off cvvh since yesterday, on tube feeds    3/17: pt seen in icu, remains intubated, for hd today. On tube feeds, on levo    3/18: sp hd yesterday with 500 ml off. For tdc today. 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)    Occipital stroke (HCC)    Atrial flutter (HCC)    Paroxysmal atrial fibrillation (HCC)    H/O: stroke    Atrial flutter with rapid ventricular  response (HCC)    Decompensated heart failure (HCC)    Chronic atrial fibrillation (HCC)    VHD (valvular heart disease)    ANTONIO (acute kidney injury)        PAST MEDICAL HISTORY:    Past Medical History:   Diagnosis Date    A-fib (HCC)     HFrEF (heart failure with reduced ejection fraction) (HCC)     HLD (hyperlipidemia)     HTN (hypertension)     Interatrial cardiac shunt     on echo 2023    L MCA stroke 2023    s/p thrombectomy       DIET:    ADULT TUBE FEEDING; Nasogastric; Renal Formula; Continuous; 10; Yes; 10; Q 4 hours; 40; 30; Q 4 hours     PHYSICAL EXAM:     Patient Vitals for the past 24 hrs:   BP Temp Temp src Pulse Resp SpO2 Weight   03/18/25 0800 -- 98.1 °F (36.7 °C) -- 71 12 97 % --   03/18/25 0745 -- -- -- 75 16 98 % --   03/18/25 0700 -- -- -- 82 12 100 % --   03/18/25 0600 -- 96.8 °F (36 °C) Esophageal 73 12 100 % 65.2 kg (143 lb 11.8 oz)   03/18/25 0500 -- -- -- 73 13 100 % --   03/18/25 0400 (!) 127/57 (!) 95.4 °F (35.2 °C) Esophageal 81 12 100 % --   03/18/25 0300 -- -- -- 80 12 100 % --   03/18/25 0215 -- -- -- 82 12 100 % --   03/18/25 0200 -- -- -- 81 12 100 % --   03/18/25 0124 -- -- -- 85 12 100 % --   03/18/25 0116 -- -- -- 66 12 100 % --   03/18/25 0100 -- -- -- 74 12 99 % --   03/18/25 0000 (!) 130/52 96.8 °F (36 °C) Esophageal 76 21 100 % --   03/17/25 2300 -- -- -- 83 15 100 % --   03/17/25 2200 -- -- -- 79 25 100 % --   03/17/25 2141 -- -- -- 86 21 91 % --   03/17/25 2100 -- -- -- 92 27 98 % --   03/17/25 2055 -- -- -- 78 21 100 % --   03/17/25 2054 -- -- -- 76 24 100 % --   03/17/25 2047 -- -- -- 59 16 100 % --   03/17/25 2045 -- -- -- 69 20 100 % --   03/17/25 2041 -- -- -- 73 17 100 % --   03/17/25 2039 -- -- -- 70 12 100 % --   03/17/25 2000 (!) 126/48 -- -- 76 16 100 % --   03/17/25 1900 (!) 84/48 -- -- 91 22 100 % --   03/17/25 1828 (!) 130/58 97.4 °F (36.3 °C) -- 79 12 -- 64.1 kg (141 lb 5 oz)   03/17/25 1800 97/67 -- -- 80 18 99 % --   03/17/25 1700 111/61 -- -- 83 16 -- --

## 2025-03-19 ENCOUNTER — APPOINTMENT (OUTPATIENT)
Dept: GENERAL RADIOLOGY | Age: 80
DRG: 004 | End: 2025-03-19
Payer: MEDICARE

## 2025-03-19 LAB
AADO2: 146.5 MMHG
ALBUMIN SERPL-MCNC: 2.8 G/DL (ref 3.5–5.2)
ALP SERPL-CCNC: 109 U/L (ref 35–104)
ALT SERPL-CCNC: 165 U/L (ref 0–32)
ANION GAP SERPL CALCULATED.3IONS-SCNC: 18 MMOL/L (ref 7–16)
AST SERPL-CCNC: 49 U/L (ref 0–31)
B.E.: -4.4 MMOL/L (ref -3–3)
BASOPHILS # BLD: 0.03 K/UL (ref 0–0.2)
BASOPHILS NFR BLD: 0 % (ref 0–2)
BILIRUB DIRECT SERPL-MCNC: 1.3 MG/DL (ref 0–0.3)
BILIRUB INDIRECT SERPL-MCNC: 0 MG/DL (ref 0–1)
BILIRUB SERPL-MCNC: 1.3 MG/DL (ref 0–1.2)
BUN SERPL-MCNC: 41 MG/DL (ref 6–23)
CALCIUM SERPL-MCNC: 8.4 MG/DL (ref 8.6–10.2)
CHLORIDE SERPL-SCNC: 104 MMOL/L (ref 98–107)
CO2 SERPL-SCNC: 19 MMOL/L (ref 22–29)
COHB: 1.8 % (ref 0–1.5)
CREAT SERPL-MCNC: 3.1 MG/DL (ref 0.5–1)
CRITICAL: ABNORMAL
DATE ANALYZED: ABNORMAL
DATE OF COLLECTION: ABNORMAL
EKG ATRIAL RATE: 58 BPM
EKG P AXIS: 27 DEGREES
EKG P-R INTERVAL: 150 MS
EKG Q-T INTERVAL: 480 MS
EKG QRS DURATION: 138 MS
EKG QTC CALCULATION (BAZETT): 471 MS
EKG R AXIS: -17 DEGREES
EKG T AXIS: 77 DEGREES
EKG VENTRICULAR RATE: 58 BPM
EOSINOPHIL # BLD: 0 K/UL (ref 0.05–0.5)
EOSINOPHILS RELATIVE PERCENT: 0 % (ref 0–6)
ERYTHROCYTE [DISTWIDTH] IN BLOOD BY AUTOMATED COUNT: 18.6 % (ref 11.5–15)
FIO2: 40 %
GFR, ESTIMATED: 15 ML/MIN/1.73M2
GLUCOSE BLD-MCNC: 108 MG/DL (ref 74–99)
GLUCOSE BLD-MCNC: 125 MG/DL (ref 74–99)
GLUCOSE SERPL-MCNC: 119 MG/DL (ref 74–99)
HCO3: 19.7 MMOL/L (ref 22–26)
HCT VFR BLD AUTO: 22.8 % (ref 34–48)
HGB BLD-MCNC: 7.1 G/DL (ref 11.5–15.5)
HHB: 2.2 % (ref 0–5)
IMM GRANULOCYTES # BLD AUTO: 0.35 K/UL (ref 0–0.58)
IMM GRANULOCYTES NFR BLD: 2 % (ref 0–5)
LAB: ABNORMAL
LACTATE BLDV-SCNC: 1 MMOL/L (ref 0.5–2.2)
LYMPHOCYTES NFR BLD: 0.6 K/UL (ref 1.5–4)
LYMPHOCYTES RELATIVE PERCENT: 3 % (ref 20–42)
Lab: 420
MAGNESIUM SERPL-MCNC: 2 MG/DL (ref 1.6–2.6)
MCH RBC QN AUTO: 26.7 PG (ref 26–35)
MCHC RBC AUTO-ENTMCNC: 31.1 G/DL (ref 32–34.5)
MCV RBC AUTO: 85.7 FL (ref 80–99.9)
METHB: 0.7 % (ref 0–1.5)
MODE: AC
MONOCYTES NFR BLD: 0.81 K/UL (ref 0.1–0.95)
MONOCYTES NFR BLD: 4 % (ref 2–12)
NEUTROPHILS NFR BLD: 92 % (ref 43–80)
NEUTS SEG NFR BLD: 19.81 K/UL (ref 1.8–7.3)
O2 SATURATION: 97.7 % (ref 92–98.5)
O2HB: 95.3 % (ref 94–97)
OPERATOR ID: 1893
PATIENT TEMP: 37 C
PCO2: 31.6 MMHG (ref 35–45)
PEEP/CPAP: 5 CMH2O
PFO2: 2.56 MMHG/%
PH BLOOD GAS: 7.41 (ref 7.35–7.45)
PHOSPHATE SERPL-MCNC: 3.1 MG/DL (ref 2.5–4.5)
PLATELET # BLD AUTO: 160 K/UL (ref 130–450)
PMV BLD AUTO: 11.2 FL (ref 7–12)
PO2: 102.4 MMHG (ref 75–100)
POTASSIUM SERPL-SCNC: 4.2 MMOL/L (ref 3.5–5)
PROT SERPL-MCNC: 5.1 G/DL (ref 6.4–8.3)
RBC # BLD AUTO: 2.66 M/UL (ref 3.5–5.5)
RI(T): 1.43
RR MECHANICAL: 12 B/MIN
SODIUM SERPL-SCNC: 141 MMOL/L (ref 132–146)
SOURCE, BLOOD GAS: ABNORMAL
THB: 7.6 G/DL (ref 11.5–16.5)
TIME ANALYZED: 429
VT MECHANICAL: 375 ML
WBC OTHER # BLD: 21.6 K/UL (ref 4.5–11.5)

## 2025-03-19 PROCEDURE — P9047 ALBUMIN (HUMAN), 25%, 50ML: HCPCS | Performed by: STUDENT IN AN ORGANIZED HEALTH CARE EDUCATION/TRAINING PROGRAM

## 2025-03-19 PROCEDURE — 99232 SBSQ HOSP IP/OBS MODERATE 35: CPT | Performed by: PHYSICIAN ASSISTANT

## 2025-03-19 PROCEDURE — 6360000002 HC RX W HCPCS

## 2025-03-19 PROCEDURE — 90935 HEMODIALYSIS ONE EVALUATION: CPT

## 2025-03-19 PROCEDURE — 84100 ASSAY OF PHOSPHORUS: CPT

## 2025-03-19 PROCEDURE — 6360000002 HC RX W HCPCS: Performed by: STUDENT IN AN ORGANIZED HEALTH CARE EDUCATION/TRAINING PROGRAM

## 2025-03-19 PROCEDURE — 2500000003 HC RX 250 WO HCPCS: Performed by: INTERNAL MEDICINE

## 2025-03-19 PROCEDURE — 83735 ASSAY OF MAGNESIUM: CPT

## 2025-03-19 PROCEDURE — 2580000003 HC RX 258

## 2025-03-19 PROCEDURE — 94003 VENT MGMT INPAT SUBQ DAY: CPT

## 2025-03-19 PROCEDURE — 71045 X-RAY EXAM CHEST 1 VIEW: CPT

## 2025-03-19 PROCEDURE — 83605 ASSAY OF LACTIC ACID: CPT

## 2025-03-19 PROCEDURE — 82248 BILIRUBIN DIRECT: CPT

## 2025-03-19 PROCEDURE — 94640 AIRWAY INHALATION TREATMENT: CPT

## 2025-03-19 PROCEDURE — 2000000000 HC ICU R&B

## 2025-03-19 PROCEDURE — 6370000000 HC RX 637 (ALT 250 FOR IP): Performed by: INTERNAL MEDICINE

## 2025-03-19 PROCEDURE — 82962 GLUCOSE BLOOD TEST: CPT

## 2025-03-19 PROCEDURE — 6370000000 HC RX 637 (ALT 250 FOR IP)

## 2025-03-19 PROCEDURE — 80053 COMPREHEN METABOLIC PANEL: CPT

## 2025-03-19 PROCEDURE — 99233 SBSQ HOSP IP/OBS HIGH 50: CPT | Performed by: INTERNAL MEDICINE

## 2025-03-19 PROCEDURE — 85025 COMPLETE CBC W/AUTO DIFF WBC: CPT

## 2025-03-19 PROCEDURE — 99232 SBSQ HOSP IP/OBS MODERATE 35: CPT | Performed by: INTERNAL MEDICINE

## 2025-03-19 PROCEDURE — 82805 BLOOD GASES W/O2 SATURATION: CPT

## 2025-03-19 PROCEDURE — 6360000002 HC RX W HCPCS: Performed by: PSYCHIATRY & NEUROLOGY

## 2025-03-19 RX ORDER — HYDROCORTISONE SODIUM SUCCINATE 100 MG/2ML
100 INJECTION INTRAMUSCULAR; INTRAVENOUS EVERY 12 HOURS
Status: DISCONTINUED | OUTPATIENT
Start: 2025-03-19 | End: 2025-03-20

## 2025-03-19 RX ORDER — MIDODRINE HYDROCHLORIDE 10 MG/1
20 TABLET ORAL
Status: DISCONTINUED | OUTPATIENT
Start: 2025-03-19 | End: 2025-03-27

## 2025-03-19 RX ORDER — ALBUMIN (HUMAN) 12.5 G/50ML
25 SOLUTION INTRAVENOUS EVERY 6 HOURS
Status: COMPLETED | OUTPATIENT
Start: 2025-03-19 | End: 2025-03-19

## 2025-03-19 RX ADMIN — Medication 1000 UNITS: at 08:36

## 2025-03-19 RX ADMIN — IPRATROPIUM BROMIDE AND ALBUTEROL SULFATE 1 DOSE: 2.5; .5 SOLUTION RESPIRATORY (INHALATION) at 09:27

## 2025-03-19 RX ADMIN — ALBUMIN (HUMAN) 25 G: 0.25 INJECTION, SOLUTION INTRAVENOUS at 22:47

## 2025-03-19 RX ADMIN — IPRATROPIUM BROMIDE AND ALBUTEROL SULFATE 1 DOSE: 2.5; .5 SOLUTION RESPIRATORY (INHALATION) at 20:49

## 2025-03-19 RX ADMIN — IPRATROPIUM BROMIDE AND ALBUTEROL SULFATE 1 DOSE: 2.5; .5 SOLUTION RESPIRATORY (INHALATION) at 12:57

## 2025-03-19 RX ADMIN — PANTOPRAZOLE SODIUM 40 MG: 40 INJECTION, POWDER, FOR SOLUTION INTRAVENOUS at 08:36

## 2025-03-19 RX ADMIN — CHLORHEXIDINE GLUCONATE, 0.12% ORAL RINSE 15 ML: 1.2 SOLUTION DENTAL at 20:36

## 2025-03-19 RX ADMIN — IPRATROPIUM BROMIDE AND ALBUTEROL SULFATE 1 DOSE: 2.5; .5 SOLUTION RESPIRATORY (INHALATION) at 16:26

## 2025-03-19 RX ADMIN — ALBUMIN (HUMAN) 25 G: 0.25 INJECTION, SOLUTION INTRAVENOUS at 10:49

## 2025-03-19 RX ADMIN — ARFORMOTEROL TARTRATE 15 MCG: 15 SOLUTION RESPIRATORY (INHALATION) at 20:49

## 2025-03-19 RX ADMIN — MIDODRINE HYDROCHLORIDE 15 MG: 10 TABLET ORAL at 08:35

## 2025-03-19 RX ADMIN — BUDESONIDE INHALATION 500 MCG: 0.5 SUSPENSION RESPIRATORY (INHALATION) at 20:49

## 2025-03-19 RX ADMIN — ALBUMIN (HUMAN) 25 G: 0.25 INJECTION, SOLUTION INTRAVENOUS at 18:02

## 2025-03-19 RX ADMIN — CHLORHEXIDINE GLUCONATE, 0.12% ORAL RINSE 15 ML: 1.2 SOLUTION DENTAL at 08:37

## 2025-03-19 RX ADMIN — LEVETIRACETAM 500 MG: 100 INJECTION INTRAVENOUS at 20:36

## 2025-03-19 RX ADMIN — SODIUM CHLORIDE, PRESERVATIVE FREE 10 ML: 5 INJECTION INTRAVENOUS at 08:36

## 2025-03-19 RX ADMIN — PIPERACILLIN AND TAZOBACTAM 4500 MG: 4; .5 INJECTION, POWDER, FOR SOLUTION INTRAVENOUS at 01:56

## 2025-03-19 RX ADMIN — APIXABAN 2.5 MG: 2.5 TABLET, FILM COATED ORAL at 20:36

## 2025-03-19 RX ADMIN — HYDROCORTISONE SODIUM SUCCINATE 100 MG: 100 INJECTION, POWDER, FOR SOLUTION INTRAMUSCULAR; INTRAVENOUS at 18:03

## 2025-03-19 RX ADMIN — BUDESONIDE INHALATION 500 MCG: 0.5 SUSPENSION RESPIRATORY (INHALATION) at 09:27

## 2025-03-19 RX ADMIN — PIPERACILLIN AND TAZOBACTAM 4500 MG: 4; .5 INJECTION, POWDER, FOR SOLUTION INTRAVENOUS at 08:41

## 2025-03-19 RX ADMIN — FLUDROCORTISONE ACETATE 0.05 MG: 0.1 TABLET ORAL at 08:35

## 2025-03-19 RX ADMIN — MIDODRINE HYDROCHLORIDE 20 MG: 10 TABLET ORAL at 10:47

## 2025-03-19 RX ADMIN — LEVETIRACETAM 500 MG: 100 INJECTION INTRAVENOUS at 08:35

## 2025-03-19 RX ADMIN — HYDROCORTISONE SODIUM SUCCINATE 100 MG: 100 INJECTION, POWDER, FOR SOLUTION INTRAMUSCULAR; INTRAVENOUS at 04:06

## 2025-03-19 RX ADMIN — ARFORMOTEROL TARTRATE 15 MCG: 15 SOLUTION RESPIRATORY (INHALATION) at 09:27

## 2025-03-19 RX ADMIN — MIDODRINE HYDROCHLORIDE 20 MG: 10 TABLET ORAL at 18:02

## 2025-03-19 RX ADMIN — SODIUM CHLORIDE, PRESERVATIVE FREE 10 ML: 5 INJECTION INTRAVENOUS at 20:36

## 2025-03-19 ASSESSMENT — PULMONARY FUNCTION TESTS
PIF_VALUE: 25
PIF_VALUE: 21
PIF_VALUE: 22
PIF_VALUE: 25
PIF_VALUE: 22
PIF_VALUE: 24
PIF_VALUE: 25
PIF_VALUE: 23
PIF_VALUE: 23
PIF_VALUE: 25
PIF_VALUE: 26
PIF_VALUE: 24
PIF_VALUE: 23
PIF_VALUE: 23
PIF_VALUE: 25
PIF_VALUE: 23
PIF_VALUE: 25
PIF_VALUE: 23
PIF_VALUE: 24
PIF_VALUE: 26
PIF_VALUE: 25
PIF_VALUE: 23
PIF_VALUE: 25
PIF_VALUE: 23

## 2025-03-19 ASSESSMENT — PAIN SCALES - GENERAL
PAINLEVEL_OUTOF10: 0

## 2025-03-19 NOTE — PROCEDURES
PROCEDURE NOTE  Date: 3/19/2025   Name: Nikki Moffett  YOB: 1945    Procedures: Tunneled HD  Discussed patient and IR procedure with bedside RN, all questions answered. Will call if able to send for patient.

## 2025-03-19 NOTE — PROGRESS NOTES
[facility]  Internal Medicine Residency Program  Progress Note - House Team     Patient:  Nikki Moffett 79 y.o. female MRN: [id number]     Date of Service: 3/19/2025     CC:   Chief Complaint   Patient presents with    Altered Mental Status     \"Mood swings, memory loss and fatigue\" per patients grandson    Chest Pain     Ongoing for a couple weeks per family     Subjective       Patient was seen and examined this morning.  Patient is currently off the sedation.  Patient is off the Levophed now.  Patient is scheduled for tunneled HD today.    Objective     Physical Exam:  Vitals: BP (!) 107/45   Pulse 88   Temp 98.9 °F (37.2 °C) (Axillary)   Resp 15   Ht 1.575 m (5' 2\")   Wt 65.8 kg (145 lb 1 oz)   SpO2 97%   BMI 26.53 kg/m²     I & O - 24hr: In: 557.8   Out: -   General appearance: Intubated .Appears lethargic.    Neuro: Mental status: Intubated. Does not follow commands.  Cardiovascular: Regular rate and rhythm, S1 and S2  heard, no murmurs appreciated  Respiratory: Rales present bilaterally   Abdomen: Soft, non tender, bowel sounds normal; masses, no organomegaly, rebound or guarding  Extremities: Extremities normal, no cyanosis, distal pulses intact, no edema    Pertinent Labs & Imaging Studies      Complete Blood Count:   Recent Labs     03/17/25 0415 03/18/25 0407 03/19/25 0419   WBC 26.7* 22.0* 21.6*   HGB 8.0* 7.7* 7.1*   HCT 26.8* 24.9* 22.8*    138 160        Last 3 Blood Glucose:   Recent Labs     03/17/25 0415 03/18/25 0407 03/19/25 0419   GLUCOSE 147* 114* 119*        PT/INR:    Lab Results   Component Value Date/Time    PROTIME 16.6 03/17/2025 07:56 AM    INR 1.5 03/17/2025 07:56 AM     PTT:    Lab Results   Component Value Date/Time    APTT 42.0 03/08/2025 05:50 PM     Comprehensive Metabolic Profile:   Recent Labs     03/17/25 0415 03/18/25 0407 03/19/25 0419    139 141   K 4.1 3.2* 4.2   * 101 104   CO2 17* 21* 19*   BUN 39* 24* 41*  PORT/PUMP > 5 YEARS    (Results Pending)   XR CHEST PORTABLE    (Results Pending)     Resident's Assessment and Plan     Assessment and Plan:    Assessment    CVA  Shock, likely cardiogenic   Altered mental status likely 2/2 #2  Atrial fibrillation with RVR, s/p DCCV (03/07/2025)  Acute hypoxic respiratory insufficiency likely 2/2 cardiogenic edema and influenza A  Influenza A  Completed Tamiflu for 5 days  Acute exacerbation of HFrEF, EF 10-15% (03/07/2025)  Pleural effusion, bilateral  Elevated INR  S/p 6 units FFP  ANTONIO superimposed on CKD, stage IIIa  Normocytic anemia  Leukocytosis  Transaminitis likely 2/2 shock liver  Hyperlipidemia  History of L MCA stroke, s/p mechanical thrombectomy (12/2023)    Plan    S/p intubation on 03/16  Of the sedation now.  Zosyn completed.  Levophed weaned off.  On Solu-Cortef 100 mg IV every 12 hours  Day 9  Also started on Florinef.  On Eliquis 2.5 mg twice daily  On lactulose  Patient was on intermittent hemodialysis  Patient was scheduled for tunneled HD today.  Breathing treatments with Brovana, Pulmicort, DuoNeb  Follow neurology recommendations  EEG  On Keppra 500 mg twice daily  Follow cardiology recommendations  Follow nephrology recommendations  Palliative care following  Strict Is/Os  Autoimmune cirrhosis work up sent.    PT evaluation: not indicated at this time   OT evaluation: not indicated at this time   DVT prophylaxis:  Eliquis  GI prophylaxis: not indicated at this time  Diet:   ADULT TUBE FEEDING; Nasogastric; Renal Formula; Continuous; 10; Yes; 10; Q 4 hours; 40; 30; Q 4 hours   Code status: Full Code   Disposition: Continue Current Care    Giovanni Gutierrez MD, PGY-1  Attending physician: SIMONE Bowers      [facility]  Internal Medicine Faculty   Attending Physician Statement    Nikki Moffett is a 79 y.o. female was seen, examined and discussed with the multi-disciplinary teaching team during rounds. I have personally seen and

## 2025-03-19 NOTE — PROGRESS NOTES
[facility]  Neuro Inpatient Follow Up        Nikki Moffett is a 79 y.o. female     Neuro is following for stroke    Significant PMH: Previous left MCA stroke SP thrombectomy, A-fib, prediabetes, heart failure    HPI:  She initially presented on 3/4 with an altered mental status and chest pain in the setting of shock, respiratory failure requiring intubation, influenza, and ANTONIO--requiring CRRT until 3/15.  Ultrasound of the abdomen did show possible early cirrhosis    On 3/15 she was noted to have a dilated, but sluggishly reactive pupil on the right and mental status persistently altered    MRI of the brain showed acute left occipital stroke--not felt to be the cause of her pupillary abnormality--as well as evidence of old stroke in her left occipital region as well as old stroke in her left MCA stroke from 2023, for which she received mechanical thrombectomy.    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  These can sometimes be implicated in embolic appearing strokes.    She is on Eliquis     3/15: EEG ordered to rule out seizures  Placed on empiric Keppra    3/18: EEG showed a severe encephalopathy. No seizures.     Subjective:  She remains in ICU.  She is intubated and off of sedation as of 3/18. Weaning off Levophed. She will open her eyes to voice but follows no commands for me or staff.  There is no family present    Unable to complete ROS due to her mental status    Current Facility-Administered Medications   Medication Dose Route Frequency Provider Last Rate Last Admin    hydrocortisone sodium succinate PF (SOLU-CORTEF) injection 100 mg  100 mg IntraVENous Q12H Rosetta Lopez MD        fludrocortisone (FLORINEF) tablet 0.05 mg  0.05 mg Per NG tube Daily Rosetta Lopez MD   0.05 mg at 03/18/25 0815    midodrine (PROAMATINE) tablet 15 mg  15 mg Per NG tube TID WC Rosetta Lopez MD   15 mg at 03/18/25 1630     stenosis or arterial occlusion is  evident.  2. Mild stenosis involving the V4 segment of the left vertebral artery.     MRI brain 3/13  IMPRESSION:  1. Linear areas of acute infarction within the left occipital lobe as  described above.  2. Encephalomalacia on the basis of prior infarction within the left insular  region and left frontal operculum.  3. Partial opacification of the mastoid air cells bilaterally.            Echo 3/7:  Left Ventricle: Normal left ventricular systolic function with a visually estimated EF of 10 -15%. Left ventricle size is normal. Normal wall thickness. Severe global hypokinesis present. E/e' ratio >11, suggesting increased LAP.    Right Ventricle: Right ventricle size is normal. Reduced systolic function.    Aortic Valve: No significant stenosis. 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    Mitral Valve: Mild regurgitation.    Tricuspid Valve: Moderate regurgitation. Normal RVSP. The estimated RVSP is 25 mmHg.    Left Atrium: Left atrium is severely dilated.    Right Atrium: Right atrium is mildly dilated.    Pericardium: No pericardial effusion.    Image quality is suboptimal. Contrast used: Lumason.    EEG   Summary: This is an abnormal Electroencephalogram with generalized background slowing with triphasic waves which is non-specific but can be suggestive of severe metabolic or toxic encephalopathy or dementia. There are no epileptiform abnormality or electrographic seizures in this recording .      All pertinent labs and images personally reviewed today    Assessment:     Acute on chronic L occipital ischemic stroke: In a patient with history of left MCA stroke s/p thrombectomy. Lambi's excrescences and dilated LA on echo remain concerning for cardioembolic sources. Pupillary abnormalities can be seen with seizures originating from the occipital lobe, EEG did not show any seizure activity or epileptiform abnormalities. Her exam remains poor off of

## 2025-03-19 NOTE — PROGRESS NOTES
03/19/25 0933   Patient Observation   Pulse 92   Respirations 17   SpO2 98 %   Vent Information   Equipment Changed Humidification;Expiratory Filter   Vent Mode AC/VC   Ventilator Settings   Vt (Set, mL) 325 mL   Resp Rate (Set) 12 bpm   PEEP/CPAP (cmH2O) 5   FiO2  40 %   Peak Inspiratory Flow (Set) 45 L/sec   Vent Patient Data (Readings)   Vt (Measured) 337 mL   Peak Inspiratory Pressure (cmH2O) 25 cmH2O   Rate Measured 20 br/min   Minute Volume (L/min) 3.82 Liters   Peak Inspiratory Flow (lpm) 45 L/sec   Mean Airway Pressure (cmH2O) 10 cmH20   Plateau Pressure (cm H2O) 19 cm H2O   Driving Pressure 14   I:E Ratio 1:5.40   Flow Sensitivity 3 L/min   Static Compliance (L/cm H2O) 24   Backup Apnea On   Backup Rate 12 Breaths Per Minute   Backup Vt 325   Vent Alarm Settings   High Pressure (cmH2O) 50 cmH2O   Low Minute Volume (lpm) 3 L/min   High Minute Volume (lpm) 12 L/min   Low Exhaled Vt (ml) 250 mL   High Exhaled Vt (ml) 700 mL   RR High (bpm) 30 br/min   Apnea (secs) 20 secs   Additional Respiratoray Assessments   Humidification Source Heated wire   Humidification Temp 37   Circuit Condensation Drained   Ambu Bag With Mask At Bedside Yes   Airway Clearance   Suction ET Tube   Suction Device Inline suction catheter   Sputum Method Obtained Endotracheal   Sputum Amount Small   Sputum Color/Odor Tan   Sputum Consistency Thick   ETT    Placement Date/Time: 03/16/25 0350   Placement Verified By: Auscultation;Capnometry  Preoxygenation: Yes  Airway Type: Cuffed  Airway Tube Size: 7.5 mm  Secured At: 22 cm  Measured From: Lips   Secured At 21 cm   Measured From Lips   Secured By Commercial tube crane   Site Assessment Dry

## 2025-03-19 NOTE — PLAN OF CARE
Problem: Chronic Conditions and Co-morbidities  Goal: Patient's chronic conditions and co-morbidity symptoms are monitored and maintained or improved  3/19/2025 1002 by Lisette Goetz RN  Outcome: Not Progressing  Flowsheets (Taken 3/19/2025 0800)  Care Plan - Patient's Chronic Conditions and Co-Morbidity Symptoms are Monitored and Maintained or Improved:   Monitor and assess patient's chronic conditions and comorbid symptoms for stability, deterioration, or improvement   Collaborate with multidisciplinary team to address chronic and comorbid conditions and prevent exacerbation or deterioration   Update acute care plan with appropriate goals if chronic or comorbid symptoms are exacerbated and prevent overall improvement and discharge  3/19/2025 0150 by Guerita Treviño, RN  Outcome: Progressing     Problem: Discharge Planning  Goal: Discharge to home or other facility with appropriate resources  3/19/2025 1002 by Lisette Goetz RN  Outcome: Not Progressing  Flowsheets (Taken 3/19/2025 0800)  Discharge to home or other facility with appropriate resources:   Identify barriers to discharge with patient and caregiver   Arrange for needed discharge resources and transportation as appropriate   Identify discharge learning needs (meds, wound care, etc)   Arrange for interpreters to assist at discharge as needed   Refer to discharge planning if patient needs post-hospital services based on physician order or complex needs related to functional status, cognitive ability or social support system  3/19/2025 0150 by Guerita Treviño, RN  Outcome: Progressing     Problem: Nutrition Deficit:  Goal: Optimize nutritional status  Outcome: Not Progressing     Problem: Neurosensory - Adult  Goal: Achieves stable or improved neurological status  Outcome: Not Progressing  Flowsheets (Taken 3/19/2025 0800)  Achieves stable or improved neurological status:   Assess for and report changes in neurological status   Initiate measures to prevent

## 2025-03-19 NOTE — PROGRESS NOTES
INPATIENT CARDIOLOGY FOLLOW-UP    Name: Nikki Moffett    Age: 79 y.o.    Date of Admission: 3/4/2025  5:05 PM    Date of Service: 3/19/2025      Chief Complaint: Follow-up for atrial fibrillation with RVR, worsening decompensated heart failure    Interim History:  No new overnight cardiac complaints. Currently with no complaints of CP, SOB, palpitations, dizziness, or lightheadedness. SR on telemetry.  No recurrent arrhythmia seen.  Intubated.     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)    Occipital stroke (HCC)    Atrial flutter (HCC)    Paroxysmal atrial fibrillation (HCC)    H/O: stroke    Atrial flutter with rapid ventricular response (HCC)    Decompensated heart failure (HCC)    Chronic atrial fibrillation (HCC)    VHD (valvular heart disease)    ANTONIO (acute kidney injury)    Palliative care encounter    Metabolic encephalopathy       Current Medications:    Current Facility-Administered Medications:     fludrocortisone (FLORINEF) tablet 0.05 mg, 0.05 mg, Per NG tube, Daily, Rosetta Lopez MD, 0.05 mg at 03/18/25 0815    midodrine (PROAMATINE) tablet 15 mg, 15 mg, Per NG tube, TID WC, Rosetta Lopez MD, 15 mg at 03/18/25 1630    chlorhexidine (PERIDEX) 0.12 % solution 15 mL, 15 mL, Mouth/Throat, BID, Sandra Del Rosario MD, 15 mL at 03/18/25 2112    fentaNYL (SUBLIMAZE) 1,000 mcg in sodium chloride 0.9% 100 mL infusion,  mcg/hr, IntraVENous, Continuous, Sandra Del Rosario MD, Stopped at 03/18/25 0822    pantoprazole (PROTONIX) 40 mg in sodium chloride (PF) 0.9 % 10 mL injection, 40 mg, IntraVENous, Daily, Sandra Del Rosario MD, 40 mg at 03/18/25 0817    levETIRAcetam (KEPPRA) injection 500 mg, 500 mg, IntraVENous, BID, Kyaw Au DO, 500 mg at 03/18/25 2115    [Held by provider] lactulose (CHRONULAC) 10 GM/15ML  Normal RVSP. The estimated RVSP is 25 mmHg.    Left Atrium: Left atrium is severely dilated.    Right Atrium: Right atrium is mildly dilated.    Pericardium: No pericardial effusion.    Image quality is suboptimal. Contrast used: Lumason.    LTD TTE 3/9/25:    Limited study to assess biventricular function    Left Ventricle: Severely reduced left ventricular systolic function with a visually estimated EF of 10 -15%.    Right Ventricle: Mildly reduced systolic function.    Mitral Valve: Mild annular calcification.    Tricuspid Valve: Moderate to severe regurgitation.    Image quality is adequate.     Assessment:  Acute HFrEF  Decompensated heart failure  History of heart failure with improved ejection fraction  Continue LV dysfunction drop in EF to 10 to 15% secondary to tachycardia induced cardiomyopathy versus underlying ischemic etiology.  Persistent atrial fibrillation and atypical flutter status post cardioversion on 3/7/2025 and was successfully converted to normal sinus rhythm  CHADS2 Vasc score-5 on Eliquis for anticoagulation  Status post CVA  Hyperlipidemia on Lipitor  CKD with ANTONIO  Intermittent hypotension  Hypothyroidism HRT        Plan:   Tenuous hemodynamics.  GDMT obviously being held currently.  On adjusted dose Eliquis. On low dose levophed.   On Florinef 0.25 mg for total of 7 doses.  On midodrine now at 15 3 times daily  Lipitor and amiodarone being appropriately held currently  Echo results were reviewed, as above EF significantly dropped to 10 to 15%.  Will need ischemic evaluation at some point with resolution of acute issues  Guarded prognosis with evidence of significant multiorgan dysfunction     Greater than 50 minutes was spent counseling the patient, reviewing the rationale for the above recommendations and reviewing the patient's current medication list, problem list and results of all previously ordered testing.      Niko Alexandre MD, [facility]    NOTE: This report was  transcribed using voice recognition software. Every effort was made to ensure accuracy; however, inadvertent computerized transcription errors may be present.

## 2025-03-19 NOTE — PROGRESS NOTES
[facility]  Nephrology Attending Progress Note      SUBJECTIVE:     HPI:   3/9:  79 yrs old HF , Known h/o prior CVA s/p intracranial thrombectomy 2023 December .      Came in with above mentioned issues , noted to be in Rapid A fib , hypotensive , lethargic , already known to   Cardiology team on enteresto , eliquis , toprol , amiodarone .      Patient remained in rapid a fib , hypotensive , was diuresed , developed ANTONIO , Acidosis , , Lactic acidosis , shock liver   Coagulopathy , her LV function detoriorated to 10-15% due to a fib related Cardiomyopathy , possible myocardial injury ( high troponin )     Her HR now is better , she appears hemodynamically stable , alert and responsive in no distress       SUBJECTIVE:    3/10: pt seen and examined in the icu, chart reviewed. On cvvh, marco icu nurse, tolerating -50 ml/hr, in bipap. Lethargic, mumbles that she is ok    3/11: pt seen and examined. On cvvh, intubated. On epi and levo, marco icu nurse, keeping even with cvvh at this time    3/12: pt seen in icu, intubated on cvvh. Levo, running even. Marco icu nurse, pt lethargic and not answering questions    3/13: pt seen in icu, remains on cvvh, lethargic, not conversant, on tube feeds    3/14: pt seen in icu, on cvvh. For mri, not conversant, on levo    3/15: pt seen in icu, getting mri today, not conversant, on cvvh, on tube feeds, marco icu nurse, will stop cvvh, on low dose levo    3/16: pt seen in icu, intubated, off cvvh since yesterday, on tube feeds    3/17: pt seen in icu, remains intubated, for hd today. On tube feeds, on levo    3/18: sp hd yesterday with 500 ml off. For tdc today. On tube feeds, on levo,  icu DEBRA MCNAIR    3/19: patient seen bedside, not making much urine. For tunneled HD line today then dialysis.    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)    Occipital stroke (HCC)    Atrial flutter (HCC)    Paroxysmal atrial  96 % --   03/18/25 1600 -- 98.6 °F (37 °C) -- 69 20 96 % --   03/18/25 1504 -- -- -- 69 16 96 % --   03/18/25 1503 -- -- -- 66 17 97 % --   03/18/25 1500 -- -- -- 77 21 96 % --   03/18/25 1400 -- -- -- 67 16 97 % --   03/18/25 1359 -- -- -- 69 14 97 % --   03/18/25 1300 -- -- -- 71 13 96 % --   03/18/25 1200 -- 98.8 °F (37.1 °C) -- 69 18 96 % --   03/18/25 1100 -- -- -- 72 14 96 % --   03/18/25 1058 -- -- -- 73 14 96 % --   03/18/25 1057 -- -- -- 71 12 96 % --   03/18/25 1000 -- -- -- 69 14 98 % --   03/18/25 0957 -- -- -- 75 14 98 % --   @      Intake/Output Summary (Last 24 hours) at 3/19/2025 0941  Last data filed at 3/19/2025 0410  Gross per 24 hour   Intake 319.23 ml   Output --   Net 319.23 ml         Wt Readings from Last 3 Encounters:   03/19/25 65.8 kg (145 lb 1 oz)   03/04/25 56.5 kg (124 lb 8 oz)   04/23/24 59.9 kg (132 lb)       Constitutional:  Pt is intubated  Head: normocephalic, atraumatic  Neck: no JVD  Cardiovascular: regular rate and rhythm, no murmurs, gallops, or rubs  Respiratory:  No rales, rhochi, or wheezes  Gastrointestinal:  Soft, nontender, nondistended, bowel sounds x 4  Ext: 1+ edema in thigh up to sacrum  Skin: dry, no rash     MEDS (scheduled):    hydrocortisone sodium succinate PF  100 mg IntraVENous Q12H    fludrocortisone  0.05 mg Per NG tube Daily    midodrine  15 mg Per NG tube TID WC    chlorhexidine  15 mL Mouth/Throat BID    pantoprazole (PROTONIX) 40 mg in sodium chloride (PF) 0.9 % 10 mL injection  40 mg IntraVENous Daily    levETIRAcetam  500 mg IntraVENous BID    [Held by provider] lactulose  20 g Oral TID    piperacillin-tazobactam  4,500 mg IntraVENous Q8H    insulin lispro  0-4 Units SubCUTAneous Q6H    sodium chloride flush  5-40 mL IntraVENous 2 times per day    apixaban  2.5 mg Oral BID    [Held by provider] furosemide  40 mg IntraVENous Daily    ipratropium 0.5 mg-albuterol 2.5 mg  1 Dose Inhalation Q4H WA RT    arformoterol tartrate  15 mcg Nebulization BID RT

## 2025-03-19 NOTE — PROGRESS NOTES
[facility]   Department of Internal Medicine   Internal Medicine Residency  MICU Progress Note    Patient:  Nikki Moffett 79 y.o. female   MRN: [id number]       Date of Service: 3/19/2025    Allergy: Patient has no known allergies.    Subjective     Overnight, no acute events.   Patient was seen and examined this morning at bedside in no acute distress, no family present, remains intubated, off sedation, continues on 1 of levo with a MAP of 70, communicated to nursing to trial off.     Objective     I & O - 24hr:    Intake/Output Summary (Last 24 hours) at 3/19/2025 0818  Last data filed at 3/19/2025 0410  Gross per 24 hour   Intake 346.9 ml   Output --   Net 346.9 ml       Physical Exam  Vitals: BP (!) 105/40   Pulse 61   Temp 97.5 °F (36.4 °C) (Temporal)   Resp 19   Ht 1.575 m (5' 2\")   Wt 65.8 kg (145 lb 1 oz)   SpO2 97%   BMI 26.53 kg/m²     General Appearance: intubated sedated no distress, warming blanket on,  responds noxious stimuli, does not follow any commands, resists eye opening, +confrontation but does not track, no clonus   HEENT: ETT present, pupils equal and sluggish to react  Neck: trachea midline   Lung: mechanical breath sounds, rhonchi R>L   Heart: regular rate and rhythm 60s,capillary refill <2   Abdomen: soft, appears nontender, BS+   Extremities: +1 pitting edema     Medications     Continuous Infusions:   fentaNYL Stopped (03/18/25 0822)    norepinephrine 1 mcg/min (03/19/25 0410)    sodium chloride 10 mL/hr at 03/12/25 2219    dextrose       Scheduled Meds:   hydrocortisone sodium succinate PF  100 mg IntraVENous Q12H    fludrocortisone  0.05 mg Per NG tube Daily    midodrine  15 mg Per NG tube TID WC    chlorhexidine  15 mL Mouth/Throat BID    pantoprazole (PROTONIX) 40 mg in sodium chloride (PF) 0.9 % 10 mL injection  40 mg IntraVENous Daily    levETIRAcetam  500 mg IntraVENous BID    [Held by provider] lactulose  20 g Oral TID    piperacillin-tazobactam  4,500 mg  TUBE PLACEMENT   Final Result   Enteric tube in the stomach as above.      No evidence of bowel obstruction.      Right pleural effusion with right basilar opacity with consolidation that may   represent pneumonia.         US ABDOMEN LIMITED   Final Result   1.  Echogenic liver parenchyma with somewhat scalloped liver margins.  Small   volume simple appearing intra-abdominal ascites in the right upper quadrant.      (Could reflect changes related to an underlying infiltrative pathology.   Hepatic steatosis or possibly early cirrhosis.  Please correlate with   patient's liver function test.)      2.  Right pleural effusion present.      3.  No intrahepatic or extrahepatic biliary dilatation.  Gallbladder is   contracted on this study.      4.  Normal appearance of the imaged kidney.  No hydronephrosis.         XR CHEST PORTABLE   Final Result   No significant change over the past 24 hours.         XR CHEST PORTABLE   Final Result   Right-sided vascular catheter tip overlies the region of the right atrium or   cavoatrial junction.      Cardiomediastinal silhouette is enlarged.      Perihilar and bibasilar opacities similar to prior.  Bilateral small pleural   effusions.  No gross pneumothorax.         XR CHEST PORTABLE   Final Result   Further progression of findings that indicate volume overload since March 7   study.  Please correlate clinically.         XR CHEST PORTABLE   Final Result   Borderline size cardiac area with mild to moderate bilateral pleural   effusions.         XR CHEST PORTABLE   Final Result   1. No acute cardiopulmonary process.   2. Bibasilar scarring and pleural thickening, unchanged.         XR CHEST (2 VW)   Final Result   Bilateral pleural effusions. The 1 on the left is slightly smaller than the   prior study.  Findings suggest resolving CHF         XR CHEST PORTABLE   Final Result   Bilateral pleural effusions.      Possible right lower lobe opacity.         IR TUNNELED CVC PLACE WO SQ  PORT/PUMP > 5 YEARS    (Results Pending)   XR CHEST PORTABLE    (Results Pending)   XR CHEST PORTABLE    (Results Pending)        Resident's Assessment and Plan     Assessment:  Acute encephalopathy likely metabolic   Hx of L MCA stroke status post mechanical thrombectomy 12/2023  CT head: Small insult L frontal parietal region of indeterminate chronicity  MRI brain: Acute infarct L occipital lobe   MRA heada: mild stenosis V4 segment L vertebral artery  EEG with generalized background slowing, triphasic waves nonspecific but poss suggestive of severe metabolic/toxic encephalopathy or dementia, no seizures.   Shock requiring pressor support likely cardiogenic  Paroxysmal atrial fibrillation/flutter with RVR, s/p DCCV 3/7/2025, [id number] 5  HFrEF EF 10-15% 3/7/2025 likely 2/2 tachycardia-induced CM vs ischemic etiology  HLD  Leukocytosis likely 2/2 steroid use   B/l pleural effusion, likely HF related, R>L    Anuric ANTONIO stage III, baseline Cr 0.8-1, CVVHD 3/8-3/15, to continue on intermittent HD   Transaminitis likely shock liver; improving   Hypoalbuminemia   Subclinical hypothyroidism  Prediabetes with hyperglycemia 2/2 steroid use, A1c 6.0%  Vitamin D deficiency  ------   Hx of hepatitis C; recent not detected, no documentation of tx   Influenza A infection; completed Tamiflu   HAGMA likely 2/2 LA; resolved  Elevated INR (peak >10); s/p 4U FFP, 10 mg Vit K; improved     Plan:  Intubated 3/16, sedation turned off 3/18 AM, not following commands.   Neurology following, continues on Keppra 500 BID.   Trial off levo this morning, continue midodrine 15 TID, weaned solu-cortef 100 Q12, florinef 0.05, MAP goal of 60.   Cardiology following, GDMT as tolerated, L heart cath at some point. Statin and amiodarone held 2/2 transaminitis.   Nephrology following, for IR THD today and HD.   Palliative care following for goals of care.   Son here very briefly yesterday, left before teams were able to speak with him, will call

## 2025-03-19 NOTE — PROGRESS NOTES
DAILY VENTILATOR WEANING ASSESSMENT PERFORMED    P/FIO2 Ratio =   256       (<100= do not Wean)                  Cs =           24               (<32= Instability)  Plat. Pressure = 19  MV =3.82  RSBI =    Instabilities:       Cardiovascular =       CNS =       Respiratory =       Metabolic =2    Parameters    no    Ask Physician for a weaning plan no    Was SAT completed no    Was SBT completed no    Additional Comments:     Performed by Jseica Victor RCP RRT      Reference Table:    Cardiovascular     CNS      1. Mean BP less than or equal to 75   1. Neuromuscular blockade  2. Heart Rate greater than 130   2. RASS of -3, -4, -5  3. Myocardial Ischemia    3. RASS of +3, +4  4. Mechanical Assist Device    4. ICP greater than 15 or             Intracranial Hypertension         Respiratory      Metabolic  1. PEEP equal to or greater than 10cm/H20  1.Temp. (8hrs) less than 95 or > 103  2. Respiratory Rate greater than 35   2. WBC < 5000 or > 92553  3. Minute Volume greater than 15L  4. pH less than 7.30  5. Deteriorating chest X-ray

## 2025-03-19 NOTE — PLAN OF CARE
Problem: Chronic Conditions and Co-morbidities  Goal: Patient's chronic conditions and co-morbidity symptoms are monitored and maintained or improved  Outcome: Progressing     Problem: Discharge Planning  Goal: Discharge to home or other facility with appropriate resources  Outcome: Progressing     Problem: Safety - Adult  Goal: Free from fall injury  Outcome: Progressing     Problem: Skin/Tissue Integrity  Goal: Skin integrity remains intact  Description: 1.  Monitor for areas of redness and/or skin breakdown  2.  Assess vascular access sites hourly  3.  Every 4-6 hours minimum:  Change oxygen saturation probe site  4.  Every 4-6 hours:  If on nasal continuous positive airway pressure, respiratory therapy assess nares and determine need for appliance change or resting period  Outcome: Progressing     Problem: ABCDS Injury Assessment  Goal: Absence of physical injury  Outcome: Progressing     Problem: Pain  Goal: Verbalizes/displays adequate comfort level or baseline comfort level  Outcome: Progressing

## 2025-03-19 NOTE — PROCEDURES
PROCEDURE NOTE  Date: 3/18/2025   Name: Nikki Moffett  YOB: 1945    Procedures:    Bedside EEG with video:    Method:   This recording was done using a digital EEG machine at the patient's bedside with 16 channels of EEG recording and 1 channel of EKG recording. It captures periods of unresponsiveness. Photic stimulation is not performed as activation procedure.     Interpretation:   There is a posterior dominant rhythm of 3-4 Hz delta activity which does not attenuate throughout the recording. There are intermittent bifrontally predominant triphasic waves seen in this recording. Beta activity is not seen in this recording.  There were no epileptiform abnormalities or electrographic seizures in this recording.     EKG demonstrates a regular rhythm with rate of 72 bpm.     Summary: This is an abnormal Electroencephalogram with generalized background slowing with triphasic waves which is non-specific but can be suggestive of severe metabolic or toxic encephalopathy or dementia. There are no epileptiform abnormality or electrographic seizures in this recording .        Ene Hu MD

## 2025-03-20 ENCOUNTER — APPOINTMENT (OUTPATIENT)
Dept: INTERVENTIONAL RADIOLOGY/VASCULAR | Age: 80
DRG: 004 | End: 2025-03-20
Payer: MEDICARE

## 2025-03-20 ENCOUNTER — APPOINTMENT (OUTPATIENT)
Dept: GENERAL RADIOLOGY | Age: 80
DRG: 004 | End: 2025-03-20
Payer: MEDICARE

## 2025-03-20 LAB
AADO2: 118.5 MMHG
ALBUMIN SERPL-MCNC: 4 G/DL (ref 3.5–5.2)
ALP SERPL-CCNC: 79 U/L (ref 35–104)
ALT SERPL-CCNC: 106 U/L (ref 0–32)
ANION GAP SERPL CALCULATED.3IONS-SCNC: 19 MMOL/L (ref 7–16)
AST SERPL-CCNC: 47 U/L (ref 0–31)
B.E.: -1.3 MMOL/L (ref -3–3)
BASOPHILS # BLD: 0.02 K/UL (ref 0–0.2)
BASOPHILS NFR BLD: 0 % (ref 0–2)
BILIRUB DIRECT SERPL-MCNC: 0.8 MG/DL (ref 0–0.3)
BILIRUB INDIRECT SERPL-MCNC: 0.7 MG/DL (ref 0–1)
BILIRUB SERPL-MCNC: 1.5 MG/DL (ref 0–1.2)
BUN SERPL-MCNC: 20 MG/DL (ref 6–23)
CALCIUM SERPL-MCNC: 8.9 MG/DL (ref 8.6–10.2)
CHLORIDE SERPL-SCNC: 99 MMOL/L (ref 98–107)
CO2 SERPL-SCNC: 20 MMOL/L (ref 22–29)
COHB: 1.9 % (ref 0–1.5)
CREAT SERPL-MCNC: 2 MG/DL (ref 0.5–1)
CRITICAL: ABNORMAL
DATE ANALYZED: ABNORMAL
DATE OF COLLECTION: ABNORMAL
EOSINOPHIL # BLD: 0 K/UL (ref 0.05–0.5)
EOSINOPHILS RELATIVE PERCENT: 0 % (ref 0–6)
ERYTHROCYTE [DISTWIDTH] IN BLOOD BY AUTOMATED COUNT: 18.5 % (ref 11.5–15)
FIO2: 40 %
GFR, ESTIMATED: 25 ML/MIN/1.73M2
GLUCOSE BLD-MCNC: 110 MG/DL (ref 74–99)
GLUCOSE SERPL-MCNC: 100 MG/DL (ref 74–99)
HCO3: 22.7 MMOL/L (ref 22–26)
HCT VFR BLD AUTO: 19.6 % (ref 34–48)
HCT VFR BLD AUTO: 24.1 % (ref 34–48)
HGB BLD-MCNC: 6.1 G/DL (ref 11.5–15.5)
HGB BLD-MCNC: 7.8 G/DL (ref 11.5–15.5)
HHB: 1.3 % (ref 0–5)
IGG 1: 607 MG/DL (ref 240–1118)
IGG 2: 127 MG/DL (ref 124–549)
IGG 3: 135 MG/DL (ref 21–134)
IGG4 SER-MCNC: 31 MG/DL (ref 1–123)
IMM GRANULOCYTES # BLD AUTO: 0.32 K/UL (ref 0–0.58)
IMM GRANULOCYTES NFR BLD: 2 % (ref 0–5)
LAB: ABNORMAL
LYMPHOCYTES NFR BLD: 0.7 K/UL (ref 1.5–4)
LYMPHOCYTES RELATIVE PERCENT: 4 % (ref 20–42)
Lab: 425
MAGNESIUM SERPL-MCNC: 1.9 MG/DL (ref 1.6–2.6)
MCH RBC QN AUTO: 26.9 PG (ref 26–35)
MCHC RBC AUTO-ENTMCNC: 31.1 G/DL (ref 32–34.5)
MCV RBC AUTO: 86.3 FL (ref 80–99.9)
METHB: 0.6 % (ref 0–1.5)
MODE: AC
MONOCYTES NFR BLD: 0.98 K/UL (ref 0.1–0.95)
MONOCYTES NFR BLD: 5 % (ref 2–12)
NEUTROPHILS NFR BLD: 89 % (ref 43–80)
NEUTS SEG NFR BLD: 16.45 K/UL (ref 1.8–7.3)
O2 SATURATION: 98.7 % (ref 92–98.5)
O2HB: 96.2 % (ref 94–97)
OPERATOR ID: 2962
PATIENT TEMP: 37 C
PCO2: 34.4 MMHG (ref 35–45)
PEEP/CPAP: 5 CMH2O
PFO2: 3.18 MMHG/%
PH BLOOD GAS: 7.44 (ref 7.35–7.45)
PHOSPHATE SERPL-MCNC: 2.2 MG/DL (ref 2.5–4.5)
PLATELET # BLD AUTO: 142 K/UL (ref 130–450)
PMV BLD AUTO: 11.3 FL (ref 7–12)
PO2: 127.1 MMHG (ref 75–100)
POTASSIUM SERPL-SCNC: 3.7 MMOL/L (ref 3.5–5)
PROT SERPL-MCNC: 5.5 G/DL (ref 6.4–8.3)
RBC # BLD AUTO: 2.27 M/UL (ref 3.5–5.5)
RI(T): 0.93
RR MECHANICAL: 12 B/MIN
SODIUM SERPL-SCNC: 138 MMOL/L (ref 132–146)
SOURCE, BLOOD GAS: ABNORMAL
THB: 6.5 G/DL (ref 11.5–16.5)
TIME ANALYZED: 442
VT MECHANICAL: 325 ML
WBC OTHER # BLD: 18.5 K/UL (ref 4.5–11.5)

## 2025-03-20 PROCEDURE — 82962 GLUCOSE BLOOD TEST: CPT

## 2025-03-20 PROCEDURE — 6370000000 HC RX 637 (ALT 250 FOR IP)

## 2025-03-20 PROCEDURE — 94003 VENT MGMT INPAT SUBQ DAY: CPT

## 2025-03-20 PROCEDURE — C1750 CATH, HEMODIALYSIS,LONG-TERM: HCPCS

## 2025-03-20 PROCEDURE — 6360000002 HC RX W HCPCS: Performed by: PSYCHIATRY & NEUROLOGY

## 2025-03-20 PROCEDURE — 84100 ASSAY OF PHOSPHORUS: CPT

## 2025-03-20 PROCEDURE — 82248 BILIRUBIN DIRECT: CPT

## 2025-03-20 PROCEDURE — 77001 FLUOROGUIDE FOR VEIN DEVICE: CPT

## 2025-03-20 PROCEDURE — 76937 US GUIDE VASCULAR ACCESS: CPT

## 2025-03-20 PROCEDURE — 6360000002 HC RX W HCPCS

## 2025-03-20 PROCEDURE — 80053 COMPREHEN METABOLIC PANEL: CPT

## 2025-03-20 PROCEDURE — 82805 BLOOD GASES W/O2 SATURATION: CPT

## 2025-03-20 PROCEDURE — 71045 X-RAY EXAM CHEST 1 VIEW: CPT

## 2025-03-20 PROCEDURE — 2500000003 HC RX 250 WO HCPCS: Performed by: INTERNAL MEDICINE

## 2025-03-20 PROCEDURE — 94640 AIRWAY INHALATION TREATMENT: CPT

## 2025-03-20 PROCEDURE — 85014 HEMATOCRIT: CPT

## 2025-03-20 PROCEDURE — 85018 HEMOGLOBIN: CPT

## 2025-03-20 PROCEDURE — 99233 SBSQ HOSP IP/OBS HIGH 50: CPT | Performed by: INTERNAL MEDICINE

## 2025-03-20 PROCEDURE — 2000000000 HC ICU R&B

## 2025-03-20 PROCEDURE — 2580000003 HC RX 258

## 2025-03-20 PROCEDURE — 6370000000 HC RX 637 (ALT 250 FOR IP): Performed by: INTERNAL MEDICINE

## 2025-03-20 PROCEDURE — P9016 RBC LEUKOCYTES REDUCED: HCPCS

## 2025-03-20 PROCEDURE — 6360000002 HC RX W HCPCS: Performed by: RADIOLOGY

## 2025-03-20 PROCEDURE — 36556 INSERT NON-TUNNEL CV CATH: CPT

## 2025-03-20 PROCEDURE — 36558 INSERT TUNNELED CV CATH: CPT

## 2025-03-20 PROCEDURE — 85025 COMPLETE CBC W/AUTO DIFF WBC: CPT

## 2025-03-20 PROCEDURE — 36430 TRANSFUSION BLD/BLD COMPNT: CPT

## 2025-03-20 PROCEDURE — 83735 ASSAY OF MAGNESIUM: CPT

## 2025-03-20 PROCEDURE — 2500000003 HC RX 250 WO HCPCS

## 2025-03-20 PROCEDURE — 99231 SBSQ HOSP IP/OBS SF/LOW 25: CPT | Performed by: NURSE PRACTITIONER

## 2025-03-20 PROCEDURE — 99232 SBSQ HOSP IP/OBS MODERATE 35: CPT | Performed by: INTERNAL MEDICINE

## 2025-03-20 RX ORDER — HYDROCORTISONE SODIUM SUCCINATE 100 MG/2ML
100 INJECTION INTRAMUSCULAR; INTRAVENOUS DAILY
Status: DISCONTINUED | OUTPATIENT
Start: 2025-03-21 | End: 2025-03-21

## 2025-03-20 RX ORDER — SODIUM CHLORIDE 9 MG/ML
INJECTION, SOLUTION INTRAVENOUS PRN
Status: DISCONTINUED | OUTPATIENT
Start: 2025-03-20 | End: 2025-03-24

## 2025-03-20 RX ORDER — LIDOCAINE HYDROCHLORIDE AND EPINEPHRINE BITARTRATE 20; .01 MG/ML; MG/ML
INJECTION, SOLUTION SUBCUTANEOUS PRN
Status: COMPLETED | OUTPATIENT
Start: 2025-03-20 | End: 2025-03-20

## 2025-03-20 RX ORDER — FENTANYL CITRATE 50 UG/ML
INJECTION, SOLUTION INTRAMUSCULAR; INTRAVENOUS PRN
Status: COMPLETED | OUTPATIENT
Start: 2025-03-20 | End: 2025-03-20

## 2025-03-20 RX ORDER — MAGNESIUM SULFATE 1 G/100ML
1000 INJECTION INTRAVENOUS ONCE
Status: COMPLETED | OUTPATIENT
Start: 2025-03-20 | End: 2025-03-20

## 2025-03-20 RX ORDER — LIDOCAINE HYDROCHLORIDE 20 MG/ML
INJECTION, SOLUTION INFILTRATION; PERINEURAL PRN
Status: COMPLETED | OUTPATIENT
Start: 2025-03-20 | End: 2025-03-20

## 2025-03-20 RX ORDER — NOREPINEPHRINE BITARTRATE 0.06 MG/ML
1-100 INJECTION, SOLUTION INTRAVENOUS CONTINUOUS
Status: DISCONTINUED | OUTPATIENT
Start: 2025-03-20 | End: 2025-03-21

## 2025-03-20 RX ORDER — DIPHENHYDRAMINE HYDROCHLORIDE 50 MG/ML
INJECTION, SOLUTION INTRAMUSCULAR; INTRAVENOUS PRN
Status: COMPLETED | OUTPATIENT
Start: 2025-03-20 | End: 2025-03-20

## 2025-03-20 RX ORDER — NOREPINEPHRINE BITARTRATE 0.06 MG/ML
1-100 INJECTION, SOLUTION INTRAVENOUS CONTINUOUS
Status: DISCONTINUED | OUTPATIENT
Start: 2025-03-20 | End: 2025-03-20

## 2025-03-20 RX ORDER — MIDAZOLAM HYDROCHLORIDE 5 MG/ML
INJECTION, SOLUTION INTRAMUSCULAR; INTRAVENOUS PRN
Status: COMPLETED | OUTPATIENT
Start: 2025-03-20 | End: 2025-03-20

## 2025-03-20 RX ADMIN — MIDODRINE HYDROCHLORIDE 20 MG: 10 TABLET ORAL at 08:27

## 2025-03-20 RX ADMIN — POTASSIUM BICARBONATE 20 MEQ: 782 TABLET, EFFERVESCENT ORAL at 08:27

## 2025-03-20 RX ADMIN — FENTANYL CITRATE 25 MCG: 50 INJECTION, SOLUTION INTRAMUSCULAR; INTRAVENOUS at 16:21

## 2025-03-20 RX ADMIN — MIDODRINE HYDROCHLORIDE 20 MG: 10 TABLET ORAL at 17:57

## 2025-03-20 RX ADMIN — CHLORHEXIDINE GLUCONATE, 0.12% ORAL RINSE 15 ML: 1.2 SOLUTION DENTAL at 20:10

## 2025-03-20 RX ADMIN — IPRATROPIUM BROMIDE AND ALBUTEROL SULFATE 1 DOSE: 2.5; .5 SOLUTION RESPIRATORY (INHALATION) at 17:01

## 2025-03-20 RX ADMIN — LIDOCAINE HYDROCHLORIDE 10 ML: 20 INJECTION, SOLUTION INFILTRATION; PERINEURAL at 16:21

## 2025-03-20 RX ADMIN — SODIUM CHLORIDE, PRESERVATIVE FREE 10 ML: 5 INJECTION INTRAVENOUS at 20:10

## 2025-03-20 RX ADMIN — APIXABAN 2.5 MG: 2.5 TABLET, FILM COATED ORAL at 20:10

## 2025-03-20 RX ADMIN — ARFORMOTEROL TARTRATE 15 MCG: 15 SOLUTION RESPIRATORY (INHALATION) at 09:49

## 2025-03-20 RX ADMIN — IPRATROPIUM BROMIDE AND ALBUTEROL SULFATE 1 DOSE: 2.5; .5 SOLUTION RESPIRATORY (INHALATION) at 20:18

## 2025-03-20 RX ADMIN — IPRATROPIUM BROMIDE AND ALBUTEROL SULFATE 1 DOSE: 2.5; .5 SOLUTION RESPIRATORY (INHALATION) at 12:10

## 2025-03-20 RX ADMIN — HYDROCORTISONE SODIUM SUCCINATE 100 MG: 100 INJECTION, POWDER, FOR SOLUTION INTRAMUSCULAR; INTRAVENOUS at 04:00

## 2025-03-20 RX ADMIN — BUDESONIDE INHALATION 500 MCG: 0.5 SUSPENSION RESPIRATORY (INHALATION) at 20:18

## 2025-03-20 RX ADMIN — LIDOCAINE HYDROCHLORIDE,EPINEPHRINE BITARTRATE 10 ML: 20; .01 INJECTION, SOLUTION INFILTRATION; PERINEURAL at 16:21

## 2025-03-20 RX ADMIN — MIDODRINE HYDROCHLORIDE 20 MG: 10 TABLET ORAL at 12:57

## 2025-03-20 RX ADMIN — CHLORHEXIDINE GLUCONATE, 0.12% ORAL RINSE 15 ML: 1.2 SOLUTION DENTAL at 08:28

## 2025-03-20 RX ADMIN — PANTOPRAZOLE SODIUM 40 MG: 40 INJECTION, POWDER, FOR SOLUTION INTRAVENOUS at 17:56

## 2025-03-20 RX ADMIN — LEVETIRACETAM 500 MG: 100 INJECTION INTRAVENOUS at 08:27

## 2025-03-20 RX ADMIN — PANTOPRAZOLE SODIUM 40 MG: 40 INJECTION, POWDER, FOR SOLUTION INTRAVENOUS at 05:47

## 2025-03-20 RX ADMIN — DIPHENHYDRAMINE HYDROCHLORIDE 25 MG: 50 INJECTION, SOLUTION INTRAMUSCULAR; INTRAVENOUS at 16:21

## 2025-03-20 RX ADMIN — MAGNESIUM SULFATE HEPTAHYDRATE 1000 MG: 1 INJECTION, SOLUTION INTRAVENOUS at 08:31

## 2025-03-20 RX ADMIN — MIDAZOLAM HYDROCHLORIDE 0.5 MG: 5 INJECTION, SOLUTION INTRAMUSCULAR; INTRAVENOUS at 16:21

## 2025-03-20 RX ADMIN — SODIUM CHLORIDE, PRESERVATIVE FREE 10 ML: 5 INJECTION INTRAVENOUS at 08:27

## 2025-03-20 RX ADMIN — BUDESONIDE INHALATION 500 MCG: 0.5 SUSPENSION RESPIRATORY (INHALATION) at 09:49

## 2025-03-20 RX ADMIN — Medication 1000 UNITS: at 08:26

## 2025-03-20 RX ADMIN — LEVETIRACETAM 500 MG: 100 INJECTION INTRAVENOUS at 20:10

## 2025-03-20 RX ADMIN — SODIUM PHOSPHATE, MONOBASIC, MONOHYDRATE AND SODIUM PHOSPHATE, DIBASIC, ANHYDROUS 15 MMOL: 142; 276 INJECTION, SOLUTION INTRAVENOUS at 07:50

## 2025-03-20 RX ADMIN — IPRATROPIUM BROMIDE AND ALBUTEROL SULFATE 1 DOSE: 2.5; .5 SOLUTION RESPIRATORY (INHALATION) at 09:49

## 2025-03-20 RX ADMIN — FLUDROCORTISONE ACETATE 0.05 MG: 0.1 TABLET ORAL at 08:27

## 2025-03-20 RX ADMIN — ARFORMOTEROL TARTRATE 15 MCG: 15 SOLUTION RESPIRATORY (INHALATION) at 20:18

## 2025-03-20 ASSESSMENT — PULMONARY FUNCTION TESTS
PIF_VALUE: 21
PIF_VALUE: 26
PIF_VALUE: 22
PIF_VALUE: 26
PIF_VALUE: 24
PIF_VALUE: 24
PIF_VALUE: 25
PIF_VALUE: 24
PIF_VALUE: 24
PIF_VALUE: 23
PIF_VALUE: 24
PIF_VALUE: 23
PIF_VALUE: 26
PIF_VALUE: 21
PIF_VALUE: 25
PIF_VALUE: 29
PIF_VALUE: 25
PIF_VALUE: 23
PIF_VALUE: 23
PIF_VALUE: 27
PIF_VALUE: 28
PIF_VALUE: 23
PIF_VALUE: 26
PIF_VALUE: 24
PIF_VALUE: 25
PIF_VALUE: 24
PIF_VALUE: 25
PIF_VALUE: 23
PIF_VALUE: 25
PIF_VALUE: 25
PIF_VALUE: 13

## 2025-03-20 ASSESSMENT — PAIN SCALES - GENERAL
PAINLEVEL_OUTOF10: 0

## 2025-03-20 NOTE — PROGRESS NOTES
Palliative Care Department  [phone]  Palliative Care Progress Note  Provider Mickie Lozano, APRN - CNP      PATIENT: Nikki Moffett  : 1945  MRN: [id number]  ADMISSION DATE: 3/4/2025  5:05 PM  Referring Provider: Ben Dumont. MD Lucrecia     Palliative Medicine was consulted on hospital day 16 for assistance with Goals of care    HPI:     Clinical Summary:Nikki Moffett is a 79 y.o. y/o female with a history of left MCA infarct SP thrombectomy, A-fib, diabetes, heart failure, who presented to [facility] on 3/4/2025 with fatigue and dizziness, ongoing for a month.  At ED, found tachycardic and dry on exam.  Chest x-ray shows bilateral pleural effusion and possible right lower lobe opacity, was treated initially with Zosyn and doxycycline and admitted for further medical evaluation.  Was seen by cardiology underwent cardioversion, became dyspneic and  hypotension postprocedure, was transferred to MICU for close monitoring.  Seen by cardiology SP TTE, on 3/7/2025, with EF 10 to 15%, mild mitral valve regurgitation, moderate transcript about regurgitation.    ASSESSMENT/PLAN:     Pertinent Hospital Diagnoses     Acute hypoxic respiratory failure   Acute on chronic HFrEF (EF 10-15%)   Bilateral pleural effusions   Suspected pneumonia   Acute renal failure on CKD now on HD- for tunneled catheter   Shock liver   History of left MCA stroke with M2 occlusion s/p thrombectomy      Palliative Care Encounter / Counseling Regarding Goals of Care  Please see detailed goals of care discussion as below  At this time, Nikki Moffett, Does Not have capacity for medical decision-making.  Capacity is time limited and situation/question specific  During encounter Shreyas was surrogate medical decision-maker  Outcome of goals of care meeting:  Continue with current medical treatment  Code status Full Code  Advanced Directives: no POA or living will in [facility]  Surrogate/Legal NOK:  Shreyas Moffett (Son)  [phone]    Spiritual assessment: no spiritual distress identified  Bereavement and grief: to be determined  Referrals to: none today    Thank you for the opportunity to participate in the care of Nikki Moffett.     SUBJECTIVE:     Details of Conversation:     Chart reviewed. Patient is intubated and on 40% FiO2. She is off sedation and off of Levophed. Her IR HD catheter was pushed and is now taking place today. Spoke with nursing. No family has been present today. They will let palliative care know if family arrives to the unit to visit.     Review of Systems   Unable to perform ROS: Intubated       Prognosis: Poor    OBJECTIVE:     BP (!) 108/42   Pulse 84   Temp 98.4 °F (36.9 °C) (Axillary)   Resp 17   Ht 1.575 m (5' 2\")   Wt 64.1 kg (141 lb 5 oz)   SpO2 96%   BMI 25.85 kg/m²     Physical Examination:  Physical Exam  Vitals reviewed.   Constitutional:       General: She is not in acute distress.     Appearance: She is ill-appearing.      Interventions: She is intubated.   HENT:      Head: Normocephalic.      Mouth/Throat:      Lips: Pink.      Mouth: Mucous membranes are moist.   Cardiovascular:      Rate and Rhythm: Normal rate and regular rhythm.   Pulmonary:      Effort: She is intubated.      Comments: Mechanically ventilated   Skin:     General: Skin is warm and dry.   Neurological:      Mental Status: She is lethargic.      Comments: Not following simple commands during exam    Psychiatric:         Mood and Affect: Mood is not anxious.         Speech: She is noncommunicative.         Behavior: Behavior is not agitated.         Objective data reviewed: labs, images, records, medication use, vitals, and chart    Time/Communication  Greater than 50% of time spent, total 15 minutes in counseling and coordination of care at the bedside regarding goals of care.    Thank you for allowing Palliative Medicine to participate in the care of Nikki Moffett.    Note: This report was completed using

## 2025-03-20 NOTE — PROGRESS NOTES
Comprehensive Nutrition Assessment    Type and Reason for Visit:  Reassess    Nutrition Recommendations/Plan:   Continue NPO, Modify Tube Feeding     Recommend Peptide based @ 35 ml/hr + 1 protein modular daily to provide 840 ml tv, 1008 kcals, 63 gm pro (1108 kcals, 89gm pro w/ mod), 681 ml free water    Renal formula not appropriate at this time w/ ANTONIO d/t K WNL & low Phos       Malnutrition Assessment:  Malnutrition Status:  At risk for malnutrition (03/14/25 1136)    Context:  Acute Illness     Findings of the 6 clinical characteristics of malnutrition:  Energy Intake:  50% or less of estimated energy requirements for 5 or more days (decreased intakes x 1 mon PTA per H&P)  Weight Loss:  Mild weight loss (10.5% wt loss x 1 year, does not meet significant criteria)     Body Fat Loss:  Unable to assess (droplet iso)     Muscle Mass Loss:  Unable to assess (droplet iso)    Fluid Accumulation:  No fluid accumulation     Strength:  Not Performed    Nutrition Assessment:    Pt remains at risk now intubated w/ ongoing need for EN support and ICU care. Admit w/ fatigue/weakness/CP found to have Afib RVR & B/L pleural effusion s/p cardioversion 3/7. Pt +Influenza A. Noted Shock liver & ANTONIO w/ CVVHD initiated, now transitioned to HD. PMHx Pre DM, L MCA infarct s/p thrombectomy, medication noncompliance, recent dx dementia. Noted poor PO intake x 1 month PTA. EN support currently held, pending TDC possibly today. Will provide updated TF recs and monitor.    Nutrition Related Findings:    pt intubated off sedation, hypotension off pressor, NGT clamped, hypoactive BS, soft abd, FMS, +1 pitting edema, +I/Os 1.3L, K WNL, Phos 2.2, jaundice, bili 1.5, elevated LFTs, HD Wound Type: None       Current Nutrition Intake & Therapies:    Average Meal Intake: NPO     ADULT TUBE FEEDING; Nasogastric; Renal Formula; Continuous; 10; Yes; 10; Q 4 hours; 40; 30; Q 4 hours  Current Tube Feeding (TF) Orders:  Feeding Route:  Nasogastric  Formula: Renal Formula  Schedule: Continuous  Feeding Regimen: 40 ml/hr, NGT clamped  Water Flushes: 30 ml q 4 hrs = 180 ml water  Current TF Provides: 960 ml tv, 1728 kcals, 78 gm pro, 698 ml free water, 878 ml total fluids    Anthropometric Measures:  Height: 157.5 cm (5' 2\")  Ideal Body Weight (IBW): 110 lbs (50 kg)    Admission Body Weight: 60.8 kg (134 lb) (3/18 first measured bedscale - likely elevated)  Current Body Weight: 56.2 kg (124 lb) (recent dry wt), 112.7 % IBW. Weight Source: Not specified  Current BMI (kg/m2): 22.7  Usual Body Weight: 63.1 kg (139 lb 2 oz) (2/22/24 EMR measured wt x ~1 year ago)     % Weight Change (Calculated): -10.5                    BMI Categories: Normal Weight (BMI 22.0 to 24.9) age over 65    Estimated Daily Nutrient Needs:  Energy Requirements Based On: Formula  Weight Used for Energy Requirements: Other  Energy (kcal/day): PS3B 1080; 5571-7274  Weight Used for Protein Requirements: Other (recent dry office visit wt 124lb)  Protein (g/day):  (1.5-1.8 gm/kg dry admit wt, monitor LFTs)  Method Used for Fluid Requirements: Defer to provider  Fluid (ml/day): per critical care/ renal management    Nutrition Diagnosis:   Inadequate oral intake related to impaired respiratory function as evidenced by NPO or clear liquid status due to medical condition, intubation, nutrition support - enteral nutrition    Nutrition Interventions:     Nutrition Education/Counseling: Education/Counseling not appropriate  Coordination of Nutrition Care: Continue to monitor while inpatient       Goals:  Goals: Initiation of nutrition, by next RD assessment  Type of Goal: New goal  Previous Goal Met: New Goal    Nutrition Monitoring and Evaluation:      Food/Nutrient Intake Outcomes: Enteral Nutrition Intake/Tolerance  Physical Signs/Symptoms Outcomes: Biochemical Data, GI Status, Fluid Status or Edema, Hemodynamic Status, Nutrition Focused Physical Findings, Skin, Weight    Discharge

## 2025-03-20 NOTE — PROGRESS NOTES
[facility]  Nephrology Attending Progress Note      SUBJECTIVE:     HPI:   3/9:  79 yrs old HF , Known h/o prior CVA s/p intracranial thrombectomy 2023 December .      Came in with above mentioned issues , noted to be in Rapid A fib , hypotensive , lethargic , already known to   Cardiology team on enteresto , eliquis , toprol , amiodarone .      Patient remained in rapid a fib , hypotensive , was diuresed , developed ANTONIO , Acidosis , , Lactic acidosis , shock liver   Coagulopathy , her LV function detoriorated to 10-15% due to a fib related Cardiomyopathy , possible myocardial injury ( high troponin )     Her HR now is better , she appears hemodynamically stable , alert and responsive in no distress       SUBJECTIVE:    3/10: pt seen and examined in the icu, chart reviewed. On cvvh, marco icu nurse, tolerating -50 ml/hr, in bipap. Lethargic, mumbles that she is ok    3/11: pt seen and examined. On cvvh, intubated. On epi and levo, marco icu nurse, keeping even with cvvh at this time    3/12: pt seen in icu, intubated on cvvh. Levo, running even. Marco icu nurse, pt lethargic and not answering questions    3/13: pt seen in icu, remains on cvvh, lethargic, not conversant, on tube feeds    3/14: pt seen in icu, on cvvh. For mri, not conversant, on levo    3/15: pt seen in icu, getting mri today, not conversant, on cvvh, on tube feeds, marco icu nurse, will stop cvvh, on low dose levo    3/16: pt seen in icu, intubated, off cvvh since yesterday, on tube feeds    3/17: pt seen in icu, remains intubated, for hd today. On tube feeds, on levo    3/18: sp hd yesterday with 500 ml off. For tdc today. On tube feeds, on levo, marco icu DEBRA MCNAIR    3/19: patient seen bedside, not making much urine. For tunneled HD line today then dialysis.    3/20: sp dialysis with 1.7L removed. Tunneled HD was not placed yesterday, hopefully today. Hb drop to 6.1 patient getting 1 unit.     PROBLEM LIST:    Patient Active Problem List   Diagnosis

## 2025-03-20 NOTE — PLAN OF CARE
Problem: Chronic Conditions and Co-morbidities  Goal: Patient's chronic conditions and co-morbidity symptoms are monitored and maintained or improved  3/20/2025 0917 by Lisette Goetz, RN  Outcome: Not Progressing  Flowsheets (Taken 3/20/2025 0800)  Care Plan - Patient's Chronic Conditions and Co-Morbidity Symptoms are Monitored and Maintained or Improved:   Monitor and assess patient's chronic conditions and comorbid symptoms for stability, deterioration, or improvement   Collaborate with multidisciplinary team to address chronic and comorbid conditions and prevent exacerbation or deterioration   Update acute care plan with appropriate goals if chronic or comorbid symptoms are exacerbated and prevent overall improvement and discharge  3/20/2025 0438 by Guerita Treviño, RN  Outcome: Not Progressing     Problem: Discharge Planning  Goal: Discharge to home or other facility with appropriate resources  3/20/2025 0917 by Lisette Goetz, RN  Outcome: Not Progressing  Flowsheets (Taken 3/20/2025 0800)  Discharge to home or other facility with appropriate resources:   Identify barriers to discharge with patient and caregiver   Arrange for needed discharge resources and transportation as appropriate   Identify discharge learning needs (meds, wound care, etc)   Arrange for interpreters to assist at discharge as needed   Refer to discharge planning if patient needs post-hospital services based on physician order or complex needs related to functional status, cognitive ability or social support system  3/20/2025 0438 by Guerita Treviño, RN  Outcome: Not Progressing     Problem: Nutrition Deficit:  Goal: Optimize nutritional status  Outcome: Not Progressing     Problem: Neurosensory - Adult  Goal: Achieves stable or improved neurological status  3/20/2025 0917 by Lisette Goetz, RN  Outcome: Not Progressing  Flowsheets (Taken 3/20/2025 0800)  Achieves stable or improved neurological status:   Assess for and report changes in  neurological status   Maintain blood pressure and fluid volume within ordered parameters to optimize cerebral perfusion and minimize risk of hemorrhage   Monitor temperature, glucose, and sodium. Initiate appropriate interventions as ordered   Initiate measures to prevent increased intracranial pressure  3/20/2025 0438 by Guerita Treviño RN  Outcome: Not Progressing  Goal: Achieves maximal functionality and self care  Outcome: Not Progressing  Flowsheets (Taken 3/20/2025 0800)  Achieves maximal functionality and self care:   Monitor swallowing and airway patency with patient fatigue and changes in neurological status   Encourage visually impaired, hearing impaired and aphasic patients to use assistive/communication devices   Encourage and assist patient to increase activity and self care with guidance from physical therapy/occupational therapy     Problem: Respiratory - Adult  Goal: Achieves optimal ventilation and oxygenation  Outcome: Not Progressing  Flowsheets (Taken 3/20/2025 0800)  Achieves optimal ventilation and oxygenation:   Assess for changes in mentation and behavior   Assess for changes in respiratory status   Position to facilitate oxygenation and minimize respiratory effort   Initiate smoking cessation protocol as indicated   Assess the need for suctioning and aspirate as needed   Assess and instruct to report shortness of breath or any respiratory difficulty   Encourage broncho-pulmonary hygiene including cough, deep breathe, incentive spirometry   Oxygen supplementation based on oxygen saturation or arterial blood gases   Respiratory therapy support as indicated     Problem: Musculoskeletal - Adult  Goal: Return mobility to safest level of function  Outcome: Not Progressing  Flowsheets (Taken 3/20/2025 0800)  Return Mobility to Safest Level of Function:   Assess patient stability and activity tolerance for standing, transferring and ambulating with or without assistive devices   Ensure adequate  protection for wounds/incisions during mobilization   Obtain physical therapy/occupational therapy consults as needed   Apply continuous passive motion per provider or physical therapy orders to increase flexion toward goal   Instruct patient/family in ordered activity level   Assist with transfers and ambulation using safe patient handling equipment as needed  Goal: Return ADL status to a safe level of function  Outcome: Not Progressing  Flowsheets (Taken 3/20/2025 0800)  Return ADL Status to a Safe Level of Function:   Administer medication as ordered   Assess activities of daily living deficits and provide assistive devices as needed   Obtain physical therapy/occupational therapy consults as needed   Assist and instruct patient to increase activity and self care as tolerated     Problem: Gastrointestinal - Adult  Goal: Maintains or returns to baseline bowel function  Outcome: Not Progressing  Flowsheets (Taken 3/20/2025 0800)  Maintains or returns to baseline bowel function:   Assess bowel function   Encourage oral fluids to ensure adequate hydration   Administer IV fluids as ordered to ensure adequate hydration   Administer ordered medications as needed   Encourage mobilization and activity   Nutrition consult to assist patient with appropriate food choices  Goal: Maintains adequate nutritional intake  Outcome: Not Progressing  Flowsheets (Taken 3/20/2025 0800)  Maintains adequate nutritional intake:   Monitor percentage of each meal consumed   Identify factors contributing to decreased intake, treat as appropriate   Assist with meals as needed   Obtain nutritional consult as needed   Monitor intake and output, weight and lab values

## 2025-03-20 NOTE — PROGRESS NOTES
The  visited the patient who was unable to be assessed as she was resting. There was no family present during the visit. The  provided spiritual support and prayer. The  will follow as needed. If additional support is needed please reach out to Spiritual Health (ext [phone]).    Rev MARRY Mcpherson MDIV,

## 2025-03-20 NOTE — PROGRESS NOTES
[facility]  Internal Medicine Residency Program  Progress Note - House Team     Patient:  Nikki Moffett 79 y.o. female MRN: [id number]     Date of Service: 3/20/2025     CC:   Chief Complaint   Patient presents with    Altered Mental Status     \"Mood swings, memory loss and fatigue\" per patients grandson    Chest Pain     Ongoing for a couple weeks per family     Overnight events:   Levo restarted  Hemoglobin 6.1, 1 unit pRBCs transfused    Subjective     Patient was seen at bedside this morning. Patient is intubated and sedated.    Objective     Physical Exam:  Vitals: BP (!) 110/46   Pulse 79   Temp 98.3 °F (36.8 °C) (Axillary)   Resp 13   Ht 1.575 m (5' 2\")   Wt 64.1 kg (141 lb 5 oz)   SpO2 93%   BMI 25.85 kg/m²     I & O - 24hr: In: 1121.9   Out: 2200   General appearance: Intubated and sedated.  Appears lethargic.  Nonpurposeful eye opening.  Neuro: Mental status: Intubated and sedated.  Nonpurposeful eye opening.  Does not follow commands.  Cardiovascular: Regular rate and rhythm, S1 and S2  heard, no murmurs appreciated  Respiratory: Rales present bilaterally   Abdomen: Soft, non tender, bowel sounds normal; masses, no organomegaly, rebound or guarding  Extremities: Extremities normal, no cyanosis, distal pulses intact, no edema    Pertinent Labs & Imaging Studies      Complete Blood Count:   Recent Labs     03/18/25  0407 03/19/25  0419 03/20/25  0410 03/20/25  0817   WBC 22.0* 21.6* 18.5*  --    HGB 7.7* 7.1* 6.1* 7.8*   HCT 24.9* 22.8* 19.6* 24.1*    160 142  --         Last 3 Blood Glucose:   Recent Labs     03/18/25  0407 03/19/25  0419 03/20/25  0410   GLUCOSE 114* 119* 100*        PT/INR:    Lab Results   Component Value Date/Time    PROTIME 16.6 03/17/2025 07:56 AM    INR 1.5 03/17/2025 07:56 AM     PTT:    Lab Results   Component Value Date/Time    APTT 42.0 03/08/2025 05:50 PM     Comprehensive Metabolic Profile:   Recent Labs     03/18/25  0407 03/19/25  0419  small bilateral pleural effusions.   4. Stable mild congestive failure.         XR ABDOMEN FOR NG/OG/NE TUBE PLACEMENT   Final Result   Enteric tube in the stomach as above.      No evidence of bowel obstruction.      Right pleural effusion with right basilar opacity with consolidation that may   represent pneumonia.         US ABDOMEN LIMITED   Final Result   1.  Echogenic liver parenchyma with somewhat scalloped liver margins.  Small   volume simple appearing intra-abdominal ascites in the right upper quadrant.      (Could reflect changes related to an underlying infiltrative pathology.   Hepatic steatosis or possibly early cirrhosis.  Please correlate with   patient's liver function test.)      2.  Right pleural effusion present.      3.  No intrahepatic or extrahepatic biliary dilatation.  Gallbladder is   contracted on this study.      4.  Normal appearance of the imaged kidney.  No hydronephrosis.         XR CHEST PORTABLE   Final Result   No significant change over the past 24 hours.         XR CHEST PORTABLE   Final Result   Right-sided vascular catheter tip overlies the region of the right atrium or   cavoatrial junction.      Cardiomediastinal silhouette is enlarged.      Perihilar and bibasilar opacities similar to prior.  Bilateral small pleural   effusions.  No gross pneumothorax.         XR CHEST PORTABLE   Final Result   Further progression of findings that indicate volume overload since March 7   study.  Please correlate clinically.         XR CHEST PORTABLE   Final Result   Borderline size cardiac area with mild to moderate bilateral pleural   effusions.         XR CHEST PORTABLE   Final Result   1. No acute cardiopulmonary process.   2. Bibasilar scarring and pleural thickening, unchanged.         XR CHEST (2 VW)   Final Result   Bilateral pleural effusions. The 1 on the left is slightly smaller than the   prior study.  Findings suggest resolving CHF         XR CHEST PORTABLE   Final Result  does not attenuate throughout the recording. There are intermittent bifrontally predominant triphasic waves seen in this recording. Beta activity is not seen in this recording.  There were no epileptiform abnormalities or electrographic seizures in this recording.\"   Shock, likely cardiogenic   Altered mental status likely 2/2 #2  Atrial fibrillation with RVR, s/p DCCV (03/07/2025)        I agree with the assessment, plan and orders as documented by the resident. I have reviewed all pertinent PMHx, PSHx, FamHx, SocialHx, medications, and allergies and updated history as appropriate.    Remainder of medical problems as per resident note.    Hermelindo Loredo,   3/20/2025 4:47 PM

## 2025-03-20 NOTE — CARE COORDINATION
Care Coordination: LOS 16 day  Vent(3/16), HA- left fem. HD. Plan for IR TDC. Referral sent to [facility].  Neurology following for acute on chronic L occipital ischemic stroke. NGT/Tf. Needs unclear, Palliative consulted, full code. Select following    Electronically signed by Cristela Bradford RN on 3/20/2025 at 1:59 PM

## 2025-03-20 NOTE — FLOWSHEET NOTE
03/19/25 2032   Vital Signs   BP (!) 134/56   Temp 98 °F (36.7 °C)   Pulse 72   Respirations 14   SpO2 97 %   Weight - Scale 65 kg (143 lb 4.8 oz)   Percent Weight Change -1.22   Post-Hemodialysis Assessment   Post-Treatment Procedures Blood returned;Catheter capped, clamped with Citrate x 2 ports   Machine Disinfection Process Acid/Vinegar Clean;Heat Disinfect;Exterior Machine Disinfection   Rinseback Volume (ml) 300 ml   Blood Volume Processed (Liters) 62 L   Dialyzer Clearance Heavily streaked   Duration of Treatment (minutes) 210 minutes   Hemodialysis Intake (ml) 500 ml   Hemodialysis Output (ml) 2200 ml   NET Removed (ml) 1700   Tolerated Treatment Good   Interventions Taken Ultrafiltration goal decreased   Patient Response to Treatment Tolerated well after albumin/midodrine and steroid given, 1700cc net fluid removal   Bilateral Breath Sounds Diminished   Edema Generalized   Edema Generalized +1   Physician Notified No   Time Off 2015   Patient Disposition Remain in ICU/ED   Observations & Evaluations   Level of Consciousness 1   Heart Rhythm Irregular   Respiratory Quality/Effort Unlabored   O2 Device Ventilator   Skin Color Bronze;Jaundice   Skin Condition/Temp Warm;Dry   Abdomen Inspection Soft   Comments Tolerated well after albumin/midodrine and steroid given, 1700cc net fluid removal   Handoff   Communication Given Transfer Handoff   Handoff Given To geoffrey beard   Handoff Received From tanner canela   Handoff Communication Face to Face;At bedside

## 2025-03-20 NOTE — PROGRESS NOTES
[facility]   Department of Internal Medicine   Internal Medicine Residency  MICU Progress Note    Patient:  Nikki Moffett 79 y.o. female   MRN: [id number]       Date of Service: 3/20/2025    Allergy: Patient has no known allergies.    Subjective     Overnight, protonix changed to BID due to blood secretion suctioned from ETT, 1U PRBC, THD line not done.     Patient was seen and examined this morning at bedside in no acute distress, no family present, remains intubated, off sedation, off levophed with MAP 65-70.    Objective     I & O - 24hr:    Intake/Output Summary (Last 24 hours) at 3/20/2025 0925  Last data filed at 3/20/2025 0836  Gross per 24 hour   Intake 1061.88 ml   Output 2200 ml   Net -1138.12 ml       Physical Exam  Vitals: BP (!) 110/46   Pulse 79   Temp 98.3 °F (36.8 °C) (Axillary)   Resp 13   Ht 1.575 m (5' 2\")   Wt 64.1 kg (141 lb 5 oz)   SpO2 93%   BMI 25.85 kg/m²     General Appearance: intubated no distress, warming blanket on,  spontaneous eye opening, does not follow any commands, pupils equal round sluggish to react, gag+, no clonus   HEENT: ETT present, dried blood around NG  Neck: trachea midline   Lung: mechanical breath sounds, rhonchi R>L   Heart: regular rate and rhythm 60s, capillary refill <2   Abdomen: soft, appears nontender, BS+   Extremities: +1 pitting edema     Medications     Continuous Infusions:   sodium chloride      norepinephrine      sodium chloride 10 mL/hr at 03/12/25 2219    dextrose       Scheduled Meds:   pantoprazole (PROTONIX) 40 mg in sodium chloride (PF) 0.9 % 10 mL injection  40 mg IntraVENous Q12H    sodium phosphate IVPB (CENTRAL line)  15 mmol IntraVENous Once    magnesium sulfate  1,000 mg IntraVENous Once    hydrocortisone sodium succinate PF  100 mg IntraVENous Q12H    midodrine  20 mg Per NG tube TID WC    fludrocortisone  0.05 mg Per NG tube Daily    chlorhexidine  15 mL Mouth/Throat BID    levETIRAcetam  500 mg IntraVENous BID

## 2025-03-20 NOTE — PLAN OF CARE
Problem: Chronic Conditions and Co-morbidities  Goal: Patient's chronic conditions and co-morbidity symptoms are monitored and maintained or improved  Outcome: Not Progressing     Problem: Discharge Planning  Goal: Discharge to home or other facility with appropriate resources  Outcome: Not Progressing     Problem: Safety - Adult  Goal: Free from fall injury  Outcome: Progressing     Problem: Metabolic/Fluid and Electrolytes - Adult  Goal: Electrolytes maintained within normal limits  Outcome: Progressing     Problem: Neurosensory - Adult  Goal: Achieves stable or improved neurological status  Outcome: Not Progressing     Problem: Chronic Conditions and Co-morbidities  Goal: Patient's chronic conditions and co-morbidity symptoms are monitored and maintained or improved  Outcome: Not Progressing     Problem: Discharge Planning  Goal: Discharge to home or other facility with appropriate resources  Outcome: Not Progressing     Problem: Neurosensory - Adult  Goal: Achieves stable or improved neurological status  Outcome: Not Progressing

## 2025-03-20 NOTE — PROGRESS NOTES
INPATIENT CARDIOLOGY FOLLOW-UP    Name: Nikki Moffett    Age: 79 y.o.    Date of Admission: 3/4/2025  5:05 PM    Date of Service: 3/20/2025      Chief Complaint: Follow-up for atrial fibrillation with RVR, worsening decompensated heart failure    Interim History:  No new overnight cardiac complaints. Currently with no complaints of CP, SOB, palpitations, dizziness, or lightheadedness. SR on telemetry.  No recurrent arrhythmia seen.  Intubated.     On intermittent hemodialysis.  5.8 L positive.  1.2 L removed via dialysis yesterday.    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)    Occipital stroke (HCC)    Atrial flutter (HCC)    Paroxysmal atrial fibrillation (HCC)    H/O: stroke    Atrial flutter with rapid ventricular response (HCC)    Decompensated heart failure (HCC)    Chronic atrial fibrillation (HCC)    VHD (valvular heart disease)    ANTONIO (acute kidney injury)    Palliative care encounter    Metabolic encephalopathy       Current Medications:    Current Facility-Administered Medications:     0.9 % sodium chloride infusion, , IntraVENous, PRN, Sandra Del Rosario MD    pantoprazole (PROTONIX) 40 mg in sodium chloride (PF) 0.9 % 10 mL injection, 40 mg, IntraVENous, Q12H, Sandra Del Rosario MD, 40 mg at 03/20/25 0547    sodium phosphate 15 mmol in sodium chloride 0.9 % 250 mL IVPB, 15 mmol, IntraVENous, Once, Sandra Del Rosario MD, Last Rate: 62.5 mL/hr at 03/20/25 0750, 15 mmol at 03/20/25 0750    norepinephrine (LEVOPHED) 16 mg in sodium chloride 0.9 % 250 mL infusion (premix), 1-100 mcg/min, IntraVENous, Continuous, Rosetta Lopez MD    hydrocortisone sodium succinate PF (SOLU-CORTEF) injection 100 mg, 100 mg, IntraVENous, Q12H, Rosetta Lopez MD, 100 mg at 03/20/25 0400    midodrine (PROAMATINE) tablet 20 mg, 20 mg, Per NG tube, TID John GONSALEZ  may be present.

## 2025-03-20 NOTE — PROGRESS NOTES
03/20/25 0951   Patient Observation   Pulse 66   Respirations 16   SpO2 92 %   Breath Sounds   Right Upper Lobe Expiratory wheezes   Right Middle Lobe Expiratory wheezes   Right Lower Lobe Expiratory wheezes   Left Upper Lobe Expiratory wheezes   Left Lower Lobe Expiratory wheezes   Vent Information   Vent Mode AC/VC   Ventilator Settings   Vt (Set, mL) 325 mL   Resp Rate (Set) 12 bpm   PEEP/CPAP (cmH2O) 5   FiO2  40 %   Peak Inspiratory Flow (Set) 50 L/sec   Vent Patient Data (Readings)   Vt (Measured) 335 mL   Peak Inspiratory Pressure (cmH2O) 25 cmH2O   Rate Measured 11 br/min   Minute Volume (L/min) 3.85 Liters   Mean Airway Pressure (cmH2O) 8 cmH20   Plateau Pressure (cm H2O) 20 cm H2O   Driving Pressure 15   I:E Ratio 1:5.40   Flow Sensitivity 3 L/min   PEEP Intrinsic (cm H2O) 0 cm H2O   Static Compliance (L/cm H2O) 24   Backup Apnea On   Backup Rate 12 Breaths Per Minute   Backup Vt 325   Vent Alarm Settings   Low Pressure (cmH2O)   (continues connnected vent    to walllalrm)   High Pressure (cmH2O) 50 cmH2O   Low Minute Volume (lpm) 3 L/min   High Minute Volume (lpm) 12 L/min   Low Exhaled Vt (ml) 300 mL   High Exhaled Vt (ml) 700 mL   RR High (bpm) 35 br/min   Apnea (secs) 20 secs   Additional Respiratoray Assessments   Humidification Source Heated wire   Humidification Temp 37   Ambu Bag With Mask At Bedside Yes   Airway Clearance   Suction ET Tube   Subglottic Suction Done Yes   Suction Device Inline suction catheter   Suction Catheter Size 14 Fr   Sputum Method Obtained Endotracheal   Sputum Amount Small   Sputum Color/Odor White   ETT    Placement Date/Time: 03/16/25 0350   Placement Verified By: Auscultation;Capnometry  Preoxygenation: Yes  Airway Type: Cuffed  Airway Tube Size: 7.5 mm  Secured At: 22 cm  Measured From: Lips   Secured At 21 cm   Measured From Lips   ETT Placement Right   Secured By Commercial tube crane   Weaning Parameters   Joseph Agitation Sedation Scale (RASS) -2   Treatment    $Treatment Type $Inhaled Therapy/Meds     DAILY VENTILATOR WEANING ASSESSMENT PERFORMED    P/FIO2 Ratio =  318        (<100= do not Wean)                  Cs =24                          (<32= Instability)  Plat. Pressure = 20  MV =  RSBI =    Instabilities:       Cardiovascular =       CNS =       Respiratory =Cs <32       Metabolic =    Parameters    no    Ask Physician for a weaning plan yes    Was SAT completed yes    Was SBT completed no    Additional Comments: pt to go for tunnel cath insertion , plan is to assess for sbt post procedure per Dr Jimenez to Rt    Performed by Julianna Benavides RCP RRT      Reference Table:    Cardiovascular     CNS      1. Mean BP less than or equal to 75   1. Neuromuscular blockade  2. Heart Rate greater than 130   2. RASS of -3, -4, -5  3. Myocardial Ischemia    3. RASS of +3, +4  4. Mechanical Assist Device    4. ICP greater than 15 or             Intracranial Hypertension         Respiratory      Metabolic  1. PEEP equal to or greater than 10cm/H20  1.Temp. (8hrs) less than 95 or > 103  2. Respiratory Rate greater than 35   2. WBC < 5000 or > 88902  3. Minute Volume greater than 15L  4. pH less than 7.30  5. Deteriorating chest X-ray

## 2025-03-20 NOTE — BRIEF OP NOTE
Brief Postoperative Note      Patient: Nikki Moffett  YOB: 1945  MRN: [id number]    Date of Procedure: 3/20/2025    End stage renal disease    Post-Op Diagnosis: Same       [facility]    BRIAN Clarence    Assistant:  * No surgical staff found *    Anesthesia: * No anesthesia type entered *    Estimated Blood Loss (mL): Minimal    Complications: None    Specimens:   * No specimens in log *    Implants:  * No implants in log *      Drains:   NG/OG/NJ/NE Tube Nasogastric 16 fr Right nostril (Active)   Surrounding Skin Clean, dry & intact 03/20/25 1400   Securement device Bridle 03/20/25 1400   Status Clamped 03/20/25 1400   Placement Verified External Catheter Length 03/20/25 1400   NG/OG/NJ/NE External Measurement (cm) 60 cm 03/20/25 1400   Drainage Appearance Brown 03/18/25 0600   Tube Feeding Renal Formula 03/20/25 0600   Tube feeding/verify rate (mL/hr) 30 mL/hr 03/20/25 0500   Tube Feeding Intake (mL) 231 ml 03/20/25 0500   Tube Feeding Supplement Amount (mL) 0 03/13/25 0802   Free Water/Flush (mL) 120 mL 03/20/25 0836   Output (mL) 0 ml 03/17/25 0541   Action Taken Feed set changed 03/19/25 2200   Residual Volume (ml) 0 ml 03/14/25 0700       Fecal Management System 03/13/25 (Active)   Stool Appearance Watery 03/20/25 1400   Stool Color Brown 03/20/25 1400   Position of Indicator Line Visible 03/18/25 0100   Balloon Volume Verified No 03/18/25 0100   Skin Assessment of the Anal Area Unremarkable 03/18/25 0100   Tube Irrigated Yes 03/18/25 0100   Irrigation Volume (mL) 60 03/18/25 0100   Stool Amount Medium 03/18/25 0100   Fecal Management Tube Output 150 ml 03/18/25 0600       [REMOVED] Urinary Catheter 03/07/25 [place] (Removed)   $ Urethral catheter insertion $ Not inserted for procedure 03/07/25 1400   Catheter Indications Need for fluid volume management of the critically ill patient in a critical care setting 03/08/25 0800   Site Assessment No urethral drainage 03/08/25 0800   Urine Color Yellow 03/08/25 0800    Urine Appearance Clear 03/08/25 0800   Collection Container Standard 03/08/25 0800   Securement Method Leg strap 03/08/25 0800   Catheter Care  Soap and water 03/08/25 0200   Catheter Best Practices  Drainage tube clipped to bed;Tamper seal intact;Catheter secured to thigh;Bag below bladder;Bag not on floor;Lack of dependent loop in tubing;Drainage bag less than half full 03/08/25 0200   Status Draining 03/08/25 0800   Output (mL) 5 mL 03/08/25 0900       [REMOVED] Urinary Catheter 03/08/25 (Removed)   Catheter Indications Need for fluid volume management of the critically ill patient in a critical care setting 03/10/25 1200   Site Assessment No urethral drainage 03/10/25 1200   Urine Color Yvonne;Brown 03/10/25 1200   Urine Appearance Cloudy 03/10/25 1200   Collection Container Standard 03/10/25 1200   Securement Method Securing device (Describe) 03/10/25 1200   Catheter Care  Soap and water 03/10/25 0800   Catheter Best Practices  Drainage tube clipped to bed;Catheter secured to thigh;Bag below bladder;Tamper seal intact;Bag not on floor;Lack of dependent loop in tubing;Drainage bag less than half full 03/10/25 1200   Status Draining 03/10/25 1200   Output (mL) 0 mL 03/10/25 0800       Findings:  Infection Present At Time Of Surgery (PATOS) (choose all levels that have infection present):  No infection present  Other Findings: AP 24 hemodialysis catheter    Electronically signed by Natalya Lima MD on 3/20/2025 at 5:28 PM

## 2025-03-20 NOTE — PRE SEDATION
Sedation Pre-Procedure Note    Patient Name: Nikki Moffett   YOB: 1945  Room/Bed: 4414/4414-A  Medical Record Number: [id number]  Date: 3/20/2025   Time: 5:27 PM       Indication:  renal failure    Consent: I have discussed with the patient and/or the patient representative the indication, alternatives, and the possible risks and/or complications of the planned procedure and the anesthesia methods. The patient and/or patient representative appear to understand and agree to proceed.    Vital Signs:   Vitals:    03/20/25 1702   BP:    Pulse: 85   Resp: 18   Temp:    SpO2: 97%       Past Medical History:   has a past medical history of A-fib (HCC), HFrEF (heart failure with reduced ejection fraction) (HCC), HLD (hyperlipidemia), HTN (hypertension), Interatrial cardiac shunt, and L MCA stroke.    Past Surgical History:   has a past surgical history that includes IR MECHANICAL ART THROMBECTOMY INTRACRANIAL (12/21/2023).    Medications:   Scheduled Meds:    pantoprazole (PROTONIX) 40 mg in sodium chloride (PF) 0.9 % 10 mL injection  40 mg IntraVENous Q12H    [START ON 3/21/2025] hydrocortisone sodium succinate PF  100 mg IntraVENous Daily    midodrine  20 mg Per NG tube TID WC    fludrocortisone  0.05 mg Per NG tube Daily    chlorhexidine  15 mL Mouth/Throat BID    levETIRAcetam  500 mg IntraVENous BID    [Held by provider] lactulose  20 g Oral TID    insulin lispro  0-4 Units SubCUTAneous Q6H    sodium chloride flush  5-40 mL IntraVENous 2 times per day    apixaban  2.5 mg Oral BID    [Held by provider] furosemide  40 mg IntraVENous Daily    ipratropium 0.5 mg-albuterol 2.5 mg  1 Dose Inhalation Q4H WA RT    arformoterol tartrate  15 mcg Nebulization BID RT    budesonide  0.5 mg Nebulization BID RT    [Held by provider] metoprolol succinate  25 mg Oral Daily    [Held by provider] amiodarone  200 mg Oral BID    Vitamin D  1,000 Units Oral Daily    [Held by provider] atorvastatin  80 mg Oral Nightly  patient with severe systemic disease that limits activity but is not incapacitating    Sedation/ Anesthesia Plan:   intravenous sedation    Medications Planned:   fentanyl intravenously    Patient is an appropriate candidate for plan of sedation: yes    Electronically signed by Natalya Lima MD on 3/20/2025 at 5:27 PM

## 2025-03-21 ENCOUNTER — APPOINTMENT (OUTPATIENT)
Dept: GENERAL RADIOLOGY | Age: 80
DRG: 004 | End: 2025-03-21
Payer: MEDICARE

## 2025-03-21 LAB
AADO2: 166.2 MMHG
ABO/RH: NORMAL
ALBUMIN SERPL-MCNC: 3.5 G/DL (ref 3.5–5.2)
ALP SERPL-CCNC: 86 U/L (ref 35–104)
ALT SERPL-CCNC: 96 U/L (ref 0–32)
ANION GAP SERPL CALCULATED.3IONS-SCNC: 18 MMOL/L (ref 7–16)
ANTIBODY SCREEN: NEGATIVE
ARM BAND NUMBER: NORMAL
AST SERPL-CCNC: 46 U/L (ref 0–31)
B.E.: -1.2 MMOL/L (ref -3–3)
BASOPHILS # BLD: 0.01 K/UL (ref 0–0.2)
BASOPHILS NFR BLD: 0 % (ref 0–2)
BILIRUB DIRECT SERPL-MCNC: 0.5 MG/DL (ref 0–0.3)
BILIRUB INDIRECT SERPL-MCNC: 0.6 MG/DL (ref 0–1)
BILIRUB SERPL-MCNC: 1.1 MG/DL (ref 0–1.2)
BLOOD BANK BLOOD PRODUCT EXPIRATION DATE: NORMAL
BLOOD BANK DISPENSE STATUS: NORMAL
BLOOD BANK ISBT PRODUCT BLOOD TYPE: 5100
BLOOD BANK PRODUCT CODE: NORMAL
BLOOD BANK SAMPLE EXPIRATION: NORMAL
BLOOD BANK UNIT TYPE AND RH: NORMAL
BPU ID: NORMAL
BUN SERPL-MCNC: 36 MG/DL (ref 6–23)
CALCIUM SERPL-MCNC: 8.5 MG/DL (ref 8.6–10.2)
CHLORIDE SERPL-SCNC: 98 MMOL/L (ref 98–107)
CO2 SERPL-SCNC: 21 MMOL/L (ref 22–29)
COHB: 0.8 % (ref 0–1.5)
COMPONENT: NORMAL
CREAT SERPL-MCNC: 3 MG/DL (ref 0.5–1)
CRITICAL: ABNORMAL
CROSSMATCH RESULT: NORMAL
DATE ANALYZED: ABNORMAL
DATE OF COLLECTION: ABNORMAL
EOSINOPHIL # BLD: 0.01 K/UL (ref 0.05–0.5)
EOSINOPHILS RELATIVE PERCENT: 0 % (ref 0–6)
ERYTHROCYTE [DISTWIDTH] IN BLOOD BY AUTOMATED COUNT: 18.5 % (ref 11.5–15)
FIO2: 45 %
GFR, ESTIMATED: 16 ML/MIN/1.73M2
GLUCOSE BLD-MCNC: 100 MG/DL (ref 74–99)
GLUCOSE BLD-MCNC: 100 MG/DL (ref 74–99)
GLUCOSE BLD-MCNC: 122 MG/DL (ref 74–99)
GLUCOSE BLD-MCNC: 132 MG/DL (ref 74–99)
GLUCOSE SERPL-MCNC: 94 MG/DL (ref 74–99)
HCO3: 22.8 MMOL/L (ref 22–26)
HCT VFR BLD AUTO: 25.5 % (ref 34–48)
HGB BLD-MCNC: 8.1 G/DL (ref 11.5–15.5)
HHB: 1.8 % (ref 0–5)
IMM GRANULOCYTES # BLD AUTO: 0.18 K/UL (ref 0–0.58)
IMM GRANULOCYTES NFR BLD: 1 % (ref 0–5)
LAB: ABNORMAL
LYMPHOCYTES NFR BLD: 1.05 K/UL (ref 1.5–4)
LYMPHOCYTES RELATIVE PERCENT: 7 % (ref 20–42)
Lab: 418
MAGNESIUM SERPL-MCNC: 2.2 MG/DL (ref 1.6–2.6)
MCH RBC QN AUTO: 27.4 PG (ref 26–35)
MCHC RBC AUTO-ENTMCNC: 31.8 G/DL (ref 32–34.5)
MCV RBC AUTO: 86.1 FL (ref 80–99.9)
METHB: 0.7 % (ref 0–1.5)
MODE: AC
MONOCYTES NFR BLD: 1.11 K/UL (ref 0.1–0.95)
MONOCYTES NFR BLD: 7 % (ref 2–12)
NEUTROPHILS NFR BLD: 84 % (ref 43–80)
NEUTS SEG NFR BLD: 12.73 K/UL (ref 1.8–7.3)
O2 SATURATION: 98.2 % (ref 92–98.5)
O2HB: 96.7 % (ref 94–97)
OPERATOR ID: 3214
PATIENT TEMP: 37 C
PCO2: 35.1 MMHG (ref 35–45)
PEEP/CPAP: 5 CMH2O
PFO2: 2.55 MMHG/%
PH BLOOD GAS: 7.43 (ref 7.35–7.45)
PHOSPHATE SERPL-MCNC: 4.2 MG/DL (ref 2.5–4.5)
PLATELET # BLD AUTO: 148 K/UL (ref 130–450)
PMV BLD AUTO: 11.3 FL (ref 7–12)
PO2: 114.7 MMHG (ref 75–100)
POTASSIUM SERPL-SCNC: 3.6 MMOL/L (ref 3.5–5)
PROT SERPL-MCNC: 5.3 G/DL (ref 6.4–8.3)
RBC # BLD AUTO: 2.96 M/UL (ref 3.5–5.5)
RI(T): 1.45
RR MECHANICAL: 12 B/MIN
SODIUM SERPL-SCNC: 137 MMOL/L (ref 132–146)
SOURCE, BLOOD GAS: ABNORMAL
THB: 8.9 G/DL (ref 11.5–16.5)
TIME ANALYZED: 431
TRANSFUSION STATUS: NORMAL
UNIT DIVISION: 0
UNIT ISSUE DATE/TIME: NORMAL
VT MECHANICAL: 350 ML
WBC OTHER # BLD: 15.1 K/UL (ref 4.5–11.5)

## 2025-03-21 PROCEDURE — B5131ZA FLUOROSCOPY OF RIGHT JUGULAR VEINS USING LOW OSMOLAR CONTRAST, GUIDANCE: ICD-10-PCS | Performed by: RADIOLOGY

## 2025-03-21 PROCEDURE — 6370000000 HC RX 637 (ALT 250 FOR IP)

## 2025-03-21 PROCEDURE — 80053 COMPREHEN METABOLIC PANEL: CPT

## 2025-03-21 PROCEDURE — 84100 ASSAY OF PHOSPHORUS: CPT

## 2025-03-21 PROCEDURE — 94640 AIRWAY INHALATION TREATMENT: CPT

## 2025-03-21 PROCEDURE — 2500000003 HC RX 250 WO HCPCS: Performed by: INTERNAL MEDICINE

## 2025-03-21 PROCEDURE — 6360000002 HC RX W HCPCS

## 2025-03-21 PROCEDURE — 2580000003 HC RX 258

## 2025-03-21 PROCEDURE — 99232 SBSQ HOSP IP/OBS MODERATE 35: CPT | Performed by: INTERNAL MEDICINE

## 2025-03-21 PROCEDURE — 94003 VENT MGMT INPAT SUBQ DAY: CPT

## 2025-03-21 PROCEDURE — 83735 ASSAY OF MAGNESIUM: CPT

## 2025-03-21 PROCEDURE — 2000000000 HC ICU R&B

## 2025-03-21 PROCEDURE — 82962 GLUCOSE BLOOD TEST: CPT

## 2025-03-21 PROCEDURE — 85025 COMPLETE CBC W/AUTO DIFF WBC: CPT

## 2025-03-21 PROCEDURE — 2500000003 HC RX 250 WO HCPCS

## 2025-03-21 PROCEDURE — 0JH63XZ INSERTION OF TUNNELED VASCULAR ACCESS DEVICE INTO CHEST SUBCUTANEOUS TISSUE AND FASCIA, PERCUTANEOUS APPROACH: ICD-10-PCS | Performed by: RADIOLOGY

## 2025-03-21 PROCEDURE — 90935 HEMODIALYSIS ONE EVALUATION: CPT

## 2025-03-21 PROCEDURE — 82805 BLOOD GASES W/O2 SATURATION: CPT

## 2025-03-21 PROCEDURE — 82248 BILIRUBIN DIRECT: CPT

## 2025-03-21 PROCEDURE — 99291 CRITICAL CARE FIRST HOUR: CPT | Performed by: INTERNAL MEDICINE

## 2025-03-21 PROCEDURE — 71045 X-RAY EXAM CHEST 1 VIEW: CPT

## 2025-03-21 PROCEDURE — 05HM33Z INSERTION OF INFUSION DEVICE INTO RIGHT INTERNAL JUGULAR VEIN, PERCUTANEOUS APPROACH: ICD-10-PCS | Performed by: RADIOLOGY

## 2025-03-21 PROCEDURE — 99231 SBSQ HOSP IP/OBS SF/LOW 25: CPT

## 2025-03-21 PROCEDURE — 6360000002 HC RX W HCPCS: Performed by: PSYCHIATRY & NEUROLOGY

## 2025-03-21 RX ORDER — LANOLIN ALCOHOL/MO/W.PET/CERES
100 CREAM (GRAM) TOPICAL DAILY
Status: DISCONTINUED | OUTPATIENT
Start: 2025-03-28 | End: 2025-03-27

## 2025-03-21 RX ORDER — THIAMINE HYDROCHLORIDE 100 MG/ML
250 INJECTION, SOLUTION INTRAMUSCULAR; INTRAVENOUS EVERY 24 HOURS
Status: COMPLETED | OUTPATIENT
Start: 2025-03-23 | End: 2025-03-27

## 2025-03-21 RX ORDER — LORAZEPAM 1 MG/1
1 TABLET ORAL
Status: DISCONTINUED | OUTPATIENT
Start: 2025-03-21 | End: 2025-03-21

## 2025-03-21 RX ORDER — CHOLECALCIFEROL (VITAMIN D3) 50 MCG
2000 TABLET ORAL DAILY
Status: DISCONTINUED | OUTPATIENT
Start: 2025-03-21 | End: 2025-03-27

## 2025-03-21 RX ORDER — HYDROCORTISONE SODIUM SUCCINATE 100 MG/2ML
50 INJECTION INTRAMUSCULAR; INTRAVENOUS DAILY
Status: DISCONTINUED | OUTPATIENT
Start: 2025-03-22 | End: 2025-03-24

## 2025-03-21 RX ORDER — THIAMINE HYDROCHLORIDE 100 MG/ML
500 INJECTION, SOLUTION INTRAMUSCULAR; INTRAVENOUS EVERY 8 HOURS
Status: COMPLETED | OUTPATIENT
Start: 2025-03-21 | End: 2025-03-23

## 2025-03-21 RX ORDER — WATER 10 ML/10ML
INJECTION INTRAMUSCULAR; INTRAVENOUS; SUBCUTANEOUS
Status: COMPLETED
Start: 2025-03-21 | End: 2025-03-21

## 2025-03-21 RX ADMIN — LEVETIRACETAM 500 MG: 100 INJECTION INTRAVENOUS at 12:17

## 2025-03-21 RX ADMIN — ARFORMOTEROL TARTRATE 15 MCG: 15 SOLUTION RESPIRATORY (INHALATION) at 08:22

## 2025-03-21 RX ADMIN — MIDODRINE HYDROCHLORIDE 20 MG: 10 TABLET ORAL at 08:37

## 2025-03-21 RX ADMIN — BUDESONIDE INHALATION 500 MCG: 0.5 SUSPENSION RESPIRATORY (INHALATION) at 08:22

## 2025-03-21 RX ADMIN — APIXABAN 2.5 MG: 2.5 TABLET, FILM COATED ORAL at 19:59

## 2025-03-21 RX ADMIN — THIAMINE HYDROCHLORIDE 500 MG: 100 INJECTION, SOLUTION INTRAMUSCULAR; INTRAVENOUS at 16:26

## 2025-03-21 RX ADMIN — CHLORHEXIDINE GLUCONATE, 0.12% ORAL RINSE 15 ML: 1.2 SOLUTION DENTAL at 19:59

## 2025-03-21 RX ADMIN — LEVETIRACETAM 500 MG: 100 INJECTION INTRAVENOUS at 19:58

## 2025-03-21 RX ADMIN — IPRATROPIUM BROMIDE AND ALBUTEROL SULFATE 1 DOSE: 2.5; .5 SOLUTION RESPIRATORY (INHALATION) at 16:32

## 2025-03-21 RX ADMIN — THIAMINE HYDROCHLORIDE 500 MG: 100 INJECTION, SOLUTION INTRAMUSCULAR; INTRAVENOUS at 08:36

## 2025-03-21 RX ADMIN — MIDODRINE HYDROCHLORIDE 20 MG: 10 TABLET ORAL at 12:15

## 2025-03-21 RX ADMIN — Medication 2000 UNITS: at 08:37

## 2025-03-21 RX ADMIN — FLUDROCORTISONE ACETATE 0.05 MG: 0.1 TABLET ORAL at 08:38

## 2025-03-21 RX ADMIN — APIXABAN 2.5 MG: 2.5 TABLET, FILM COATED ORAL at 08:36

## 2025-03-21 RX ADMIN — WATER 10 ML: 1 INJECTION INTRAMUSCULAR; INTRAVENOUS; SUBCUTANEOUS at 08:38

## 2025-03-21 RX ADMIN — ARFORMOTEROL TARTRATE 15 MCG: 15 SOLUTION RESPIRATORY (INHALATION) at 20:48

## 2025-03-21 RX ADMIN — SODIUM CHLORIDE, PRESERVATIVE FREE 10 ML: 5 INJECTION INTRAVENOUS at 19:59

## 2025-03-21 RX ADMIN — IPRATROPIUM BROMIDE AND ALBUTEROL SULFATE 1 DOSE: 2.5; .5 SOLUTION RESPIRATORY (INHALATION) at 11:42

## 2025-03-21 RX ADMIN — MIDODRINE HYDROCHLORIDE 20 MG: 10 TABLET ORAL at 16:26

## 2025-03-21 RX ADMIN — IPRATROPIUM BROMIDE AND ALBUTEROL SULFATE 1 DOSE: 2.5; .5 SOLUTION RESPIRATORY (INHALATION) at 20:48

## 2025-03-21 RX ADMIN — BUDESONIDE INHALATION 500 MCG: 0.5 SUSPENSION RESPIRATORY (INHALATION) at 20:48

## 2025-03-21 RX ADMIN — SODIUM CHLORIDE, PRESERVATIVE FREE 10 ML: 5 INJECTION INTRAVENOUS at 08:37

## 2025-03-21 RX ADMIN — CHLORHEXIDINE GLUCONATE, 0.12% ORAL RINSE 15 ML: 1.2 SOLUTION DENTAL at 08:37

## 2025-03-21 RX ADMIN — POTASSIUM BICARBONATE 40 MEQ: 782 TABLET, EFFERVESCENT ORAL at 06:36

## 2025-03-21 RX ADMIN — IPRATROPIUM BROMIDE AND ALBUTEROL SULFATE 1 DOSE: 2.5; .5 SOLUTION RESPIRATORY (INHALATION) at 08:22

## 2025-03-21 RX ADMIN — PANTOPRAZOLE SODIUM 40 MG: 40 INJECTION, POWDER, FOR SOLUTION INTRAVENOUS at 05:21

## 2025-03-21 RX ADMIN — HYDROCORTISONE SODIUM SUCCINATE 100 MG: 100 INJECTION, POWDER, FOR SOLUTION INTRAMUSCULAR; INTRAVENOUS at 08:37

## 2025-03-21 RX ADMIN — PANTOPRAZOLE SODIUM 40 MG: 40 INJECTION, POWDER, FOR SOLUTION INTRAVENOUS at 16:26

## 2025-03-21 ASSESSMENT — PAIN SCALES - GENERAL
PAINLEVEL_OUTOF10: 0

## 2025-03-21 ASSESSMENT — PULMONARY FUNCTION TESTS
PIF_VALUE: 21
PIF_VALUE: 20
PIF_VALUE: 22
PIF_VALUE: 21
PIF_VALUE: 24
PIF_VALUE: 21
PIF_VALUE: 21
PIF_VALUE: 22
PIF_VALUE: 22
PIF_VALUE: 20
PIF_VALUE: 22
PIF_VALUE: 22
PIF_VALUE: 21
PIF_VALUE: 21
PIF_VALUE: 22
PIF_VALUE: 21
PIF_VALUE: 22
PIF_VALUE: 21
PIF_VALUE: 21
PIF_VALUE: 20
PIF_VALUE: 21
PIF_VALUE: 22

## 2025-03-21 NOTE — PROGRESS NOTES
[facility]  Nephrology Attending Progress Note      SUBJECTIVE:     HPI:   3/9:  79 yrs old HF , Known h/o prior CVA s/p intracranial thrombectomy 2023 December .      Came in with above mentioned issues , noted to be in Rapid A fib , hypotensive , lethargic , already known to   Cardiology team on enteresto , eliquis , toprol , amiodarone .      Patient remained in rapid a fib , hypotensive , was diuresed , developed ANTONIO , Acidosis , , Lactic acidosis , shock liver   Coagulopathy , her LV function detoriorated to 10-15% due to a fib related Cardiomyopathy , possible myocardial injury ( high troponin )     Her HR now is better , she appears hemodynamically stable , alert and responsive in no distress       SUBJECTIVE:    3/10: pt seen and examined in the icu, chart reviewed. On cvvh, marco icu nurse, tolerating -50 ml/hr, in bipap. Lethargic, mumbles that she is ok    3/11: pt seen and examined. On cvvh, intubated. On epi and levo, marco icu nurse, keeping even with cvvh at this time    3/12: pt seen in icu, intubated on cvvh. Levo, running even. Marco icu nurse, pt lethargic and not answering questions    3/13: pt seen in icu, remains on cvvh, lethargic, not conversant, on tube feeds    3/14: pt seen in icu, on cvvh. For mri, not conversant, on levo    3/15: pt seen in icu, getting mri today, not conversant, on cvvh, on tube feeds, marco icu nurse, will stop cvvh, on low dose levo    3/16: pt seen in icu, intubated, off cvvh since yesterday, on tube feeds    3/17: pt seen in icu, remains intubated, for hd today. On tube feeds, on levo    3/18: sp hd yesterday with 500 ml off. For tdc today. On tube feeds, on levo, marco icu DEBRA MCNAIR    3/19: patient seen bedside, not making much urine. For tunneled HD line today then dialysis.    3/20: sp dialysis with 1.7L removed. Tunneled HD was not placed yesterday, hopefully today. Hb drop to 6.1 patient getting 1 unit.    3/21: s/p tunneled HD 3/20. Post H/H 7.8. Patient awake but  no purposeful movements, not following commands. Off sedation.     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)    Occipital stroke (HCC)    Atrial flutter (HCC)    Paroxysmal atrial fibrillation (HCC)    H/O: stroke    Atrial flutter with rapid ventricular response (HCC)    Decompensated heart failure (HCC)    Chronic atrial fibrillation (HCC)    VHD (valvular heart disease)    ANTONIO (acute kidney injury)    Palliative care encounter    Metabolic encephalopathy        PAST MEDICAL HISTORY:    Past Medical History:   Diagnosis Date    A-fib (HCC)     HFrEF (heart failure with reduced ejection fraction) (HCC)     HLD (hyperlipidemia)     HTN (hypertension)     Interatrial cardiac shunt     on echo 2023    L MCA stroke 2023    s/p thrombectomy       DIET:    ADULT TUBE FEEDING; Nasogastric; Peptide Based; Continuous; 10; Yes; 10; Q 4 hours; 35; 30; Q 4 hours; Protein; 1 Dose; Daily     PHYSICAL EXAM:     Patient Vitals for the past 24 hrs:   BP Temp Temp src Pulse Resp SpO2 Height Weight   03/21/25 0600 (!) 85/52 -- -- 68 24 99 % -- 66.5 kg (146 lb 9.7 oz)   03/21/25 0500 (!) 81/62 -- -- 64 16 99 % -- --   03/21/25 0400 (!) 83/58 98.1 °F (36.7 °C) Axillary 63 24 99 % -- --   03/21/25 0300 (!) 85/56 -- -- 62 24 98 % -- --   03/21/25 0200 (!) 91/58 -- -- 72 24 98 % -- --   03/21/25 0100 (!) 89/65 -- -- 68 19 98 % -- --   03/21/25 0047 -- -- -- 66 12 98 % -- --   03/21/25 0000 (!) 81/56 97.8 °F (36.6 °C) Axillary 64 16 99 % -- --   03/20/25 2330 93/60 -- -- 70 12 98 % -- --   03/20/25 2300 -- -- -- 66 12 99 % -- --   03/20/25 2200 97/65 -- -- 78 17 98 % -- --   03/20/25 2100 -- -- -- 74 24 96 % -- --   03/20/25 2000 -- -- -- 70 24 97 % -- --   03/20/25 1900 -- -- -- 68 12 97 % -- --   03/20/25 1800 -- -- -- 70 19 99 % -- --   03/20/25 1702 -- -- -- 85 18 97 % -- --   03/20/25 1701 -- -- -- 86 24 97 % -- --   03/20/25 1700 (!) 120/57 98.5 °F (36.9

## 2025-03-21 NOTE — PLAN OF CARE
Per chart review:  12/2023: SLP reported Polish as first language.     3/9/2024: After 12/2023 ischemic L MCA, pt was completely aphasic prior to thrombectomy, after which she recovered only Polish speaking abilities. Days later she recovered her English.     Pt seen at bedside with Portuguese speaking resident; is more engaged with Portuguese interview, turning head and making facial expressions, unable to follow commands due to weakness vs understanding?     Son confirms Polish as first language, English second.    Will continue to evaluate in English and Polish daily.     Electronically signed by Rosetta Lopez MD on 3/21/2025 at 3:33 PM

## 2025-03-21 NOTE — PROGRESS NOTES
[facility]  Internal Medicine Residency Program  Progress Note - House Team     Patient:  Nikki Moffett 79 y.o. female MRN: [id number]     Date of Service: 3/21/2025     CC:   Chief Complaint   Patient presents with    Altered Mental Status     \"Mood swings, memory loss and fatigue\" per patients grandson    Chest Pain     Ongoing for a couple weeks per family       Subjective     Patient was seen at bedside this morning. Patient is intubated, not following any responds. THD placed yesterday, HD started today.    Objective     Physical Exam:  Vitals: BP (!) 87/54   Pulse 75   Temp (!) 96.6 °F (35.9 °C)   Resp 24   Ht 1.575 m (5' 2\")   Wt 65.3 kg (143 lb 15.4 oz)   SpO2 97%   BMI 26.33 kg/m²     I & O - 24hr: In: 1347.9   Out: 2200   General appearance: Intubated.  Appears lethargic.  Nonpurposeful eye opening.  Neuro: Mental status: Intubated.  Nonpurposeful eye opening.  Does not follow commands.  Cardiovascular: Regular rate and rhythm, S1 and S2  heard, no murmurs appreciated  Respiratory: Rales present bilaterally   Abdomen: Soft, non tender, bowel sounds normal; masses, no organomegaly, rebound or guarding  Extremities: Extremities normal, no cyanosis, distal pulses intact, no edema    Pertinent Labs & Imaging Studies      Complete Blood Count:   Recent Labs     03/19/25 0419 03/20/25  0410 03/20/25  0817 03/21/25  0403   WBC 21.6* 18.5*  --  15.1*   HGB 7.1* 6.1* 7.8* 8.1*   HCT 22.8* 19.6* 24.1* 25.5*    142  --  148        Last 3 Blood Glucose:   Recent Labs     03/19/25  0419 03/20/25  0410 03/21/25  0403   GLUCOSE 119* 100* 94        PT/INR:    Lab Results   Component Value Date/Time    PROTIME 16.6 03/17/2025 07:56 AM    INR 1.5 03/17/2025 07:56 AM     PTT:    Lab Results   Component Value Date/Time    APTT 42.0 03/08/2025 05:50 PM     Comprehensive Metabolic Profile:   Recent Labs     03/19/25  0419 03/20/25  0410 03/21/25  0403    138 137   K 4.2 3.7 3.6   CL  Keppra 500 mg twice daily  Follow cardiology recommendations  Will need ischemic evaluation at some point with resolution of acute issues  Follow nephrology recommendations  HD today.  Palliative care following    PT evaluation: not indicated at this time   OT evaluation: not indicated at this time   DVT prophylaxis:  Eliquis  GI prophylaxis: Protonix   Diet:   ADULT TUBE FEEDING; Nasogastric; Peptide Based; Continuous; 10; Yes; 10; Q 4 hours; 35; 30; Q 4 hours; Protein; 1 Dose; Daily   Code status: Full Code   Disposition: Continue Current Care    Giovanni Gutierrez MD, PGY-1  Attending physician: SIMONE Bowers       [facility]  Internal Medicine Faculty   Attending Physician Statement    Nikki Moffett is a 79 y.o. female was seen, examined and discussed with the multi-disciplinary teaching team during rounds. I have personally seen and examined the patient and the key elements of the encounter were performed by me. The data collected below information that was obtained, reviewed, analyzed and interpreted today. Imaging test are reviewed during rounds. Comparison to previous images are always explored.     CBC:   Lab Results   Component Value Date/Time    WBC 15.1 03/21/2025 04:03 AM    RBC 2.96 03/21/2025 04:03 AM    HGB 8.1 03/21/2025 04:03 AM    HCT 25.5 03/21/2025 04:03 AM     03/21/2025 04:03 AM    MCV 86.1 03/21/2025 04:03 AM       BMP:    Lab Results   Component Value Date/Time     03/21/2025 04:03 AM    K 3.6 03/21/2025 04:03 AM    CL 98 03/21/2025 04:03 AM    CO2 21 03/21/2025 04:03 AM    BUN 36 03/21/2025 04:03 AM    CREATININE 3.0 03/21/2025 04:03 AM    GLUCOSE 94 03/21/2025 04:03 AM    CALCIUM 8.5 03/21/2025 04:03 AM       TSH:    Lab Results   Component Value Date/Time    TSH 2.39 03/12/2025 11:01 AM         Imaging Studies:    RADIOLOGY:  My interpretation of the current and previous films reveal no pneumothorax    EKG:    My interpretation of the current and

## 2025-03-21 NOTE — PLAN OF CARE
Problem: Discharge Planning  Goal: Discharge to home or other facility with appropriate resources  3/20/2025 2055 by Josefina Jackson RN  Outcome: Not Progressing  Flowsheets (Taken 3/20/2025 2000)  Discharge to home or other facility with appropriate resources: Identify barriers to discharge with patient and caregiver  3/20/2025 0917 by Lisette Goetz RN  Outcome: Not Progressing  Flowsheets (Taken 3/20/2025 0800)  Discharge to home or other facility with appropriate resources:   Identify barriers to discharge with patient and caregiver   Arrange for needed discharge resources and transportation as appropriate   Identify discharge learning needs (meds, wound care, etc)   Arrange for interpreters to assist at discharge as needed   Refer to discharge planning if patient needs post-hospital services based on physician order or complex needs related to functional status, cognitive ability or social support system     Problem: Neurosensory - Adult  Goal: Achieves stable or improved neurological status  3/20/2025 2055 by Josefina Jackson RN  Outcome: Not Progressing  Flowsheets (Taken 3/20/2025 2000)  Achieves stable or improved neurological status: Assess for and report changes in neurological status  3/20/2025 0917 by Lisette Goetz RN  Outcome: Not Progressing  Flowsheets (Taken 3/20/2025 0800)  Achieves stable or improved neurological status:   Assess for and report changes in neurological status   Maintain blood pressure and fluid volume within ordered parameters to optimize cerebral perfusion and minimize risk of hemorrhage   Monitor temperature, glucose, and sodium. Initiate appropriate interventions as ordered   Initiate measures to prevent increased intracranial pressure  Goal: Achieves maximal functionality and self care  3/20/2025 2055 by Josefina Jackson RN  Outcome: Not Progressing  Flowsheets (Taken 3/20/2025 2000)  Achieves maximal functionality and self care: Monitor swallowing and airway patency with  as appropriate  Goal: Glucose maintained within prescribed range  Recent Flowsheet Documentation  Taken 3/20/2025 2000 by Josefina Jackson RN  Glucose maintained within prescribed range:   Monitor blood glucose as ordered   Assess for signs and symptoms of hyperglycemia and hypoglycemia  3/20/2025 0917 by Lisette Goetz RN  Outcome: Progressing  Flowsheets (Taken 3/20/2025 0800)  Glucose maintained within prescribed range:   Assess for signs and symptoms of hyperglycemia and hypoglycemia   Monitor blood glucose as ordered   Administer ordered medications to maintain glucose within target range   Assess barriers to adequate nutritional intake and initiate nutrition consult as needed     Problem: Hematologic - Adult  Goal: Maintains hematologic stability  3/20/2025 2055 by Josefina Jackson RN  Outcome: Progressing  Flowsheets (Taken 3/20/2025 2000)  Maintains hematologic stability:   Assess for signs and symptoms of bleeding or hemorrhage   Monitor labs for bleeding or clotting disorders  3/20/2025 0917 by Lisette Goetz RN  Outcome: Progressing  Flowsheets (Taken 3/20/2025 0800)  Maintains hematologic stability:   Assess for signs and symptoms of bleeding or hemorrhage   Monitor labs for bleeding or clotting disorders   Administer blood products/factors as ordered     Problem: Respiratory - Adult  Goal: Achieves optimal ventilation and oxygenation  3/20/2025 2055 by Josefina Jackson RN  Outcome: Progressing  Flowsheets (Taken 3/20/2025 2000)  Achieves optimal ventilation and oxygenation: Assess for changes in respiratory status  3/20/2025 0917 by Lisette Goetz RN  Outcome: Not Progressing  Flowsheets (Taken 3/20/2025 0800)  Achieves optimal ventilation and oxygenation:   Assess for changes in mentation and behavior   Assess for changes in respiratory status   Position to facilitate oxygenation and minimize respiratory effort   Initiate smoking cessation protocol as indicated   Assess the need for suctioning and  level or baseline comfort level  Outcome: Progressing  Flowsheets  Taken 3/20/2025 2000 by Josefina Jackson, RN  Verbalizes/displays adequate comfort level or baseline comfort level: Assess pain using appropriate pain scale  Taken 3/20/2025 0800 by Lisette Goetz, DEBRA  Verbalizes/displays adequate comfort level or baseline comfort level:   Assess pain using appropriate pain scale   Administer analgesics based on type and severity of pain and evaluate response

## 2025-03-21 NOTE — PROGRESS NOTES
The patient communicated by nodding of her had and her eyes. The patient stated that she was a [employer] and did want to receive prayer. The  provided sustaining ministry presence and prayer for the patient. There was no family available during the visit. The  will follow as needed. If additional support is needed please reach out to [facility] (ext [phone]).    Rev MARRY Mcpherson MDIV,

## 2025-03-21 NOTE — PROGRESS NOTES
DAILY VENTILATOR WEANING ASSESSMENT PERFORMED    P/FIO2 Ratio =    255      (<100= do not Wean)                  Cs =       26                (<32= Instability)  Plat. Pressure = 18  MV =  4.31  RSBI =    Instabilities:       Cardiovascular =        CNS =       Respiratory =       Metabolic =    Parameters    no    Ask Physician for a weaning plan yes    Was SAT completed yes    Was SBT completed no, patient did not meet criteria to continue SBT    Additional Comments:     Performed by Kelsey Hameed RRT      Reference Table:    Cardiovascular     CNS      1. Mean BP less than or equal to 75   1. Neuromuscular blockade  2. Heart Rate greater than 130   2. RASS of -3, -4, -5  3. Myocardial Ischemia    3. RASS of +3, +4  4. Mechanical Assist Device    4. ICP greater than 15 or             Intracranial Hypertension         Respiratory      Metabolic  1. PEEP equal to or greater than 10cm/H20  1.Temp. (8hrs) less than 95 or > 103  2. Respiratory Rate greater than 35   2. WBC < 5000 or > 02663  3. Minute Volume greater than 15L  4. pH less than 7.30  5. Deteriorating chest X-ray

## 2025-03-21 NOTE — PLAN OF CARE
Problem: Safety - Adult  Goal: Free from fall injury  3/21/2025 0826 by Ghislaine Griffin RN  Outcome: Progressing  3/20/2025 2055 by Josefina Jackson RN  Outcome: Progressing  Flowsheets (Taken 3/20/2025 2000)  Free From Fall Injury: Instruct family/caregiver on patient safety     Problem: Skin/Tissue Integrity  Goal: Skin integrity remains intact  Description: 1.  Monitor for areas of redness and/or skin breakdown  2.  Assess vascular access sites hourly  3.  Every 4-6 hours minimum:  Change oxygen saturation probe site  4.  Every 4-6 hours:  If on nasal continuous positive airway pressure, respiratory therapy assess nares and determine need for appliance change or resting period  3/21/2025 0826 by Ghislaine Griffin RN  Outcome: Progressing  3/20/2025 2055 by Josefina Jackson RN  Outcome: Progressing  Flowsheets (Taken 3/20/2025 2000)  Skin Integrity Remains Intact:   Monitor for areas of redness and/or skin breakdown   Assess vascular access sites hourly   Every 4-6 hours minimum: Change oxygen saturation probe site   Every 4-6 hours: If on nasal continuous positive airway pressure, respiratory therapy assesses nares and determine need for appliance change or resting period     Problem: ABCDS Injury Assessment  Goal: Absence of physical injury  3/21/2025 0826 by Ghislaine Griffin RN  Outcome: Progressing  3/20/2025 2055 by Josefina Jackson RN  Outcome: Progressing  Flowsheets (Taken 3/20/2025 2000)  Absence of Physical Injury: Implement safety measures based on patient assessment     Problem: Metabolic/Fluid and Electrolytes - Adult  Goal: Electrolytes maintained within normal limits  3/21/2025 0826 by Ghislaine Griffin RN  Outcome: Progressing  3/20/2025 2055 by Josefina Jackson RN  Outcome: Progressing  Flowsheets (Taken 3/20/2025 2000)  Electrolytes maintained within normal limits:   Monitor labs and assess patient for signs and symptoms of electrolyte imbalances   Administer electrolyte replacement as ordered  Goal:  Hemodynamic stability and optimal renal function maintained  3/20/2025 2055 by Josefina Jackson RN  Outcome: Progressing  Flowsheets (Taken 3/20/2025 2000)  Hemodynamic stability and optimal renal function maintained:   Monitor labs and assess for signs and symptoms of volume excess or deficit   Monitor intake, output and patient weight  Goal: Glucose maintained within prescribed range  Recent Flowsheet Documentation  Taken 3/20/2025 2000 by Josefina Jackson RN  Glucose maintained within prescribed range:   Monitor blood glucose as ordered   Assess for signs and symptoms of hyperglycemia and hypoglycemia     Problem: Hematologic - Adult  Goal: Maintains hematologic stability  3/21/2025 0826 by Ghislaine Griffin RN  Outcome: Progressing  3/20/2025 2055 by Josefina Jackson RN  Outcome: Progressing  Flowsheets (Taken 3/20/2025 2000)  Maintains hematologic stability:   Assess for signs and symptoms of bleeding or hemorrhage   Monitor labs for bleeding or clotting disorders     Problem: Gastrointestinal - Adult  Goal: Maintains or returns to baseline bowel function  3/21/2025 0826 by Ghislaine Griffin RN  Outcome: Progressing  3/20/2025 2055 by Josefina Jackson RN  Outcome: Progressing  Flowsheets (Taken 3/20/2025 2000)  Maintains or returns to baseline bowel function: Assess bowel function  Goal: Maintains adequate nutritional intake  3/20/2025 2055 by Josefina Jackson RN  Outcome: Progressing  Flowsheets (Taken 3/20/2025 2000)  Maintains adequate nutritional intake: Monitor intake and output, weight and lab values     Problem: Infection - Adult  Goal: Absence of infection at discharge  3/21/2025 0826 by Ghislaine Griffin RN  Outcome: Progressing  3/20/2025 2055 by Josefina Jackson RN  Outcome: Progressing  Flowsheets (Taken 3/20/2025 2000)  Absence of infection at discharge:   Assess and monitor for signs and symptoms of infection   Monitor lab/diagnostic results   Monitor all insertion sites i.e., indwelling lines, tubes and

## 2025-03-21 NOTE — PROGRESS NOTES
INPATIENT CARDIOLOGY FOLLOW-UP    Name: Nikki Moffett    Age: 79 y.o.    Date of Admission: 3/4/2025  5:05 PM    Date of Service: 3/21/2025      Chief Complaint: Follow-up for atrial fibrillation with RVR, worsening decompensated heart failure    Interim History:  No new overnight cardiac complaints. Currently with no complaints of CP, SOB, palpitations, dizziness, or lightheadedness. SR on telemetry.  No recurrent arrhythmia seen.  Intubated.     On intermittent hemodialysis.  5.8 L positive.  No dialysis in the last 2 days.  Not following commands.  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)    Occipital stroke (HCC)    Atrial flutter (HCC)    Paroxysmal atrial fibrillation (HCC)    H/O: stroke    Atrial flutter with rapid ventricular response (HCC)    Decompensated heart failure (HCC)    Chronic atrial fibrillation (HCC)    VHD (valvular heart disease)    ANTONIO (acute kidney injury)    Palliative care encounter    Metabolic encephalopathy       Current Medications:    Current Facility-Administered Medications:     vitamin D (CHOLECALCIFEROL) tablet 2,000 Units, 2,000 Units, Oral, Daily, Rosetta Lopez MD, 2,000 Units at 03/21/25 0837    thiamine (B-1) injection 500 mg, 500 mg, IntraVENous, Q8H, 500 mg at 03/21/25 0836 **FOLLOWED BY** [START ON 3/23/2025] thiamine (B-1) injection 250 mg, 250 mg, IntraVENous, Q24H **FOLLOWED BY** [START ON 3/28/2025] thiamine tablet 100 mg, 100 mg, Oral, Daily, Rosetta Lopez MD    0.9 % sodium chloride infusion, , IntraVENous, PRN, Sandra Del Rosario MD    pantoprazole (PROTONIX) 40 mg in sodium chloride (PF) 0.9 % 10 mL injection, 40 mg, IntraVENous, Q12H, Sandra Del Rosario MD, 40 mg at 03/21/25 0521    hydrocortisone sodium succinate PF (SOLU-CORTEF) injection 100 mg, 100 mg, IntraVENous, Daily, John  MD Rosetta, 100 mg at 03/21/25 0837    midodrine (PROAMATINE) tablet 20 mg, 20 mg, Per NG tube, TID WC, Rosetta Lopez MD, 20 mg at 03/21/25 0837    fludrocortisone (FLORINEF) tablet 0.05 mg, 0.05 mg, Per NG tube, Daily, Rosetta Lopez MD, 0.05 mg at 03/21/25 0838    chlorhexidine (PERIDEX) 0.12 % solution 15 mL, 15 mL, Mouth/Throat, BID, Sandra Del Rosario MD, 15 mL at 03/21/25 0837    levETIRAcetam (KEPPRA) injection 500 mg, 500 mg, IntraVENous, BID, Kyaw Au DO, 500 mg at 03/20/25 2010    [Held by provider] lactulose (CHRONULAC) 10 GM/15ML solution 20 g, 20 g, Oral, TID, Ben Dumont. MD Lucrecia, 20 g at 03/15/25 2148    sulfur hexafluoride microspheres (LUMASON) 60.7-25 MG injection 2 mL, 2 mL, IntraVENous, ONCE PRN, Niko Alexandre MD    insulin lispro (HUMALOG,ADMELOG) injection vial 0-4 Units, 0-4 Units, SubCUTAneous, Q6H, Hermelindo Loredo DO    heparin (PF) 100 UNIT/ML injection 100 Units, 100 Units, IntraCATHeter, PRN, Americo aVrgas MD    heparin (porcine) injection 1,100-1,900 Units, 1,100-1,900 Units, IntraCATHeter, PRN, 1,600 Units at 03/15/25 0845 **AND** heparin (porcine) injection 1,100-1,900 Units, 1,100-1,900 Units, IntraCATHeter, PRN, Princess Winkler MD, 1,200 Units at 03/15/25 0849    sodium chloride flush 0.9 % injection 5-40 mL, 5-40 mL, IntraVENous, 2 times per day, Gelacio Hendricks MD, 10 mL at 03/21/25 0837    apixaban (ELIQUIS) tablet 2.5 mg, 2.5 mg, Oral, BID, Kilo Dumont MD, 2.5 mg at 03/21/25 0836    [Held by provider] furosemide (LASIX) injection 40 mg, 40 mg, IntraVENous, Daily, Chandni Garrido, APRN - CNP, 40 mg at 03/07/25 1531    ipratropium 0.5 mg-albuterol 2.5 mg (DUONEB) nebulizer solution 1 Dose, 1 Dose, Inhalation, Q4H WA RT, Ben Dumont. MD Lucrecia, 1 Dose at 03/21/25 0822    arformoterol tartrate (BROVANA) nebulizer solution 15 mcg, 15 mcg, Nebulization, BID RT, Kilo Dumont MD, 15 mcg at 03/21/25 0822     (132 lb)     Appearance: Awake, alert, no acute respiratory distress  Skin: Intact, no rash  Head: Normocephalic, atraumatic  Eyes: EOMI, no conjunctival erythema  ENMT: No pharyngeal erythema, MMM, no rhinorrhea  Neck: Supple, JVP difficult to assess, no carotid bruits  Lungs: Clear to auscultation bilaterally. No wheezes, rales, or rhonchi.  Cardiac: PMI nondisplaced, Regular rhythm with a normal rate, S1 & S2 normal, no murmurs  Abdomen: Soft, nontender, +bowel sounds  Extremities: Moves all extremities x 4, no lower extremity edema  Neurologic: No focal motor deficits apparent, normal mood and affect  Peripheral Pulses: Intact posterior tibial pulses bilaterally    Intake/Output:    Intake/Output Summary (Last 24 hours) at 3/21/2025 1101  Last data filed at 3/21/2025 0600  Gross per 24 hour   Intake 370.14 ml   Output 700 ml   Net -329.86 ml     No intake/output data recorded.    Laboratory Tests:  Recent Labs     03/19/25 0419 03/20/25 0410 03/21/25  0403    138 137   K 4.2 3.7 3.6    99 98   CO2 19* 20* 21*   BUN 41* 20 36*   CREATININE 3.1* 2.0* 3.0*   GLUCOSE 119* 100* 94   CALCIUM 8.4* 8.9 8.5*     Lab Results   Component Value Date/Time    MG 2.2 03/21/2025 04:03 AM     Recent Labs     03/19/25 0419 03/20/25  0410 03/21/25  0403   ALKPHOS 109* 79 86   * 106* 96*   AST 49* 47* 46*   BILITOT 1.3* 1.5* 1.1   BILIDIR 1.3* 0.8* 0.5*     Recent Labs     03/19/25 0419 03/20/25  0410 03/20/25  0817 03/21/25  0403   WBC 21.6* 18.5*  --  15.1*   RBC 2.66* 2.27*  --  2.96*   HGB 7.1* 6.1* 7.8* 8.1*   HCT 22.8* 19.6* 24.1* 25.5*   MCV 85.7 86.3  --  86.1   MCH 26.7 26.9  --  27.4   MCHC 31.1* 31.1*  --  31.8*   RDW 18.6* 18.5*  --  18.5*    142  --  148   MPV 11.2 11.3  --  11.3     No results found for: \"CKTOTAL\", \"CKMB\", \"CKMBINDEX\", \"TROPONINI\"  Lab Results   Component Value Date    INR 1.5 03/17/2025    INR 1.5 03/16/2025    INR 1.6 03/12/2025    PROTIME 16.6 (H) 03/17/2025    PROTIME

## 2025-03-21 NOTE — PROGRESS NOTES
[facility]   Department of Internal Medicine   Internal Medicine Residency  MICU Progress Note    Patient:  Nikki Moffett 79 y.o. female   MRN: [id number]       Date of Service: 3/21/2025    Allergy: Patient has no known allergies.    Subjective     Overnight, got tunneled HD line, old lines removed.  Patient was seen and examined this morning at bedside in no acute distress, no family present, remains intubated, off sedation.     Objective     I & O - 24hr:    Intake/Output Summary (Last 24 hours) at 3/21/2025 0821  Last data filed at 3/21/2025 0600  Gross per 24 hour   Intake 1047.85 ml   Output 700 ml   Net 347.85 ml       Physical Exam  Vitals: BP (!) 85/52   Pulse 68   Temp 98.1 °F (36.7 °C) (Axillary)   Resp 24   Ht 1.575 m (5' 2\")   Wt 66.5 kg (146 lb 9.7 oz)   SpO2 99%   BMI 26.81 kg/m²     General Appearance: intubated no distress, spontaneous eye opening, follows voice, slight movement of LLE toes, does not follow commands, pupils equal round sluggish to react, gag+, no clonus   HEENT: ETT present, dried blood around NG  Neck: trachea midline   Lung: mechanical breath sounds, rhonchi R>L   Heart: regular rate and rhythm 60s, capillary refill <2   Abdomen: soft, appears nontender, BS+   Extremities: +1 pitting edema     Medications     Continuous Infusions:   sodium chloride      sodium chloride 10 mL/hr at 03/12/25 2219    dextrose       Scheduled Meds:   vitamin D  2,000 Units Oral Daily    pantoprazole (PROTONIX) 40 mg in sodium chloride (PF) 0.9 % 10 mL injection  40 mg IntraVENous Q12H    hydrocortisone sodium succinate PF  100 mg IntraVENous Daily    midodrine  20 mg Per NG tube TID WC    fludrocortisone  0.05 mg Per NG tube Daily    chlorhexidine  15 mL Mouth/Throat BID    levETIRAcetam  500 mg IntraVENous BID    [Held by provider] lactulose  20 g Oral TID    insulin lispro  0-4 Units SubCUTAneous Q6H    sodium chloride flush  5-40 mL IntraVENous 2 times per day    apixaban   perihilar   pulmonary edema.         MRA NECK WO CONTRAST   Final Result   Limited examination due to motion. No evidence for high-grade stenosis or   arterial occlusion within the limitations of this examination.      Arterial structures of the head and neck could be better assessed with CT   angiography as clinically warranted.         MRA HEAD WO CONTRAST   Final Result   1. Motion limits detailed assessment of the intracranial vasculature. Within   the limitations of this exam, no high-grade stenosis or arterial occlusion is   evident.   2. Mild stenosis involving the V4 segment of the left vertebral artery.         XR ABDOMEN (KUB) (SINGLE AP VIEW)   Final Result      MRI BRAIN WO CONTRAST   Final Result   1. Linear areas of acute infarction within the left occipital lobe as   described above.   2. Encephalomalacia on the basis of prior infarction within the left insular   region and left frontal operculum.   3. Partial opacification of the mastoid air cells bilaterally.   Radiology communication note placed to have information called to the   ordering physician 10:54 a.m. EST 03/15/2025         XR CHEST PORTABLE   Final Result   Cardiomegaly with stable increased markings seen diffusely throughout the mid   and lower lung fields bilaterally with small bilateral pleural effusions.         CT HEAD WO CONTRAST   Final Result   Small insult in the left frontal parietal region of indeterminate chronicity.   Correlate with MRI if there is concern for acute infarction.      Remote appearing insult in the left occipital lobe.      Findings of chronic small vessel ischemic change and cerebral volume loss.         XR CHEST PORTABLE   Final Result   1. Interval placement of a nasogastric tube which passes into the body of the   stomach.   2. No change in prominent left lower lobe pneumonia and moderate right lower   lobe pneumonia.   3. Slight increase in size of small bilateral pleural effusions.   4. Stable mild

## 2025-03-21 NOTE — PROGRESS NOTES
Palliative Care Department  [phone]  Palliative Care Progress Note  Provider Kim Schofield, APRN - CNP      PATIENT: Nikki Moffett  : 1945  MRN: [id number]  ADMISSION DATE: 3/4/2025  5:05 PM  Referring Provider: Ben Dumont. MD Lucrecia     Palliative Medicine was consulted on hospital day 17 for assistance with Goals of care    HPI:     Clinical Summary:Nikki Moffett is a 79 y.o. y/o female with a history of left MCA infarct SP thrombectomy, A-fib, diabetes, heart failure, who presented to [facility] on 3/4/2025 with fatigue and dizziness, ongoing for a month.  At ED, found tachycardic and dry on exam.  Chest x-ray shows bilateral pleural effusion and possible right lower lobe opacity, was treated initially with Zosyn and doxycycline and admitted for further medical evaluation.  Was seen by cardiology underwent cardioversion, became dyspneic and  hypotension postprocedure, was transferred to MICU for close monitoring.  Seen by cardiology SP TTE, on 3/7/2025, with EF 10 to 15%, mild mitral valve regurgitation, moderate transcript about regurgitation.    ASSESSMENT/PLAN:     Pertinent Hospital Diagnoses     Acute hypoxic respiratory failure   Acute on chronic HFrEF (EF 10-15%)   Bilateral pleural effusions   Suspected pneumonia   Acute renal failure on CKD now on HD- for tunneled catheter   Shock liver   History of left MCA stroke with M2 occlusion s/p thrombectomy      Palliative Care Encounter / Counseling Regarding Goals of Care  Please see detailed goals of care discussion as below  At this time, Nikki Moffett, Does Not have capacity for medical decision-making.  Capacity is time limited and situation/question specific  During encounter Shreyas was surrogate medical decision-maker  Outcome of goals of care meeting:  Continue with current medical treatment  Code status Full Code  Advanced Directives: no POA or living will in [facility]  Surrogate/Legal NOK:  Shreyas Moffett (Son)  [phone]    Spiritual assessment: no spiritual distress identified  Bereavement and grief: to be determined  Referrals to: none today    Thank you for the opportunity to participate in the care of Nikki Moffett.     SUBJECTIVE:     Details of Conversation:     Chart reviewed.  Patient seen at the bedside, remains on ventilation support, no family members were present in the room.  Called patient's son Shreyas over the phone, phone rang until it was disconnected, I was unable to leave voicemail.      Review of Systems   Unable to perform ROS: Intubated       Prognosis: Poor    OBJECTIVE:     BP (!) 95/56   Pulse 86   Temp (!) 96.6 °F (35.9 °C)   Resp 20   Ht 1.575 m (5' 2\")   Wt 65.3 kg (143 lb 15.4 oz)   SpO2 100%   BMI 26.33 kg/m²     Physical Examination:  Physical Exam  Vitals reviewed.   Constitutional:       General: She is not in acute distress.     Appearance: She is ill-appearing.      Interventions: She is intubated.   HENT:      Head: Normocephalic.      Mouth/Throat:      Lips: Pink.      Mouth: Mucous membranes are moist.   Cardiovascular:      Rate and Rhythm: Normal rate and regular rhythm.   Pulmonary:      Effort: She is intubated.      Comments: Mechanically ventilated   Skin:     General: Skin is warm and dry.   Neurological:      Mental Status: She is lethargic.      Comments: Not following simple commands during exam    Psychiatric:         Mood and Affect: Mood is not anxious.         Speech: She is noncommunicative.         Behavior: Behavior is not agitated.         Objective data reviewed: labs, images, records, medication use, vitals, and chart    Time/Communication  Greater than 50% of time spent, total 25 minutes in counseling and coordination of care at the bedside regarding goals of care.    Thank you for allowing Palliative Medicine to participate in the care of Nikki Moffett.    Note: This report was completed using computerAuto I.D. voiced recognition software.  Every effort has  -ACP Only-, .(Attending)

## 2025-03-21 NOTE — PROGRESS NOTES
Attempted SBT per order on PSV 8 and PEEP 5. Patient's tidal volumes and minute ventilation decreased significantly, a long with an increase of RR equaling to >125 RSBI. Patient placed back to AC/VC settings. More comfortable appearing on AC/VC.

## 2025-03-21 NOTE — FLOWSHEET NOTE
03/21/25 1150   Vital Signs   BP 97/63   Temp (!) 96.6 °F (35.9 °C)   Pulse 75   Respirations 21   Weight - Scale 65.3 kg (143 lb 15.4 oz)   Weight Method Bed scale   Percent Weight Change -1.8   Post-Hemodialysis Assessment   Post-Treatment Procedures Blood returned;Catheter capped, clamped and heparinized x 2 ports   Machine Disinfection Process Acid/Vinegar Clean;Heat Disinfect;Exterior Machine Disinfection   Rinseback Volume (ml) 300 ml   Blood Volume Processed (Liters) 60 L   Dialyzer Clearance Lightly streaked   Duration of Treatment (minutes) 210 minutes   Heparin Amount Administered During Treatment (mL) 0 mL   Hemodialysis Intake (ml) 300 ml   Hemodialysis Output (ml) 1500 ml   NET Removed (ml) 1200   Tolerated Treatment Good   Patient Response to Treatment tolerated well, blood returned, cath care per policy/procdure, lines flushed, heparin instilled ports capped

## 2025-03-22 ENCOUNTER — APPOINTMENT (OUTPATIENT)
Dept: GENERAL RADIOLOGY | Age: 80
DRG: 004 | End: 2025-03-22
Payer: MEDICARE

## 2025-03-22 LAB
AADO2: 142.5 MMHG
ALBUMIN SERPL-MCNC: 3.2 G/DL (ref 3.5–5.2)
ALP SERPL-CCNC: 104 U/L (ref 35–104)
ALT SERPL-CCNC: 92 U/L (ref 0–32)
ANION GAP SERPL CALCULATED.3IONS-SCNC: 15 MMOL/L (ref 7–16)
ANION GAP SERPL CALCULATED.3IONS-SCNC: 15 MMOL/L (ref 7–16)
AST SERPL-CCNC: 46 U/L (ref 0–31)
B.E.: 2.6 MMOL/L (ref -3–3)
BASOPHILS # BLD: 0.02 K/UL (ref 0–0.2)
BASOPHILS NFR BLD: 0 % (ref 0–2)
BILIRUB DIRECT SERPL-MCNC: 0.4 MG/DL (ref 0–0.3)
BILIRUB INDIRECT SERPL-MCNC: 0.7 MG/DL (ref 0–1)
BILIRUB SERPL-MCNC: 1.1 MG/DL (ref 0–1.2)
BUN SERPL-MCNC: 26 MG/DL (ref 6–23)
BUN SERPL-MCNC: 34 MG/DL (ref 6–23)
CALCIUM SERPL-MCNC: 7.4 MG/DL (ref 8.6–10.2)
CALCIUM SERPL-MCNC: 8.2 MG/DL (ref 8.6–10.2)
CHLORIDE SERPL-SCNC: 100 MMOL/L (ref 98–107)
CHLORIDE SERPL-SCNC: 102 MMOL/L (ref 98–107)
CO2 SERPL-SCNC: 19 MMOL/L (ref 22–29)
CO2 SERPL-SCNC: 22 MMOL/L (ref 22–29)
COHB: 0.5 % (ref 0–1.5)
CREAT SERPL-MCNC: 2.2 MG/DL (ref 0.5–1)
CREAT SERPL-MCNC: 2.3 MG/DL (ref 0.5–1)
CRITICAL: ABNORMAL
DATE ANALYZED: ABNORMAL
DATE OF COLLECTION: ABNORMAL
EOSINOPHIL # BLD: 0 K/UL (ref 0.05–0.5)
EOSINOPHILS RELATIVE PERCENT: 0 % (ref 0–6)
ERYTHROCYTE [DISTWIDTH] IN BLOOD BY AUTOMATED COUNT: 19.1 % (ref 11.5–15)
FIO2: 40 %
GFR, ESTIMATED: 21 ML/MIN/1.73M2
GFR, ESTIMATED: 22 ML/MIN/1.73M2
GLUCOSE BLD-MCNC: 132 MG/DL (ref 74–99)
GLUCOSE BLD-MCNC: 136 MG/DL (ref 74–99)
GLUCOSE BLD-MCNC: 140 MG/DL (ref 74–99)
GLUCOSE BLD-MCNC: 155 MG/DL (ref 74–99)
GLUCOSE SERPL-MCNC: 123 MG/DL (ref 74–99)
GLUCOSE SERPL-MCNC: 142 MG/DL (ref 74–99)
HCO3: 25.8 MMOL/L (ref 22–26)
HCT VFR BLD AUTO: 27.7 % (ref 34–48)
HGB BLD-MCNC: 9 G/DL (ref 11.5–15.5)
HHB: 2.1 % (ref 0–5)
IMM GRANULOCYTES # BLD AUTO: 0.15 K/UL (ref 0–0.58)
IMM GRANULOCYTES NFR BLD: 1 % (ref 0–5)
LAB: ABNORMAL
LYMPHOCYTES NFR BLD: 0.92 K/UL (ref 1.5–4)
LYMPHOCYTES RELATIVE PERCENT: 5 % (ref 20–42)
Lab: 423
MAGNESIUM SERPL-MCNC: 1.9 MG/DL (ref 1.6–2.6)
MAGNESIUM SERPL-MCNC: 1.9 MG/DL (ref 1.6–2.6)
MCH RBC QN AUTO: 28 PG (ref 26–35)
MCHC RBC AUTO-ENTMCNC: 32.5 G/DL (ref 32–34.5)
MCV RBC AUTO: 86.3 FL (ref 80–99.9)
METHB: 0.7 % (ref 0–1.5)
MODE: AC
MONOCYTES NFR BLD: 1.22 K/UL (ref 0.1–0.95)
MONOCYTES NFR BLD: 7 % (ref 2–12)
NEUTROPHILS NFR BLD: 86 % (ref 43–80)
NEUTS SEG NFR BLD: 14.7 K/UL (ref 1.8–7.3)
O2 SATURATION: 97.9 % (ref 92–98.5)
O2HB: 96.7 % (ref 94–97)
OPERATOR ID: ABNORMAL
PATIENT TEMP: 37 C
PCO2: 34.3 MMHG (ref 35–45)
PEEP/CPAP: 5 CMH2O
PFO2: 2.58 MMHG/%
PH BLOOD GAS: 7.49 (ref 7.35–7.45)
PHOSPHATE SERPL-MCNC: 2.5 MG/DL (ref 2.5–4.5)
PLATELET # BLD AUTO: 163 K/UL (ref 130–450)
PMV BLD AUTO: 11.7 FL (ref 7–12)
PO2: 103.3 MMHG (ref 75–100)
POTASSIUM SERPL-SCNC: 4.3 MMOL/L (ref 3.5–5)
POTASSIUM SERPL-SCNC: 4.5 MMOL/L (ref 3.5–5)
PROT SERPL-MCNC: 5.3 G/DL (ref 6.4–8.3)
RBC # BLD AUTO: 3.21 M/UL (ref 3.5–5.5)
RI(T): 1.38
RR MECHANICAL: 12 B/MIN
SODIUM SERPL-SCNC: 136 MMOL/L (ref 132–146)
SODIUM SERPL-SCNC: 137 MMOL/L (ref 132–146)
SOURCE, BLOOD GAS: ABNORMAL
THB: 9.9 G/DL (ref 11.5–16.5)
TIME ANALYZED: 427
VT MECHANICAL: 325 ML
WBC OTHER # BLD: 17 K/UL (ref 4.5–11.5)

## 2025-03-22 PROCEDURE — 99233 SBSQ HOSP IP/OBS HIGH 50: CPT | Performed by: INTERNAL MEDICINE

## 2025-03-22 PROCEDURE — 94640 AIRWAY INHALATION TREATMENT: CPT

## 2025-03-22 PROCEDURE — 2000000000 HC ICU R&B

## 2025-03-22 PROCEDURE — 6370000000 HC RX 637 (ALT 250 FOR IP)

## 2025-03-22 PROCEDURE — 6360000002 HC RX W HCPCS: Performed by: PSYCHIATRY & NEUROLOGY

## 2025-03-22 PROCEDURE — 82805 BLOOD GASES W/O2 SATURATION: CPT

## 2025-03-22 PROCEDURE — 85025 COMPLETE CBC W/AUTO DIFF WBC: CPT

## 2025-03-22 PROCEDURE — 2580000003 HC RX 258

## 2025-03-22 PROCEDURE — 82962 GLUCOSE BLOOD TEST: CPT

## 2025-03-22 PROCEDURE — 6360000002 HC RX W HCPCS: Performed by: INTERNAL MEDICINE

## 2025-03-22 PROCEDURE — 6360000002 HC RX W HCPCS

## 2025-03-22 PROCEDURE — 99291 CRITICAL CARE FIRST HOUR: CPT | Performed by: INTERNAL MEDICINE

## 2025-03-22 PROCEDURE — 80053 COMPREHEN METABOLIC PANEL: CPT

## 2025-03-22 PROCEDURE — 82248 BILIRUBIN DIRECT: CPT

## 2025-03-22 PROCEDURE — 80048 BASIC METABOLIC PNL TOTAL CA: CPT

## 2025-03-22 PROCEDURE — 2500000003 HC RX 250 WO HCPCS: Performed by: INTERNAL MEDICINE

## 2025-03-22 PROCEDURE — 83735 ASSAY OF MAGNESIUM: CPT

## 2025-03-22 PROCEDURE — 94003 VENT MGMT INPAT SUBQ DAY: CPT

## 2025-03-22 PROCEDURE — 84100 ASSAY OF PHOSPHORUS: CPT

## 2025-03-22 PROCEDURE — 2500000003 HC RX 250 WO HCPCS

## 2025-03-22 PROCEDURE — 71045 X-RAY EXAM CHEST 1 VIEW: CPT

## 2025-03-22 RX ORDER — METOPROLOL TARTRATE 1 MG/ML
5 INJECTION, SOLUTION INTRAVENOUS ONCE
Status: COMPLETED | OUTPATIENT
Start: 2025-03-22 | End: 2025-03-22

## 2025-03-22 RX ORDER — MAGNESIUM SULFATE 1 G/100ML
1000 INJECTION INTRAVENOUS ONCE
Status: COMPLETED | OUTPATIENT
Start: 2025-03-22 | End: 2025-03-22

## 2025-03-22 RX ORDER — NOREPINEPHRINE BITARTRATE 0.06 MG/ML
1-100 INJECTION, SOLUTION INTRAVENOUS CONTINUOUS
Status: DISCONTINUED | OUTPATIENT
Start: 2025-03-22 | End: 2025-03-27

## 2025-03-22 RX ADMIN — PANTOPRAZOLE SODIUM 40 MG: 40 INJECTION, POWDER, FOR SOLUTION INTRAVENOUS at 16:15

## 2025-03-22 RX ADMIN — SODIUM CHLORIDE: 9 INJECTION, SOLUTION INTRAVENOUS at 07:00

## 2025-03-22 RX ADMIN — ARFORMOTEROL TARTRATE 15 MCG: 15 SOLUTION RESPIRATORY (INHALATION) at 21:27

## 2025-03-22 RX ADMIN — IPRATROPIUM BROMIDE AND ALBUTEROL SULFATE 1 DOSE: 2.5; .5 SOLUTION RESPIRATORY (INHALATION) at 13:06

## 2025-03-22 RX ADMIN — BUDESONIDE INHALATION 500 MCG: 0.5 SUSPENSION RESPIRATORY (INHALATION) at 09:23

## 2025-03-22 RX ADMIN — Medication 5 MCG/MIN: at 10:08

## 2025-03-22 RX ADMIN — IPRATROPIUM BROMIDE AND ALBUTEROL SULFATE 1 DOSE: 2.5; .5 SOLUTION RESPIRATORY (INHALATION) at 09:23

## 2025-03-22 RX ADMIN — THIAMINE HYDROCHLORIDE 500 MG: 100 INJECTION, SOLUTION INTRAMUSCULAR; INTRAVENOUS at 01:21

## 2025-03-22 RX ADMIN — IPRATROPIUM BROMIDE AND ALBUTEROL SULFATE 1 DOSE: 2.5; .5 SOLUTION RESPIRATORY (INHALATION) at 16:12

## 2025-03-22 RX ADMIN — Medication 2000 UNITS: at 08:21

## 2025-03-22 RX ADMIN — MIDODRINE HYDROCHLORIDE 20 MG: 10 TABLET ORAL at 08:21

## 2025-03-22 RX ADMIN — CHLORHEXIDINE GLUCONATE, 0.12% ORAL RINSE 15 ML: 1.2 SOLUTION DENTAL at 08:21

## 2025-03-22 RX ADMIN — HYDROCORTISONE SODIUM SUCCINATE 50 MG: 100 INJECTION, POWDER, FOR SOLUTION INTRAMUSCULAR; INTRAVENOUS at 08:21

## 2025-03-22 RX ADMIN — ARFORMOTEROL TARTRATE 15 MCG: 15 SOLUTION RESPIRATORY (INHALATION) at 09:23

## 2025-03-22 RX ADMIN — APIXABAN 2.5 MG: 2.5 TABLET, FILM COATED ORAL at 19:44

## 2025-03-22 RX ADMIN — SODIUM CHLORIDE, PRESERVATIVE FREE 10 ML: 5 INJECTION INTRAVENOUS at 08:21

## 2025-03-22 RX ADMIN — PANTOPRAZOLE SODIUM 40 MG: 40 INJECTION, POWDER, FOR SOLUTION INTRAVENOUS at 04:58

## 2025-03-22 RX ADMIN — METOROPROLOL TARTRATE 5 MG: 5 INJECTION, SOLUTION INTRAVENOUS at 20:11

## 2025-03-22 RX ADMIN — SODIUM CHLORIDE, PRESERVATIVE FREE 10 ML: 5 INJECTION INTRAVENOUS at 19:44

## 2025-03-22 RX ADMIN — LEVETIRACETAM 500 MG: 100 INJECTION INTRAVENOUS at 08:20

## 2025-03-22 RX ADMIN — FLUDROCORTISONE ACETATE 0.05 MG: 0.1 TABLET ORAL at 08:21

## 2025-03-22 RX ADMIN — CHLORHEXIDINE GLUCONATE, 0.12% ORAL RINSE 15 ML: 1.2 SOLUTION DENTAL at 19:44

## 2025-03-22 RX ADMIN — IPRATROPIUM BROMIDE AND ALBUTEROL SULFATE 1 DOSE: 2.5; .5 SOLUTION RESPIRATORY (INHALATION) at 21:27

## 2025-03-22 RX ADMIN — THIAMINE HYDROCHLORIDE 500 MG: 100 INJECTION, SOLUTION INTRAMUSCULAR; INTRAVENOUS at 08:20

## 2025-03-22 RX ADMIN — MIDODRINE HYDROCHLORIDE 20 MG: 10 TABLET ORAL at 16:15

## 2025-03-22 RX ADMIN — LEVETIRACETAM 500 MG: 100 INJECTION INTRAVENOUS at 19:44

## 2025-03-22 RX ADMIN — MIDODRINE HYDROCHLORIDE 20 MG: 10 TABLET ORAL at 11:45

## 2025-03-22 RX ADMIN — APIXABAN 2.5 MG: 2.5 TABLET, FILM COATED ORAL at 08:21

## 2025-03-22 RX ADMIN — THIAMINE HYDROCHLORIDE 500 MG: 100 INJECTION, SOLUTION INTRAMUSCULAR; INTRAVENOUS at 16:15

## 2025-03-22 RX ADMIN — MAGNESIUM SULFATE HEPTAHYDRATE 1000 MG: 1 INJECTION, SOLUTION INTRAVENOUS at 08:35

## 2025-03-22 RX ADMIN — BUDESONIDE INHALATION 500 MCG: 0.5 SUSPENSION RESPIRATORY (INHALATION) at 21:27

## 2025-03-22 ASSESSMENT — PULMONARY FUNCTION TESTS
PIF_VALUE: 20
PIF_VALUE: 22
PIF_VALUE: 20
PIF_VALUE: 24
PIF_VALUE: 23
PIF_VALUE: 22
PIF_VALUE: 23
PIF_VALUE: 22
PIF_VALUE: 24
PIF_VALUE: 22
PIF_VALUE: 26
PIF_VALUE: 22
PIF_VALUE: 10
PIF_VALUE: 24
PIF_VALUE: 26
PIF_VALUE: 19
PIF_VALUE: 22
PIF_VALUE: 22
PIF_VALUE: 13
PIF_VALUE: 22
PIF_VALUE: 21
PIF_VALUE: 23
PIF_VALUE: 26
PIF_VALUE: 22
PIF_VALUE: 23
PIF_VALUE: 22
PIF_VALUE: 23
PIF_VALUE: 24
PIF_VALUE: 22

## 2025-03-22 ASSESSMENT — PAIN SCALES - GENERAL
PAINLEVEL_OUTOF10: 0

## 2025-03-22 NOTE — PROGRESS NOTES
DAILY VENTILATOR WEANING ASSESSMENT PERFORMED    P/FIO2 Ratio =  258        (<100= do not Wean)                  Cs =  29                        (<32= Instability)  Plat. Pressure = 17  MV = 4.5  RSBI =    Instabilities:       Cardiovascular =       CNS =       Respiratory = cs low       Metabolic =    Parameters    no    Ask Physician for a weaning plan yes    Was SAT completed yes    Was SBT completed yes    Additional Comments:     Performed by Patricia Simerlink, RCP RRT      Reference Table:    Cardiovascular     CNS      1. Mean BP less than or equal to 75   1. Neuromuscular blockade  2. Heart Rate greater than 130   2. RASS of -3, -4, -5  3. Myocardial Ischemia    3. RASS of +3, +4  4. Mechanical Assist Device    4. ICP greater than 15 or             Intracranial Hypertension         Respiratory      Metabolic  1. PEEP equal to or greater than 10cm/H20  1.Temp. (8hrs) less than 95 or > 103  2. Respiratory Rate greater than 35   2. WBC < 5000 or > 40893  3. Minute Volume greater than 15L  4. pH less than 7.30  5. Deteriorating chest X-ray

## 2025-03-22 NOTE — PLAN OF CARE
Problem: Discharge Planning  Goal: Discharge to home or other facility with appropriate resources  3/21/2025 2016 by Josefina Jackson RN  Outcome: Not Progressing  Flowsheets (Taken 3/21/2025 2000)  Discharge to home or other facility with appropriate resources: Identify barriers to discharge with patient and caregiver  3/21/2025 0826 by Ghislaine Griffin RN  Outcome: Not Progressing     Problem: Neurosensory - Adult  Goal: Achieves maximal functionality and self care  Outcome: Not Progressing     Problem: Respiratory - Adult  Goal: Achieves optimal ventilation and oxygenation  3/21/2025 2016 by Josefina Jackson RN  Outcome: Not Progressing  3/21/2025 0826 by Ghislaine Griffin RN  Outcome: Not Progressing     Problem: Musculoskeletal - Adult  Goal: Return mobility to safest level of function  3/21/2025 2016 by Josefina Jackson RN  Outcome: Not Progressing  Flowsheets (Taken 3/21/2025 2000)  Return Mobility to Safest Level of Function: Assess patient stability and activity tolerance for standing, transferring and ambulating with or without assistive devices  3/21/2025 0826 by Ghislaine Griffin RN  Outcome: Not Progressing     Problem: Genitourinary - Adult  Goal: Absence of urinary retention  Recent Flowsheet Documentation  Taken 3/21/2025 2000 by Josefina Jackson RN  Absence of urinary retention: Assess patient’s ability to void and empty bladder  3/21/2025 0826 by Ghislaine Griffin RN  Outcome: Not Progressing  Flowsheets (Taken 3/20/2025 2000 by Josefina Jackson RN)  Absence of urinary retention: Assess patient’s ability to void and empty bladder     Problem: Chronic Conditions and Co-morbidities  Goal: Patient's chronic conditions and co-morbidity symptoms are monitored and maintained or improved  3/21/2025 2016 by Josefina Jackson RN  Outcome: Progressing  Flowsheets (Taken 3/21/2025 2000)  Care Plan - Patient's Chronic Conditions and Co-Morbidity Symptoms are Monitored and Maintained or Improved: Monitor and assess patient's  baseline  Outcome: Progressing  Flowsheets (Taken 3/21/2025 2000)  Absence of cardiac dysrhythmias or at baseline: Monitor cardiac rate and rhythm     Problem: Gastrointestinal - Adult  Goal: Maintains or returns to baseline bowel function  3/21/2025 2016 by Josefina Jackson RN  Outcome: Progressing  3/21/2025 0826 by Ghislaine Griffin RN  Outcome: Progressing  Goal: Maintains adequate nutritional intake  Outcome: Progressing     Problem: Infection - Adult  Goal: Absence of infection at discharge  3/21/2025 2016 by Josefina Jackson RN  Outcome: Progressing  Flowsheets (Taken 3/21/2025 2000)  Absence of infection at discharge:   Assess and monitor for signs and symptoms of infection   Monitor lab/diagnostic results   Monitor all insertion sites i.e., indwelling lines, tubes and drains  3/21/2025 0826 by Ghislaine Griffin RN  Outcome: Progressing  Goal: Absence of infection during hospitalization  Outcome: Progressing  Flowsheets (Taken 3/21/2025 2000)  Absence of infection during hospitalization:   Assess and monitor for signs and symptoms of infection   Monitor lab/diagnostic results   Monitor all insertion sites i.e., indwelling lines, tubes and drains     Problem: Pain  Goal: Verbalizes/displays adequate comfort level or baseline comfort level  3/21/2025 2016 by Josefina Jackson RN  Outcome: Progressing  Flowsheets (Taken 3/21/2025 2000)  Verbalizes/displays adequate comfort level or baseline comfort level: Assess pain using appropriate pain scale  3/21/2025 0826 by Ghislaine Griffin RN  Outcome: Progressing

## 2025-03-22 NOTE — PROGRESS NOTES
test.         Culture, MRSA, Screening [[id number]] Collected: 03/09/25 0841    Order Status: Completed Specimen: Nares Updated: 03/10/25 0959     Specimen Description .NARES     Culture NEGATIVE FOR: METHICILLIN RESISTANT STAPHYLOCOCCUS AUREUS    Culture, Blood 2 [[id number]] Collected: 03/08/25 1756    Order Status: Completed Specimen: Blood Updated: 03/13/25 2037     Specimen Description .BLOOD RIGHT     Special Requests          Culture NO GROWTH 5 DAYS    Respiratory Panel, Molecular, with COVID-19 (Restricted: peds pts or suitable admitted adults) [[id number]]  (Abnormal) Collected: 03/08/25 1216    Order Status: Completed Specimen: Nasopharyngeal Swab Updated: 03/08/25 1346     Specimen Description .NASOPHARYNGEAL SWAB     Adenovirus PCR Not Detected     Coronavirus 229E PCR Not Detected     Coronavirus HKU1 PCR Not Detected     Coronavirus NL63 PCR Not Detected     Coronavirus OC43 PCR Not Detected     SARS-CoV-2, PCR Not Detected     Human Metapneumovirus PCR Not Detected     Rhino/Enterovirus PCR Not Detected     Influenza A by PCR DETECTED     Influenza A H3 PCR DETECTED     Influenza B by PCR Not Detected     Parainfluenza 1 PCR Not Detected     Parainfluenza 2 PCR Not Detected     Parainfluenza 3 PCR Not Detected     Parainfluenza 4 PCR Not Detected     Resp Syncytial Virus PCR Not Detected     Bordetella parapertussis by PCR Not Detected     B Pertussis by PCR Not Detected     Chlamydia pneumoniae By PCR Not Detected     Mycoplasma pneumo by PCR Not Detected     Comment: Performed by multiplexed nucleic acid assay.       Culture, Blood 1 [[id number]] Collected: 03/08/25 1140    Order Status: Completed Specimen: Blood Updated: 03/13/25 1307     Specimen Description .BLOOD RIGHT     Special Requests          Culture NO GROWTH 5 DAYS    Culture, Urine [[id number]] Collected: 03/08/25 0929    Order Status: Completed Specimen: Urine, clean catch Updated: 03/09/25 1241     Specimen Description .CLEAN CATCH  off 3/18, not following commands; failed SBT today due to RSBI 150-170  Neurology following, continues on Keppra 500 BID.   S/p albumin x3, continues on midodrine 20 TID, solu-cortef 100 daily, florinef 0.05, MAP goal of 60; restarted norepinephrine infusion due to MAP 49-55  Cardiology following, GDMT as tolerated, L heart cath at some point. Statin and amiodarone held 2/2 transaminitis.   Nephrology following, last iHD 3/21  Palliative care following for goals of care.   Checking NICOM for cardiac index    LDA:  Lines: PICC L brachial 3/16, tunneled HD 3/20  Drain(s): NG 3/11, FMS 3/13  Airway: ETT 3/16    PT/OT evaluation: not indicated at this time   DVT prophylaxis:  on apixaban  GI prophylaxis: pantoprazole 40 mg IV BID   Diet:   ADULT TUBE FEEDING; Nasogastric; Peptide Based; Continuous; 10; Yes; 10; Q 4 hours; 35; 30; Q 4 hours; Protein; 1 Dose; Daily   Bowel regimen: PRN  Pain management: as needed  Code status: Full Code   Disposition: Admitted to ICU  Family: updated as available    Marko Prater MD, PGY-2  Attending physician: Dr. Loredo    [facility]  Department of Pulmonary, Critical Care and Sleep Medicine  [facility]  Department of Internal Medicine  Attending Statement    This patient has a high probability of sudden clinically significant deterioration, which requires the highest level of physician preparedness to intervene urgently. I managed/supervised life or organ supporting interventions that required frequent physician assessment. I devoted my full attention to the direct care of this patient for the period of time indicated below. Time I spent with family of surrogate(s) is included only if the patient was incapable of providing the necessary information or participating in medical decision making. In addition to time devoted to teaching and to any procedure. CCT 35 minutes    Hermelindo Loredo DO, CURT, FACP, FCCP

## 2025-03-22 NOTE — PLAN OF CARE
Problem: Respiratory - Adult  Goal: Achieves optimal ventilation and oxygenation  3/22/2025 0936 by Simerlink, Patricia, RCP  Outcome: Progressing     Problem: Discharge Planning  Goal: Discharge to home or other facility with appropriate resources  3/21/2025 2016 by Josefina Jackson RN  Outcome: Not Progressing  Flowsheets (Taken 3/21/2025 2000)  Discharge to home or other facility with appropriate resources: Identify barriers to discharge with patient and caregiver     Problem: Neurosensory - Adult  Goal: Achieves maximal functionality and self care  3/21/2025 2016 by Josefina Jackson RN  Outcome: Not Progressing     Problem: Respiratory - Adult  Goal: Achieves optimal ventilation and oxygenation  3/22/2025 0936 by Simerlink, Patricia, RCP  Outcome: Progressing  3/21/2025 2016 by Josefina Jackson RN  Outcome: Not Progressing     Problem: Musculoskeletal - Adult  Goal: Return mobility to safest level of function  3/21/2025 2016 by Josefina Jackson RN  Outcome: Not Progressing  Flowsheets (Taken 3/21/2025 2000)  Return Mobility to Safest Level of Function: Assess patient stability and activity tolerance for standing, transferring and ambulating with or without assistive devices

## 2025-03-22 NOTE — PLAN OF CARE
Problem: Chronic Conditions and Co-morbidities  Goal: Patient's chronic conditions and co-morbidity symptoms are monitored and maintained or improved  3/22/2025 1520 by Laurel Arnold RN  Outcome: Not Progressing  3/22/2025 0943 by Laurel Arnold RN  Outcome: Not Progressing  Flowsheets (Taken 3/22/2025 0800)  Care Plan - Patient's Chronic Conditions and Co-Morbidity Symptoms are Monitored and Maintained or Improved:   Monitor and assess patient's chronic conditions and comorbid symptoms for stability, deterioration, or improvement   Collaborate with multidisciplinary team to address chronic and comorbid conditions and prevent exacerbation or deterioration   Update acute care plan with appropriate goals if chronic or comorbid symptoms are exacerbated and prevent overall improvement and discharge     Problem: Discharge Planning  Goal: Discharge to home or other facility with appropriate resources  3/22/2025 1520 by Laurel Arnold RN  Outcome: Not Progressing  3/22/2025 0943 by Laurel Arnold RN  Outcome: Not Progressing  Flowsheets (Taken 3/22/2025 0800)  Discharge to home or other facility with appropriate resources: Identify barriers to discharge with patient and caregiver     Problem: Musculoskeletal - Adult  Goal: Return mobility to safest level of function  3/22/2025 1520 by Laurel Arnold RN  Outcome: Not Progressing  3/22/2025 0943 by Laurel Arnold RN  Outcome: Not Progressing  Goal: Maintain proper alignment of affected body part  3/22/2025 1520 by Laurel Arnold RN  Outcome: Not Progressing  3/22/2025 0943 by Laurel Arnold RN  Outcome: Not Progressing     Problem: Safety - Adult  Goal: Free from fall injury  3/22/2025 1520 by Laurel Arnold RN  Outcome: Progressing  3/22/2025 0943 by Laurel Arnold RN  Outcome: Progressing  Flowsheets (Taken 3/22/2025 0800)  Free From Fall Injury:   Instruct family/caregiver on patient safety   Based on caregiver fall risk screen, instruct family/caregiver to  values  Goal: Minimal or absence of nausea and vomiting  3/22/2025 1520 by Laurel Arnold RN  Outcome: Progressing  3/22/2025 0943 by Laurel Arnold RN  Outcome: Progressing     Problem: Genitourinary - Adult  Goal: Absence of urinary retention  3/22/2025 1520 by Laurel Arnold RN  Outcome: Progressing  3/22/2025 0943 by Laurel Arnold RN  Outcome: Progressing  Flowsheets  Taken 3/22/2025 0800 by Laurel Arnold RN  Absence of urinary retention:   Assess patient’s ability to void and empty bladder   Monitor intake/output and perform bladder scan as needed  Taken 3/21/2025 2000 by Josefina Jackson RN  Absence of urinary retention: Assess patient’s ability to void and empty bladder     Problem: Infection - Adult  Goal: Absence of infection at discharge  3/22/2025 1520 by Laurel Arnold RN  Outcome: Progressing  3/22/2025 0943 by Laurel Arnold RN  Outcome: Progressing  Flowsheets (Taken 3/22/2025 0800)  Absence of infection at discharge:   Assess and monitor for signs and symptoms of infection   Monitor lab/diagnostic results   Monitor all insertion sites i.e., indwelling lines, tubes and drains   Monitor endotracheal (as able) and nasal secretions for changes in amount and color  Goal: Absence of infection during hospitalization  3/22/2025 1520 by Laurel Arnold RN  Outcome: Progressing  3/22/2025 0943 by Laurel Arnold RN  Outcome: Progressing  Flowsheets (Taken 3/22/2025 0800)  Absence of infection during hospitalization:   Assess and monitor for signs and symptoms of infection   Monitor lab/diagnostic results   Monitor all insertion sites i.e., indwelling lines, tubes and drains   Monitor endotracheal (as able) and nasal secretions for changes in amount and color  Goal: Absence of fever/infection during anticipated neutropenic period  3/22/2025 1520 by Laurel Arnold RN  Outcome: Progressing  3/22/2025 0943 by Laurel Arnold RN  Outcome: Progressing  Flowsheets (Taken 3/22/2025 0800)  Absence of  3/22/2025 0800)  Oral Mucous Membranes Remain Intact:   Assess oral mucosa and hygiene practices   Implement preventative oral hygiene regimen   Implement oral medicated treatments as ordered

## 2025-03-22 NOTE — PROGRESS NOTES
Progress Note  3/22/2025 8:43 AM  Subjective:   Admit Date: 3/4/2025  PCP: Tiesha Panda MD  Interval History: Patient examined doing much the same , no s/o renal recovery , dialysis as planned , remains intubated , tube fed , on vent     Diet: ADULT TUBE FEEDING; Nasogastric; Peptide Based; Continuous; 10; Yes; 10; Q 4 hours; 35; 30; Q 4 hours; Protein; 1 Dose; Daily    Data:   Scheduled Meds:   magnesium sulfate  1,000 mg IntraVENous Once    vitamin D  2,000 Units Oral Daily    thiamine  500 mg IntraVENous Q8H    Followed by    [START ON 3/23/2025] thiamine  250 mg IntraVENous Q24H    Followed by    [START ON 3/28/2025] thiamine  100 mg Oral Daily    hydrocortisone sodium succinate PF  50 mg IntraVENous Daily    pantoprazole (PROTONIX) 40 mg in sodium chloride (PF) 0.9 % 10 mL injection  40 mg IntraVENous Q12H    midodrine  20 mg Per NG tube TID WC    fludrocortisone  0.05 mg Per NG tube Daily    chlorhexidine  15 mL Mouth/Throat BID    levETIRAcetam  500 mg IntraVENous BID    [Held by provider] lactulose  20 g Oral TID    insulin lispro  0-4 Units SubCUTAneous Q6H    sodium chloride flush  5-40 mL IntraVENous 2 times per day    apixaban  2.5 mg Oral BID    [Held by provider] furosemide  40 mg IntraVENous Daily    ipratropium 0.5 mg-albuterol 2.5 mg  1 Dose Inhalation Q4H WA RT    arformoterol tartrate  15 mcg Nebulization BID RT    budesonide  0.5 mg Nebulization BID RT    [Held by provider] metoprolol succinate  25 mg Oral Daily    [Held by provider] amiodarone  200 mg Oral BID    [Held by provider] atorvastatin  80 mg Oral Nightly     Continuous Infusions:   sodium chloride      sodium chloride 10 mL/hr at 03/22/25 0700    dextrose       PRN Meds:sodium chloride, sulfur hexafluoride microspheres, heparin (PF), heparin (porcine) **AND** heparin (porcine), sodium chloride, polyethylene glycol, sodium chloride flush, [DISCONTINUED] ondansetron **OR** ondansetron, acetaminophen **OR** acetaminophen, dextrose  Clarence  Last  dialysis 3/21     2)  shock liver   Coagulopathy   eliquis on hold   Amiodarone on hold   Lfts improving     3)  A Fib with rapid venticular rate  Off amio due to lfts  Eliquis contiues continues to be given intermittently as still waiting for tunneled dialysis line per IR   Per cardiology     4)  hypotension/sepsis  Influenza pos  On empiric abx  Off levophed  Underlying HFrEF  Intubated 3/16     5)   Mild to moderate todd PL effusion  possible HF related  Uf as tolerated with hd     6) High TSH   Per IM    Princess Winkler MD

## 2025-03-22 NOTE — PROGRESS NOTES
P Quality Flow/Interdisciplinary Rounds Progress Note        Quality Flow Rounds held on March 22, 2025    Disciplines Attending:  Bedside Nurse and Dr. Facundo Moffett was admitted on 3/4/2025  5:05 PM    Anticipated Discharge Date:  unknown     Disposition: unknown       Marty Score:  Marty Scale Score: 12    [facility] RISK OF UNPLANNED READMISSION 2.0             19.8 Total Score        Discussed patient goal for the day, patient clinical progression, and barriers to discharge.  The following Goal(s) of the Day/Commitment(s) have been identified:   NICOM for cardiac index       Laurel Arnold RN  March 22, 2025

## 2025-03-22 NOTE — PROGRESS NOTES
INPATIENT CARDIOLOGY FOLLOW-UP    Name: Nikki Moffett    Age: 79 y.o.    Date of Admission: 3/4/2025  5:05 PM    Date of Service: 3/22/2025      Chief Complaint: Follow-up for atrial fibrillation with RVR, worsening decompensated heart failure    Interim History:  No new overnight cardiac complaints. Currently with no complaints of CP, SOB, palpitations, dizziness, or lightheadedness. SR on telemetry.  No recurrent arrhythmia seen.  Intubated.     On intermittent hemodialysis.  Hemodialyzed yesterday.  4.6 L positive now.  Hypotensive this morning.  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)    Occipital stroke (HCC)    Atrial flutter (HCC)    Paroxysmal atrial fibrillation (HCC)    H/O: stroke    Atrial flutter with rapid ventricular response (HCC)    Decompensated heart failure (HCC)    Chronic atrial fibrillation (HCC)    VHD (valvular heart disease)    ANTONIO (acute kidney injury)    Palliative care encounter    Metabolic encephalopathy       Current Medications:    Current Facility-Administered Medications:     vitamin D (CHOLECALCIFEROL) tablet 2,000 Units, 2,000 Units, Oral, Daily, Rosetta Lopez MD, 2,000 Units at 03/22/25 0821    thiamine (B-1) injection 500 mg, 500 mg, IntraVENous, Q8H, 500 mg at 03/22/25 0820 **FOLLOWED BY** [START ON 3/23/2025] thiamine (B-1) injection 250 mg, 250 mg, IntraVENous, Q24H **FOLLOWED BY** [START ON 3/28/2025] thiamine tablet 100 mg, 100 mg, Oral, Daily, Rosetta Lopez MD    hydrocortisone sodium succinate PF (SOLU-CORTEF) injection 50 mg, 50 mg, IntraVENous, Daily, Adan Sandoval MD, 50 mg at 03/22/25 0821    0.9 % sodium chloride infusion, , IntraVENous, PRN, Sandra Del Rosario MD    pantoprazole (PROTONIX) 40 mg in sodium chloride (PF) 0.9 % 10 mL injection, 40 mg, IntraVENous, Q12H,  ventricular systolic function with a visually estimated EF of 55 - 60%. Left ventricle size is normal. Moderately increased ventricular mass. Findings consistent with moderate concentric hypertrophy. Normal wall motion. Indeterminate diastolic function.    Right Ventricle: Right ventricle size is normal. Normal systolic function. TAPSE is 2.5 cm.    Aortic Valve: No stenosis.    Tricuspid Valve: Normal RVSP. The estimated RVSP is 33 mmHg.    Left Atrium: Left atrium is mildly dilated.    Interatrial Septum: Interatrial shunt visualized with color Doppler with left to right shunt.  No right to left shunt on bubble study.    Pericardium: No pericardial effusion.    Image quality is good.     Lexiscan MPS 2/2024    Stress Function: Left ventricular function post-stress is normal. Post-stress ejection fraction is 69%.    Perfusion Comments: LV perfusion is normal. There is no evidence of inducible ischemia.    ECG: Resting ECG demonstrates normal sinus rhythm and interventricular conduction delay.    ECG: The ECG was negative for ischemia.    Stress Test: A pharmacological stress test was performed using lexiscan. The patient reported no symptoms during the stress test. Blood pressure demonstrated a normal response to stress     TTE-3/7/2025:    Left Ventricle: Normal left ventricular systolic function with a visually estimated EF of 10 -15%. Left ventricle size is normal. Normal wall thickness. Severe global hypokinesis present. E/e' ratio >11, suggesting increased LAP.    Right Ventricle: Right ventricle size is normal. Reduced systolic function.    Aortic Valve: No significant stenosis. 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    Mitral Valve: Mild regurgitation.    Tricuspid Valve: Moderate regurgitation. Normal RVSP. The estimated RVSP is 25 mmHg.    Left Atrium: Left atrium is severely dilated.    Right Atrium: Right atrium is mildly dilated.    Pericardium: No pericardial effusion.     software. Every effort was made to ensure accuracy; however, inadvertent computerized transcription errors may be present.

## 2025-03-22 NOTE — PROGRESS NOTES
[facility]  Internal Medicine Residency Program  Progress Note - House Team     Patient:  Nikki Moffett 79 y.o. female MRN: [id number]     Date of Service: 3/22/2025     CC:   Chief Complaint   Patient presents with    Altered Mental Status     \"Mood swings, memory loss and fatigue\" per patients grandson    Chest Pain     Ongoing for a couple weeks per family       Subjective     Patient was seen at bedside this morning.  Patient is still intubated, not responding to simple commands.  Patient had tunneled HD placed, on hemodialysis.  Patient is off sedation, off Levophed.    Objective     Physical Exam:  Vitals: BP (!) 61/38   Pulse 79   Temp 98.6 °F (37 °C) (Esophageal)   Resp 12   Ht 1.575 m (5' 2\")   Wt 67.3 kg (148 lb 5.9 oz)   SpO2 99%   BMI 27.14 kg/m²     I & O - 24hr: In: 1338   Out: 2500   General appearance: Intubated.  Appears lethargic.  Nonpurposeful eye opening.  Neuro: Mental status: Intubated.  Nonpurposeful eye opening.  Does not follow commands.  Cardiovascular: Regular rate and rhythm, S1 and S2  heard, no murmurs appreciated  Respiratory: Rales present bilaterally   Abdomen: Soft, non tender, bowel sounds normal; masses, no organomegaly, rebound or guarding  Extremities: Extremities normal, no cyanosis, distal pulses intact, no edema    Pertinent Labs & Imaging Studies      Complete Blood Count:   Recent Labs     03/20/25  0410 03/20/25  0817 03/21/25  0403 03/22/25  0401   WBC 18.5*  --  15.1* 17.0*   HGB 6.1* 7.8* 8.1* 9.0*   HCT 19.6* 24.1* 25.5* 27.7*     --  148 163        Last 3 Blood Glucose:   Recent Labs     03/20/25  0410 03/21/25  0403 03/22/25  0401   GLUCOSE 100* 94 123*        PT/INR:    Lab Results   Component Value Date/Time    PROTIME 16.6 03/17/2025 07:56 AM    INR 1.5 03/17/2025 07:56 AM     PTT:    Lab Results   Component Value Date/Time    APTT 42.0 03/08/2025 05:50 PM     Comprehensive Metabolic Profile:   Recent Labs     03/20/25  5077

## 2025-03-23 ENCOUNTER — APPOINTMENT (OUTPATIENT)
Dept: GENERAL RADIOLOGY | Age: 80
DRG: 004 | End: 2025-03-23
Payer: MEDICARE

## 2025-03-23 LAB
ALBUMIN SERPL-MCNC: 2.9 G/DL (ref 3.5–5.2)
ALP SERPL-CCNC: 107 U/L (ref 35–104)
ALT SERPL-CCNC: 78 U/L (ref 0–32)
ANION GAP SERPL CALCULATED.3IONS-SCNC: 16 MMOL/L (ref 7–16)
AST SERPL-CCNC: 39 U/L (ref 0–31)
BASOPHILS # BLD: 0.03 K/UL (ref 0–0.2)
BASOPHILS NFR BLD: 0 % (ref 0–2)
BILIRUB DIRECT SERPL-MCNC: 0.3 MG/DL (ref 0–0.3)
BILIRUB INDIRECT SERPL-MCNC: 0.5 MG/DL (ref 0–1)
BILIRUB SERPL-MCNC: 0.8 MG/DL (ref 0–1.2)
BUN SERPL-MCNC: 41 MG/DL (ref 6–23)
CALCIUM SERPL-MCNC: 8.4 MG/DL (ref 8.6–10.2)
CHLORIDE SERPL-SCNC: 96 MMOL/L (ref 98–107)
CO2 SERPL-SCNC: 21 MMOL/L (ref 22–29)
CREAT SERPL-MCNC: 2.9 MG/DL (ref 0.5–1)
EOSINOPHIL # BLD: 0 K/UL (ref 0.05–0.5)
EOSINOPHILS RELATIVE PERCENT: 0 % (ref 0–6)
ERYTHROCYTE [DISTWIDTH] IN BLOOD BY AUTOMATED COUNT: 19.4 % (ref 11.5–15)
FIO2: 40
GFR, ESTIMATED: 16 ML/MIN/1.73M2
GLUCOSE BLD-MCNC: 128 MG/DL (ref 74–99)
GLUCOSE BLD-MCNC: 137 MG/DL (ref 74–99)
GLUCOSE BLD-MCNC: 137 MG/DL (ref 74–99)
GLUCOSE BLD-MCNC: 166 MG/DL (ref 74–99)
GLUCOSE SERPL-MCNC: 134 MG/DL (ref 74–99)
HCT VFR BLD AUTO: 28.6 % (ref 34–48)
HGB BLD-MCNC: 9 G/DL (ref 11.5–15.5)
IMM GRANULOCYTES # BLD AUTO: 0.16 K/UL (ref 0–0.58)
IMM GRANULOCYTES NFR BLD: 1 % (ref 0–5)
LACTATE BLDV-SCNC: 1.2 MMOL/L (ref 0.5–2.2)
LYMPHOCYTES NFR BLD: 1.14 K/UL (ref 1.5–4)
LYMPHOCYTES RELATIVE PERCENT: 5 % (ref 20–42)
MAGNESIUM SERPL-MCNC: 3.7 MG/DL (ref 1.6–2.6)
MCH RBC QN AUTO: 27.5 PG (ref 26–35)
MCHC RBC AUTO-ENTMCNC: 31.5 G/DL (ref 32–34.5)
MCV RBC AUTO: 87.5 FL (ref 80–99.9)
MODE: AC
MONOCYTES NFR BLD: 1.2 K/UL (ref 0.1–0.95)
MONOCYTES NFR BLD: 6 % (ref 2–12)
NEUTROPHILS NFR BLD: 88 % (ref 43–80)
NEUTS SEG NFR BLD: 18.8 K/UL (ref 1.8–7.3)
O2 DELIVERY DEVICE: ABNORMAL
PEEP: 5
PHOSPHATE SERPL-MCNC: 2.9 MG/DL (ref 2.5–4.5)
PLATELET # BLD AUTO: 177 K/UL (ref 130–450)
PMV BLD AUTO: 11.9 FL (ref 7–12)
POC HCO3: 28.5 MMOL/L (ref 22–26)
POC O2 SATURATION: 95.6 % (ref 92–98.5)
POC PCO2: 38.8 MM HG (ref 35–45)
POC PH: 7.47 (ref 7.35–7.45)
POC PO2: 73.7 MM HG (ref 60–80)
POSITIVE BASE EXCESS, ART: 4.6 MMOL/L (ref 0–3)
POTASSIUM SERPL-SCNC: 4.6 MMOL/L (ref 3.5–5)
PROT SERPL-MCNC: 5.2 G/DL (ref 6.4–8.3)
RBC # BLD AUTO: 3.27 M/UL (ref 3.5–5.5)
SET RATE, POC: 12
SODIUM SERPL-SCNC: 133 MMOL/L (ref 132–146)
T4 FREE SERPL-MCNC: 0.7 NG/DL (ref 0.9–1.7)
TIDAL VOLUME: 325
TSH SERPL DL<=0.05 MIU/L-ACNC: 6.5 UIU/ML (ref 0.27–4.2)
WBC OTHER # BLD: 21.3 K/UL (ref 4.5–11.5)

## 2025-03-23 PROCEDURE — 71045 X-RAY EXAM CHEST 1 VIEW: CPT

## 2025-03-23 PROCEDURE — 6370000000 HC RX 637 (ALT 250 FOR IP)

## 2025-03-23 PROCEDURE — 82248 BILIRUBIN DIRECT: CPT

## 2025-03-23 PROCEDURE — 2500000003 HC RX 250 WO HCPCS: Performed by: INTERNAL MEDICINE

## 2025-03-23 PROCEDURE — 6360000002 HC RX W HCPCS: Performed by: INTERNAL MEDICINE

## 2025-03-23 PROCEDURE — 93005 ELECTROCARDIOGRAM TRACING: CPT

## 2025-03-23 PROCEDURE — 85025 COMPLETE CBC W/AUTO DIFF WBC: CPT

## 2025-03-23 PROCEDURE — 83605 ASSAY OF LACTIC ACID: CPT

## 2025-03-23 PROCEDURE — 6360000002 HC RX W HCPCS

## 2025-03-23 PROCEDURE — 94003 VENT MGMT INPAT SUBQ DAY: CPT

## 2025-03-23 PROCEDURE — 82962 GLUCOSE BLOOD TEST: CPT

## 2025-03-23 PROCEDURE — 87070 CULTURE OTHR SPECIMN AEROBIC: CPT

## 2025-03-23 PROCEDURE — 2580000003 HC RX 258

## 2025-03-23 PROCEDURE — 80053 COMPREHEN METABOLIC PANEL: CPT

## 2025-03-23 PROCEDURE — 99291 CRITICAL CARE FIRST HOUR: CPT | Performed by: INTERNAL MEDICINE

## 2025-03-23 PROCEDURE — 2500000003 HC RX 250 WO HCPCS

## 2025-03-23 PROCEDURE — 84443 ASSAY THYROID STIM HORMONE: CPT

## 2025-03-23 PROCEDURE — 84100 ASSAY OF PHOSPHORUS: CPT

## 2025-03-23 PROCEDURE — 83735 ASSAY OF MAGNESIUM: CPT

## 2025-03-23 PROCEDURE — 99232 SBSQ HOSP IP/OBS MODERATE 35: CPT | Performed by: INTERNAL MEDICINE

## 2025-03-23 PROCEDURE — 99233 SBSQ HOSP IP/OBS HIGH 50: CPT | Performed by: INTERNAL MEDICINE

## 2025-03-23 PROCEDURE — 6360000002 HC RX W HCPCS: Performed by: PSYCHIATRY & NEUROLOGY

## 2025-03-23 PROCEDURE — 87106 FUNGI IDENTIFICATION YEAST: CPT

## 2025-03-23 PROCEDURE — 84439 ASSAY OF FREE THYROXINE: CPT

## 2025-03-23 PROCEDURE — 2000000000 HC ICU R&B

## 2025-03-23 PROCEDURE — 87205 SMEAR GRAM STAIN: CPT

## 2025-03-23 PROCEDURE — 36415 COLL VENOUS BLD VENIPUNCTURE: CPT

## 2025-03-23 PROCEDURE — 82803 BLOOD GASES ANY COMBINATION: CPT

## 2025-03-23 PROCEDURE — 87040 BLOOD CULTURE FOR BACTERIA: CPT

## 2025-03-23 PROCEDURE — P9047 ALBUMIN (HUMAN), 25%, 50ML: HCPCS

## 2025-03-23 PROCEDURE — 94640 AIRWAY INHALATION TREATMENT: CPT

## 2025-03-23 RX ORDER — MAGNESIUM SULFATE IN WATER 40 MG/ML
2000 INJECTION, SOLUTION INTRAVENOUS ONCE
Status: COMPLETED | OUTPATIENT
Start: 2025-03-23 | End: 2025-03-23

## 2025-03-23 RX ORDER — ALBUMIN (HUMAN) 12.5 G/50ML
25 SOLUTION INTRAVENOUS EVERY 6 HOURS
Status: COMPLETED | OUTPATIENT
Start: 2025-03-23 | End: 2025-03-23

## 2025-03-23 RX ORDER — METOPROLOL TARTRATE 1 MG/ML
5 INJECTION, SOLUTION INTRAVENOUS ONCE
Status: COMPLETED | OUTPATIENT
Start: 2025-03-23 | End: 2025-03-23

## 2025-03-23 RX ORDER — METOPROLOL TARTRATE 1 MG/ML
INJECTION, SOLUTION INTRAVENOUS
Status: COMPLETED
Start: 2025-03-23 | End: 2025-03-23

## 2025-03-23 RX ADMIN — PANTOPRAZOLE SODIUM 40 MG: 40 INJECTION, POWDER, FOR SOLUTION INTRAVENOUS at 05:41

## 2025-03-23 RX ADMIN — CHLORHEXIDINE GLUCONATE, 0.12% ORAL RINSE 15 ML: 1.2 SOLUTION DENTAL at 20:47

## 2025-03-23 RX ADMIN — APIXABAN 2.5 MG: 2.5 TABLET, FILM COATED ORAL at 20:48

## 2025-03-23 RX ADMIN — IPRATROPIUM BROMIDE AND ALBUTEROL SULFATE 1 DOSE: 2.5; .5 SOLUTION RESPIRATORY (INHALATION) at 11:28

## 2025-03-23 RX ADMIN — METOPROLOL TARTRATE 5 MG: 1 INJECTION, SOLUTION INTRAVENOUS at 22:38

## 2025-03-23 RX ADMIN — ARFORMOTEROL TARTRATE 15 MCG: 15 SOLUTION RESPIRATORY (INHALATION) at 08:27

## 2025-03-23 RX ADMIN — METOROPROLOL TARTRATE 5 MG: 5 INJECTION, SOLUTION INTRAVENOUS at 22:38

## 2025-03-23 RX ADMIN — Medication 2000 UNITS: at 10:21

## 2025-03-23 RX ADMIN — BUDESONIDE INHALATION 500 MCG: 0.5 SUSPENSION RESPIRATORY (INHALATION) at 20:15

## 2025-03-23 RX ADMIN — MIDODRINE HYDROCHLORIDE 20 MG: 10 TABLET ORAL at 17:41

## 2025-03-23 RX ADMIN — LEVETIRACETAM 500 MG: 100 INJECTION INTRAVENOUS at 10:19

## 2025-03-23 RX ADMIN — BUDESONIDE INHALATION 500 MCG: 0.5 SUSPENSION RESPIRATORY (INHALATION) at 08:27

## 2025-03-23 RX ADMIN — SODIUM CHLORIDE, PRESERVATIVE FREE 10 ML: 5 INJECTION INTRAVENOUS at 20:48

## 2025-03-23 RX ADMIN — HYDROCORTISONE SODIUM SUCCINATE 50 MG: 100 INJECTION, POWDER, FOR SOLUTION INTRAMUSCULAR; INTRAVENOUS at 10:19

## 2025-03-23 RX ADMIN — SODIUM CHLORIDE, PRESERVATIVE FREE 10 ML: 5 INJECTION INTRAVENOUS at 10:21

## 2025-03-23 RX ADMIN — LEVETIRACETAM 500 MG: 100 INJECTION INTRAVENOUS at 20:48

## 2025-03-23 RX ADMIN — MIDODRINE HYDROCHLORIDE 20 MG: 10 TABLET ORAL at 10:21

## 2025-03-23 RX ADMIN — ALBUMIN (HUMAN) 25 G: 0.25 INJECTION, SOLUTION INTRAVENOUS at 17:43

## 2025-03-23 RX ADMIN — ARFORMOTEROL TARTRATE 15 MCG: 15 SOLUTION RESPIRATORY (INHALATION) at 20:15

## 2025-03-23 RX ADMIN — MIDODRINE HYDROCHLORIDE 20 MG: 10 TABLET ORAL at 12:16

## 2025-03-23 RX ADMIN — APIXABAN 2.5 MG: 2.5 TABLET, FILM COATED ORAL at 10:21

## 2025-03-23 RX ADMIN — ALBUMIN (HUMAN) 25 G: 0.25 INJECTION, SOLUTION INTRAVENOUS at 12:18

## 2025-03-23 RX ADMIN — IPRATROPIUM BROMIDE AND ALBUTEROL SULFATE 1 DOSE: 2.5; .5 SOLUTION RESPIRATORY (INHALATION) at 16:49

## 2025-03-23 RX ADMIN — CHLORHEXIDINE GLUCONATE, 0.12% ORAL RINSE 15 ML: 1.2 SOLUTION DENTAL at 10:19

## 2025-03-23 RX ADMIN — THIAMINE HYDROCHLORIDE 250 MG: 100 INJECTION, SOLUTION INTRAMUSCULAR; INTRAVENOUS at 10:19

## 2025-03-23 RX ADMIN — MAGNESIUM SULFATE HEPTAHYDRATE 2000 MG: 40 INJECTION, SOLUTION INTRAVENOUS at 00:20

## 2025-03-23 RX ADMIN — THIAMINE HYDROCHLORIDE 500 MG: 100 INJECTION, SOLUTION INTRAMUSCULAR; INTRAVENOUS at 00:15

## 2025-03-23 RX ADMIN — FLUDROCORTISONE ACETATE 0.05 MG: 0.1 TABLET ORAL at 10:21

## 2025-03-23 RX ADMIN — IPRATROPIUM BROMIDE AND ALBUTEROL SULFATE 1 DOSE: 2.5; .5 SOLUTION RESPIRATORY (INHALATION) at 08:26

## 2025-03-23 RX ADMIN — IPRATROPIUM BROMIDE AND ALBUTEROL SULFATE 1 DOSE: 2.5; .5 SOLUTION RESPIRATORY (INHALATION) at 20:15

## 2025-03-23 ASSESSMENT — PULMONARY FUNCTION TESTS
PIF_VALUE: 22
PIF_VALUE: 22
PIF_VALUE: 23
PIF_VALUE: 24
PIF_VALUE: 24
PIF_VALUE: 22
PIF_VALUE: 25
PIF_VALUE: 23
PIF_VALUE: 26
PIF_VALUE: 24
PIF_VALUE: 23
PIF_VALUE: 25
PIF_VALUE: 15
PIF_VALUE: 24
PIF_VALUE: 25
PIF_VALUE: 24
PIF_VALUE: 25
PIF_VALUE: 22
PIF_VALUE: 24
PIF_VALUE: 24
PIF_VALUE: 23
PIF_VALUE: 24
PIF_VALUE: 22
PIF_VALUE: 23
PIF_VALUE: 16
PIF_VALUE: 25
PIF_VALUE: 26
PIF_VALUE: 22
PIF_VALUE: 22
PIF_VALUE: 26

## 2025-03-23 ASSESSMENT — PAIN SCALES - GENERAL
PAINLEVEL_OUTOF10: 0

## 2025-03-23 NOTE — PROGRESS NOTES
03/23/25 0829   Patient Observation   Pulse 82   Respirations 12   SpO2 100 %   Vent Information   Vent Mode AC/VC   Ventilator Settings   Vt (Set, mL) 325 mL   Resp Rate (Set) 12 bpm   PEEP/CPAP (cmH2O) 5   FiO2  40 %   Peak Inspiratory Flow (Set) 50 L/sec   Vent Patient Data (Readings)   Vt (Measured) 341 mL   Peak Inspiratory Pressure (cmH2O) 24 cmH2O   Rate Measured 12 br/min   Minute Volume (L/min) 4.13 Liters   Peak Inspiratory Flow (lpm) 50 L/sec   Mean Airway Pressure (cmH2O) 8 cmH20   Plateau Pressure (cm H2O) 19 cm H2O   Driving Pressure 14   I:E Ratio 1:6.10   Flow Sensitivity 3 L/min   Static Compliance (L/cm H2O) 25   Backup Apnea On   Backup Rate 12 Breaths Per Minute   Backup Vt 325   Vent Alarm Settings   High Pressure (cmH2O) 50 cmH2O   Low Minute Volume (lpm) 3 L/min   High Minute Volume (lpm) 12 L/min   Low Exhaled Vt (ml) 300 mL   High Exhaled Vt (ml) 700 mL   RR High (bpm) 30 br/min   Apnea (secs) 20 secs   Additional Respiratoray Assessments   Humidification Temp 37   Circuit Condensation Drained   Ambu Bag With Mask At Bedside Yes   Airway Clearance   Suction ET Tube   Suction Device Inline suction catheter   Sputum Method Obtained Endotracheal   Sputum Amount Scant   Sputum Color/Odor Tan   Sputum Consistency Thin   ETT    Placement Date/Time: 03/16/25 0350   Placement Verified By: Auscultation;Capnometry  Preoxygenation: Yes  Airway Type: Cuffed  Airway Tube Size: 7.5 mm  Secured At: 22 cm  Measured From: Lips   Secured At 22 cm   Measured From Lips   Secured By Commercial tube crane   Site Assessment Dry   Treatment   $Treatment Type $Inhaled Therapy/Meds

## 2025-03-23 NOTE — PROGRESS NOTES
P Quality Flow/Interdisciplinary Rounds Progress Note        Quality Flow Rounds held on March 23, 2025    Disciplines Attending:  Bedside Nurse and Dr. Facundo Moffett was admitted on 3/4/2025  5:05 PM    Anticipated Discharge Date:   unknown    Disposition: unknown      Marty Score:  Marty Scale Score: 12    BS RISK OF UNPLANNED READMISSION 2.0             20.3 Total Score        Discussed patient goal for the day, patient clinical progression, and barriers to discharge.  The following Goal(s) of the Day/Commitment(s) have been identified:   SBT       Laurel Arnold RN  March 23, 2025

## 2025-03-23 NOTE — PROGRESS NOTES
INPATIENT CARDIOLOGY FOLLOW-UP    Name: Nikki Moffett    Age: 79 y.o.    Date of Admission: 3/4/2025  5:05 PM    Date of Service: 3/23/2025      Chief Complaint: Follow-up for atrial fibrillation with RVR, worsening decompensated heart failure    Interim History:  No new overnight cardiac complaints. Currently with no complaints of CP, SOB, palpitations, dizziness, or lightheadedness. SR on telemetry.  No recurrent arrhythmia seen.  Intubated.     On intermittent hemodialysis.  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)    Occipital stroke (HCC)    Atrial flutter (HCC)    Paroxysmal atrial fibrillation (HCC)    H/O: stroke    Atrial flutter with rapid ventricular response (HCC)    Decompensated heart failure (HCC)    Chronic atrial fibrillation (HCC)    VHD (valvular heart disease)    ANTONIO (acute kidney injury)    Palliative care encounter    Metabolic encephalopathy       Current Medications:    Current Facility-Administered Medications:     norepinephrine (LEVOPHED) 16 mg in sodium chloride 0.9 % 250 mL infusion (premix), 1-100 mcg/min, IntraVENous, Continuous, Marko Yang MD, Last Rate: 3.8 mL/hr at 03/23/25 0600, 4 mcg/min at 03/23/25 0600    vitamin D (CHOLECALCIFEROL) tablet 2,000 Units, 2,000 Units, Oral, Daily, Rosetta Lopez MD, 2,000 Units at 03/22/25 0821    [COMPLETED] thiamine (B-1) injection 500 mg, 500 mg, IntraVENous, Q8H, 500 mg at 03/23/25 0015 **FOLLOWED BY** thiamine (B-1) injection 250 mg, 250 mg, IntraVENous, Q24H **FOLLOWED BY** [START ON 3/28/2025] thiamine tablet 100 mg, 100 mg, Oral, Daily, Rosetta Lopez MD    hydrocortisone sodium succinate PF (SOLU-CORTEF) injection 50 mg, 50 mg, IntraVENous, Daily, Adan Sandoval MD, 50 mg at 03/22/25 0821    0.9 % sodium chloride infusion, , IntraVENous, PRN, Sandra Del Rosario  MD    pantoprazole (PROTONIX) 40 mg in sodium chloride (PF) 0.9 % 10 mL injection, 40 mg, IntraVENous, Q12H, Sandra Del Rosario MD, 40 mg at 03/23/25 0541    midodrine (PROAMATINE) tablet 20 mg, 20 mg, Per NG tube, TID WC, Rosetta Lopez MD, 20 mg at 03/22/25 1615    fludrocortisone (FLORINEF) tablet 0.05 mg, 0.05 mg, Per NG tube, Daily, Rosetta Lopez MD, 0.05 mg at 03/22/25 0821    chlorhexidine (PERIDEX) 0.12 % solution 15 mL, 15 mL, Mouth/Throat, BID, Sandra Del Rosario MD, 15 mL at 03/22/25 1944    levETIRAcetam (KEPPRA) injection 500 mg, 500 mg, IntraVENous, BID, Kyaw Au DO, 500 mg at 03/22/25 1944    [Held by provider] lactulose (CHRONULAC) 10 GM/15ML solution 20 g, 20 g, Oral, TID, Ben Dumont. MD Lucrecia, 20 g at 03/15/25 2148    sulfur hexafluoride microspheres (LUMASON) 60.7-25 MG injection 2 mL, 2 mL, IntraVENous, ONCE PRN, Niko Alexandre MD    insulin lispro (HUMALOG,ADMELOG) injection vial 0-4 Units, 0-4 Units, SubCUTAneous, Q6H, Hermelindo Loredo DO    heparin (PF) 100 UNIT/ML injection 100 Units, 100 Units, IntraCATHeter, PRN, Americo Vargas MD    heparin (porcine) injection 1,100-1,900 Units, 1,100-1,900 Units, IntraCATHeter, PRN, 1,600 Units at 03/15/25 0845 **AND** heparin (porcine) injection 1,100-1,900 Units, 1,100-1,900 Units, IntraCATHeter, PRN, Princess Winkler MD, 1,200 Units at 03/15/25 0849    sodium chloride flush 0.9 % injection 5-40 mL, 5-40 mL, IntraVENous, 2 times per day, Gelacio Hendricks MD, 10 mL at 03/22/25 1944    apixaban (ELIQUIS) tablet 2.5 mg, 2.5 mg, Oral, BID, Kilo Dumont MD, 2.5 mg at 03/22/25 1944    [Held by provider] furosemide (LASIX) injection 40 mg, 40 mg, IntraVENous, Daily, Chandni Garrido, APRN - CNP, 40 mg at 03/07/25 1531    ipratropium 0.5 mg-albuterol 2.5 mg (DUONEB) nebulizer solution 1 Dose, 1 Dose, Inhalation, Q4H WA RT, Ben Dumont. MD Lucrecia, 1 Dose at 03/22/25 2127    arformoterol tartrate (BROVANA)  03/16/2025    PROTIME 17.5 (H) 03/12/2025     Lab Results   Component Value Date    TSH 2.39 03/12/2025     Lab Results   Component Value Date    LABA1C 6.0 (H) 03/04/2025     No results found for: \"EAG\"  Lab Results   Component Value Date    CHOL 159 12/22/2023    CHOL 278 (H) 05/11/2017    CHOL 300 (H) 04/07/2016     Lab Results   Component Value Date    TRIG 112 12/22/2023    TRIG 193 (H) 05/11/2017    TRIG 150 (H) 04/07/2016     Lab Results   Component Value Date    HDL 24 (L) 03/16/2025    HDL 40 (L) 12/22/2023    HDL 84 05/11/2017     No components found for: \"LDLCALC\", \"LDLCHOLESTEROL\"  Lab Results   Component Value Date    VLDL 20 03/16/2025    VLDL 22 12/22/2023    VLDL 39 05/11/2017     No results found for: \"CHOLHDLRATIO\"  No results for input(s): \"PROBNP\" in the last 72 hours.      Cardiac Tests:    TTE Dr. Carnes 12/22/2023    Left Ventricle: Moderately reduced left ventricular systolic function with a visually estimated EF of 40 - 45%. Left ventricle size is normal. Mildly increased ventricular mass. Findings consistent with mild concentric hypertrophy. Moderate global hypokinesis present. Moderate hypokinesis of the following segments: inferior and inferoseptal walls. Grade III diastolic dysfunction with increased LAP.    Right Ventricle: Right ventricle size is normal. Normal systolic function. TAPSE is 1.9 cm.    Aortic Valve: No significant stenosis. COLLEEN by direct planimetry is 2 cm2.    Mitral Valve: Mild regurgitation.    Tricuspid Valve: Normal RVSP. The estimated RVSP is 32 mmHg.    Left Atrium: Left atrium is mildly dilated.    Interatrial Septum: Agitated saline study was negative with and without provocation.    Right Atrium: Right atrium is mildly dilated.    Pericardium: No pericardial effusion.    Image quality is suboptimal. Contrast used: Definity. Technically difficult study with poor endocardial visualization.     Limited TTE Dr. Carnes 2/29/2024    Left Ventricle: Normal left

## 2025-03-23 NOTE — PLAN OF CARE
Problem: Chronic Conditions and Co-morbidities  Goal: Patient's chronic conditions and co-morbidity symptoms are monitored and maintained or improved  3/22/2025 2040 by Josefina Jackson RN  Outcome: Not Progressing  Flowsheets (Taken 3/22/2025 2000)  Care Plan - Patient's Chronic Conditions and Co-Morbidity Symptoms are Monitored and Maintained or Improved: Monitor and assess patient's chronic conditions and comorbid symptoms for stability, deterioration, or improvement  3/22/2025 1520 by Laurel Arnold RN  Outcome: Not Progressing  3/22/2025 0943 by Laurle Arnold RN  Outcome: Not Progressing  Flowsheets (Taken 3/22/2025 0800)  Care Plan - Patient's Chronic Conditions and Co-Morbidity Symptoms are Monitored and Maintained or Improved:   Monitor and assess patient's chronic conditions and comorbid symptoms for stability, deterioration, or improvement   Collaborate with multidisciplinary team to address chronic and comorbid conditions and prevent exacerbation or deterioration   Update acute care plan with appropriate goals if chronic or comorbid symptoms are exacerbated and prevent overall improvement and discharge     Problem: Discharge Planning  Goal: Discharge to home or other facility with appropriate resources  3/22/2025 2040 by Josefina Jackson RN  Outcome: Not Progressing  Flowsheets (Taken 3/22/2025 2000)  Discharge to home or other facility with appropriate resources: Identify barriers to discharge with patient and caregiver  3/22/2025 1520 by Laurel Arnold RN  Outcome: Not Progressing  3/22/2025 0943 by Laurel Arnold RN  Outcome: Not Progressing  Flowsheets (Taken 3/22/2025 0800)  Discharge to home or other facility with appropriate resources: Identify barriers to discharge with patient and caregiver     Problem: Metabolic/Fluid and Electrolytes - Adult  Goal: Hemodynamic stability and optimal renal function maintained  3/22/2025 2040 by Josefina Jackson RN  Outcome: Not Progressing  Flowsheets  Progressing  Flowsheets  Taken 3/22/2025 0800 by Laurel Arnold RN  Absence of urinary retention:   Assess patient’s ability to void and empty bladder   Monitor intake/output and perform bladder scan as needed  Taken 3/21/2025 2000 by Josefina Jackson RN  Absence of urinary retention: Assess patient’s ability to void and empty bladder     Problem: Safety - Adult  Goal: Free from fall injury  3/22/2025 2040 by Josefina Jackson RN  Outcome: Progressing  Flowsheets (Taken 3/22/2025 2000)  Free From Fall Injury: Instruct family/caregiver on patient safety  3/22/2025 1520 by Laurel Arnold RN  Outcome: Progressing  3/22/2025 0943 by Laurel Arnold RN  Outcome: Progressing  Flowsheets (Taken 3/22/2025 0800)  Free From Fall Injury:   Instruct family/caregiver on patient safety   Based on caregiver fall risk screen, instruct family/caregiver to ask for assistance with transferring infant if caregiver noted to have fall risk factors     Problem: Skin/Tissue Integrity  Goal: Skin integrity remains intact  Description: 1.  Monitor for areas of redness and/or skin breakdown  2.  Assess vascular access sites hourly  3.  Every 4-6 hours minimum:  Change oxygen saturation probe site  4.  Every 4-6 hours:  If on nasal continuous positive airway pressure, respiratory therapy assess nares and determine need for appliance change or resting period  3/22/2025 2040 by Josefina Jackson RN  Outcome: Progressing  Flowsheets (Taken 3/22/2025 2000)  Skin Integrity Remains Intact:   Monitor for areas of redness and/or skin breakdown   Every 4-6 hours minimum: Change oxygen saturation probe site   Assess vascular access sites hourly   Every 4-6 hours: If on nasal continuous positive airway pressure, respiratory therapy assesses nares and determine need for appliance change or resting period  3/22/2025 1520 by Laurel Arnold RN  Outcome: Progressing  3/22/2025 0943 by Laurel Arnold RN  Outcome: Progressing  Flowsheets (Taken 3/22/2025

## 2025-03-23 NOTE — PROGRESS NOTES
03/23/25 1132   Patient Observation   Pulse 94   Respirations 24   SpO2 100 %   Vent Information   Equipment Changed Expiratory Filter   Vent Mode (S)  CPAP/PS   Ventilator Settings   PEEP/CPAP (cmH2O) 5   FiO2  40 %   Pressure Support (cm H2O) (S)  10 cm H2O   Vent Patient Data (Readings)   Vt (Measured) 211 mL   Peak Inspiratory Pressure (cmH2O) 16 cmH2O   Rate Measured 24 br/min   Minute Volume (L/min) 4.83 Liters   Mean Airway Pressure (cmH2O) 7 cmH20   Plateau Pressure (cm H2O) 19 cm H2O   Driving Pressure 14   I:E Ratio 1:3.10   Flow Sensitivity 3 L/min   Backup Apnea On   Backup Rate 12 Breaths Per Minute   Backup Vt 325   Vent Alarm Settings   High Pressure (cmH2O) 50 cmH2O   Low Minute Volume (lpm) 3 L/min   High Minute Volume (lpm) 12 L/min   Low Exhaled Vt (ml) 300 mL   High Exhaled Vt (ml) 700 mL   RR High (bpm) 30 br/min   Apnea (secs) 20 secs   Additional Respiratoray Assessments   Humidification Source Heated wire   Humidification Temp 37   Circuit Condensation Not drained   Ambu Bag With Mask At Bedside Yes   ETT    Placement Date/Time: 03/16/25 0350   Placement Verified By: Auscultation;Capnometry  Preoxygenation: Yes  Airway Type: Cuffed  Airway Tube Size: 7.5 mm  Secured At: 22 cm  Measured From: Lips   Secured At 22 cm   Measured From Lips   Secured By Commercial tube crane   Site Assessment Dry     Respiratory Wean Start time:  Order to wean ventilator Yes (if no, contact provider)    Patient placed on PS of 10, PEEP 5cmH2O, FIO2 40 at 1132AM.    HR 94bpm  RR 24bpm  SPO2 100%      Will continue to attempt to wean based on patient tolerance.       Performed by  Jesica Victor RCP RRT

## 2025-03-23 NOTE — PROGRESS NOTES
[facility]   Department of Internal Medicine   Internal Medicine Residency  MICU Progress Note    Patient:  Nikki Moffett 79 y.o. female   MRN: [id number]       Date of Service: 3/23/2025    Allergy: Patient has no known allergies.    Subjective     Overnight, episode of a fib RVR broke with 5 of lopressor.   Patient was seen and examined this morning at bedside in no acute distress, no family present, remains intubated, off sedation, on 4 of levophed.     Objective     I & O - 24hr:    Intake/Output Summary (Last 24 hours) at 3/23/2025 0944  Last data filed at 3/23/2025 0600  Gross per 24 hour   Intake 1624.34 ml   Output 480 ml   Net 1144.34 ml       Physical Exam  Vitals: BP (!) 83/60   Pulse 82   Temp 98.8 °F (37.1 °C) (Esophageal)   Resp 12   Ht 1.575 m (5' 2\")   Wt 68.8 kg (151 lb 10.8 oz)   SpO2 100%   BMI 27.74 kg/m²     General Appearance: intubated no distress, spontaneous eye opening, does not follow commands but does shake head \"no\" when asked if in pain  HEENT: ETT present   Neck: trachea midline   Lung: mechanical breath sounds, rhonchi R>L   Heart: RRR capillary refill <2   Abdomen: soft, appears nontender, BS+   Extremities: +1 pitting edema     Medications     Continuous Infusions:   norepinephrine 4 mcg/min (03/23/25 0600)    sodium chloride      sodium chloride Stopped (03/23/25 0020)    dextrose       Scheduled Meds:   [START ON 3/24/2025] pantoprazole (PROTONIX) 40 mg in sodium chloride (PF) 0.9 % 10 mL injection  40 mg IntraVENous Daily    vitamin D  2,000 Units Oral Daily    thiamine  250 mg IntraVENous Q24H    Followed by    [START ON 3/28/2025] thiamine  100 mg Oral Daily    hydrocortisone sodium succinate PF  50 mg IntraVENous Daily    midodrine  20 mg Per NG tube TID WC    fludrocortisone  0.05 mg Per NG tube Daily    chlorhexidine  15 mL Mouth/Throat BID    levETIRAcetam  500 mg IntraVENous BID    [Held by provider] lactulose  20 g Oral TID    insulin lispro  0-4

## 2025-03-23 NOTE — PROGRESS NOTES
[facility]  Internal Medicine Residency Program  Progress Note - House Team     Patient:  Nikki Moffett 79 y.o. female MRN: [id number]     Date of Service: 3/23/2025     CC:   Chief Complaint   Patient presents with    Altered Mental Status     \"Mood swings, memory loss and fatigue\" per patients grandson    Chest Pain     Ongoing for a couple weeks per family       Subjective     Patient was seen at bedside this morning.  Patient is still intubated, not responding to simple commands.  Patient had tunneled HD placed, on intermittent hemodialysis.  Patient is off sedation.    Patient had an episode of A-fib with RVR overnight, was given 5 mg Lopressor IV, back to normal sinus rhythm now.  Patient also restarted on Levophed for hypotension.      Objective     Physical Exam:  Vitals: BP (!) 83/60   Pulse 85   Temp 98.8 °F (37.1 °C) (Esophageal)   Resp 12   Ht 1.575 m (5' 2\")   Wt 68.8 kg (151 lb 10.8 oz)   SpO2 100%   BMI 27.74 kg/m²     I & O - 24hr: In: 1744.3   Out: 480   General appearance: Intubated.  Appears lethargic.  Nonpurposeful eye opening.  Neuro: Mental status: Intubated.  Nonpurposeful eye opening.  Does not follow commands.  Cardiovascular: Regular rate and rhythm, S1 and S2  heard, no murmurs appreciated  Respiratory: Rales present bilaterally   Abdomen: Soft, non tender, bowel sounds normal; masses, no organomegaly, rebound or guarding  Extremities: Extremities normal, no cyanosis, distal pulses intact, no edema    Pertinent Labs & Imaging Studies      Complete Blood Count:   Recent Labs     03/21/25  0403 03/22/25  0401 03/23/25 0406   WBC 15.1* 17.0* 21.3*   HGB 8.1* 9.0* 9.0*   HCT 25.5* 27.7* 28.6*    163 177        Last 3 Blood Glucose:   Recent Labs     03/22/25  0401 03/22/25 2008 03/23/25 0406   GLUCOSE 123* 142* 134*        PT/INR:    Lab Results   Component Value Date/Time    PROTIME 16.6 03/17/2025 07:56 AM    INR 1.5 03/17/2025 07:56 AM     PTT:    Lab  respiratory insufficiency likely 2/2 cardiogenic edema and influenza A  Influenza A  Completed Tamiflu for 5 days  Acute exacerbation of HFrEF, EF 10-15% (03/07/2025)  Pleural effusion, bilateral  Elevated INR  S/p 6 units FFP  ANTONIO superimposed on CKD, stage IIIa  Normocytic anemia  S/p 1 unit pRBCs  Leukocytosis  Hypophosphatemia  Transaminitis likely 2/2 shock liver  Hyperlipidemia  History of L MCA stroke, s/p mechanical thrombectomy (12/2023)    Plan    Tunneled HD procedure placed, on intermittent hemodialysis.  S/p intubation on 03/16  Patient failed SBT due to high RSBI, 150- 160.  Patient restarted on Levophed overnight due to hypotension, is on 4 mcg of levo now.  Weaned off sedation.  On Solu-Cortef 50 mg IV every day (Day 13), Florinef 0.05 mg daily (Day 7)  On Eliquis 2.5 mg twice daily  On midodrine 20 mg 3 times daily  Breathing treatments with Brovana, Pulmicort, DuoNeb  Follow neurology recommendations  On Keppra 500 mg twice daily  Follow cardiology recommendations  Will need ischemic evaluation at some point with resolution of acute issues  Follow nephrology recommendations  On HD   Palliative care following    PT evaluation: not indicated at this time   OT evaluation: not indicated at this time   DVT prophylaxis:  Eliquis  GI prophylaxis: Protonix   Diet:   ADULT TUBE FEEDING; Nasogastric; Peptide Based; Continuous; 10; Yes; 10; Q 4 hours; 35; 30; Q 4 hours; Protein; 1 Dose; Daily   Code status: Full Code   Disposition: Continue Current Care    Giovanni Gutierrez MD, PGY-1  Attending physician: JARVIS Morris

## 2025-03-23 NOTE — PLAN OF CARE
Problem: Respiratory - Adult  Goal: Achieves optimal ventilation and oxygenation  3/23/2025 0914 by Jesica Victor RCP  Outcome: Progressing

## 2025-03-23 NOTE — PROGRESS NOTES
03/23/25 1238   Patient Observation   Pulse 100   Respirations 16   SpO2 100 %   Vent Information   Vent Mode (S)  AC/VC  (Patient back on AC/VC to due low tidal volumes, increased RR, and RSBI 164.)   Ventilator Settings   Vt (Set, mL) 325 mL   Resp Rate (Set) 12 bpm   PEEP/CPAP (cmH2O) 5   FiO2  40 %   Peak Inspiratory Flow (Set) 50 L/sec   Vent Patient Data (Readings)   Vt (Measured) 343 mL   Peak Inspiratory Pressure (cmH2O) 25 cmH2O   Rate Measured 14 br/min   Minute Volume (L/min) 4.39 Liters   Peak Inspiratory Flow (lpm) 50 L/sec   Mean Airway Pressure (cmH2O) 8 cmH20   Plateau Pressure (cm H2O) 19 cm H2O   Driving Pressure 14   I:E Ratio 1:6.10   Flow Sensitivity 3 L/min   Backup Apnea On   Backup Rate 12 Breaths Per Minute   Backup Vt 325   Vent Alarm Settings   High Pressure (cmH2O) 50 cmH2O   Low Minute Volume (lpm) 3 L/min   High Minute Volume (lpm) 12 L/min   Low Exhaled Vt (ml) 300 mL   High Exhaled Vt (ml) 700 mL   RR High (bpm) 30 br/min   Apnea (secs) 20 secs   Additional Respiratoray Assessments   Humidification Source Heated wire   Humidification Temp 37   Circuit Condensation Drained   Ambu Bag With Mask At Bedside Yes   Airway Clearance   Suction ET Tube;Oral   Suction Device Inline suction catheter;Anshu   Sputum Method Obtained Endotracheal   Sputum Amount Small   Sputum Color/Odor Tan   Sputum Consistency Thin   ETT    Placement Date/Time: 03/16/25 0350   Placement Verified By: Auscultation;Capnometry  Preoxygenation: Yes  Airway Type: Cuffed  Airway Tube Size: 7.5 mm  Secured At: 22 cm  Measured From: Lips   Secured At 22 cm   Measured From Lips   Secured By Commercial tube crane   Site Assessment Dry     End of Weaning attempt:     beginning at 1132 and ending at 1238.     HR 100bpm  RR 14bpm  SPO2 100%    Jesica Victor RCP RRT

## 2025-03-23 NOTE — PROGRESS NOTES
PROVIDED HISTORY: Reason for exam:->arythmia FINDINGS: Possible right lower lobe opacity..  Bilateral pleural effusions.  No pneumothorax.  Stable heart size.  The osseous structures are without acute process.     Bilateral pleural effusions. Possible right lower lobe opacity.       Objective:   Vitals: BP (!) 83/60   Pulse 85   Temp 98.8 °F (37.1 °C) (Esophageal)   Resp 12   Ht 1.575 m (5' 2\")   Wt 68.8 kg (151 lb 10.8 oz)   SpO2 100%   BMI 27.74 kg/m²   General appearance: appears stated age   Skin:  No rashes or lesions  HEENT: Head: Normocephalic, no lesions, without obvious abnormality.  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)     Occipital stroke (HCC)     Atrial flutter (HCC)     Paroxysmal atrial fibrillation (HCC)     H/O: stroke     Atrial flutter with rapid ventricular response (HCC)     Decompensated heart failure (HCC)     Chronic atrial fibrillation (HCC)     VHD (valvular heart disease)     ANTONIO (acute kidney injury)     Palliative care encounter     Metabolic encephalopathy    Plan:     IMPRESSION/RECOMMENDATIONS:       1) ANTONIO on CKD 3a  baseline cr at 1-1.3 range 2023   setting of hypotension, aib, HFrEF, entresto, diuretics  Anuric   Antihypertensives , diuretics now are on hold  Off levophed   CVVHD 3/8-3/15  Hd 3/19 with 1.7L removed with 3 doses 25% albumin given  IR guided tunneled dialysis catheter 3/20/24 by Dr. Lima  Last  dialysis 3/21, no s/o renal function recovery , will  continue dialytic support      2)  shock liver   Coagulopathy   eliquis on hold   Amiodarone on hold   Lfts improving     3)  A Fib with rapid venticular rate , improved   Off amio due to lfts  Eliquis contiues continues to be given intermittently as still waiting for tunneled dialysis line per IR   Per cardiology     4)  hypotension/sepsis  Influenza pos  On empiric abx  Off levophed  Underlying HFrEF  Intubated 3/16     5)   Mild to moderate todd PL effusion  possible HF related  Uf as tolerated with hd     6) High TSH   Per IM    7) Generalized edema UF as tolerated during HD with albumin infusions as needed     Princess Winkler MD

## 2025-03-23 NOTE — PROGRESS NOTES
Spontaneous Parameters performed on PS 5 40% +5    Patient has a cuff leak    VT = 159 ml  f = 28  B/M  Ve = 4.32 L/M  NIF = -8.1  cmH2O  VC = 114 mL  RSBI = 164      Performed by Jesica Victor RCP

## 2025-03-23 NOTE — PLAN OF CARE
Problem: Chronic Conditions and Co-morbidities  Goal: Patient's chronic conditions and co-morbidity symptoms are monitored and maintained or improved  3/23/2025 1514 by Laurel Arnold RN  Outcome: Not Progressing  3/23/2025 1114 by Laurel Arnold RN  Outcome: Not Progressing  Flowsheets (Taken 3/23/2025 0800)  Care Plan - Patient's Chronic Conditions and Co-Morbidity Symptoms are Monitored and Maintained or Improved:   Monitor and assess patient's chronic conditions and comorbid symptoms for stability, deterioration, or improvement   Collaborate with multidisciplinary team to address chronic and comorbid conditions and prevent exacerbation or deterioration   Update acute care plan with appropriate goals if chronic or comorbid symptoms are exacerbated and prevent overall improvement and discharge     Problem: Discharge Planning  Goal: Discharge to home or other facility with appropriate resources  3/23/2025 1514 by Laurel Arnold RN  Outcome: Not Progressing  3/23/2025 1114 by Laurel Arnold RN  Outcome: Not Progressing  Flowsheets (Taken 3/23/2025 0800)  Discharge to home or other facility with appropriate resources: Identify barriers to discharge with patient and caregiver     Problem: Musculoskeletal - Adult  Goal: Return mobility to safest level of function  3/23/2025 1514 by Laurel Arnold RN  Outcome: Not Progressing  3/23/2025 1114 by Laurel Arnold RN  Outcome: Not Progressing  Goal: Return ADL status to a safe level of function  Outcome: Not Progressing  Goal: Maintain proper alignment of affected body part  Outcome: Not Progressing     Problem: Safety - Adult  Goal: Free from fall injury  3/23/2025 1514 by Laurel Arnold RN  Outcome: Progressing  3/23/2025 1114 by Laurel Arnold RN  Outcome: Progressing  Flowsheets (Taken 3/23/2025 0800)  Free From Fall Injury:   Instruct family/caregiver on patient safety   Based on caregiver fall risk screen, instruct family/caregiver to ask for assistance with

## 2025-03-23 NOTE — PLAN OF CARE
Problem: Chronic Conditions and Co-morbidities  Goal: Patient's chronic conditions and co-morbidity symptoms are monitored and maintained or improved  Outcome: Not Progressing  Flowsheets (Taken 3/23/2025 0800)  Care Plan - Patient's Chronic Conditions and Co-Morbidity Symptoms are Monitored and Maintained or Improved:   Monitor and assess patient's chronic conditions and comorbid symptoms for stability, deterioration, or improvement   Collaborate with multidisciplinary team to address chronic and comorbid conditions and prevent exacerbation or deterioration   Update acute care plan with appropriate goals if chronic or comorbid symptoms are exacerbated and prevent overall improvement and discharge     Problem: Discharge Planning  Goal: Discharge to home or other facility with appropriate resources  Outcome: Not Progressing  Flowsheets (Taken 3/23/2025 0800)  Discharge to home or other facility with appropriate resources: Identify barriers to discharge with patient and caregiver     Problem: Musculoskeletal - Adult  Goal: Return mobility to safest level of function  Outcome: Not Progressing  Goal: Maintain proper alignment of affected body part  Outcome: Not Progressing     Problem: Safety - Adult  Goal: Free from fall injury  Outcome: Progressing  Flowsheets (Taken 3/23/2025 0800)  Free From Fall Injury:   Instruct family/caregiver on patient safety   Based on caregiver fall risk screen, instruct family/caregiver to ask for assistance with transferring infant if caregiver noted to have fall risk factors     Problem: Skin/Tissue Integrity  Goal: Skin integrity remains intact  Description: 1.  Monitor for areas of redness and/or skin breakdown  2.  Assess vascular access sites hourly  3.  Every 4-6 hours minimum:  Change oxygen saturation probe site  4.  Every 4-6 hours:  If on nasal continuous positive airway pressure, respiratory therapy assess nares and determine need for appliance change or resting  bleeding or clotting disorders     Problem: Neurosensory - Adult  Goal: Achieves stable or improved neurological status  Outcome: Progressing  Flowsheets (Taken 3/23/2025 0800)  Achieves stable or improved neurological status: Assess for and report changes in neurological status     Problem: Respiratory - Adult  Goal: Achieves optimal ventilation and oxygenation  3/23/2025 1114 by Laurel Arnold, RN  Outcome: Progressing  3/23/2025 0914 by Jesica Victor RCP  Outcome: Progressing  Flowsheets (Taken 3/23/2025 0800 by Laurel Arnold, RN)  Achieves optimal ventilation and oxygenation:   Assess for changes in respiratory status   Assess for changes in mentation and behavior   Position to facilitate oxygenation and minimize respiratory effort   Oxygen supplementation based on oxygen saturation or arterial blood gases   Assess the need for suctioning and aspirate as needed   Respiratory therapy support as indicated     Problem: Cardiovascular - Adult  Goal: Maintains optimal cardiac output and hemodynamic stability  Outcome: Progressing  Flowsheets (Taken 3/23/2025 0800)  Maintains optimal cardiac output and hemodynamic stability:   Monitor blood pressure and heart rate   Monitor urine output and notify Licensed Independent Practitioner for values outside of normal range   Assess for signs of decreased cardiac output   Administer fluid and/or volume expanders as ordered   Administer vasoactive medications as ordered  Goal: Absence of cardiac dysrhythmias or at baseline  Outcome: Progressing  Flowsheets (Taken 3/23/2025 0800)  Absence of cardiac dysrhythmias or at baseline:   Monitor cardiac rate and rhythm   Assess for signs of decreased cardiac output     Problem: Gastrointestinal - Adult  Goal: Maintains or returns to baseline bowel function  Outcome: Progressing  Flowsheets (Taken 3/23/2025 0800)  Maintains or returns to baseline bowel function: Assess bowel function  Goal: Maintains adequate nutritional

## 2025-03-24 ENCOUNTER — APPOINTMENT (OUTPATIENT)
Dept: GENERAL RADIOLOGY | Age: 80
DRG: 004 | End: 2025-03-24
Payer: MEDICARE

## 2025-03-24 LAB
AADO2: 136.9 MMHG
ALBUMIN SERPL-MCNC: 3.4 G/DL (ref 3.5–5.2)
ALP SERPL-CCNC: 91 U/L (ref 35–104)
ALT SERPL-CCNC: 57 U/L (ref 0–32)
ANION GAP SERPL CALCULATED.3IONS-SCNC: 14 MMOL/L (ref 7–16)
AST SERPL-CCNC: 31 U/L (ref 0–31)
B.E.: -1.3 MMOL/L (ref -3–3)
BASOPHILS # BLD: 0.03 K/UL (ref 0–0.2)
BASOPHILS NFR BLD: 0 % (ref 0–2)
BILIRUB DIRECT SERPL-MCNC: 0.3 MG/DL (ref 0–0.3)
BILIRUB INDIRECT SERPL-MCNC: 0.4 MG/DL (ref 0–1)
BILIRUB SERPL-MCNC: 0.7 MG/DL (ref 0–1.2)
BUN SERPL-MCNC: 57 MG/DL (ref 6–23)
CALCIUM SERPL-MCNC: 8.1 MG/DL (ref 8.6–10.2)
CHLORIDE SERPL-SCNC: 94 MMOL/L (ref 98–107)
CO2 SERPL-SCNC: 22 MMOL/L (ref 22–29)
COHB: 0.4 % (ref 0–1.5)
CORTIS SERPL-MCNC: 24.9 UG/DL (ref 2.7–18.4)
CORTISOL COLLECTION INFO: ABNORMAL
CREAT SERPL-MCNC: 3.5 MG/DL (ref 0.5–1)
CRITICAL: ABNORMAL
DATE ANALYZED: ABNORMAL
DATE OF COLLECTION: ABNORMAL
EKG ATRIAL RATE: 105 BPM
EKG P AXIS: 91 DEGREES
EKG P-R INTERVAL: 166 MS
EKG Q-T INTERVAL: 372 MS
EKG QRS DURATION: 132 MS
EKG QTC CALCULATION (BAZETT): 491 MS
EKG R AXIS: -18 DEGREES
EKG T AXIS: 66 DEGREES
EKG VENTRICULAR RATE: 105 BPM
EOSINOPHIL # BLD: 0.01 K/UL (ref 0.05–0.5)
EOSINOPHILS RELATIVE PERCENT: 0 % (ref 0–6)
ERYTHROCYTE [DISTWIDTH] IN BLOOD BY AUTOMATED COUNT: 19.7 % (ref 11.5–15)
FIO2: 40 %
GFR, ESTIMATED: 13 ML/MIN/1.73M2
GLUCOSE BLD-MCNC: 122 MG/DL (ref 74–99)
GLUCOSE BLD-MCNC: 126 MG/DL (ref 74–99)
GLUCOSE BLD-MCNC: 132 MG/DL (ref 74–99)
GLUCOSE BLD-MCNC: 155 MG/DL (ref 74–99)
GLUCOSE SERPL-MCNC: 117 MG/DL (ref 74–99)
HCO3: 22.3 MMOL/L (ref 22–26)
HCT VFR BLD AUTO: 23.4 % (ref 34–48)
HGB BLD-MCNC: 7.4 G/DL (ref 11.5–15.5)
HHB: 2 % (ref 0–5)
IMM GRANULOCYTES # BLD AUTO: 0.15 K/UL (ref 0–0.58)
IMM GRANULOCYTES NFR BLD: 1 % (ref 0–5)
LAB: ABNORMAL
LYMPHOCYTES NFR BLD: 1.03 K/UL (ref 1.5–4)
LYMPHOCYTES RELATIVE PERCENT: 5 % (ref 20–42)
Lab: 510
MAGNESIUM SERPL-MCNC: 2.7 MG/DL (ref 1.6–2.6)
MCH RBC QN AUTO: 27.7 PG (ref 26–35)
MCHC RBC AUTO-ENTMCNC: 31.6 G/DL (ref 32–34.5)
MCV RBC AUTO: 87.6 FL (ref 80–99.9)
METHB: 0.8 % (ref 0–1.5)
MICROORGANISM SPEC CULT: ABNORMAL
MICROORGANISM SPEC CULT: ABNORMAL
MICROORGANISM/AGENT SPEC: ABNORMAL
MODE: AC
MONOCYTES NFR BLD: 1.1 K/UL (ref 0.1–0.95)
MONOCYTES NFR BLD: 5 % (ref 2–12)
NEUTROPHILS NFR BLD: 89 % (ref 43–80)
NEUTS SEG NFR BLD: 18.97 K/UL (ref 1.8–7.3)
O2 SATURATION: 98 % (ref 92–98.5)
O2HB: 96.8 % (ref 94–97)
OPERATOR ID: 2593
PATIENT TEMP: 37 C
PCO2: 32.4 MMHG (ref 35–45)
PEEP/CPAP: 5 CMH2O
PFO2: 2.78 MMHG/%
PH BLOOD GAS: 7.46 (ref 7.35–7.45)
PHOSPHATE SERPL-MCNC: 3.3 MG/DL (ref 2.5–4.5)
PLATELET # BLD AUTO: 167 K/UL (ref 130–450)
PMV BLD AUTO: 11.7 FL (ref 7–12)
PO2: 111 MMHG (ref 75–100)
POTASSIUM SERPL-SCNC: 4.6 MMOL/L (ref 3.5–5)
PROT SERPL-MCNC: 5.1 G/DL (ref 6.4–8.3)
RBC # BLD AUTO: 2.67 M/UL (ref 3.5–5.5)
RI(T): 1.23
RR MECHANICAL: 12 B/MIN
SODIUM SERPL-SCNC: 130 MMOL/L (ref 132–146)
SOURCE, BLOOD GAS: ABNORMAL
SPECIMEN DESCRIPTION: ABNORMAL
THB: 8.3 G/DL (ref 11.5–16.5)
TIME ANALYZED: 518
VT MECHANICAL: 325 ML
WBC OTHER # BLD: 21.3 K/UL (ref 4.5–11.5)

## 2025-03-24 PROCEDURE — 93010 ELECTROCARDIOGRAM REPORT: CPT | Performed by: INTERNAL MEDICINE

## 2025-03-24 PROCEDURE — 82805 BLOOD GASES W/O2 SATURATION: CPT

## 2025-03-24 PROCEDURE — 84100 ASSAY OF PHOSPHORUS: CPT

## 2025-03-24 PROCEDURE — 6360000002 HC RX W HCPCS: Performed by: INTERNAL MEDICINE

## 2025-03-24 PROCEDURE — 82248 BILIRUBIN DIRECT: CPT

## 2025-03-24 PROCEDURE — 99233 SBSQ HOSP IP/OBS HIGH 50: CPT | Performed by: CHIROPRACTOR

## 2025-03-24 PROCEDURE — 71045 X-RAY EXAM CHEST 1 VIEW: CPT

## 2025-03-24 PROCEDURE — 2500000003 HC RX 250 WO HCPCS: Performed by: INTERNAL MEDICINE

## 2025-03-24 PROCEDURE — 2580000003 HC RX 258

## 2025-03-24 PROCEDURE — 6360000002 HC RX W HCPCS

## 2025-03-24 PROCEDURE — 82962 GLUCOSE BLOOD TEST: CPT

## 2025-03-24 PROCEDURE — 6370000000 HC RX 637 (ALT 250 FOR IP)

## 2025-03-24 PROCEDURE — 94003 VENT MGMT INPAT SUBQ DAY: CPT

## 2025-03-24 PROCEDURE — 83735 ASSAY OF MAGNESIUM: CPT

## 2025-03-24 PROCEDURE — 2000000000 HC ICU R&B

## 2025-03-24 PROCEDURE — 36592 COLLECT BLOOD FROM PICC: CPT

## 2025-03-24 PROCEDURE — 6360000002 HC RX W HCPCS: Performed by: PSYCHIATRY & NEUROLOGY

## 2025-03-24 PROCEDURE — P9047 ALBUMIN (HUMAN), 25%, 50ML: HCPCS | Performed by: INTERNAL MEDICINE

## 2025-03-24 PROCEDURE — 85025 COMPLETE CBC W/AUTO DIFF WBC: CPT

## 2025-03-24 PROCEDURE — 80053 COMPREHEN METABOLIC PANEL: CPT

## 2025-03-24 PROCEDURE — 6370000000 HC RX 637 (ALT 250 FOR IP): Performed by: PSYCHIATRY & NEUROLOGY

## 2025-03-24 PROCEDURE — 82533 TOTAL CORTISOL: CPT

## 2025-03-24 PROCEDURE — 90935 HEMODIALYSIS ONE EVALUATION: CPT

## 2025-03-24 PROCEDURE — 99231 SBSQ HOSP IP/OBS SF/LOW 25: CPT

## 2025-03-24 PROCEDURE — 94640 AIRWAY INHALATION TREATMENT: CPT

## 2025-03-24 RX ORDER — AMIODARONE HYDROCHLORIDE 200 MG/1
200 TABLET ORAL DAILY
Status: DISCONTINUED | OUTPATIENT
Start: 2025-03-25 | End: 2025-03-27

## 2025-03-24 RX ORDER — HYDROCORTISONE SODIUM SUCCINATE 100 MG/2ML
25 INJECTION INTRAMUSCULAR; INTRAVENOUS DAILY
Status: DISCONTINUED | OUTPATIENT
Start: 2025-03-25 | End: 2025-03-25

## 2025-03-24 RX ORDER — LEVETIRACETAM 100 MG/ML
500 SOLUTION ORAL 2 TIMES DAILY
Status: DISCONTINUED | OUTPATIENT
Start: 2025-03-24 | End: 2025-03-27

## 2025-03-24 RX ORDER — ALBUMIN (HUMAN) 12.5 G/50ML
25 SOLUTION INTRAVENOUS ONCE
Status: COMPLETED | OUTPATIENT
Start: 2025-03-24 | End: 2025-03-24

## 2025-03-24 RX ORDER — COSYNTROPIN 0.25 MG/ML
250 INJECTION, POWDER, FOR SOLUTION INTRAMUSCULAR; INTRAVENOUS ONCE
Status: DISCONTINUED | OUTPATIENT
Start: 2025-03-25 | End: 2025-03-25

## 2025-03-24 RX ORDER — LANSOPRAZOLE 30 MG/1
30 TABLET, ORALLY DISINTEGRATING, DELAYED RELEASE ORAL
Status: DISCONTINUED | OUTPATIENT
Start: 2025-03-25 | End: 2025-03-24

## 2025-03-24 RX ADMIN — Medication 2000 UNITS: at 09:05

## 2025-03-24 RX ADMIN — MIDODRINE HYDROCHLORIDE 20 MG: 10 TABLET ORAL at 17:23

## 2025-03-24 RX ADMIN — MIDODRINE HYDROCHLORIDE 20 MG: 10 TABLET ORAL at 12:15

## 2025-03-24 RX ADMIN — SODIUM CHLORIDE, PRESERVATIVE FREE 10 ML: 5 INJECTION INTRAVENOUS at 20:59

## 2025-03-24 RX ADMIN — APIXABAN 2.5 MG: 2.5 TABLET, FILM COATED ORAL at 20:59

## 2025-03-24 RX ADMIN — MIDODRINE HYDROCHLORIDE 20 MG: 10 TABLET ORAL at 09:05

## 2025-03-24 RX ADMIN — IPRATROPIUM BROMIDE AND ALBUTEROL SULFATE 1 DOSE: 2.5; .5 SOLUTION RESPIRATORY (INHALATION) at 16:04

## 2025-03-24 RX ADMIN — IPRATROPIUM BROMIDE AND ALBUTEROL SULFATE 1 DOSE: 2.5; .5 SOLUTION RESPIRATORY (INHALATION) at 20:08

## 2025-03-24 RX ADMIN — CHLORHEXIDINE GLUCONATE, 0.12% ORAL RINSE 15 ML: 1.2 SOLUTION DENTAL at 20:58

## 2025-03-24 RX ADMIN — LEVETIRACETAM 500 MG: 100 SOLUTION ORAL at 20:59

## 2025-03-24 RX ADMIN — APIXABAN 2.5 MG: 2.5 TABLET, FILM COATED ORAL at 09:05

## 2025-03-24 RX ADMIN — PANTOPRAZOLE SODIUM 40 MG: 40 INJECTION, POWDER, FOR SOLUTION INTRAVENOUS at 09:03

## 2025-03-24 RX ADMIN — LEVETIRACETAM 500 MG: 100 INJECTION INTRAVENOUS at 09:04

## 2025-03-24 RX ADMIN — SODIUM CHLORIDE, PRESERVATIVE FREE 10 ML: 5 INJECTION INTRAVENOUS at 09:05

## 2025-03-24 RX ADMIN — IPRATROPIUM BROMIDE AND ALBUTEROL SULFATE 1 DOSE: 2.5; .5 SOLUTION RESPIRATORY (INHALATION) at 08:47

## 2025-03-24 RX ADMIN — BUDESONIDE INHALATION 500 MCG: 0.5 SUSPENSION RESPIRATORY (INHALATION) at 08:47

## 2025-03-24 RX ADMIN — AMIODARONE HYDROCHLORIDE 200 MG: 200 TABLET ORAL at 09:05

## 2025-03-24 RX ADMIN — ARFORMOTEROL TARTRATE 15 MCG: 15 SOLUTION RESPIRATORY (INHALATION) at 08:47

## 2025-03-24 RX ADMIN — THIAMINE HYDROCHLORIDE 250 MG: 100 INJECTION, SOLUTION INTRAMUSCULAR; INTRAVENOUS at 09:02

## 2025-03-24 RX ADMIN — CHLORHEXIDINE GLUCONATE, 0.12% ORAL RINSE 15 ML: 1.2 SOLUTION DENTAL at 09:04

## 2025-03-24 RX ADMIN — IPRATROPIUM BROMIDE AND ALBUTEROL SULFATE 1 DOSE: 2.5; .5 SOLUTION RESPIRATORY (INHALATION) at 11:44

## 2025-03-24 RX ADMIN — ALBUMIN (HUMAN) 25 G: 0.25 INJECTION, SOLUTION INTRAVENOUS at 10:15

## 2025-03-24 RX ADMIN — HYDROCORTISONE SODIUM SUCCINATE 50 MG: 100 INJECTION, POWDER, FOR SOLUTION INTRAMUSCULAR; INTRAVENOUS at 09:02

## 2025-03-24 RX ADMIN — ATORVASTATIN CALCIUM 80 MG: 40 TABLET, FILM COATED ORAL at 20:59

## 2025-03-24 ASSESSMENT — PULMONARY FUNCTION TESTS
PIF_VALUE: 25
PIF_VALUE: 10
PIF_VALUE: 23
PIF_VALUE: 21
PIF_VALUE: 22
PIF_VALUE: 26
PIF_VALUE: 24
PIF_VALUE: 24
PIF_VALUE: 22
PIF_VALUE: 21
PIF_VALUE: 23
PIF_VALUE: 24
PIF_VALUE: 26
PIF_VALUE: 24
PIF_VALUE: 25
PIF_VALUE: 27
PIF_VALUE: 20
PIF_VALUE: 25
PIF_VALUE: 26
PIF_VALUE: 21
PIF_VALUE: 23
PIF_VALUE: 26
PIF_VALUE: 26
PIF_VALUE: 22
PIF_VALUE: 26
PIF_VALUE: 27
PIF_VALUE: 21
PIF_VALUE: 27
PIF_VALUE: 24
PIF_VALUE: 26
PIF_VALUE: 23
PIF_VALUE: 20
PIF_VALUE: 26
PIF_VALUE: 24
PIF_VALUE: 23
PIF_VALUE: 23
PIF_VALUE: 35
PIF_VALUE: 23

## 2025-03-24 ASSESSMENT — PAIN SCALES - GENERAL
PAINLEVEL_OUTOF10: 0

## 2025-03-24 NOTE — PLAN OF CARE
Problem: Safety - Adult  Goal: Free from fall injury  3/24/2025 0952 by Ghislaine Griffin RN  Outcome: Progressing  3/24/2025 0241 by Mayra Hammonds RN  Outcome: Progressing  3/23/2025 2011 by Marciano Arora RN  Outcome: Progressing     Problem: Skin/Tissue Integrity  Goal: Skin integrity remains intact  Description: 1.  Monitor for areas of redness and/or skin breakdown  2.  Assess vascular access sites hourly  3.  Every 4-6 hours minimum:  Change oxygen saturation probe site  4.  Every 4-6 hours:  If on nasal continuous positive airway pressure, respiratory therapy assess nares and determine need for appliance change or resting period  3/24/2025 0952 by Ghislaine Griffin RN  Outcome: Progressing  3/24/2025 0241 by Mayra Hammonds RN  Outcome: Progressing  3/23/2025 2011 by Marciano Arora RN  Outcome: Progressing     Problem: Nutrition Deficit:  Goal: Optimize nutritional status  3/24/2025 0952 by Ghislaine Griffin RN  Outcome: Progressing  3/24/2025 0241 by Mayra Hammonds RN  Outcome: Progressing  3/23/2025 2011 by Marciano Arora RN  Outcome: Progressing     Problem: ABCDS Injury Assessment  Goal: Absence of physical injury  3/24/2025 0952 by Ghislaine Griffin RN  Outcome: Progressing  3/24/2025 0241 by Mayra Hammonds RN  Outcome: Progressing  3/23/2025 2011 by Marciano Arora RN  Outcome: Progressing     Problem: Metabolic/Fluid and Electrolytes - Adult  Goal: Electrolytes maintained within normal limits  3/24/2025 0952 by Ghislaine Griffin RN  Outcome: Progressing  3/24/2025 0241 by Mayra Hammonds RN  Outcome: Progressing  3/23/2025 2011 by Marciano Arora RN  Outcome: Progressing  Goal: Hemodynamic stability and optimal renal function maintained  3/23/2025 2011 by Marciano Arora RN  Outcome: Progressing  Goal: Glucose maintained within prescribed range  3/23/2025 2011 by Marciano Arora RN  Outcome: Progressing     Problem: Hematologic - Adult  Goal: Maintains hematologic stability  3/24/2025 0952 by Ghislaine Griffin RN  Outcome: Progressing  3/23/2025

## 2025-03-24 NOTE — PROGRESS NOTES
[facility]  Internal Medicine Residency Program  Progress Note - House Team     Patient:  Nikki Moffett 79 y.o. female MRN: [id number]     Date of Service: 3/24/2025     CC:   Chief Complaint   Patient presents with    Altered Mental Status     \"Mood swings, memory loss and fatigue\" per patients grandson    Chest Pain     Ongoing for a couple weeks per family       Subjective     Patient was seen at bedside this morning.  Patient is still intubated, does have intermittent purposeful movements.  Patient had tunneled HD placed, on intermittent hemodialysis.  Patient is off sedation.  Patient is a still on Levophed.    Patient also had A-fib RVR overnight, did broke with 5 mg Lopressor IV, no acute distress.    Objective     Physical Exam:  Vitals: BP (!) 91/55   Pulse 91   Temp 98.4 °F (36.9 °C) (Esophageal)   Resp 29   Ht 1.575 m (5' 2\")   Wt 70.5 kg (155 lb 6.8 oz)   SpO2 100%   BMI 28.43 kg/m²     I & O - 24hr: In: 1529.4   Out: 125   General appearance: Intubated.  Appears lethargic.  Some intermittent purposeful movement.  Neuro: Mental status: Intubated.  Appears lethargic.  Some intermittent purposeful movement.  Cardiovascular: Regular rate and rhythm, S1 and S2  heard, no murmurs appreciated  Respiratory: Rales present bilaterally   Abdomen: Soft, non tender, bowel sounds normal; masses, no organomegaly, rebound or guarding  Extremities: Extremities normal, no cyanosis, distal pulses intact, no edema    Pertinent Labs & Imaging Studies      Complete Blood Count:   Recent Labs     03/22/25  0401 03/23/25 0406 03/24/25  0453   WBC 17.0* 21.3* 21.3*   HGB 9.0* 9.0* 7.4*   HCT 27.7* 28.6* 23.4*    177 167        Last 3 Blood Glucose:   Recent Labs     03/22/25 2008 03/23/25  0406 03/24/25  0453   GLUCOSE 142* 134* 117*        PT/INR:    Lab Results   Component Value Date/Time    PROTIME 16.6 03/17/2025 07:56 AM    INR 1.5 03/17/2025 07:56 AM     PTT:    Lab Results  started on amiodarone and Eliquis on outpatient.  Patient is also on metoprolol 25 mg once daily, Entresto 49-51 mg, amiodarone 200 mg daily at home.    HALEIGH on 3/7 did show EF of 10 to 15%, patient went into cardiogenic shock, started on Levophed in ICU.  Patient also developed shock liver with coagulopathy, severe ANTONIO due to cardiogenic shock, aspiration pneumonia.    Nephrology was consulted for ANTONIO on CKD and anuria, patient started on CVVHD, patient was on CVVHD on 3/8 to 3/15, finally had tunneled HD placed on 3/20, now on intermittent hemodialysis.  Patient had metabolic acidosis and lactic acidosis secondary to shock, which improved on bicarb drip.    Patient received FFP and vitamin K for her coagulopathy secondary to shock liver, amiodarone was on hold, liver function is better now.    Her Eliquis was switched to low-dose Eliquis due to ANTONIO, GDMT on hold, cardiology is planning ischemic evaluation at some point once patient is stable.  Patient had intermittent A-fib RVR overnight for couple of nights, was received Lopressor 5 mg IV.    Patient was also influenza positive, completed Tamiflu, completed 7-day course of Zosyn.    Patient also had concern for acute occipital infarction on MRI, neurology was consulted, EEG was negative, patient is on Keppra 500 mg twice daily.    Patient was intubated on 3/16 for worsening shortness of breath and mental status.  Her sedation was turned off on 3/18, not following simple commands, might need trach and PEG.    Patient had 10 days of stress dose steroids, still on hydrocortisone 50 mg daily on 14th day, also on Florinef 0.25 mg, had intermittent IV albumin infusion and currently on midodrine, MAP goal of 55.      Assessment and Plan:    Assessment    Shock, likely cardiogenic  Altered mental status, multifactorial etiology, s/p thrombectomy in 2023 due to L MCA stroke, MRI brain on this admission did show acute infarct on left occipital lobe, EEG did not show

## 2025-03-24 NOTE — PROGRESS NOTES
Palliative Care Department  [phone]  Palliative Care Progress Note  Provider Kim Schofield, APRN - CNP      PATIENT: Nikki Moffett  : 1945  MRN: [id number]  ADMISSION DATE: 3/4/2025  5:05 PM  Referring Provider: Kilo Dumont MD     Palliative Medicine was consulted on hospital day 20 for assistance with Goals of care    HPI:     Clinical Summary:Nikki Moffett is a 79 y.o. y/o female with a history of left MCA infarct SP thrombectomy, A-fib, diabetes, heart failure, who presented to [facility] on 3/4/2025 with fatigue and dizziness, ongoing for a month.  At ED, found tachycardic and dry on exam.  Chest x-ray shows bilateral pleural effusion and possible right lower lobe opacity, was treated initially with Zosyn and doxycycline and admitted for further medical evaluation.  Was seen by cardiology underwent cardioversion, became dyspneic and  hypotension postprocedure, was transferred to MICU for close monitoring.  Seen by cardiology SP TTE, on 3/7/2025, with EF 10 to 15%, mild mitral valve regurgitation, moderate transcript about regurgitation.    ASSESSMENT/PLAN:     Pertinent Hospital Diagnoses     Acute hypoxic respiratory failure   Acute on chronic HFrEF (EF 10-15%)   Bilateral pleural effusions   Suspected pneumonia   Acute renal failure on CKD now on HD- for tunneled catheter   Shock liver   History of left MCA stroke with M2 occlusion s/p thrombectomy      Palliative Care Encounter / Counseling Regarding Goals of Care  Please see detailed goals of care discussion as below  At this time, Nikki Moffett, Does Not have capacity for medical decision-making.  Capacity is time limited and situation/question specific  During encounter Shreyas was surrogate medical decision-maker  Outcome of goals of care meeting:  Continue with current medical treatment  Code status Full Code  Advanced Directives: no POA or living will in [facility]  Surrogate/Legal NOK:  Shreyas Moffett (Son)  ensure accuracy; however, inadvertent computerized transcription errors may be present.

## 2025-03-24 NOTE — PROGRESS NOTES
03/24/25 0847   Patient Observation   Pulse 75   Respirations 19   SpO2 100 %   Breath Sounds   Right Upper Lobe Expiratory wheezes   Left Upper Lobe Expiratory wheezes   Vent Information   Ventilator Day(s) 9   Ventilator ID [id number]   Equipment Changed Expiratory Filter   Vent Mode AC/VC   Ventilator Settings   Vt (Set, mL) 325 mL   Resp Rate (Set) 12 bpm   PEEP/CPAP (cmH2O) 5   FiO2  40 %   Peak Inspiratory Flow (Set) 50 L/sec   Vent Patient Data (Readings)   Vt (Measured) 334 mL   Peak Inspiratory Pressure (cmH2O) 26 cmH2O   Rate Measured 12 br/min   Minute Volume (L/min) 3.99 Liters   Mean Airway Pressure (cmH2O) 8 cmH20   Plateau Pressure (cm H2O) 20 cm H2O   Driving Pressure 15   I:E Ratio 1:6.10   PEEP Intrinsic (cm H2O) 0 cm H2O   Static Compliance (L/cm H2O) 27   Backup Apnea On   Backup Rate 12 Breaths Per Minute   Backup Vt 325   Vent Alarm Settings   High Pressure (cmH2O) 50 cmH2O   Low Minute Volume (lpm) 3 L/min   High Minute Volume (lpm) 12 L/min   Low Exhaled Vt (ml) 300 mL   High Exhaled Vt (ml) 700 mL   RR Low (bpm) 12   RR High (bpm) 35 br/min   Apnea (secs) 20 secs   Additional Respiratoray Assessments   Humidification Source Heated wire   Humidification Temp 37   Circuit Condensation Drained   Ambu Bag With Mask At Bedside Yes   Airway Clearance   Suction ET Tube;Oral   Suction Device Inline suction catheter   Sputum Method Obtained Endotracheal   Sputum Amount Small   Sputum Color/Odor Tan   Sputum Consistency Thick;Thin   ETT    Placement Date/Time: 03/16/25 0350   Placement Verified By: Auscultation;Capnometry  Preoxygenation: Yes  Airway Type: Cuffed  Airway Tube Size: 7.5 mm  Secured At: 22 cm  Measured From: Lips   Secured At 22 cm   Measured From Lips   ETT Placement (S)  Right   Secured By Commercial tube crane

## 2025-03-24 NOTE — PROGRESS NOTES
03/24/25 1144   Patient Observation   Pulse 87   Respirations 19   SpO2 100 %   Vent Information   Ventilator Day(s) 9   Ventilator ID [id number]   Equipment Changed Expiratory Filter   Vent Mode AC/VC   Ventilator Settings   Vt (Set, mL) 325 mL   Resp Rate (Set) 12 bpm   PEEP/CPAP (cmH2O) 5   FiO2  40 %   Peak Inspiratory Flow (Set) 50 L/sec   Vent Patient Data (Readings)   Vt (Measured) 291 mL   Peak Inspiratory Pressure (cmH2O) 26 cmH2O   Rate Measured 15 br/min   Minute Volume (L/min) 5.12 Liters   Mean Airway Pressure (cmH2O) 9 cmH20   Plateau Pressure (cm H2O) 17 cm H2O   Driving Pressure 12   I:E Ratio 1:2.50   Backup Apnea On   Backup Rate 12 Breaths Per Minute   Backup Vt 325   Vent Alarm Settings   High Pressure (cmH2O) 50 cmH2O   Low Minute Volume (lpm) 3 L/min   High Minute Volume (lpm) 12 L/min   Low Exhaled Vt (ml) 300 mL   High Exhaled Vt (ml) 7001 mL   RR Low (bpm) 12   RR High (bpm) 35 br/min   Apnea (secs) 20 secs   Additional Respiratoray Assessments   Humidification Source Heated wire   Humidification Temp 37   Circuit Condensation Drained   Ambu Bag With Mask At Bedside Yes   Airway Clearance   Suction ET Tube   Suction Device Inline suction catheter   Sputum Method Obtained Endotracheal   Sputum Amount Small   Sputum Color/Odor Tan   Sputum Consistency Thin   ETT    Placement Date/Time: 03/16/25 0350   Placement Verified By: Auscultation;Capnometry  Preoxygenation: Yes  Airway Type: Cuffed  Airway Tube Size: 7.5 mm  Secured At: 22 cm  Measured From: Lips   Secured At 22 cm   Measured From Lips   Secured By Commercial tube crane

## 2025-03-24 NOTE — PLAN OF CARE
Problem: Chronic Conditions and Co-morbidities  Goal: Patient's chronic conditions and co-morbidity symptoms are monitored and maintained or improved  3/24/2025 0241 by Mayra Hammonds RN  Outcome: Progressing     Problem: Discharge Planning  Goal: Discharge to home or other facility with appropriate resources  3/24/2025 0241 by Mayra Hammonds RN  Outcome: Not Progressing     Problem: Safety - Adult  Goal: Free from fall injury  3/24/2025 0241 by Mayra Hammonds RN  Outcome: Progressing     Problem: Skin/Tissue Integrity  Goal: Skin integrity remains intact  Description: 1.  Monitor for areas of redness and/or skin breakdown  2.  Assess vascular access sites hourly  3.  Every 4-6 hours minimum:  Change oxygen saturation probe site  4.  Every 4-6 hours:  If on nasal continuous positive airway pressure, respiratory therapy assess nares and determine need for appliance change or resting period  3/24/2025 0241 by Mayra Hammonds RN  Outcome: Progressing     Problem: Nutrition Deficit:  Goal: Optimize nutritional status  3/24/2025 0241 by Mayra Hammonds RN  Outcome: Progressing     Problem: ABCDS Injury Assessment  Goal: Absence of physical injury  3/24/2025 0241 by Mayra Hammonds RN  Outcome: Progressing     Problem: Metabolic/Fluid and Electrolytes - Adult  Goal: Electrolytes maintained within normal limits  3/24/2025 0241 by Mayra Hammonds RN  Outcome: Progressing     Problem: Chronic Conditions and Co-morbidities  Goal: Patient's chronic conditions and co-morbidity symptoms are monitored and maintained or improved  3/24/2025 0241 by Mayra Hammonds RN  Outcome: Progressing  3/23/2025 2011 by Marciano Arora RN  Outcome: Progressing  3/23/2025 1514 by Laurel Arnold RN  Outcome: Not Progressing     Problem: Discharge Planning  Goal: Discharge to home or other facility with appropriate resources  3/24/2025 0241 by Mayra Hammonds RN  Outcome: Not Progressing  3/23/2025 2011 by Marciano Arora, RN  Outcome:

## 2025-03-24 NOTE — PROGRESS NOTES
Progress Note  3/24/2025 7:36 AM  Subjective:   Admit Date: 3/4/2025  PCP: Tiesha Panda MD    Subjective    3/24/25:: Patient seen and examined ; continues to require pressors; remains intubated    Diet: ADULT TUBE FEEDING; Nasogastric; Peptide Based; Continuous; 10; Yes; 10; Q 4 hours; 35; 40; Q 4 hours; Protein; 1 Dose; Daily    Data:   Scheduled Meds:   pantoprazole (PROTONIX) 40 mg in sodium chloride (PF) 0.9 % 10 mL injection  40 mg IntraVENous Daily    vitamin D  2,000 Units Oral Daily    thiamine  250 mg IntraVENous Q24H    Followed by    [START ON 3/28/2025] thiamine  100 mg Oral Daily    hydrocortisone sodium succinate PF  50 mg IntraVENous Daily    midodrine  20 mg Per NG tube TID WC    chlorhexidine  15 mL Mouth/Throat BID    levETIRAcetam  500 mg IntraVENous BID    [Held by provider] lactulose  20 g Oral TID    insulin lispro  0-4 Units SubCUTAneous Q6H    sodium chloride flush  5-40 mL IntraVENous 2 times per day    apixaban  2.5 mg Oral BID    [Held by provider] furosemide  40 mg IntraVENous Daily    ipratropium 0.5 mg-albuterol 2.5 mg  1 Dose Inhalation Q4H WA RT    arformoterol tartrate  15 mcg Nebulization BID RT    budesonide  0.5 mg Nebulization BID RT    [Held by provider] metoprolol succinate  25 mg Oral Daily    [Held by provider] amiodarone  200 mg Oral BID    [Held by provider] atorvastatin  80 mg Oral Nightly     Continuous Infusions:   norepinephrine 2 mcg/min (03/24/25 0620)    sodium chloride      sodium chloride Stopped (03/23/25 0020)    dextrose       PRN Meds:sodium chloride, sulfur hexafluoride microspheres, heparin (PF), heparin (porcine) **AND** heparin (porcine), sodium chloride, polyethylene glycol, sodium chloride flush, [DISCONTINUED] ondansetron **OR** ondansetron, acetaminophen **OR** acetaminophen, dextrose bolus **OR** dextrose bolus, glucagon (rDNA), dextrose  I/O last 3 completed shifts:  In: 2190.6 [I.V.:189; NG/GT:1767; IV Piggyback:234.6]  Out: 325 [Emesis/NG  exam:->arythmia FINDINGS: Possible right lower lobe opacity..  Bilateral pleural effusions.  No pneumothorax.  Stable heart size.  The osseous structures are without acute process.     Bilateral pleural effusions. Possible right lower lobe opacity.       Objective:   Vitals: BP (!) 91/55   Pulse 84   Temp 98.4 °F (36.9 °C) (Esophageal)   Resp 22   Ht 1.575 m (5' 2\")   Wt 70.5 kg (155 lb 6.8 oz)   SpO2 100%   BMI 28.43 kg/m²   General appearance: appears stated age   HEENT: Head: Normocephalic, no lesions, without obvious abnormality.  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)     Occipital stroke (HCC)     Atrial flutter (HCC)     Paroxysmal atrial fibrillation (HCC)     H/O: stroke     Atrial flutter with rapid ventricular response (HCC)     Decompensated heart failure (HCC)     Chronic atrial fibrillation (HCC)     VHD (valvular heart disease)     ANTONIO (acute kidney injury)     Palliative care encounter     Metabolic encephalopathy         Assessment/Pans      1) ANTONIO on CKD 3a  baseline cr at 1-1.3 range 2023   setting of hypotension, aib, HFrEF, entresto, diuretics  Anuric   Antihypertensives , diuretics now are on hold  Requiring levophed despite  high dose midodrine  CVVHD 3/8-3/15  Hd 3/19 with 1.7L removed with 3 doses 25% albumin given  IR guided tunneled dialysis catheter 3/20/24 by Dr. Lima  Last  dialysis 3/21, no s/o renal function recovery , will continue dialytic support     2)  A Fib with rapid venticular rate , improved

## 2025-03-24 NOTE — PROGRESS NOTES
Respiratory Wean Start time:  Order to wean ventilator Yes (if no, contact provider)    Patient placed on PS of 5, PEEP 5cmH2O,and then increased to [id number] FIO2 40 at 0852AM.    HR 91bpm  RR 13bpm  SPO2 100%      Will continue to attempt to wean based on patient tolerance.       Performed by  Lauren Marshall RCP RRT    End of Weaning attempt: low tidal volumes     ending at 0854    HR 90bpm  RR 26bpm  SPO2 100%    Lauren Marshall RCP RRT

## 2025-03-24 NOTE — PROGRESS NOTES
Respiratory Wean Start time:  Order to wean ventilator Yes (if no, contact provider)placed on by Dr Beltran, will clarify order    Patient placed on PS of 15, PEEP 5cmH2O, FIO2 40 at 1433PM.    HR 98bpm  RR 23bpm  SPO2 100%      Will continue to attempt to wean based on patient tolerance.       Performed by  Lauren Marshall RCP RRT           03/24/25 1433   Patient Observation   Pulse 98   Respirations 23   SpO2 100 %   Ventilator Settings   Vt (Set, mL) 325 mL   Resp Rate (Set) 12 bpm   PEEP/CPAP (cmH2O) 5   FiO2  40 %   Vent Patient Data (Readings)   Vt (Measured) 258 mL   Peak Inspiratory Pressure (cmH2O) 21 cmH2O   Rate Measured 17 br/min   Minute Volume (L/min) 4.54 Liters   Mean Airway Pressure (cmH2O) 8 cmH20   Plateau Pressure (cm H2O) 17 cm H2O   Driving Pressure 12   I:E Ratio 1:7.40   Vent Alarm Settings   High Pressure (cmH2O) 50 cmH2O   Low Exhaled Vt (ml) 0 mL

## 2025-03-24 NOTE — PROGRESS NOTES
svi ci hr     base 32 2.8 86     challenge 28 2.5 84     % change -12 -10 2.5                           Not responsive per PLR

## 2025-03-24 NOTE — PROGRESS NOTES
[facility]   Department of Internal Medicine   Internal Medicine Residency  MICU Progress Note    Patient:  Nikki Moffett 79 y.o. female   MRN: [id number]       Date of Service: 3/24/2025    Allergy: Patient has no known allergies.    Subjective     Overnight, episode of a fib RVR broke with 5 of lopressor.   Patient was seen and examined this morning at bedside in no acute distress, no family present, remains intubated, off sedation, on 2 of levophed MAPs fluctuating from 70 to mid 50's.     Objective     I & O - 24hr:    Intake/Output Summary (Last 24 hours) at 3/24/2025 0824  Last data filed at 3/24/2025 0620  Gross per 24 hour   Intake 1409.44 ml   Output 125 ml   Net 1284.44 ml       Physical Exam  Vitals: BP (!) 91/55   Pulse 84   Temp 98.4 °F (36.9 °C) (Esophageal)   Resp 22   Ht 1.575 m (5' 2\")   Wt 70.5 kg (155 lb 6.8 oz)   SpO2 100%   BMI 28.43 kg/m²     General Appearance: intubated no distress, spontaneous eye opening, shakes head yes and no to questions, attempts to follow commands but is profoundly weak.   HEENT: ETT present   Neck: trachea midline   Lung: mechanical breath sounds, rhonchi R>L   Heart: RRR capillary refill <2   Abdomen: soft, appears nontender, BS+   Extremities: trace edema     Medications     Continuous Infusions:   norepinephrine 2 mcg/min (03/24/25 0620)    sodium chloride      sodium chloride Stopped (03/23/25 0020)    dextrose       Scheduled Meds:   pantoprazole (PROTONIX) 40 mg in sodium chloride (PF) 0.9 % 10 mL injection  40 mg IntraVENous Daily    vitamin D  2,000 Units Oral Daily    thiamine  250 mg IntraVENous Q24H    Followed by    [START ON 3/28/2025] thiamine  100 mg Oral Daily    hydrocortisone sodium succinate PF  50 mg IntraVENous Daily    midodrine  20 mg Per NG tube TID WC    chlorhexidine  15 mL Mouth/Throat BID    levETIRAcetam  500 mg IntraVENous BID    [Held by provider] lactulose  20 g Oral TID    insulin lispro  0-4 Units SubCUTAneous  Q6H    sodium chloride flush  5-40 mL IntraVENous 2 times per day    apixaban  2.5 mg Oral BID    [Held by provider] furosemide  40 mg IntraVENous Daily    ipratropium 0.5 mg-albuterol 2.5 mg  1 Dose Inhalation Q4H WA RT    arformoterol tartrate  15 mcg Nebulization BID RT    budesonide  0.5 mg Nebulization BID RT    [Held by provider] metoprolol succinate  25 mg Oral Daily    amiodarone  200 mg Oral BID    atorvastatin  80 mg Oral Nightly     PRN Meds: sodium chloride, sulfur hexafluoride microspheres, heparin (PF), heparin (porcine) **AND** heparin (porcine), sodium chloride, polyethylene glycol, sodium chloride flush, [DISCONTINUED] ondansetron **OR** ondansetron, acetaminophen **OR** acetaminophen, dextrose bolus **OR** dextrose bolus, glucagon (rDNA), dextrose    Labs and Imaging Studies     Labs    CBC with Differential:    Lab Results   Component Value Date/Time    WBC 21.3 03/24/2025 04:53 AM    RBC 2.67 03/24/2025 04:53 AM    HGB 7.4 03/24/2025 04:53 AM    HCT 23.4 03/24/2025 04:53 AM     03/24/2025 04:53 AM    MCV 87.6 03/24/2025 04:53 AM    MCH 27.7 03/24/2025 04:53 AM    MCHC 31.6 03/24/2025 04:53 AM    RDW 19.7 03/24/2025 04:53 AM    NRBC 3 03/12/2025 05:28 AM    METASPCT 1 03/09/2025 03:57 AM    LYMPHOPCT 5 03/24/2025 04:53 AM    MONOPCT 5 03/24/2025 04:53 AM    EOSPCT 0 03/24/2025 04:53 AM    BASOPCT 0 03/24/2025 04:53 AM    MONOSABS 1.10 03/24/2025 04:53 AM    LYMPHSABS 1.03 03/24/2025 04:53 AM    EOSABS 0.01 03/24/2025 04:53 AM    BASOSABS 0.03 03/24/2025 04:53 AM     BMP:    Lab Results   Component Value Date/Time     03/24/2025 04:53 AM    K 4.6 03/24/2025 04:53 AM    CL 94 03/24/2025 04:53 AM    CO2 22 03/24/2025 04:53 AM    BUN 57 03/24/2025 04:53 AM    CREATININE 3.5 03/24/2025 04:53 AM    CALCIUM 8.1 03/24/2025 04:53 AM    GFRAA >60 10/06/2017 12:12 PM    LABGLOM 13 03/24/2025 04:53 AM    LABGLOM 59 12/26/2023 09:18 AM    GLUCOSE 117 03/24/2025 04:53 AM     Hepatic Function Panel:  below. In addition to above, time was devoted to teaching and to any procedure.      NOTE: This report, in part or full, may have been transcribed using voice recognition software. Every effort was made to ensure accuracy; however, inadvertent computerized transcription errors may be present. Please excuse any transcriptional grammatical or spelling errors that may have escaped my editorial review.     Total critical care time caring for this patient with life threatening, unstable organ failure, including direct patient contact, management of life support systems, review of data including imaging and labs, discussions with other team members and physicians is at least 36 Min so far today, excluding procedures.    Ana Beltran MD

## 2025-03-24 NOTE — PROGRESS NOTES
03/24/25 1607   Patient Observation   Pulse 94   Respirations 22   SpO2 100 %   Vent Information   Ventilator Day(s) 9   Ventilator ID [id number]   Vent Mode (S)  CPAP/PS  (patient found on ps)   Ventilator Settings   PEEP/CPAP (cmH2O) 5   FiO2  40 %   Pressure Support (cm H2O) (S)  15 cm H2O  (patient found on)   Vent Patient Data (Readings)   Vt (Measured) 255 mL   Peak Inspiratory Pressure (cmH2O) 21 cmH2O   Rate Measured 18 br/min   Minute Volume (L/min) 4.97 Liters   Mean Airway Pressure (cmH2O) 8 cmH20   Plateau Pressure (cm H2O) 17 cm H2O   Driving Pressure 12   I:E Ratio 1:4.10   Backup Apnea On   Backup Rate 12 Breaths Per Minute   Backup Vt 325   Vent Alarm Settings   High Pressure (cmH2O) 50 cmH2O   Low Minute Volume (lpm) 3 L/min   High Minute Volume (lpm) 12 L/min   Low Exhaled Vt (ml) 250 mL   High Exhaled Vt (ml) 700 mL   RR Low (bpm) 12   RR High (bpm) 35 br/min   Apnea (secs) 20 secs   Additional Respiratoray Assessments   Humidification Source Heated wire   Humidification Temp 36.9   Circuit Condensation Drained   Ambu Bag With Mask At Bedside Yes   ETT    Placement Date/Time: 03/16/25 0350   Placement Verified By: Auscultation;Capnometry  Preoxygenation: Yes  Airway Type: Cuffed  Airway Tube Size: 7.5 mm  Secured At: 22 cm  Measured From: Lips   Secured At 22 cm   Measured From Lips   ETT Placement (S)  Center   Secured By Commercial tube crane

## 2025-03-24 NOTE — PLAN OF CARE
Problem: Chronic Conditions and Co-morbidities  Goal: Patient's chronic conditions and co-morbidity symptoms are monitored and maintained or improved  3/23/2025 2011 by Marciano Arora RN  Outcome: Progressing  3/23/2025 1514 by Laurel Arnold RN  Outcome: Not Progressing  3/23/2025 1114 by Laurel Arnold RN  Outcome: Not Progressing  Flowsheets (Taken 3/23/2025 0800)  Care Plan - Patient's Chronic Conditions and Co-Morbidity Symptoms are Monitored and Maintained or Improved:   Monitor and assess patient's chronic conditions and comorbid symptoms for stability, deterioration, or improvement   Collaborate with multidisciplinary team to address chronic and comorbid conditions and prevent exacerbation or deterioration   Update acute care plan with appropriate goals if chronic or comorbid symptoms are exacerbated and prevent overall improvement and discharge     Problem: Discharge Planning  Goal: Discharge to home or other facility with appropriate resources  3/23/2025 2011 by Marciano Arora RN  Outcome: Progressing  3/23/2025 1514 by Laurel Arnold RN  Outcome: Not Progressing  3/23/2025 1114 by Laurel Arnold RN  Outcome: Not Progressing  Flowsheets (Taken 3/23/2025 0800)  Discharge to home or other facility with appropriate resources: Identify barriers to discharge with patient and caregiver     Problem: Safety - Adult  Goal: Free from fall injury  3/23/2025 2011 by Marciano Arora RN  Outcome: Progressing  3/23/2025 1514 by Laurel Arnold RN  Outcome: Progressing  3/23/2025 1114 by Laurel Arnold RN  Outcome: Progressing  Flowsheets (Taken 3/23/2025 0800)  Free From Fall Injury:   Instruct family/caregiver on patient safety   Based on caregiver fall risk screen, instruct family/caregiver to ask for assistance with transferring infant if caregiver noted to have fall risk factors     Problem: Skin/Tissue Integrity  Goal: Skin integrity remains intact  Description: 1.  Monitor for areas of redness and/or skin  a safe level of function  3/23/2025 2011 by Marciano Arora RN  Outcome: Progressing  3/23/2025 1514 by Laurel Arnold RN  Outcome: Not Progressing  Goal: Maintain proper alignment of affected body part  3/23/2025 2011 by Marciano Arora, RN  Outcome: Progressing  3/23/2025 1114 by Laurel Arnold RN  Outcome: Not Progressing

## 2025-03-24 NOTE — FLOWSHEET NOTE
03/24/25 1405   Vital Signs   /60   Temp 98.2 °F (36.8 °C)   Pulse 88   SpO2 100 %   Weight - Scale 71.5 kg (157 lb 10.1 oz)   Percent Weight Change 1.42   Post-Hemodialysis Assessment   Post-Treatment Procedures Catheter capped, clamped with Citrate x 2 ports;Blood returned   Machine Disinfection Process Acid/Vinegar Clean;Heat Disinfect;Exterior Machine Disinfection   Rinseback Volume (ml) 300 ml   Blood Volume Processed (Liters) 47 L   Dialyzer Clearance Lightly streaked   Duration of Treatment (minutes) 240 minutes   Hemodialysis Intake (ml) 400 ml   Hemodialysis Output (ml) 2400 ml   NET Removed (ml) 2000   Tolerated Treatment Good   Patient Response to Treatment 2000cc net fluid removal with albumin and levophed support .   Bilateral Breath Sounds Diminished   Edema Generalized   Edema Generalized +1   Time Off 1355   Patient Disposition Remain in ICU/ED   Observations & Evaluations   Level of Consciousness 0   Heart Rhythm Irregular   Respiratory Quality/Effort Unlabored   O2 Device Ventilator   Skin Color Dusky   Skin Condition/Temp Warm;Dry   Abdomen Inspection Soft  (fms)   Bowel Sounds (All Quadrants) Active   Comments 2000cc net fluid removal with albumin and levophed support .   Handoff   Communication Given Transfer Handoff   Handoff Given To leonel villalpando   Handoff Received From tanner canela   Handoff Communication Face to Face   Time Handoff Given 8318

## 2025-03-25 ENCOUNTER — APPOINTMENT (OUTPATIENT)
Dept: GENERAL RADIOLOGY | Age: 80
DRG: 004 | End: 2025-03-25
Payer: MEDICARE

## 2025-03-25 LAB
AADO2: 198.3 MMHG
ALBUMIN SERPL-MCNC: 3.8 G/DL (ref 3.5–5.2)
ALP SERPL-CCNC: 105 U/L (ref 35–104)
ALT SERPL-CCNC: 61 U/L (ref 0–32)
ANION GAP SERPL CALCULATED.3IONS-SCNC: 13 MMOL/L (ref 7–16)
ANION GAP SERPL CALCULATED.3IONS-SCNC: 15 MMOL/L (ref 7–16)
AST SERPL-CCNC: 42 U/L (ref 0–31)
B.E.: -0.4 MMOL/L (ref -3–3)
BASOPHILS # BLD: 0.03 K/UL (ref 0–0.2)
BASOPHILS NFR BLD: 0 % (ref 0–2)
BILIRUB DIRECT SERPL-MCNC: 0.3 MG/DL (ref 0–0.3)
BILIRUB INDIRECT SERPL-MCNC: 0.6 MG/DL (ref 0–1)
BILIRUB SERPL-MCNC: 0.9 MG/DL (ref 0–1.2)
BUN SERPL-MCNC: 18 MG/DL (ref 6–23)
BUN SERPL-MCNC: 35 MG/DL (ref 6–23)
CA-I BLD-SCNC: 1.15 MMOL/L (ref 1.15–1.33)
CALCIUM SERPL-MCNC: 8.2 MG/DL (ref 8.6–10.2)
CALCIUM SERPL-MCNC: 8.3 MG/DL (ref 8.6–10.2)
CHLORIDE SERPL-SCNC: 96 MMOL/L (ref 98–107)
CHLORIDE SERPL-SCNC: 99 MMOL/L (ref 98–107)
CO2 SERPL-SCNC: 22 MMOL/L (ref 22–29)
CO2 SERPL-SCNC: 25 MMOL/L (ref 22–29)
COHB: 1.2 % (ref 0–1.5)
CREAT SERPL-MCNC: 1.4 MG/DL (ref 0.5–1)
CREAT SERPL-MCNC: 2.3 MG/DL (ref 0.5–1)
CRITICAL: ABNORMAL
DATE ANALYZED: ABNORMAL
DATE OF COLLECTION: ABNORMAL
EOSINOPHIL # BLD: 0.06 K/UL (ref 0.05–0.5)
EOSINOPHILS RELATIVE PERCENT: 0 % (ref 0–6)
ERYTHROCYTE [DISTWIDTH] IN BLOOD BY AUTOMATED COUNT: 20.2 % (ref 11.5–15)
FIO2: 40 %
GFR, ESTIMATED: 21 ML/MIN/1.73M2
GFR, ESTIMATED: 40 ML/MIN/1.73M2
GLUCOSE BLD-MCNC: 126 MG/DL (ref 74–99)
GLUCOSE BLD-MCNC: 127 MG/DL (ref 74–99)
GLUCOSE BLD-MCNC: 130 MG/DL (ref 74–99)
GLUCOSE SERPL-MCNC: 120 MG/DL (ref 74–99)
GLUCOSE SERPL-MCNC: 125 MG/DL (ref 74–99)
HCO3: 22.4 MMOL/L (ref 22–26)
HCT VFR BLD AUTO: 26.8 % (ref 34–48)
HGB BLD-MCNC: 8.3 G/DL (ref 11.5–15.5)
HHB: 8.4 % (ref 0–5)
IMM GRANULOCYTES # BLD AUTO: 0.2 K/UL (ref 0–0.58)
IMM GRANULOCYTES NFR BLD: 1 % (ref 0–5)
LAB: ABNORMAL
LYMPHOCYTES NFR BLD: 1.51 K/UL (ref 1.5–4)
LYMPHOCYTES RELATIVE PERCENT: 7 % (ref 20–42)
Lab: 421
MAGNESIUM SERPL-MCNC: 1.9 MG/DL (ref 1.6–2.6)
MAGNESIUM SERPL-MCNC: 2.2 MG/DL (ref 1.6–2.6)
MCH RBC QN AUTO: 27.8 PG (ref 26–35)
MCHC RBC AUTO-ENTMCNC: 31 G/DL (ref 32–34.5)
MCV RBC AUTO: 89.6 FL (ref 80–99.9)
METHB: 0.6 % (ref 0–1.5)
MODE: AC
MONOCYTES NFR BLD: 1.37 K/UL (ref 0.1–0.95)
MONOCYTES NFR BLD: 6 % (ref 2–12)
NEUTROPHILS NFR BLD: 86 % (ref 43–80)
NEUTS SEG NFR BLD: 19.22 K/UL (ref 1.8–7.3)
O2 SATURATION: 91.4 % (ref 92–98.5)
O2HB: 89.8 % (ref 94–97)
OPERATOR ID: 2593
PATIENT TEMP: 37 C
PCO2: 29.4 MMHG (ref 35–45)
PEEP/CPAP: 5 CMH2O
PFO2: 1.33 MMHG/%
PH BLOOD GAS: 7.5 (ref 7.35–7.45)
PHOSPHATE SERPL-MCNC: 1.4 MG/DL (ref 2.5–4.5)
PHOSPHATE SERPL-MCNC: 2.2 MG/DL (ref 2.5–4.5)
PLATELET # BLD AUTO: 202 K/UL (ref 130–450)
PMV BLD AUTO: 11.5 FL (ref 7–12)
PO2: 53.1 MMHG (ref 75–100)
POTASSIUM SERPL-SCNC: 3.8 MMOL/L (ref 3.5–5)
POTASSIUM SERPL-SCNC: 4.1 MMOL/L (ref 3.5–5)
PROT SERPL-MCNC: 5.5 G/DL (ref 6.4–8.3)
RBC # BLD AUTO: 2.99 M/UL (ref 3.5–5.5)
RBC # BLD: ABNORMAL 10*6/UL
RBC # BLD: ABNORMAL 10*6/UL
RI(T): 3.73
RR MECHANICAL: 12 B/MIN
SODIUM SERPL-SCNC: 133 MMOL/L (ref 132–146)
SODIUM SERPL-SCNC: 137 MMOL/L (ref 132–146)
SOURCE, BLOOD GAS: ABNORMAL
THB: 8.5 G/DL (ref 11.5–16.5)
TIME ANALYZED: 425
VT MECHANICAL: 325 ML
WBC OTHER # BLD: 22.4 K/UL (ref 4.5–11.5)

## 2025-03-25 PROCEDURE — 85025 COMPLETE CBC W/AUTO DIFF WBC: CPT

## 2025-03-25 PROCEDURE — 2500000003 HC RX 250 WO HCPCS

## 2025-03-25 PROCEDURE — 82962 GLUCOSE BLOOD TEST: CPT

## 2025-03-25 PROCEDURE — 90935 HEMODIALYSIS ONE EVALUATION: CPT

## 2025-03-25 PROCEDURE — 82248 BILIRUBIN DIRECT: CPT

## 2025-03-25 PROCEDURE — 83735 ASSAY OF MAGNESIUM: CPT

## 2025-03-25 PROCEDURE — 94003 VENT MGMT INPAT SUBQ DAY: CPT

## 2025-03-25 PROCEDURE — 51798 US URINE CAPACITY MEASURE: CPT

## 2025-03-25 PROCEDURE — 6360000002 HC RX W HCPCS: Performed by: INTERNAL MEDICINE

## 2025-03-25 PROCEDURE — 2000000000 HC ICU R&B

## 2025-03-25 PROCEDURE — 84100 ASSAY OF PHOSPHORUS: CPT

## 2025-03-25 PROCEDURE — 80048 BASIC METABOLIC PNL TOTAL CA: CPT

## 2025-03-25 PROCEDURE — 2500000003 HC RX 250 WO HCPCS: Performed by: INTERNAL MEDICINE

## 2025-03-25 PROCEDURE — 86850 RBC ANTIBODY SCREEN: CPT

## 2025-03-25 PROCEDURE — 86900 BLOOD TYPING SEROLOGIC ABO: CPT

## 2025-03-25 PROCEDURE — 6370000000 HC RX 637 (ALT 250 FOR IP)

## 2025-03-25 PROCEDURE — 71045 X-RAY EXAM CHEST 1 VIEW: CPT

## 2025-03-25 PROCEDURE — APPSS60 APP SPLIT SHARED TIME 46-60 MINUTES

## 2025-03-25 PROCEDURE — 99233 SBSQ HOSP IP/OBS HIGH 50: CPT | Performed by: CHIROPRACTOR

## 2025-03-25 PROCEDURE — 2580000003 HC RX 258

## 2025-03-25 PROCEDURE — 94640 AIRWAY INHALATION TREATMENT: CPT

## 2025-03-25 PROCEDURE — 6360000002 HC RX W HCPCS

## 2025-03-25 PROCEDURE — 82805 BLOOD GASES W/O2 SATURATION: CPT

## 2025-03-25 PROCEDURE — 99231 SBSQ HOSP IP/OBS SF/LOW 25: CPT | Performed by: NURSE PRACTITIONER

## 2025-03-25 PROCEDURE — 86901 BLOOD TYPING SEROLOGIC RH(D): CPT

## 2025-03-25 PROCEDURE — 6370000000 HC RX 637 (ALT 250 FOR IP): Performed by: PSYCHIATRY & NEUROLOGY

## 2025-03-25 PROCEDURE — 86923 COMPATIBILITY TEST ELECTRIC: CPT

## 2025-03-25 PROCEDURE — 80053 COMPREHEN METABOLIC PANEL: CPT

## 2025-03-25 PROCEDURE — 82330 ASSAY OF CALCIUM: CPT

## 2025-03-25 RX ORDER — LANOLIN ALCOHOL/MO/W.PET/CERES
400 CREAM (GRAM) TOPICAL ONCE
Status: COMPLETED | OUTPATIENT
Start: 2025-03-25 | End: 2025-03-25

## 2025-03-25 RX ORDER — FENTANYL CITRATE 50 UG/ML
300 INJECTION, SOLUTION INTRAMUSCULAR; INTRAVENOUS
Status: COMPLETED | OUTPATIENT
Start: 2025-03-26 | End: 2025-03-26

## 2025-03-25 RX ORDER — VECURONIUM BROMIDE 1 MG/ML
10 INJECTION, POWDER, LYOPHILIZED, FOR SOLUTION INTRAVENOUS
Status: COMPLETED | OUTPATIENT
Start: 2025-03-26 | End: 2025-03-26

## 2025-03-25 RX ORDER — MIDAZOLAM HYDROCHLORIDE 2 MG/2ML
5 INJECTION, SOLUTION INTRAMUSCULAR; INTRAVENOUS
Status: COMPLETED | OUTPATIENT
Start: 2025-03-26 | End: 2025-03-26

## 2025-03-25 RX ORDER — COSYNTROPIN 0.25 MG/ML
250 INJECTION, POWDER, FOR SOLUTION INTRAMUSCULAR; INTRAVENOUS ONCE
Status: DISCONTINUED | OUTPATIENT
Start: 2025-03-26 | End: 2025-03-26

## 2025-03-25 RX ORDER — SODIUM CHLORIDE 9 MG/ML
INJECTION, SOLUTION INTRAVENOUS CONTINUOUS
Status: DISCONTINUED | OUTPATIENT
Start: 2025-03-26 | End: 2025-03-25

## 2025-03-25 RX ADMIN — SODIUM CHLORIDE, PRESERVATIVE FREE 10 ML: 5 INJECTION INTRAVENOUS at 10:01

## 2025-03-25 RX ADMIN — Medication 8 MCG/MIN: at 02:44

## 2025-03-25 RX ADMIN — POTASSIUM PHOSPHATE, MONOBASIC AND POTASSIUM PHOSPHATE, DIBASIC 20 MMOL: 224; 236 INJECTION, SOLUTION, CONCENTRATE INTRAVENOUS at 16:47

## 2025-03-25 RX ADMIN — SODIUM CHLORIDE, PRESERVATIVE FREE 40 MG: 5 INJECTION INTRAVENOUS at 10:00

## 2025-03-25 RX ADMIN — MIDODRINE HYDROCHLORIDE 20 MG: 10 TABLET ORAL at 10:00

## 2025-03-25 RX ADMIN — CHLORHEXIDINE GLUCONATE, 0.12% ORAL RINSE 15 ML: 1.2 SOLUTION DENTAL at 08:52

## 2025-03-25 RX ADMIN — MIDODRINE HYDROCHLORIDE 20 MG: 10 TABLET ORAL at 12:46

## 2025-03-25 RX ADMIN — SODIUM CHLORIDE, PRESERVATIVE FREE 10 ML: 5 INJECTION INTRAVENOUS at 20:24

## 2025-03-25 RX ADMIN — IPRATROPIUM BROMIDE AND ALBUTEROL SULFATE 1 DOSE: 2.5; .5 SOLUTION RESPIRATORY (INHALATION) at 12:03

## 2025-03-25 RX ADMIN — ATORVASTATIN CALCIUM 80 MG: 40 TABLET, FILM COATED ORAL at 20:23

## 2025-03-25 RX ADMIN — Medication 400 MG: at 16:41

## 2025-03-25 RX ADMIN — LEVETIRACETAM 500 MG: 100 SOLUTION ORAL at 20:30

## 2025-03-25 RX ADMIN — LEVETIRACETAM 500 MG: 100 SOLUTION ORAL at 10:06

## 2025-03-25 RX ADMIN — SODIUM CHLORIDE: 9 INJECTION, SOLUTION INTRAVENOUS at 14:33

## 2025-03-25 RX ADMIN — AMIODARONE HYDROCHLORIDE 200 MG: 200 TABLET ORAL at 10:00

## 2025-03-25 RX ADMIN — THIAMINE HYDROCHLORIDE 250 MG: 100 INJECTION, SOLUTION INTRAMUSCULAR; INTRAVENOUS at 10:00

## 2025-03-25 RX ADMIN — IPRATROPIUM BROMIDE AND ALBUTEROL SULFATE 1 DOSE: 2.5; .5 SOLUTION RESPIRATORY (INHALATION) at 20:44

## 2025-03-25 RX ADMIN — CHLORHEXIDINE GLUCONATE, 0.12% ORAL RINSE 15 ML: 1.2 SOLUTION DENTAL at 20:23

## 2025-03-25 RX ADMIN — MIDODRINE HYDROCHLORIDE 20 MG: 10 TABLET ORAL at 16:41

## 2025-03-25 RX ADMIN — IPRATROPIUM BROMIDE AND ALBUTEROL SULFATE 1 DOSE: 2.5; .5 SOLUTION RESPIRATORY (INHALATION) at 09:11

## 2025-03-25 RX ADMIN — IPRATROPIUM BROMIDE AND ALBUTEROL SULFATE 1 DOSE: 2.5; .5 SOLUTION RESPIRATORY (INHALATION) at 15:54

## 2025-03-25 RX ADMIN — Medication 2000 UNITS: at 10:00

## 2025-03-25 ASSESSMENT — PULMONARY FUNCTION TESTS
PIF_VALUE: 20
PIF_VALUE: 27
PIF_VALUE: 22
PIF_VALUE: 19
PIF_VALUE: 19
PIF_VALUE: 26
PIF_VALUE: 24
PIF_VALUE: 27
PIF_VALUE: 19
PIF_VALUE: 20
PIF_VALUE: 21
PIF_VALUE: 24
PIF_VALUE: 25
PIF_VALUE: 21
PIF_VALUE: 20
PIF_VALUE: 17
PIF_VALUE: 20
PIF_VALUE: 19
PIF_VALUE: 20
PIF_VALUE: 21
PIF_VALUE: 19
PIF_VALUE: 20
PIF_VALUE: 21
PIF_VALUE: 18
PIF_VALUE: 21
PIF_VALUE: 24
PIF_VALUE: 18
PIF_VALUE: 19
PIF_VALUE: 21
PIF_VALUE: 24
PIF_VALUE: 19
PIF_VALUE: 22
PIF_VALUE: 19
PIF_VALUE: 21
PIF_VALUE: 21
PIF_VALUE: 19
PIF_VALUE: 25
PIF_VALUE: 18

## 2025-03-25 ASSESSMENT — PAIN SCALES - GENERAL
PAINLEVEL_OUTOF10: 0

## 2025-03-25 NOTE — PLAN OF CARE
skills, available support systems and cultural and spiritual values  2. Provide emotional support, including active listening and acknowledgement of concerns of patient and caregivers  3. Reduce environmental stimuli, as able  4. Instruct patient/family in relaxation techniques, as appropriate  5. Assess for spiritual pain/suffering and initiate Spiritual Care, Psychosocial Clinical Specialist consults as needed  3/24/2025 2031 by Marciano Arora RN  Outcome: Progressing  3/24/2025 0952 by Ghislaine Griffin RN  Outcome: Progressing     Problem: Spiritual Care  Goal: Pt/Family able to move forward in process of forgiving self, others, and/or higher power  Description: INTERVENTIONS:  1. Assist patient/family to overcome blocks to healing by use of spiritual practices (prayer, meditation, guided imagery, reiki, breath work, etc).  2. De-myth guilt and help patient/family identify possible irrational spiritual/cultural beliefs and values.  3. Explore possibilities of making amends & reconciliation with self, others, and/or a greater power.  4. Guide patient/family in identifying painful feelings of guilt.  5. Help patient/famiy explore and identify spiritual beliefs, cultural understandings or values that may help or hinder letting go of issue.  6. Help patient/family explore feelings of anger, bitterness, resentment.  7. Help patient/family identify and examine the situation in which these feelings are experienced.  8. Help patient/family identify destructive displacement of feelings onto other individuals.  9. Invite use of sacraments/rituals/ceremonies as appropriate (e.g. - confession, anointing, smudging).  10. Refer patient/family to formal counseling and/or to christen community for further support work.  3/24/2025 2031 by Marciano Arora RN  Outcome: Progressing  3/24/2025 0952 by Ghislaine Griffin RN  Outcome: Progressing     Problem: Chronic Conditions and Co-morbidities  Goal: Patient's chronic conditions and co-morbidity

## 2025-03-25 NOTE — PROGRESS NOTES
Progress Note  3/25/2025 9:22 AM  Subjective:   Admit Date: 3/4/2025  PCP: Tiesha Panda MD    Subjective    3/24/25:: Patient seen and examined ; continues to require pressors; remains intubated    3/25: remains intubated, still requiring pressors; HD in progress, off sedation now    Diet: ADULT TUBE FEEDING; Nasogastric; Peptide Based; Continuous; 10; Yes; 10; Q 4 hours; 35; 30; Q 4 hours; Protein; 1 Dose; Daily    Data:   Scheduled Meds:   sodium phosphate IVPB (PERIPHERAL line)  15 mmol IntraVENous Once    hydrocortisone sodium succinate PF  25 mg IntraVENous Daily    amiodarone  200 mg Per NG tube Daily    levETIRAcetam  500 mg Per G Tube BID    pantoprazole (PROTONIX) 40 mg in sodium chloride (PF) 0.9 % 10 mL injection  40 mg IntraVENous Daily    cosyntropin  250 mcg IntraVENous Once    vitamin D  2,000 Units Oral Daily    thiamine  250 mg IntraVENous Q24H    Followed by    [START ON 3/28/2025] thiamine  100 mg Oral Daily    midodrine  20 mg Per NG tube TID WC    chlorhexidine  15 mL Mouth/Throat BID    [Held by provider] lactulose  20 g Oral TID    insulin lispro  0-4 Units SubCUTAneous Q6H    sodium chloride flush  5-40 mL IntraVENous 2 times per day    apixaban  2.5 mg Oral BID    [Held by provider] furosemide  40 mg IntraVENous Daily    ipratropium 0.5 mg-albuterol 2.5 mg  1 Dose Inhalation Q4H WA RT    [Held by provider] metoprolol succinate  25 mg Oral Daily    atorvastatin  80 mg Oral Nightly     Continuous Infusions:   norepinephrine 8 mcg/min (03/25/25 0610)    sodium chloride Stopped (03/23/25 0020)    dextrose       PRN Meds:sulfur hexafluoride microspheres, heparin (PF), heparin (porcine) **AND** heparin (porcine), sodium chloride, polyethylene glycol, sodium chloride flush, [DISCONTINUED] ondansetron **OR** ondansetron, acetaminophen **OR** acetaminophen, dextrose bolus **OR** dextrose bolus, glucagon (rDNA), dextrose  I/O last 3 completed shifts:  In: 2084.5 [I.V.:150.8; NG/GT:1462; IV

## 2025-03-25 NOTE — PROGRESS NOTES
03/25/25 1559   Patient Observation   Pulse (!) 119   Respirations 20   SpO2 100 %   Vent Information   Vent Mode AC/VC   Ventilator Settings   Vt (Set, mL) 280 mL   Resp Rate (Set) 12 bpm   PEEP/CPAP (cmH2O) 5   FiO2  40 %   Peak Inspiratory Flow (Set) 44 L/sec   Vent Patient Data (Readings)   Vt (Measured) 226 mL   Peak Inspiratory Pressure (cmH2O) 21 cmH2O   Rate Measured 18 br/min   Minute Volume (L/min) 5.41 Liters   Peak Inspiratory Flow (lpm) 44 L/sec   Mean Airway Pressure (cmH2O) 8 cmH20   Plateau Pressure (cm H2O) 17 cm H2O   Driving Pressure 12   I:E Ratio 1:2.50   Flow Sensitivity 3 L/min   Static Compliance (L/cm H2O) 25   Backup Apnea On   Backup Rate 12 Breaths Per Minute   Backup Vt 280   Vent Alarm Settings   High Pressure (cmH2O) 50 cmH2O   Low Minute Volume (lpm) 4 L/min   High Minute Volume (lpm) 9 L/min   Low Exhaled Vt (ml) 250 mL   High Exhaled Vt (ml) 500 mL   RR High (bpm) 30 br/min   Apnea (secs) 20 secs   Additional Respiratoray Assessments   Humidification Source Heated wire   Humidification Temp 37   Circuit Condensation Drained   Ambu Bag With Mask At Bedside Yes   ETT    Placement Date/Time: 03/16/25 0350   Placement Verified By: Auscultation;Capnometry  Preoxygenation: Yes  Airway Type: Cuffed  Airway Tube Size: 7.5 mm  Secured At: 22 cm  Measured From: Lips   Secured At 22 cm   Measured From Lips   Secured By Commercial tube crane   Site Assessment Dry

## 2025-03-25 NOTE — PROGRESS NOTES
Spoke to patients RN regarding ordered thoracentesis. Notified that patient cannot be done today.   Requested to keep pt NPO after midnight and to verify with ordering provider that it is ok to continue to hold all blood thinning medications.  [facility] staff will call in am to proceed with procedure.

## 2025-03-25 NOTE — PROGRESS NOTES
Palliative Care Department  [phone]  Palliative Care Progress Note  Provider Mickie Lozano, APRN - CNP      PATIENT: Nikki Moffett  : 1945  MRN: [id number]  ADMISSION DATE: 3/4/2025  5:05 PM  Referring Provider: Kilo Dumont MD     Palliative Medicine was consulted on hospital day 21 for assistance with Goals of care    HPI:     Clinical Summary:Nikki Moffett is a 79 y.o. y/o female with a history of left MCA infarct SP thrombectomy, A-fib, diabetes, heart failure, who presented to [facility] on 3/4/2025 with fatigue and dizziness, ongoing for a month.  At ED, found tachycardic and dry on exam.  Chest x-ray shows bilateral pleural effusion and possible right lower lobe opacity, was treated initially with Zosyn and doxycycline and admitted for further medical evaluation.  Was seen by cardiology underwent cardioversion, became dyspneic and  hypotension postprocedure, was transferred to MICU for close monitoring.  Seen by cardiology SP TTE, on 3/7/2025, with EF 10 to 15%, mild mitral valve regurgitation, moderate transcript about regurgitation.    ASSESSMENT/PLAN:     Pertinent Hospital Diagnoses     Acute hypoxic respiratory failure   Acute on chronic HFrEF (EF 10-15%)   Bilateral pleural effusions   Suspected pneumonia   Acute renal failure on CKD now on HD- for tunneled catheter   Shock liver   History of left MCA stroke with M2 occlusion s/p thrombectomy      Palliative Care Encounter / Counseling Regarding Goals of Care  Please see detailed goals of care discussion as below  At this time, Nikki Moffett, Does Not have capacity for medical decision-making.  Capacity is time limited and situation/question specific  During encounter Shreyas was surrogate medical decision-maker  Outcome of goals of care meeting:  Family meeting with Shreyas tomorrow 3/26/2025 at 3:15 PM.   Code status Full Code  Advanced Directives: no POA or living will in epic  Surrogate/Legal NOK:  Shreyas Moffett (Son)

## 2025-03-25 NOTE — PROGRESS NOTES
[facility]   Department of Internal Medicine   Internal Medicine Residency  MICU Progress Note    Patient:  Nikki Moffett 79 y.o. female   MRN: [id number]       Date of Service: 3/25/2025    Allergy: Patient has no known allergies.    Subjective     Overnight, no acute events.   Patient was seen and examined this morning at bedside in no acute distress, no family present, remains intubated, off sedation, attempts to follow commands however profoundly weak, able to slightly shake head yes/no to questions. SLED being set up with goal removal 2L, currently on 8 of levo with MAP of 94, bedside nursing to decrease to MAP goal of 60.     Objective     I & O - 24hr:    Intake/Output Summary (Last 24 hours) at 3/25/2025 0952  Last data filed at 3/25/2025 0610  Gross per 24 hour   Intake 1522.25 ml   Output 3250 ml   Net -1727.75 ml       Physical Exam  Vitals: /67   Pulse 88   Temp 98.2 °F (36.8 °C) (Esophageal)   Resp 24   Ht 1.575 m (5' 2\")   Wt 70.3 kg (154 lb 15.7 oz)   SpO2 100%   BMI 28.35 kg/m²     General Appearance: intubated no distress, spontaneous eye opening, shakes head yes and no to questions, attempts to follow commands but is profoundly weak.   HEENT: ETT present   Neck: trachea midline   Lung: mechanical breath sounds, rhonchi b/l R>L   Heart: RRR   Abdomen: soft, appears nontender, BS+   Extremities: +2 pitting edema     Medications     Continuous Infusions:   norepinephrine 8 mcg/min (03/25/25 0610)    sodium chloride Stopped (03/23/25 0020)    dextrose       Scheduled Meds:   sodium phosphate IVPB (PERIPHERAL line)  15 mmol IntraVENous Once    amiodarone  200 mg Per NG tube Daily    levETIRAcetam  500 mg Per G Tube BID    pantoprazole (PROTONIX) 40 mg in sodium chloride (PF) 0.9 % 10 mL injection  40 mg IntraVENous Daily    cosyntropin  250 mcg IntraVENous Once    vitamin D  2,000 Units Oral Daily    thiamine  250 mg IntraVENous Q24H    Followed by    [START ON 3/28/2025]  case were discussed among consultants.     This patient has a high probability of sudden clinically significant deterioration. I managed/supervised life or organ supporting interventions that required frequent physician assessment. I devoted my full attention to the direct care of this patient for the period of time indicated below. In addition to above, time was devoted to teaching and to any procedure.      NOTE: This report, in part or full, may have been transcribed using voice recognition software. Every effort was made to ensure accuracy; however, inadvertent computerized transcription errors may be present. Please excuse any transcriptional grammatical or spelling errors that may have escaped my editorial review.     Total critical care time caring for this patient with life threatening, unstable organ failure, including direct patient contact, management of life support systems, review of data including imaging and labs, discussions with other team members and physicians is at least 36 Min so far today, excluding procedures.  Ana Beltran MD

## 2025-03-25 NOTE — PROGRESS NOTES
03/24/25 2008   Patient Observation   Pulse 99   Respirations 24   SpO2 100 %   Vent Information   Ventilator Day(s) 9   Ventilator ID [id number]   Equipment Changed Expiratory Filter   Vent Mode AC/VC   Ventilator Settings   Vt (Set, mL) 325 mL   Resp Rate (Set) 12 bpm   PEEP/CPAP (cmH2O) 5   FiO2  40 %   Peak Inspiratory Flow (Set) 50 L/sec   Vent Patient Data (Readings)   Vt (Measured) 315 mL   Peak Inspiratory Pressure (cmH2O) 24 cmH2O   Rate Measured 13 br/min   Minute Volume (L/min) 4.76 Liters   Mean Airway Pressure (cmH2O) 8 cmH20   Plateau Pressure (cm H2O) 17 cm H2O   Driving Pressure 12   I:E Ratio 1:6.10   Backup Apnea On   Backup Rate 12 Breaths Per Minute   Backup Vt 325   Vent Alarm Settings   High Pressure (cmH2O) 50 cmH2O   Low Minute Volume (lpm) 4 L/min   High Minute Volume (lpm) 12 L/min   Low Exhaled Vt (ml) 250 mL   High Exhaled Vt (ml) 700 mL   RR Low (bpm) 12   RR High (bpm) 35 br/min   Apnea (secs) 20 secs   Additional Respiratoray Assessments   Humidification Source Heated wire   Humidification Temp 37.1   Circuit Condensation Drained   Ambu Bag With Mask At Bedside Yes   ETT    Placement Date/Time: 03/16/25 1210   Placement Verified By: Auscultation;Capnometry  Preoxygenation: Yes  Airway Type: Cuffed  Airway Tube Size: 7.5 mm  Secured At: 22 cm  Measured From: Lips   Secured At 22 cm   Measured From Lips   Secured By Commercial tube crane

## 2025-03-25 NOTE — PLAN OF CARE
Problem: Respiratory - Adult  Goal: Achieves optimal ventilation and oxygenation  3/25/2025 1601 by Jesica Victor RCP  Outcome: Progressing

## 2025-03-25 NOTE — PROGRESS NOTES
DAILY VENTILATOR WEANING ASSESSMENT PERFORMED    P/FIO2 Ratio =  133        (<100= do not Wean)                  Cs =      22                   (<32= Instability)  Plat. Pressure = 20  MV =  RSBI =    Instabilities:       Cardiovascular =       CNS =       Respiratory =       Metabolic =2    Parameters    no    Ask Physician for a weaning plan no    Was SAT completed no    Was SBT completed no    Additional Comments: patient is on dialysis at this time    Performed by Annalise Ma RCP RRT      Reference Table:    Cardiovascular     CNS      1. Mean BP less than or equal to 75   1. Neuromuscular blockade  2. Heart Rate greater than 130   2. RASS of -3, -4, -5  3. Myocardial Ischemia    3. RASS of +3, +4  4. Mechanical Assist Device    4. ICP greater than 15 or             Intracranial Hypertension         Respiratory      Metabolic  1. PEEP equal to or greater than 10cm/H20  1.Temp. (8hrs) less than 95 or > 103  2. Respiratory Rate greater than 35   2. WBC < 5000 or > 98183  3. Minute Volume greater than 15L  4. pH less than 7.30  5. Deteriorating chest X-ray

## 2025-03-25 NOTE — PROGRESS NOTES
Inpatient Cardiology Progress note     PATIENT IS BEING FOLLOWED FOR: Atrial fibrillation, worsening decompensated heart failure    Nikki Moffett is a 79 y.o. female known to Dr. Carnes (last OV 2024).  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  Patient presented to the ED on 2025 with complaints of altered mental status, fatigue, poor appetite, palpitations and dyspnea.  Seen by [facility] electrophysiology for atrial fibrillation and is status post cardioversion on 3/7/2025.  Patient was initially seen by Dr. Carnes on 3/7/2025 for acute decompensated heart failure with TTE on 3/7/2025 revealing an EF of 10-15% in the setting of A-fib.  GDMT has been held in the setting of significant hypotension and patient has required pressors.  Nephrology following for fluid management and has required intermittent dialysis.    Patient was seen and examined in the ICU while receiving dialysis. She remains intubated and on the ventilator but is awake and her eyes are open. History obtained from EMR.    PHYSICAL EXAM:   /67   Pulse 88   Temp 98.2 °F (36.8 °C) (Esophageal)   Resp 24   Ht 1.575 m (5' 2\")   Wt 70.3 kg (154 lb 15.7 oz)   SpO2 100%   BMI 28.35 kg/m²    B/P Range last 24 hours: Systolic (24hrs), Av , Min:67 , Max:121    Diastolic (24hrs), Av, Min:44, Max:87  CONST: Acutely ill-appearing  female.  Awake, alert and cooperative. No apparent distress  HEENT:   Head- Normocephalic, atraumatic   Eyes- Conjunctivae pink, anicteric  Throat- Oral mucosa pink and moist  Neck-  No stridor, trachea midline, no jugular venous distention. No carotid bruit  CHEST: Chest symmetrical and non-tender to palpation. No accessory muscle use or intercostal

## 2025-03-25 NOTE — FLOWSHEET NOTE
03/25/25 1223   Vital Signs   /78   Temp 97.3 °F (36.3 °C)   Pulse (!) 103   Respirations 18   Weight - Scale 68.8 kg (151 lb 10.8 oz)   Weight Method Estimated   Percent Weight Change -2.13   Pain Assessment   Pain Assessment None - Denies Pain   Post-Hemodialysis Assessment   Post-Treatment Procedures Blood returned;Catheter capped, clamped and heparinized x 2 ports   Machine Disinfection Process Exterior Machine Disinfection   Rinseback Volume (ml) 1100 ml   Blood Volume Processed (Liters) 43.4 L   Dialyzer Clearance Lightly streaked   Duration of Treatment (minutes) 240 minutes   Hemodialysis Intake (ml) 1100 ml   Hemodialysis Output (ml) 2600 ml   NET Removed (ml) 1500   Tolerated Treatment Good   Patient Response to Treatment tolerated SLED tx well, 1500 net removed, goal reduced 0.5 for hypotension w (+) results, amiodarone and midodrine given w (+) results, catheter clamped and capped, stable at dc. drsg CDI not due for change. post report to Carol Mendes RN   Bilateral Breath Sounds Clear;Diminished   Edema Generalized;Right upper extremity   Edema Generalized +1   RUE Edema +2   Time Off 1218   Patient Disposition Remain in ICU/ED   Observations & Evaluations   Level of Consciousness 0   Oriented X Ra   Heart Rhythm Irregular   Respiratory Quality/Effort Unlabored   O2 Device Ventilator   Skin Color Pink   Skin Condition/Temp Dry;Warm   Handoff   Communication Given Shift Handoff   Handoff Given To Carol Mendes RN   Handoff Received From KULWANT Miller RN   Handoff Communication Face to Face   Time Handoff Given 1233   End of Shift Check Performed Yes

## 2025-03-25 NOTE — PROGRESS NOTES
[facility]  Internal Medicine Residency Program  Progress Note - House Team     Patient:  Nikki Moffett 79 y.o. female MRN: [id number]     Date of Service: 3/25/2025     CC:   Chief Complaint   Patient presents with    Altered Mental Status     \"Mood swings, memory loss and fatigue\" per patients grandson    Chest Pain     Ongoing for a couple weeks per family       Subjective     Patient was seen at bedside this morning.  Patient is still intubated, does have purposeful movements. Patient is off sedation.  Patient is still on Levophed. SLED with a goal of 2 L removal.      Objective     Physical Exam:  Vitals: /67   Pulse 88   Temp 98.2 °F (36.8 °C) (Esophageal)   Resp 24   Ht 1.575 m (5' 2\")   Wt 70.3 kg (154 lb 15.7 oz)   SpO2 100%   BMI 28.35 kg/m²     I & O - 24hr: In: 1522.3   Out: 3250   General appearance: Intubated.  Appears lethargic.  Some intermittent purposeful movement.  Neuro: Mental status: Intubated.  Appears lethargic.  Some intermittent purposeful movement.  Cardiovascular: Regular rate and rhythm, S1 and S2  heard, no murmurs appreciated  Respiratory: Rales present bilaterally   Abdomen: Soft, non tender, bowel sounds normal; masses, no organomegaly, rebound or guarding  Extremities: Extremities normal, no cyanosis, distal pulses intact, no edema    Pertinent Labs & Imaging Studies      Complete Blood Count:   Recent Labs     03/23/25  0406 03/24/25  0453 03/25/25  0400   WBC 21.3* 21.3* 22.4*   HGB 9.0* 7.4* 8.3*   HCT 28.6* 23.4* 26.8*    167 202        Last 3 Blood Glucose:   Recent Labs     03/23/25  0406 03/24/25  0453 03/25/25  0400   GLUCOSE 134* 117* 120*        PT/INR:    Lab Results   Component Value Date/Time    PROTIME 16.6 03/17/2025 07:56 AM    INR 1.5 03/17/2025 07:56 AM     PTT:    Lab Results   Component Value Date/Time    APTT 42.0 03/08/2025 05:50 PM     Comprehensive Metabolic Profile:   Recent Labs     03/23/25  0406 03/24/25  0453  also showed concern for pneumonia, patient was admitted on floor, was receiving antibiotics.    Patient became hypotensive on the floor, was started on amiodarone drip.  She was also started on midodrine.  EP was consulted, patient underwent DCCV on 3/7, patient noted to have increased shortness of breath with hypotension after DCCV and was transferred to medical ICU on 3/7.    On her echocardiogram in December 23, EF was 40 to 45%, on February 2024, EF was 55 to 60%.  Patient was diagnosed with paroxysmal A-fib on 23, started on amiodarone and Eliquis on outpatient.  Patient is also on metoprolol 25 mg once daily, Entresto 49-51 mg, amiodarone 200 mg daily at home.    HALEIGH on 3/7 did show EF of 10 to 15%, patient went into cardiogenic shock, started on Levophed in ICU.  Patient also developed shock liver with coagulopathy, severe ANTONIO due to cardiogenic shock, aspiration pneumonia.    Nephrology was consulted for ANTONIO on CKD and anuria, patient started on CVVHD, patient was on CVVHD on 3/8 to 3/15, finally had tunneled HD placed on 3/20, now on intermittent hemodialysis.  Patient had metabolic acidosis and lactic acidosis secondary to shock, which improved on bicarb drip.    Patient received FFP and vitamin K for her coagulopathy secondary to shock liver, amiodarone was on hold, liver function is better now.    Her Eliquis was switched to low-dose Eliquis due to ANTONIO, GDMT on hold, cardiology is planning ischemic evaluation at some point once patient is stable.  Patient had intermittent A-fib RVR overnight for couple of nights, was received Lopressor 5 mg IV.    Patient was also influenza positive, completed Tamiflu, completed 7-day course of Zosyn.    Patient also had concern for acute occipital infarction on MRI, neurology was consulted, EEG was negative, patient is on Keppra 500 mg twice daily.    Patient was intubated on 3/16 for worsening shortness of breath and mental status.  Her sedation was turned off on 3/18,

## 2025-03-25 NOTE — FLOWSHEET NOTE
Inpatient Wound Care(initial evaluation)  4414    Admit Date: 3/4/2025  5:05 PM    Reason for consult:  coccyx    Significant history:  per H & P  Chief complaint: had concerns including Altered Mental Status (\"Mood swings, memory loss and fatigue\" per patients grandson) and Chest Pain (Ongoing for a couple weeks per family).  Past medical history of left MCA infarct s/p thrombectomy, atrial fibrillation, prediabetes, HFimpEF. who presented to the ER with CC of fatigue and dizziness     Patient was in her usual state of health until 1 month prior when she had sudden onset of fatigue and weakness. Patient also noted poor appetite and noted weight loss. Of note patient was last seen in Internal Medicine clinic on 4/23/2024 and was lost to follow-up. She reports taking apixaban but not amiodarone and metoprolol. Patient sought consult at urgent care today where EKG was done and found to be in atrial fibrillation in RVR. Patient was advised to seek consult at ED.    Findings:     03/25/25 1520   Skin Integumentary    Skin Integrity Ecchymosis   Location BUE   Skin Integrity Site 2   Skin Integrity Location 2   (dry flaky)   Location 2 feet   Wound 03/22/25 Coccyx   Date First Assessed/Time First Assessed: 03/22/25 0800   Present on Original Admission: No  Primary Wound Type: Pressure Injury  Location: Coccyx   Wound Image    Wound Etiology Pressure Stage 2   Dressing/Treatment Protective barrier   Wound Length (cm) 5 cm   Wound Width (cm) 1 cm   Wound Depth (cm) 0.1 cm   Wound Surface Area (cm^2) 5 cm^2   Change in Wound Size % (l*w) -25   Wound Volume (cm^3) 0.5 cm^3   Wound Healing % -25   Wound Assessment Pink/red   Drainage Amount Scant (moist but unmeasurable)   Drainage Description Serosanguinous   Odor None   Leatha-wound Assessment   (thin)       **Informed Consent**    photos taken of coccyx and inserted into their chart as part of their permanent medical record for purposes of documentation, treatment management

## 2025-03-25 NOTE — PLAN OF CARE
Problem: Respiratory - Adult  Goal: Achieves optimal ventilation and oxygenation  3/25/2025 0920 by Annalise Ma, NIKOLE  Outcome: Progressing

## 2025-03-26 ENCOUNTER — APPOINTMENT (OUTPATIENT)
Dept: GENERAL RADIOLOGY | Age: 80
DRG: 004 | End: 2025-03-26
Payer: MEDICARE

## 2025-03-26 ENCOUNTER — APPOINTMENT (OUTPATIENT)
Dept: INTERVENTIONAL RADIOLOGY/VASCULAR | Age: 80
DRG: 004 | End: 2025-03-26
Payer: MEDICARE

## 2025-03-26 LAB
AADO2: 120.2 MMHG
ALBUMIN SERPL-MCNC: 3.4 G/DL (ref 3.5–5.2)
ALP SERPL-CCNC: 113 U/L (ref 35–104)
ALT SERPL-CCNC: 61 U/L (ref 0–32)
ANION GAP SERPL CALCULATED.3IONS-SCNC: 13 MMOL/L (ref 7–16)
AST SERPL-CCNC: 44 U/L (ref 0–31)
B.E.: 1 MMOL/L (ref -3–3)
BASOPHILS # BLD: 0.01 K/UL (ref 0–0.2)
BASOPHILS NFR BLD: 0 % (ref 0–2)
BILIRUB DIRECT SERPL-MCNC: 0.3 MG/DL (ref 0–0.3)
BILIRUB INDIRECT SERPL-MCNC: 0.6 MG/DL (ref 0–1)
BILIRUB SERPL-MCNC: 0.9 MG/DL (ref 0–1.2)
BUN SERPL-MCNC: 29 MG/DL (ref 6–23)
CALCIUM SERPL-MCNC: 8.3 MG/DL (ref 8.6–10.2)
CHLORIDE SERPL-SCNC: 97 MMOL/L (ref 98–107)
CO2 SERPL-SCNC: 22 MMOL/L (ref 22–29)
COHB: 0.5 % (ref 0–1.5)
CREAT SERPL-MCNC: 1.9 MG/DL (ref 0.5–1)
CRITICAL: ABNORMAL
DATE ANALYZED: ABNORMAL
DATE OF COLLECTION: ABNORMAL
EOSINOPHIL # BLD: 0.11 K/UL (ref 0.05–0.5)
EOSINOPHILS RELATIVE PERCENT: 1 % (ref 0–6)
ERYTHROCYTE [DISTWIDTH] IN BLOOD BY AUTOMATED COUNT: 20.3 % (ref 11.5–15)
FIO2: 40 %
GFR, ESTIMATED: 27 ML/MIN/1.73M2
GLUCOSE BLD-MCNC: 112 MG/DL (ref 74–99)
GLUCOSE BLD-MCNC: 124 MG/DL (ref 74–99)
GLUCOSE FLD-MCNC: 115 MG/DL
GLUCOSE SERPL-MCNC: 111 MG/DL (ref 74–99)
HCO3: 24.3 MMOL/L (ref 22–26)
HCT VFR BLD AUTO: 24 % (ref 34–48)
HGB BLD-MCNC: 7.6 G/DL (ref 11.5–15.5)
HHB: 1.3 % (ref 0–5)
IMM GRANULOCYTES # BLD AUTO: 0.09 K/UL (ref 0–0.58)
IMM GRANULOCYTES NFR BLD: 1 % (ref 0–5)
INR PPP: 1.2
LAB: ABNORMAL
LDH FLD L TO P-CCNC: 102 U/L
LDH SERPL-CCNC: 195 U/L (ref 135–214)
LYMPHOCYTES NFR BLD: 1.01 K/UL (ref 1.5–4)
LYMPHOCYTES RELATIVE PERCENT: 6 % (ref 20–42)
Lab: 455
MAGNESIUM SERPL-MCNC: 1.8 MG/DL (ref 1.6–2.6)
MCH RBC QN AUTO: 28 PG (ref 26–35)
MCHC RBC AUTO-ENTMCNC: 31.7 G/DL (ref 32–34.5)
MCV RBC AUTO: 88.6 FL (ref 80–99.9)
METHB: 0.5 % (ref 0–1.5)
MODE: AC
MONOCYTES NFR BLD: 1.05 K/UL (ref 0.1–0.95)
MONOCYTES NFR BLD: 6 % (ref 2–12)
NEUTROPHILS NFR BLD: 87 % (ref 43–80)
NEUTS SEG NFR BLD: 14.54 K/UL (ref 1.8–7.3)
O2 SATURATION: 98.7 % (ref 92–98.5)
O2HB: 97.7 % (ref 94–97)
OPERATOR ID: ABNORMAL
PATIENT TEMP: 37 C
PCO2: 33.2 MMHG (ref 35–45)
PEEP/CPAP: 5 CMH2O
PFO2: 3.17 MMHG/%
PH BLOOD GAS: 7.48 (ref 7.35–7.45)
PHOSPHATE SERPL-MCNC: 3.8 MG/DL (ref 2.5–4.5)
PLATELET # BLD AUTO: 182 K/UL (ref 130–450)
PMV BLD AUTO: 11.1 FL (ref 7–12)
PO2: 126.8 MMHG (ref 75–100)
POTASSIUM SERPL-SCNC: 4.6 MMOL/L (ref 3.5–5)
PROT FLD-MCNC: 2.9 G/DL
PROT SERPL-MCNC: 5.2 G/DL (ref 6.4–8.3)
PROTHROMBIN TIME: 13.4 SEC (ref 9.3–12.4)
RBC # BLD AUTO: 2.71 M/UL (ref 3.5–5.5)
RBC # BLD: ABNORMAL 10*6/UL
RI(T): 0.95
RR MECHANICAL: 12 B/MIN
SODIUM SERPL-SCNC: 132 MMOL/L (ref 132–146)
SOURCE, BLOOD GAS: ABNORMAL
SPECIMEN TYPE: NORMAL
THB: 9 G/DL (ref 11.5–16.5)
TIME ANALYZED: 505
VT MECHANICAL: 280 ML
WBC OTHER # BLD: 16.8 K/UL (ref 4.5–11.5)

## 2025-03-26 PROCEDURE — 6370000000 HC RX 637 (ALT 250 FOR IP)

## 2025-03-26 PROCEDURE — 87205 SMEAR GRAM STAIN: CPT

## 2025-03-26 PROCEDURE — 85025 COMPLETE CBC W/AUTO DIFF WBC: CPT

## 2025-03-26 PROCEDURE — 6360000002 HC RX W HCPCS: Performed by: INTERNAL MEDICINE

## 2025-03-26 PROCEDURE — 32555 ASPIRATE PLEURA W/ IMAGING: CPT

## 2025-03-26 PROCEDURE — 2500000003 HC RX 250 WO HCPCS

## 2025-03-26 PROCEDURE — 83735 ASSAY OF MAGNESIUM: CPT

## 2025-03-26 PROCEDURE — 6360000002 HC RX W HCPCS: Performed by: PHYSICIAN ASSISTANT

## 2025-03-26 PROCEDURE — 71045 X-RAY EXAM CHEST 1 VIEW: CPT

## 2025-03-26 PROCEDURE — 0B113F4 BYPASS TRACHEA TO CUTANEOUS WITH TRACHEOSTOMY DEVICE, PERCUTANEOUS APPROACH: ICD-10-PCS | Performed by: INTERNAL MEDICINE

## 2025-03-26 PROCEDURE — 84100 ASSAY OF PHOSPHORUS: CPT

## 2025-03-26 PROCEDURE — 94640 AIRWAY INHALATION TREATMENT: CPT

## 2025-03-26 PROCEDURE — 80053 COMPREHEN METABOLIC PANEL: CPT

## 2025-03-26 PROCEDURE — 82945 GLUCOSE OTHER FLUID: CPT

## 2025-03-26 PROCEDURE — 0W993ZZ DRAINAGE OF RIGHT PLEURAL CAVITY, PERCUTANEOUS APPROACH: ICD-10-PCS | Performed by: RADIOLOGY

## 2025-03-26 PROCEDURE — C1729 CATH, DRAINAGE: HCPCS

## 2025-03-26 PROCEDURE — 2500000003 HC RX 250 WO HCPCS: Performed by: INTERNAL MEDICINE

## 2025-03-26 PROCEDURE — 82248 BILIRUBIN DIRECT: CPT

## 2025-03-26 PROCEDURE — 2000000000 HC ICU R&B

## 2025-03-26 PROCEDURE — 85610 PROTHROMBIN TIME: CPT

## 2025-03-26 PROCEDURE — 0DH63UZ INSERTION OF FEEDING DEVICE INTO STOMACH, PERCUTANEOUS APPROACH: ICD-10-PCS | Performed by: SURGERY

## 2025-03-26 PROCEDURE — 99233 SBSQ HOSP IP/OBS HIGH 50: CPT | Performed by: CHIROPRACTOR

## 2025-03-26 PROCEDURE — 82962 GLUCOSE BLOOD TEST: CPT

## 2025-03-26 PROCEDURE — 88305 TISSUE EXAM BY PATHOLOGIST: CPT

## 2025-03-26 PROCEDURE — 6360000002 HC RX W HCPCS

## 2025-03-26 PROCEDURE — 87070 CULTURE OTHR SPECIMN AEROBIC: CPT

## 2025-03-26 PROCEDURE — 99231 SBSQ HOSP IP/OBS SF/LOW 25: CPT | Performed by: NURSE PRACTITIONER

## 2025-03-26 PROCEDURE — 83615 LACTATE (LD) (LDH) ENZYME: CPT

## 2025-03-26 PROCEDURE — 82805 BLOOD GASES W/O2 SATURATION: CPT

## 2025-03-26 PROCEDURE — 89051 BODY FLUID CELL COUNT: CPT

## 2025-03-26 PROCEDURE — 3E0G76Z INTRODUCTION OF NUTRITIONAL SUBSTANCE INTO UPPER GI, VIA NATURAL OR ARTIFICIAL OPENING: ICD-10-PCS | Performed by: SURGERY

## 2025-03-26 PROCEDURE — 43246 EGD PLACE GASTROSTOMY TUBE: CPT | Performed by: SURGERY

## 2025-03-26 PROCEDURE — 84157 ASSAY OF PROTEIN OTHER: CPT

## 2025-03-26 PROCEDURE — 2709999900 HC NON-CHARGEABLE SUPPLY: Performed by: SURGERY

## 2025-03-26 PROCEDURE — 6370000000 HC RX 637 (ALT 250 FOR IP): Performed by: PSYCHIATRY & NEUROLOGY

## 2025-03-26 PROCEDURE — 2580000003 HC RX 258: Performed by: INTERNAL MEDICINE

## 2025-03-26 PROCEDURE — 3609013300 HC EGD TUBE PLACEMENT: Performed by: SURGERY

## 2025-03-26 PROCEDURE — 31645 BRNCHSC W/THER ASPIR 1ST: CPT | Performed by: SURGERY

## 2025-03-26 PROCEDURE — 94003 VENT MGMT INPAT SUBQ DAY: CPT

## 2025-03-26 PROCEDURE — 83986 ASSAY PH BODY FLUID NOS: CPT

## 2025-03-26 PROCEDURE — 88112 CYTOPATH CELL ENHANCE TECH: CPT

## 2025-03-26 PROCEDURE — 31600 PLANNED TRACHEOSTOMY: CPT | Performed by: SURGERY

## 2025-03-26 RX ORDER — MAGNESIUM SULFATE IN WATER 40 MG/ML
2000 INJECTION, SOLUTION INTRAVENOUS ONCE
Status: COMPLETED | OUTPATIENT
Start: 2025-03-26 | End: 2025-03-26

## 2025-03-26 RX ORDER — MIDAZOLAM HYDROCHLORIDE 1 MG/ML
INJECTION, SOLUTION INTRAMUSCULAR; INTRAVENOUS
Status: DISPENSED
Start: 2025-03-26 | End: 2025-03-26

## 2025-03-26 RX ORDER — LIDOCAINE HYDROCHLORIDE 20 MG/ML
INJECTION, SOLUTION INFILTRATION; PERINEURAL PRN
Status: COMPLETED | OUTPATIENT
Start: 2025-03-26 | End: 2025-03-26

## 2025-03-26 RX ORDER — CEFAZOLIN 2 G/1
INJECTION, POWDER, FOR SOLUTION INTRAMUSCULAR; INTRAVENOUS
Status: DISPENSED
Start: 2025-03-26 | End: 2025-03-26

## 2025-03-26 RX ORDER — FENTANYL CITRATE 50 UG/ML
INJECTION, SOLUTION INTRAMUSCULAR; INTRAVENOUS
Status: COMPLETED
Start: 2025-03-26 | End: 2025-03-26

## 2025-03-26 RX ORDER — MIDAZOLAM HYDROCHLORIDE 1 MG/ML
INJECTION, SOLUTION INTRAMUSCULAR; INTRAVENOUS
Status: COMPLETED
Start: 2025-03-26 | End: 2025-03-26

## 2025-03-26 RX ORDER — COSYNTROPIN 0.25 MG/ML
250 INJECTION, POWDER, FOR SOLUTION INTRAMUSCULAR; INTRAVENOUS ONCE
Status: COMPLETED | OUTPATIENT
Start: 2025-03-27 | End: 2025-03-27

## 2025-03-26 RX ADMIN — SODIUM CHLORIDE, PRESERVATIVE FREE 10 ML: 5 INJECTION INTRAVENOUS at 12:55

## 2025-03-26 RX ADMIN — THIAMINE HYDROCHLORIDE 250 MG: 100 INJECTION, SOLUTION INTRAMUSCULAR; INTRAVENOUS at 08:36

## 2025-03-26 RX ADMIN — SODIUM CHLORIDE, PRESERVATIVE FREE 10 ML: 5 INJECTION INTRAVENOUS at 20:42

## 2025-03-26 RX ADMIN — Medication 6 MCG/MIN: at 09:09

## 2025-03-26 RX ADMIN — SODIUM CHLORIDE, PRESERVATIVE FREE 40 MG: 5 INJECTION INTRAVENOUS at 08:36

## 2025-03-26 RX ADMIN — LEVETIRACETAM 500 MG: 100 SOLUTION ORAL at 14:25

## 2025-03-26 RX ADMIN — MIDODRINE HYDROCHLORIDE 20 MG: 10 TABLET ORAL at 17:25

## 2025-03-26 RX ADMIN — FENTANYL CITRATE 300 MCG: 50 INJECTION INTRAMUSCULAR; INTRAVENOUS at 09:23

## 2025-03-26 RX ADMIN — MIDAZOLAM HYDROCHLORIDE 5 MG: 2 INJECTION, SOLUTION INTRAMUSCULAR; INTRAVENOUS at 09:42

## 2025-03-26 RX ADMIN — Medication 2000 UNITS: at 14:25

## 2025-03-26 RX ADMIN — CEFAZOLIN 2000 MG: 2 INJECTION, POWDER, FOR SOLUTION INTRAMUSCULAR; INTRAVENOUS at 09:42

## 2025-03-26 RX ADMIN — IPRATROPIUM BROMIDE AND ALBUTEROL SULFATE 1 DOSE: 2.5; .5 SOLUTION RESPIRATORY (INHALATION) at 12:11

## 2025-03-26 RX ADMIN — CHLORHEXIDINE GLUCONATE, 0.12% ORAL RINSE 15 ML: 1.2 SOLUTION DENTAL at 12:54

## 2025-03-26 RX ADMIN — MAGNESIUM SULFATE HEPTAHYDRATE 2000 MG: 40 INJECTION, SOLUTION INTRAVENOUS at 11:11

## 2025-03-26 RX ADMIN — IPRATROPIUM BROMIDE AND ALBUTEROL SULFATE 1 DOSE: 2.5; .5 SOLUTION RESPIRATORY (INHALATION) at 19:55

## 2025-03-26 RX ADMIN — ATORVASTATIN CALCIUM 80 MG: 40 TABLET, FILM COATED ORAL at 20:42

## 2025-03-26 RX ADMIN — FENTANYL CITRATE 300 MCG: 50 INJECTION, SOLUTION INTRAMUSCULAR; INTRAVENOUS at 09:23

## 2025-03-26 RX ADMIN — MIDAZOLAM 5 MG: 1 INJECTION INTRAMUSCULAR; INTRAVENOUS at 09:42

## 2025-03-26 RX ADMIN — VECURONIUM BROMIDE 10 MG: 1 INJECTION, POWDER, LYOPHILIZED, FOR SOLUTION INTRAVENOUS at 09:44

## 2025-03-26 RX ADMIN — IPRATROPIUM BROMIDE AND ALBUTEROL SULFATE 1 DOSE: 2.5; .5 SOLUTION RESPIRATORY (INHALATION) at 16:25

## 2025-03-26 RX ADMIN — AMIODARONE HYDROCHLORIDE 200 MG: 200 TABLET ORAL at 14:25

## 2025-03-26 RX ADMIN — LIDOCAINE HYDROCHLORIDE 10 ML: 20 INJECTION, SOLUTION INFILTRATION; PERINEURAL at 15:06

## 2025-03-26 RX ADMIN — LEVETIRACETAM 500 MG: 100 SOLUTION ORAL at 20:43

## 2025-03-26 ASSESSMENT — PULMONARY FUNCTION TESTS
PIF_VALUE: 46
PIF_VALUE: 20
PIF_VALUE: 23
PIF_VALUE: 34
PIF_VALUE: 31
PIF_VALUE: 18
PIF_VALUE: 23
PIF_VALUE: 23
PIF_VALUE: 22
PIF_VALUE: 21
PIF_VALUE: 20
PIF_VALUE: 22
PIF_VALUE: 21
PIF_VALUE: 25
PIF_VALUE: 18
PIF_VALUE: 30
PIF_VALUE: 21
PIF_VALUE: 30
PIF_VALUE: 22
PIF_VALUE: 20
PIF_VALUE: 45
PIF_VALUE: 32
PIF_VALUE: 22
PIF_VALUE: 21
PIF_VALUE: 20
PIF_VALUE: 20
PIF_VALUE: 25
PIF_VALUE: 23

## 2025-03-26 ASSESSMENT — PAIN SCALES - GENERAL
PAINLEVEL_OUTOF10: 0
PAINLEVEL_OUTOF10: 0

## 2025-03-26 NOTE — PLAN OF CARE
Problem: Chronic Conditions and Co-morbidities  Goal: Patient's chronic conditions and co-morbidity symptoms are monitored and maintained or improved  Outcome: Progressing  Flowsheets (Taken 3/26/2025 0800)  Care Plan - Patient's Chronic Conditions and Co-Morbidity Symptoms are Monitored and Maintained or Improved:   Monitor and assess patient's chronic conditions and comorbid symptoms for stability, deterioration, or improvement   Collaborate with multidisciplinary team to address chronic and comorbid conditions and prevent exacerbation or deterioration   Update acute care plan with appropriate goals if chronic or comorbid symptoms are exacerbated and prevent overall improvement and discharge     Problem: Discharge Planning  Goal: Discharge to home or other facility with appropriate resources  Outcome: Progressing  Flowsheets (Taken 3/26/2025 0800)  Discharge to home or other facility with appropriate resources:   Identify barriers to discharge with patient and caregiver   Arrange for needed discharge resources and transportation as appropriate   Identify discharge learning needs (meds, wound care, etc)   Arrange for interpreters to assist at discharge as needed   Refer to discharge planning if patient needs post-hospital services based on physician order or complex needs related to functional status, cognitive ability or social support system     Problem: Safety - Adult  Goal: Free from fall injury  Outcome: Progressing  Flowsheets (Taken 3/26/2025 0800)  Free From Fall Injury:   Instruct family/caregiver on patient safety   Based on caregiver fall risk screen, instruct family/caregiver to ask for assistance with transferring infant if caregiver noted to have fall risk factors     Problem: Skin/Tissue Integrity  Goal: Skin integrity remains intact  Description: 1.  Monitor for areas of redness and/or skin breakdown  2.  Assess vascular access sites hourly  3.  Every 4-6 hours minimum:  Change oxygen saturation  or ordered   Encourage oral intake as appropriate   Instruct patient on fluid and nutrition restrictions as appropriate   Monitor response to interventions for patient's volume status, including labs, urine output, blood pressure (other measures as available)   Monitor labs and assess for signs and symptoms of volume excess or deficit  Goal: Glucose maintained within prescribed range  Outcome: Progressing  Flowsheets (Taken 3/26/2025 0800)  Glucose maintained within prescribed range:   Monitor blood glucose as ordered   Assess for signs and symptoms of hyperglycemia and hypoglycemia   Administer ordered medications to maintain glucose within target range   Instruct patient on self management of diabetes and initiate consult as needed   Assess barriers to adequate nutritional intake and initiate nutrition consult as needed     Problem: Hematologic - Adult  Goal: Maintains hematologic stability  Outcome: Progressing  Flowsheets (Taken 3/26/2025 0800)  Maintains hematologic stability:   Assess for signs and symptoms of bleeding or hemorrhage   Monitor labs for bleeding or clotting disorders   Administer blood products/factors as ordered     Problem: Neurosensory - Adult  Goal: Achieves stable or improved neurological status  Outcome: Progressing  Flowsheets (Taken 3/26/2025 0800)  Achieves stable or improved neurological status:   Assess for and report changes in neurological status   Initiate measures to prevent increased intracranial pressure   Monitor temperature, glucose, and sodium. Initiate appropriate interventions as ordered   Maintain blood pressure and fluid volume within ordered parameters to optimize cerebral perfusion and minimize risk of hemorrhage  Goal: Achieves maximal functionality and self care  Outcome: Progressing  Flowsheets (Taken 3/26/2025 0800)  Achieves maximal functionality and self care:   Monitor swallowing and airway patency with patient fatigue and changes in neurological status

## 2025-03-26 NOTE — OP NOTE
Operative Note      Patient: Nikki Moffett  YOB: 1945  MRN: [id number]    Date of Procedure: 3/26/2025    Pre-op diagnosis:  Respiratory failure, inability to wean from ventilator, dysphagia, inability to tolerate sufficient oral intake    Post-Op Diagnosis: Same, retained food material and oropharynx, esophagus, and stomach.  Mild gastritis.  Thick mucus present within the trachea and bilateral mainstem bronchi       Procedures: Percutaneous tracheostomy insertion, incomplete esophagogastroduodenoscopy with percutaneous endoscopic gastrostomy tube insertion    Surgeon: Dr. Ryne Bloom MD    Assistants: Dr. Gladys Dominguez MD.  Dr. Kyaw Rubio DO    Anesthesia: intravenous    Estimated Blood Loss (mL): Minimal    Complications: None    Specimens:   * No specimens in log *    Implants:  * No implants in log *      Drains:   NG/OG/NJ/NE Tube Nasogastric 16 fr Right nostril (Active)   Surrounding Skin Clean, dry & intact 03/26/25 0600   Securement device Bridle 03/26/25 0600   Status Continuous feeding 03/26/25 0600   Placement Verified Respiratory Status;External Catheter Length;Gastric Contents 03/26/25 0600   NG/OG/NJ/NE External Measurement (cm) 58 cm 03/26/25 0600   Drainage Appearance None 03/25/25 0800   Tube Feeding Peptide Based 03/26/25 0600   Tube feeding/verify rate (mL/hr) 35 mL/hr 03/25/25 1600   Tube Feeding Intake (mL) 435 ml 03/25/25 1800   Tube Feeding Supplement Amount (mL) 0 03/13/25 0802   Free Water/Flush (mL) 100 mL 03/25/25 1800   Output (mL) 0 ml 03/25/25 1800   Action Taken Feed set changed 03/23/25 1800   Residual Volume (ml) 60 ml 03/25/25 1600       Fecal Management System 03/13/25 (Active)   Stool Appearance Watery 03/25/25 0600   Stool Color Brown 03/25/25 0600   Position of Indicator Line Visible 03/25/25 0600   Balloon Volume Verified No 03/25/25 0600   Skin Assessment of the Anal Area Unremarkable 03/25/25 0600   Tube Irrigated Yes 03/25/25 0600   Irrigation Volume  trachea and bilateral mainstem bronchi     Indications for procedure:  This is a 79-year-old female, who was admitted to the hospital earlier this month for AMS and A-fib with RVR.  The patient underwent cardioversion and became hemodynamically unstable.  She has now been intubated for 10 days and has required SLED earlier in this hospital admission.  She is now unable to wean from the ventilator or tolerate sufficient oral nutrition.  Percutaneous tracheostomy and PEG tube insertion with possible need for open procedures were discussed. The risks, benefits, alternatives, and potential complications of the procedure, including the risks of bleeding, infection, injury to surrounding structures, and need for additional procedures were explained to the patient/responsible party. All questions were answered. They understand and agree to the procedure.       Description of procedure:  A shoulder roll was placed under the patient. IV sedation and paralysis with Versed, fentanyl, and Vecuronium  was administered. A BIS monitor was used to ensure adequate sedation for paralysis. The anterior area of the neck was prepped and draped in the standard sterile fashion.   The flexible bronchoscope was inserted into the endotracheal tube.  Bronchoscopy was performed.  Thick, mucinous secretions were present within the trachea and bilateral mainstem bronchi.  All visualized secretions were suctioned completely.   Next, 8 cc1% lidocaine was inserted into the subcutaneous tissue approximately 2 fingerbreaths above the sternal notch. Using a #15 blade, a 2cm transverse incision was made through the anesthetized skin and a hemostat was used to dilate the space below until the tracheal rings could be easily palpated. Next, the bronchoscope and ET tube were withdrawn such that the ET tube was just distal to the vocal cords. The needle was inserted into the trachea just superior to the 2nd tracheal ring. Using the Seldinger technique, the  stomach. The PEG tube was seen in good position without evidence of bleeding. The gastroscope was removed. The PEG tube was cut to size, 2.5 cm at the skin, and fit with a bumper, clamp, and feeding apparatus. The patient tolerated the procedure well without immediate complication.      Dr. Bloom was present for the entire procedure.     Plan:  Okay to use PEG tube for feeds and meds starting at 1400 today, 3/26  Continue PPI  Please call with any further questions or concerns     Electronically signed by Kyaw Rubio DO on 3/26/2025 at 10:20 AM

## 2025-03-26 NOTE — BRIEF OP NOTE
Brief-Op Note  ____________________________________________________________      IR  U/S GUIDED THORACENTESIS  [facility] 4WE MICU    Patient Name: Nikki Moffett   YOB: 1945  Medical Record Number: [id number]  Date of Procedure: 3/26/25  Room/Bed: [address] [place] [address]    Preoperative diagnosis: bilateral Pleural Effusion    Postoperative diagnosis: Same    Consent: The son was counseled regarding the procedure, it's indications, risks, potential complications and alternatives and any questions were answered.     Procedure:  Image Guided right Thoracentesis.    Anesthesia: Local anesthesia.    Performed by: DICKSON Saunders under on-site supervision by Hattie Daley MD.    Estimated blood loss: Less than 10 mL    Complications: None    Specimen Obtained: A total volume of  750mL was withdrawn    Electronically signed by DICKSON Saunders   DD: 3/26/25  3:40 PM

## 2025-03-26 NOTE — OR NURSING
Right thoracentesis catheter placed at this time. Draining clear yellow fluid.    Otezla Counseling: The side effects of Otezla were discussed with the patient, including but not limited to worsening or new depression, weight loss, diarrhea, nausea, upper respiratory tract infection, and headache. Patient instructed to call the office should any adverse effect occur.  The patient verbalized understanding of the proper use and possible adverse effects of Otezla.  All the patient's questions and concerns were addressed.

## 2025-03-26 NOTE — PROGRESS NOTES
Progress Note  3/26/2025 10:51 AM  Subjective:   Admit Date: 3/4/2025  PCP: Tiesha Panda MD    Subjective    3/24/25:: Patient seen and examined ; continues to require pressors; remains intubated    3/25: remains intubated, still requiring pressors; HD in progress, off sedation now    3/26: Trach and PEG this a.m.; continues to require pressors; being weaned; for thoracentesis today    Diet: Diet NPO Exceptions are: Other (Specify); Specify Other Exceptions: Ok for meds through NGT tube    Data:   Scheduled Meds:   magnesium sulfate  2,000 mg IntraVENous Once    midazolam        ceFAZolin        [START ON 3/27/2025] cosyntropin  250 mcg IntraVENous Once    [Held by provider] apixaban  5 mg Oral BID    amiodarone  200 mg Per NG tube Daily    levETIRAcetam  500 mg Per G Tube BID    pantoprazole (PROTONIX) 40 mg in sodium chloride (PF) 0.9 % 10 mL injection  40 mg IntraVENous Daily    vitamin D  2,000 Units Oral Daily    thiamine  250 mg IntraVENous Q24H    Followed by    [START ON 3/28/2025] thiamine  100 mg Oral Daily    midodrine  20 mg Per NG tube TID WC    chlorhexidine  15 mL Mouth/Throat BID    [Held by provider] lactulose  20 g Oral TID    insulin lispro  0-4 Units SubCUTAneous Q6H    sodium chloride flush  5-40 mL IntraVENous 2 times per day    [Held by provider] furosemide  40 mg IntraVENous Daily    ipratropium 0.5 mg-albuterol 2.5 mg  1 Dose Inhalation Q4H WA RT    [Held by provider] metoprolol succinate  25 mg Oral Daily    atorvastatin  80 mg Oral Nightly     Continuous Infusions:   norepinephrine 6 mcg/min (03/26/25 0909)    sodium chloride Stopped (03/25/25 1647)    dextrose       PRN Meds:midazolam, ceFAZolin, heparin (PF), heparin (porcine) **AND** heparin (porcine), sodium chloride, polyethylene glycol, sodium chloride flush, [DISCONTINUED] ondansetron **OR** ondansetron, acetaminophen **OR** acetaminophen, dextrose bolus **OR** dextrose bolus, glucagon (rDNA), dextrose  I/O last 3 completed  well  Influenza pos  S/p course of Zosyn  Refractory hypotension  For cosyntropin stim test  Now requiring :levophed  Underlying HFrEF  TTE from 3/9 EF 10-15%        4)   Mild to moderate todd PL effusion  possible HF related  For thoracentesis today    5) Acute respiratory failure  -intubated for worsening mental status and need for airway protection  -Had trach this a.m.      D/w MICU team        Dewayne Simpson MD

## 2025-03-26 NOTE — CONSULTS
General Surgery   Consult Note      Patient's Name/Date of Birth: Nikki Moffett / 1945    Date: March 25, 2025     PCP: Tiesha Panda MD     Chief Complaint:   Chief Complaint   Patient presents with    Altered Mental Status     \"Mood swings, memory loss and fatigue\" per patients grandson    Chest Pain     Ongoing for a couple weeks per family       HPI:   Nikki Moffett is a 79 y.o. female who was sent to the ED from the IM clinic on 3/4/25 for AMS and Afib with RVR. Pt was complaining of dizziness and fatigue at that time. Pt was transferred to MICU on 3/7/25 after she was having dyspnea, orthopnea and hypotension after she underwent cardioversion. She was intubated on 3/16 due to worsening encephalopathy and has been requiring SLED due to her ANTONIO on CKD. General surgery is being consulted for tracheostomy and PEG tube placement. Upon evaluation pt was GCS 11T. She has a bridled NGT with tube feeds running at 35 cc/hr, 8 mcg/min of Levophed.  Vent settings FiO2 40%, PEEP 5.     PMHx L MCA infarct s/p thrombectomy 12/2023, Afib with RVR, HFrEF EF 10-15%, HLD, HTN, Hepatitis C, CKD. No past abdominal surgeries. She has an IR tunneled HD catheter in her R IJ vein (placed on 3/21). Pt has been off of Eliquis since 3/24.     Patient Active Problem List   Diagnosis    Stroke due to embolism of left middle cerebral artery (HCC)    Occipital stroke (HCC)    Atrial flutter (HCC)    Paroxysmal atrial fibrillation (HCC)    H/O: stroke    Atrial flutter with rapid ventricular response (HCC)    Decompensated heart failure (HCC)    Chronic atrial fibrillation (HCC)    VHD (valvular heart disease)    ANTONIO (acute kidney injury)    Palliative care encounter    Metabolic encephalopathy       No Known Allergies    Past Medical History:   Diagnosis Date    A-fib (HCC)     HFrEF (heart failure with reduced ejection fraction) (HCC)     HLD (hyperlipidemia)     HTN (hypertension)     Interatrial cardiac shunt     on echo 2023

## 2025-03-26 NOTE — PROGRESS NOTES
[facility]  Internal Medicine Residency Program  Progress Note - House Team     Patient:  Nikki Moffett 79 y.o. female MRN: [id number]     Date of Service: 3/26/2025     CC:   Chief Complaint   Patient presents with    Altered Mental Status     \"Mood swings, memory loss and fatigue\" per patients grandson    Chest Pain     Ongoing for a couple weeks per family       Subjective     Patient was seen at bedside this morning. Patient is still intubated, does have purposeful movements. Patient is off sedation.  Patient is still on Levophed. Patient is scheduled for tracheostomy and PEG tube placement at bedside by general surgery.  Eliquis on hold.    Objective     Physical Exam:  Vitals: BP 98/70   Pulse (!) 105   Temp 99 °F (37.2 °C) (Esophageal)   Resp 24   Ht 1.575 m (5' 2\")   Wt 68.8 kg (151 lb 10.8 oz)   SpO2 100%   BMI 27.74 kg/m²     I & O - 24hr: In: 2082.8   Out: 2650   General appearance: Intubated.  Appears lethargic. Awake, can follow simple commands.  Neuro: Mental status: Intubated.  Appears lethargic.  Awake, can follow simple commands  Cardiovascular: Regular rate and rhythm, S1 and S2  heard, no murmurs appreciated  Respiratory: Rales present bilaterally   Abdomen: Soft, non tender, bowel sounds normal; masses, no organomegaly, rebound or guarding  Extremities: Extremities normal, no cyanosis, distal pulses intact, no edema    Pertinent Labs & Imaging Studies      Complete Blood Count:   Recent Labs     03/24/25  0453 03/25/25  0400 03/26/25  0434   WBC 21.3* 22.4* 16.8*   HGB 7.4* 8.3* 7.6*   HCT 23.4* 26.8* 24.0*    202 182        Last 3 Blood Glucose:   Recent Labs     03/25/25  0400 03/25/25  1436 03/26/25  0434   GLUCOSE 120* 125* 111*        PT/INR:    Lab Results   Component Value Date/Time    PROTIME 13.4 03/26/2025 04:34 AM    INR 1.2 03/26/2025 04:34 AM     PTT:    Lab Results   Component Value Date/Time    APTT 42.0 03/08/2025 05:50 PM     Comprehensive    change.   2. Lines and tubes in good position.         XR CHEST PORTABLE   Final Result   Stable appearance of the chest and support apparatus.         XR CHEST PORTABLE   Final Result   1. Bilateral small pleural effusions with moderate cardiomegaly and bibasilar   patchy infiltrates.   2. Life lines remain in good position.         XR CHEST PORTABLE   Final Result   1.  Endotracheal tube extends to the distal thoracic trachea approximately 20   mm above the lew.      2.  Right IJ central venous catheter and enteric tube.      3.  There may be catheter tubing projecting over the left upper lung.  The   distal tip projects over the aortic arch.  It is unclear if this represents   an indwelling catheter and if indwelling it is unclear the course of this   catheter.      4.  Atherosclerotic disease, cardiomegaly, and central pulmonary vascular   congestion.      5.  Diffuse ill-defined opacities in the lungs more pronounced in the lower   lungs with noted right greater than left pleural effusions.      6.  No pneumothorax.         XR CHEST PORTABLE   Final Result   Endotracheal tube tip now with improved positioning 2.5 cm above the lew.         XR CHEST PORTABLE   Final Result   1. Endotracheal tube tip in low but satisfactory positioning 9 mm above the   lew.   2. Unchanged right greater than left basilar effusions and perihilar   pulmonary edema.         MRA NECK WO CONTRAST   Final Result   Limited examination due to motion. No evidence for high-grade stenosis or   arterial occlusion within the limitations of this examination.      Arterial structures of the head and neck could be better assessed with CT   angiography as clinically warranted.         MRA HEAD WO CONTRAST   Final Result   1. Motion limits detailed assessment of the intracranial vasculature. Within   the limitations of this exam, no high-grade stenosis or arterial occlusion is   evident.   2. Mild stenosis involving the V4 segment of the  urgent care, was found to have A-fib RVR.  Patient was started on diltiazem drip in the ED, chest x-ray also showed concern for pneumonia, patient was admitted on floor, was receiving antibiotics.    Patient became hypotensive on the floor, was started on amiodarone drip.  She was also started on midodrine.  EP was consulted, patient underwent DCCV on 3/7, patient noted to have increased shortness of breath with hypotension after DCCV and was transferred to medical ICU on 3/7.    On her echocardiogram in December 23, EF was 40 to 45%, on February 2024, EF was 55 to 60%.  Patient was diagnosed with paroxysmal A-fib on 23, started on amiodarone and Eliquis on outpatient.  Patient is also on metoprolol 25 mg once daily, Entresto 49-51 mg, amiodarone 200 mg daily at home.    HALEIGH on 3/7 did show EF of 10 to 15%, patient went into cardiogenic shock, started on Levophed in ICU.  Patient also developed shock liver with coagulopathy, severe ANTONIO due to cardiogenic shock, aspiration pneumonia.    Nephrology was consulted for ANTONIO on CKD and anuria, patient started on CVVHD, patient was on CVVHD on 3/8 to 3/15, finally had tunneled HD placed on 3/20, now on intermittent hemodialysis.  Patient had metabolic acidosis and lactic acidosis secondary to shock, which improved on bicarb drip.    Patient received FFP and vitamin K for her coagulopathy secondary to shock liver, amiodarone was on hold, liver function is better now.    Her Eliquis was switched to low-dose Eliquis due to ANTONIO, GDMT on hold, cardiology is planning ischemic evaluation at some point once patient is stable.  Patient had intermittent A-fib RVR overnight for couple of nights, was received Lopressor 5 mg IV.    Patient was also influenza positive, completed Tamiflu, completed 7-day course of Zosyn.    Patient also had concern for acute occipital infarction on MRI, neurology was consulted, EEG was negative, patient is on Keppra 500 mg twice daily.    Patient was

## 2025-03-26 NOTE — OP NOTE
Operative Note  ____________________________________________________________      IR U/S GUIDED THORACENTESIS  [facility] 4WE MICU    Patient Name: Nikki Moffett   YOB: 1945  Medical Record Number: [id number]  Date of Procedure: 3/26/25  Room/Bed: [address] [place] [address]-A    Preoperative diagnosis: bilateral Pleural Effusion    Postoperative diagnosis: Same    Consent: The son was counseled regarding the procedure, it's indications, risks, potential complications and alternatives and any questions were answered. Consent was obtained for image guided right thoracentesis for Pleural effusion.     Procedure:  Image Guided right Thoracentesis.    Performed by: DICKSON Saunders under on-site supervision by Hattie Daley MD.    Anesthesia: Local    Estimated blood loss: Less than 10 mL    Complications: None    Specimen Obtained: Pleural Fluid    Procedure: Prior to start of procedure, routine scanning of the posterior thorax was performed using real-time ultrasound and revealed sufficient amount of pleural fluid present. Decision was made to proceed with procedure.  After obtaining consent, a \"Time-out\" was called to verify the correct patient, procedure/location, allergies, relevant medications held for procedure and that all equipment is functioning and available. The patient was then situated in a seated upright position and the appropriate landmarks were identified using ultrasound. The site of maximum fluid collection was marked. The skin over the puncture site in the right lower posterior hemithorax region was prepped with surgical skin prep and draped in a sterile fashion. Local anesthesia was administered by infiltration using 2% Lidocaine without epinephrine administered subcutaneously.  A 6 Marshallese safe-t-centesis catheter was then advanced into the pleural cavity and the needle was withdrawn.  Pleural fluid return was clear straw colored. The catheter was then withdrawn and a sterile dressing was placed over the site.   The patient tolerated the procedure well.     A total volume of  750mL was withdrawn    Complications: None.     Estimated Blood Loss: < 10cc.   .      Thank you for allowing Interventional Radiology to participate in the care of Nikki Moffett.     Electronically signed by DICKSON Saunders   DD: 3/26/25  3:40 PM

## 2025-03-26 NOTE — OR NURSING
Right thoracentesis catheter removed at this time. 750cc clear yellow fluid removed. Vaseline gauze and dry dressing applied.

## 2025-03-26 NOTE — PROGRESS NOTES
DAILY VENTILATOR WEANING ASSESSMENT PERFORMED    P/FIO2 Ratio =   317       (<100= do not Wean)                  Cs =         20          (<32= Instability)  Plat. Pressure =  19  MV = 3.57  RSBI =    Instabilities:       Cardiovascular =        CNS =       Respiratory =       Metabolic =    Parameters    no    Ask Physician for a weaning plan no    Was SAT completed no    Was SBT completed no    Additional Comments:     Trach/PEG today    Performed by Kelsey Hame[facility]      Reference Table:    Cardiovascular     CNS      1. Mean BP less than or equal to 75   1. Neuromuscular blockade  2. Heart Rate greater than 130   2. RASS of -3, -4, -5  3. Myocardial Ischemia    3. RASS of +3, +4  4. Mechanical Assist Device    4. ICP greater than 15 or             Intracranial Hypertension         Respiratory      Metabolic  1. PEEP equal to or greater than 10cm/H20  1.Temp. (8hrs) less than 95 or > 103  2. Respiratory Rate greater than 35   2. WBC < 5000 or > 52954  3. Minute Volume greater than 15L  4. pH less than 7.30  5. Deteriorating chest X-ray

## 2025-03-26 NOTE — PROGRESS NOTES
03/25/25 2048   Patient Observation   Pulse 100   Respirations 19   SpO2 100 %   Vent Information   Equipment Changed Expiratory Filter;Humidification   Vent Mode AC/VC   Ventilator Settings   Vt (Set, mL) 280 mL   Resp Rate (Set) 12 bpm   PEEP/CPAP (cmH2O) 5   FiO2  40 %   Peak Inspiratory Flow (Set) 44 L/sec   Vent Patient Data (Readings)   Vt (Measured) 21 mL   Peak Inspiratory Pressure (cmH2O) 20 cmH2O   Rate Measured 23 br/min   Minute Volume (L/min) 4.25 Liters   Peak Inspiratory Flow (lpm) 44 L/sec   Mean Airway Pressure (cmH2O) 11 cmH20   Plateau Pressure (cm H2O) 17 cm H2O   Driving Pressure 12   I:E Ratio 1:1.10   Flow Sensitivity 3 L/min   Backup Apnea On   Backup Rate 12 Breaths Per Minute   Backup Vt 280   Vent Alarm Settings   High Pressure (cmH2O) 50 cmH2O   Low Minute Volume (lpm) 4 L/min   High Minute Volume (lpm) 9 L/min   Low Exhaled Vt (ml) 250 mL   High Exhaled Vt (ml) 500 mL   RR High (bpm) 30 br/min   Apnea (secs) 20 secs   Additional Respiratoray Assessments   Humidification Source Heated wire   Humidification Temp 37   Circuit Condensation Drained   Ambu Bag With Mask At Bedside Yes   Airway Clearance   Suction ET Tube   Suction Device Inline suction catheter   Sputum Method Obtained Endotracheal   Sputum Amount Other (comment)  (none)   ETT    Placement Date/Time: 03/16/25 0350   Placement Verified By: Auscultation;Capnometry  Preoxygenation: Yes  Airway Type: Cuffed  Airway Tube Size: 7.5 mm  Secured At: 22 cm  Measured From: Lips   Secured At 22 cm   Measured From Lips   Secured By Commercial tube crane   Site Assessment Dry

## 2025-03-26 NOTE — CARE COORDINATION
Care Coordination: LOS 22 day, remains intubated, Levophed. underwent trach/peg today, select following. Also plan for IR thoracentesis- moderate B/L pl effusions-R>L. Nephrology following,S/p CVVHD 3/8-3/15-transitioned IHD/SLED ,IR TDC 3/20/24. Cardiology following, L heart cath when stable.- will need wean trials documented prior to initiating precert- will follow for transition of care needs. Ambulance transport completed.    Electronically signed by Cristela Bradford RN on 3/26/2025 at 1:48 PM

## 2025-03-26 NOTE — PROGRESS NOTES
[facility]   Department of Internal Medicine   Internal Medicine Residency  MICU Progress Note    Patient:  Nikki Moffett 79 y.o. female   MRN: [id number]       Date of Service: 3/26/2025    Allergy: Patient has no known allergies.    Subjective     Overnight, no acute events.   Patient was seen and examined this morning at bedside in no acute distress, no family present, remains intubated, off sedation, remains on 6 of levo with MAP of 70, to continue weaning for MAP goal of 60. For trach/PEG today. Son plans to come in afternoon to have discussion with MICU and palliative teams.     Objective     I & O - 24hr:    Intake/Output Summary (Last 24 hours) at 3/26/2025 0801  Last data filed at 3/25/2025 1926  Gross per 24 hour   Intake 2082.81 ml   Output 2650 ml   Net -567.19 ml       Physical Exam  Vitals: BP 98/70   Pulse (!) 105   Temp 99 °F (37.2 °C) (Esophageal)   Resp 24   Ht 1.575 m (5' 2\")   Wt 68.8 kg (151 lb 10.8 oz)   SpO2 100%   BMI 27.74 kg/m²     General Appearance: intubated no distress, spontaneous eye opening, shakes head yes and no to questions, attempts to follow commands but is profoundly weak.   HEENT: ETT present   Neck: trachea midline   Lung: mechanical breath sounds, rhonchi b/l R>L   Heart: RRR   Abdomen: soft, appears nontender, BS+   Extremities: +2 pitting edema     Medications     Continuous Infusions:   norepinephrine 6 mcg/min (03/25/25 1926)    sodium chloride Stopped (03/25/25 1647)    dextrose       Scheduled Meds:   magnesium sulfate  2,000 mg IntraVENous Once    [Held by provider] apixaban  5 mg Oral BID    cosyntropin  250 mcg IntraVENous Once    fentanNYL  300 mcg IntraVENous On Call    vecuronium  10 mg IntraVENous On Call    midazolam  5 mg IntraVENous On Call    amiodarone  200 mg Per NG tube Daily    levETIRAcetam  500 mg Per G Tube BID    pantoprazole (PROTONIX) 40 mg in sodium chloride (PF) 0.9 % 10 mL injection  40 mg IntraVENous Daily    vitamin D   confirmation that I have personally seen and examined the patient and that the key elements of the encounter were performed by me (> 85 % time).  The medications & laboratory data and imagery was discussed and adjusted where necessary. Key issues of the case were discussed among consultants.     This patient has a high probability of sudden clinically significant deterioration. I managed/supervised life or organ supporting interventions that required frequent physician assessment. I devoted my full attention to the direct care of this patient for the period of time indicated below. In addition to above, time was devoted to teaching and to any procedure.      NOTE: This report, in part or full, may have been transcribed using voice recognition software. Every effort was made to ensure accuracy; however, inadvertent computerized transcription errors may be present. Please excuse any transcriptional grammatical or spelling errors that may have escaped my editorial review.     Total critical care time caring for this patient with life threatening, unstable organ failure, including direct patient contact, management of life support systems, review of data including imaging and labs, discussions with other team members and physicians is at least 36 Min so far today, excluding procedures.    Ana Beltran MD

## 2025-03-26 NOTE — PROGRESS NOTES
GENERAL SURGERY  DAILY PROGRESS NOTE    Patient's Name/Date of Birth: Nikki Moffett / 1945    Date: 2025     Chief Complaint   Patient presents with    Altered Mental Status     \"Mood swings, memory loss and fatigue\" per patients grandson    Chest Pain     Ongoing for a couple weeks per family        Subjective:  Patient awake and appears to understand discussion about performing tracheostomy and PEG tube placement. Weak. Remains on levo. Vent settings appropriate, FiO2 40, PEEP 5.     Objective:  Last 24Hrs  Temp  Av.3 °F (36.8 °C)  Min: 97.3 °F (36.3 °C)  Max: 99.7 °F (37.6 °C)  Resp  Av.3  Min: 14  Max: 34  Pulse  Av.8  Min: 63  Max: 121  Systolic (24hrs), Av , Min:81 , Max:118     Diastolic (24hrs), Av, Min:50, Max:87    SpO2  Av %  Min: 100 %  Max: 100 %    I/O last 3 completed shifts:  In: 3452.7 [I.V.:132.2; NG/GT:1547; IV Piggyback:273.5]  Out: 5900 [Stool:900]      General: In no acute distress, awake, alert. Intubated. Corpak in place.   Cardiovascular: Warm throughout, no edema  Respiratory: Intubated, mechanically ventilated. FiO2 40, PEEP 5  Abdomen: soft, nontender, nondistended  Skin: no obvious rashes or lesions appreciated, no jaundice  Extremities: atraumatic, no focal motor deficits, no open wounds      CBC  Recent Labs     25  0453 25  0400 25  0434   WBC 21.3* 22.4* 16.8*   RBC 2.67* 2.99* 2.71*   HGB 7.4* 8.3* 7.6*   HCT 23.4* 26.8* 24.0*   MCV 87.6 89.6 88.6   MCH 27.7 27.8 28.0   MCHC 31.6* 31.0* 31.7*   RDW 19.7* 20.2* 20.3*    202 182   MPV 11.7 11.5 11.1       CMP  Recent Labs     25  0453 25  0400 25  1436   * 133 137   K 4.6 4.1 3.8   CL 94* 96* 99   CO2 22 22 25   BUN 57* 35* 18   CREATININE 3.5* 2.3* 1.4*   GLUCOSE 117* 120* 125*   CALCIUM 8.1* 8.3* 8.2*   BILITOT 0.7 0.9  --    ALKPHOS 91 105*  --    AST 31 42*  --    ALT 57* 61*  --        Assessment/Plan:    Patient Active Problem List    Diagnosis    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 female who presented with respiratory failure in need of tracheostomy and PEG tube placement.     - for bedside percutaneous tracheostomy and PEG tube placement today, 3/26  - medications have been ordered  - blue rhino kit is at bedside  - NPO + mIVF, hold TF  - continue holding ander Clements MD  General Surgery Resident, PGY-4    Electronically signed on 3/26/2025 at 5:43 AM

## 2025-03-26 NOTE — PROGRESS NOTES
Comprehensive Nutrition Assessment    Type and Reason for Visit:  Reassess    Nutrition Recommendations/Plan:   Continue NPO. TF held for trach/PEG placement. Resume EN when appropriate. Will continue to monitor and provide nutrition recs as appropriate.     Recommend   Peptide based @ 35 ml/hr + 1 protein modular daily via feeding tube:  To provide 840 ml tv, 1008 kcals, 63 gm pro (1108 kcals, 89gm pro w/ mod), 681 ml free water. Flushes per renal/critical care management.    Continue to monitor for clinical signs of refeeding/monitor and replace electrolytes PRN.     Malnutrition Assessment:  Malnutrition Status:  At risk for malnutrition (03/14/25 1136)    Context:  Acute Illness     Findings of the 6 clinical characteristics of malnutrition:  Energy Intake:  50% or less of estimated energy requirements for 5 or more days (decreased intakes x 1 mon PTA per H&P)  Weight Loss:  Mild weight loss (10.5% wt loss x 1 year, does not meet significant criteria)     Body Fat Loss:  Unable to assess (droplet iso)     Muscle Mass Loss:  Unable to assess (droplet iso)    Fluid Accumulation:  No fluid accumulation     Strength:  Not Performed    Nutrition Assessment:    Pt remains at risk d/t ongoing need for critical care and EN support. Plan is for trach/PEG today 3/26. Admit w/ fatigue/weakness/CP found to have Afib RVR & B/L pleural effusion s/p cardioversion 3/7. Pt remains intubated 2/2 Acute hypoxic respiratory insufficiency likely 2/2 cardiogenic edema, +influenza A, w/ noted acute exacerbation of HFrEF and b/l pleural effusion. Noted Shock liver & ANTONIO on CKD w/ CVVHD initiated on 3/8, tunneled HD placed on 3/20 and now on intermittent HD. PMHx Pre DM, L MCA infarct s/p thrombectomy, medication noncompliance, recent dx dementia. Noted poor PO intake x 1 month PTA. Noted PI to coccyx. EN support currently held for trach/PEG today. Will provide updated TF recs and monitor.    Nutrition Related Findings:    Intubated

## 2025-03-26 NOTE — PROGRESS NOTES
Palliative Care Department  [phone]  Palliative Care Progress Note  Provider Mickie Lozano, APRN - CNP      PATIENT: Nikki Moffett  : 1945  MRN: [id number]  ADMISSION DATE: 3/4/2025  5:05 PM  Referring Provider: Ben Dumont. MD Lucrecia     Palliative Medicine was consulted on hospital day 22 for assistance with Goals of care    HPI:     Clinical Summary:Nikki Moffett is a 79 y.o. y/o female with a history of left MCA infarct SP thrombectomy, A-fib, diabetes, heart failure, who presented to [facility] on 3/4/2025 with fatigue and dizziness, ongoing for a month.  At ED, found tachycardic and dry on exam.  Chest x-ray shows bilateral pleural effusion and possible right lower lobe opacity, was treated initially with Zosyn and doxycycline and admitted for further medical evaluation.  Was seen by cardiology underwent cardioversion, became dyspneic and  hypotension postprocedure, was transferred to MICU for close monitoring.  Seen by cardiology SP TTE, on 3/7/2025, with EF 10 to 15%, mild mitral valve regurgitation, moderate transcript about regurgitation.    ASSESSMENT/PLAN:     Pertinent Hospital Diagnoses     Acute hypoxic respiratory failure   Acute on chronic HFrEF (EF 10-15%)   Bilateral pleural effusions   Suspected pneumonia   Acute renal failure on CKD now on HD- for tunneled catheter   Shock liver   History of left MCA stroke with M2 occlusion s/p thrombectomy      Palliative Care Encounter / Counseling Regarding Goals of Care  Please see detailed goals of care discussion as below  At this time, Nikki Moffett, Does Not have capacity for medical decision-making.  Capacity is time limited and situation/question specific  During encounter Shreyas was surrogate medical decision-maker  Outcome of goals of care meeting:  Family meeting with Shreyas tomorrow 3/26/2025 at 3:15 PM.   Code status Full Code  Advanced Directives: no POA or living will in epic  Surrogate/Legal NOK:  Shreyas Moffett (Son)  [phone]    Spiritual assessment: no spiritual distress identified  Bereavement and grief: to be determined  Referrals to: none today    Thank you for the opportunity to participate in the care of Nikki Moffett.     SUBJECTIVE:     Details of Conversation:   Chart reviewed.  Patient was seen and examined at the bedside with no family present.  She underwent tracheostomy and PEG tube placement today.  She remains mechanically ventilated via tracheostomy.  She remains on Levophed.    Made visits down to the ICU for scheduled family meeting with Shreyas, patient's son, at 315-3:20 PM.  He did not show up for meeting. Spoke with ICU nurses     Review of Systems   Unable to perform ROS: Mental status change       Prognosis: unknown    OBJECTIVE:     BP 94/64   Pulse 100   Temp 97.7 °F (36.5 °C) (Temporal)   Resp 26   Ht 1.575 m (5' 2\")   Wt 68.8 kg (151 lb 10.8 oz)   SpO2 100%   BMI 27.74 kg/m²     Physical Examination:  Physical Exam  Vitals reviewed.   Constitutional:       General: She is not in acute distress.     Appearance: She is ill-appearing.   HENT:      Head: Normocephalic.      Mouth/Throat:      Lips: Pink.      Mouth: Mucous membranes are moist.   Neck:      Trachea: Tracheostomy present.   Cardiovascular:      Rate and Rhythm: Normal rate and regular rhythm.      Comments: RUE edema   Pulmonary:      Effort: No accessory muscle usage or respiratory distress.      Comments: Mechanically ventilated via tracheostomy   Abdominal:      Comments: PEG tube    Musculoskeletal:      Right lower leg: Edema present.      Left lower leg: Edema present.   Skin:     General: Skin is warm and dry.   Neurological:      Mental Status: She is lethargic.   Psychiatric:         Mood and Affect: Mood is not anxious.         Speech: She is noncommunicative.         Behavior: Behavior is not agitated.         Objective data reviewed: labs, images, records, medication use, vitals, and

## 2025-03-27 ENCOUNTER — APPOINTMENT (OUTPATIENT)
Dept: GENERAL RADIOLOGY | Age: 80
DRG: 004 | End: 2025-03-27
Payer: MEDICARE

## 2025-03-27 ENCOUNTER — APPOINTMENT (OUTPATIENT)
Dept: INTERVENTIONAL RADIOLOGY/VASCULAR | Age: 80
DRG: 004 | End: 2025-03-27
Payer: MEDICARE

## 2025-03-27 LAB
AADO2: 111.2 MMHG
ALBUMIN SERPL-MCNC: 3 G/DL (ref 3.5–5.2)
ALP SERPL-CCNC: 96 U/L (ref 35–104)
ALT SERPL-CCNC: 31 U/L (ref 0–32)
ANION GAP SERPL CALCULATED.3IONS-SCNC: 16 MMOL/L (ref 7–16)
APPEARANCE FLD: NORMAL
APPEARANCE FLD: NORMAL
AST SERPL-CCNC: 46 U/L (ref 0–31)
B.E.: -1.2 MMOL/L (ref -3–3)
BASOPHILS # BLD: 0 K/UL (ref 0–0.2)
BASOPHILS NFR BLD: 0 % (ref 0–2)
BILIRUB DIRECT SERPL-MCNC: 0.3 MG/DL (ref 0–0.3)
BILIRUB INDIRECT SERPL-MCNC: 0.4 MG/DL (ref 0–1)
BILIRUB SERPL-MCNC: 0.7 MG/DL (ref 0–1.2)
BODY FLD TYPE: NORMAL
BODY FLD TYPE: NORMAL
BUN SERPL-MCNC: 40 MG/DL (ref 6–23)
CALCIUM SERPL-MCNC: 8.3 MG/DL (ref 8.6–10.2)
CHLORIDE SERPL-SCNC: 95 MMOL/L (ref 98–107)
CLOT CHECK: NORMAL
CLOT CHECK: NORMAL
CO2 SERPL-SCNC: 22 MMOL/L (ref 22–29)
COHB: 0.9 % (ref 0–1.5)
COLOR FLD: YELLOW
COLOR FLD: YELLOW
CORTIS SERPL-MCNC: 14.8 UG/DL (ref 2.7–18.4)
CORTIS SERPL-MCNC: 20.6 UG/DL (ref 2.7–18.4)
CORTIS SERPL-MCNC: 26.2 UG/DL (ref 2.7–18.4)
CORTISOL COLLECTION INFO: ABNORMAL
CORTISOL COLLECTION INFO: ABNORMAL
CORTISOL COLLECTION INFO: NORMAL
CREAT SERPL-MCNC: 2.6 MG/DL (ref 0.5–1)
CRITICAL: ABNORMAL
DATE ANALYZED: ABNORMAL
DATE OF COLLECTION: ABNORMAL
EOSINOPHIL # BLD: 0.13 K/UL (ref 0.05–0.5)
EOSINOPHILS RELATIVE PERCENT: 1 % (ref 0–6)
ERYTHROCYTE [DISTWIDTH] IN BLOOD BY AUTOMATED COUNT: 20.1 % (ref 11.5–15)
FIO2: 40 %
GFR, ESTIMATED: 18 ML/MIN/1.73M2
GLUCOSE BLD-MCNC: 100 MG/DL (ref 74–99)
GLUCOSE BLD-MCNC: 88 MG/DL (ref 74–99)
GLUCOSE BLD-MCNC: 90 MG/DL (ref 74–99)
GLUCOSE BLD-MCNC: 90 MG/DL (ref 74–99)
GLUCOSE BLD-MCNC: 99 MG/DL (ref 74–99)
GLUCOSE FLD-MCNC: 96 MG/DL
GLUCOSE SERPL-MCNC: 95 MG/DL (ref 74–99)
HCO3: 22.4 MMOL/L (ref 22–26)
HCT VFR BLD AUTO: 20.7 % (ref 34–48)
HCT VFR BLD AUTO: 22.7 % (ref 34–48)
HGB BLD-MCNC: 6.6 G/DL (ref 11.5–15.5)
HGB BLD-MCNC: 7.2 G/DL (ref 11.5–15.5)
HHB: 1.1 % (ref 0–5)
LAB: ABNORMAL
LDH FLD L TO P-CCNC: 99 U/L
LDH SERPL-CCNC: 200 U/L (ref 135–214)
LYMPHOCYTES NFR BLD: 0.51 K/UL (ref 1.5–4)
LYMPHOCYTES RELATIVE PERCENT: 4 % (ref 20–42)
Lab: 509
MAGNESIUM SERPL-MCNC: 2.2 MG/DL (ref 1.6–2.6)
MCH RBC QN AUTO: 28.2 PG (ref 26–35)
MCHC RBC AUTO-ENTMCNC: 31.9 G/DL (ref 32–34.5)
MCV RBC AUTO: 88.5 FL (ref 80–99.9)
METHB: 0.6 % (ref 0–1.5)
MODE: AC
MONOCYTES NFR BLD: 0.13 K/UL (ref 0.1–0.95)
MONOCYTES NFR BLD: 1 % (ref 2–12)
MONOCYTES NFR FLD: 65 %
MONOCYTES NFR FLD: 76 %
NEUTROPHILS NFR BLD: 95 % (ref 43–80)
NEUTROPHILS NFR FLD: 25 %
NEUTROPHILS NFR FLD: 35 %
NEUTS SEG NFR BLD: 13.84 K/UL (ref 1.8–7.3)
O2 SATURATION: 98.9 % (ref 92–98.5)
O2HB: 97.4 % (ref 94–97)
OPERATOR ID: ABNORMAL
PATIENT TEMP: 37 C
PCO2: 32.3 MMHG (ref 35–45)
PEEP/CPAP: 5 CMH2O
PFO2: 3.42 MMHG/%
PH BLOOD GAS: 7.46 (ref 7.35–7.45)
PHOSPHATE SERPL-MCNC: 4.8 MG/DL (ref 2.5–4.5)
PLATELET # BLD AUTO: 175 K/UL (ref 130–450)
PMV BLD AUTO: 11 FL (ref 7–12)
PO2: 136.9 MMHG (ref 75–100)
POTASSIUM SERPL-SCNC: 5 MMOL/L (ref 3.5–5)
PROT FLD-MCNC: 2.4 G/DL
PROT SERPL-MCNC: 5 G/DL (ref 6.4–8.3)
RBC # BLD AUTO: 2.34 M/UL (ref 3.5–5.5)
RBC # BLD: ABNORMAL 10*6/UL
RBC # FLD: <2000 CELLS/UL
RBC # FLD: <2000 CELLS/UL
RI(T): 0.81
RR MECHANICAL: 12 B/MIN
SODIUM SERPL-SCNC: 133 MMOL/L (ref 132–146)
SOURCE, BLOOD GAS: ABNORMAL
SPECIMEN TYPE: NORMAL
THB: 7.6 G/DL (ref 11.5–16.5)
TIME ANALYZED: 525
VT MECHANICAL: 280 ML
WBC # FLD: 235 CELLS/UL
WBC # FLD: 347 CELLS/UL
WBC OTHER # BLD: 14.6 K/UL (ref 4.5–11.5)

## 2025-03-27 PROCEDURE — 6370000000 HC RX 637 (ALT 250 FOR IP): Performed by: PSYCHIATRY & NEUROLOGY

## 2025-03-27 PROCEDURE — P9047 ALBUMIN (HUMAN), 25%, 50ML: HCPCS

## 2025-03-27 PROCEDURE — 6360000002 HC RX W HCPCS

## 2025-03-27 PROCEDURE — 6370000000 HC RX 637 (ALT 250 FOR IP)

## 2025-03-27 PROCEDURE — 85025 COMPLETE CBC W/AUTO DIFF WBC: CPT

## 2025-03-27 PROCEDURE — 85018 HEMOGLOBIN: CPT

## 2025-03-27 PROCEDURE — 84157 ASSAY OF PROTEIN OTHER: CPT

## 2025-03-27 PROCEDURE — 90935 HEMODIALYSIS ONE EVALUATION: CPT

## 2025-03-27 PROCEDURE — 82533 TOTAL CORTISOL: CPT

## 2025-03-27 PROCEDURE — 83735 ASSAY OF MAGNESIUM: CPT

## 2025-03-27 PROCEDURE — 6360000002 HC RX W HCPCS: Performed by: PHYSICIAN ASSISTANT

## 2025-03-27 PROCEDURE — 0W9B3ZZ DRAINAGE OF LEFT PLEURAL CAVITY, PERCUTANEOUS APPROACH: ICD-10-PCS | Performed by: RADIOLOGY

## 2025-03-27 PROCEDURE — 89051 BODY FLUID CELL COUNT: CPT

## 2025-03-27 PROCEDURE — 2580000003 HC RX 258: Performed by: INTERNAL MEDICINE

## 2025-03-27 PROCEDURE — 2500000003 HC RX 250 WO HCPCS: Performed by: INTERNAL MEDICINE

## 2025-03-27 PROCEDURE — 71045 X-RAY EXAM CHEST 1 VIEW: CPT

## 2025-03-27 PROCEDURE — 85014 HEMATOCRIT: CPT

## 2025-03-27 PROCEDURE — P9016 RBC LEUKOCYTES REDUCED: HCPCS

## 2025-03-27 PROCEDURE — 6370000000 HC RX 637 (ALT 250 FOR IP): Performed by: INTERNAL MEDICINE

## 2025-03-27 PROCEDURE — 87070 CULTURE OTHR SPECIMN AEROBIC: CPT

## 2025-03-27 PROCEDURE — 99231 SBSQ HOSP IP/OBS SF/LOW 25: CPT | Performed by: SURGERY

## 2025-03-27 PROCEDURE — 80053 COMPREHEN METABOLIC PANEL: CPT

## 2025-03-27 PROCEDURE — 82945 GLUCOSE OTHER FLUID: CPT

## 2025-03-27 PROCEDURE — 82248 BILIRUBIN DIRECT: CPT

## 2025-03-27 PROCEDURE — 2000000000 HC ICU R&B

## 2025-03-27 PROCEDURE — 87205 SMEAR GRAM STAIN: CPT

## 2025-03-27 PROCEDURE — 83615 LACTATE (LD) (LDH) ENZYME: CPT

## 2025-03-27 PROCEDURE — 83986 ASSAY PH BODY FLUID NOS: CPT

## 2025-03-27 PROCEDURE — 36430 TRANSFUSION BLD/BLD COMPNT: CPT

## 2025-03-27 PROCEDURE — 84100 ASSAY OF PHOSPHORUS: CPT

## 2025-03-27 PROCEDURE — 82962 GLUCOSE BLOOD TEST: CPT

## 2025-03-27 PROCEDURE — 6360000002 HC RX W HCPCS: Performed by: INTERNAL MEDICINE

## 2025-03-27 PROCEDURE — C1729 CATH, DRAINAGE: HCPCS

## 2025-03-27 PROCEDURE — 94003 VENT MGMT INPAT SUBQ DAY: CPT

## 2025-03-27 PROCEDURE — 99231 SBSQ HOSP IP/OBS SF/LOW 25: CPT | Performed by: NURSE PRACTITIONER

## 2025-03-27 PROCEDURE — 99232 SBSQ HOSP IP/OBS MODERATE 35: CPT | Performed by: CHIROPRACTOR

## 2025-03-27 PROCEDURE — 32555 ASPIRATE PLEURA W/ IMAGING: CPT

## 2025-03-27 PROCEDURE — 94640 AIRWAY INHALATION TREATMENT: CPT

## 2025-03-27 PROCEDURE — 82805 BLOOD GASES W/O2 SATURATION: CPT

## 2025-03-27 RX ORDER — ALBUMIN (HUMAN) 12.5 G/50ML
25 SOLUTION INTRAVENOUS ONCE
Status: COMPLETED | OUTPATIENT
Start: 2025-03-27 | End: 2025-03-27

## 2025-03-27 RX ORDER — MIDODRINE HYDROCHLORIDE 10 MG/1
20 TABLET ORAL
Status: DISCONTINUED | OUTPATIENT
Start: 2025-03-27 | End: 2025-04-04 | Stop reason: HOSPADM

## 2025-03-27 RX ORDER — ATORVASTATIN CALCIUM 40 MG/1
80 TABLET, FILM COATED ORAL NIGHTLY
Status: DISCONTINUED | OUTPATIENT
Start: 2025-03-27 | End: 2025-04-04 | Stop reason: HOSPADM

## 2025-03-27 RX ORDER — CHOLECALCIFEROL (VITAMIN D3) 50 MCG
2000 TABLET ORAL DAILY
Status: DISCONTINUED | OUTPATIENT
Start: 2025-03-28 | End: 2025-04-04 | Stop reason: HOSPADM

## 2025-03-27 RX ORDER — LANOLIN ALCOHOL/MO/W.PET/CERES
100 CREAM (GRAM) TOPICAL DAILY
Status: DISCONTINUED | OUTPATIENT
Start: 2025-03-28 | End: 2025-04-04 | Stop reason: HOSPADM

## 2025-03-27 RX ORDER — LEVETIRACETAM 100 MG/ML
500 SOLUTION ORAL 2 TIMES DAILY
Status: DISCONTINUED | OUTPATIENT
Start: 2025-03-27 | End: 2025-03-30

## 2025-03-27 RX ORDER — LIDOCAINE HYDROCHLORIDE 20 MG/ML
INJECTION, SOLUTION INFILTRATION; PERINEURAL PRN
Status: COMPLETED | OUTPATIENT
Start: 2025-03-27 | End: 2025-03-27

## 2025-03-27 RX ORDER — AMIODARONE HYDROCHLORIDE 200 MG/1
200 TABLET ORAL DAILY
Status: DISCONTINUED | OUTPATIENT
Start: 2025-03-28 | End: 2025-03-28

## 2025-03-27 RX ORDER — SODIUM CHLORIDE 9 MG/ML
INJECTION, SOLUTION INTRAVENOUS PRN
Status: DISCONTINUED | OUTPATIENT
Start: 2025-03-27 | End: 2025-04-01

## 2025-03-27 RX ADMIN — SODIUM CHLORIDE, PRESERVATIVE FREE 40 MG: 5 INJECTION INTRAVENOUS at 09:43

## 2025-03-27 RX ADMIN — IPRATROPIUM BROMIDE AND ALBUTEROL SULFATE 1 DOSE: 2.5; .5 SOLUTION RESPIRATORY (INHALATION) at 20:53

## 2025-03-27 RX ADMIN — ATORVASTATIN CALCIUM 80 MG: 40 TABLET, FILM COATED ORAL at 21:07

## 2025-03-27 RX ADMIN — MIDODRINE HYDROCHLORIDE 20 MG: 10 TABLET ORAL at 12:01

## 2025-03-27 RX ADMIN — LEVETIRACETAM 500 MG: 100 SOLUTION ORAL at 08:58

## 2025-03-27 RX ADMIN — LEVETIRACETAM 500 MG: 100 SOLUTION ORAL at 21:07

## 2025-03-27 RX ADMIN — IPRATROPIUM BROMIDE AND ALBUTEROL SULFATE 1 DOSE: 2.5; .5 SOLUTION RESPIRATORY (INHALATION) at 08:06

## 2025-03-27 RX ADMIN — SODIUM CHLORIDE, PRESERVATIVE FREE 10 ML: 5 INJECTION INTRAVENOUS at 21:08

## 2025-03-27 RX ADMIN — MIDODRINE HYDROCHLORIDE 20 MG: 10 TABLET ORAL at 17:30

## 2025-03-27 RX ADMIN — IPRATROPIUM BROMIDE AND ALBUTEROL SULFATE 1 DOSE: 2.5; .5 SOLUTION RESPIRATORY (INHALATION) at 11:15

## 2025-03-27 RX ADMIN — MIDODRINE HYDROCHLORIDE 20 MG: 10 TABLET ORAL at 08:40

## 2025-03-27 RX ADMIN — IPRATROPIUM BROMIDE AND ALBUTEROL SULFATE 1 DOSE: 2.5; .5 SOLUTION RESPIRATORY (INHALATION) at 15:10

## 2025-03-27 RX ADMIN — SODIUM CHLORIDE, PRESERVATIVE FREE 10 ML: 5 INJECTION INTRAVENOUS at 09:43

## 2025-03-27 RX ADMIN — ALBUMIN (HUMAN) 25 G: 0.25 INJECTION, SOLUTION INTRAVENOUS at 22:59

## 2025-03-27 RX ADMIN — THIAMINE HYDROCHLORIDE 250 MG: 100 INJECTION, SOLUTION INTRAMUSCULAR; INTRAVENOUS at 09:43

## 2025-03-27 RX ADMIN — LIDOCAINE HYDROCHLORIDE 10 ML: 20 INJECTION, SOLUTION INFILTRATION; PERINEURAL at 14:02

## 2025-03-27 RX ADMIN — ALBUMIN (HUMAN) 25 G: 0.25 INJECTION, SOLUTION INTRAVENOUS at 21:44

## 2025-03-27 RX ADMIN — COSYNTROPIN 250 MCG: 0.25 INJECTION, POWDER, LYOPHILIZED, FOR SOLUTION INTRAMUSCULAR; INTRAVENOUS at 08:56

## 2025-03-27 RX ADMIN — APIXABAN 5 MG: 5 TABLET, FILM COATED ORAL at 21:07

## 2025-03-27 RX ADMIN — Medication 2000 UNITS: at 08:52

## 2025-03-27 RX ADMIN — AMIODARONE HYDROCHLORIDE 200 MG: 200 TABLET ORAL at 08:51

## 2025-03-27 ASSESSMENT — PULMONARY FUNCTION TESTS
PIF_VALUE: 25
PIF_VALUE: 18
PIF_VALUE: 18
PIF_VALUE: 20
PIF_VALUE: 18
PIF_VALUE: 17
PIF_VALUE: 18
PIF_VALUE: 19
PIF_VALUE: 20
PIF_VALUE: 20
PIF_VALUE: 19
PIF_VALUE: 18
PIF_VALUE: 19
PIF_VALUE: 21
PIF_VALUE: 25
PIF_VALUE: 21
PIF_VALUE: 19
PIF_VALUE: 20
PIF_VALUE: 23
PIF_VALUE: 19
PIF_VALUE: 20
PIF_VALUE: 19
PIF_VALUE: 19
PIF_VALUE: 27
PIF_VALUE: 19
PIF_VALUE: 21
PIF_VALUE: 21
PIF_VALUE: 18
PIF_VALUE: 20
PIF_VALUE: 19
PIF_VALUE: 18
PIF_VALUE: 20
PIF_VALUE: 20

## 2025-03-27 ASSESSMENT — PAIN SCALES - GENERAL
PAINLEVEL_OUTOF10: 0

## 2025-03-27 NOTE — PROGRESS NOTES
Progress Note  3/27/2025 8:12 AM  Subjective:   Admit Date: 3/4/2025  PCP: Tiesha Panda MD    Subjective    3/24/25:: Patient seen and examined ; continues to require pressors; remains intubated    3/25: remains intubated, still requiring pressors; HD in progress, off sedation now    3/26: Trach and PEG this a.m.; continues to require pressors; being weaned; for thoracentesis today    3/27: No new acute issues from overnight    Diet: Diet NPO  ADULT TUBE FEEDING; Nasogastric; Peptide Based; Continuous; 10; Yes; 10; Q 4 hours; 35; 30; Q 4 hours; Protein; 1 Dose; Daily    Data:   Scheduled Meds:   cosyntropin  250 mcg IntraVENous Once    [Held by provider] apixaban  5 mg Oral BID    amiodarone  200 mg Per NG tube Daily    levETIRAcetam  500 mg Per G Tube BID    pantoprazole (PROTONIX) 40 mg in sodium chloride (PF) 0.9 % 10 mL injection  40 mg IntraVENous Daily    vitamin D  2,000 Units Oral Daily    thiamine  250 mg IntraVENous Q24H    Followed by    [START ON 3/28/2025] thiamine  100 mg Oral Daily    midodrine  20 mg Per NG tube TID WC    [Held by provider] lactulose  20 g Oral TID    insulin lispro  0-4 Units SubCUTAneous Q6H    sodium chloride flush  5-40 mL IntraVENous 2 times per day    [Held by provider] furosemide  40 mg IntraVENous Daily    ipratropium 0.5 mg-albuterol 2.5 mg  1 Dose Inhalation Q4H WA RT    [Held by provider] metoprolol succinate  25 mg Oral Daily    atorvastatin  80 mg Oral Nightly     Continuous Infusions:   sodium chloride      norepinephrine 7 mcg/min (03/27/25 0805)    sodium chloride Stopped (03/25/25 1647)    dextrose       PRN Meds:sodium chloride, heparin (PF), heparin (porcine) **AND** heparin (porcine), sodium chloride, polyethylene glycol, sodium chloride flush, [DISCONTINUED] ondansetron **OR** ondansetron, acetaminophen **OR** acetaminophen, dextrose bolus **OR** dextrose bolus, glucagon (rDNA), dextrose  I/O last 3 completed shifts:  In: 788.4 [I.V.:389.1; NG/GT:170; IV  Reason for exam:->arythmia FINDINGS: Possible right lower lobe opacity..  Bilateral pleural effusions.  No pneumothorax.  Stable heart size.  The osseous structures are without acute process.     Bilateral pleural effusions. Possible right lower lobe opacity.       Objective:   Vitals: /70   Pulse (!) 120   Temp 99.5 °F (37.5 °C) (Axillary)   Resp 26   Ht 1.575 m (5' 2\")   Wt 67.6 kg (149 lb 0.5 oz)   SpO2 100%   BMI 27.26 kg/m²   General appearance: intubated, awake  HEENT: Head: Normocephalic, no lesions, without obvious abnormality.  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)     Occipital stroke (HCC)     Atrial flutter (HCC)     Paroxysmal atrial fibrillation (HCC)     H/O: stroke     Atrial flutter with rapid ventricular response (HCC)     Decompensated heart failure (HCC)     Chronic atrial fibrillation (HCC)     VHD (valvular heart disease)     ANTONIO (acute kidney injury)     Palliative care encounter     Metabolic encephalopathy         Assessment/Pans      1) ANTONIO on CKD 3a  baseline cr at 1-1.3 range 2023   setting of hypotension, aib, HFrEF, entresto, diuretics  Anuric   Requiring levophed despite  high dose midodrine  S/p CVVHD 3/8-3/15  Now transitioned to  IHD/SLED   IR guided tunneled dialysis catheter 3/20/24 in IR      2)  A Fib with rapid venticular rate , improved   Off amio due to lfts  Eliquis  now on hold  Seen by Cardiology    3)  Cardiogenic shock, possibly septic as well  Influenza pos  S/p course of

## 2025-03-27 NOTE — OP NOTE
Operative Note  ____________________________________________________________      IR U/S GUIDED THORACENTESIS  [facility] 4WE MICU    Patient Name: Nikki Moffett   YOB: 1945  Medical Record Number: [id number]  Date of Procedure: 3/27/25  Room/Bed: [address] [place] [address]-A    Preoperative diagnosis: left Pleural Effusion    Postoperative diagnosis: Same    Consent: The son was counseled regarding the procedure, it's indications, risks, potential complications and alternatives and any questions were answered. Consent was obtained for image guided left thoracentesis for Pleural effusion.     Procedure:  Image Guided left Thoracentesis.    Performed by: DICKSON aSunders under on-site supervision by Hattie Daley MD.    Anesthesia: Local    Estimated blood loss: Less than 10 mL    Complications: None    Specimen Obtained: Pleural Fluid    Procedure: Prior to start of procedure, routine scanning of the posterior thorax was performed using real-time ultrasound and revealed sufficient amount of pleural fluid present. Decision was made to proceed with procedure.  After obtaining consent, a \"Time-out\" was called to verify the correct patient, procedure/location, allergies, relevant medications held for procedure and that all equipment is functioning and available. The patient was then situated in a seated upright position and the appropriate landmarks were identified using ultrasound. The site of maximum fluid collection was marked. The skin over the puncture site in the left lower posterior hemithorax region was prepped with surgical skin prep and draped in a sterile fashion. Local anesthesia was administered by infiltration using 2% Lidocaine without epinephrine administered subcutaneously.  A 6 K[place] safe-t-centesis catheter was then advanced into the pleural cavity and the needle was withdrawn.  Pleural fluid return was clear straw colored. The catheter was then withdrawn and a sterile dressing was placed over the site.  The

## 2025-03-27 NOTE — PLAN OF CARE
Problem: Chronic Conditions and Co-morbidities  Goal: Patient's chronic conditions and co-morbidity symptoms are monitored and maintained or improved  3/26/2025 2341 by Carmel Covarrubias RN  Outcome: Progressing     Problem: Discharge Planning  Goal: Discharge to home or other facility with appropriate resources  3/26/2025 2341 by Carmel Covarrubias RN  Outcome: Progressing     Problem: Safety - Adult  Goal: Free from fall injury  3/26/2025 2340 by Carmel Covarrubias RN  Outcome: Progressing     Problem: Skin/Tissue Integrity  Goal: Skin integrity remains intact  Description: 1.  Monitor for areas of redness and/or skin breakdown  2.  Assess vascular access sites hourly  3.  Every 4-6 hours minimum:  Change oxygen saturation probe site  4.  Every 4-6 hours:  If on nasal continuous positive airway pressure, respiratory therapy assess nares and determine need for appliance change or resting period  3/26/2025 2340 by Carmel Covarrubias RN  Outcome: Progressing     Problem: Nutrition Deficit:  Goal: Optimize nutritional status  3/26/2025 2340 by Carmel Covarrubias RN  Outcome: Progressing     Problem: ABCDS Injury Assessment  Goal: Absence of physical injury  3/26/2025 2340 by Carmel Covarrubias RN  Outcome: Progressing     Problem: Metabolic/Fluid and Electrolytes - Adult  Goal: Electrolytes maintained within normal limits  3/26/2025 2340 by Carmel Covarrubias RN  Outcome: Progressing     Problem: Metabolic/Fluid and Electrolytes - Adult  Goal: Glucose maintained within prescribed range  3/26/2025 2340 by Carmel Covarrubias RN  Outcome: Progressing     Problem: Hematologic - Adult  Goal: Maintains hematologic stability  3/26/2025 2340 by Carmel Covarrubias RN  Outcome: Progressing     Problem: Respiratory - Adult  Goal: Achieves optimal ventilation and oxygenation  3/26/2025 2340 by Carmel Covarrubias RN  Outcome: Progressing     Problem: Cardiovascular - Adult  Goal: Maintains optimal cardiac output and  hemodynamic stability  3/26/2025 2340 by Carmel Covarrubias RN  Outcome: Progressing     Problem: Cardiovascular - Adult  Goal: Absence of cardiac dysrhythmias or at baseline  3/26/2025 2340 by Carmel Covarrubias RN  Outcome: Progressing     Problem: Skin/Tissue Integrity - Adult  Goal: Skin integrity remains intact  Description: 1.  Monitor for areas of redness and/or skin breakdown  2.  Assess vascular access sites hourly  3.  Every 4-6 hours minimum:  Change oxygen saturation probe site  4.  Every 4-6 hours:  If on nasal continuous positive airway pressure, respiratory therapy assess nares and determine need for appliance change or resting period  3/26/2025 2340 by Carmel Covarrubias RN  Outcome: Progressing

## 2025-03-27 NOTE — BRIEF OP NOTE
Brief-Op Note  ____________________________________________________________      IR  U/S GUIDED THORACENTESIS  [facility] 4WE MICU    Patient Name: Nikki Moffett   YOB: 1945  Medical Record Number: [id number]  Date of Procedure: 3/27/25  Room/Bed: [address] [place] [address]    Preoperative diagnosis: left Pleural Effusion    Postoperative diagnosis: Same    Consent: The son was counseled regarding the procedure, it's indications, risks, potential complications and alternatives and any questions were answered.     Procedure:  Image Guided left Thoracentesis.    Anesthesia: Local anesthesia.    Performed by: DICKSON Saunders under on-site supervision by Hattie Daley MD.    Estimated blood loss: Less than 10 mL    Complications: None    Specimen Obtained: A total volume of  400mL was withdrawn    Electronically signed by DICKSON Saunders   DD: 3/27/25  2:14 PM

## 2025-03-27 NOTE — PROGRESS NOTES
Physical Therapy    PT consult to evaluate/treat received and appreciated.  Pt chart reviewed and evaluation attempted.  Pt is currently on dialysis x2 separate attempts.  Will check back as able.  Thank you.        Chema Coleman, PT, DPT   [id number]

## 2025-03-27 NOTE — PROGRESS NOTES
Palliative Care Department  [phone]  Palliative Care Progress Note  Provider Mickie Lozano, APRN - CNP      PATIENT: Nikki Moffett  : 1945  MRN: [id number]  ADMISSION DATE: 3/4/2025  5:05 PM  Referring Provider: Ben Dumont. MD Lucrecia     Palliative Medicine was consulted on hospital day 23 for assistance with Goals of care    HPI:     Clinical Summary:Nikki Moffett is a 79 y.o. y/o female with a history of left MCA infarct SP thrombectomy, A-fib, diabetes, heart failure, who presented to [facility] on 3/4/2025 with fatigue and dizziness, ongoing for a month.  At ED, found tachycardic and dry on exam.  Chest x-ray shows bilateral pleural effusion and possible right lower lobe opacity, was treated initially with Zosyn and doxycycline and admitted for further medical evaluation.  Was seen by cardiology underwent cardioversion, became dyspneic and  hypotension postprocedure, was transferred to MICU for close monitoring.  Seen by cardiology SP TTE, on 3/7/2025, with EF 10 to 15%, mild mitral valve regurgitation, moderate transcript about regurgitation.    ASSESSMENT/PLAN:     Pertinent Hospital Diagnoses     Acute hypoxic respiratory failure   Acute on chronic HFrEF (EF 10-15%)   Bilateral pleural effusions   Suspected pneumonia   Acute renal failure on CKD now on HD- for tunneled catheter   Shock liver   History of left MCA stroke with M2 occlusion s/p thrombectomy      Palliative Care Encounter / Counseling Regarding Goals of Care  Please see detailed goals of care discussion as below  At this time, Nikki Moffett, Does Not have capacity for medical decision-making.  Capacity is time limited and situation/question specific  During encounter Shreyas was surrogate medical decision-maker  Outcome of goals of care meeting:  Family would like patient to remain a full code.  No further palliative care needs identified at this time.  We will sign off.  Code status Full Code  Advanced Directives: no POA or  living will in [facility]  Surrogate/Legal NOK:  Shreyas Moffett (Son) [phone]    Spiritual assessment: no spiritual distress identified  Bereavement and grief: to be determined  Referrals to: none today    Thank you for the opportunity to participate in the care of Nikki Moffett.     SUBJECTIVE:     Details of Conversation:   Chart reviewed.  Patient is receiving hemodialysis.  She is status post trach and PEG.  No family is present.  Placed call to patient's son, Shreyas.  Offered support.  He denies any needs.  Followed up on CODE STATUS discussion and he would like patient to remain a full code at this time.  No further palliative care needs identified at this time.  We will sign off.  Please reconsult if needs arise.    Review of Systems   Unable to perform ROS: Mental status change       Prognosis: unknown    OBJECTIVE:     /70   Pulse 100   Temp 97.6 °F (36.4 °C) (Temporal)   Resp 28   Ht 1.575 m (5' 2\")   Wt 67.6 kg (149 lb 0.5 oz)   SpO2 100%   BMI 27.26 kg/m²     Physical Examination:  Physical Exam  Vitals reviewed.   Constitutional:       General: She is not in acute distress.     Appearance: She is ill-appearing.   HENT:      Head: Normocephalic.      Mouth/Throat:      Lips: Pink.      Mouth: Mucous membranes are moist.   Neck:      Trachea: Tracheostomy present.   Cardiovascular:      Rate and Rhythm: Normal rate and regular rhythm.      Comments: RUE edema   Pulmonary:      Effort: No accessory muscle usage or respiratory distress.      Comments: Mechanically ventilated via tracheostomy   Abdominal:      Comments: PEG tube    Musculoskeletal:      Right lower leg: Edema present.      Left lower leg: Edema present.   Skin:     General: Skin is warm and dry.   Neurological:      Mental Status: She is lethargic.   Psychiatric:         Mood and Affect: Mood is not anxious.         Speech: She is noncommunicative.         Behavior: Behavior is not agitated.         Objective data reviewed: labs,

## 2025-03-27 NOTE — PROGRESS NOTES
GENERAL SURGERY  DAILY PROGRESS NOTE    Patient's Name/Date of Birth: Nikki Moffett / 1945    Date: 2025     Chief Complaint   Patient presents with    Altered Mental Status     \"Mood swings, memory loss and fatigue\" per patients grandson    Chest Pain     Ongoing for a couple weeks per family        Subjective:  Now s/p trach/peg 3/26. No issues overnight. Had thoracentesis yesterday, reportedly planning for another today.     Objective:  Last 24Hrs  Temp  Av.4 °F (36.9 °C)  Min: 97.7 °F (36.5 °C)  Max: 99.7 °F (37.6 °C)  Resp  Av.3  Min: 16  Max: 32  Pulse  Av.5  Min: 80  Max: 109  Systolic (24hrs), Av , Min:83 , Max:126     Diastolic (24hrs), Av, Min:49, Max:83    SpO2  Av %  Min: 100 %  Max: 100 %    I/O last 3 completed shifts:  In: 2556.1 [I.V.:298.4; NG/GT:655; IV Piggyback:502.7]  Out: 3150 [Stool:550]      General: In no acute distress, awake, alert.    Cardiovascular: Warm throughout, no edema  Respiratory: Trach in place, minimal bleeding.   Abdomen: soft, nontender, nondistended. PEG in place, 2.5 cm at skin  Skin: no obvious rashes or lesions appreciated, no jaundice  Extremities: atraumatic, no focal motor deficits, no open wounds      CBC  Recent Labs     25  0400 25  0434 25  0421   WBC 22.4* 16.8* 14.6*   RBC 2.99* 2.71* 2.34*   HGB 8.3* 7.6* 6.6*   HCT 26.8* 24.0* 20.7*   MCV 89.6 88.6 88.5   MCH 27.8 28.0 28.2   MCHC 31.0* 31.7* 31.9*   RDW 20.2* 20.3* 20.1*    182 175   MPV 11.5 11.1 11.0       CMP  Recent Labs     25  0400 25  1436 25  0434 25  0421    137 132 133   K 4.1 3.8 4.6 5.0   CL 96* 99 97* 95*   CO2 22 25 22 22   BUN 35* 18 29* 40*   CREATININE 2.3* 1.4* 1.9* 2.6*   GLUCOSE 120* 125* 111* 95   CALCIUM 8.3* 8.2* 8.3* 8.3*   BILITOT 0.9  --  0.9 0.7   ALKPHOS 105*  --  113* 96   AST 42*  --  44* 46*   ALT 61*  --  61* 31       Assessment/Plan:    Patient Active Problem List   Diagnosis     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 female who presented with respiratory failure now s/p trach/PEG 3/26.     - okay to resume eliquis from surgery standpoint  - okay to use PEG for meds and tube feeds  - trach sutures to be removed in 7 days  - surgery will be available as needed, please do not hesitate to reach out if there are any further questions or concerns    Mimi Clements MD  General Surgery Resident, PGY-4    Electronically signed on 3/27/2025 at 5:40 AM      [facility]   ATTENDING PHYSICIAN PROGRESS NOTE     I have personally examined the patient, personally reviewed the record, and personally discussed the case with the resident. I have personally reviewed all relevant labs and imaging data. I am actively managing this patient's medications.  Please refer to the resident's note. I agree with the assessment and plan with the following corrections/additions. The following summarizes my clinical findings and independent assessment.     CC: trach/PEG    Trach site with minimal oozing  PEG site okay  TF not started    Medical management/dispo per MICU team    Ryne Bloom MD, FACS  3/27/2025  11:56 AM

## 2025-03-27 NOTE — PROGRESS NOTES
DAILY VENTILATOR WEANING ASSESSMENT PERFORMED    P/FIO2 Ratio =  342        (<100= do not Wean)                  Cs =     27                     (<32= Instability)  Plat. Pressure = 18  MV =4.37    Instabilities:       Cardiovascular =       CNS =       Respiratory =       Metabolic =    Parameters    no    Ask Physician for a weaning plan yes    Was SAT completed yes    Was SBT completed no    Additional Comments:  Patient for thoracentesis today    Performed by Galina Espinosa RCP RRT      Reference Table:    Cardiovascular     CNS      1. Mean BP less than or equal to 75   1. Neuromuscular blockade  2. Heart Rate greater than 130   2. RASS of -3, -4, -5  3. Myocardial Ischemia    3. RASS of +3, +4  4. Mechanical Assist Device    4. ICP greater than 15 or             Intracranial Hypertension         Respiratory      Metabolic  1. PEEP equal to or greater than 10cm/H20  1.Temp. (8hrs) less than 95 or > 103  2. Respiratory Rate greater than 35   2. WBC < 5000 or > 64342  3. Minute Volume greater than 15L  4. pH less than 7.30  5. Deteriorating chest X-ray

## 2025-03-27 NOTE — PROGRESS NOTES
[facility]   Department of Internal Medicine   Internal Medicine Residency  MICU Progress Note    Patient:  Nikki Moffett 79 y.o. female   MRN: [id number]       Date of Service: 3/27/2025    Allergy: Patient has no known allergies.    Subjective     Overnight, no acute events. 1U PRBC transfused this morning.   Patient was seen and examined this morning at bedside in no acute distress, no family present. S/p trach/PEG, R-sided thoracentesis 3/26. Denies any pain or SOB. Remains on 6 of levo. Tube feeds currently held pending L thoracentesis today.     Objective     I & O - 24hr:    Intake/Output Summary (Last 24 hours) at 3/27/2025 1507  Last data filed at 3/27/2025 1345  Gross per 24 hour   Intake 925.41 ml   Output 2650 ml   Net -1724.59 ml       Physical Exam  Vitals: /74   Pulse 94   Temp 97.6 °F (36.4 °C) (Temporal)   Resp 26   Ht 1.575 m (5' 2\")   Wt 67.6 kg (149 lb 0.5 oz)   SpO2 100%   BMI 27.26 kg/m²     General Appearance: s/p trach, spontaneous eye opening, shakes head yes and no to questions, attempts to follow commands but is profoundly weak.   HEENT: moist mucus membranes   Neck: trach present    Lung: mechanical breath sounds, L sided rhonchi    Heart: RRR, capillary refill <2  Abdomen: soft, appears nontender, BS+   Extremities: +2 pitting edema     Medications     Continuous Infusions:   sodium chloride      sodium chloride Stopped (03/25/25 1647)    dextrose       Scheduled Meds:   [START ON 3/28/2025] amiodarone  200 mg PEG Tube Daily    atorvastatin  80 mg PEG Tube Nightly    levETIRAcetam  500 mg PEG Tube BID    midodrine  20 mg PEG Tube TID WC    [START ON 3/28/2025] thiamine  100 mg Per G Tube Daily    [START ON 3/28/2025] vitamin D  2,000 Units PEG Tube Daily    [Held by provider] apixaban  5 mg Oral BID    pantoprazole (PROTONIX) 40 mg in sodium chloride (PF) 0.9 % 10 mL injection  40 mg IntraVENous Daily    [Held by provider] lactulose  20 g Oral TID    insulin  the patient and that the key elements of the encounter were performed by me (> 85 % time).  The medications & laboratory data and imagery was discussed and adjusted where necessary. Key issues of the case were discussed among consultants.     This patient has a high probability of sudden clinically significant deterioration. I managed/supervised life or organ supporting interventions that required frequent physician assessment. I devoted my full attention to the direct care of this patient for the period of time indicated below. In addition to above, time was devoted to teaching and to any procedure.      NOTE: This report, in part or full, may have been transcribed using voice recognition software. Every effort was made to ensure accuracy; however, inadvertent computerized transcription errors may be present. Please excuse any transcriptional grammatical or spelling errors that may have escaped my editorial review.     Total critical care time caring for this patient with life threatening, unstable organ failure, including direct patient contact, management of life support systems, review of data including imaging and labs, discussions with other team members and physicians is at least 35 Min so far today, excluding procedures.  Ana Beltran MD

## 2025-03-27 NOTE — PROGRESS NOTES
[facility]  Internal Medicine Residency Program  Progress Note - House Team     Patient:  Nikki Moffett 79 y.o. female MRN: [id number]     Date of Service: 3/27/2025     CC:   Chief Complaint   Patient presents with    Altered Mental Status     \"Mood swings, memory loss and fatigue\" per patients grandson    Chest Pain     Ongoing for a couple weeks per family       Subjective     Patient was seen at bedside this morning. Patient is s/t tracheostomy, does have purposeful movements. Patient is off sedation. Patient is still on Levophed. Patient denied pain/CP/SOB.     Objective     Physical Exam:  Vitals: /72   Pulse 82   Temp 97.6 °F (36.4 °C) (Temporal)   Resp 23   Ht 1.575 m (5' 2\")   Wt 67.6 kg (149 lb 0.5 oz)   SpO2 100%   BMI 27.26 kg/m²     I & O - 24hr: In: 978.1   Out: 700   General appearance:  Awake, can follow simple commands.  Neuro: Mental status: Awake, can follow simple commands  Cardiovascular: Regular rate and rhythm, S1 and S2  heard, no murmurs appreciated  Respiratory: Rales present bilaterally   Abdomen: Soft, non tender, bowel sounds normal; masses, no organomegaly, rebound or guarding  Extremities: Extremities normal, no cyanosis, distal pulses intact, right upper extremity edema, 3+    Pertinent Labs & Imaging Studies      Complete Blood Count:   Recent Labs     03/25/25  0400 03/26/25  0434 03/27/25  0421 03/27/25  0925   WBC 22.4* 16.8* 14.6*  --    HGB 8.3* 7.6* 6.6* 7.2*   HCT 26.8* 24.0* 20.7* 22.7*    182 175  --         Last 3 Blood Glucose:   Recent Labs     03/25/25  1436 03/26/25  0434 03/27/25  0421   GLUCOSE 125* 111* 95        PT/INR:    Lab Results   Component Value Date/Time    PROTIME 13.4 03/26/2025 04:34 AM    INR 1.2 03/26/2025 04:34 AM     PTT:    Lab Results   Component Value Date/Time    APTT 42.0 03/08/2025 05:50 PM     Comprehensive Metabolic Profile:   Recent Labs     03/25/25  1436 03/26/25  0434 03/27/25  0421    132  of indeterminate chronicity.   Correlate with MRI if there is concern for acute infarction.      Remote appearing insult in the left occipital lobe.      Findings of chronic small vessel ischemic change and cerebral volume loss.         XR CHEST PORTABLE   Final Result   1. Interval placement of a nasogastric tube which passes into the body of the   stomach.   2. No change in prominent left lower lobe pneumonia and moderate right lower   lobe pneumonia.   3. Slight increase in size of small bilateral pleural effusions.   4. Stable mild congestive failure.         XR ABDOMEN FOR NG/OG/NE TUBE PLACEMENT   Final Result   Enteric tube in the stomach as above.      No evidence of bowel obstruction.      Right pleural effusion with right basilar opacity with consolidation that may   represent pneumonia.         US ABDOMEN LIMITED   Final Result   1.  Echogenic liver parenchyma with somewhat scalloped liver margins.  Small   volume simple appearing intra-abdominal ascites in the right upper quadrant.      (Could reflect changes related to an underlying infiltrative pathology.   Hepatic steatosis or possibly early cirrhosis.  Please correlate with   patient's liver function test.)      2.  Right pleural effusion present.      3.  No intrahepatic or extrahepatic biliary dilatation.  Gallbladder is   contracted on this study.      4.  Normal appearance of the imaged kidney.  No hydronephrosis.         XR CHEST PORTABLE   Final Result   No significant change over the past 24 hours.         XR CHEST PORTABLE   Final Result   Right-sided vascular catheter tip overlies the region of the right atrium or   cavoatrial junction.      Cardiomediastinal silhouette is enlarged.      Perihilar and bibasilar opacities similar to prior.  Bilateral small pleural   effusions.  No gross pneumothorax.         XR CHEST PORTABLE   Final Result   Further progression of findings that indicate volume overload since March 7   study.  Please  A  Completed Tamiflu for 5 days  Acute exacerbation of HFrEF  EF 10-15% (03/07/2025)  HFrEF   EF 10-15% 3/7/2025 likely 2/2 tachycardia-induced CM vs ischemic etiology   Pleural effusion, bilateral  Elevated INR  S/p 6 units FFP  ANTONIO superimposed on CKD, stage IIIa, tunneled HD placed on 3/21.  Normocytic anemia  S/p 1 unit pRBCs  Leukocytosis  Hypophosphatemia  Transaminitis likely 2/2 shock liver  High anion gap metabolic acidosis secondary to lactic acidosis, resolved.  Hyperlipidemia  History of L MCA stroke  S/p mechanical thrombectomy (12/2023)    Plan  Tunneled HD procedure placed, SLED with a goal of 2 L removal  S/p intubation on 03/16  S/p percutaneous tracheostomy 3/26, removal of trach sutures planned for 4.3.2025 by General Surgery  S/p PEG tube placement 3/26  Hydrocortisone and fludrocortisone discontinued  Cosyntropin test result pending  New onset bilateral pleural effusion, R > L size  S/p R thoracocentesis on 3.26, 750 cc   Scheduled for L thoracocentesis today  TF on hold due to procedure  Patient restarted on Levophed due to hypotension  On Amiodarone 200 mg daily and Lipitor 80 mg daily, Midodrine 20 mg 3 times daily  Eliquis on hold (2.5 mg twice daily)  Breathing treatments with Brovana, Pulmicort, DuoNeb  Follow Neurology recommendations  On Keppra 500 mg twice daily  Follow Cardiology recommendations  Will need ischemic evaluation at some point with resolution of acute issues  Follow Nephrology recommendations  On HD   Palliative care following      PT evaluation: not indicated at this time   OT evaluation: not indicated at this time   DVT prophylaxis: Eliquis  on hold  GI prophylaxis: Protonix 40 mg IV daily  Diet:   Diet NPO  ADULT TUBE FEEDING; Nasogastric; Peptide Based; Continuous; 10; Yes; 10; Q 4 hours; 35; 30; Q 4 hours; Protein; 1 Dose; Daily   Code status: Full Code   Disposition: Continue Current Care      Maria Isabel Hays MD, PGY-1  Attending physician: Dr. Haji

## 2025-03-27 NOTE — PLAN OF CARE
Problem: Chronic Conditions and Co-morbidities  Goal: Patient's chronic conditions and co-morbidity symptoms are monitored and maintained or improved  3/27/2025 1336 by Patria Hernandez RN  Outcome: Not Progressing  3/26/2025 2341 by Carmel Covarrubias RN  Outcome: Progressing  3/26/2025 2340 by Carmel Covarrubias RN  Outcome: Not Progressing     Problem: Discharge Planning  Goal: Discharge to home or other facility with appropriate resources  3/27/2025 1336 by Patria Hernandez RN  Outcome: Not Progressing  3/26/2025 2341 by Carmel Covarrubias RN  Outcome: Progressing  3/26/2025 2340 by Carmel Covarrubias RN  Outcome: Not Progressing     Problem: Nutrition Deficit:  Goal: Optimize nutritional status  3/27/2025 1336 by Patria Hernandez RN  Outcome: Not Progressing  3/26/2025 2340 by Carmel Covarrubias RN  Outcome: Progressing     Problem: Hematologic - Adult  Goal: Maintains hematologic stability  3/27/2025 1336 by Patria Hernandez RN  Outcome: Not Progressing  3/26/2025 2340 by Carmel Covarrubias RN  Outcome: Progressing     Problem: Neurosensory - Adult  Goal: Achieves maximal functionality and self care  Outcome: Not Progressing     Problem: Respiratory - Adult  Goal: Achieves optimal ventilation and oxygenation  3/27/2025 1336 by Patria Hernanedz RN  Outcome: Not Progressing  3/26/2025 2340 by Carmel Covarrubias RN  Outcome: Progressing     Problem: Cardiovascular - Adult  Goal: Maintains optimal cardiac output and hemodynamic stability  3/27/2025 1336 by Patria Hernandez RN  Outcome: Not Progressing  3/26/2025 2340 by Carmel Covarrubias RN  Outcome: Progressing  Goal: Absence of cardiac dysrhythmias or at baseline  3/27/2025 1336 by Patria Hernandez RN  Outcome: Not Progressing  3/26/2025 2340 by Carmel Covarrubias RN  Outcome: Progressing     Problem: Musculoskeletal - Adult  Goal: Return mobility to safest level of function  Outcome: Not Progressing  Goal: Return ADL status to a safe level of  function  Outcome: Not Progressing     Problem: Gastrointestinal - Adult  Goal: Maintains or returns to baseline bowel function  Outcome: Not Progressing  Goal: Maintains adequate nutritional intake  Outcome: Not Progressing  Goal: Minimal or absence of nausea and vomiting  Outcome: Not Progressing     Problem: Genitourinary - Adult  Goal: Absence of urinary retention  Outcome: Not Progressing

## 2025-03-27 NOTE — OR NURSING
Thoracentesis catheter removed at this time, 400ml yellow, clear fluid drained, Vaseline gauze, 4x4 and transparent dressing applied to clean, dry, site. Patient tolerated well. CXR ordered

## 2025-03-27 NOTE — PROGRESS NOTES
End of Weaning attempt:    Patient apneic; placed back on AC settings.      beginning at 11:12AM and ending at 1508PM.       HR 81bpm  RR 12bpm  SPO2 100%    Galina Espinosa RCP RRT

## 2025-03-27 NOTE — FLOWSHEET NOTE
03/27/25 1345   Vital Signs   /70   Pulse 100   Respirations 28   SpO2 100 %   Pain Assessment   Pain Assessment None - Denies Pain   Post-Hemodialysis Assessment   Rinseback Volume (ml) 300 ml   Blood Volume Processed (Liters) 49.4 L   Dialyzer Clearance Lightly streaked   Duration of Treatment (minutes) 240 minutes   Heparin Amount Administered During Treatment (mL) 0 mL   Hemodialysis Intake (ml) 300 ml   Hemodialysis Output (ml) 1950 ml   NET Removed (ml) 1650   Tolerated Treatment Good   Patient Response to Treatment treatment complete, patient tolerated well   Bilateral Breath Sounds Diminished   RUE Edema +3   LUE Edema +1   RLE Edema +1   LLE Edema +1   Physician Notified No   Time Off 1327   Patient Disposition Remain in ICU/ED   Observations & Evaluations   Level of Consciousness 0   Heart Rhythm Irregular   Respiratory Quality/Effort Unlabored   FiO2  40 %   O2 Device Ventilator   Handoff   Communication Given Transfer Handoff   Handoff Given To Krupa   Handoff Received From Pedro Stallworth   Handoff Communication Face to Face

## 2025-03-27 NOTE — PROGRESS NOTES
Respiratory Wean Start time:  Order to wean ventilator Yes (if no, contact provider)    Patient placed on PS of 12, PEEP 5cmH2O, FIO2 40 at 11:14AM.    HR 87bpm  RR 25bpm  SPO2 100%      Will continue to attempt to wean based on patient tolerance.       Performed by  Galina Espinosa RCP RRT

## 2025-03-27 NOTE — PROGRESS NOTES
OT SESSION ATTEMPT     Date:3/27/2025  Patient Name: Nikki Moffett  MRN: [id number]  : 1945  Room: [address] [place] [address]-A     Attempted OT session this date- pt on dialysis during AM/PM attempts.    Will reattempt OT evaluation at a later time.    Mary Reynolds OTD, OTR/L, [id number]

## 2025-03-28 LAB
ABO/RH: NORMAL
ALBUMIN SERPL-MCNC: 3.6 G/DL (ref 3.5–5.2)
ALP SERPL-CCNC: 85 U/L (ref 35–104)
ALT SERPL-CCNC: 16 U/L (ref 0–32)
ANION GAP SERPL CALCULATED.3IONS-SCNC: 15 MMOL/L (ref 7–16)
ANION GAP SERPL CALCULATED.3IONS-SCNC: 15 MMOL/L (ref 7–16)
ANTIBODY SCREEN: NEGATIVE
ARM BAND NUMBER: NORMAL
AST SERPL-CCNC: 44 U/L (ref 0–31)
BASOPHILS # BLD: 0.02 K/UL (ref 0–0.2)
BASOPHILS NFR BLD: 0 % (ref 0–2)
BILIRUB DIRECT SERPL-MCNC: 0.5 MG/DL (ref 0–0.3)
BILIRUB INDIRECT SERPL-MCNC: 0.7 MG/DL (ref 0–1)
BILIRUB SERPL-MCNC: 1.2 MG/DL (ref 0–1.2)
BLOOD BANK BLOOD PRODUCT EXPIRATION DATE: NORMAL
BLOOD BANK DISPENSE STATUS: NORMAL
BLOOD BANK ISBT PRODUCT BLOOD TYPE: 9500
BLOOD BANK PRODUCT CODE: NORMAL
BLOOD BANK SAMPLE EXPIRATION: NORMAL
BLOOD BANK UNIT TYPE AND RH: NORMAL
BPU ID: NORMAL
BUN SERPL-MCNC: 23 MG/DL (ref 6–23)
BUN SERPL-MCNC: 26 MG/DL (ref 6–23)
CALCIUM SERPL-MCNC: 8.6 MG/DL (ref 8.6–10.2)
CALCIUM SERPL-MCNC: 8.6 MG/DL (ref 8.6–10.2)
CHLORIDE SERPL-SCNC: 96 MMOL/L (ref 98–107)
CHLORIDE SERPL-SCNC: 98 MMOL/L (ref 98–107)
CO2 SERPL-SCNC: 23 MMOL/L (ref 22–29)
CO2 SERPL-SCNC: 23 MMOL/L (ref 22–29)
COMPONENT: NORMAL
CREAT SERPL-MCNC: 1.7 MG/DL (ref 0.5–1)
CREAT SERPL-MCNC: 1.9 MG/DL (ref 0.5–1)
CROSSMATCH RESULT: NORMAL
EKG ATRIAL RATE: 127 BPM
EKG Q-T INTERVAL: 364 MS
EKG QRS DURATION: 130 MS
EKG QTC CALCULATION (BAZETT): 537 MS
EKG R AXIS: -12 DEGREES
EKG T AXIS: 167 DEGREES
EKG VENTRICULAR RATE: 131 BPM
EOSINOPHIL # BLD: 0.06 K/UL (ref 0.05–0.5)
EOSINOPHILS RELATIVE PERCENT: 0 % (ref 0–6)
ERYTHROCYTE [DISTWIDTH] IN BLOOD BY AUTOMATED COUNT: 19.2 % (ref 11.5–15)
GFR, ESTIMATED: 27 ML/MIN/1.73M2
GFR, ESTIMATED: 30 ML/MIN/1.73M2
GLUCOSE BLD-MCNC: 109 MG/DL (ref 74–99)
GLUCOSE BLD-MCNC: 120 MG/DL (ref 74–99)
GLUCOSE BLD-MCNC: 124 MG/DL (ref 74–99)
GLUCOSE SERPL-MCNC: 122 MG/DL (ref 74–99)
GLUCOSE SERPL-MCNC: 95 MG/DL (ref 74–99)
HCT VFR BLD AUTO: 22.4 % (ref 34–48)
HGB BLD-MCNC: 7.2 G/DL (ref 11.5–15.5)
IMM GRANULOCYTES # BLD AUTO: 0.08 K/UL (ref 0–0.58)
IMM GRANULOCYTES NFR BLD: 1 % (ref 0–5)
LYMPHOCYTES NFR BLD: 0.65 K/UL (ref 1.5–4)
LYMPHOCYTES RELATIVE PERCENT: 5 % (ref 20–42)
MAGNESIUM SERPL-MCNC: 2 MG/DL (ref 1.6–2.6)
MAGNESIUM SERPL-MCNC: 2 MG/DL (ref 1.6–2.6)
MCH RBC QN AUTO: 28.3 PG (ref 26–35)
MCHC RBC AUTO-ENTMCNC: 32.1 G/DL (ref 32–34.5)
MCV RBC AUTO: 88.2 FL (ref 80–99.9)
MICROORGANISM SPEC CULT: NORMAL
MICROORGANISM SPEC CULT: NORMAL
MONOCYTES NFR BLD: 0.49 K/UL (ref 0.1–0.95)
MONOCYTES NFR BLD: 4 % (ref 2–12)
NEUTROPHILS NFR BLD: 91 % (ref 43–80)
NEUTS SEG NFR BLD: 12.18 K/UL (ref 1.8–7.3)
NON-GYN CYTOLOGY REPORT: NORMAL
PHOSPHATE SERPL-MCNC: 2.7 MG/DL (ref 2.5–4.5)
PHOSPHATE SERPL-MCNC: 3 MG/DL (ref 2.5–4.5)
PLATELET, FLUORESCENCE: 141 K/UL (ref 130–450)
PMV BLD AUTO: 10.5 FL (ref 7–12)
POTASSIUM SERPL-SCNC: 4.1 MMOL/L (ref 3.5–5)
POTASSIUM SERPL-SCNC: 4.3 MMOL/L (ref 3.5–5)
PROT SERPL-MCNC: 5.7 G/DL (ref 6.4–8.3)
RBC # BLD AUTO: 2.54 M/UL (ref 3.5–5.5)
SERVICE CMNT-IMP: NORMAL
SERVICE CMNT-IMP: NORMAL
SODIUM SERPL-SCNC: 134 MMOL/L (ref 132–146)
SODIUM SERPL-SCNC: 136 MMOL/L (ref 132–146)
SPECIMEN DESCRIPTION: NORMAL
SPECIMEN DESCRIPTION: NORMAL
TRANSFUSION STATUS: NORMAL
TROPONIN I SERPL HS-MCNC: 149 NG/L (ref 0–9)
TROPONIN I SERPL HS-MCNC: 161 NG/L (ref 0–9)
UNIT DIVISION: 0
UNIT ISSUE DATE/TIME: NORMAL
WBC OTHER # BLD: 13.5 K/UL (ref 4.5–11.5)

## 2025-03-28 PROCEDURE — 84484 ASSAY OF TROPONIN QUANT: CPT

## 2025-03-28 PROCEDURE — 6370000000 HC RX 637 (ALT 250 FOR IP): Performed by: INTERNAL MEDICINE

## 2025-03-28 PROCEDURE — 99232 SBSQ HOSP IP/OBS MODERATE 35: CPT | Performed by: CHIROPRACTOR

## 2025-03-28 PROCEDURE — 85025 COMPLETE CBC W/AUTO DIFF WBC: CPT

## 2025-03-28 PROCEDURE — 2500000003 HC RX 250 WO HCPCS

## 2025-03-28 PROCEDURE — 2500000003 HC RX 250 WO HCPCS: Performed by: INTERNAL MEDICINE

## 2025-03-28 PROCEDURE — 6360000002 HC RX W HCPCS

## 2025-03-28 PROCEDURE — 80048 BASIC METABOLIC PNL TOTAL CA: CPT

## 2025-03-28 PROCEDURE — 94003 VENT MGMT INPAT SUBQ DAY: CPT

## 2025-03-28 PROCEDURE — 2580000003 HC RX 258: Performed by: INTERNAL MEDICINE

## 2025-03-28 PROCEDURE — 84100 ASSAY OF PHOSPHORUS: CPT

## 2025-03-28 PROCEDURE — 6370000000 HC RX 637 (ALT 250 FOR IP)

## 2025-03-28 PROCEDURE — 80053 COMPREHEN METABOLIC PANEL: CPT

## 2025-03-28 PROCEDURE — 82962 GLUCOSE BLOOD TEST: CPT

## 2025-03-28 PROCEDURE — 99232 SBSQ HOSP IP/OBS MODERATE 35: CPT | Performed by: INTERNAL MEDICINE

## 2025-03-28 PROCEDURE — 2580000003 HC RX 258

## 2025-03-28 PROCEDURE — 6360000002 HC RX W HCPCS: Performed by: INTERNAL MEDICINE

## 2025-03-28 PROCEDURE — 83735 ASSAY OF MAGNESIUM: CPT

## 2025-03-28 PROCEDURE — 2000000000 HC ICU R&B

## 2025-03-28 PROCEDURE — 82248 BILIRUBIN DIRECT: CPT

## 2025-03-28 RX ORDER — NOREPINEPHRINE BITARTRATE 0.06 MG/ML
1-100 INJECTION, SOLUTION INTRAVENOUS CONTINUOUS
Status: DISCONTINUED | OUTPATIENT
Start: 2025-03-28 | End: 2025-03-29

## 2025-03-28 RX ORDER — IPRATROPIUM BROMIDE AND ALBUTEROL SULFATE 2.5; .5 MG/3ML; MG/3ML
1 SOLUTION RESPIRATORY (INHALATION) EVERY 4 HOURS PRN
Status: DISCONTINUED | OUTPATIENT
Start: 2025-03-28 | End: 2025-04-04 | Stop reason: HOSPADM

## 2025-03-28 RX ORDER — AMIODARONE HYDROCHLORIDE 200 MG/1
200 TABLET ORAL 2 TIMES DAILY
Status: DISCONTINUED | OUTPATIENT
Start: 2025-03-28 | End: 2025-03-30

## 2025-03-28 RX ORDER — METOPROLOL TARTRATE 1 MG/ML
5 INJECTION, SOLUTION INTRAVENOUS ONCE
Status: DISCONTINUED | OUTPATIENT
Start: 2025-03-28 | End: 2025-03-28

## 2025-03-28 RX ORDER — AMIODARONE HYDROCHLORIDE 200 MG/1
200 TABLET ORAL DAILY
Status: DISCONTINUED | OUTPATIENT
Start: 2025-04-12 | End: 2025-03-30

## 2025-03-28 RX ADMIN — AMIODARONE HYDROCHLORIDE 150 MG: 50 INJECTION, SOLUTION INTRAVENOUS at 01:12

## 2025-03-28 RX ADMIN — APIXABAN 5 MG: 5 TABLET, FILM COATED ORAL at 08:53

## 2025-03-28 RX ADMIN — AMIODARONE HYDROCHLORIDE 1 MG/MIN: 1.8 INJECTION, SOLUTION INTRAVENOUS at 06:33

## 2025-03-28 RX ADMIN — APIXABAN 5 MG: 5 TABLET, FILM COATED ORAL at 20:43

## 2025-03-28 RX ADMIN — ATORVASTATIN CALCIUM 80 MG: 40 TABLET, FILM COATED ORAL at 20:45

## 2025-03-28 RX ADMIN — MIDODRINE HYDROCHLORIDE 20 MG: 10 TABLET ORAL at 08:53

## 2025-03-28 RX ADMIN — SODIUM CHLORIDE, PRESERVATIVE FREE 40 MG: 5 INJECTION INTRAVENOUS at 08:54

## 2025-03-28 RX ADMIN — SODIUM CHLORIDE, PRESERVATIVE FREE 10 ML: 5 INJECTION INTRAVENOUS at 14:27

## 2025-03-28 RX ADMIN — AMIODARONE HYDROCHLORIDE 1 MG/MIN: 1.8 INJECTION, SOLUTION INTRAVENOUS at 01:27

## 2025-03-28 RX ADMIN — AMIODARONE HYDROCHLORIDE 200 MG: 200 TABLET ORAL at 20:43

## 2025-03-28 RX ADMIN — MIDODRINE HYDROCHLORIDE 20 MG: 10 TABLET ORAL at 18:47

## 2025-03-28 RX ADMIN — Medication 2000 UNITS: at 08:53

## 2025-03-28 RX ADMIN — Medication 5 MCG/MIN: at 01:19

## 2025-03-28 RX ADMIN — MIDODRINE HYDROCHLORIDE 20 MG: 10 TABLET ORAL at 14:24

## 2025-03-28 RX ADMIN — AMIODARONE HYDROCHLORIDE 0.5 MG/MIN: 1.8 INJECTION, SOLUTION INTRAVENOUS at 08:55

## 2025-03-28 RX ADMIN — Medication 100 MG: at 08:54

## 2025-03-28 RX ADMIN — SODIUM CHLORIDE, PRESERVATIVE FREE 10 ML: 5 INJECTION INTRAVENOUS at 20:43

## 2025-03-28 ASSESSMENT — PAIN SCALES - GENERAL
PAINLEVEL_OUTOF10: 0

## 2025-03-28 ASSESSMENT — PULMONARY FUNCTION TESTS
PIF_VALUE: 21
PIF_VALUE: 24
PIF_VALUE: 23
PIF_VALUE: 22
PIF_VALUE: 25
PIF_VALUE: 28
PIF_VALUE: 24
PIF_VALUE: 26
PIF_VALUE: 21
PIF_VALUE: 23
PIF_VALUE: 20
PIF_VALUE: 25
PIF_VALUE: 20
PIF_VALUE: 22
PIF_VALUE: 22
PIF_VALUE: 21
PIF_VALUE: 24
PIF_VALUE: 25
PIF_VALUE: 24
PIF_VALUE: 24
PIF_VALUE: 23
PIF_VALUE: 24
PIF_VALUE: 22
PIF_VALUE: 26
PIF_VALUE: 25
PIF_VALUE: 23
PIF_VALUE: 24
PIF_VALUE: 21
PIF_VALUE: 23
PIF_VALUE: 26

## 2025-03-28 NOTE — PROGRESS NOTES
[facility]  Internal Medicine Residency Program  Progress Note - House Team     Patient:  Nikki Moffett 79 y.o. female MRN: [id number]     Date of Service: 3/28/2025     CC:   Chief Complaint   Patient presents with    Altered Mental Status     \"Mood swings, memory loss and fatigue\" per patients grandson    Chest Pain     Ongoing for a couple weeks per family       Subjective     Patient was seen at bedside this morning.  Patient is s/p tracheostomy and PEG tube placement by general surgery.  Patient is still requiring a very low-dose of Levophed, had an episode of A-fib RVR overnight.  Patient is on amiodarone drip.    Objective     Physical Exam:  Vitals: BP (!) 98/59   Pulse 89   Temp 97.3 °F (36.3 °C) (Temporal)   Resp 28   Ht 1.575 m (5' 2\")   Wt 67.6 kg (149 lb 0.5 oz)   SpO2 100%   BMI 27.26 kg/m²     I & O - 24hr: In: 1628.8   Out: 2550   General appearance: Patient responds to simple commands, does respond to questions by yes and no, weakness on right extremity.  Neuro:Patient responds to simple commands, does respond to questions by yes and no, weakness on right extremity.  Cardiovascular: Regular rate and rhythm, S1 and S2  heard, no murmurs appreciated  Respiratory: Rales present bilaterally.  Abdomen: Soft, non tender, bowel sounds normal; masses, no organomegaly, rebound or guarding  Extremities: Extremities normal, no cyanosis, distal pulses intact, no edema    Pertinent Labs & Imaging Studies      Complete Blood Count:   Recent Labs     03/26/25  0434 03/27/25  0421 03/27/25  0925 03/28/25  0426   WBC 16.8* 14.6*  --  13.5*   HGB 7.6* 6.6* 7.2* 7.2*   HCT 24.0* 20.7* 22.7* 22.4*    175  --   --         Last 3 Blood Glucose:   Recent Labs     03/27/25  0421 03/28/25  0054 03/28/25  0426   GLUCOSE 95 95 122*        PT/INR:    Lab Results   Component Value Date/Time    PROTIME 13.4 03/26/2025 04:34 AM    INR 1.2 03/26/2025 04:34 AM     PTT:    Lab Results   Component

## 2025-03-28 NOTE — PROGRESS NOTES
Progress Note  3/28/2025 7:55 AM  Subjective:   Admit Date: 3/4/2025  PCP: Tiesha Panda MD    Subjective    3/24/25:: Patient seen and examined ; continues to require pressors; remains intubated    3/25: remains intubated, still requiring pressors; HD in progress, off sedation now    3/26: Trach and PEG this a.m.; continues to require pressors; being weaned; for thoracentesis today    3/27: No new acute issues from overnight    3/28: No new acute issues from overnight; vasopressors, being weaned    Diet: Diet NPO  ADULT TUBE FEEDING; Nasogastric; Peptide Based; Continuous; 10; Yes; 10; Q 4 hours; 35; 30; Q 4 hours; Protein; 1 Dose; Daily    Data:   Scheduled Meds:   [Held by provider] amiodarone  200 mg PEG Tube Daily    atorvastatin  80 mg PEG Tube Nightly    levETIRAcetam  500 mg PEG Tube BID    midodrine  20 mg PEG Tube TID WC    thiamine  100 mg Per G Tube Daily    vitamin D  2,000 Units PEG Tube Daily    apixaban  5 mg Oral BID    pantoprazole (PROTONIX) 40 mg in sodium chloride (PF) 0.9 % 10 mL injection  40 mg IntraVENous Daily    [Held by provider] lactulose  20 g Oral TID    insulin lispro  0-4 Units SubCUTAneous Q6H    sodium chloride flush  5-40 mL IntraVENous 2 times per day    [Held by provider] furosemide  40 mg IntraVENous Daily    [Held by provider] metoprolol succinate  25 mg Oral Daily     Continuous Infusions:   amiodarone      norepinephrine 3 mcg/min (03/28/25 0650)    sodium chloride      sodium chloride Stopped (03/25/25 1647)    dextrose       PRN Meds:ipratropium 0.5 mg-albuterol 2.5 mg, sodium chloride, heparin (PF), heparin (porcine) **AND** heparin (porcine), sodium chloride, polyethylene glycol, sodium chloride flush, [DISCONTINUED] ondansetron **OR** ondansetron, acetaminophen **OR** acetaminophen, dextrose bolus **OR** dextrose bolus, glucagon (rDNA), dextrose  I/O last 3 completed shifts:  In: 1791.5 [I.V.:466.9; Blood:342.2; NG/GT:484; IV Piggyback:198.5]  Out: 2750 [Other:400;  hypotension  For cosyntropin stim test  Now requiring :levophed  Underlying HFrEF  TTE from 3/9 EF 10-15%        4)   Mild to moderate todd PL effusion  possible HF related  For thoracentesis today    5) Acute respiratory failure  -intubated for worsening mental status and need for airway protection  -s/p tranch and PEG 3/26          Procedure:dialysis/SLED        Dewayne Simpson MD

## 2025-03-28 NOTE — PROGRESS NOTES
Physical Therapy    PT consult to evaluate/treat received and appreciated.  Pt chart reviewed and dicussed at quality flow rounds.  Per chart, pt having increased HR and hypotension.  Will hold for now.  Will check back as able.  Thank you.        Chema Coleman, PT, DPT   [id number]

## 2025-03-28 NOTE — PROGRESS NOTES
DAILY VENTILATOR WEANING ASSESSMENT PERFORMED    P/FIO2 Ratio =  no morning abg        (<100= do not Wean)                  Cs =    25                      (<32= Instability)  Plat. Pressure = 17  MV =4.57      Instabilities:       Cardiovascular =       CNS =       Respiratory =       Metabolic =      Ask Physician for a weaning plan yes    Was SAT completed yes    Was SBT completed no    Additional Comments:     Performed by Galina Espinosa RCP RRT      Reference Table:    Cardiovascular     CNS      1. Mean BP less than or equal to 75   1. Neuromuscular blockade  2. Heart Rate greater than 130   2. RASS of -3, -4, -5  3. Myocardial Ischemia    3. RASS of +3, +4  4. Mechanical Assist Device    4. ICP greater than 15 or             Intracranial Hypertension         Respiratory      Metabolic  1. PEEP equal to or greater than 10cm/H20  1.Temp. (8hrs) less than 95 or > 103  2. Respiratory Rate greater than 35   2. WBC < 5000 or > 05598  3. Minute Volume greater than 15L  4. pH less than 7.30  5. Deteriorating chest X-ray

## 2025-03-28 NOTE — PROGRESS NOTES
OT consult received and appreciated. Chart reviewed, discussed at AM rounds and consulted RN. Pt is not appropriate at this time d/t medical status. Will evaluate at a later time.     Mary Reynolds, OTR/L # [id number]

## 2025-03-28 NOTE — PROGRESS NOTES
[facility]   Department of Internal Medicine   Internal Medicine Residency  MICU Progress Note    Patient:  Nikki Moffett 79 y.o. female   MRN: [id number]       Date of Service: 3/28/2025    Allergy: Patient has no known allergies.    Subjective     Overnight, pt received x2 albumin due to low MAP, went into a fib RVR rate 120-140s with MAP in low 50s, EP contacted, amiodarone bolus and ggt +levophed were started.     S/p trach/PEG, R-sided thoracentesis 3/26   S/p L-side thoracentesis 3/27.   Patient was seen and examined this morning at bedside in no acute distress, no family present. Denies any pain or SOB.     Objective     I & O - 24hr:    Intake/Output Summary (Last 24 hours) at 3/28/2025 0900  Last data filed at 3/28/2025 0650  Gross per 24 hour   Intake 1286.64 ml   Output 2550 ml   Net -1263.36 ml       Physical Exam  Vitals: BP (!) 98/59   Pulse 76   Temp 97.3 °F (36.3 °C) (Temporal)   Resp 28   Ht 1.575 m (5' 2\")   Wt 67.6 kg (149 lb 0.5 oz)   SpO2 100%   BMI 27.26 kg/m²     General Appearance: s/p trach, spontaneous eye opening, shakes head yes and no to questions, attempts to follow commands but is profoundly weak, able to move LUE, no movement of RUE   HEENT: moist mucus membranes   Neck: trach present    Lung: mechanical breath sounds, L sided rhonchi    Heart: RRR, capillary refill <2  Abdomen: soft, appears nontender, BS+   Extremities: +2 pitting edema     Medications     Continuous Infusions:   amiodarone 0.5 mg/min (03/28/25 0855)    norepinephrine 3 mcg/min (03/28/25 0650)    sodium chloride      sodium chloride Stopped (03/25/25 1647)    dextrose       Scheduled Meds:   [Held by provider] amiodarone  200 mg PEG Tube Daily    atorvastatin  80 mg PEG Tube Nightly    [Held by provider] levETIRAcetam  500 mg PEG Tube BID    midodrine  20 mg PEG Tube TID WC    thiamine  100 mg Per G Tube Daily    vitamin D  2,000 Units PEG Tube Daily    apixaban  5 mg Oral BID    pantoprazole  left lower lobe pneumonia and moderate right lower   lobe pneumonia.   3. Slight increase in size of small bilateral pleural effusions.   4. Stable mild congestive failure.         XR ABDOMEN FOR NG/OG/NE TUBE PLACEMENT   Final Result   Enteric tube in the stomach as above.      No evidence of bowel obstruction.      Right pleural effusion with right basilar opacity with consolidation that may   represent pneumonia.         US ABDOMEN LIMITED   Final Result   1.  Echogenic liver parenchyma with somewhat scalloped liver margins.  Small   volume simple appearing intra-abdominal ascites in the right upper quadrant.      (Could reflect changes related to an underlying infiltrative pathology.   Hepatic steatosis or possibly early cirrhosis.  Please correlate with   patient's liver function test.)      2.  Right pleural effusion present.      3.  No intrahepatic or extrahepatic biliary dilatation.  Gallbladder is   contracted on this study.      4.  Normal appearance of the imaged kidney.  No hydronephrosis.         XR CHEST PORTABLE   Final Result   No significant change over the past 24 hours.         XR CHEST PORTABLE   Final Result   Right-sided vascular catheter tip overlies the region of the right atrium or   cavoatrial junction.      Cardiomediastinal silhouette is enlarged.      Perihilar and bibasilar opacities similar to prior.  Bilateral small pleural   effusions.  No gross pneumothorax.         XR CHEST PORTABLE   Final Result   Further progression of findings that indicate volume overload since March 7   study.  Please correlate clinically.         XR CHEST PORTABLE   Final Result   Borderline size cardiac area with mild to moderate bilateral pleural   effusions.         XR CHEST PORTABLE   Final Result   1. No acute cardiopulmonary process.   2. Bibasilar scarring and pleural thickening, unchanged.         XR CHEST (2 VW)   Final Result   Bilateral pleural effusions. The 1 on the left is slightly smaller  physician: Dr. Beltran     I personally saw, examined and provided care for the patient. Radiographs, labs and medication list were reviewed by me independently. I spoke with bedside nursing, therapists and consultants. Critical care services and times documented are independent of procedures and multidisciplinary rounds with Residents. Additionally comprehensive, multidisciplinary rounds were conducted with the MICU team. The case was discussed in detail and plans for care were established. Review of Residents documentation was conducted and revisions were made as appropriate. I agree with the above documented exam, problem list and plan of care.I performed the substantive portion of the visit.      During multidisciplinary team rounds the patient was seen, examined and discussed. This is confirmation that I have personally seen and examined the patient and that the key elements of the encounter were performed by me (> 85 % time).  The medications & laboratory data and imagery was discussed and adjusted where necessary. Key issues of the case were discussed among consultants.     This patient has a high probability of sudden clinically significant deterioration. I managed/supervised life or organ supporting interventions that required frequent physician assessment. I devoted my full attention to the direct care of this patient for the period of time indicated below. In addition to above, time was devoted to teaching and to any procedure.      NOTE: This report, in part or full, may have been transcribed using voice recognition software. Every effort was made to ensure accuracy; however, inadvertent computerized transcription errors may be present. Please excuse any transcriptional grammatical or spelling errors that may have escaped my editorial review.     Total critical care time caring for this patient with life threatening, unstable organ failure, including direct patient contact, management of life support

## 2025-03-28 NOTE — PLAN OF CARE
Spoke with pt's son, Shreyas, provided updates. Will be coming in today around 1:30pm to visit pt. Has not spoke to Case Management yet about facilities but plans on addressing soon. All questions answered.     Electronically signed by Rosetta Lopez MD on 3/28/2025 at 9:14 AM

## 2025-03-28 NOTE — PLAN OF CARE
Problem: Chronic Conditions and Co-morbidities  Goal: Patient's chronic conditions and co-morbidity symptoms are monitored and maintained or improved  3/27/2025 2247 by Brady Pope RN  Outcome: Not Progressing  3/27/2025 1336 by Patria Hernandez RN  Outcome: Not Progressing     Problem: Metabolic/Fluid and Electrolytes - Adult  Goal: Hemodynamic stability and optimal renal function maintained  3/27/2025 2247 by Brady Pope RN  Outcome: Not Progressing  3/27/2025 1336 by Patria Hernandez RN  Outcome: Progressing     Problem: Hematologic - Adult  Goal: Maintains hematologic stability  3/27/2025 2247 by Brady Pope RN  Outcome: Not Progressing  3/27/2025 1336 by Patria Hernandez RN  Outcome: Not Progressing     Problem: Neurosensory - Adult  Goal: Achieves maximal functionality and self care  3/27/2025 2247 by Brady Pope RN  Outcome: Not Progressing  3/27/2025 1336 by Patria Hernandez RN  Outcome: Not Progressing     Problem: Musculoskeletal - Adult  Goal: Return mobility to safest level of function  3/27/2025 2247 by Brady Pope RN  Outcome: Not Progressing  3/27/2025 1336 by Patria Hernandez RN  Outcome: Not Progressing  Goal: Return ADL status to a safe level of function  3/27/2025 2247 by Brady Pope RN  Outcome: Not Progressing  3/27/2025 1336 by Patria Hernandez RN  Outcome: Not Progressing     Problem: Gastrointestinal - Adult  Goal: Maintains or returns to baseline bowel function  3/27/2025 2247 by Brady Pope RN  Outcome: Not Progressing  3/27/2025 1336 by Patria Hernandez RN  Outcome: Not Progressing     Problem: Coping  Goal: Pt/Family able to verbalize concerns and demonstrate effective coping strategies  Description: INTERVENTIONS:  1. Assist patient/family to identify coping skills, available support systems and cultural and spiritual values  2. Provide emotional support, including active listening and acknowledgement of concerns of patient and caregivers  3. Reduce environmental stimuli, as  [place]  Outcome: Not Progressing     Problem: Infection - Adult  Goal: Absence of infection at discharge  3/27/2025 2247 by Brady Pope RN  Outcome: Progressing  3/27/2025 1336 by Patria Hernandez RN  Outcome: Progressing  Goal: Absence of infection during hospitalization  3/27/2025 2247 by Brady Pope RN  Outcome: Progressing  3/27/2025 1336 by Patria Hernandez RN  Outcome: Progressing  Goal: Absence of fever/infection during anticipated neutropenic period  3/27/2025 2247 by Brady Pope RN  Outcome: Progressing  3/27/2025 1336 by Patria Hernandez RN  Outcome: Progressing     Problem: Pain  Goal: Verbalizes/displays adequate comfort level or baseline comfort level  3/27/2025 2247 by Brady Pope RN  Outcome: Progressing  3/27/2025 1336 by Patria Hernandez RN  Outcome: Progressing     Problem: Skin/Tissue Integrity - Adult  Goal: Skin integrity remains intact  Description: 1.  Monitor for areas of redness and/or skin breakdown  2.  Assess vascular access sites hourly  3.  Every 4-6 hours minimum:  Change oxygen saturation probe site  4.  Every 4-6 hours:  If on nasal continuous positive airway pressure, respiratory therapy assess nares and determine need for appliance change or resting period  3/27/2025 2247 by Brady Pope RN  Outcome: Progressing  3/27/2025 1336 by Patria Hernandez RN  Outcome: Progressing  Goal: Incisions, wounds, or drain sites healing without S/S of infection  Outcome: Progressing  Goal: Oral mucous membranes remain intact  Outcome: Progressing     Problem: Anxiety  Goal: Will report anxiety at manageable levels  Description: INTERVENTIONS:  1. Administer medication as ordered  2. Teach and rehearse alternative coping skills  3. Provide emotional support with 1:1 interaction with staff  3/27/2025 2247 by Brady Pope RN  Outcome: Progressing  3/27/2025 1336 by Patria Hernandez RN  Outcome: Progressing     Problem: Spiritual Care  Goal: Pt/Family able to move forward in process of

## 2025-03-28 NOTE — CARE COORDINATION
Care Coordination: LOS 24 day. Spoke to son Shreyas, he [facility] facility.  If [facility] is not approved he is aware that she will need to go directly to skilled nursing facility. He verbalized understanding and his goal is to eventually bring her home. Will need wean attempts document x 3-4, then [facility] will initiate precert    Electronically signed by Cristela Bradford RN on 3/28/2025 at 1:30 PM

## 2025-03-28 NOTE — PLAN OF CARE
I was notified about heart rate sustaining around 120-140 bpm at 12:32 am. STAT EKG, and labs including BMP, Mg, Phos, and Troponin was ordered. Patient's BP at that time was 68/45, MAP was 50-54, EKG showed Atrial fibrillation with RVR, Amiodarone bolus with infusion was ordered, discussed with Dr. Hendricks, agreed with plan, patient's heart rate was 140-150 bpm, became more hypotensive, started on Levophed, Later on her HR was down 112-120 bpm. Patient was awake, alert the whole time, and was following commands by knodding her head.     BP was 122/78, MAP was 94, weaning off with levophed, and patient started on amiodarone infusion. Will continue monitor.     Electronically signed by Ina Clark MD on 3/28/2025 at 2:19 AM

## 2025-03-28 NOTE — PROGRESS NOTES
[facility]        CARDIOLOGY                 INPATIENT PROGRESS NOTE          PATIENT SEEN IN FOLLOW UP FOR: CMP, CHF    Hospital Day: 25     Nikki Moffett is a 79 year old patient known to Dr. Carnes from this admission      SUBJECTIVE: Denies any CPp -On Vent via Trach. No distress. No family at bed sdide. On Amiodarone for A fib with RVR.    ROS: Review of rest of 10 systems negative except as mentioned above    OBJECTIVE: No acute distress.  See Assessment     Diagnostics:       Telemetry: Reviewed      TTE-3/7/2025:    Left Ventricle: Normal left ventricular systolic function with a visually estimated EF of 10 -15%. Left ventricle size is normal. Normal wall thickness. Severe global hypokinesis present. E/e' ratio >11, suggesting increased LAP.    Right Ventricle: Right ventricle size is normal. Reduced systolic function.    Aortic Valve: No significant stenosis. 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    Mitral Valve: Mild regurgitation.    Tricuspid Valve: Moderate regurgitation. Normal RVSP. The estimated RVSP is 25 mmHg.    Left Atrium: Left atrium is severely dilated.    Right Atrium: Right atrium is mildly dilated.    Pericardium: No pericardial effusion.    Image quality is suboptimal. Contrast used: Lumason.     Limited TTE Dr. Carnes 2/29/2024    Left Ventricle: Normal left ventricular systolic function with a visually estimated EF of 55 - 60%. Left ventricle size is normal. Moderately increased ventricular mass. Findings consistent with moderate concentric hypertrophy. Normal wall motion. Indeterminate diastolic function.    Right Ventricle: Right ventricle size is normal. Normal systolic function. TAPSE is 2.5 cm.    Aortic Valve: No stenosis.    Tricuspid Valve: Normal RVSP. The estimated RVSP is 33 mmHg.    Left Atrium: Left atrium is mildly dilated.    Interatrial Septum: Interatrial shunt visualized with color Doppler with left to right  shunt.  No right to left shunt on bubble study.    Pericardium: No pericardial effusion.    Image quality is good.     Lexiscan MPS 2/2024    Stress Function: Left ventricular function post-stress is normal. Post-stress ejection fraction is 69%.    Perfusion Comments: LV perfusion is normal. There is no evidence of inducible ischemia.    ECG: Resting ECG demonstrates normal sinus rhythm and interventricular conduction delay.    ECG: The ECG was negative for ischemia.    Stress Test: A pharmacological stress test was performed using lexiscan. The patient reported no symptoms during the stress test. Blood pressure demonstrated a normal response to stress            Intake/Output Summary (Last 24 hours) at 3/28/2025 0904  Last data filed at 3/28/2025 0650  Gross per 24 hour   Intake 1286.64 ml   Output 2550 ml   Net -1263.36 ml       Labs:   CBC:   Recent Labs     03/26/25 0434 03/27/25 0421 03/27/25  0925 03/28/25 0426   WBC 16.8* 14.6*  --  13.5*   HGB 7.6* 6.6* 7.2* 7.2*   HCT 24.0* 20.7* 22.7* 22.4*    175  --   --      BMP:   Recent Labs     03/28/25 0054 03/28/25 0426    136   K 4.3 4.1   CO2 23 23   BUN 23 26*   CREATININE 1.7* 1.9*   LABGLOM 30* 27*   CALCIUM 8.6 8.6     Mag:   Recent Labs     03/28/25 0054 03/28/25 0426   MG 2.0 2.0     Phos:   Recent Labs     03/28/25 0054 03/28/25 0426   PHOS 3.0 2.7     TSH: No results for input(s): \"TSH\" in the last 72 hours.  HgA1c:     BNP: No results for input(s): \"BNP\" in the last 72 hours.  PT/INR:   Recent Labs     03/26/25 0434   PROTIME 13.4*   INR 1.2     APTT:No results for input(s): \"APTT\" in the last 72 hours.  CARDIAC ENZYMES:  Recent Labs     03/28/25 0054 03/28/25 0426   TROPHS 161* 149*     FASTING LIPID PANEL:  Lab Results   Component Value Date/Time    CHOL 159 12/22/2023 04:47 AM    CHOL 278 05/11/2017 04:46 PM    HDL 24 03/16/2025 08:15 AM    TRIG 112 12/22/2023 04:47 AM     LIVER PROFILE:  Recent Labs     03/27/25  0421

## 2025-03-29 ENCOUNTER — APPOINTMENT (OUTPATIENT)
Dept: GENERAL RADIOLOGY | Age: 80
DRG: 004 | End: 2025-03-29
Payer: MEDICARE

## 2025-03-29 LAB
ALBUMIN SERPL-MCNC: 3.1 G/DL (ref 3.5–5.2)
ALP SERPL-CCNC: 107 U/L (ref 35–104)
ALT SERPL-CCNC: 9 U/L (ref 0–32)
ANION GAP SERPL CALCULATED.3IONS-SCNC: 15 MMOL/L (ref 7–16)
AST SERPL-CCNC: 49 U/L (ref 0–31)
BASOPHILS # BLD: 0.03 K/UL (ref 0–0.2)
BASOPHILS NFR BLD: 0 % (ref 0–2)
BILIRUB DIRECT SERPL-MCNC: 0.3 MG/DL (ref 0–0.3)
BILIRUB INDIRECT SERPL-MCNC: 0.4 MG/DL (ref 0–1)
BILIRUB SERPL-MCNC: 0.7 MG/DL (ref 0–1.2)
BUN SERPL-MCNC: 38 MG/DL (ref 6–23)
CALCIUM SERPL-MCNC: 8.4 MG/DL (ref 8.6–10.2)
CHLORIDE SERPL-SCNC: 93 MMOL/L (ref 98–107)
CO2 SERPL-SCNC: 22 MMOL/L (ref 22–29)
CREAT SERPL-MCNC: 2.5 MG/DL (ref 0.5–1)
EOSINOPHIL # BLD: 0.13 K/UL (ref 0.05–0.5)
EOSINOPHILS RELATIVE PERCENT: 1 % (ref 0–6)
ERYTHROCYTE [DISTWIDTH] IN BLOOD BY AUTOMATED COUNT: 19.1 % (ref 11.5–15)
GFR, ESTIMATED: 19 ML/MIN/1.73M2
GLUCOSE BLD-MCNC: 111 MG/DL (ref 74–99)
GLUCOSE BLD-MCNC: 120 MG/DL (ref 74–99)
GLUCOSE BLD-MCNC: 125 MG/DL (ref 74–99)
GLUCOSE SERPL-MCNC: 97 MG/DL (ref 74–99)
HCT VFR BLD AUTO: 23.2 % (ref 34–48)
HGB BLD-MCNC: 7.5 G/DL (ref 11.5–15.5)
IMM GRANULOCYTES # BLD AUTO: 0.07 K/UL (ref 0–0.58)
IMM GRANULOCYTES NFR BLD: 1 % (ref 0–5)
LYMPHOCYTES NFR BLD: 0.81 K/UL (ref 1.5–4)
LYMPHOCYTES RELATIVE PERCENT: 6 % (ref 20–42)
MAGNESIUM SERPL-MCNC: 2.1 MG/DL (ref 1.6–2.6)
MCH RBC QN AUTO: 28.6 PG (ref 26–35)
MCHC RBC AUTO-ENTMCNC: 32.3 G/DL (ref 32–34.5)
MCV RBC AUTO: 88.5 FL (ref 80–99.9)
MICROORGANISM SPEC CULT: NO GROWTH
MICROORGANISM/AGENT SPEC: NORMAL
MONOCYTES NFR BLD: 0.43 K/UL (ref 0.1–0.95)
MONOCYTES NFR BLD: 3 % (ref 2–12)
NEUTROPHILS NFR BLD: 89 % (ref 43–80)
NEUTS SEG NFR BLD: 11.8 K/UL (ref 1.8–7.3)
PHOSPHATE SERPL-MCNC: 2.6 MG/DL (ref 2.5–4.5)
PLATELET, FLUORESCENCE: 138 K/UL (ref 130–450)
PMV BLD AUTO: 10.4 FL (ref 7–12)
POTASSIUM SERPL-SCNC: 4.1 MMOL/L (ref 3.5–5)
PROT SERPL-MCNC: 5.2 G/DL (ref 6.4–8.3)
RBC # BLD AUTO: 2.62 M/UL (ref 3.5–5.5)
REPORT STATUS: NORMAL
SERVICE CMNT-IMP: NORMAL
SODIUM SERPL-SCNC: 130 MMOL/L (ref 132–146)
SPECIMEN DESCRIPTION: NORMAL
WBC OTHER # BLD: 13.3 K/UL (ref 4.5–11.5)

## 2025-03-29 PROCEDURE — 83735 ASSAY OF MAGNESIUM: CPT

## 2025-03-29 PROCEDURE — 2580000003 HC RX 258

## 2025-03-29 PROCEDURE — 2580000003 HC RX 258: Performed by: INTERNAL MEDICINE

## 2025-03-29 PROCEDURE — 85025 COMPLETE CBC W/AUTO DIFF WBC: CPT

## 2025-03-29 PROCEDURE — 99232 SBSQ HOSP IP/OBS MODERATE 35: CPT | Performed by: CHIROPRACTOR

## 2025-03-29 PROCEDURE — 82962 GLUCOSE BLOOD TEST: CPT

## 2025-03-29 PROCEDURE — 90935 HEMODIALYSIS ONE EVALUATION: CPT

## 2025-03-29 PROCEDURE — 80053 COMPREHEN METABOLIC PANEL: CPT

## 2025-03-29 PROCEDURE — 6370000000 HC RX 637 (ALT 250 FOR IP): Performed by: INTERNAL MEDICINE

## 2025-03-29 PROCEDURE — 94003 VENT MGMT INPAT SUBQ DAY: CPT

## 2025-03-29 PROCEDURE — 2000000000 HC ICU R&B

## 2025-03-29 PROCEDURE — 6360000002 HC RX W HCPCS

## 2025-03-29 PROCEDURE — 82248 BILIRUBIN DIRECT: CPT

## 2025-03-29 PROCEDURE — 99232 SBSQ HOSP IP/OBS MODERATE 35: CPT | Performed by: INTERNAL MEDICINE

## 2025-03-29 PROCEDURE — 6360000002 HC RX W HCPCS: Performed by: INTERNAL MEDICINE

## 2025-03-29 PROCEDURE — 2500000003 HC RX 250 WO HCPCS

## 2025-03-29 PROCEDURE — 2500000003 HC RX 250 WO HCPCS: Performed by: INTERNAL MEDICINE

## 2025-03-29 PROCEDURE — 71045 X-RAY EXAM CHEST 1 VIEW: CPT

## 2025-03-29 PROCEDURE — 6370000000 HC RX 637 (ALT 250 FOR IP)

## 2025-03-29 PROCEDURE — 84100 ASSAY OF PHOSPHORUS: CPT

## 2025-03-29 RX ORDER — 0.9 % SODIUM CHLORIDE 0.9 %
250 INTRAVENOUS SOLUTION INTRAVENOUS ONCE
Status: COMPLETED | OUTPATIENT
Start: 2025-03-29 | End: 2025-03-29

## 2025-03-29 RX ORDER — AMIODARONE HYDROCHLORIDE 150 MG/3ML
INJECTION, SOLUTION INTRAVENOUS
Status: COMPLETED
Start: 2025-03-29 | End: 2025-03-29

## 2025-03-29 RX ORDER — METOPROLOL TARTRATE 1 MG/ML
2.5 INJECTION, SOLUTION INTRAVENOUS ONCE
Status: COMPLETED | OUTPATIENT
Start: 2025-03-29 | End: 2025-03-29

## 2025-03-29 RX ADMIN — ATORVASTATIN CALCIUM 80 MG: 40 TABLET, FILM COATED ORAL at 20:25

## 2025-03-29 RX ADMIN — MIDODRINE HYDROCHLORIDE 20 MG: 10 TABLET ORAL at 18:16

## 2025-03-29 RX ADMIN — METOPROLOL TARTRATE 2.5 MG: 5 INJECTION, SOLUTION INTRAVENOUS at 22:34

## 2025-03-29 RX ADMIN — ACETAMINOPHEN 650 MG: 325 TABLET ORAL at 10:09

## 2025-03-29 RX ADMIN — SODIUM CHLORIDE, PRESERVATIVE FREE 10 ML: 5 INJECTION INTRAVENOUS at 15:00

## 2025-03-29 RX ADMIN — AMIODARONE HYDROCHLORIDE 150 MG: 50 INJECTION, SOLUTION INTRAVENOUS at 16:29

## 2025-03-29 RX ADMIN — SODIUM CHLORIDE: 9 INJECTION, SOLUTION INTRAVENOUS at 20:18

## 2025-03-29 RX ADMIN — AMIODARONE HYDROCHLORIDE 1 MG/MIN: 1.8 INJECTION, SOLUTION INTRAVENOUS at 18:26

## 2025-03-29 RX ADMIN — APIXABAN 5 MG: 5 TABLET, FILM COATED ORAL at 14:58

## 2025-03-29 RX ADMIN — AMIODARONE HYDROCHLORIDE 200 MG: 200 TABLET ORAL at 12:51

## 2025-03-29 RX ADMIN — MIDODRINE HYDROCHLORIDE 20 MG: 10 TABLET ORAL at 12:51

## 2025-03-29 RX ADMIN — AMIODARONE HYDROCHLORIDE 200 MG: 200 TABLET ORAL at 20:25

## 2025-03-29 RX ADMIN — Medication 2000 UNITS: at 14:57

## 2025-03-29 RX ADMIN — SODIUM CHLORIDE, PRESERVATIVE FREE 10 ML: 5 INJECTION INTRAVENOUS at 20:25

## 2025-03-29 RX ADMIN — SODIUM CHLORIDE, PRESERVATIVE FREE 40 MG: 5 INJECTION INTRAVENOUS at 14:59

## 2025-03-29 RX ADMIN — SODIUM CHLORIDE 250 ML: 0.9 INJECTION, SOLUTION INTRAVENOUS at 18:13

## 2025-03-29 RX ADMIN — Medication 100 MG: at 14:57

## 2025-03-29 RX ADMIN — MIDODRINE HYDROCHLORIDE 20 MG: 10 TABLET ORAL at 10:08

## 2025-03-29 RX ADMIN — APIXABAN 5 MG: 5 TABLET, FILM COATED ORAL at 20:25

## 2025-03-29 ASSESSMENT — PULMONARY FUNCTION TESTS
PIF_VALUE: 27
PIF_VALUE: 31
PIF_VALUE: 21
PIF_VALUE: 22
PIF_VALUE: 21
PIF_VALUE: 24
PIF_VALUE: 22
PIF_VALUE: 23
PIF_VALUE: 22
PIF_VALUE: 25
PIF_VALUE: 23
PIF_VALUE: 20
PIF_VALUE: 24
PIF_VALUE: 22
PIF_VALUE: 26
PIF_VALUE: 21
PIF_VALUE: 23
PIF_VALUE: 23
PIF_VALUE: 22
PIF_VALUE: 27
PIF_VALUE: 32
PIF_VALUE: 22
PIF_VALUE: 25
PIF_VALUE: 22
PIF_VALUE: 26
PIF_VALUE: 35
PIF_VALUE: 25
PIF_VALUE: 26
PIF_VALUE: 23
PIF_VALUE: 27
PIF_VALUE: 23
PIF_VALUE: 23
PIF_VALUE: 24
PIF_VALUE: 21

## 2025-03-29 ASSESSMENT — PAIN SCALES - GENERAL
PAINLEVEL_OUTOF10: 0

## 2025-03-29 NOTE — PROGRESS NOTES
[facility]   Department of Internal Medicine   Internal Medicine Residency  MICU Progress Note    Patient:  Nikki Moffett 79 y.o. female   MRN: [id number]       Date of Service: 3/29/2025    Allergy: Patient has no known allergies.    Subjective     Overnight, patient was hemodynamically stable.  No acute concerns  Patient was seen this morning.  She has s/p trach and PEG.  She is hemodynamically stable.  She is awake and follows commands.     Objective     I & O - 24hr:    Intake/Output Summary (Last 24 hours) at 3/29/2025 1255  Last data filed at 3/29/2025 0600  Gross per 24 hour   Intake 120 ml   Output 400 ml   Net -280 ml       Physical Exam  Vitals: /66   Pulse 90   Temp 97.6 °F (36.4 °C) (Axillary)   Resp 22   Ht 1.575 m (5' 2\")   Wt 67.6 kg (149 lb 0.5 oz)   SpO2 100%   BMI 27.26 kg/m²     General Appearance: s/p trach, spontaneous eye opening, shakes head yes and no to questions, attempts to follow commands but is profoundly weak, able to move LUE, no movement of RUE   HEENT: moist mucus membranes   Neck: trach present    Lung: mechanical breath sounds, bilateral rhonchi present  Heart: RRR, capillary refill <2  Abdomen: soft, appears nontender, BS+ , PEG present  Extremities: +2 pitting edema     Medications     Continuous Infusions:   sodium chloride      sodium chloride Stopped (03/25/25 1647)    dextrose       Scheduled Meds:   amiodarone  200 mg PEG Tube BID    Followed by    [START ON 4/12/2025] amiodarone  200 mg PEG Tube Daily    atorvastatin  80 mg PEG Tube Nightly    [Held by provider] levETIRAcetam  500 mg PEG Tube BID    midodrine  20 mg PEG Tube TID WC    thiamine  100 mg Per G Tube Daily    vitamin D  2,000 Units PEG Tube Daily    apixaban  5 mg Oral BID    pantoprazole (PROTONIX) 40 mg in sodium chloride (PF) 0.9 % 10 mL injection  40 mg IntraVENous Daily    [Held by provider] lactulose  20 g Oral TID    insulin lispro  0-4 Units SubCUTAneous Q6H    sodium  steatosis, US abd 3/11    Plan:  S/p trach & PEG, trach suture removal 4/3.   Off levophed, continues on midodrine 20 TID, MAP goal of 60.  Ensure blood pressures are not taken on flaccid RUE, CARLIN has brachial PICC, to use L forearm. MAP difference this morning 57(RUE) 72(L forearm).    Avoid QTC prolonging medications, zofran dc'd, keppra held.   Reached out to Neurology to confirm hold vs discontinue keppra; BID keppra for seizure prophylaxis started 3/15; 3/15 MRI acute infarct L occipital lobe, no hemmorhage, no TBI, no recent cranial surgery, no seizures on EEG or witnessed, mentation has improved.  Cardiology following, GDMT as tolerated, L heart cath at some point,  amiodarone restarted 3/24, monitoring LFTs closely.  Switched amiodarone IV to oral  Re-cultured 3/23 for concerns of persistent leukocytosis, remains afebrile, respiratory with candida.likely colonization, no growth pleural fluid cultures.  X-ray ordered, will follow.   Nephrology following for intermittent HD, much improved fluid balance.  Patient is not making urine, hold off on Lasix.  Palliative care following for goals of care.  Transferred to floors    Antimicrobials: completed 7 days of zosyn 3/19, monitoring off abx.   I/Os: Total +674cc admission, last 24hrs -921cc, remains anuric.   LDA: PICC L brachial day 12, tunneled HD 8, FMS   DVT prophylaxis: eliquis 5 BID  GI prophylaxis: protonix IV daily   Diet:   Diet NPO  ADULT TUBE FEEDING; Nasogastric; Peptide Based; Continuous; 10; Yes; 10; Q 4 hours; 35; 30; Q 3 hours; Protein; 1 Dose; Daily   Bowel regimen: PRN  Pain management: as needed  Code status: Full Code   Disposition: Continue current care.   Family: updated son 3/25 did not show for meeting 3/26     Bess Tapia MD, PGY-2   Attending physician: Dr. Beltran     I personally saw, examined and provided care for the patient. Radiographs, labs and medication list were reviewed by me independently. I spoke with bedside nursing,

## 2025-03-29 NOTE — PROGRESS NOTES
[facility]        CARDIOLOGY                 INPATIENT PROGRESS NOTE          PATIENT SEEN IN FOLLOW UP FOR: CMP, CHF    Hospital Day: 26     Nikki Moffett is a 79 year old patient known to Dr. Carnes from this admission      SUBJECTIVE: Denies any CP - On Vent via Trach. No distress. No family at bed sdide.     ROS: Review of rest of 10 systems limited due to her over all condition    OBJECTIVE: No acute distress.  See Assessment     Diagnostics:       Telemetry: Reviewed      TTE-3/7/2025:    Left Ventricle: Normal left ventricular systolic function with a visually estimated EF of 10 -15%. Left ventricle size is normal. Normal wall thickness. Severe global hypokinesis present. E/e' ratio >11, suggesting increased LAP.    Right Ventricle: Right ventricle size is normal. Reduced systolic function.    Aortic Valve: No significant stenosis. 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    Mitral Valve: Mild regurgitation.    Tricuspid Valve: Moderate regurgitation. Normal RVSP. The estimated RVSP is 25 mmHg.    Left Atrium: Left atrium is severely dilated.    Right Atrium: Right atrium is mildly dilated.    Pericardium: No pericardial effusion.    Image quality is suboptimal. Contrast used: Lumason.     Limited TTE Dr. Carnes 2/29/2024    Left Ventricle: Normal left ventricular systolic function with a visually estimated EF of 55 - 60%. Left ventricle size is normal. Moderately increased ventricular mass. Findings consistent with moderate concentric hypertrophy. Normal wall motion. Indeterminate diastolic function.    Right Ventricle: Right ventricle size is normal. Normal systolic function. TAPSE is 2.5 cm.    Aortic Valve: No stenosis.    Tricuspid Valve: Normal RVSP. The estimated RVSP is 33 mmHg.    Left Atrium: Left atrium is mildly dilated.    Interatrial Septum: Interatrial shunt visualized with color Doppler with left to right shunt.  No right to left shunt on  Eliquis.     Acute HFrEF - Diuretics as needed    Hypotensive shock - On Inotropic support - wean as her BP tolerates. Was on Midodrine    CMP  - EF reduced to 10 to 15% ?secondary to tachycardia induced cardiomyopathy versus underlying ischemic etiology. GDMT limited due to low BP/Renal failure. Plan is to start and up-titrate her meds as her BP/renal Fn tolerate, re assess her EF in 3-6 months, then ischemia w/u.  ?Life Vest upon discharge-defer to EP consultants.    ANTONIO/CKD - Nephrology following - On dialysis for several days    Influenza - Per Primary    PFO with left to right shunt    Hx of  CVA - s/p intracranial thrombectomy 12/2023    VHD - Mild MR, mod TR    Hypothyroidism - On HRT    Chronic Anemia - per Primary    Shock liver - Elevated LFTs - Statin held-Per [facility]               Cardiology will follow as needed during weekend  Overall prognosis guarded.     F/u with Dr Carnes after discharge.    d/w Nephrology yesterday    Electronically signed by Darlene Wiseman MD on 3/29/2025 at 7:18 AM  [facility]

## 2025-03-29 NOTE — PLAN OF CARE
Problem: Chronic Conditions and Co-morbidities  Goal: Patient's chronic conditions and co-morbidity symptoms are monitored and maintained or improved  3/29/2025 1041 by Marlin Cruz RN  Outcome: Progressing  3/28/2025 2226 by Brady Pope RN  Outcome: Not Progressing     Problem: Safety - Adult  Goal: Free from fall injury  3/29/2025 1041 by Marlin Cruz RN  Outcome: Progressing  3/28/2025 2226 by Brady Pope RN  Outcome: Progressing     Problem: Skin/Tissue Integrity  Goal: Skin integrity remains intact  Description: 1.  Monitor for areas of redness and/or skin breakdown  2.  Assess vascular access sites hourly  3.  Every 4-6 hours minimum:  Change oxygen saturation probe site  4.  Every 4-6 hours:  If on nasal continuous positive airway pressure, respiratory therapy assess nares and determine need for appliance change or resting period  3/29/2025 1041 by Marlin Cruz RN  Outcome: Progressing  3/28/2025 2226 by Brady Pope RN  Outcome: Progressing     Problem: Nutrition Deficit:  Goal: Optimize nutritional status  3/29/2025 1041 by Marlin Cruz RN  Outcome: Progressing  3/28/2025 2226 by Brady Pope RN  Outcome: Progressing     Problem: ABCDS Injury Assessment  Goal: Absence of physical injury  3/29/2025 1041 by Marlin Cruz RN  Outcome: Progressing  3/28/2025 2226 by Brady Pope RN  Outcome: Progressing     Problem: Metabolic/Fluid and Electrolytes - Adult  Goal: Electrolytes maintained within normal limits  3/29/2025 1041 by Marlin Cruz RN  Outcome: Progressing  3/28/2025 2226 by Brady Pope RN  Outcome: Progressing  Goal: Hemodynamic stability and optimal renal function maintained  3/29/2025 1041 by Marlin Cruz RN  Outcome: Progressing  3/28/2025 2226 by Brady Pope RN  Outcome: Progressing  Goal: Glucose maintained within prescribed range  3/29/2025 1041 by Marlin Cruz RN  Outcome: Progressing  3/28/2025 2226 by Brady Pope RN  Outcome: Progressing    Progressing  3/28/2025 2226 by Brady Pope, RN  Outcome: Progressing

## 2025-03-29 NOTE — PLAN OF CARE
Problem: Chronic Conditions and Co-morbidities  Goal: Patient's chronic conditions and co-morbidity symptoms are monitored and maintained or improved  Outcome: Not Progressing     Problem: Discharge Planning  Goal: Discharge to home or other facility with appropriate resources  Outcome: Progressing     Problem: Safety - Adult  Goal: Free from fall injury  Outcome: Progressing     Problem: Skin/Tissue Integrity  Goal: Skin integrity remains intact  Description: 1.  Monitor for areas of redness and/or skin breakdown  2.  Assess vascular access sites hourly  3.  Every 4-6 hours minimum:  Change oxygen saturation probe site  4.  Every 4-6 hours:  If on nasal continuous positive airway pressure, respiratory therapy assess nares and determine need for appliance change or resting period  Outcome: Progressing     Problem: Nutrition Deficit:  Goal: Optimize nutritional status  Outcome: Progressing     Problem: ABCDS Injury Assessment  Goal: Absence of physical injury  Outcome: Progressing     Problem: Metabolic/Fluid and Electrolytes - Adult  Goal: Electrolytes maintained within normal limits  Outcome: Progressing  Goal: Hemodynamic stability and optimal renal function maintained  Outcome: Progressing  Goal: Glucose maintained within prescribed range  Outcome: Progressing     Problem: Hematologic - Adult  Goal: Maintains hematologic stability  Outcome: Progressing     Problem: Neurosensory - Adult  Goal: Achieves stable or improved neurological status  Outcome: Progressing  Goal: Achieves maximal functionality and self care  Outcome: Progressing  Goal: Absence of seizures  Outcome: Progressing  Goal: Remains free of injury related to seizures activity  Outcome: Progressing     Problem: Respiratory - Adult  Goal: Achieves optimal ventilation and oxygenation  Outcome: Progressing     Problem: Cardiovascular - Adult  Goal: Maintains optimal cardiac output and hemodynamic stability  Outcome: Progressing  Goal: Absence of  interaction with staff  Outcome: Progressing     Problem: Coping  Goal: Pt/Family able to verbalize concerns and demonstrate effective coping strategies  Description: INTERVENTIONS:  1. Assist patient/family to identify coping skills, available support systems and cultural and spiritual values  2. Provide emotional support, including active listening and acknowledgement of concerns of patient and caregivers  3. Reduce environmental stimuli, as able  4. Instruct patient/family in relaxation techniques, as appropriate  5. Assess for spiritual pain/suffering and initiate Spiritual Care, Psychosocial Clinical Specialist consults as needed  Outcome: Progressing     Problem: Spiritual Care  Goal: Pt/Family able to move forward in process of forgiving self, others, and/or higher power  Description: INTERVENTIONS:  1. Assist patient/family to overcome blocks to healing by use of spiritual practices (prayer, meditation, guided imagery, reiki, breath work, etc).  2. De-myth guilt and help patient/family identify possible irrational spiritual/cultural beliefs and values.  3. Explore possibilities of making amends & reconciliation with self, others, and/or a greater power.  4. Guide patient/family in identifying painful feelings of guilt.  5. Help patient/famiy explore and identify spiritual beliefs, cultural understandings or values that may help or hinder letting go of issue.  6. Help patient/family explore feelings of anger, bitterness, resentment.  7. Help patient/family identify and examine the situation in which these feelings are experienced.  8. Help patient/family identify destructive displacement of feelings onto other individuals.  9. Invite use of sacraments/rituals/ceremonies as appropriate (e.g. - confession, anointing, smudging).  10. Refer patient/family to formal counseling and/or to christen community for further support work.  Outcome: Progressing

## 2025-03-29 NOTE — PROGRESS NOTES
Progress Note  3/29/2025 7:51 AM  Subjective:   Admit Date: 3/4/2025  PCP: Tiesha Panda MD    Subjective    3/24/25:: Patient seen and examined ; continues to require pressors; remains intubated    3/25: remains intubated, still requiring pressors; HD in progress, off sedation now    3/26: Trach and PEG this a.m.; continues to require pressors; being weaned; for thoracentesis today    3/27: No new acute issues from overnight    3/28: No new acute issues from overnight; vasopressors, being weaned    3/29: No new acute issues from overnight; remains off pressors    Diet: Diet NPO  ADULT TUBE FEEDING; Nasogastric; Peptide Based; Continuous; 10; Yes; 10; Q 4 hours; 35; 30; Q 4 hours; Protein; 1 Dose; Daily    Data:   Scheduled Meds:   amiodarone  200 mg PEG Tube BID    Followed by    [START ON 4/12/2025] amiodarone  200 mg PEG Tube Daily    atorvastatin  80 mg PEG Tube Nightly    [Held by provider] levETIRAcetam  500 mg PEG Tube BID    midodrine  20 mg PEG Tube TID WC    thiamine  100 mg Per G Tube Daily    vitamin D  2,000 Units PEG Tube Daily    apixaban  5 mg Oral BID    pantoprazole (PROTONIX) 40 mg in sodium chloride (PF) 0.9 % 10 mL injection  40 mg IntraVENous Daily    [Held by provider] lactulose  20 g Oral TID    insulin lispro  0-4 Units SubCUTAneous Q6H    sodium chloride flush  5-40 mL IntraVENous 2 times per day    [Held by provider] furosemide  40 mg IntraVENous Daily    [Held by provider] metoprolol succinate  25 mg Oral Daily     Continuous Infusions:   norepinephrine Stopped (03/28/25 1430)    sodium chloride      sodium chloride Stopped (03/25/25 1647)    dextrose       PRN Meds:ipratropium 0.5 mg-albuterol 2.5 mg, sodium chloride, heparin (PF), heparin (porcine) **AND** heparin (porcine), sodium chloride, polyethylene glycol, sodium chloride flush, acetaminophen **OR** acetaminophen, dextrose bolus **OR** dextrose bolus, glucagon (rDNA), dextrose  I/O last 3 completed shifts:  In: 1071.1

## 2025-03-29 NOTE — FLOWSHEET NOTE
03/29/25 1430   Observations & Evaluations   FiO2  40 %   Vital Signs   /80   Pulse 94   Respirations 21   SpO2 94 %   Hemodialysis Central Access - Permanent/Tunneled Right Neck   Placement Date/Time: 03/20/25 1632   Present on Admission/Arrival: No  Inserted by: Clarence  Insertion Practices: Chlorohexadine skin antisepsis;Hand hygiene;Maximal barrier precautions;Optimal catheter site selection;Sterile ultrasound technique  [place]...   Continued need for line? Yes   Site Assessment Clean, dry & intact   Blue Lumen Status Brisk blood return;Flushed   Red Lumen Status Brisk blood return;Flushed   Line Care Cap changed;Connections checked and tightened;Line pulled back;Ports disinfected   Dressing Type Antimicrobial;Transparent   Date of Last Dressing Change 03/29/25   Dressing Status New dressing applied   Dressing Intervention Dressing changed;New   Post-Hemodialysis Assessment   Post-Treatment Procedures Blood returned;Catheter Capped, clamped with Saline x2 ports   Machine Disinfection Process Exterior Machine Disinfection;Acid/Vinegar Clean;Heat Disinfect   Rinseback Volume (ml) 300 ml   Blood Volume Processed (Liters) 47.2 L   Dialyzer Clearance Moderately streaked   Duration of Treatment (minutes) 240 minutes   Heparin Amount Administered During Treatment (mL) 0 mL   Hemodialysis Intake (ml) 300 ml   Hemodialysis Output (ml) 1900 ml   NET Removed (ml) 1600   Tolerated Treatment Good   Patient Response to Treatment pt tolerated tx well 1600mL removed hemo lines secure clear guard caps applied nurse to nurse report given to floor RN at bedside pt/vitals stable upon d/c dialysis RN   Patient Disposition Remain in ICU/ED

## 2025-03-29 NOTE — PROGRESS NOTES
Called pharmacy two times for amiodarone bolus.  State, \"It was tubed at 1533.\"  Still not here. Message sent again.

## 2025-03-29 NOTE — PROGRESS NOTES
[facility]  Internal Medicine Residency Program  Progress Note - House Team     Patient:  Nikki Moffett 79 y.o. female MRN: [id number]     Date of Service: 3/29/2025     CC:   Chief Complaint   Patient presents with    Altered Mental Status     \"Mood swings, memory loss and fatigue\" per patients grandson    Chest Pain     Ongoing for a couple weeks per family       Subjective     Patient was seen at bedside this morning.  Patient is s/p tracheostomy and PEG tube placement by general surgery. Patient is off the levophed overnight. Patient will be transferred out of MICU today possibly.     Objective     Physical Exam:  Vitals: BP (!) 105/54   Pulse (!) 105   Temp 97.5 °F (36.4 °C) (Temporal)   Resp 13   Ht 1.575 m (5' 2\")   Wt 67.6 kg (149 lb 0.5 oz)   SpO2 100%   BMI 27.26 kg/m²     I & O - 24hr: In: 120   Out: 400   General appearance: Patient responds to simple commands, does respond to questions by yes and no, weakness on right extremity.  Neuro:Patient responds to simple commands, does respond to questions by yes and no, weakness on right extremity.  Cardiovascular: Regular rate and rhythm, S1 and S2  heard, no murmurs appreciated  Respiratory: Rales present bilaterally. S/P trach.   Abdomen: PEG Tube in place. No abdominal distension.  Extremities: Extremities normal, no cyanosis, distal pulses intact, no edema    Pertinent Labs & Imaging Studies      Complete Blood Count:   Recent Labs     03/27/25  0421 03/27/25  0925 03/28/25  0426 03/29/25  0428   WBC 14.6*  --  13.5* 13.3*   HGB 6.6* 7.2* 7.2* 7.5*   HCT 20.7* 22.7* 22.4* 23.2*     --   --   --         Last 3 Blood Glucose:   Recent Labs     03/28/25  0054 03/28/25  0426 03/29/25  0428   GLUCOSE 95 122* 97        PT/INR:    Lab Results   Component Value Date/Time    PROTIME 13.4 03/26/2025 04:34 AM    INR 1.2 03/26/2025 04:34 AM     PTT:    Lab Results   Component Value Date/Time    APTT 42.0 03/08/2025 05:50 PM  increased markings identified in the mid and lower lung fields with no   change in the bilateral pleural effusion.  Heart remains enlarged.  Lines and   tubes are stable.         XR CHEST PORTABLE   Final Result   1. Stable lines and tubes.  Endotracheal tube tip terminates at the lew.   2. Similar bilateral pleural effusions with adjacent atelectasis.         XR CHEST PORTABLE   Final Result   No significant interval changes since the study of March 23.         XR CHEST PORTABLE   Final Result   Stable appearance of the chest and support apparatus.         XR CHEST PORTABLE   Final Result   Stable appearance of the chest compared to 03/21/2025         XR CHEST PORTABLE   Final Result   1. The position and alignment of the tubes and catheters are within normal   range.   2. Small bilateral pleural effusions, stable.   3. No signs of pneumothorax.         IR TUNNELED CVC PLACE WO SQ PORT/PUMP > 5 YEARS   Final Result   1. Successful placement of tunneled cuffed hemodialysis catheter using   ultrasound and fluoroscopic guidance.         IR ULTRASOUND GUIDANCE VASCULAR ACCESS   Final Result   1. Successful placement of tunneled cuffed hemodialysis catheter using   ultrasound and fluoroscopic guidance.         IR FLUORO GUIDED CVA DEVICE PLMT/REPLACE/REMOVAL   Final Result   1. Successful placement of tunneled cuffed hemodialysis catheter using   ultrasound and fluoroscopic guidance.         XR CHEST PORTABLE   Final Result   No change of the past 24 hours.         XR CHEST PORTABLE   Final Result   1. Moderate bilateral pleural effusions with bibasilar infiltrates with no   change.   2. Lines and tubes in good position.         XR CHEST PORTABLE   Final Result   Stable appearance of the chest and support apparatus.         XR CHEST PORTABLE   Final Result   1. Bilateral small pleural effusions with moderate cardiomegaly and bibasilar   patchy infiltrates.   2. Life lines remain in good position.         XR CHEST

## 2025-03-29 NOTE — PROGRESS NOTES
Per verbal order from Resident Rosetta Lopez, keep blood pressure cuff on lower left arm, under the PICC.

## 2025-03-29 NOTE — PROGRESS NOTES
Jorge served Dr. Miranda due to cardiology said they were taking care of Afib.  Patient back in Afib RVR after HD.  Rate 130s to 140s.  BP 82/63 with MAP 72.

## 2025-03-29 NOTE — PROGRESS NOTES
Perfect served Abimael Orr NP covering Dr. Wiseman due to patient back in afib RVR rate 128 to 130s. Message read.

## 2025-03-30 LAB
AADO2: 106.4 MMHG
ALBUMIN SERPL-MCNC: 2.9 G/DL (ref 3.5–5.2)
ALP SERPL-CCNC: 131 U/L (ref 35–104)
ALT SERPL-CCNC: 9 U/L (ref 0–32)
ANION GAP SERPL CALCULATED.3IONS-SCNC: 15 MMOL/L (ref 7–16)
AST SERPL-CCNC: 48 U/L (ref 0–31)
B.E.: 0.5 MMOL/L (ref -3–3)
BASOPHILS # BLD: 0.03 K/UL (ref 0–0.2)
BASOPHILS NFR BLD: 0 % (ref 0–2)
BILIRUB DIRECT SERPL-MCNC: 0.3 MG/DL (ref 0–0.3)
BILIRUB INDIRECT SERPL-MCNC: 0.5 MG/DL (ref 0–1)
BILIRUB SERPL-MCNC: 0.8 MG/DL (ref 0–1.2)
BUN SERPL-MCNC: 25 MG/DL (ref 6–23)
CALCIUM SERPL-MCNC: 7.9 MG/DL (ref 8.6–10.2)
CHLORIDE SERPL-SCNC: 96 MMOL/L (ref 98–107)
CO2 SERPL-SCNC: 22 MMOL/L (ref 22–29)
COHB: 0.4 % (ref 0–1.5)
CREAT SERPL-MCNC: 1.7 MG/DL (ref 0.5–1)
CRITICAL: ABNORMAL
DATE ANALYZED: ABNORMAL
DATE OF COLLECTION: ABNORMAL
EOSINOPHIL # BLD: 0.1 K/UL (ref 0.05–0.5)
EOSINOPHILS RELATIVE PERCENT: 1 % (ref 0–6)
ERYTHROCYTE [DISTWIDTH] IN BLOOD BY AUTOMATED COUNT: 19.1 % (ref 11.5–15)
FIO2: 40 %
GFR, ESTIMATED: 30 ML/MIN/1.73M2
GLUCOSE BLD-MCNC: 111 MG/DL (ref 74–99)
GLUCOSE BLD-MCNC: 120 MG/DL (ref 74–99)
GLUCOSE BLD-MCNC: 125 MG/DL (ref 74–99)
GLUCOSE BLD-MCNC: 150 MG/DL (ref 74–99)
GLUCOSE SERPL-MCNC: 112 MG/DL (ref 74–99)
HCO3: 23.9 MMOL/L (ref 22–26)
HCT VFR BLD AUTO: 22.7 % (ref 34–48)
HGB BLD-MCNC: 7.3 G/DL (ref 11.5–15.5)
HHB: 1 % (ref 0–5)
IMM GRANULOCYTES # BLD AUTO: 0.06 K/UL (ref 0–0.58)
IMM GRANULOCYTES NFR BLD: 1 % (ref 0–5)
LAB: ABNORMAL
LYMPHOCYTES NFR BLD: 0.74 K/UL (ref 1.5–4)
LYMPHOCYTES RELATIVE PERCENT: 7 % (ref 20–42)
Lab: 419
MAGNESIUM SERPL-MCNC: 1.8 MG/DL (ref 1.6–2.6)
MCH RBC QN AUTO: 28.4 PG (ref 26–35)
MCHC RBC AUTO-ENTMCNC: 32.2 G/DL (ref 32–34.5)
MCV RBC AUTO: 88.3 FL (ref 80–99.9)
METHB: 0.4 % (ref 0–1.5)
MICROORGANISM SPEC CULT: NO GROWTH
MICROORGANISM/AGENT SPEC: NORMAL
MODE: AC
MONOCYTES NFR BLD: 0.39 K/UL (ref 0.1–0.95)
MONOCYTES NFR BLD: 4 % (ref 2–12)
NEUTROPHILS NFR BLD: 88 % (ref 43–80)
NEUTS SEG NFR BLD: 9.54 K/UL (ref 1.8–7.3)
O2 SATURATION: 99 % (ref 92–98.5)
O2HB: 98.2 % (ref 94–97)
OPERATOR ID: 2962
PATIENT TEMP: 37 C
PCO2: 33.1 MMHG (ref 35–45)
PEEP/CPAP: 5 CMH2O
PFO2: 3.52 MMHG/%
PH BLOOD GAS: 7.48 (ref 7.35–7.45)
PHOSPHATE SERPL-MCNC: 1.6 MG/DL (ref 2.5–4.5)
PLATELET # BLD AUTO: 118 K/UL (ref 130–450)
PMV BLD AUTO: 10.7 FL (ref 7–12)
PO2: 140.7 MMHG (ref 75–100)
POTASSIUM SERPL-SCNC: 3.9 MMOL/L (ref 3.5–5)
PROT SERPL-MCNC: 5.4 G/DL (ref 6.4–8.3)
RBC # BLD AUTO: 2.57 M/UL (ref 3.5–5.5)
RI(T): 0.76
RR MECHANICAL: 12 B/MIN
SODIUM SERPL-SCNC: 133 MMOL/L (ref 132–146)
SOURCE, BLOOD GAS: ABNORMAL
SPECIMEN DESCRIPTION: NORMAL
THB: 8.5 G/DL (ref 11.5–16.5)
TIME ANALYZED: 431
VT MECHANICAL: 280 ML
WBC OTHER # BLD: 10.9 K/UL (ref 4.5–11.5)

## 2025-03-30 PROCEDURE — 6360000002 HC RX W HCPCS: Performed by: INTERNAL MEDICINE

## 2025-03-30 PROCEDURE — 2500000003 HC RX 250 WO HCPCS: Performed by: INTERNAL MEDICINE

## 2025-03-30 PROCEDURE — 2000000000 HC ICU R&B

## 2025-03-30 PROCEDURE — 2580000003 HC RX 258

## 2025-03-30 PROCEDURE — 94003 VENT MGMT INPAT SUBQ DAY: CPT

## 2025-03-30 PROCEDURE — 51798 US URINE CAPACITY MEASURE: CPT

## 2025-03-30 PROCEDURE — 99232 SBSQ HOSP IP/OBS MODERATE 35: CPT | Performed by: CHIROPRACTOR

## 2025-03-30 PROCEDURE — 83735 ASSAY OF MAGNESIUM: CPT

## 2025-03-30 PROCEDURE — 80053 COMPREHEN METABOLIC PANEL: CPT

## 2025-03-30 PROCEDURE — 6370000000 HC RX 637 (ALT 250 FOR IP): Performed by: INTERNAL MEDICINE

## 2025-03-30 PROCEDURE — 6370000000 HC RX 637 (ALT 250 FOR IP)

## 2025-03-30 PROCEDURE — 2580000003 HC RX 258: Performed by: INTERNAL MEDICINE

## 2025-03-30 PROCEDURE — 82962 GLUCOSE BLOOD TEST: CPT

## 2025-03-30 PROCEDURE — 99291 CRITICAL CARE FIRST HOUR: CPT | Performed by: INTERNAL MEDICINE

## 2025-03-30 PROCEDURE — 6360000002 HC RX W HCPCS

## 2025-03-30 PROCEDURE — 36592 COLLECT BLOOD FROM PICC: CPT

## 2025-03-30 PROCEDURE — 82805 BLOOD GASES W/O2 SATURATION: CPT

## 2025-03-30 PROCEDURE — 84100 ASSAY OF PHOSPHORUS: CPT

## 2025-03-30 PROCEDURE — 93005 ELECTROCARDIOGRAM TRACING: CPT

## 2025-03-30 PROCEDURE — 2500000003 HC RX 250 WO HCPCS

## 2025-03-30 PROCEDURE — 82248 BILIRUBIN DIRECT: CPT

## 2025-03-30 PROCEDURE — 94640 AIRWAY INHALATION TREATMENT: CPT

## 2025-03-30 PROCEDURE — 85025 COMPLETE CBC W/AUTO DIFF WBC: CPT

## 2025-03-30 RX ORDER — METOPROLOL TARTRATE 1 MG/ML
5 INJECTION, SOLUTION INTRAVENOUS ONCE
Status: COMPLETED | OUTPATIENT
Start: 2025-03-30 | End: 2025-03-30

## 2025-03-30 RX ORDER — POTASSIUM CHLORIDE 7.45 MG/ML
10 INJECTION INTRAVENOUS
Status: COMPLETED | OUTPATIENT
Start: 2025-03-30 | End: 2025-03-30

## 2025-03-30 RX ORDER — MAGNESIUM SULFATE IN WATER 40 MG/ML
2000 INJECTION, SOLUTION INTRAVENOUS ONCE
Status: COMPLETED | OUTPATIENT
Start: 2025-03-30 | End: 2025-03-30

## 2025-03-30 RX ORDER — AMIODARONE HYDROCHLORIDE 200 MG/1
200 TABLET ORAL 2 TIMES DAILY
Status: DISCONTINUED | OUTPATIENT
Start: 2025-03-30 | End: 2025-03-31

## 2025-03-30 RX ADMIN — Medication 2000 UNITS: at 08:21

## 2025-03-30 RX ADMIN — METOROPROLOL TARTRATE 5 MG: 5 INJECTION, SOLUTION INTRAVENOUS at 17:37

## 2025-03-30 RX ADMIN — MIDODRINE HYDROCHLORIDE 20 MG: 10 TABLET ORAL at 17:31

## 2025-03-30 RX ADMIN — IPRATROPIUM BROMIDE AND ALBUTEROL SULFATE 1 DOSE: 2.5; .5 SOLUTION RESPIRATORY (INHALATION) at 08:54

## 2025-03-30 RX ADMIN — APIXABAN 5 MG: 5 TABLET, FILM COATED ORAL at 20:23

## 2025-03-30 RX ADMIN — MIDODRINE HYDROCHLORIDE 20 MG: 10 TABLET ORAL at 08:21

## 2025-03-30 RX ADMIN — AMIODARONE HYDROCHLORIDE 200 MG: 200 TABLET ORAL at 14:39

## 2025-03-30 RX ADMIN — SODIUM CHLORIDE, PRESERVATIVE FREE 10 ML: 5 INJECTION INTRAVENOUS at 20:23

## 2025-03-30 RX ADMIN — SODIUM CHLORIDE, PRESERVATIVE FREE 40 MG: 5 INJECTION INTRAVENOUS at 08:22

## 2025-03-30 RX ADMIN — MIDODRINE HYDROCHLORIDE 20 MG: 10 TABLET ORAL at 11:44

## 2025-03-30 RX ADMIN — Medication 100 MG: at 08:21

## 2025-03-30 RX ADMIN — ATORVASTATIN CALCIUM 80 MG: 40 TABLET, FILM COATED ORAL at 20:23

## 2025-03-30 RX ADMIN — POTASSIUM CHLORIDE 10 MEQ: 10 INJECTION, SOLUTION INTRAVENOUS at 08:46

## 2025-03-30 RX ADMIN — AMIODARONE HYDROCHLORIDE 0.5 MG/MIN: 1.8 INJECTION, SOLUTION INTRAVENOUS at 00:30

## 2025-03-30 RX ADMIN — POTASSIUM CHLORIDE 10 MEQ: 10 INJECTION, SOLUTION INTRAVENOUS at 10:01

## 2025-03-30 RX ADMIN — DEXTROSE MONOHYDRATE 150 MG: 50 INJECTION, SOLUTION INTRAVENOUS at 08:21

## 2025-03-30 RX ADMIN — SODIUM CHLORIDE, PRESERVATIVE FREE 10 ML: 5 INJECTION INTRAVENOUS at 08:50

## 2025-03-30 RX ADMIN — MAGNESIUM SULFATE HEPTAHYDRATE 2000 MG: 40 INJECTION, SOLUTION INTRAVENOUS at 08:49

## 2025-03-30 RX ADMIN — AMIODARONE HYDROCHLORIDE 0.5 MG/MIN: 1.8 INJECTION, SOLUTION INTRAVENOUS at 23:07

## 2025-03-30 RX ADMIN — APIXABAN 5 MG: 5 TABLET, FILM COATED ORAL at 08:21

## 2025-03-30 RX ADMIN — AMIODARONE HYDROCHLORIDE 0.5 MG/MIN: 1.8 INJECTION, SOLUTION INTRAVENOUS at 12:37

## 2025-03-30 RX ADMIN — AMIODARONE HYDROCHLORIDE 200 MG: 200 TABLET ORAL at 20:23

## 2025-03-30 RX ADMIN — SODIUM PHOSPHATE, MONOBASIC, MONOHYDRATE AND SODIUM PHOSPHATE, DIBASIC, ANHYDROUS 20 MMOL: 142; 276 INJECTION, SOLUTION INTRAVENOUS at 08:49

## 2025-03-30 ASSESSMENT — PULMONARY FUNCTION TESTS
PIF_VALUE: 28
PIF_VALUE: 21
PIF_VALUE: 33
PIF_VALUE: 20
PIF_VALUE: 21
PIF_VALUE: 23
PIF_VALUE: 28
PIF_VALUE: 33
PIF_VALUE: 26
PIF_VALUE: 24
PIF_VALUE: 24
PIF_VALUE: 26
PIF_VALUE: 18
PIF_VALUE: 19
PIF_VALUE: 26
PIF_VALUE: 29
PIF_VALUE: 20
PIF_VALUE: 28
PIF_VALUE: 24
PIF_VALUE: 26
PIF_VALUE: 24
PIF_VALUE: 20
PIF_VALUE: 34
PIF_VALUE: 20
PIF_VALUE: 39
PIF_VALUE: 20
PIF_VALUE: 29
PIF_VALUE: 32
PIF_VALUE: 25
PIF_VALUE: 20

## 2025-03-30 ASSESSMENT — PAIN SCALES - GENERAL
PAINLEVEL_OUTOF10: 0

## 2025-03-30 NOTE — PLAN OF CARE
Problem: Chronic Conditions and Co-morbidities  Goal: Patient's chronic conditions and co-morbidity symptoms are monitored and maintained or improved  3/30/2025 1018 by Wilber Bernardo RN  Outcome: Progressing     Problem: Discharge Planning  Goal: Discharge to home or other facility with appropriate resources  3/30/2025 1018 by Wilber Bernardo RN  Outcome: Progressing     Problem: Safety - Adult  Goal: Free from fall injury  3/30/2025 1018 by Wilber Bernardo RN  Outcome: Progressing  Flowsheets (Taken 3/30/2025 1018)  Free From Fall Injury:   Instruct family/caregiver on patient safety   Based on caregiver fall risk screen, instruct family/caregiver to ask for assistance with transferring infant if caregiver noted to have fall risk factors     Problem: Skin/Tissue Integrity  Goal: Skin integrity remains intact  Description: 1.  Monitor for areas of redness and/or skin breakdown  2.  Assess vascular access sites hourly  3.  Every 4-6 hours minimum:  Change oxygen saturation probe site  4.  Every 4-6 hours:  If on nasal continuous positive airway pressure, respiratory therapy assess nares and determine need for appliance change or resting period  3/30/2025 1018 by Wilber Bernardo RN  Outcome: Progressing  Flowsheets (Taken 3/30/2025 1018)  Skin Integrity Remains Intact:   Monitor for areas of redness and/or skin breakdown   Assess vascular access sites hourly   Every 4-6 hours minimum: Change oxygen saturation probe site   Every 4-6 hours: If on nasal continuous positive airway pressure, respiratory therapy assesses nares and determine need for appliance change or resting period     Problem: Nutrition Deficit:  Goal: Optimize nutritional status  3/30/2025 1018 by Wilber Bernardo RN  Outcome: Progressing     Problem: ABCDS Injury Assessment  Goal: Absence of physical injury  3/30/2025 1018 by Wilber Bernardo RN  Outcome: Progressing  Flowsheets (Taken 3/30/2025 1018)  Absence of Physical Injury: Implement safety

## 2025-03-30 NOTE — PROGRESS NOTES
[facility]YSICI[facility]- [facility]  Inpatient Progress Report  PATIENT: Nikki Moffett  MEDICAL RECORD NUMBER: [id number]  DATE OF SERVICE:  3/30/2025  ATTENDING ELECTROPHYSIOLOGIST: Nakita Miranda MD   PRIMARY ELECTROPHYSIOLOGIST: Gelacio Hendricks MD  REFERRING PHYSICIAN:  Tiesha Panda MD  CHIEF COMPLAINT: Altered mental status    HPI: This is a 79 y.o. female with a history of  persistent atrial fibrillation and atrial flutter, cerebrovascular accident and hyperlipidemia.   The patient presented to the hospital on 12/21/23 due to right sided weakness and facial droop. She diagnosed with acute left MCA stroke with M2 occlusion. She was not a candidate for tPA and underwent mechanical thrombectomy by IR team on 12/21/23. Prior to intervention, patient was diagnosed with AF with RVR with heart rate in the 180s. Patient underwent emergency DC-cardioversion to normal sinus rhythm and was initiated on IV Amiodarone which later on transited to oral Amiodarone. Echocardiogram 2/29/24 showed LV EF of 40-45%.  She was started on GDMT and was discharged home on Amiodarone loading dose, Eliquis and Metoprolol.   She was last seen in Cardiology's office on 2/22/24. She subsequently underwent nuclear stress test on 2/29/24 which showed no ischemia and LV EF of 69%. Repeat echocardiogram on 2/29/24 showed LV EF 55-60%, moderate LVH and mild LAE. She was scheduled for follow up with Cardiology but did not follow through.   Son called on 3/3/25 stated that the patient was not doing well She was not eating well, shortness of breath with exertion and reported palpitations. She was seen in IM resident clinic on 3/4/35 and was noted to be in AFL with  bpm. She was advised to come in to ED. In ED she was noted to be in AFL with  bpm.She was started on IV Cardizem drip.CXR showed bilateral pleural effusions and possible right lower lobe opacity. Patient received  piperacillin-tazobactam and doxycyline which later on discontinued after repeated CXR. She was wean off IV Cardizem. Her HR elevated to 120-130 bpm and was started on Amiodarone, Toprol XL and Digoxin. The patient currently reports shortness of breath and occasional palpitations. She is currently in AFL with -129 bpm. Discussed with antione who believes that she may missed 1 to 2 doses of Eliquis recently due to moving to the house. Antione also reported that the patient has not been taking Metoprolol and Amiodarone at home for quite some time. Cardiac electrophysiology service is consulted for persistent AF.    03/07/2025: She remains in AFL with rates in the 140's this morning. Yesterday she had hypotension and was initially treated with Midodrine followed by IV bolus at 1613 and 0307 for a total of 2.5 litters since admission. This morning she developed significant orthopnea and dyspnea with CXR showing fluid overload.     3/8/25: Confused overnight required restraint. Remain in NSR. Awake and alert this morning. On Norepinephrine for BP support. Metoprolol is held for hypotension. TTE showed LV EF 10-15%.    3/9/25: Amiodarone stopped due to transaminitis. Eliquis is on hold due to elevated INR. Currently on CVVHD. Remains in NSR.    3/30/25: Patient was last seen by electrophysiology during this hospitalization--more than 20 days ago.  Since then the patient has developed multiorgan failure with marked deterioration in LV systolic function.  Currently she has a tracheostomy and is ventilated for respiratory failure, PEG tube for feeding, on hemodialysis and completely bedridden.  Persistent hypotension has made it difficult to maximize medical therapy.  She developed atrial fibrillation postdialysis yesterday and we were called again.  I did start her back on IV amiodarone overnight.  She has also received IV metoprolol for rate control without benefit.  I also suggested small fluid boluses to improve  mildly dilated.    Interatrial Septum: Agitated saline study was negative with and without provocation.    Right Atrium: Right atrium is mildly dilated.    Pericardium: No pericardial effusion.    Image quality is suboptimal. Contrast used: Definity. Technically difficult study with poor endocardial visualization.     I have independently reviewed all of the ECGs and rhythm strips per above     Assessment/Plan: This is a 79 y.o. female with     1. Persistent atrial fibrillation and atypical atrial flutter  - [id number] of 5.  - Diagnosed with AF with RVR when presented with acute left MCA stroke with M2 occlusion status post mechanical thrombectomy on 12/21/23. Patient underwent emergency DC-cardioversion to normal sinus rhythm and was initiated on IV Amiodarone which later on transited to oral Amiodarone at that time.  - Was discharged home on Amiodarone, Toprol XL and Eliquis.  - Has not been taking Amiodarone and Toprol XL for some time between 4/24/24 and now.  - Reported may miss Eliquis for 1 to 2 doses lately.  - Presented in AFL with RVR with ongoing AFL with RVR on IV and oral Amiodarone.  - S/p DC-cardioversion 3/7/25.    -Recurrence of atrial flutter/fibrillation with rapid ventricular rate on 3/29/2025-IV amiodarone started for better rate control and possible conversion to sinus rhythm      2. 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  - LV EF 40-45% on TTE 12/22/23.  - LV EF 55-60% on TTE 2/29/24.  - LV EF 10-15% on TTE 3/8/25  - Decompensated but improved.  - NYHA class III-IV.  - On CVVHD    3. Cerebrovascular accident   - Diagnosed with acute left MCA stroke with M2 occlusion.   - Not a candidate for tPA and underwent mechanical thrombectomy by IR team on 12/21/23.    4. Hyperlipidemia  - On Lipitor.    5. ANTONIO on CKD  Estimated Creatinine Clearance: 25 mL/min (A) (based on SCr of 1.7 mg/dL (H)).   - On CVVHD.    6. Hypotension.   - On Midodrine.  - On IV

## 2025-03-30 NOTE — PROGRESS NOTES
Nephrology Progress Note  3/30/2025 7:32 AM  Subjective:   Admit Date: 3/4/2025  PCP: Tiesha Panda MD    Subjective    3/24/25:: Patient seen and examined ; continues to require pressors; remains intubated    3/25: remains intubated, still requiring pressors; HD in progress, off sedation now    3/26: Trach and PEG this a.m.; continues to require pressors; being weaned; for thoracentesis today    3/27: No new acute issues from overnight    3/28: No new acute issues from overnight; vasopressors, being weaned    3/29: No new acute issues from overnight; remains off pressors    3/30: Amiodarone drip restarted due to atrial fibrillation; EP consulted; no other acute issues from overnight    Diet: Diet NPO  ADULT TUBE FEEDING; Nasogastric; Peptide Based; Continuous; 10; Yes; 10; Q 4 hours; 35; 30; Q 3 hours; Protein; 1 Dose; Daily    Data:   Scheduled Meds:   amiodarone  150 mg IntraVENous Once    magnesium sulfate  2,000 mg IntraVENous Once    sodium phosphate IVPB (PERIPHERAL line)  20 mmol IntraVENous Once    potassium chloride  10 mEq IntraVENous Q1H    amiodarone (CORDARONE) 150 mg in dextrose 5 % 100 mL bolus  150 mg IntraVENous Once    amiodarone  200 mg PEG Tube BID    Followed by    [START ON 4/12/2025] amiodarone  200 mg PEG Tube Daily    atorvastatin  80 mg PEG Tube Nightly    midodrine  20 mg PEG Tube TID WC    thiamine  100 mg Per G Tube Daily    vitamin D  2,000 Units PEG Tube Daily    apixaban  5 mg Oral BID    pantoprazole (PROTONIX) 40 mg in sodium chloride (PF) 0.9 % 10 mL injection  40 mg IntraVENous Daily    [Held by provider] lactulose  20 g Oral TID    insulin lispro  0-4 Units SubCUTAneous Q6H    sodium chloride flush  5-40 mL IntraVENous 2 times per day    [Held by provider] metoprolol succinate  25 mg Oral Daily     Continuous Infusions:   amiodarone 0.5 mg/min (03/30/25 0615)    sodium chloride      sodium chloride 10 mL/hr at 03/30/25 0615    dextrose       PRN Meds:ipratropium 0.5

## 2025-03-30 NOTE — PROGRESS NOTES
Pt flipped back to AC at this time due to periods of apnea   03/30/25 1059   Vent Information   Vent Mode AC/VC   Ventilator Settings   Vt (Set, mL) 280 mL   Resp Rate (Set) 12 bpm   PEEP/CPAP (cmH2O) 5   FiO2  40 %   Peak Inspiratory Flow (Set) 50 L/sec   Vent Patient Data (Readings)   Vt (Measured) 301 mL   Peak Inspiratory Pressure (cmH2O) 20 cmH2O   Rate Measured 13 br/min   Minute Volume (L/min) 4.27 Liters   Peak Inspiratory Flow (lpm) 50 L/sec   Mean Airway Pressure (cmH2O) 7.7 cmH20   I:E Ratio 1:2.8   Flow Sensitivity 3 L/min   Backup Apnea On   Backup Rate 12 Breaths Per Minute   Backup Vt 280

## 2025-03-30 NOTE — PROGRESS NOTES
[facility]   Department of Internal Medicine   Internal Medicine Residency  MICU Progress Note    Patient:  Nikki Moffett 79 y.o. female   MRN: [id number]       Date of Service: 3/30/2025    Allergy: Patient has no known allergies.    Subjective     Overnight, pt went into a fib RVR after HD session, EP consulted, re-boloused with amiodarone.   Patient was seen and examined this morning at bedside in no acute distress, no family present. Denies any pain or SOB.     Objective     I & O - 24hr:    Intake/Output Summary (Last 24 hours) at 3/30/2025 1044  Last data filed at 3/30/2025 0615  Gross per 24 hour   Intake 3048.15 ml   Output 2730 ml   Net 318.15 ml       Physical Exam  Vitals: BP (!) 83/53   Pulse (!) 114   Temp 97.7 °F (36.5 °C) (Temporal)   Resp (!) 31   Ht 1.575 m (5' 2\")   Wt 67.9 kg (149 lb 11.1 oz)   SpO2 100%   BMI 27.38 kg/m²     General Appearance: s/p trach, spontaneous eye opening, shakes head yes and no to questions, attempts to follow commands but is profoundly weak, able to move LUE, no movement of RUE   HEENT: moist mucus membranes   Neck: trach present    Lung: mechanical breath sounds, L sided rhonchi    Heart: RRR, capillary refill <2  Abdomen: soft, appears nontender, BS+   Extremities: +2 pitting edema     Medications     Continuous Infusions:   amiodarone 0.5 mg/min (03/30/25 0615)    sodium chloride      sodium chloride 10 mL/hr at 03/30/25 0615    dextrose       Scheduled Meds:   magnesium sulfate  2,000 mg IntraVENous Once    sodium phosphate IVPB (PERIPHERAL line)  20 mmol IntraVENous Once    amiodarone (CORDARONE) 150 mg in dextrose 5 % 100 mL bolus  150 mg IntraVENous Once    atorvastatin  80 mg PEG Tube Nightly    midodrine  20 mg PEG Tube TID WC    thiamine  100 mg Per G Tube Daily    vitamin D  2,000 Units PEG Tube Daily    apixaban  5 mg Oral BID    pantoprazole (PROTONIX) 40 mg in sodium chloride (PF) 0.9 % 10 mL injection  40 mg IntraVENous Daily     pneumothorax.         XR CHEST PORTABLE   Final Result   Endotracheal tube tip now with improved positioning 2.5 cm above the lew.         XR CHEST PORTABLE   Final Result   1. Endotracheal tube tip in low but satisfactory positioning 9 mm above the   lew.   2. Unchanged right greater than left basilar effusions and perihilar   pulmonary edema.         MRA NECK WO CONTRAST   Final Result   Limited examination due to motion. No evidence for high-grade stenosis or   arterial occlusion within the limitations of this examination.      Arterial structures of the head and neck could be better assessed with CT   angiography as clinically warranted.         MRA HEAD WO CONTRAST   Final Result   1. Motion limits detailed assessment of the intracranial vasculature. Within   the limitations of this exam, no high-grade stenosis or arterial occlusion is   evident.   2. Mild stenosis involving the V4 segment of the left vertebral artery.         XR ABDOMEN (KUB) (SINGLE AP VIEW)   Final Result      MRI BRAIN WO CONTRAST   Final Result   1. Linear areas of acute infarction within the left occipital lobe as   described above.   2. Encephalomalacia on the basis of prior infarction within the left insular   region and left frontal operculum.   3. Partial opacification of the mastoid air cells bilaterally.   Radiology communication note placed to have information called to the   ordering physician 10:54 a.m. EST 03/15/2025         XR CHEST PORTABLE   Final Result   Cardiomegaly with stable increased markings seen diffusely throughout the mid   and lower lung fields bilaterally with small bilateral pleural effusions.         CT HEAD WO CONTRAST   Final Result   Small insult in the left frontal parietal region of indeterminate chronicity.   Correlate with MRI if there is concern for acute infarction.      Remote appearing insult in the left occipital lobe.      Findings of chronic small vessel ischemic change and cerebral volume

## 2025-03-30 NOTE — PLAN OF CARE
Problem: Chronic Conditions and Co-morbidities  Goal: Patient's chronic conditions and co-morbidity symptoms are monitored and maintained or improved  3/29/2025 2146 by Chris George RN  Outcome: Progressing  Flowsheets (Taken 3/29/2025 2000)  Care Plan - Patient's Chronic Conditions and Co-Morbidity Symptoms are Monitored and Maintained or Improved: Monitor and assess patient's chronic conditions and comorbid symptoms for stability, deterioration, or improvement  3/29/2025 1041 by Marlin Cruz RN  Outcome: Progressing     Problem: Discharge Planning  Goal: Discharge to home or other facility with appropriate resources  Outcome: Progressing  Flowsheets (Taken 3/29/2025 2000)  Discharge to home or other facility with appropriate resources:   Identify barriers to discharge with patient and caregiver   Refer to discharge planning if patient needs post-hospital services based on physician order or complex needs related to functional status, cognitive ability or social support system     Problem: Safety - Adult  Goal: Free from fall injury  3/29/2025 2146 by Chris George RN  Outcome: Progressing  Flowsheets (Taken 3/29/2025 2000)  Free From Fall Injury: Instruct family/caregiver on patient safety  3/29/2025 1041 by Marlin Cruz RN  Outcome: Progressing     Problem: Skin/Tissue Integrity  Goal: Skin integrity remains intact  Description: 1.  Monitor for areas of redness and/or skin breakdown  2.  Assess vascular access sites hourly  3.  Every 4-6 hours minimum:  Change oxygen saturation probe site  4.  Every 4-6 hours:  If on nasal continuous positive airway pressure, respiratory therapy assess nares and determine need for appliance change or resting period  3/29/2025 2146 by Chris George RN  Outcome: Progressing  Flowsheets (Taken 3/29/2025 2000)  Skin Integrity Remains Intact:   Monitor for areas of redness and/or skin breakdown   Assess vascular access sites hourly   Every 4-6 hours minimum: Change  smudging).  10. Refer patient/family to formal counseling and/or to [facility] for further support work.  3/29/2025 2146 by Chris George RN  Outcome: Progressing  Flowsheets (Taken 3/29/2025 2000)  Patient/family able to move forward in process of forgiving self, others, and/or higher power: Assist patient to overcome blocks to healing by use of spiritual practices (prayer, meditation, guided imagery, reiki, breath work, etc)  3/29/2025 1041 by Marlin Cruz, RN  Outcome: Progressing     Problem: Genitourinary - Adult  Goal: Absence of urinary retention  Outcome: Progressing  Flowsheets (Taken 3/29/2025 2000)  Absence of urinary retention:   Assess patient’s ability to void and empty bladder   Monitor intake/output and perform bladder scan as needed     Problem: Neurosensory - Adult  Goal: Achieves maximal functionality and self care  3/29/2025 2146 by Chris George RN  Outcome: Not Progressing  Flowsheets (Taken 3/29/2025 2000)  Achieves maximal functionality and self care: Monitor swallowing and airway patency with patient fatigue and changes in neurological status  3/29/2025 1041 by Marlin Cruz RN  Outcome: Progressing     Problem: Musculoskeletal - Adult  Goal: Return mobility to safest level of function  3/29/2025 2146 by Chris George RN  Outcome: Progressing  Flowsheets (Taken 3/29/2025 2000)  Return Mobility to Safest Level of Function:   Assess patient stability and activity tolerance for standing, transferring and ambulating with or without assistive devices   Apply continuous passive motion per provider or physical therapy orders to increase flexion toward goal  3/29/2025 1041 by Marlin Cruz, RN  Outcome: Not Progressing  Goal: Return ADL status to a safe level of function  3/29/2025 2146 by Chris George RN  Outcome: Not Progressing  Flowsheets (Taken 3/29/2025 2000)  Return ADL Status to a Safe Level of Function: Obtain physical therapy/occupational therapy consults as  needed  3/29/2025 1041 by Marlin Cruz, RN  Outcome: Not Progressing     Problem: Infection - Adult  Goal: Absence of fever/infection during anticipated neutropenic period  3/29/2025 2146 by Chris George RN  Outcome: Progressing  Flowsheets (Taken 3/29/2025 2000)  Absence of fever/infection during anticipated neutropenic period: Monitor white blood cell count  3/29/2025 1041 by Marlin Cruz, RN  Outcome: Not Progressing

## 2025-03-30 NOTE — PROGRESS NOTES
[facility]  Internal Medicine Residency Program  Progress Note - House Team     Patient:  Nikki Moffett 79 y.o. female MRN: [id number]     Date of Service: 3/30/2025     CC:   Chief Complaint   Patient presents with    Altered Mental Status     \"Mood swings, memory loss and fatigue\" per patients grandson    Chest Pain     Ongoing for a couple weeks per family     Overnight: Patient was in Afib with RVR. Cardiology and EP on board. Received IV Amiodarone and Metoprolol.    Subjective     Patient was seen at bedside this morning. Patient was awake. Patient had an EKG done before being seen, revealing sinus tachycardia.    Objective     Physical Exam:  Vitals: /69   Pulse (!) 117   Temp 97.7 °F (36.5 °C) (Temporal)   Resp 28   Ht 1.575 m (5' 2\")   Wt 67.9 kg (149 lb 11.1 oz)   SpO2 100%   BMI 27.38 kg/m²     I & O - 24hr: In: 3108.2   Out: 2730   General appearance: Awake.  Neuro: Patient responds to simple commands, does respond to questions by yes and no, weakness on right extremity.  Cardiovascular: Regular rate and rhythm, S1 and S2  heard, no murmurs appreciated  Respiratory: Rales present bilaterally. S/P trach.   Abdomen: PEG Tube in place. No abdominal distension.  Extremities: Extremities normal, no cyanosis, distal pulses intact, no edema    Pertinent Labs & Imaging Studies      Complete Blood Count:   Recent Labs     03/28/25 0426 03/29/25 0428 03/30/25  0405   WBC 13.5* 13.3* 10.9   HGB 7.2* 7.5* 7.3*   HCT 22.4* 23.2* 22.7*   PLT  --   --  118*        Last 3 Blood Glucose:   Recent Labs     03/28/25  0426 03/29/25  0428 03/30/25  0405   GLUCOSE 122* 97 112*        PT/INR:    Lab Results   Component Value Date/Time    PROTIME 13.4 03/26/2025 04:34 AM    INR 1.2 03/26/2025 04:34 AM     PTT:    Lab Results   Component Value Date/Time    APTT 42.0 03/08/2025 05:50 PM     Comprehensive Metabolic Profile:   Recent Labs     03/28/25  0426 03/29/25  0428 03/30/25  0405

## 2025-03-31 PROBLEM — I48.19 PERSISTENT ATRIAL FIBRILLATION (HCC): Status: ACTIVE | Noted: 2025-03-31

## 2025-03-31 LAB
AADO2: 105.7 MMHG
ALBUMIN SERPL-MCNC: 2.7 G/DL (ref 3.5–5.2)
ALP SERPL-CCNC: 143 U/L (ref 35–104)
ALT SERPL-CCNC: 9 U/L (ref 0–32)
ANION GAP SERPL CALCULATED.3IONS-SCNC: 17 MMOL/L (ref 7–16)
AST SERPL-CCNC: 42 U/L (ref 0–31)
B.E.: -2.7 MMOL/L (ref -3–3)
BASOPHILS # BLD: 0 K/UL (ref 0–0.2)
BASOPHILS NFR BLD: 0 % (ref 0–2)
BILIRUB DIRECT SERPL-MCNC: 0.3 MG/DL (ref 0–0.3)
BILIRUB INDIRECT SERPL-MCNC: 0.4 MG/DL (ref 0–1)
BILIRUB SERPL-MCNC: 0.7 MG/DL (ref 0–1.2)
BUN SERPL-MCNC: 43 MG/DL (ref 6–23)
CALCIUM SERPL-MCNC: 8 MG/DL (ref 8.6–10.2)
CHLORIDE SERPL-SCNC: 92 MMOL/L (ref 98–107)
CO2 SERPL-SCNC: 21 MMOL/L (ref 22–29)
COHB: 0.7 % (ref 0–1.5)
CREAT SERPL-MCNC: 2.3 MG/DL (ref 0.5–1)
CRITICAL: ABNORMAL
DATE ANALYZED: ABNORMAL
DATE OF COLLECTION: ABNORMAL
EKG ATRIAL RATE: 122 BPM
EKG Q-T INTERVAL: 384 MS
EKG QRS DURATION: 136 MS
EKG QTC CALCULATION (BAZETT): 547 MS
EKG R AXIS: 11 DEGREES
EKG T AXIS: 135 DEGREES
EKG VENTRICULAR RATE: 122 BPM
EOSINOPHIL # BLD: 0.35 K/UL (ref 0.05–0.5)
EOSINOPHILS RELATIVE PERCENT: 3 % (ref 0–6)
ERYTHROCYTE [DISTWIDTH] IN BLOOD BY AUTOMATED COUNT: 19.1 % (ref 11.5–15)
FIO2: 40 %
GFR, ESTIMATED: 21 ML/MIN/1.73M2
GLUCOSE BLD-MCNC: 124 MG/DL (ref 74–99)
GLUCOSE BLD-MCNC: 136 MG/DL (ref 74–99)
GLUCOSE BLD-MCNC: 137 MG/DL (ref 74–99)
GLUCOSE SERPL-MCNC: 126 MG/DL (ref 74–99)
HCO3: 20.2 MMOL/L (ref 22–26)
HCT VFR BLD AUTO: 22.2 % (ref 34–48)
HGB BLD-MCNC: 7.1 G/DL (ref 11.5–15.5)
HHB: 0.8 % (ref 0–5)
LAB: ABNORMAL
LYMPHOCYTES NFR BLD: 0.93 K/UL (ref 1.5–4)
LYMPHOCYTES RELATIVE PERCENT: 7 % (ref 20–42)
Lab: 419
MAGNESIUM SERPL-MCNC: 2.6 MG/DL (ref 1.6–2.6)
MCH RBC QN AUTO: 28.6 PG (ref 26–35)
MCHC RBC AUTO-ENTMCNC: 32 G/DL (ref 32–34.5)
MCV RBC AUTO: 89.5 FL (ref 80–99.9)
METHB: 0.7 % (ref 0–1.5)
MODE: AC
MONOCYTES NFR BLD: 0.35 K/UL (ref 0.1–0.95)
MONOCYTES NFR BLD: 3 % (ref 2–12)
NEUTROPHILS NFR BLD: 88 % (ref 43–80)
NEUTS SEG NFR BLD: 11.77 K/UL (ref 1.8–7.3)
O2 SATURATION: 99.2 % (ref 92–98.5)
O2HB: 97.8 % (ref 94–97)
OPERATOR ID: ABNORMAL
PATIENT TEMP: 37 C
PCO2: 27.7 MMHG (ref 35–45)
PEEP/CPAP: 5 CMH2O
PFO2: 3.69 MMHG/%
PH BLOOD GAS: 7.48 (ref 7.35–7.45)
PHOSPHATE SERPL-MCNC: 2.9 MG/DL (ref 2.5–4.5)
PLATELET # BLD AUTO: 119 K/UL (ref 130–450)
PMV BLD AUTO: 10.7 FL (ref 7–12)
PO2: 147.6 MMHG (ref 75–100)
POTASSIUM SERPL-SCNC: 4.2 MMOL/L (ref 3.5–5)
PROT SERPL-MCNC: 5.5 G/DL (ref 6.4–8.3)
RBC # BLD AUTO: 2.48 M/UL (ref 3.5–5.5)
RBC # BLD: ABNORMAL 10*6/UL
REPORT STATUS: NORMAL
RI(T): 0.72
RR MECHANICAL: 12 B/MIN
SODIUM SERPL-SCNC: 130 MMOL/L (ref 132–146)
SOURCE, BLOOD GAS: ABNORMAL
THB: 7.8 G/DL (ref 11.5–16.5)
TIME ANALYZED: 421
VT MECHANICAL: 280 ML
WBC OTHER # BLD: 13.4 K/UL (ref 4.5–11.5)

## 2025-03-31 PROCEDURE — 6370000000 HC RX 637 (ALT 250 FOR IP)

## 2025-03-31 PROCEDURE — 82962 GLUCOSE BLOOD TEST: CPT

## 2025-03-31 PROCEDURE — 97530 THERAPEUTIC ACTIVITIES: CPT

## 2025-03-31 PROCEDURE — 97168 OT RE-EVAL EST PLAN CARE: CPT

## 2025-03-31 PROCEDURE — 99291 CRITICAL CARE FIRST HOUR: CPT | Performed by: INTERNAL MEDICINE

## 2025-03-31 PROCEDURE — 97110 THERAPEUTIC EXERCISES: CPT

## 2025-03-31 PROCEDURE — 83735 ASSAY OF MAGNESIUM: CPT

## 2025-03-31 PROCEDURE — 2500000003 HC RX 250 WO HCPCS

## 2025-03-31 PROCEDURE — 84100 ASSAY OF PHOSPHORUS: CPT

## 2025-03-31 PROCEDURE — 85025 COMPLETE CBC W/AUTO DIFF WBC: CPT

## 2025-03-31 PROCEDURE — 82248 BILIRUBIN DIRECT: CPT

## 2025-03-31 PROCEDURE — 6360000002 HC RX W HCPCS

## 2025-03-31 PROCEDURE — 2500000003 HC RX 250 WO HCPCS: Performed by: INTERNAL MEDICINE

## 2025-03-31 PROCEDURE — 2580000003 HC RX 258: Performed by: INTERNAL MEDICINE

## 2025-03-31 PROCEDURE — 6360000002 HC RX W HCPCS: Performed by: INTERNAL MEDICINE

## 2025-03-31 PROCEDURE — P9047 ALBUMIN (HUMAN), 25%, 50ML: HCPCS

## 2025-03-31 PROCEDURE — 82805 BLOOD GASES W/O2 SATURATION: CPT

## 2025-03-31 PROCEDURE — 93010 ELECTROCARDIOGRAM REPORT: CPT | Performed by: INTERNAL MEDICINE

## 2025-03-31 PROCEDURE — 2000000000 HC ICU R&B

## 2025-03-31 PROCEDURE — 36592 COLLECT BLOOD FROM PICC: CPT

## 2025-03-31 PROCEDURE — 90935 HEMODIALYSIS ONE EVALUATION: CPT

## 2025-03-31 PROCEDURE — 94003 VENT MGMT INPAT SUBQ DAY: CPT

## 2025-03-31 PROCEDURE — 97164 PT RE-EVAL EST PLAN CARE: CPT

## 2025-03-31 PROCEDURE — 6370000000 HC RX 637 (ALT 250 FOR IP): Performed by: INTERNAL MEDICINE

## 2025-03-31 PROCEDURE — 80053 COMPREHEN METABOLIC PANEL: CPT

## 2025-03-31 PROCEDURE — 99232 SBSQ HOSP IP/OBS MODERATE 35: CPT | Performed by: INTERNAL MEDICINE

## 2025-03-31 RX ORDER — HYDROCORTISONE SODIUM SUCCINATE 100 MG/2ML
100 INJECTION INTRAMUSCULAR; INTRAVENOUS ONCE
Status: DISCONTINUED | OUTPATIENT
Start: 2025-03-31 | End: 2025-04-02

## 2025-03-31 RX ORDER — FENTANYL CITRATE 50 UG/ML
25 INJECTION, SOLUTION INTRAMUSCULAR; INTRAVENOUS ONCE
Status: COMPLETED | OUTPATIENT
Start: 2025-03-31 | End: 2025-03-31

## 2025-03-31 RX ORDER — METOPROLOL TARTRATE 1 MG/ML
5 INJECTION, SOLUTION INTRAVENOUS ONCE
Status: COMPLETED | OUTPATIENT
Start: 2025-03-31 | End: 2025-03-31

## 2025-03-31 RX ORDER — NOREPINEPHRINE BITARTRATE 0.06 MG/ML
1-100 INJECTION, SOLUTION INTRAVENOUS CONTINUOUS
Status: DISCONTINUED | OUTPATIENT
Start: 2025-03-31 | End: 2025-04-03

## 2025-03-31 RX ORDER — ALBUMIN (HUMAN) 12.5 G/50ML
50 SOLUTION INTRAVENOUS ONCE
Status: COMPLETED | OUTPATIENT
Start: 2025-03-31 | End: 2025-03-31

## 2025-03-31 RX ADMIN — SODIUM CHLORIDE, PRESERVATIVE FREE 10 ML: 5 INJECTION INTRAVENOUS at 20:46

## 2025-03-31 RX ADMIN — AMIODARONE HYDROCHLORIDE 200 MG: 200 TABLET ORAL at 07:44

## 2025-03-31 RX ADMIN — Medication 100 MG: at 07:44

## 2025-03-31 RX ADMIN — Medication 2000 UNITS: at 07:44

## 2025-03-31 RX ADMIN — APIXABAN 5 MG: 5 TABLET, FILM COATED ORAL at 20:46

## 2025-03-31 RX ADMIN — FENTANYL CITRATE 25 MCG: 50 INJECTION INTRAMUSCULAR; INTRAVENOUS at 10:15

## 2025-03-31 RX ADMIN — ATORVASTATIN CALCIUM 80 MG: 40 TABLET, FILM COATED ORAL at 20:46

## 2025-03-31 RX ADMIN — ALBUMIN (HUMAN) 25 G: 0.25 INJECTION, SOLUTION INTRAVENOUS at 10:20

## 2025-03-31 RX ADMIN — SODIUM CHLORIDE, PRESERVATIVE FREE 10 ML: 5 INJECTION INTRAVENOUS at 07:44

## 2025-03-31 RX ADMIN — MIDODRINE HYDROCHLORIDE 20 MG: 10 TABLET ORAL at 11:22

## 2025-03-31 RX ADMIN — APIXABAN 5 MG: 5 TABLET, FILM COATED ORAL at 07:44

## 2025-03-31 RX ADMIN — METOROPROLOL TARTRATE 5 MG: 5 INJECTION, SOLUTION INTRAVENOUS at 08:06

## 2025-03-31 RX ADMIN — AMIODARONE HYDROCHLORIDE 0.5 MG/MIN: 1.8 INJECTION, SOLUTION INTRAVENOUS at 09:49

## 2025-03-31 RX ADMIN — MIDODRINE HYDROCHLORIDE 20 MG: 10 TABLET ORAL at 17:20

## 2025-03-31 RX ADMIN — SODIUM CHLORIDE, PRESERVATIVE FREE 40 MG: 5 INJECTION INTRAVENOUS at 07:43

## 2025-03-31 RX ADMIN — METOROPROLOL TARTRATE 5 MG: 5 INJECTION, SOLUTION INTRAVENOUS at 09:49

## 2025-03-31 RX ADMIN — MIDODRINE HYDROCHLORIDE 20 MG: 10 TABLET ORAL at 07:43

## 2025-03-31 ASSESSMENT — PULMONARY FUNCTION TESTS
PIF_VALUE: 26
PIF_VALUE: 28
PIF_VALUE: 27
PIF_VALUE: 29
PIF_VALUE: 26
PIF_VALUE: 27
PIF_VALUE: 25
PIF_VALUE: 28
PIF_VALUE: 26
PIF_VALUE: 26
PIF_VALUE: 30
PIF_VALUE: 23
PIF_VALUE: 31
PIF_VALUE: 28
PIF_VALUE: 26
PIF_VALUE: 23
PIF_VALUE: 36
PIF_VALUE: 26
PIF_VALUE: 37
PIF_VALUE: 27
PIF_VALUE: 26
PIF_VALUE: 33
PIF_VALUE: 20
PIF_VALUE: 31
PIF_VALUE: 29
PIF_VALUE: 28
PIF_VALUE: 28
PIF_VALUE: 31
PIF_VALUE: 23
PIF_VALUE: 20
PIF_VALUE: 33
PIF_VALUE: 23
PIF_VALUE: 35
PIF_VALUE: 26
PIF_VALUE: 42

## 2025-03-31 ASSESSMENT — PAIN SCALES - GENERAL
PAINLEVEL_OUTOF10: 0

## 2025-03-31 NOTE — PLAN OF CARE
Problem: Chronic Conditions and Co-morbidities  Goal: Patient's chronic conditions and co-morbidity symptoms are monitored and maintained or improved  3/31/2025 0817 by Wilber Bernardo RN  Outcome: Progressing     Problem: Discharge Planning  Goal: Discharge to home or other facility with appropriate resources  3/31/2025 0817 by Wilber Bernardo RN  Outcome: Progressing     Problem: Safety - Adult  Goal: Free from fall injury  3/31/2025 0817 by Wilber Bernardo RN  Outcome: Progressing  Flowsheets (Taken 3/31/2025 0815)  Free From Fall Injury:   Instruct family/caregiver on patient safety   Based on caregiver fall risk screen, instruct family/caregiver to ask for assistance with transferring infant if caregiver noted to have fall risk factors     Problem: Skin/Tissue Integrity  Goal: Skin integrity remains intact  Description: 1.  Monitor for areas of redness and/or skin breakdown  2.  Assess vascular access sites hourly  3.  Every 4-6 hours minimum:  Change oxygen saturation probe site  4.  Every 4-6 hours:  If on nasal continuous positive airway pressure, respiratory therapy assess nares and determine need for appliance change or resting period  3/31/2025 0817 by Wilber Bernardo RN  Outcome: Progressing  Flowsheets (Taken 3/31/2025 0815)  Skin Integrity Remains Intact:   Monitor for areas of redness and/or skin breakdown   Assess vascular access sites hourly   Every 4-6 hours minimum: Change oxygen saturation probe site   Every 4-6 hours: If on nasal continuous positive airway pressure, respiratory therapy assesses nares and determine need for appliance change or resting period     Problem: Nutrition Deficit:  Goal: Optimize nutritional status  3/31/2025 0817 by Wilber Bernardo RN  Outcome: Progressing     Problem: ABCDS Injury Assessment  Goal: Absence of physical injury  3/31/2025 0817 by Wilber Bernardo RN  Outcome: Progressing  Flowsheets (Taken 3/31/2025 0815)  Absence of Physical Injury: Implement safety  INTERVENTIONS:  1. Administer medication as ordered  2. Teach and rehearse alternative coping skills  3. Provide emotional support with 1:1 interaction with staff  3/31/2025 0817 by Wilber Bernardo RN  Outcome: Progressing     Problem: Coping  Goal: Pt/Family able to verbalize concerns and demonstrate effective coping strategies  Description: INTERVENTIONS:  1. Assist patient/family to identify coping skills, available support systems and cultural and spiritual values  2. Provide emotional support, including active listening and acknowledgement of concerns of patient and caregivers  3. Reduce environmental stimuli, as able  4. Instruct patient/family in relaxation techniques, as appropriate  5. Assess for spiritual pain/suffering and initiate Spiritual Care, Psychosocial Clinical Specialist consults as needed  3/31/2025 0817 by Wilber Bernardo RN  Outcome: Progressing     Problem: Spiritual Care  Goal: Pt/Family able to move forward in process of forgiving self, others, and/or higher power  Description: INTERVENTIONS:  1. Assist patient/family to overcome blocks to healing by use of spiritual practices (prayer, meditation, guided imagery, reiki, breath work, etc).  2. De-myth guilt and help patient/family identify possible irrational spiritual/cultural beliefs and values.  3. Explore possibilities of making amends & reconciliation with self, others, and/or a greater power.  4. Guide patient/family in identifying painful feelings of guilt.  5. Help patient/famiy explore and identify spiritual beliefs, cultural understandings or values that may help or hinder letting go of issue.  6. Help patient/family explore feelings of anger, bitterness, resentment.  7. Help patient/family identify and examine the situation in which these feelings are experienced.  8. Help patient/family identify destructive displacement of feelings onto other individuals.  9. Invite use of sacraments/rituals/ceremonies as appropriate (e.g. -  confession, anointing, smudging).  10. Refer patient/family to formal counseling and/or to christen community for further support work.  3/31/2025 0817 by Wilber Bernardo, RN  Outcome: Progressing     Problem: Genitourinary - Adult  Goal: Absence of urinary retention  3/31/2025 0817 by Wilber Bernardo, RN  Outcome: Progressing

## 2025-03-31 NOTE — PROGRESS NOTES
INPATIENT CARDIOLOGY FOLLOW-UP    Name: Nikki Moffett    Age: 79 y.o.    Date of Admission: 3/4/2025  5:05 PM    Date of Service: 3/31/2025    Chief Complaint: Follow-up for CMF, CHF    Interim History:  No new overnight cardiac complaints. Currently with no complaints of CP, SOB, palpitations, dizziness, or lightheadedness. SR on telemetry.    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)    Occipital stroke (HCC)    Atrial flutter (HCC)    Paroxysmal atrial fibrillation (HCC)    H/O: stroke    Atrial flutter with rapid ventricular response (HCC)    Decompensated heart failure (HCC)    Chronic atrial fibrillation (HCC)    VHD (valvular heart disease)    ANTONIO (acute kidney injury)    Palliative care encounter    Metabolic encephalopathy    Persistent atrial fibrillation (HCC)       Allergies:  No Known Allergies    Current Medications:  Current Facility-Administered Medications   Medication Dose Route Frequency Provider Last Rate Last Admin    norepinephrine (LEVOPHED) 16 mg in sodium chloride 0.9 % 250 mL infusion (premix)  1-100 mcg/min IntraVENous Continuous Americo Vargas MD   Held at 03/31/25 1210    hydrocortisone sodium succinate PF (SOLU-CORTEF) injection 100 mg  100 mg IntraVENous Once Americo Vargas MD        ipratropium 0.5 mg-albuterol 2.5 mg (DUONEB) nebulizer solution 1 Dose  1 Dose Inhalation Q4H PRN Rosetta Lopez MD   1 Dose at 03/30/25 0854    0.9 % sodium chloride infusion   IntraVENous PRN Marko Yang MD        atorvastatin (LIPITOR) tablet 80 mg  80 mg PEG Tube Nightly Ana Beltran

## 2025-03-31 NOTE — CARE COORDINATION
Care Coordination: LOS 27 day. [facility] will start precert today, wean trials documented- they can accept IV Cordarone. S/P Trach/peg, currently T-V.  Ambulance transport on chart. Will follow for transition of care.    Electronically signed by Cristela Bradford RN on 3/31/2025 at 12:03 PM

## 2025-03-31 NOTE — PROGRESS NOTES
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   Procedure/Surgery:  Tunneled CVC placement 3/20; Trach and PEG 3/26;  Thoracentesis 3/26;  Thoracentesis 3/27  Precautions:  Falls, Trach to Vent, PEG, FMS, Monitor HR, Alarms   Equipment Needs:  TBD    Reason for re-evaluation:  Change in status, transfer to ICU, multiple procedures   Date of re-evaluation:  3/31    SUBJECTIVE:    Pt lives with her family in a 2 story home with 4 stairs to enter and no rail(s).    Bed is on 2nd floor and bath is on 2nd floor.    Pt ambulated with no AD PTA.     Equipment Owned: none  Equipment Needs:  TBD - possible WW    OBJECTIVE:   Re-Evaluation  Date: 3/31/25 Treatment Short Term/ Long Term   Goals   AM-PAC 6 Clicks      Was pt agreeable to Re-Eval/treatment? Yes      Does pt have pain? No c/o pain     Bed Mobility  Rolling: Dep  Supine to sit: NT  Sit to supine: NT  Scooting: Dep   Rolling: Min A  Supine to sit: Min A  Sit to supine: Min A  Scooting: Min A   Transfers Sit to stand: NT  Stand to sit: NT  Stand pivot: NT  Sit to stand: Min A  Stand to sit: Min A  Stand pivot: Min A with AAD   Ambulation    NT  >5 feet with AAD Min A   Stair negotiation: ascended and descended  NT  NA   ROM BUE:  Per OT eval   BLE:  WFL     Strength BUE:  Per OT eval   BLE:  Unable to assess     Balance Sitting EOB:  NT  Dynamic Standing:  NT  Sitting EOB:  SBA   Dynamic Standing:   function, completion of standardized testing/informal observation of tasks, assessment of data and education on plan of care and goals.    CPT codes:  [] Low Complexity PT evaluation 12673  [] Moderate Complexity PT evaluation 98989  [] High Complexity PT evaluation 67489  [x] PT Re-evaluation 65869  [] Gait training 63066 -- minutes  [] Manual therapy 62368 -- minutes  [x] Therapeutic activities 15610 8 minutes  [] Therapeutic exercises 90352 - minutes  [] Neuromuscular reeducation 01672 -- minutes     Chema Coleman, PT, DPT  [id number]

## 2025-03-31 NOTE — PROGRESS NOTES
[facility]- [facility]  Inpatient Progress Report  PATIENT: Nikki Moffett  MEDICAL RECORD NUMBER: [id number]  DATE OF SERVICE:  3/31/2025  ATTENDING ELECTROPHYSIOLOGIST: Nakita Miranda MD   PRIMARY ELECTROPHYSIOLOGIST: Gelacio Hendricks MD  REFERRING PHYSICIAN:  Tiesha Panda MD  CHIEF COMPLAINT: Altered mental status    HPI: This is a 79 y.o. female with a history of  persistent atrial fibrillation and atrial flutter, cerebrovascular accident and hyperlipidemia.   The patient presented to the hospital on 12/21/23 due to right sided weakness and facial droop. She diagnosed with acute left MCA stroke with M2 occlusion. She was not a candidate for tPA and underwent mechanical thrombectomy by IR team on 12/21/23. Prior to intervention, patient was diagnosed with AF with RVR with heart rate in the 180s. Patient underwent emergency DC-cardioversion to normal sinus rhythm and was initiated on IV Amiodarone which later on transited to oral Amiodarone. Echocardiogram 2/29/24 showed LV EF of 40-45%.  She was started on GDMT and was discharged home on Amiodarone loading dose, Eliquis and Metoprolol.   She was last seen in Cardiology's office on 2/22/24. She subsequently underwent nuclear stress test on 2/29/24 which showed no ischemia and LV EF of 69%. Repeat echocardiogram on 2/29/24 showed LV EF 55-60%, moderate LVH and mild LAE. She was scheduled for follow up with Cardiology but did not follow through.   Son called on 3/3/25 stated that the patient was not doing well She was not eating well, shortness of breath with exertion and reported palpitations. She was seen in IM resident clinic on 3/4/35 and was noted to be in AFL with  bpm. She was advised to come in to ED. In ED she was noted to be in AFL with  bpm.She was started on IV Cardizem drip.CXR showed bilateral pleural effusions and possible right lower lobe opacity. Patient received  right-sided pleural effusion.           Exam Ended: 03/29/25 15:19 EDT   .      Telemetry 3/30/2025: Persistent atrial flutter/fibrillation with ventricular rates of 100-130 bpm    EKG 3/6/25:  bpm, QRS 138ms  Qtc 522 ms.  Please see scan in Cardiology.    Echocardiogram 3/7/25:    Left Ventricle: Normal left ventricular systolic function with a visually estimated EF of 10 -15%. Left ventricle size is normal. Normal wall thickness. Severe global hypokinesis present. E/e' ratio >11, suggesting increased LAP.    Right Ventricle: Right ventricle size is normal. Reduced systolic function.    Aortic Valve: No significant stenosis. 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    Mitral Valve: Mild regurgitation.    Tricuspid Valve: Moderate regurgitation. Normal RVSP. The estimated RVSP is 25 mmHg.    Left Atrium: Left atrium is severely dilated.    Right Atrium: Right atrium is mildly dilated.    Pericardium: No pericardial effusion.    Image quality is suboptimal. Contrast used: Lumason.    Echocardiogram 2/29/24:     Left Ventricle: Normal left ventricular systolic function with a visually estimated EF of 55 - 60%. Left ventricle size is normal. Moderately increased ventricular mass. Findings consistent with moderate concentric hypertrophy. Normal wall motion. Indeterminate diastolic function.    Right Ventricle: Right ventricle size is normal. Normal systolic function. TAPSE is 2.5 cm.    Aortic Valve: No stenosis.    Tricuspid Valve: Normal RVSP. The estimated RVSP is 33 mmHg.    Left Atrium: Left atrium is mildly dilated.    Interatrial Septum: Interatrial shunt visualized with color Doppler with left to right shunt.  No right to left shunt on bubble study.    Pericardium: No pericardial effusion.    Image quality is good.    Echocardiogram 12/22/23:    Left Ventricle: Moderately reduced left ventricular systolic function with a visually estimated EF of 40 - 45%. Left ventricle size is  normal. Mildly increased ventricular mass. Findings consistent with mild concentric hypertrophy. Moderate global hypokinesis present. Moderate hypokinesis of the following segments: inferior and inferoseptal walls. Grade III diastolic dysfunction with increased LAP.    Right Ventricle: Right ventricle size is normal. Normal systolic function. TAPSE is 1.9 cm.    Aortic Valve: No significant stenosis. COLLEEN by direct planimetry is 2 cm2.    Mitral Valve: Mild regurgitation.    Tricuspid Valve: Normal RVSP. The estimated RVSP is 32 mmHg.    Left Atrium: Left atrium is mildly dilated.    Interatrial Septum: Agitated saline study was negative with and without provocation.    Right Atrium: Right atrium is mildly dilated.    Pericardium: No pericardial effusion.    Image quality is suboptimal. Contrast used: Definity. Technically difficult study with poor endocardial visualization.     I have independently reviewed all of the ECGs and rhythm strips per above     Assessment/Plan: This is a 79 y.o. female with     1. Persistent atrial fibrillation and atypical atrial flutter  - [id number] of 5.  - Diagnosed with AF with RVR when presented with acute left MCA stroke with M2 occlusion status post mechanical thrombectomy on 12/21/23. Patient underwent emergency DC-cardioversion to normal sinus rhythm and was initiated on IV Amiodarone which later on transited to oral Amiodarone at that time.  - Was discharged home on Amiodarone, Toprol XL and Eliquis.  - Has not been taking Amiodarone and Toprol XL for some time between 4/24/24 and now.  - Reported may miss Eliquis for 1 to 2 doses lately.  - Presented in AFL with RVR with ongoing AFL with RVR on IV and oral Amiodarone.  - S/p DC-cardioversion 3/7/25.    -Recurrence of atrial flutter/fibrillation with rapid ventricular rate on 3/29/2025-IV amiodarone started for better rate control and possible conversion to sinus rhythm.  However IV and oral amiodarone have caused the

## 2025-03-31 NOTE — PROGRESS NOTES
Nephrology Progress Note  3/31/2025 8:15 AM  Subjective:   Admit Date: 3/4/2025  PCP: Tiesha Panda MD    Subjective    3/24/25:: Patient seen and examined ; continues to require pressors; remains intubated    3/25: remains intubated, still requiring pressors; HD in progress, off sedation now    3/26: Trach and PEG this a.m.; continues to require pressors; being weaned; for thoracentesis today    3/27: No new acute issues from overnight    3/28: No new acute issues from overnight; vasopressors, being weaned    3/29: No new acute issues from overnight; remains off pressors    3/30: Amiodarone drip restarted due to atrial fibrillation; EP consulted; no other acute issues from overnight    3/31: No new c/o; remains off pressors; on amio drip; in A fib RVR     Diet: Diet NPO  ADULT TUBE FEEDING; PEG; Renal Formula; Continuous; 20; Yes; 10; Q 4 hours; 30; 30; Q 4 hours; Protein; 1 Dose; Daily    Data:   Scheduled Meds:   amiodarone  200 mg PEG Tube BID    amiodarone (CORDARONE) 150 mg in dextrose 5 % 100 mL bolus  150 mg IntraVENous Once    atorvastatin  80 mg PEG Tube Nightly    midodrine  20 mg PEG Tube TID WC    thiamine  100 mg Per G Tube Daily    vitamin D  2,000 Units PEG Tube Daily    apixaban  5 mg Oral BID    pantoprazole (PROTONIX) 40 mg in sodium chloride (PF) 0.9 % 10 mL injection  40 mg IntraVENous Daily    insulin lispro  0-4 Units SubCUTAneous Q6H    sodium chloride flush  5-40 mL IntraVENous 2 times per day    [Held by provider] metoprolol succinate  25 mg Oral Daily     Continuous Infusions:   amiodarone 0.5 mg/min (03/31/25 0604)    sodium chloride      sodium chloride 10 mL/hr at 03/31/25 0604    dextrose       PRN Meds:ipratropium 0.5 mg-albuterol 2.5 mg, sodium chloride, heparin (PF), heparin (porcine) **AND** heparin (porcine), sodium chloride, polyethylene glycol, sodium chloride flush, acetaminophen **OR** acetaminophen, dextrose bolus **OR** dextrose bolus, glucagon (rDNA), dextrose  I/O last

## 2025-03-31 NOTE — PROGRESS NOTES
[facility]  Internal Medicine Residency / House Medicine Service    Attending Physician Statement  I have discussed the case, including pertinent history and exam findings with the resident and the team.  I have seen and examined the patient and the key elements of the encounter have been performed by me.  I agree with the assessment, plan and orders as documented by the resident.      Very alert this AM  VS stble on HD  Recurrent rapid Flutter/Fib on amiodarone  Attempting positive fluid balance  BP 98/67 this AM  Hx of remote CVA on right, AFib and Cardiomyopathy  Probable left hemianopsia  S/P Trach and PEG  Reviewe causes of liquid stool  TF is 1.2 Osmoles   Review meds for AFib , Eliquis and metoprolol  Plan; same ICU care  Remainder of medical problems as per resident note.      Dalton Sosa MD FRCP [facility]  Internal Medicine Residency Faculty

## 2025-03-31 NOTE — PROGRESS NOTES
Physical Therapy    PT consult to evaluate/treat received and appreciated.  Pt chart reviewed and dicussed at quality flow rounds.  Pt is currently on AM dialysis -- will attempt back at later time.  Will check back as able.  Thank you.        Chema Coleman, PT, DPT   [id number]

## 2025-03-31 NOTE — PROGRESS NOTES
[facility]  Internal Medicine Residency Program  Progress Note - House Team 1    Patient:  Nikki Moffett 79 y.o. female MRN: [id number]     Date of Service: 3/31/2025     CC:   Chief Complaint   Patient presents with    Altered Mental Status     \"Mood swings, memory loss and fatigue\" per patients grandson    Chest Pain     Ongoing for a couple weeks per family     Overnight events: no acute overnight events     Subjective     Patient seen at bedside this morning. She was awake, alert, and follows simple commands. ROS cannot be accurately done she has a trach and PEG. When asked if she has pain, she shakes her head no.    Objective     Physical Exam:  Vitals: BP 98/67   Pulse (!) 101   Temp 96.8 °F (36 °C)   Resp 20   Ht 1.575 m (5' 2\")   Wt 68.1 kg (150 lb 2.1 oz)   SpO2 100%   BMI 27.46 kg/m²     I & O - 24hr: I/O this shift:  In: 1400   Out: 1200    General Appearance: alert and appears stated age  HEENT:  Head: Normocephalic, no lesions, without obvious abnormality.  Neck: supple, symmetrical, trachea midline  Lung:  rhonchi bilaterally  Heart: irregularly irregular rhythm, rapid ventricular rate in the 120s  Abdomen: soft, non-tender; bowel sounds normal; no masses,  no organomegaly  Extremities:  extremities normal, atraumatic, no cyanosis or edema  Musculokeletal: No joint swelling, no muscle tenderness. ROM normal in all joints of extremities.   Neurologic: Mental status: awake, alert, follows commands    Pertinent Labs & Imaging Studies     Recent Labs     03/29/25  0428 03/30/25  0405 03/31/25  0408   HGB 7.5* 7.3* 7.1*   HCT 23.2* 22.7* 22.2*   WBC 13.3* 10.9 13.4*   PLT  --  118* 119*       Recent Labs     03/29/25  0428 03/30/25  0405 03/31/25  0408   * 133 130*   K 4.1 3.9 4.2   CL 93* 96* 92*   CO2 22 22 21*   BUN 38* 25* 43*   CREATININE 2.5* 1.7* 2.3*   MG 2.1 1.8 2.6   PHOS 2.6 1.6* 2.9   CALCIUM 8.4* 7.9* 8.0*   GLUCOSE 97 112* 126*       Recent Labs      midodrine  20 mg PEG Tube TID WC    thiamine  100 mg Per G Tube Daily    vitamin D  2,000 Units PEG Tube Daily    apixaban  5 mg Oral BID    pantoprazole (PROTONIX) 40 mg in sodium chloride (PF) 0.9 % 10 mL injection  40 mg IntraVENous Daily    insulin lispro  0-4 Units SubCUTAneous Q6H    sodium chloride flush  5-40 mL IntraVENous 2 times per day    [Held by provider] metoprolol succinate  25 mg Oral Daily         ipratropium 0.5 mg-albuterol 2.5 mg, sodium chloride, heparin (PF), heparin (porcine) **AND** heparin (porcine), sodium chloride, polyethylene glycol, sodium chloride flush, acetaminophen **OR** acetaminophen, dextrose bolus **OR** dextrose bolus, glucagon (rDNA), dextrose      Infusions:     amiodarone 0.5 mg/min (03/31/25 0949)    sodium chloride      sodium chloride 10 mL/hr at 03/31/25 0604    dextrose         Home meds:    Prior to Admission medications    Medication Sig Start Date End Date Taking? Authorizing Provider   ELIQUIS 5 MG TABS tablet TAKE 1 TABLET BY MOUTH TWICE DAILY 2/7/25   Francheska Carnes MD   citalopram (CELEXA) 10 MG tablet Take 1 tablet by mouth daily  Patient not taking: Reported on 3/4/2025 4/23/24   Thuan Santos MD   amiodarone (CORDARONE) 200 MG tablet Take 1 tablet by mouth daily 2/22/24 3/4/25  Francheska Carnes MD   atorvastatin (LIPITOR) 80 MG tablet Take 1 tablet by mouth nightly 2/22/24   Francheska Carnes MD   metoprolol succinate (TOPROL XL) 25 MG extended release tablet Take 1 tablet by mouth daily  Patient not taking: Reported on 3/4/2025 2/22/24   Francheska Carnes MD   sacubitril-valsartan (ENTRESTO) 49-51 MG per tablet Take 1 tablet by mouth 2 times daily  Patient not taking: Reported on 3/4/2025 2/22/24   Francheska Carnes MD   melatonin (RA MELATONIN) 3 MG TABS tablet Take 1 tablet by mouth daily  Patient not taking: Reported on 3/4/2025 1/9/24   QazzihCm butt MD       Resident's Assessment and Plan     Nikki Moffett is a 79 y.o.  female    Assessment:  Acute encephalopathy likely metabolic, improving  Imaging this admission 3/13/25 CT head showed small insult L frontal parietal region of indeterminate chronicity, 3/15/25 MRI brain acute infarct L occipital lobe, MRA head mild stenosis L vertebral artery  EEG 3/18/25 EEG showed no seizures, suggestive of severe metabolic/toxic encephalopathy  Hx of L MCA stroke s/p mechanical thrombectomy 12/2023  On trach and peg 3/26  Intubated for airway protection 3/16 2/2 worsening encephalopathy  Bilateral moderate pleural effusions likely 2/2 HFrEF s/p R-sided and L-sided thoracentesis  Shock requiring pressor support likely cardiogenic, infectious  Paroxysmal atrial fibrillation/flutter with RVR, s/p DCCV 3/7/25  [id number] score 5  HFrEF EF10-15%  Elevated troponin in the setting of Afib in RVR and ESRD on HD  Prolonged Qtc (latest 562)  HLD  Transaminitis, improving  Normocytic anemia  Thrombocytopenia  Anuric ANTONIO stage III, s/p CVVHD  Prediabetes, A1c 6.0%  Subclinical hypothyroidism, stable  Vit D deficiency  Diarrhea 2/2 low suspicion for C diff    Plan:  Primary care per MICU service  S/p trach & PEF, trach suture removal 4/3  Continue midodrine 20 mg TID, MAP goal of 60  Consider decreasing Atorvastatin to 20 mg ODHS  Avoid Qtc prolonging medications  Follow cardiology recommendations  Diuretics as needed  GDMT limited due to low BP/Renal failure, up-titrate meds as tolerated  Re-assess EF in 3-6 months  Follow EP recommendations  continue Amiodarone IV infusion for rate control and possible conversion to sinus rhythm  Beta blockade as tolerated  Continue Eliquis  Follow nephrology recommendations  3/20/25 IR guided tunneled dialysis catheter  continue on intermittent HD/SLED  Dental service on board for poor dentition  Palliative care on board for goals of care    PT/OT evaluation: not indicated at this time  DVT prophylaxis/ GI prophylaxis:   Disposition: continue ICU care    Mehreen Saanbria

## 2025-03-31 NOTE — FLOWSHEET NOTE
03/31/25 1039   Vital Signs   BP 98/67   Temp 96.8 °F (36 °C)   Pulse (!) 101   Respirations 20   Post-Hemodialysis Assessment   Post-Treatment Procedures Blood returned;Catheter capped, clamped with Citrate x 2 ports   Machine Disinfection Process Exterior Machine Disinfection   Rinseback Volume (ml) 300 ml   Blood Volume Processed (Liters) 45.3 L   Dialyzer Clearance Lightly streaked   Duration of Treatment (minutes) 240 minutes   Heparin Amount Administered During Treatment (mL) 0 mL   Hemodialysis Intake (ml) 1400 ml   Hemodialysis Output (ml) 1200 ml   NET Removed (ml) -200   Tolerated Treatment Fair   Patient Response to Treatment toleratd fiar, +200ml, blood returned, cath care per policy/procedure, lines flushed, heparin instilled

## 2025-03-31 NOTE — PROGRESS NOTES
OCCUPATIONAL THERAPY RE-EVALUATION    [facility] [address], [place]       Date:3/31/2025                                                  Patient Name: Nikki Moffett  MRN: [id number]  : 1945  Room: [address] [place], [place] [address]    Evaluating OT: Siddhartha Mohamudnbraykim OTR/L [id number]  Re-evaluation: Mary Reynolds OTD, OTR/L, [id number]    Re-evaluation indicated after transfer to ICU requiring intubation.    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   Past Surgical History:   Procedure Laterality Date    IR MECHANICAL ART THROMBECTOMY INTRACRANIAL  2023    IR MECHANICAL ART THROMBECTOMY INTRACRANIAL 2023 Dominik Castillo MD [facility] SPECIAL PROCEDURES    IR TUNNELED CVC PLACE WO SQ PORT/PUMP > 5 YEARS  2025    IR TUNNELED CVC PLACE WO SQ PORT/PUMP > 5 YEARS 3/20/2025 ClarenceNatalya MD [facility] SPECIAL PROCEDURES    THORACENTESIS Right 2025    750cc clear yellow fluid    TRACHEAL SURGERY N/A 3/26/2025    TRACHEOTOMY PERCUTANEOUS BRONCHOSCOPY performed by Ryne Bloom MD at [facility] ENDOSCOPY    UPPER GASTROINTESTINAL ENDOSCOPY N/A 3/26/2025    ESOPHAGOGASTRODUODENOSCOPY PERCUTANEOUS ENDOSCOPIC GASTROSTOMY TUBE INSERTION performed by Ryne Bloom MD at [facility] ENDOSCOPY       Pt presented to the hospital on 3/4 with fatigue, dizziness, and chest pain     Orders received, chart reviewed, eval complete.     Precautions:  Fall Risk, monitor HR, trach    Therapeutic Ex 60833 8  1    Therapeutic Activities 89344      ADL/Self Care [id number]     Orthotic Management 66857       Neuro Re-Ed 94352       Non-Billable Time          Mary Reynolds OTD, OTR/L, [id number]

## 2025-03-31 NOTE — PROGRESS NOTES
volume overload, minimal perihilar vascular congestion.      Persistent increased density in the left lower lung base and being a   combination of atelectasis/pleural effusion/infiltrate.      Small right-sided pleural effusion.         XR CHEST PORTABLE   Final Result   Interval reduction of left pleural effusion consistent with thoracentesis.   No pneumothorax identified.      Otherwise, similar appearance of pulmonary edema and small right effusion.         IR GUIDED THORACENTESIS PLEURAL   Final Result   Successful ultrasound guided therapeutic left thoracentesis.         XR CHEST PORTABLE   Final Result   After right-sided thoracentesis.      Significant drainage of right-sided pleural effusion.  Residual areas of   atelectasis in the right lower lung base.      No right-sided pneumothorax.         IR GUIDED THORACENTESIS PLEURAL   Final Result   Bilateral pleural effusions.      Successful ultrasound guided therapeutic right thoracentesis.         XR CHEST PORTABLE   Final Result   Stable increased markings identified in the mid and lower lung fields with no   change in the bilateral pleural effusion.  Heart remains enlarged.  Lines and   tubes are stable.         XR CHEST PORTABLE   Final Result   1. Stable lines and tubes.  Endotracheal tube tip terminates at the lew.   2. Similar bilateral pleural effusions with adjacent atelectasis.         XR CHEST PORTABLE   Final Result   No significant interval changes since the study of March 23.         XR CHEST PORTABLE   Final Result   Stable appearance of the chest and support apparatus.         XR CHEST PORTABLE   Final Result   Stable appearance of the chest compared to 03/21/2025         XR CHEST PORTABLE   Final Result   1. The position and alignment of the tubes and catheters are within normal   range.   2. Small bilateral pleural effusions, stable.   3. No signs of pneumothorax.         IR TUNNELED CVC PLACE WO SQ PORT/PUMP > 5 YEARS   Final Result   1.    Final Result   Bilateral pleural effusions.      Possible right lower lobe opacity.              Resident's Assessment and Plan     Assessment and Plan:    Acute encephalopathy, likely metabolic; improving   History of L MCA stroke status post mechanical thrombectomy 12/2023   3/13/2025 CT head: Small insult left frontal parietal region of indeterminate chronicity   3/15/2025 MRI brain: Acute infarct L occipital lobe   3/15/2025 MRA head: Mild stenosis V4 segment L vertebral artery  3/18/2025 EEG with generalized background slowing, triphasic waves nonspecific but possibly suggestive of severe metabolic/toxic encephalopathy or dementia, no seizures.  Intubated for airway protection on 3/16/2025 2/2 worsening encephalopathy, failed extubation s/p trach PEG on 3/26/2025  Bilateral moderate pleural effusion likely 2/2 HFrEF   S/p right-sided thoracentesis 3/26/2025, 750 cc, positive lights criteria, likely altered   S/p left-sided thoracentesis 3/27/225, 400 cc, negative lights criteria  Shock likely cardiogenic, infection r/o,CIRCI r/o; resolved, on midodrine, off pressors   Cortisol stim test: Total increase 11.4 (14.8 > 20.6 > 26.2)-unlikely CIRCI  Paroxysmal A-fib/flutter with RVR, status post DCCV 3/7/2025, [id number] 5  HFrEF, EF 10 to 15% on 3/7/2025 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  Elevated high-sensitivity troponin in the setting of A-fib RVR, ESRD requiring HD   161 > 149 (3/16/2025 48 > 49) asymptomatic, no EKG changes   Lexiscan 2/7/2024 LV poststress normal, post EF 69%, negative for ischemia  Prolonged QTc, 537  Hyperlipidemia  Normocytic anemia, multifactorial, status post 2 unit PRBC, stable   Hemoccult + stool  Thrombocytopenia and acute illness, ESRD on HD  Anuric ANTONIO stage III, baseline creatinine 0.8-1, CVVHD 3/8 to 3/15, to continue on intermittent HD/SLED  Prediabetes with hyperglycemia 2/2 steroid use, A1c 6%  Vitamin D deficiency  Diarrhea, low  suspicion for C. difficile at this time, on PPI and tube feeds  History of hepatitis C; recently not detected, no documentation of treatment  Subclinical hypothyroidism; stable  Influenza A infection; on 3/8/2025, completed course of Tamiflu  Persistent leukocytosis likely 2/2 steroid use plus cardiogenic shock; improved  HAGMA 2/2 LA; resolved  Elevated INR (peak >10); status post 4 unit FFP, 10 mg vitamin K; improved  Acute hyponatremia suspect dilutional; improved  Hypoalbuminemia; improved  Transaminitis likely shock liver; improved   Concerns for early cirrhosis versus hepatic steatosis, ultrasound abdomen 3/11    Plan:  Status post trach and PEG, trach suture removal 4/3  Continue midodrine 20 mg 3 times daily, MAP goal >60  Blood pressure should be taken in left forearm only, should not be taken in flaccid RUE, LUE has brachial PICC  Avoid QTc prolonging medications  Cardiology following, GDMT as tolerated, reassess EF in 3 to 6 months, left heart cath at some point  EP following, patient continued on IV amiodarone for rate control and possible conversion to sinus rhythm  Daily liver function monitoring  Nephrology following, patient on IHD/SLED, improved fluid balance  Dental consulted for poor dentition  Palliative following for goals of care    Antimicrobials: Completed 7 days of Zosyn 3/19, monitoring off antibiotics  LDA: Left brachial PICC line, day 12, tunneled HD day 8, FMS  DVT prophylaxis: Eliquis 5 mg twice daily  GI prophylaxis: Protonix  Diet:   Diet NPO  ADULT TUBE FEEDING; PEG; Renal Formula; Continuous; 20; Yes; 10; Q 4 hours; 30; 30; Q 4 hours; Protein; 1 Dose; Daily   Bowel regimen: As needed  Pain management: as needed  Code status: Full Code   Disposition: Admitted to ICU  Family: updated as available    Americo Vargas MD, PGY-2   Attending physician: Dr. Sandoval

## 2025-03-31 NOTE — PROGRESS NOTES
DAILY VENTILATOR WEANING ASSESSMENT PERFORMED    P/FIO2 Ratio = 369         (<100= do not Wean)                  Cs =  17                       (<32= Instability)  Plat. Pressure = 21  MV =6.42      Instabilities:       Cardiovascular =       CNS =       Respiratory =       Metabolic =    Parameters    no    Ask Physician for a weaning plan yes    Was SAT completed yes    Was SBT completed no    Additional Comments:  Patient heart rate 140 bpm; amio drip     Performed by Galina Espinosa RCP RRT      Reference Table:    Cardiovascular     CNS      1. Mean BP less than or equal to 75   1. Neuromuscular blockade  2. Heart Rate greater than 130   2. RASS of -3, -4, -5  3. Myocardial Ischemia    3. RASS of +3, +4  4. Mechanical Assist Device    4. ICP greater than 15 or             Intracranial Hypertension         Respiratory      Metabolic  1. PEEP equal to or greater than 10cm/H20  1.Temp. (8hrs) less than 95 or > 103  2. Respiratory Rate greater than 35   2. WBC < 5000 or > 77293  3. Minute Volume greater than 15L  4. pH less than 7.30  5. Deteriorating chest X-ray

## 2025-03-31 NOTE — PLAN OF CARE
Problem: Chronic Conditions and Co-morbidities  Goal: Patient's chronic conditions and co-morbidity symptoms are monitored and maintained or improved  3/30/2025 2046 by Chris George RN  Outcome: Progressing  Flowsheets (Taken 3/30/2025 2000)  Care Plan - Patient's Chronic Conditions and Co-Morbidity Symptoms are Monitored and Maintained or Improved:   Monitor and assess patient's chronic conditions and comorbid symptoms for stability, deterioration, or improvement   Update acute care plan with appropriate goals if chronic or comorbid symptoms are exacerbated and prevent overall improvement and discharge  3/30/2025 1018 by Wilber Bernardo RN  Outcome: Progressing     Problem: Discharge Planning  Goal: Discharge to home or other facility with appropriate resources  3/30/2025 2046 by Chris George RN  Outcome: Progressing  Flowsheets (Taken 3/30/2025 2000)  Discharge to home or other facility with appropriate resources:   Identify barriers to discharge with patient and caregiver   Refer to discharge planning if patient needs post-hospital services based on physician order or complex needs related to functional status, cognitive ability or social support system  3/30/2025 1018 by Wilber Bernardo RN  Outcome: Progressing     Problem: Safety - Adult  Goal: Free from fall injury  3/30/2025 2046 by Chris George RN  Outcome: Progressing  Flowsheets (Taken 3/30/2025 2000)  Free From Fall Injury: Instruct family/caregiver on patient safety  3/30/2025 1018 by Wilber Bernardo RN  Outcome: Progressing  Flowsheets (Taken 3/30/2025 1018)  Free From Fall Injury:   Instruct family/caregiver on patient safety   Based on caregiver fall risk screen, instruct family/caregiver to ask for assistance with transferring infant if caregiver noted to have fall risk factors     Problem: Skin/Tissue Integrity  Goal: Skin integrity remains intact  Description: 1.  Monitor for areas of redness and/or skin breakdown  2.  Assess vascular  RN  Outcome: Progressing  Goal: Absence of fever/infection during anticipated neutropenic period  3/30/2025 2046 by Chris George RN  Outcome: Progressing  Flowsheets (Taken 3/30/2025 2000)  Absence of fever/infection during anticipated neutropenic period: Monitor white blood cell count  3/30/2025 1018 by Wilber Bernardo RN  Outcome: Progressing     Problem: Pain  Goal: Verbalizes/displays adequate comfort level or baseline comfort level  3/30/2025 2046 by Chris George RN  Outcome: Progressing  Flowsheets (Taken 3/30/2025 2000)  Verbalizes/displays adequate comfort level or baseline comfort level:   Assess pain using appropriate pain scale   Implement non-pharmacological measures as appropriate and evaluate response   Administer analgesics based on type and severity of pain and evaluate response  3/30/2025 1018 by Wilber Bernardo RN  Outcome: Progressing     Problem: Skin/Tissue Integrity - Adult  Goal: Skin integrity remains intact  Description: 1.  Monitor for areas of redness and/or skin breakdown  2.  Assess vascular access sites hourly  3.  Every 4-6 hours minimum:  Change oxygen saturation probe site  4.  Every 4-6 hours:  If on nasal continuous positive airway pressure, respiratory therapy assess nares and determine need for appliance change or resting period  3/30/2025 2046 by Chris George RN  Outcome: Progressing  Flowsheets (Taken 3/30/2025 2000)  Skin Integrity Remains Intact:   Monitor for areas of redness and/or skin breakdown   Assess vascular access sites hourly   Every 4-6 hours minimum: Change oxygen saturation probe site  3/30/2025 1018 by Wilber Bernardo RN  Outcome: Progressing  Flowsheets (Taken 3/30/2025 1018)  Skin Integrity Remains Intact:   Monitor for areas of redness and/or skin breakdown   Assess vascular access sites hourly   Every 4-6 hours minimum: Change oxygen saturation probe site   Every 4-6 hours: If on nasal continuous positive airway pressure, respiratory therapy

## 2025-04-01 LAB
AADO2: 72 MMHG
ALBUMIN SERPL-MCNC: 3.1 G/DL (ref 3.5–5.2)
ALP SERPL-CCNC: 121 U/L (ref 35–104)
ALT SERPL-CCNC: 9 U/L (ref 0–32)
ANION GAP SERPL CALCULATED.3IONS-SCNC: 14 MMOL/L (ref 7–16)
ANION GAP SERPL CALCULATED.3IONS-SCNC: 16 MMOL/L (ref 7–16)
AST SERPL-CCNC: 37 U/L (ref 0–31)
B.E.: -0.6 MMOL/L (ref -3–3)
BASOPHILS # BLD: 0 K/UL (ref 0–0.2)
BASOPHILS # BLD: 0 K/UL (ref 0–0.2)
BASOPHILS NFR BLD: 0 % (ref 0–2)
BASOPHILS NFR BLD: 0 % (ref 0–2)
BILIRUB DIRECT SERPL-MCNC: 0.4 MG/DL (ref 0–0.3)
BILIRUB INDIRECT SERPL-MCNC: 0.7 MG/DL (ref 0–1)
BILIRUB SERPL-MCNC: 1.1 MG/DL (ref 0–1.2)
BUN SERPL-MCNC: 29 MG/DL (ref 6–23)
BUN SERPL-MCNC: 30 MG/DL (ref 6–23)
CA-I BLD-SCNC: 1.08 MMOL/L (ref 1.15–1.33)
CALCIUM SERPL-MCNC: 8.1 MG/DL (ref 8.6–10.2)
CALCIUM SERPL-MCNC: 8.2 MG/DL (ref 8.6–10.2)
CHLORIDE SERPL-SCNC: 93 MMOL/L (ref 98–107)
CHLORIDE SERPL-SCNC: 95 MMOL/L (ref 98–107)
CO2 SERPL-SCNC: 21 MMOL/L (ref 22–29)
CO2 SERPL-SCNC: 22 MMOL/L (ref 22–29)
COHB: 1.5 % (ref 0–1.5)
CREAT SERPL-MCNC: 1.6 MG/DL (ref 0.5–1)
CREAT SERPL-MCNC: 1.8 MG/DL (ref 0.5–1)
CRITICAL: ABNORMAL
DATE ANALYZED: ABNORMAL
DATE OF COLLECTION: ABNORMAL
EOSINOPHIL # BLD: 0 K/UL (ref 0.05–0.5)
EOSINOPHIL # BLD: 0 K/UL (ref 0.05–0.5)
EOSINOPHILS RELATIVE PERCENT: 0 % (ref 0–6)
EOSINOPHILS RELATIVE PERCENT: 0 % (ref 0–6)
ERYTHROCYTE [DISTWIDTH] IN BLOOD BY AUTOMATED COUNT: 18.6 % (ref 11.5–15)
ERYTHROCYTE [DISTWIDTH] IN BLOOD BY AUTOMATED COUNT: 19 % (ref 11.5–15)
ERYTHROCYTE [DISTWIDTH] IN BLOOD BY AUTOMATED COUNT: 19.1 % (ref 11.5–15)
FIO2: 40 %
GFR, ESTIMATED: 28 ML/MIN/1.73M2
GFR, ESTIMATED: 32 ML/MIN/1.73M2
GLUCOSE BLD-MCNC: 123 MG/DL (ref 74–99)
GLUCOSE BLD-MCNC: 132 MG/DL (ref 74–99)
GLUCOSE BLD-MCNC: 136 MG/DL (ref 74–99)
GLUCOSE BLD-MCNC: 156 MG/DL (ref 74–99)
GLUCOSE SERPL-MCNC: 115 MG/DL (ref 74–99)
GLUCOSE SERPL-MCNC: 131 MG/DL (ref 74–99)
HCO3: 23.7 MMOL/L (ref 22–26)
HCT VFR BLD AUTO: 19.4 % (ref 34–48)
HCT VFR BLD AUTO: 22.4 % (ref 34–48)
HCT VFR BLD AUTO: 22.5 % (ref 34–48)
HCT VFR BLD AUTO: 22.7 % (ref 34–48)
HGB BLD-MCNC: 6.3 G/DL (ref 11.5–15.5)
HGB BLD-MCNC: 7.2 G/DL (ref 11.5–15.5)
HGB BLD-MCNC: 7.4 G/DL (ref 11.5–15.5)
HGB BLD-MCNC: 7.4 G/DL (ref 11.5–15.5)
HHB: 0.6 % (ref 0–5)
LAB: ABNORMAL
LYMPHOCYTES NFR BLD: 0.21 K/UL (ref 1.5–4)
LYMPHOCYTES NFR BLD: 0.56 K/UL (ref 1.5–4)
LYMPHOCYTES RELATIVE PERCENT: 2 % (ref 20–42)
LYMPHOCYTES RELATIVE PERCENT: 4 % (ref 20–42)
Lab: 436
MAGNESIUM SERPL-MCNC: 1.8 MG/DL (ref 1.6–2.6)
MAGNESIUM SERPL-MCNC: 2 MG/DL (ref 1.6–2.6)
MCH RBC QN AUTO: 28.5 PG (ref 26–35)
MCH RBC QN AUTO: 28.6 PG (ref 26–35)
MCH RBC QN AUTO: 28.8 PG (ref 26–35)
MCHC RBC AUTO-ENTMCNC: 32.1 G/DL (ref 32–34.5)
MCHC RBC AUTO-ENTMCNC: 32.5 G/DL (ref 32–34.5)
MCHC RBC AUTO-ENTMCNC: 32.6 G/DL (ref 32–34.5)
MCV RBC AUTO: 87.3 FL (ref 80–99.9)
MCV RBC AUTO: 88.2 FL (ref 80–99.9)
MCV RBC AUTO: 89.6 FL (ref 80–99.9)
METHB: 0.9 % (ref 0–1.5)
MODE: AC
MONOCYTES NFR BLD: 0 % (ref 2–12)
MONOCYTES NFR BLD: 0 K/UL (ref 0.1–0.95)
MONOCYTES NFR BLD: 0.52 K/UL (ref 0.1–0.95)
MONOCYTES NFR BLD: 4 % (ref 2–12)
NEUTROPHILS NFR BLD: 94 % (ref 43–80)
NEUTROPHILS NFR BLD: 96 % (ref 43–80)
NEUTS SEG NFR BLD: 11.18 K/UL (ref 1.8–7.3)
NEUTS SEG NFR BLD: 12.24 K/UL (ref 1.8–7.3)
O2 SATURATION: 99.4 % (ref 92–98.5)
O2HB: 97 % (ref 94–97)
OPERATOR ID: ABNORMAL
PARTIAL THROMBOPLASTIN TIME: 40 SEC (ref 24.5–35.1)
PATIENT TEMP: 37 C
PCO2: 37 MMHG (ref 35–45)
PEEP/CPAP: 5 CMH2O
PFO2: 4.27 MMHG/%
PH BLOOD GAS: 7.42 (ref 7.35–7.45)
PHOSPHATE SERPL-MCNC: 2.4 MG/DL (ref 2.5–4.5)
PLATELET # BLD AUTO: 123 K/UL (ref 130–450)
PLATELET # BLD AUTO: 135 K/UL (ref 130–450)
PLATELET CONFIRMATION: NORMAL
PLATELET, FLUORESCENCE: 91 K/UL (ref 130–450)
PMV BLD AUTO: 10.5 FL (ref 7–12)
PMV BLD AUTO: 10.6 FL (ref 7–12)
PMV BLD AUTO: 11.4 FL (ref 7–12)
PO2: 170.7 MMHG (ref 75–100)
POTASSIUM SERPL-SCNC: 3.8 MMOL/L (ref 3.5–5)
POTASSIUM SERPL-SCNC: 3.9 MMOL/L (ref 3.5–5)
PROT SERPL-MCNC: 5.5 G/DL (ref 6.4–8.3)
RBC # BLD AUTO: 2.2 M/UL (ref 3.5–5.5)
RBC # BLD AUTO: 2.5 M/UL (ref 3.5–5.5)
RBC # BLD AUTO: 2.6 M/UL (ref 3.5–5.5)
RBC # BLD: ABNORMAL 10*6/UL
RI(T): 0.42
RR MECHANICAL: 12 B/MIN
SODIUM SERPL-SCNC: 128 MMOL/L (ref 132–146)
SODIUM SERPL-SCNC: 133 MMOL/L (ref 132–146)
SOURCE, BLOOD GAS: ABNORMAL
THB: 7.3 G/DL (ref 11.5–16.5)
TIME ANALYZED: 441
VT MECHANICAL: 280 ML
WBC OTHER # BLD: 11.6 K/UL (ref 4.5–11.5)
WBC OTHER # BLD: 11.9 K/UL (ref 4.5–11.5)
WBC OTHER # BLD: 12.8 K/UL (ref 4.5–11.5)

## 2025-04-01 PROCEDURE — 99291 CRITICAL CARE FIRST HOUR: CPT | Performed by: INTERNAL MEDICINE

## 2025-04-01 PROCEDURE — 80048 BASIC METABOLIC PNL TOTAL CA: CPT

## 2025-04-01 PROCEDURE — 6360000002 HC RX W HCPCS: Performed by: INTERNAL MEDICINE

## 2025-04-01 PROCEDURE — 36430 TRANSFUSION BLD/BLD COMPNT: CPT

## 2025-04-01 PROCEDURE — 2500000003 HC RX 250 WO HCPCS

## 2025-04-01 PROCEDURE — 2580000003 HC RX 258

## 2025-04-01 PROCEDURE — 80053 COMPREHEN METABOLIC PANEL: CPT

## 2025-04-01 PROCEDURE — 85018 HEMOGLOBIN: CPT

## 2025-04-01 PROCEDURE — 6360000002 HC RX W HCPCS

## 2025-04-01 PROCEDURE — 2500000003 HC RX 250 WO HCPCS: Performed by: INTERNAL MEDICINE

## 2025-04-01 PROCEDURE — 6370000000 HC RX 637 (ALT 250 FOR IP)

## 2025-04-01 PROCEDURE — 85027 COMPLETE CBC AUTOMATED: CPT

## 2025-04-01 PROCEDURE — 85025 COMPLETE CBC W/AUTO DIFF WBC: CPT

## 2025-04-01 PROCEDURE — 2580000003 HC RX 258: Performed by: INTERNAL MEDICINE

## 2025-04-01 PROCEDURE — 94003 VENT MGMT INPAT SUBQ DAY: CPT

## 2025-04-01 PROCEDURE — 2000000000 HC ICU R&B

## 2025-04-01 PROCEDURE — 83735 ASSAY OF MAGNESIUM: CPT

## 2025-04-01 PROCEDURE — 99232 SBSQ HOSP IP/OBS MODERATE 35: CPT | Performed by: INTERNAL MEDICINE

## 2025-04-01 PROCEDURE — P9016 RBC LEUKOCYTES REDUCED: HCPCS

## 2025-04-01 PROCEDURE — 86900 BLOOD TYPING SEROLOGIC ABO: CPT

## 2025-04-01 PROCEDURE — 82330 ASSAY OF CALCIUM: CPT

## 2025-04-01 PROCEDURE — 85014 HEMATOCRIT: CPT

## 2025-04-01 PROCEDURE — 82962 GLUCOSE BLOOD TEST: CPT

## 2025-04-01 PROCEDURE — 82805 BLOOD GASES W/O2 SATURATION: CPT

## 2025-04-01 PROCEDURE — 86901 BLOOD TYPING SEROLOGIC RH(D): CPT

## 2025-04-01 PROCEDURE — 85730 THROMBOPLASTIN TIME PARTIAL: CPT

## 2025-04-01 PROCEDURE — 86923 COMPATIBILITY TEST ELECTRIC: CPT

## 2025-04-01 PROCEDURE — 6370000000 HC RX 637 (ALT 250 FOR IP): Performed by: INTERNAL MEDICINE

## 2025-04-01 PROCEDURE — 84100 ASSAY OF PHOSPHORUS: CPT

## 2025-04-01 PROCEDURE — 93005 ELECTROCARDIOGRAM TRACING: CPT

## 2025-04-01 PROCEDURE — 86850 RBC ANTIBODY SCREEN: CPT

## 2025-04-01 PROCEDURE — 99233 SBSQ HOSP IP/OBS HIGH 50: CPT | Performed by: INTERNAL MEDICINE

## 2025-04-01 PROCEDURE — 82248 BILIRUBIN DIRECT: CPT

## 2025-04-01 RX ORDER — ZOLPIDEM TARTRATE 5 MG/1
5 TABLET ORAL ONCE
Status: COMPLETED | OUTPATIENT
Start: 2025-04-01 | End: 2025-04-01

## 2025-04-01 RX ORDER — HEPARIN SODIUM 10000 [USP'U]/100ML
5-30 INJECTION, SOLUTION INTRAVENOUS CONTINUOUS
Status: DISCONTINUED | OUTPATIENT
Start: 2025-04-01 | End: 2025-04-04

## 2025-04-01 RX ORDER — HEPARIN SODIUM 1000 [USP'U]/ML
2000 INJECTION, SOLUTION INTRAVENOUS; SUBCUTANEOUS PRN
Status: DISCONTINUED | OUTPATIENT
Start: 2025-04-01 | End: 2025-04-04 | Stop reason: ALTCHOICE

## 2025-04-01 RX ORDER — METOPROLOL TARTRATE 1 MG/ML
5 INJECTION, SOLUTION INTRAVENOUS ONCE
Status: COMPLETED | OUTPATIENT
Start: 2025-04-01 | End: 2025-04-01

## 2025-04-01 RX ORDER — SODIUM CHLORIDE 9 MG/ML
INJECTION, SOLUTION INTRAVENOUS PRN
Status: DISCONTINUED | OUTPATIENT
Start: 2025-04-01 | End: 2025-04-04 | Stop reason: HOSPADM

## 2025-04-01 RX ORDER — 0.9 % SODIUM CHLORIDE 0.9 %
250 INTRAVENOUS SOLUTION INTRAVENOUS ONCE
Status: COMPLETED | OUTPATIENT
Start: 2025-04-01 | End: 2025-04-01

## 2025-04-01 RX ORDER — SODIUM CHLORIDE 9 MG/ML
INJECTION, SOLUTION INTRAVENOUS PRN
Status: DISCONTINUED | OUTPATIENT
Start: 2025-04-01 | End: 2025-04-01

## 2025-04-01 RX ORDER — METOPROLOL SUCCINATE 25 MG/1
12.5 TABLET, EXTENDED RELEASE ORAL 2 TIMES DAILY WITH MEALS
Status: DISCONTINUED | OUTPATIENT
Start: 2025-04-02 | End: 2025-04-02

## 2025-04-01 RX ORDER — HEPARIN SODIUM 1000 [USP'U]/ML
4000 INJECTION, SOLUTION INTRAVENOUS; SUBCUTANEOUS PRN
Status: DISCONTINUED | OUTPATIENT
Start: 2025-04-01 | End: 2025-04-04 | Stop reason: ALTCHOICE

## 2025-04-01 RX ADMIN — MIDODRINE HYDROCHLORIDE 20 MG: 10 TABLET ORAL at 11:13

## 2025-04-01 RX ADMIN — HEPARIN SODIUM 12 UNITS/KG/HR: 10000 INJECTION, SOLUTION INTRAVENOUS at 18:05

## 2025-04-01 RX ADMIN — METOROPROLOL TARTRATE 5 MG: 5 INJECTION, SOLUTION INTRAVENOUS at 01:08

## 2025-04-01 RX ADMIN — ATORVASTATIN CALCIUM 80 MG: 40 TABLET, FILM COATED ORAL at 20:54

## 2025-04-01 RX ADMIN — ZOLPIDEM TARTRATE 5 MG: 5 TABLET ORAL at 01:31

## 2025-04-01 RX ADMIN — METOROPROLOL TARTRATE 5 MG: 5 INJECTION, SOLUTION INTRAVENOUS at 02:09

## 2025-04-01 RX ADMIN — SODIUM CHLORIDE 250 ML: 0.9 INJECTION, SOLUTION INTRAVENOUS at 01:11

## 2025-04-01 RX ADMIN — SODIUM CHLORIDE, PRESERVATIVE FREE 10 ML: 5 INJECTION INTRAVENOUS at 07:37

## 2025-04-01 RX ADMIN — APIXABAN 5 MG: 5 TABLET, FILM COATED ORAL at 07:37

## 2025-04-01 RX ADMIN — Medication 2000 UNITS: at 07:35

## 2025-04-01 RX ADMIN — SODIUM CHLORIDE 250 ML: 0.9 INJECTION, SOLUTION INTRAVENOUS at 02:25

## 2025-04-01 RX ADMIN — MIDODRINE HYDROCHLORIDE 20 MG: 10 TABLET ORAL at 07:35

## 2025-04-01 RX ADMIN — Medication 5 MCG/MIN: at 03:01

## 2025-04-01 RX ADMIN — SODIUM CHLORIDE, PRESERVATIVE FREE 40 MG: 5 INJECTION INTRAVENOUS at 07:36

## 2025-04-01 RX ADMIN — SODIUM PHOSPHATE, MONOBASIC, MONOHYDRATE AND SODIUM PHOSPHATE, DIBASIC, ANHYDROUS 10 MMOL: 142; 276 INJECTION, SOLUTION INTRAVENOUS at 08:03

## 2025-04-01 RX ADMIN — MIDODRINE HYDROCHLORIDE 20 MG: 10 TABLET ORAL at 17:32

## 2025-04-01 RX ADMIN — Medication 100 MG: at 07:35

## 2025-04-01 ASSESSMENT — PULMONARY FUNCTION TESTS
PIF_VALUE: 32
PIF_VALUE: 25
PIF_VALUE: 24
PIF_VALUE: 28
PIF_VALUE: 26
PIF_VALUE: 24
PIF_VALUE: 24
PIF_VALUE: 27
PIF_VALUE: 25
PIF_VALUE: 28
PIF_VALUE: 28
PIF_VALUE: 25
PIF_VALUE: 45
PIF_VALUE: 40
PIF_VALUE: 41
PIF_VALUE: 36
PIF_VALUE: 25
PIF_VALUE: 25
PIF_VALUE: 38
PIF_VALUE: 27
PIF_VALUE: 27
PIF_VALUE: 23
PIF_VALUE: 25
PIF_VALUE: 24
PIF_VALUE: 31
PIF_VALUE: 33
PIF_VALUE: 28
PIF_VALUE: 26
PIF_VALUE: 29
PIF_VALUE: 24
PIF_VALUE: 27
PIF_VALUE: 29

## 2025-04-01 ASSESSMENT — PAIN SCALES - GENERAL
PAINLEVEL_OUTOF10: 0
PAINLEVEL_OUTOF10: 0

## 2025-04-01 NOTE — PROGRESS NOTES
Comprehensive Nutrition Assessment    Type and Reason for Visit:  Reassess    Nutrition Recommendations/Plan:   Continue NPO. Recommend to modify TF to better meet est needs and w/ consideration for current vent settings/optimal GI tolerance.    Recommend:  Peptide based formula (Vital AF 1.2) @ 35 ml/hr + 1 protein modular daily:   To provide 840 ml tv, 1008 kcals, 63 gm pro (1112 kcals, 89gm pro w/ mod), 681 ml free water. Flushes per critical care.    Pt. Remains at risk for refeeding. Monitor/replace electrolytes PRN.      Malnutrition Assessment:  Malnutrition Status:  At risk for malnutrition (03/14/25 1136)    Context:  Acute Illness     Findings of the 6 clinical characteristics of malnutrition:  Energy Intake:  50% or less of estimated energy requirements for 5 or more days (decreased intakes x 1 mon PTA per H&P)  Weight Loss:  Mild weight loss (10.5% wt loss x 1 year, does not meet significant criteria)     Body Fat Loss:  Unable to assess     Muscle Mass Loss:  Unable to assess    Fluid Accumulation:  Unable to assess     Strength:  Not Performed    Nutrition Assessment:    Pt remains at risk d/t ongoing need for critical care and EN support. Now s/p trach/PEG on 3/26/2025. Admit for fatigue/weakness/CP found to have Afib RVR & B/L pleural effusion s/p cardioversion 3/7. Pt. intubated on 3/16 2/2 worsening encephalopathy and Acute hypoxic respiratory insufficiency likely 2/2 cardiogenic edema, +influenza A, w/ noted acute exacerbation of HFrEF and b/l pleural effusion; S/p R thoracentesis 3/26-(750 cc), S/p L thoracentesis 3/27-(750 cc). Noted Shock likely cardiogenic/infection; Transaminitis likely Shock liver-improved; ANTONIO on CKD w/ CVVHD initiated on 3/8, tunneled HD placed on 3/20 and now on intermittent HD. Cardiology and EP following for Afib RVR w/ recurrence on 3/29 noted PMHx Pre DM, L MCA infarct s/p thrombectomy, medication noncompliance, recent dx dementia. Noted poor PO intake x 1 month  evidenced by NPO or clear liquid status due to medical condition, intubation, nutrition support - enteral nutrition    Nutrition Interventions:   Food and/or Nutrient Delivery: Continue NPO, Modify Tube Feeding (Rec: Peptide based @ 35 ml/hr + 1 protein modular daily to provide 840 ml tv, 1008 kcals, 63 gm pro (1112 kcals, 89gm pro w/ mod), 681 ml free water.)  Nutrition Education/Counseling: Education/Counseling not appropriate  Coordination of Nutrition Care: Continue to monitor while inpatient       Goals:  Goals: Tolerate nutrition support at goal rate, by next RD assessment  Type of Goal: New goal  Previous Goal Met: Goal(s) Achieved (new goal)    Nutrition Monitoring and Evaluation:   Behavioral-Environmental Outcomes: None Identified  Food/Nutrient Intake Outcomes: Enteral Nutrition Intake/Tolerance  Physical Signs/Symptoms Outcomes: Biochemical Data, GI Status, Fluid Status or Edema, Hemodynamic Status, Nutrition Focused Physical Findings, Skin, Weight    Discharge Planning:    Enteral Nutrition     Robbie Granados RD  Contact: ext [phone]

## 2025-04-01 NOTE — PLAN OF CARE
Problem: Musculoskeletal - Adult  Goal: Return mobility to safest level of function  Outcome: Not Progressing  Goal: Return ADL status to a safe level of function  Outcome: Not Progressing     Problem: Gastrointestinal - Adult  Goal: Maintains or returns to baseline bowel function  Outcome: Not Progressing     Problem: Chronic Conditions and Co-morbidities  Goal: Patient's chronic conditions and co-morbidity symptoms are monitored and maintained or improved  Outcome: Progressing     Problem: Discharge Planning  Goal: Discharge to home or other facility with appropriate resources  Outcome: Progressing     Problem: Safety - Adult  Goal: Free from fall injury  Outcome: Progressing     Problem: Skin/Tissue Integrity  Goal: Skin integrity remains intact  Description: 1.  Monitor for areas of redness and/or skin breakdown  2.  Assess vascular access sites hourly  3.  Every 4-6 hours minimum:  Change oxygen saturation probe site  4.  Every 4-6 hours:  If on nasal continuous positive airway pressure, respiratory therapy assess nares and determine need for appliance change or resting period  Outcome: Progressing     Problem: Nutrition Deficit:  Goal: Optimize nutritional status  Outcome: Progressing     Problem: ABCDS Injury Assessment  Goal: Absence of physical injury  Outcome: Progressing     Problem: Metabolic/Fluid and Electrolytes - Adult  Goal: Electrolytes maintained within normal limits  Outcome: Progressing  Goal: Hemodynamic stability and optimal renal function maintained  Outcome: Progressing  Goal: Glucose maintained within prescribed range  Outcome: Progressing     Problem: Hematologic - Adult  Goal: Maintains hematologic stability  Outcome: Progressing     Problem: Neurosensory - Adult  Goal: Achieves stable or improved neurological status  Outcome: Progressing  Goal: Achieves maximal functionality and self care  Outcome: Progressing     Problem: Respiratory - Adult  Goal: Achieves optimal ventilation and  Progressing     Problem: Gastrointestinal - Adult  Goal: Maintains or returns to baseline bowel function  Outcome: Not Progressing

## 2025-04-01 NOTE — PROGRESS NOTES
[facility]  Internal Medicine Residency / House Medicine Service    Attending Physician Statement  I have discussed the case, including pertinent history and exam findings with the resident and the team.  I have seen and examined the patient and the key elements of the encounter have been performed by me.  I agree with the assessment, plan and orders as documented by the resident.      Mental state less clear this AM  Now on Levophed 4 after Fluid bolus  HR still 120's Afib  At 1100 now 82 after IV metoprolol 5 mg  Continuing Eliquis   Plan; Same ICU care   Remainder of medical problems as per resident note.      Dalton Sosa MD  Internal Medicine Residency Faculty

## 2025-04-01 NOTE — PROGRESS NOTES
Nephrology Progress Note  4/1/2025 8:35 AM  Subjective:   Admit Date: 3/4/2025  PCP: Tiesha Panda MD    Subjective    3/24/25:: Patient seen and examined ; continues to require pressors; remains intubated    3/25: remains intubated, still requiring pressors; HD in progress, off sedation now    3/26: Trach and PEG this a.m.; continues to require pressors; being weaned; for thoracentesis today    3/27: No new acute issues from overnight    3/28: No new acute issues from overnight; vasopressors, being weaned    3/29: No new acute issues from overnight; remains off pressors    3/30: Amiodarone drip restarted due to atrial fibrillation; EP consulted; no other acute issues from overnight    3/31: No new c/o; remains off pressors; on amio drip; in A fib RVR     4/1: Episode of A-fib RVR earlier this a.m. with hypotension; required 250 cc IV fluid NS bolus; was given Lopressor  Noted hemoglobin this a.m. 6.3 PRBC transfusion for 1 unit    Diet: Diet NPO  ADULT TUBE FEEDING; PEG; Renal Formula; Continuous; 20; Yes; 10; Q 4 hours; 30; 30; Q 4 hours; Protein; 1 Dose; Daily    Data:   Scheduled Meds:   sodium phosphate IVPB (PERIPHERAL line)  10 mmol IntraVENous Once    hydrocortisone sodium succinate PF  100 mg IntraVENous Once    atorvastatin  80 mg PEG Tube Nightly    midodrine  20 mg PEG Tube TID WC    thiamine  100 mg Per G Tube Daily    vitamin D  2,000 Units PEG Tube Daily    apixaban  5 mg Oral BID    pantoprazole (PROTONIX) 40 mg in sodium chloride (PF) 0.9 % 10 mL injection  40 mg IntraVENous Daily    insulin lispro  0-4 Units SubCUTAneous Q6H    sodium chloride flush  5-40 mL IntraVENous 2 times per day    [Held by provider] metoprolol succinate  25 mg Oral Daily     Continuous Infusions:   sodium chloride      norepinephrine 5 mcg/min (04/01/25 0336)    sodium chloride Stopped (03/31/25 1403)    dextrose       PRN Meds:sodium chloride, ipratropium 0.5 mg-albuterol 2.5 mg, heparin (PF), heparin (porcine) **AND**  CVVHD 3/8-3/15  Now transitioned to  IHD/SLED   IR guided tunneled dialysis catheter 3/20/24 in IR  Limit UF with dialysis      2)  A Fib with rapid venticular rate   Received 2 doses of IV metoprolol this am  Eliquis  now on hold  Now off oral and IV amiodarone  EP following    3)  Cardiogenic shock, possibly septic as well  Influenza pos  S/p course of Zosyn  Refractory hypotension, now improved; off pressors  Underlying HFrEF  TTE from 3/9 EF 10-15%   Now off pressors       4)   Mild to moderate bilateral pleural effusioon  L sided thoracentesis by IR for 400cc 3/27  For thoracentesis today    5) Acute respiratory failure  -intubated for worsening mental status and need for airway protection  -s/p tranch and PEG 3/26          d/w ICU Team    Dewayne Simpson MD

## 2025-04-01 NOTE — CARE COORDINATION
Care Coordination: LOS 28 day [facility] denied, P2P completed, overturned. And approved for admission today.  Per RN, Dr Carnes is following for possible cath tomorrow and to follow with HD after.  HR afib rvr at times.  Per [facility], Precert will   at Noon and precert will need restarted. Will follow    Electronically signed by Cristela Bradford RN on 2025 at 4:07 PM

## 2025-04-01 NOTE — PROGRESS NOTES
INPATIENT CARDIOLOGY FOLLOW-UP    Name: Nikki Moffett    Age: 79 y.o.    Date of Admission: 3/4/2025  5:05 PM    Date of Service: 4/1/2025    Chief Complaint: Follow-up for CMF, CHF    Interim History:  No new overnight cardiac complaints. Currently with no complaints of CP, SOB, palpitations, dizziness, or lightheadedness. SR on telemetry.    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)    Occipital stroke (HCC)    Atrial flutter (HCC)    Paroxysmal atrial fibrillation (HCC)    H/O: stroke    Atrial flutter with rapid ventricular response (HCC)    Decompensated heart failure (HCC)    Chronic atrial fibrillation (HCC)    VHD (valvular heart disease)    ANTONIO (acute kidney injury)    Palliative care encounter    Metabolic encephalopathy    Persistent atrial fibrillation (HCC)       Allergies:  No Known Allergies    Current Medications:  Current Facility-Administered Medications   Medication Dose Route Frequency Provider Last Rate Last Admin    0.9 % sodium chloride infusion   IntraVENous PRN eDrrell Sherwood MD        norepinephrine (LEVOPHED) 16 mg in sodium chloride 0.9 % 250 mL infusion (premix)  1-100 mcg/min IntraVENous Continuous TauAmerico childers MD 4.7 mL/hr at 04/01/25 0336 5 mcg/min at 04/01/25 0336    hydrocortisone sodium succinate PF (SOLU-CORTEF) injection 100 mg  100 mg IntraVENous Once Americo Vargas MD        ipratropium 0.5 mg-albuterol 2.5 mg (DUONEB) nebulizer solution 1 Dose  1 Dose Inhalation Q4H PRN Rosetta Lopez MD   1 Dose at 03/30/25 0854    atorvastatin (LIPITOR) tablet 80 mg  80 mg PEG  Results   Component Value Date/Time    MG 2.0 04/01/2025 06:16 AM     Recent Labs     03/30/25  0405 03/31/25  0408 04/01/25  0616   ALKPHOS 131* 143* 121*   ALT 9 9 9   AST 48* 42* 37*   BILITOT 0.8 0.7 1.1   BILIDIR 0.3 0.3 0.4*     Recent Labs     03/30/25  0405 03/31/25  0408 04/01/25  0126 04/01/25  0616   WBC 10.9 13.4* 11.9* 12.8*   RBC 2.57* 2.48* 2.20* 2.50*   HGB 7.3* 7.1* 6.3* 7.2*   HCT 22.7* 22.2* 19.4* 22.4*   MCV 88.3 89.5 88.2 89.6   MCH 28.4 28.6 28.6 28.8   MCHC 32.2 32.0 32.5 32.1   RDW 19.1* 19.1* 19.1* 18.6*   * 119*  --  123*   MPV 10.7 10.7 10.5 11.4     No results found for: \"CKTOTAL\", \"CKMB\", \"CKMBINDEX\", \"TROPONINI\"  Lab Results   Component Value Date    INR 1.2 03/26/2025    INR 1.5 03/17/2025    INR 1.5 03/16/2025    PROTIME 13.4 (H) 03/26/2025    PROTIME 16.6 (H) 03/17/2025    PROTIME 16.0 (H) 03/16/2025     Lab Results   Component Value Date    TSH 6.50 (H) 03/23/2025     Lab Results   Component Value Date    LABA1C 6.0 (H) 03/04/2025     No results found for: \"EAG\"  Lab Results   Component Value Date    CHOL 159 12/22/2023    CHOL 278 (H) 05/11/2017    CHOL 300 (H) 04/07/2016     Lab Results   Component Value Date    TRIG 112 12/22/2023    TRIG 193 (H) 05/11/2017    TRIG 150 (H) 04/07/2016     Lab Results   Component Value Date    HDL 24 (L) 03/16/2025    HDL 40 (L) 12/22/2023    HDL 84 05/11/2017     No components found for: \"LDLCALC\", \"LDLCHOLESTEROL\"  Lab Results   Component Value Date    VLDL 20 03/16/2025    VLDL 22 12/22/2023    VLDL 39 05/11/2017     No results found for: \"CHOLHDLRATIO\"    Cardiac Tests:  ECG: Atrial flutter with RVR in 2-1 block QTc 522, abnormal EKG    Telemetry findings reviewed: Atrial fibrillation/further with a 2-1 block heart rate in the 130s    Vitals and labs were reviewed: As above    TTE-3/7/2025:    Left Ventricle: Normal left ventricular systolic function with a visually estimated EF of 10 -15%. Left ventricle size is normal. Normal wall

## 2025-04-01 NOTE — PROGRESS NOTES
[facility]   Department of Internal Medicine   Internal Medicine Residency  MICU Progress Note    Patient:  Nikki Moffett 79 y.o. female   MRN: [id number]       Date of Service: 4/1/2025    Allergy: Patient has no known allergies.    Subjective     Patient was seen and examined this morning at bedside.  Patient is awake but not following commands today.  She nods no to pain.    Overnight,   Heart Rate in 120s-130s, BP normal  RVR 140s around 1am, IV lopressor 5mg x 2 and 250 cc bolus given  Hb 6.3, 1 Unit pRBC ordered  Hypotensive s/p lopressor>> Started back on levo >> MAP goal 60      Objective       I & O - 24hr:    Intake/Output Summary (Last 24 hours) at 4/1/2025 1019  Last data filed at 4/1/2025 0557  Gross per 24 hour   Intake 2876.71 ml   Output 1900 ml   Net 976.71 ml       Physical Exam  Vitals: BP (!) 111/52   Pulse (!) 113   Temp 98.3 °F (36.8 °C)   Resp 25   Ht 1.575 m (5' 2\")   Wt 69.3 kg (152 lb 12.5 oz)   SpO2 100%   BMI 27.94 kg/m²     I & O - 24hr: No intake/output data recorded.   General Appearance:  Awake, lethargic, does not follow commands  HEENT:  Head: Normal, normocephalic, atraumatic.  Neck:  Tracheostomy tube in place  Lung: rhonchi bilaterally   Heart: Irregular rhythm, tachycardia  Abdomen: soft, non-tender; bowel sounds normal; no masses,  no organomegaly  Extremities:  edema 1+ pitting bilaterally    Neurologic: Mental status: Awake, lethargic, does not follow command       Medications     Continuous Infusions:   sodium chloride      norepinephrine 5 mcg/min (04/01/25 0336)    sodium chloride Stopped (03/31/25 1403)    dextrose       Scheduled Meds:   hydrocortisone sodium succinate PF  100 mg IntraVENous Once    atorvastatin  80 mg PEG Tube Nightly    midodrine  20 mg PEG Tube TID WC    thiamine  100 mg Per G Tube Daily    vitamin D  2,000 Units PEG Tube Daily    apixaban  5 mg Oral BID    pantoprazole (PROTONIX) 40 mg in sodium chloride (PF) 0.9 % 10 mL  C. difficile at this time, on PPI and tube feeds  History of hepatitis C; recently not detected, no documentation of treatment  Subclinical hypothyroidism; stable  Influenza A infection; on 3/8/2025, completed course of Tamiflu  Persistent leukocytosis likely 2/2 steroid use plus cardiogenic shock; improved  HAGMA 2/2 LA; resolved  Elevated INR (peak >10); status post 4 unit FFP, 10 mg vitamin K; improved  Acute hyponatremia suspect dilutional; improved  Hypoalbuminemia; improved  Transaminitis likely shock liver; improved              Concerns for early cirrhosis versus hepatic steatosis, ultrasound abdomen 3/11     Plan:  Status post trach and PEG, trach suture removal 4/3  Continue Levophed, wean with a MAP goal of >60  Continue midodrine 20 mg 3 times daily  Blood pressure should be taken in left forearm only, should not be taken in flaccid RUE, LUE has brachial PICC  Avoid QTc prolonging medications  Cardiology following, GDMT as tolerated, reassess EF in 3 to 6 months, left heart cath at some point  EP following, discontinued amiodarone< recommend to use beta blockers for rate control   Nephrology following, patient on IHD/SLED, improved fluid balance  Dental consulted for poor dentition  Palliative following for goals of care    Antimicrobials:Completed 7 days of Zosyn 3/19, monitoring off antibiotics   I/O: +1.3L  LDA:Left brachial PICC line, day 13, tunneled HD day 9, FMS   PT/OT evaluation: following  DVT prophylaxis: Eliquis 5 mg twice daily   GI prophylaxis: Protonix   Diet:   Diet NPO  ADULT TUBE FEEDING; PEG; Renal Formula; Continuous; 20; Yes; 10; Q 4 hours; 30; 30; Q 4 hours; Protein; 1 Dose; Daily   Bowel regimen: As needed   Pain management: as needed  Code status: Full Code   Disposition: Admitted to ICU  Family: updated as available    Americo Vargas MD, PGY-2   Attending physician: Dr. Sandoval

## 2025-04-01 NOTE — PROGRESS NOTES
[facility] Elect[facility]  Inpatient Progress Report  PATIENT: Nikki Moffett  MEDICAL RECORD NUMBER: [id number]  DATE OF SERVICE:  4/1/2025  ATTENDING ELECTROPHYSIOLOGIST: Nakita Miranda MD   PRIMARY ELECTROPHYSIOLOGIST: Gelacio Hendricks MD  REFERRING PHYSICIAN:  Tiesha Panda MD  CHIEF COMPLAINT: Altered mental status    HPI: This is a 79 y.o. female with a history of  persistent atrial fibrillation and atrial flutter, cerebrovascular accident and hyperlipidemia.   The patient presented to the hospital on 12/21/23 due to right sided weakness and facial droop. She diagnosed with acute left MCA stroke with M2 occlusion. She was not a candidate for tPA and underwent mechanical thrombectomy by IR team on 12/21/23. Prior to intervention, patient was diagnosed with AF with RVR with heart rate in the 180s. Patient underwent emergency DC-cardioversion to normal sinus rhythm and was initiated on IV Amiodarone which later on transited to oral Amiodarone. Echocardiogram 2/29/24 showed LV EF of 40-45%.  She was started on GDMT and was discharged home on Amiodarone loading dose, Eliquis and Metoprolol.   She was last seen in Cardiology's office on 2/22/24. She subsequently underwent nuclear stress test on 2/29/24 which showed no ischemia and LV EF of 69%. Repeat echocardiogram on 2/29/24 showed LV EF 55-60%, moderate LVH and mild LAE. She was scheduled for follow up with Cardiology but did not follow through.   Son called on 3/3/25 stated that the patient was not doing well She was not eating well, shortness of breath with exertion and reported palpitations. She was seen in IM resident clinic on 3/4/35 and was noted to be in AFL with  bpm. She was advised to come in to ED. In ED she was noted to be in AFL with  bpm.She was started on IV Cardizem drip.CXR showed bilateral pleural effusions and possible right lower lobe opacity. Patient received  hypotension.  Better rate control overnight but patient remains in atrial fibrillation.    3/31/25: Patient remains in atrial flutter with 2-1 AV block.  Asymptomatic, ventilated using a tracheostomy.  Mildly hypotensive    4/1/25: No change in current status.  The patient remains ventilated using her tracheostomy.  Persistent atrial flutter fibrillation and ventricular rates ranged between  bpm.  Case discussed with Dr. Carnes.    We discussed obtaining coronary angiography to delineate the exact etiology of her LV dysfunction or at least rule out CAD as a cause of the same.    Patient Active Problem List    Diagnosis Date Noted    Persistent atrial fibrillation (HCC) 03/31/2025    Palliative care encounter 03/18/2025    Metabolic encephalopathy 03/18/2025    ANTONIO (acute kidney injury) 03/17/2025    VHD (valvular heart disease) 03/08/2025    Decompensated heart failure ([facility]) 03/07/2025    Chronic atrial fibrillation ([facility]) 03/07/2025    Atrial flutter with rapid ventricular response ([facility]) 03/04/2025    Paroxysmal atrial fibrillation ([facility]) 03/01/2024    H/O: stroke 03/01/2024    Atrial flutter ([facility]) 01/10/2024    Occipital stroke ([facility]) 12/22/2023    Stroke due to embolism of left middle cerebral artery ([facility]) 12/21/2023       Past Medical History:   Diagnosis Date    A-fib ([facility])     HFrEF (heart failure with reduced ejection fraction) ([facility])     HLD (hyperlipidemia)     HTN (hypertension)     Interatrial cardiac shunt     on echo 2023    L MCA stroke 2023    s/p thrombectomy       Family History   Problem Relation Age of Onset    Heart Attack Sister        Social History     Tobacco Use    Smoking status: Former     Types: Cigarettes    Smokeless tobacco: Never    Tobacco comments:     Stopped in 2021   Substance Use Topics    Alcohol use: Yes     Alcohol/week: 1.0 standard drink of alcohol     Types: 1 Glasses of wine per week     Comment: occasional       Current Facility-Administered Medications   Medication  is related with the  left-sided pleural effusion causes compression atelectasis in the left lower  lobe.     Right lungs well expanded.  The small right-sided pleural effusion at the  level of the costophrenic sulcus laterally.     There is minimal prominence of perihilar vessels.     No conspicuous pneumothorax seen on the right or on the left.     IMPRESSION:  No significant interval changes since the previous study of March 27.  The  signs for discrete volume overload, minimal perihilar vascular congestion.     Persistent increased density in the left lower lung base and being a  combination of atelectasis/pleural effusion/infiltrate.     Small right-sided pleural effusion.           Exam Ended: 03/29/25 15:19 EDT   .      Telemetry 3/30/2025: Persistent atrial flutter/fibrillation with ventricular rates of 100-130 bpm    EKG 3/6/25:  bpm, QRS 138ms  Qtc 522 ms.  Please see scan in Cardiology.    Echocardiogram 3/7/25:    Left Ventricle: Normal left ventricular systolic function with a visually estimated EF of 10 -15%. Left ventricle size is normal. Normal wall thickness. Severe global hypokinesis present. E/e' ratio >11, suggesting increased LAP.    Right Ventricle: Right ventricle size is normal. Reduced systolic function.    Aortic Valve: No significant stenosis. 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    Mitral Valve: Mild regurgitation.    Tricuspid Valve: Moderate regurgitation. Normal RVSP. The estimated RVSP is 25 mmHg.    Left Atrium: Left atrium is severely dilated.    Right Atrium: Right atrium is mildly dilated.    Pericardium: No pericardial effusion.    Image quality is suboptimal. Contrast used: Lumason.    Echocardiogram 2/29/24:     Left Ventricle: Normal left ventricular systolic function with a visually estimated EF of 55 - 60%. Left ventricle size is normal. Moderately increased ventricular mass. Findings consistent with moderate concentric hypertrophy.

## 2025-04-01 NOTE — PROGRESS NOTES
[facility]  Internal Medicine Residency Program  Progress Note - House Team 1    Patient:  Nikki Moffett 79 y.o. female MRN: [id number]     Date of Service: 4/1/2025     CC:   Chief Complaint   Patient presents with    Altered Mental Status     \"Mood swings, memory loss and fatigue\" per patients grandson    Chest Pain     Ongoing for a couple weeks per family     Overnight events: Still had HR of 130s, given Lopressor but tachycardia persisted, became hypotensive given 500 cc bolus but still hypotensive, Levophed started.    Subjective     Patient seen at bedside this morning. She was awake, able to track but does not follow commands. ROS cannot be accurately done she has a trach and PEG.     Objective     Physical Exam:  Vitals: /62   Pulse 93   Temp 97.9 °F (36.6 °C) (Temporal)   Resp 24   Ht 1.575 m (5' 2\")   Wt 68.1 kg (150 lb 2.1 oz)   SpO2 100%   BMI 27.46 kg/m²     I & O - 24hr: No intake/output data recorded.   General Appearance: alert and appears stated age  HEENT:  Head: Normocephalic, no lesions, without obvious abnormality.  Neck: supple, symmetrical, trachea midline  Lung:  rhonchi bilaterally  Heart: irregularly irregular rhythm, rapid ventricular rate in the 120s  Abdomen: soft, non-tender; bowel sounds normal; no masses,  no organomegaly  Extremities:  extremities normal, atraumatic, no cyanosis or edema  Musculokeletal: No joint swelling, no muscle tenderness. ROM normal in all joints of extremities.   Neurologic: Mental status: awake but does not follow commands    Pertinent Labs & Imaging Studies     Recent Labs     03/30/25 0405 03/31/25 0408 04/01/25  0126 04/01/25  0616   HGB 7.3* 7.1* 6.3* 7.2*   HCT 22.7* 22.2* 19.4* 22.4*   WBC 10.9 13.4* 11.9* 12.8*   * 119*  --  123*       Recent Labs     03/30/25  0405 03/31/25  0408 04/01/25  0126    130* 128*   K 3.9 4.2 3.9   CL 96* 92* 93*   CO2 22 21* 21*   BUN 25* 43* 29*   CREATININE 1.7* 2.3* 1.6*  injection  40 mg IntraVENous Daily    insulin lispro  0-4 Units SubCUTAneous Q6H    sodium chloride flush  5-40 mL IntraVENous 2 times per day    [Held by provider] metoprolol succinate  25 mg Oral Daily         sodium chloride, ipratropium 0.5 mg-albuterol 2.5 mg, heparin (PF), heparin (porcine) **AND** heparin (porcine), sodium chloride, polyethylene glycol, sodium chloride flush, acetaminophen **OR** acetaminophen, dextrose bolus **OR** dextrose bolus, glucagon (rDNA), dextrose      Infusions:     sodium chloride      norepinephrine 5 mcg/min (04/01/25 0336)    sodium chloride Stopped (03/31/25 1403)    dextrose         Home meds:    Prior to Admission medications    Medication Sig Start Date End Date Taking? Authorizing Provider   ELIQUIS 5 MG TABS tablet TAKE 1 TABLET BY MOUTH TWICE DAILY 2/7/25   Francheska Carnes MD   citalopram (CELEXA) 10 MG tablet Take 1 tablet by mouth daily  Patient not taking: Reported on 3/4/2025 4/23/24   Thuan Santos MD   amiodarone (CORDARONE) 200 MG tablet Take 1 tablet by mouth daily 2/22/24 3/4/25  Francheska Carnes MD   atorvastatin (LIPITOR) 80 MG tablet Take 1 tablet by mouth nightly 2/22/24   Francheska Carnes MD   metoprolol succinate (TOPROL XL) 25 MG extended release tablet Take 1 tablet by mouth daily  Patient not taking: Reported on 3/4/2025 2/22/24   Francheska Carnes MD   sacubitril-valsartan (ENTRESTO) 49-51 MG per tablet Take 1 tablet by mouth 2 times daily  Patient not taking: Reported on 3/4/2025 2/22/24   Francheska Carnes MD   melatonin (RA MELATONIN) 3 MG TABS tablet Take 1 tablet by mouth daily  Patient not taking: Reported on 3/4/2025 1/9/24   Cm Silveira MD       Resident's Assessment and Plan     Nikki Moffett is a 79 y.o. female    Assessment:  Acute encephalopathy likely metabolic, improving  Imaging this admission 3/13/25 CT head showed small insult L frontal parietal region of indeterminate chronicity, 3/15/25 MRI brain acute  infarct L occipital lobe, MRA head mild stenosis L vertebral artery  EEG 3/18/25 EEG showed no seizures, suggestive of severe metabolic/toxic encephalopathy  Hx of L MCA stroke s/p mechanical thrombectomy 12/2023  On trach and peg 3/26  Intubated for airway protection 3/16 2/2 worsening encephalopathy  Bilateral moderate pleural effusions likely 2/2 HFrEF s/p R-sided and L-sided thoracentesis  Shock requiring pressor support likely cardiogenic, infectious  Paroxysmal atrial fibrillation/flutter with RVR, s/p DCCV 3/7/25  [id number] score 5  HFrEF EF10-15%  Elevated troponin in the setting of Afib in RVR and ESRD on HD  Prolonged Qtc (latest 562)  HLD  Transaminitis, improving  Normocytic anemia  Thrombocytopenia  Anuric ANTONIO stage III, s/p CVVHD  Prediabetes, A1c 6.0%  Subclinical hypothyroidism, stable  Vit D deficiency  Diarrhea 2/2 low suspicion for C diff    Plan:  Primary care per MICU service  S/p trach & PEF, trach suture removal 4/3  Continue midodrine 20 mg TID, on Levophed drip, wean to MAP goal of 60  Consider decreasing Atorvastatin to 20 mg ODHS  Avoid Qtc prolonging medications  Follow cardiology recommendations  Diuretics as needed  GDMT limited due to low BP/Renal failure, up-titrate meds as tolerated  Re-assess EF in 3-6 months  Not a candidate for ischemic evaluation due to multiple co-morbidities  Follow EP recommendations  Discontinue Amiodarone drip  Beta blockade for rate control  Continue Eliquis  Follow nephrology recommendations  3/20/25 IR guided tunneled dialysis catheter  continue on intermittent HD/SLED  Dental service on board for poor dentition  Palliative care on board for goals of care    PT/OT evaluation: not indicated at this time  DVT prophylaxis/ GI prophylaxis:   Disposition: continue ICU care    Mehreen Painter MD, PGY-1  Attending physician: Dr. Dr. Sosa

## 2025-04-02 ENCOUNTER — APPOINTMENT (OUTPATIENT)
Age: 80
DRG: 004 | End: 2025-04-02
Attending: INTERNAL MEDICINE
Payer: MEDICARE

## 2025-04-02 LAB
ALBUMIN SERPL-MCNC: 2.9 G/DL (ref 3.5–5.2)
ALP SERPL-CCNC: 191 U/L (ref 35–104)
ALT SERPL-CCNC: 14 U/L (ref 0–32)
ANION GAP SERPL CALCULATED.3IONS-SCNC: 14 MMOL/L (ref 7–16)
ANION GAP SERPL CALCULATED.3IONS-SCNC: 15 MMOL/L (ref 7–16)
AST SERPL-CCNC: 43 U/L (ref 0–31)
BASOPHILS # BLD: 0.03 K/UL (ref 0–0.2)
BASOPHILS NFR BLD: 0 % (ref 0–2)
BILIRUB DIRECT SERPL-MCNC: 0.3 MG/DL (ref 0–0.3)
BILIRUB INDIRECT SERPL-MCNC: 0.5 MG/DL (ref 0–1)
BILIRUB SERPL-MCNC: 0.8 MG/DL (ref 0–1.2)
BUN SERPL-MCNC: 39 MG/DL (ref 6–23)
BUN SERPL-MCNC: 43 MG/DL (ref 6–23)
CALCIUM SERPL-MCNC: 8.2 MG/DL (ref 8.6–10.2)
CALCIUM SERPL-MCNC: 8.2 MG/DL (ref 8.6–10.2)
CHLORIDE SERPL-SCNC: 95 MMOL/L (ref 98–107)
CHLORIDE SERPL-SCNC: 96 MMOL/L (ref 98–107)
CO2 SERPL-SCNC: 21 MMOL/L (ref 22–29)
CO2 SERPL-SCNC: 21 MMOL/L (ref 22–29)
CREAT SERPL-MCNC: 2.1 MG/DL (ref 0.5–1)
CREAT SERPL-MCNC: 2.3 MG/DL (ref 0.5–1)
ECHO AO ASC DIAM: 3.3 CM
ECHO AO ASCENDING AORTA INDEX: 1.93 CM/M2
ECHO AO SINUS VALSALVA DIAM: 2.9 CM
ECHO AO SINUS VALSALVA INDEX: 1.7 CM/M2
ECHO AV AREA PEAK VELOCITY: 1.9 CM2
ECHO AV AREA VTI: 1.7 CM2
ECHO AV AREA/BSA PEAK VELOCITY: 1.1 CM2/M2
ECHO AV AREA/BSA VTI: 1 CM2/M2
ECHO AV CUSP MM: 1.5 CM
ECHO AV MEAN GRADIENT: 8 MMHG
ECHO AV MEAN VELOCITY: 1.3 M/S
ECHO AV PEAK GRADIENT: 15 MMHG
ECHO AV PEAK VELOCITY: 1.9 M/S
ECHO AV VELOCITY RATIO: 0.79
ECHO AV VTI: 32.6 CM
ECHO BSA: 1.68 M2
ECHO EST RA PRESSURE: 8 MMHG
ECHO LA DIAMETER INDEX: 2.69 CM/M2
ECHO LA DIAMETER: 4.6 CM
ECHO LA VOL A-L A2C: 115 ML (ref 22–52)
ECHO LA VOL A-L A4C: 87 ML (ref 22–52)
ECHO LA VOL MOD A2C: 111 ML (ref 22–52)
ECHO LA VOL MOD A4C: 81 ML (ref 22–52)
ECHO LA VOLUME AREA LENGTH: 102 ML
ECHO LA VOLUME INDEX A-L A2C: 67 ML/M2 (ref 16–34)
ECHO LA VOLUME INDEX A-L A4C: 51 ML/M2 (ref 16–34)
ECHO LA VOLUME INDEX AREA LENGTH: 60 ML/M2 (ref 16–34)
ECHO LA VOLUME INDEX MOD A2C: 65 ML/M2 (ref 16–34)
ECHO LA VOLUME INDEX MOD A4C: 47 ML/M2 (ref 16–34)
ECHO LV EDV A2C: 120 ML
ECHO LV EDV A4C: 97 ML
ECHO LV EDV BP: 109 ML (ref 56–104)
ECHO LV EDV INDEX A4C: 57 ML/M2
ECHO LV EDV INDEX BP: 64 ML/M2
ECHO LV EDV NDEX A2C: 70 ML/M2
ECHO LV EF PHYSICIAN: 32 %
ECHO LV EJECTION FRACTION A2C: 37 %
ECHO LV EJECTION FRACTION A4C: 32 %
ECHO LV EJECTION FRACTION BIPLANE: 33 % (ref 55–100)
ECHO LV ESV A2C: 76 ML
ECHO LV ESV A4C: 66 ML
ECHO LV ESV BP: 73 ML (ref 19–49)
ECHO LV ESV INDEX A2C: 44 ML/M2
ECHO LV ESV INDEX A4C: 39 ML/M2
ECHO LV ESV INDEX BP: 43 ML/M2
ECHO LV FRACTIONAL SHORTENING: 18 % (ref 28–44)
ECHO LV INTERNAL DIMENSION DIASTOLE INDEX: 2.57 CM/M2
ECHO LV INTERNAL DIMENSION DIASTOLIC: 4.4 CM (ref 3.9–5.3)
ECHO LV INTERNAL DIMENSION SYSTOLIC INDEX: 2.11 CM/M2
ECHO LV INTERNAL DIMENSION SYSTOLIC: 3.6 CM
ECHO LV ISOVOLUMETRIC RELAXATION TIME (IVRT): 65.7 MS
ECHO LV IVSD: 1 CM (ref 0.6–0.9)
ECHO LV IVSS: 1 CM
ECHO LV MASS 2D: 147.8 G (ref 67–162)
ECHO LV MASS INDEX 2D: 86.5 G/M2 (ref 43–95)
ECHO LV POSTERIOR WALL DIASTOLIC: 1 CM (ref 0.6–0.9)
ECHO LV POSTERIOR WALL SYSTOLIC: 1.3 CM
ECHO LV RELATIVE WALL THICKNESS RATIO: 0.45
ECHO LVOT AREA: 2.3 CM2
ECHO LVOT AV VTI INDEX: 0.74
ECHO LVOT DIAM: 1.7 CM
ECHO LVOT MEAN GRADIENT: 5 MMHG
ECHO LVOT PEAK GRADIENT: 9 MMHG
ECHO LVOT PEAK VELOCITY: 1.5 M/S
ECHO LVOT STROKE VOLUME INDEX: 31.8 ML/M2
ECHO LVOT SV: 54.4 ML
ECHO LVOT VTI: 24 CM
ECHO MV "A" WAVE DURATION: 121.1 MSEC
ECHO MV A VELOCITY: 0.49 M/S
ECHO MV AREA PHT: 3.2 CM2
ECHO MV AREA VTI: 1.6 CM2
ECHO MV E DECELERATION TIME (DT): 238.4 MS
ECHO MV E VELOCITY: 1.22 M/S
ECHO MV E/A RATIO: 2.49
ECHO MV LVOT VTI INDEX: 1.42
ECHO MV MAX VELOCITY: 1.8 M/S
ECHO MV MEAN GRADIENT: 3 MMHG
ECHO MV MEAN VELOCITY: 0.8 M/S
ECHO MV PEAK GRADIENT: 12 MMHG
ECHO MV PRESSURE HALF TIME (PHT): 69.1 MS
ECHO MV VTI: 34.1 CM
ECHO PV MAX VELOCITY: 1.4 M/S
ECHO PV MEAN GRADIENT: 4 MMHG
ECHO PV MEAN VELOCITY: 0.9 M/S
ECHO PV PEAK GRADIENT: 8 MMHG
ECHO PV VTI: 23.1 CM
ECHO RIGHT VENTRICULAR SYSTOLIC PRESSURE (RVSP): 48 MMHG
ECHO RV INTERNAL DIMENSION: 3.2 CM
ECHO TV REGURGITANT MAX VELOCITY: 3.18 M/S
ECHO TV REGURGITANT PEAK GRADIENT: 40 MMHG
EOSINOPHIL # BLD: 0.24 K/UL (ref 0.05–0.5)
EOSINOPHILS RELATIVE PERCENT: 2 % (ref 0–6)
ERYTHROCYTE [DISTWIDTH] IN BLOOD BY AUTOMATED COUNT: 19.1 % (ref 11.5–15)
GFR, ESTIMATED: 21 ML/MIN/1.73M2
GFR, ESTIMATED: 24 ML/MIN/1.73M2
GLUCOSE BLD-MCNC: 120 MG/DL (ref 74–99)
GLUCOSE BLD-MCNC: 123 MG/DL (ref 74–99)
GLUCOSE BLD-MCNC: 130 MG/DL (ref 74–99)
GLUCOSE BLD-MCNC: 166 MG/DL (ref 74–99)
GLUCOSE SERPL-MCNC: 135 MG/DL (ref 74–99)
GLUCOSE SERPL-MCNC: 166 MG/DL (ref 74–99)
HCT VFR BLD AUTO: 21.3 % (ref 34–48)
HCT VFR BLD AUTO: 22.6 % (ref 34–48)
HCT VFR BLD AUTO: 28.7 % (ref 34–48)
HGB BLD-MCNC: 6.9 G/DL (ref 11.5–15.5)
HGB BLD-MCNC: 7.2 G/DL (ref 11.5–15.5)
HGB BLD-MCNC: 9.3 G/DL (ref 11.5–15.5)
IMM GRANULOCYTES # BLD AUTO: 0.07 K/UL (ref 0–0.58)
IMM GRANULOCYTES NFR BLD: 1 % (ref 0–5)
LYMPHOCYTES NFR BLD: 0.7 K/UL (ref 1.5–4)
LYMPHOCYTES RELATIVE PERCENT: 7 % (ref 20–42)
MAGNESIUM SERPL-MCNC: 1.9 MG/DL (ref 1.6–2.6)
MAGNESIUM SERPL-MCNC: 1.9 MG/DL (ref 1.6–2.6)
MCH RBC QN AUTO: 28 PG (ref 26–35)
MCHC RBC AUTO-ENTMCNC: 31.9 G/DL (ref 32–34.5)
MCV RBC AUTO: 87.9 FL (ref 80–99.9)
MONOCYTES NFR BLD: 0.41 K/UL (ref 0.1–0.95)
MONOCYTES NFR BLD: 4 % (ref 2–12)
NEUTROPHILS NFR BLD: 86 % (ref 43–80)
NEUTS SEG NFR BLD: 8.6 K/UL (ref 1.8–7.3)
PARTIAL THROMBOPLASTIN TIME: 131.3 SEC (ref 24.5–35.1)
PARTIAL THROMBOPLASTIN TIME: 235.4 SEC (ref 24.5–35.1)
PARTIAL THROMBOPLASTIN TIME: 67.7 SEC (ref 24.5–35.1)
PHOSPHATE SERPL-MCNC: 2.6 MG/DL (ref 2.5–4.5)
PHOSPHATE SERPL-MCNC: 3 MG/DL (ref 2.5–4.5)
PLATELET # BLD AUTO: 148 K/UL (ref 130–450)
PMV BLD AUTO: 11.2 FL (ref 7–12)
POTASSIUM SERPL-SCNC: 3.1 MMOL/L (ref 3.5–5)
POTASSIUM SERPL-SCNC: 3.9 MMOL/L (ref 3.5–5)
PROT SERPL-MCNC: 5.4 G/DL (ref 6.4–8.3)
RBC # BLD AUTO: 2.57 M/UL (ref 3.5–5.5)
RBC # BLD: ABNORMAL 10*6/UL
SODIUM SERPL-SCNC: 131 MMOL/L (ref 132–146)
SODIUM SERPL-SCNC: 131 MMOL/L (ref 132–146)
WBC OTHER # BLD: 10.1 K/UL (ref 4.5–11.5)

## 2025-04-02 PROCEDURE — 93306 TTE W/DOPPLER COMPLETE: CPT

## 2025-04-02 PROCEDURE — 2580000003 HC RX 258

## 2025-04-02 PROCEDURE — 84100 ASSAY OF PHOSPHORUS: CPT

## 2025-04-02 PROCEDURE — 6360000002 HC RX W HCPCS: Performed by: INTERNAL MEDICINE

## 2025-04-02 PROCEDURE — 97530 THERAPEUTIC ACTIVITIES: CPT

## 2025-04-02 PROCEDURE — 2500000003 HC RX 250 WO HCPCS: Performed by: INTERNAL MEDICINE

## 2025-04-02 PROCEDURE — 36592 COLLECT BLOOD FROM PICC: CPT

## 2025-04-02 PROCEDURE — 6370000000 HC RX 637 (ALT 250 FOR IP): Performed by: INTERNAL MEDICINE

## 2025-04-02 PROCEDURE — 6360000002 HC RX W HCPCS

## 2025-04-02 PROCEDURE — 85018 HEMOGLOBIN: CPT

## 2025-04-02 PROCEDURE — 99291 CRITICAL CARE FIRST HOUR: CPT | Performed by: INTERNAL MEDICINE

## 2025-04-02 PROCEDURE — 82962 GLUCOSE BLOOD TEST: CPT

## 2025-04-02 PROCEDURE — 93306 TTE W/DOPPLER COMPLETE: CPT | Performed by: INTERNAL MEDICINE

## 2025-04-02 PROCEDURE — 36430 TRANSFUSION BLD/BLD COMPNT: CPT

## 2025-04-02 PROCEDURE — 85014 HEMATOCRIT: CPT

## 2025-04-02 PROCEDURE — 2000000000 HC ICU R&B

## 2025-04-02 PROCEDURE — 80048 BASIC METABOLIC PNL TOTAL CA: CPT

## 2025-04-02 PROCEDURE — 99233 SBSQ HOSP IP/OBS HIGH 50: CPT | Performed by: INTERNAL MEDICINE

## 2025-04-02 PROCEDURE — 97110 THERAPEUTIC EXERCISES: CPT

## 2025-04-02 PROCEDURE — 94003 VENT MGMT INPAT SUBQ DAY: CPT

## 2025-04-02 PROCEDURE — 85025 COMPLETE CBC W/AUTO DIFF WBC: CPT

## 2025-04-02 PROCEDURE — 2500000003 HC RX 250 WO HCPCS

## 2025-04-02 PROCEDURE — 6370000000 HC RX 637 (ALT 250 FOR IP)

## 2025-04-02 PROCEDURE — 85730 THROMBOPLASTIN TIME PARTIAL: CPT

## 2025-04-02 PROCEDURE — 82248 BILIRUBIN DIRECT: CPT

## 2025-04-02 PROCEDURE — 83735 ASSAY OF MAGNESIUM: CPT

## 2025-04-02 PROCEDURE — 80053 COMPREHEN METABOLIC PANEL: CPT

## 2025-04-02 PROCEDURE — P9016 RBC LEUKOCYTES REDUCED: HCPCS

## 2025-04-02 RX ORDER — DIGOXIN 0.25 MG/ML
250 INJECTION INTRAMUSCULAR; INTRAVENOUS ONCE
Status: DISCONTINUED | OUTPATIENT
Start: 2025-04-02 | End: 2025-04-02

## 2025-04-02 RX ORDER — POTASSIUM CHLORIDE 29.8 MG/ML
40 INJECTION INTRAVENOUS ONCE
Status: DISCONTINUED | OUTPATIENT
Start: 2025-04-02 | End: 2025-04-02

## 2025-04-02 RX ORDER — METOPROLOL TARTRATE 25 MG/1
12.5 TABLET, FILM COATED ORAL 2 TIMES DAILY
Status: DISCONTINUED | OUTPATIENT
Start: 2025-04-02 | End: 2025-04-02

## 2025-04-02 RX ORDER — METOPROLOL TARTRATE 25 MG/1
12.5 TABLET, FILM COATED ORAL EVERY 8 HOURS SCHEDULED
Status: DISCONTINUED | OUTPATIENT
Start: 2025-04-02 | End: 2025-04-04 | Stop reason: HOSPADM

## 2025-04-02 RX ORDER — MAGNESIUM SULFATE IN WATER 40 MG/ML
2000 INJECTION, SOLUTION INTRAVENOUS ONCE
Status: COMPLETED | OUTPATIENT
Start: 2025-04-02 | End: 2025-04-02

## 2025-04-02 RX ORDER — METOPROLOL TARTRATE 1 MG/ML
2.5 INJECTION, SOLUTION INTRAVENOUS ONCE
Status: COMPLETED | OUTPATIENT
Start: 2025-04-02 | End: 2025-04-02

## 2025-04-02 RX ORDER — DIGOXIN 125 MCG
125 TABLET ORAL DAILY
Status: DISCONTINUED | OUTPATIENT
Start: 2025-04-03 | End: 2025-04-04

## 2025-04-02 RX ORDER — 0.9 % SODIUM CHLORIDE 0.9 %
250 INTRAVENOUS SOLUTION INTRAVENOUS ONCE
Status: COMPLETED | OUTPATIENT
Start: 2025-04-02 | End: 2025-04-02

## 2025-04-02 RX ORDER — SODIUM CHLORIDE 9 MG/ML
INJECTION, SOLUTION INTRAVENOUS PRN
Status: DISCONTINUED | OUTPATIENT
Start: 2025-04-02 | End: 2025-04-04 | Stop reason: HOSPADM

## 2025-04-02 RX ORDER — METOPROLOL TARTRATE 1 MG/ML
5 INJECTION, SOLUTION INTRAVENOUS ONCE
Status: DISCONTINUED | OUTPATIENT
Start: 2025-04-02 | End: 2025-04-02

## 2025-04-02 RX ORDER — DIGOXIN 0.25 MG/ML
125 INJECTION INTRAMUSCULAR; INTRAVENOUS ONCE
Status: COMPLETED | OUTPATIENT
Start: 2025-04-02 | End: 2025-04-02

## 2025-04-02 RX ADMIN — POTASSIUM BICARBONATE 20 MEQ: 782 TABLET, EFFERVESCENT ORAL at 02:36

## 2025-04-02 RX ADMIN — DIGOXIN 125 MCG: 0.25 INJECTION INTRAMUSCULAR; INTRAVENOUS at 07:27

## 2025-04-02 RX ADMIN — SODIUM CHLORIDE, PRESERVATIVE FREE 40 MG: 5 INJECTION INTRAVENOUS at 07:34

## 2025-04-02 RX ADMIN — MAGNESIUM SULFATE HEPTAHYDRATE 2000 MG: 40 INJECTION, SOLUTION INTRAVENOUS at 06:29

## 2025-04-02 RX ADMIN — MIDODRINE HYDROCHLORIDE 20 MG: 10 TABLET ORAL at 11:42

## 2025-04-02 RX ADMIN — MIDODRINE HYDROCHLORIDE 20 MG: 10 TABLET ORAL at 17:03

## 2025-04-02 RX ADMIN — Medication 100 MG: at 07:34

## 2025-04-02 RX ADMIN — MIDODRINE HYDROCHLORIDE 20 MG: 10 TABLET ORAL at 07:34

## 2025-04-02 RX ADMIN — METOROPROLOL TARTRATE 2.5 MG: 5 INJECTION, SOLUTION INTRAVENOUS at 02:36

## 2025-04-02 RX ADMIN — SODIUM CHLORIDE, PRESERVATIVE FREE 10 ML: 5 INJECTION INTRAVENOUS at 07:37

## 2025-04-02 RX ADMIN — Medication 2000 UNITS: at 07:34

## 2025-04-02 RX ADMIN — METOROPROLOL TARTRATE 2.5 MG: 5 INJECTION, SOLUTION INTRAVENOUS at 03:59

## 2025-04-02 RX ADMIN — SODIUM CHLORIDE 250 ML: 0.9 INJECTION, SOLUTION INTRAVENOUS at 07:34

## 2025-04-02 RX ADMIN — METOPROLOL TARTRATE 12.5 MG: 25 TABLET, FILM COATED ORAL at 22:11

## 2025-04-02 RX ADMIN — ATORVASTATIN CALCIUM 80 MG: 40 TABLET, FILM COATED ORAL at 22:11

## 2025-04-02 RX ADMIN — METOPROLOL TARTRATE 12.5 MG: 25 TABLET, FILM COATED ORAL at 10:50

## 2025-04-02 ASSESSMENT — PULMONARY FUNCTION TESTS
PIF_VALUE: 38
PIF_VALUE: 32
PIF_VALUE: 25
PIF_VALUE: 31
PIF_VALUE: 25
PIF_VALUE: 28
PIF_VALUE: 25
PIF_VALUE: 32
PIF_VALUE: 39
PIF_VALUE: 36
PIF_VALUE: 24
PIF_VALUE: 27
PIF_VALUE: 28
PIF_VALUE: 26
PIF_VALUE: 26
PIF_VALUE: 27
PIF_VALUE: 29
PIF_VALUE: 19
PIF_VALUE: 31
PIF_VALUE: 25
PIF_VALUE: 25
PIF_VALUE: 24
PIF_VALUE: 26
PIF_VALUE: 26
PIF_VALUE: 28
PIF_VALUE: 29
PIF_VALUE: 26
PIF_VALUE: 26

## 2025-04-02 ASSESSMENT — PAIN SCALES - GENERAL
PAINLEVEL_OUTOF10: 0

## 2025-04-02 NOTE — PROGRESS NOTES
Nephrology Progress Note  4/2/2025 8:27 AM  Subjective:   Admit Date: 3/4/2025  PCP: Tiesha Panda MD    Subjective    3/24/25:: Patient seen and examined ; continues to require pressors; remains intubated    3/25: remains intubated, still requiring pressors; HD in progress, off sedation now    3/26: Trach and PEG this a.m.; continues to require pressors; being weaned; for thoracentesis today    3/27: No new acute issues from overnight    3/28: No new acute issues from overnight; vasopressors, being weaned    3/29: No new acute issues from overnight; remains off pressors    3/30: Amiodarone drip restarted due to atrial fibrillation; EP consulted; no other acute issues from overnight    3/31: No new c/o; remains off pressors; on amio drip; in A fib RVR     4/1: Episode of A-fib RVR earlier this a.m. with hypotension; required 250 cc IV fluid NS bolus; was given Lopressor  Noted hemoglobin this a.m. 6.3 PRBC transfusion for 1 unit    4/2: Episode of A-fib with heart rates in the 160s with low BP last evening requiring 250 cc normal; patient dosed with digoxin; possible LHC    Diet: Diet NPO  ADULT TUBE FEEDING; PEG; Renal Formula; Continuous; 20; Yes; 10; Q 4 hours; 30; 30; Q 4 hours; Protein; 1 Dose; Daily    Data:   Scheduled Meds:   magnesium sulfate  2,000 mg IntraVENous Once    metoprolol  5 mg IntraVENous Once    sodium chloride  250 mL IntraVENous Once    metoprolol succinate  12.5 mg Oral BID with meals    hydrocortisone sodium succinate PF  100 mg IntraVENous Once    atorvastatin  80 mg PEG Tube Nightly    midodrine  20 mg PEG Tube TID WC    thiamine  100 mg Per G Tube Daily    vitamin D  2,000 Units PEG Tube Daily    [Held by provider] apixaban  5 mg Oral BID    pantoprazole (PROTONIX) 40 mg in sodium chloride (PF) 0.9 % 10 mL injection  40 mg IntraVENous Daily    insulin lispro  0-4 Units SubCUTAneous Q6H    sodium chloride flush  5-40 mL IntraVENous 2 times per day     Continuous Infusions:   sodium  chloride      heparin (PORCINE) Infusion 12 Units/kg/hr (04/02/25 0653)    norepinephrine 3 mcg/min (04/02/25 0653)    sodium chloride Stopped (03/31/25 1403)    dextrose       PRN Meds:sodium chloride, sulfur hexafluoride microspheres, sulfur hexafluoride microspheres, heparin (porcine), heparin (porcine), ipratropium 0.5 mg-albuterol 2.5 mg, heparin (PF), heparin (porcine) **AND** heparin (porcine), sodium chloride, polyethylene glycol, sodium chloride flush, acetaminophen **OR** acetaminophen, dextrose bolus **OR** dextrose bolus, glucagon (rDNA), dextrose  I/O last 3 completed shifts:  In: 2436.9 [I.V.:177.9; NG/GT:1708; IV Piggyback:551]  Out: 300 [Stool:300]  No intake/output data recorded.    Intake/Output Summary (Last 24 hours) at 4/2/2025 0827  Last data filed at 4/2/2025 0653  Gross per 24 hour   Intake 1352.38 ml   Output --   Net 1352.38 ml     CBC:   Recent Labs     04/01/25  0616 04/01/25  1547 04/01/25  1548 04/02/25  0446   WBC 12.8* 11.6*  --  10.1   HGB 7.2* 7.4* 7.4* 7.2*   * 135  --  148     BMP:    Recent Labs     04/01/25  0616 04/02/25  0004 04/02/25  0446    131* 131*   K 3.8 3.1* 3.9   CL 95* 95* 96*   CO2 22 21* 21*   BUN 30* 39* 43*   CREATININE 1.8* 2.1* 2.3*   GLUCOSE 131* 166* 135*     Hepatic:   Recent Labs     03/31/25  0408 04/01/25  0616 04/02/25  0446   AST 42* 37* 43*   ALT 9 9 14   BILITOT 0.7 1.1 0.8   ALKPHOS 143* 121* 191*     Troponin: No results for input(s): \"TROPONINI\" in the last 72 hours.  BNP: No results for input(s): \"BNP\" in the last 72 hours.  Lipids: No results for input(s): \"CHOL\", \"HDL\" in the last 72 hours.    Invalid input(s): \"LDLCALCU\"  ABGs: No results found for: \"PHART\", \"PO2ART\", \"DXK4FTK\"  INR:   No results for input(s): \"INR\" in the last 72 hours.      -----------------------------------------------------------------  RAD: XR CHEST (2 VW)  Result Date: 3/5/2025  EXAMINATION: TWO XRAY VIEWS OF THE CHEST 3/5/2025 3:15 pm COMPARISON: 03/04/2025  HISTORY: ORDERING SYSTEM PROVIDED HISTORY: pleural effusion TECHNOLOGIST PROVIDED HISTORY: Reason for exam:->pleural effusion FINDINGS: Heart is mildly enlarged.  There are bilateral pleural effusions.  The 1 on the left is slightly smaller than the prior study.  Degree of cephalization of the pulmonary vascularity is less.     Bilateral pleural effusions. The 1 on the left is slightly smaller than the prior study.  Findings suggest resolving CHF     XR CHEST PORTABLE  Result Date: 3/4/2025  EXAMINATION: ONE XRAY VIEW OF THE CHEST 3/4/2025 6:33 pm COMPARISON: 12/22/2023 HISTORY: ORDERING SYSTEM PROVIDED HISTORY: arythmia TECHNOLOGIST PROVIDED HISTORY: Reason for exam:->arythmia FINDINGS: Possible right lower lobe opacity..  Bilateral pleural effusions.  No pneumothorax.  Stable heart size.  The osseous structures are without acute process.     Bilateral pleural effusions. Possible right lower lobe opacity.       Objective:   Vitals: BP 94/67   Pulse (!) 122   Temp 98 °F (36.7 °C) (Temporal)   Resp 16   Ht 1.575 m (5' 2\")   Wt 69.4 kg (153 lb)   SpO2 91%   BMI 27.98 kg/m²   General appearance: intubated, awake  HEENT: Head: Normocephalic, no lesions, without obvious abnormality., trach  Neck: no adenopathy, no carotid bruit, no JVD, supple, symmetrical, trachea midline, and thyroid not enlarged, symmetric, no tenderness/mass/nodules; + trach  Lungs: diminished breath sounds bilaterally  Heart: regular rate and rhythm, S1, S2 normal, no murmur, click, rub or gallop  Abdomen: soft, non-tender; bowel sounds normal; no masses,  no organomegaly  Extremities: edema ++  Skin:  No rashes or lesions  Neurologic: awake        Patient Active Problem List:     Stroke due to embolism of left middle cerebral artery (HCC)     Occipital stroke (HCC)     Atrial flutter (HCC)     Paroxysmal atrial fibrillation (HCC)     H/O: stroke     Atrial flutter with rapid ventricular response (HCC)     Decompensated heart failure (HCC)

## 2025-04-02 NOTE — PROGRESS NOTES
Physical Therapy    Physical Therapy Daily Treatment Note      Name: Nikki Moffett  : 1945  MRN: [id number]      Date of Service: 2025    Re-Evaluating PT:  Chema Coleman, PT, DPT  [id number]     Room #:  4414/4414-A  Diagnosis:  Failure to thrive in childhood [R62.51]  Chronic atrial fibrillation (HCC) [I48.20]  ANTONIO (acute kidney injury) [N17.9]  Atrial flutter with rapid ventricular response (HCC) [I48.92]  PMHx/PSHx:   has a past medical history of A-fib (HCC), HFrEF (heart failure with reduced ejection fraction) (HCC), HLD (hyperlipidemia), HTN (hypertension), Interatrial cardiac shunt, and L MCA stroke.   Procedure/Surgery:  Tunneled CVC placement 3/20; Trach and PEG 3/26;  Thoracentesis 3/26;  Thoracentesis 3/27  Precautions:  Falls, Trach to Vent, PEG, FMS, Monitor HR, Alarms   Equipment Needs:  TBD    Reason for re-evaluation:  Change in status, transfer to ICU, multiple procedures   Date of re-evaluation:  3/31    SUBJECTIVE:    Pt lives with her family in a 2 story home with 4 stairs to enter and no rail(s).    Bed is on 2nd floor and bath is on 2nd floor.    Pt ambulated with no AD PTA.     Equipment Owned: none  Equipment Needs:  TBD - possible WW    OBJECTIVE:   Re-Evaluation  Date: 3/31/25 Treatment  25 Short Term/ Long Term   Goals   AM-PAC 6 Clicks      Was pt agreeable to Re-Eval/treatment? Yes  Yes     Does pt have pain? No c/o pain No c/o pain    Bed Mobility  Rolling: Dep  Supine to sit: NT  Sit to supine: NT  Scooting: Dep  Rolling: NT  Supine to sit: NT  Sit to supine: NT  Scooting: Dep  Rolling: Min A  Supine to sit: Min A  Sit to supine: Min A  Scooting: Min A   Transfers Sit to stand: NT  Stand to sit: NT  Stand pivot: NT NT Sit to stand: Min A  Stand to sit: Min A  Stand pivot: Min A with AAD   Ambulation    NT NT >5 feet with AAD Min A   Stair negotiation: ascended and descended  NT NT NA   ROM BUE:  Per OT eval   BLE:  WFL     Strength BUE:  Per OT eval   BLE:   utilized to facilitate upright posture, joint and skin integrity, and interaction with environment.      Non-pharmacological treatment and prevention of ICU delirium - Pt oriented to date, time, time of day, place, and situation as well as provided with visual and auditory stimuli in order to improve cognition and combat effects of ICU delirium.       PHYSICAL THERAPY PLAN OF CARE:  Pt is making progress towards established goals.  Continue PT POC.     Specific instructions for next treatment:  progress as tolerated     Time in  1115  Time out  1138    Total Treatment Time  23 minutes     CPT codes:  [] Low Complexity PT evaluation 09948  [] Moderate Complexity PT evaluation 62798  [] High Complexity PT evaluation 77047  [] PT Re-evaluation 86956  [] Gait training 99183 -- minutes  [] Manual therapy 67761 -- minutes  [x] Therapeutic activities 34052 23 minutes  [] Therapeutic exercises 87532 - minutes  [] Neuromuscular reeducation 19245 -- minutes     Chema Coleman, PT, DPT  [id number]

## 2025-04-02 NOTE — PROGRESS NOTES
Per Dr. Miranda, we are to give the oral lopressor 12.5 mg, even if blood pressure is low. She said to give lopressor to stay ahead of the heart rate, and to start levophed if needed.

## 2025-04-02 NOTE — PROGRESS NOTES
[facility]  Internal Medicine Residency / House Medicine Service    Attending Physician Statement  I have discussed the case, including pertinent history and exam findings with the resident and the team.  I have seen and examined the patient and the key elements of the encounter have been performed by me.  I agree with the assessment, plan and orders as documented by the resident.      More alert   Solid eye contact  Dense paresis on right , old and new CVA's on imagery  Digoxin now started   By EP to control AFib  BP stable off Levophed on midodrine  May not understand english  Influenza A resolved   Mouth symmetric  AFib difficult to control  Review meds  Plan; Same ICU care   Remainder of medical problems as per resident note.      Dalton Sosa MD FRCP [facility]  Internal Medicine Residency Faculty

## 2025-04-02 NOTE — PROGRESS NOTES
[facility]  Internal Medicine Residency Program  Progress Note - House Team 1    Patient:  Nikki Moffett 79 y.o. female MRN: [id number]     Date of Service: 4/2/2025     CC:   Chief Complaint   Patient presents with    Altered Mental Status     \"Mood swings, memory loss and fatigue\" per patients grandson    Chest Pain     Ongoing for a couple weeks per family     Overnight events: Afib RVR overnight, restarted on levophed    Subjective     Patient seen at bedside this morning. She was awake, able to track but does not follow commands. Overnight, she went into Afib RVR again and was given digoxin. She became hypotensive and was on low dose levophed but was able to be weaned off this morning.     Objective     Physical Exam:  Vitals: BP (!) 103/45   Pulse 77   Temp 98.4 °F (36.9 °C) (Temporal)   Resp 21   Ht 1.575 m (5' 2\")   Wt 69.4 kg (153 lb)   SpO2 97%   BMI 27.98 kg/m²     I & O - 24hr: I/O this shift:  In: 419.9 [I.V.:22; NG/GT:120; IV Piggyback:278]  Out: 0    General Appearance: alert and appears stated age, no acute distress  HEENT:  Head: Normocephalic, no lesions, without obvious abnormality  Neck: supple, symmetrical, trachea midline, trach in place  Lung:  rhonchi bilaterally , no use of accessory muscles   Heart: irregularly irregular rhythm, rapid ventricular rate in the 120s  Abdomen: soft, non-tender; bowel sounds normal; no masses,  no organomegaly  Extremities:  extremities normal, atraumatic, no cyanosis, nonpitting edema of right upper extremity, trace edema of bilateral legs below knees  Neurologic: Mental status: awake, tracks with eyes, responds to visual threat, cannot follow commands    Pertinent Labs & Imaging Studies     Recent Labs     04/01/25  0616 04/01/25  1547 04/01/25  1548 04/02/25  0446   HGB 7.2* 7.4* 7.4* 7.2*   HCT 22.4* 22.7* 22.5* 22.6*   WBC 12.8* 11.6*  --  10.1   * 135  --  148       Recent Labs     04/01/25  0616 04/02/25  0004

## 2025-04-02 NOTE — PROGRESS NOTES
[facility]   Department of Internal Medicine   Internal Medicine Residency  MICU Progress Note    Patient:  Nikki Moffett 79 y.o. female   MRN: [id number]       Date of Service: 4/2/2025    Allergy: Patient has no known allergies.    Subjective     Patient was seen and examined this morning at bedside in no acute distress.  Patient does not follow commands but nods no to pain.  Denies any discomfort in chest.  Patient's heart rate was in 160s, ordered 250 cc bolus, patient got the digoxin dose.  Heart rate came down to 110s.    Overnight,   Hgb 7.4 > 7.2  HR 140s, Patient off levo. /60   Ordered lopressor 2.5 mg. ordered electrolytes   K and Mg replaced  HR 160s ,A flutter in EKG, overnight team contacted EP, ordered low dose digoxin.   Patient needed to be put back on levophed        Objective       I & O - 24hr:    Intake/Output Summary (Last 24 hours) at 4/2/2025 0915  Last data filed at 4/2/2025 0913  Gross per 24 hour   Intake 1651.55 ml   Output --   Net 1651.55 ml       Physical Exam  Vitals: /73   Pulse (!) 113   Temp 98.7 °F (37.1 °C) (Axillary)   Resp 15   Ht 1.575 m (5' 2\")   Wt 69.4 kg (153 lb)   SpO2 100%   BMI 27.98 kg/m²     I & O - 24hr: I/O this shift:  In: 299.2 [I.V.:21.2; IV Piggyback:278]  Out: -    General Appearance:  Awake, lethargic, does not follow commands  HEENT:  Head: Normal, normocephalic, atraumatic.  Neck:  Tracheostomy tube in place  Lung: rhonchi bilaterally   Heart: Irregular rhythm, tachycardia  Abdomen: soft, non-tender; bowel sounds normal; no masses,  no organomegaly  Extremities:  edema 1+ pitting bilaterally    Neurologic: Mental status: Awake, lethargic, does not follow command      Medications     Continuous Infusions:   sodium chloride      heparin (PORCINE) Infusion 12 Units/kg/hr (04/02/25 0913)    norepinephrine Stopped (04/02/25 0843)    sodium chloride Stopped (03/31/25 1403)    dextrose       Scheduled Meds:   metoprolol  5 mg  pronounced in the lower   lungs with noted right greater than left pleural effusions.      6.  No pneumothorax.         XR CHEST PORTABLE   Final Result   Endotracheal tube tip now with improved positioning 2.5 cm above the lew.         XR CHEST PORTABLE   Final Result   1. Endotracheal tube tip in low but satisfactory positioning 9 mm above the   lew.   2. Unchanged right greater than left basilar effusions and perihilar   pulmonary edema.         MRA NECK WO CONTRAST   Final Result   Limited examination due to motion. No evidence for high-grade stenosis or   arterial occlusion within the limitations of this examination.      Arterial structures of the head and neck could be better assessed with CT   angiography as clinically warranted.         MRA HEAD WO CONTRAST   Final Result   1. Motion limits detailed assessment of the intracranial vasculature. Within   the limitations of this exam, no high-grade stenosis or arterial occlusion is   evident.   2. Mild stenosis involving the V4 segment of the left vertebral artery.         XR ABDOMEN (KUB) (SINGLE AP VIEW)   Final Result      MRI BRAIN WO CONTRAST   Final Result   1. Linear areas of acute infarction within the left occipital lobe as   described above.   2. Encephalomalacia on the basis of prior infarction within the left insular   region and left frontal operculum.   3. Partial opacification of the mastoid air cells bilaterally.   Radiology communication note placed to have information called to the   ordering physician 10:54 a.m. EST 03/15/2025         XR CHEST PORTABLE   Final Result   Cardiomegaly with stable increased markings seen diffusely throughout the mid   and lower lung fields bilaterally with small bilateral pleural effusions.         CT HEAD WO CONTRAST   Final Result   Small insult in the left frontal parietal region of indeterminate chronicity.   Correlate with MRI if there is concern for acute infarction.      Remote appearing insult in  goals of care    Antimicrobials: Completed 7 days of Zosyn 3/19, monitoring off antibiotics   I/O: +2.3 L  LDA:Left brachial PICC line, day 14, tunneled HD day 10, FMS   PT/OT evaluation: Following  DVT prophylaxis: Heparin drip  GI prophylaxis: Protonix  Diet:   Diet NPO  ADULT TUBE FEEDING; PEG; Renal Formula; Continuous; 20; Yes; 10; Q 4 hours; 30; 30; Q 4 hours; Protein; 1 Dose; Daily   Bowel regimen: As needed  Pain management: as needed  Code status: Full Code   Disposition: Admitted to ICU  Family: updated as available    Americo Vargas MD, PGY-2   Attending physician: Dr. Sandoval

## 2025-04-02 NOTE — PROGRESS NOTES
INPATIENT CARDIOLOGY FOLLOW-UP    Name: Nikki Moffett    Age: 79 y.o.    Date of Admission: 3/4/2025  5:05 PM    Date of Service: 4/2/2025    Chief Complaint: Follow-up for CMF, CHF    Interim History:  No new overnight cardiac complaints. Currently with no complaints of CP, SOB, palpitations, dizziness, or lightheadedness. SR on telemetry.    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)    Occipital stroke (HCC)    Atrial flutter (HCC)    Paroxysmal atrial fibrillation (HCC)    H/O: stroke    Atrial flutter with rapid ventricular response (HCC)    Decompensated heart failure (HCC)    Chronic atrial fibrillation (HCC)    VHD (valvular heart disease)    ANTONIO (acute kidney injury)    Palliative care encounter    Metabolic encephalopathy    Persistent atrial fibrillation (HCC)       Allergies:  No Known Allergies    Current Medications:  Current Facility-Administered Medications   Medication Dose Route Frequency Provider Last Rate Last Admin    metoprolol (LOPRESSOR) injection 5 mg  5 mg IntraVENous Once Americo Vargas MD        [START ON 4/3/2025] digoxin (LANOXIN) tablet 125 mcg  125 mcg Oral Daily Nakita Miranda MD        metoprolol tartrate (LOPRESSOR) tablet 12.5 mg  12.5 mg Oral BID Nakita Miranda MD   12.5 mg at 04/02/25 1050    0.9 % sodium chloride infusion   IntraVENous PRN Derrell Sherwood MD        sulfur hexafluoride microspheres (LUMASON) 60.7-25 MG injection 2 mL  2 mL IntraVENous ONCE PRN Francheska Carnes MD        sulfur hexafluoride microspheres (LUMASON) 60.7-25 MG injection 2 mL  2 mL IntraVENous ONCE

## 2025-04-02 NOTE — PROGRESS NOTES
OCCUPATIONAL THERAPY TREATMENT SESSION  [facility] [address] [place], [place]       Date:2025                                                  Patient Name: Nikki Moffett  MRN: [id number]  : 1945  Room: [address] [place] [address]    Evaluating OT: Siddhartha Mohamudnbraykim OTR/L [id number]  Re-evaluation: Mary Reynolds OTD, OTR/L, [id number]    Re-evaluation indicated after transfer to ICU requiring intubation.    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   Past Surgical History:   Procedure Laterality Date    IR MECHANICAL ART THROMBECTOMY INTRACRANIAL  2023    IR MECHANICAL ART THROMBECTOMY INTRACRANIAL 2023 [place]illo [place] [facility] [place]    IR TUNNELED CVC PLACE WO SQ PORT/PUMP > 5 YEARS  2025    IR TUNNELED CVC PLACE WO SQ PORT/PUMP > 5 YEARS 3/20/2025 [place]renceNatalya [place] [facility] [place]    THORACENTESIS Right 2025    750cc clear yellow fluid    TRACHEAL SURGERY N/A 3/26/2025    TRACHEOTOMY PERCUTANEOUS BRONCHOSCOPY performed by Ryne Bloom [place] [facility] [place]    UPPER GASTROINTESTINAL ENDOSCOPY N/A 3/26/2025    ESOPHAGOGASTRODUODENOSCOPY PERCUTANEOUS ENDOSCOPIC GASTROSTOMY TUBE INSERTION performed by Ryne Bloom MD [place] [facility] [place]       Pt presented to the hospital on 3/4 with fatigue, dizziness, and chest pain     Orders received, chart reviewed, eval complete.     Precautions:  Fall Risk, monitor HR, trach  and to improve overall orientation, mentation and pt interaction with family/staff.      Time In: 1115  Time Out: 1138  Total Treatment Time: 23 minutes    Min Units   OT Eval Low 25832       OT Eval Moderate 27812      OT Eval High 31969       OT Re-Eval 31767      Therapeutic Ex 70387 10  1    Therapeutic Activities 75726 13 1   ADL/Self Care 44499     Orthotic Management 86514       Neuro Re-Ed 89253       Non-Billable Time          Mary Reynolds OTD, OTR/L, [id number]

## 2025-04-02 NOTE — PLAN OF CARE
Problem: Chronic Conditions and Co-morbidities  Goal: Patient's chronic conditions and co-morbidity symptoms are monitored and maintained or improved  Outcome: Progressing     Problem: Discharge Planning  Goal: Discharge to home or other facility with appropriate resources  Outcome: Progressing     Problem: Safety - Adult  Goal: Free from fall injury  Outcome: Progressing     Problem: Skin/Tissue Integrity  Goal: Skin integrity remains intact  Description: 1.  Monitor for areas of redness and/or skin breakdown  2.  Assess vascular access sites hourly  3.  Every 4-6 hours minimum:  Change oxygen saturation probe site  4.  Every 4-6 hours:  If on nasal continuous positive airway pressure, respiratory therapy assess nares and determine need for appliance change or resting period  Outcome: Progressing     Problem: Nutrition Deficit:  Goal: Optimize nutritional status  Outcome: Progressing     Problem: Skin/Tissue Integrity - Adult  Goal: Skin integrity remains intact  Description: 1.  Monitor for areas of redness and/or skin breakdown  2.  Assess vascular access sites hourly  3.  Every 4-6 hours minimum:  Change oxygen saturation probe site  4.  Every 4-6 hours:  If on nasal continuous positive airway pressure, respiratory therapy assess nares and determine need for appliance change or resting period  Outcome: Progressing

## 2025-04-02 NOTE — PROGRESS NOTES
[facility]- [facility] Electrophysiology  Inpatient Progress Report  PATIENT: Nikki Moffett  MEDICAL RECORD NUMBER: [id number]  DATE OF SERVICE:  4/2/2025  ATTENDING ELECTROPHYSIOLOGIST: Nakita Miranda MD   PRIMARY ELECTROPHYSIOLOGIST: Gelacio Hendricks MD  REFERRING PHYSICIAN:  Tiesha Panda MD  CHIEF COMPLAINT: Altered mental status    HPI: This is a 79 y.o. female with a history of  persistent atrial fibrillation and atrial flutter, cerebrovascular accident and hyperlipidemia.   The patient presented to the hospital on 12/21/23 due to right sided weakness and facial droop. She diagnosed with acute left MCA stroke with M2 occlusion. She was not a candidate for tPA and underwent mechanical thrombectomy by IR team on 12/21/23. Prior to intervention, patient was diagnosed with AF with RVR with heart rate in the 180s. Patient underwent emergency DC-cardioversion to normal sinus rhythm and was initiated on IV Amiodarone which later on transited to oral Amiodarone. Echocardiogram 2/29/24 showed LV EF of 40-45%.  She was started on GDMT and was discharged home on Amiodarone loading dose, Eliquis and Metoprolol.   She was last seen in Cardiology's office on 2/22/24. She subsequently underwent nuclear stress test on 2/29/24 which showed no ischemia and LV EF of 69%. Repeat echocardiogram on 2/29/24 showed LV EF 55-60%, moderate LVH and mild LAE. She was scheduled for follow up with Cardiology but did not follow through.   Son called on 3/3/25 stated that the patient was not doing well She was not eating well, shortness of breath with exertion and reported palpitations. She was seen in IM resident clinic on 3/4/35 and was noted to be in AFL with  bpm. She was advised to come in to ED. In ED she was noted to be in AFL with  bpm.She was started on IV Cardizem drip.CXR showed bilateral pleural effusions and possible right lower lobe opacity. Patient received  is suboptimal. Contrast used: Lumason.    Echocardiogram 2/29/24:     Left Ventricle: Normal left ventricular systolic function with a visually estimated EF of 55 - 60%. Left ventricle size is normal. Moderately increased ventricular mass. Findings consistent with moderate concentric hypertrophy. Normal wall motion. Indeterminate diastolic function.    Right Ventricle: Right ventricle size is normal. Normal systolic function. TAPSE is 2.5 cm.    Aortic Valve: No stenosis.    Tricuspid Valve: Normal RVSP. The estimated RVSP is 33 mmHg.    Left Atrium: Left atrium is mildly dilated.    Interatrial Septum: Interatrial shunt visualized with color Doppler with left to right shunt.  No right to left shunt on bubble study.    Pericardium: No pericardial effusion.    Image quality is good.    Echocardiogram 12/22/23:    Left Ventricle: Moderately reduced left ventricular systolic function with a visually estimated EF of 40 - 45%. Left ventricle size is normal. Mildly increased ventricular mass. Findings consistent with mild concentric hypertrophy. Moderate global hypokinesis present. Moderate hypokinesis of the following segments: inferior and inferoseptal walls. Grade III diastolic dysfunction with increased LAP.    Right Ventricle: Right ventricle size is normal. Normal systolic function. TAPSE is 1.9 cm.    Aortic Valve: No significant stenosis. COLLEEN by direct planimetry is 2 cm2.    Mitral Valve: Mild regurgitation.    Tricuspid Valve: Normal RVSP. The estimated RVSP is 32 mmHg.    Left Atrium: Left atrium is mildly dilated.    Interatrial Septum: Agitated saline study was negative with and without provocation.    Right Atrium: Right atrium is mildly dilated.    Pericardium: No pericardial effusion.    Image quality is suboptimal. Contrast used: Definity. Technically difficult study with poor endocardial visualization.     I have independently reviewed all of the ECGs and rhythm strips per above     Assessment/Plan: This  0.9    Recommendations:    Metoprolol and digoxin added as noted above  Respiratory and hemodynamic support as is ongoing  Coronary angiography tomorrow    I have spent a total of 35 CCT minutes with the patient , reviewing the above stated recommendations.  And a total of >50% of that time involved face-to-face time providing counseling and/or coordination of care with the other providers, reviewing records/tests, counseling/education of the patient, ordering medications/tests/procedures, coordinating care, and documenting clinical information in the EHR.     Thank you for allowing me to participate in your patient's care.  Please call me if there are any questions or concerns.      Nakita Miranda MD  Cardiac Electrophysiology  [facility]  [facility] [place]:  Electrophysiology  10:42 AM  4/2/2025

## 2025-04-03 PROBLEM — I50.21 ACUTE SYSTOLIC HEART FAILURE (HCC): Status: ACTIVE | Noted: 2025-04-03

## 2025-04-03 LAB
ALBUMIN SERPL-MCNC: 2.4 G/DL (ref 3.5–5.2)
ALP SERPL-CCNC: 153 U/L (ref 35–104)
ALT SERPL-CCNC: 16 U/L (ref 0–32)
ANION GAP SERPL CALCULATED.3IONS-SCNC: 16 MMOL/L (ref 7–16)
AST SERPL-CCNC: 43 U/L (ref 0–31)
BASOPHILS # BLD: 0.02 K/UL (ref 0–0.2)
BASOPHILS NFR BLD: 0 % (ref 0–2)
BILIRUB DIRECT SERPL-MCNC: 0.3 MG/DL (ref 0–0.3)
BILIRUB INDIRECT SERPL-MCNC: 0.5 MG/DL (ref 0–1)
BILIRUB SERPL-MCNC: 0.8 MG/DL (ref 0–1.2)
BUN SERPL-MCNC: 53 MG/DL (ref 6–23)
CALCIUM SERPL-MCNC: 7.8 MG/DL (ref 8.6–10.2)
CHLORIDE SERPL-SCNC: 95 MMOL/L (ref 98–107)
CO2 SERPL-SCNC: 20 MMOL/L (ref 22–29)
CREAT SERPL-MCNC: 2.9 MG/DL (ref 0.5–1)
EKG ATRIAL RATE: 111 BPM
EKG ATRIAL RATE: 112 BPM
EKG P-R INTERVAL: 152 MS
EKG Q-T INTERVAL: 390 MS
EKG Q-T INTERVAL: 412 MS
EKG QRS DURATION: 136 MS
EKG QRS DURATION: 144 MS
EKG QTC CALCULATION (BAZETT): 558 MS
EKG QTC CALCULATION (BAZETT): 562 MS
EKG R AXIS: -3 DEGREES
EKG R AXIS: -59 DEGREES
EKG T AXIS: 136 DEGREES
EKG T AXIS: 47 DEGREES
EKG VENTRICULAR RATE: 112 BPM
EKG VENTRICULAR RATE: 123 BPM
EOSINOPHIL # BLD: 0.32 K/UL (ref 0.05–0.5)
EOSINOPHILS RELATIVE PERCENT: 4 % (ref 0–6)
ERYTHROCYTE [DISTWIDTH] IN BLOOD BY AUTOMATED COUNT: 18.3 % (ref 11.5–15)
GFR, ESTIMATED: 16 ML/MIN/1.73M2
GLUCOSE BLD-MCNC: 80 MG/DL (ref 74–99)
GLUCOSE BLD-MCNC: 82 MG/DL (ref 74–99)
GLUCOSE BLD-MCNC: 85 MG/DL (ref 74–99)
GLUCOSE SERPL-MCNC: 92 MG/DL (ref 74–99)
HCT VFR BLD AUTO: 22.7 % (ref 34–48)
HCT VFR BLD AUTO: 26.9 % (ref 34–48)
HCT VFR BLD AUTO: 28.2 % (ref 34–48)
HCT VFR BLD AUTO: 30 % (ref 34–48)
HGB BLD-MCNC: 7.5 G/DL (ref 11.5–15.5)
HGB BLD-MCNC: 8.9 G/DL (ref 11.5–15.5)
HGB BLD-MCNC: 9.2 G/DL (ref 11.5–15.5)
HGB BLD-MCNC: 9.9 G/DL (ref 11.5–15.5)
IMM GRANULOCYTES # BLD AUTO: 0.05 K/UL (ref 0–0.58)
IMM GRANULOCYTES NFR BLD: 1 % (ref 0–5)
LYMPHOCYTES NFR BLD: 1.05 K/UL (ref 1.5–4)
LYMPHOCYTES RELATIVE PERCENT: 12 % (ref 20–42)
MAGNESIUM SERPL-MCNC: 2.5 MG/DL (ref 1.6–2.6)
MCH RBC QN AUTO: 28.4 PG (ref 26–35)
MCHC RBC AUTO-ENTMCNC: 33 G/DL (ref 32–34.5)
MCV RBC AUTO: 86 FL (ref 80–99.9)
MONOCYTES NFR BLD: 0.67 K/UL (ref 0.1–0.95)
MONOCYTES NFR BLD: 8 % (ref 2–12)
NEUTROPHILS NFR BLD: 76 % (ref 43–80)
NEUTS SEG NFR BLD: 6.88 K/UL (ref 1.8–7.3)
PARTIAL THROMBOPLASTIN TIME: 40.2 SEC (ref 24.5–35.1)
PHOSPHATE SERPL-MCNC: 2.5 MG/DL (ref 2.5–4.5)
PLATELET # BLD AUTO: 172 K/UL (ref 130–450)
PMV BLD AUTO: 11 FL (ref 7–12)
POTASSIUM SERPL-SCNC: 3.5 MMOL/L (ref 3.5–5)
PROT SERPL-MCNC: 5 G/DL (ref 6.4–8.3)
RBC # BLD AUTO: 2.64 M/UL (ref 3.5–5.5)
RBC # BLD: ABNORMAL 10*6/UL
SODIUM SERPL-SCNC: 131 MMOL/L (ref 132–146)
WBC OTHER # BLD: 9 K/UL (ref 4.5–11.5)

## 2025-04-03 PROCEDURE — 0DB78ZX EXCISION OF STOMACH, PYLORUS, VIA NATURAL OR ARTIFICIAL OPENING ENDOSCOPIC, DIAGNOSTIC: ICD-10-PCS | Performed by: SURGERY

## 2025-04-03 PROCEDURE — 6360000002 HC RX W HCPCS

## 2025-04-03 PROCEDURE — 85025 COMPLETE CBC W/AUTO DIFF WBC: CPT

## 2025-04-03 PROCEDURE — P9016 RBC LEUKOCYTES REDUCED: HCPCS

## 2025-04-03 PROCEDURE — 82962 GLUCOSE BLOOD TEST: CPT

## 2025-04-03 PROCEDURE — 3609012400 HC EGD TRANSORAL BIOPSY SINGLE/MULTIPLE: Performed by: SURGERY

## 2025-04-03 PROCEDURE — 99232 SBSQ HOSP IP/OBS MODERATE 35: CPT | Performed by: SURGERY

## 2025-04-03 PROCEDURE — 85730 THROMBOPLASTIN TIME PARTIAL: CPT

## 2025-04-03 PROCEDURE — 99291 CRITICAL CARE FIRST HOUR: CPT | Performed by: INTERNAL MEDICINE

## 2025-04-03 PROCEDURE — 94003 VENT MGMT INPAT SUBQ DAY: CPT

## 2025-04-03 PROCEDURE — 6370000000 HC RX 637 (ALT 250 FOR IP): Performed by: INTERNAL MEDICINE

## 2025-04-03 PROCEDURE — 82248 BILIRUBIN DIRECT: CPT

## 2025-04-03 PROCEDURE — 2580000003 HC RX 258

## 2025-04-03 PROCEDURE — 2500000003 HC RX 250 WO HCPCS: Performed by: INTERNAL MEDICINE

## 2025-04-03 PROCEDURE — 2709999900 HC NON-CHARGEABLE SUPPLY: Performed by: SURGERY

## 2025-04-03 PROCEDURE — 6360000002 HC RX W HCPCS: Performed by: INTERNAL MEDICINE

## 2025-04-03 PROCEDURE — 83735 ASSAY OF MAGNESIUM: CPT

## 2025-04-03 PROCEDURE — 88305 TISSUE EXAM BY PATHOLOGIST: CPT

## 2025-04-03 PROCEDURE — 43239 EGD BIOPSY SINGLE/MULTIPLE: CPT | Performed by: SURGERY

## 2025-04-03 PROCEDURE — 36415 COLL VENOUS BLD VENIPUNCTURE: CPT

## 2025-04-03 PROCEDURE — 36430 TRANSFUSION BLD/BLD COMPNT: CPT

## 2025-04-03 PROCEDURE — 88342 IMHCHEM/IMCYTCHM 1ST ANTB: CPT

## 2025-04-03 PROCEDURE — 85018 HEMOGLOBIN: CPT

## 2025-04-03 PROCEDURE — 80053 COMPREHEN METABOLIC PANEL: CPT

## 2025-04-03 PROCEDURE — 84100 ASSAY OF PHOSPHORUS: CPT

## 2025-04-03 PROCEDURE — 85014 HEMATOCRIT: CPT

## 2025-04-03 PROCEDURE — 90935 HEMODIALYSIS ONE EVALUATION: CPT

## 2025-04-03 PROCEDURE — 6360000002 HC RX W HCPCS: Performed by: SURGERY

## 2025-04-03 PROCEDURE — 36592 COLLECT BLOOD FROM PICC: CPT

## 2025-04-03 PROCEDURE — 2000000000 HC ICU R&B

## 2025-04-03 RX ORDER — MIDAZOLAM HYDROCHLORIDE 5 MG/ML
6 INJECTION, SOLUTION INTRAMUSCULAR; INTRAVENOUS ONCE
Status: COMPLETED | OUTPATIENT
Start: 2025-04-03 | End: 2025-04-03

## 2025-04-03 RX ORDER — POTASSIUM CHLORIDE 29.8 MG/ML
20 INJECTION INTRAVENOUS ONCE
Status: COMPLETED | OUTPATIENT
Start: 2025-04-03 | End: 2025-04-03

## 2025-04-03 RX ORDER — FENTANYL CITRATE 50 UG/ML
200 INJECTION, SOLUTION INTRAMUSCULAR; INTRAVENOUS ONCE
Status: COMPLETED | OUTPATIENT
Start: 2025-04-03 | End: 2025-04-03

## 2025-04-03 RX ORDER — 0.9 % SODIUM CHLORIDE 0.9 %
250 INTRAVENOUS SOLUTION INTRAVENOUS ONCE
Status: COMPLETED | OUTPATIENT
Start: 2025-04-03 | End: 2025-04-03

## 2025-04-03 RX ORDER — SODIUM CHLORIDE 9 MG/ML
INJECTION, SOLUTION INTRAVENOUS PRN
Status: DISCONTINUED | OUTPATIENT
Start: 2025-04-03 | End: 2025-04-04 | Stop reason: HOSPADM

## 2025-04-03 RX ADMIN — DIGOXIN 125 MCG: 125 TABLET ORAL at 08:22

## 2025-04-03 RX ADMIN — ATORVASTATIN CALCIUM 80 MG: 40 TABLET, FILM COATED ORAL at 21:10

## 2025-04-03 RX ADMIN — MIDODRINE HYDROCHLORIDE 20 MG: 10 TABLET ORAL at 16:39

## 2025-04-03 RX ADMIN — SODIUM CHLORIDE, PRESERVATIVE FREE 10 ML: 5 INJECTION INTRAVENOUS at 08:23

## 2025-04-03 RX ADMIN — MIDODRINE HYDROCHLORIDE 20 MG: 10 TABLET ORAL at 08:22

## 2025-04-03 RX ADMIN — Medication 100 MG: at 08:22

## 2025-04-03 RX ADMIN — MIDAZOLAM 6 MG: 5 INJECTION INTRAMUSCULAR; INTRAVENOUS at 13:46

## 2025-04-03 RX ADMIN — POTASSIUM CHLORIDE 20 MEQ: 29.8 INJECTION INTRAVENOUS at 07:00

## 2025-04-03 RX ADMIN — HEPARIN SODIUM 2000 UNITS: 1000 INJECTION INTRAVENOUS; SUBCUTANEOUS at 02:55

## 2025-04-03 RX ADMIN — PETROLATUM: 420 OINTMENT TOPICAL at 21:10

## 2025-04-03 RX ADMIN — MIDODRINE HYDROCHLORIDE 20 MG: 10 TABLET ORAL at 12:11

## 2025-04-03 RX ADMIN — SODIUM CHLORIDE, PRESERVATIVE FREE 10 ML: 5 INJECTION INTRAVENOUS at 21:11

## 2025-04-03 RX ADMIN — SODIUM CHLORIDE 250 ML: 0.9 INJECTION, SOLUTION INTRAVENOUS at 05:14

## 2025-04-03 RX ADMIN — SODIUM CHLORIDE, PRESERVATIVE FREE 40 MG: 5 INJECTION INTRAVENOUS at 08:22

## 2025-04-03 RX ADMIN — Medication 2000 UNITS: at 08:22

## 2025-04-03 RX ADMIN — FENTANYL CITRATE 200 MCG: 50 INJECTION INTRAMUSCULAR; INTRAVENOUS at 13:46

## 2025-04-03 RX ADMIN — SODIUM CHLORIDE, PRESERVATIVE FREE 40 MG: 5 INJECTION INTRAVENOUS at 21:09

## 2025-04-03 ASSESSMENT — PAIN SCALES - GENERAL
PAINLEVEL_OUTOF10: 0

## 2025-04-03 ASSESSMENT — PULMONARY FUNCTION TESTS
PIF_VALUE: 40
PIF_VALUE: 31
PIF_VALUE: 31
PIF_VALUE: 27
PIF_VALUE: 31
PIF_VALUE: 34
PIF_VALUE: 37
PIF_VALUE: 32
PIF_VALUE: 34
PIF_VALUE: 29
PIF_VALUE: 37
PIF_VALUE: 28
PIF_VALUE: 31
PIF_VALUE: 29
PIF_VALUE: 36
PIF_VALUE: 36
PIF_VALUE: 37
PIF_VALUE: 36
PIF_VALUE: 30
PIF_VALUE: 29
PIF_VALUE: 29
PIF_VALUE: 31
PIF_VALUE: 36
PIF_VALUE: 29
PIF_VALUE: 26
PIF_VALUE: 37
PIF_VALUE: 29
PIF_VALUE: 30
PIF_VALUE: 32
PIF_VALUE: 36

## 2025-04-03 NOTE — PROGRESS NOTES
04/03/25 1619   Patient Observation   Pulse 56   Respirations 21   SpO2 100 %   Vent Information   Vent Mode AC/VC   Ventilator Settings   Vt (Set, mL) 280 mL   Resp Rate (Set) 12 bpm   PEEP/CPAP (cmH2O) 5   FiO2  40 %   Peak Inspiratory Flow (Set) 45 L/sec   Vent Patient Data (Readings)   Vt (Measured) 360 mL   Peak Inspiratory Pressure (cmH2O) 36 cmH2O   Rate Measured 12 br/min   Minute Volume (L/min) 3.65 Liters   Peak Inspiratory Flow (lpm) 45 L/sec   Mean Airway Pressure (cmH2O) 9 cmH20   Plateau Pressure (cm H2O) 20 cm H2O   Driving Pressure 15   I:E Ratio 1:6.40   Flow Sensitivity 3 L/min   Static Compliance (L/cm H2O) 21   Backup Apnea On   Backup Rate 12 Breaths Per Minute   Backup Vt 280   Vent Alarm Settings   High Pressure (cmH2O) 45 cmH2O   Low Minute Volume (lpm) 3 L/min   High Minute Volume (lpm) 12 L/min   Low Exhaled Vt (ml) 200 mL   High Exhaled Vt (ml) 500 mL   RR High (bpm) 35 br/min   Apnea (secs) 20 secs   Additional Respiratoray Assessments   Humidification Source Heated wire   Humidification Temp 37   Circuit Condensation Drained   Ambu Bag With Mask At Bedside Yes

## 2025-04-03 NOTE — SIGNIFICANT EVENT
I called the patient's son Shreyas and explained that dialysis is being stopped right now because of concern for upper GI bleeding.    I stated that we will place an upper endoscopy scope-EGD scope down her mom's esophagus and look at her stomach and duodenum for bleeding ulcer.  There is something I can control endoscopically I will proceed with that if there    Patient son states that his mom is everything.  He wants me to proceed.  All questions were answered    Electronically signed by JANE TOLENTINO MD on 4/3/2025 at 1:25 PM    NOTE: This report was transcribed using voice recognition software. Every effort was made to ensure accuracy; however, inadvertent computerized transcription errors may be present.

## 2025-04-03 NOTE — FLOWSHEET NOTE
04/03/25 1321   Vital Signs   BP (!) 129/92   Temp 98 °F (36.7 °C)   Pulse 70   Respirations 18   Weight - Scale 69.7 kg (153 lb 10.6 oz)   Percent Weight Change 0   Post-Hemodialysis Assessment   Post-Treatment Procedures Blood returned;Catheter capped, clamped with Citrate x 2 ports   Machine Disinfection Process Exterior Machine Disinfection   Rinseback Volume (ml) 300 ml   Blood Volume Processed (Liters) 19 L   Dialyzer Clearance Lightly streaked   Duration of Treatment (minutes) 80 minutes   Hemodialysis Intake (ml) 300 ml   Hemodialysis Output (ml) 300 ml   NET Removed (ml) 0   Tolerated Treatment Good   Patient Response to Treatment Tolerated tx well.  ICU doctor ordered to stop tx early for EGD.   Bilateral Breath Sounds Diminished   Patient Disposition Remain in ICU/ED   Observations & Evaluations   Level of Consciousness 0   Heart Rhythm Regular   Respiratory Quality/Effort Unlabored   Handoff   Handoff Given To Jesi   Handoff Communication Face to Face   Time Handoff Given 9412

## 2025-04-03 NOTE — PROGRESS NOTES
[facility]  ATTENDING PHYSICIAN PROGRESS NOTE      I personally saw, examined and provided care for the patient. Radiographs, labs and medications were reviewed by me independently.  The case was discussed in detail and plans for care were established. Review of Residents documentation was conducted and revisions were made as appropriate. I agree with the above documented exam, problem list and plan of care.    The following summarizes my clinical findings and independent assessment.     CC: Reconsult for drop in hemoglobin    S.  MICU team reconsulted to assess because the drop in hemoglobin.  Cardiology canceled the heart cath due to concern for GI bleed    O.  Trach present  No apparent distress  Heart regular rate rhythm  Lungs are clear bilaterally  Abdomen soft nontender nondistended       ASSESSMENT:  Principal Problem:    Atrial flutter with rapid ventricular response (HCC)  Active Problems:    Occipital stroke (HCC)    Decompensated heart failure (HCC)    Chronic atrial fibrillation (HCC)    VHD (valvular heart disease)    ANTONIO (acute kidney injury)    Palliative care encounter    Metabolic encephalopathy    Persistent atrial fibrillation (HCC)  Resolved Problems:    * No resolved hospital problems. *       PLAN:  LABS:  -I have personally reviewed the patient's labs   CBC with Differential:    Lab Results   Component Value Date/Time    WBC 9.0 04/03/2025 05:04 AM    RBC 2.64 04/03/2025 05:04 AM    HGB 9.2 04/03/2025 09:55 AM    HCT 28.2 04/03/2025 09:55 AM     04/03/2025 05:04 AM    MCV 86.0 04/03/2025 05:04 AM    MCH 28.4 04/03/2025 05:04 AM    MCHC 33.0 04/03/2025 05:04 AM    RDW 18.3 04/03/2025 05:04 AM    NRBC 3 03/12/2025 05:28 AM    METASPCT 1 03/09/2025 03:57 AM    LYMPHOPCT 12 04/03/2025 05:04 AM    MONOPCT 8 04/03/2025 05:04 AM    EOSPCT 4 04/03/2025 05:04 AM    BASOPCT 0 04/03/2025 05:04 AM    MONOSABS 0.67 04/03/2025 05:04 AM    LYMPHSABS 1.05 04/03/2025 05:04 AM     EOSABS 0.32 04/03/2025 05:04 AM    BASOSABS 0.02 04/03/2025 05:04 AM     CMP:    Lab Results   Component Value Date/Time     04/03/2025 05:04 AM    K 3.5 04/03/2025 05:04 AM    CL 95 04/03/2025 05:04 AM    CO2 20 04/03/2025 05:04 AM    BUN 53 04/03/2025 05:04 AM    CREATININE 2.9 04/03/2025 05:04 AM    GFRAA >60 10/06/2017 12:12 PM    LABGLOM 16 04/03/2025 05:04 AM    LABGLOM 59 12/26/2023 09:18 AM    GLUCOSE 92 04/03/2025 05:04 AM    CALCIUM 7.8 04/03/2025 05:04 AM    BILITOT 0.8 04/03/2025 05:04 AM    ALKPHOS 153 04/03/2025 05:04 AM    AST 43 04/03/2025 05:04 AM    ALT 16 04/03/2025 05:04 AM       -I have reviewed the progress note from medical ICU on April 3    -Rule out GI bleed  Status post trach and PEG by our service on March 26  Will proceed with upper endoscopy today to evaluate for upper GI source.  Patient is at risk for stress gastritis given her mechanical ventilation A-fib and need for anticoagulation  Continue Protonix               Pradip Tuttle MD, FACS  4/3/2025  12:13 PM    NOTE: This report was transcribed using voice recognition software. Every effort was made to ensure accuracy; however, inadvertent computerized transcription errors may be present.

## 2025-04-03 NOTE — PLAN OF CARE
Problem: Safety - Adult  Goal: Free from fall injury  4/2/2025 2128 by Ashleigh Underwood RN  Outcome: Progressing  4/2/2025 0932 by Tonia Garcia RN  Outcome: Progressing     Problem: Skin/Tissue Integrity  Goal: Skin integrity remains intact  Description: 1.  Monitor for areas of redness and/or skin breakdown  2.  Assess vascular access sites hourly  3.  Every 4-6 hours minimum:  Change oxygen saturation probe site  4.  Every 4-6 hours:  If on nasal continuous positive airway pressure, respiratory therapy assess nares and determine need for appliance change or resting period  4/2/2025 2128 by Ashleigh Underwood RN  Outcome: Progressing  4/2/2025 0932 by Tonia Garcia RN  Outcome: Progressing     Problem: Nutrition Deficit:  Goal: Optimize nutritional status  4/2/2025 0932 by Tonia Garcia RN  Outcome: Progressing     Problem: ABCDS Injury Assessment  Goal: Absence of physical injury  4/2/2025 2128 by Ashleigh Underwood RN  Outcome: Progressing  4/2/2025 0932 by Tonia Garcia RN  Outcome: Progressing     Problem: Hematologic - Adult  Goal: Maintains hematologic stability  4/2/2025 0932 by Tonia Garcia RN  Outcome: Progressing     Problem: Respiratory - Adult  Goal: Achieves optimal ventilation and oxygenation  4/2/2025 0932 by Tonia Garcia RN  Outcome: Progressing     Problem: Pain  Goal: Verbalizes/displays adequate comfort level or baseline comfort level  4/2/2025 0932 by Tonia Garcia RN  Outcome: Progressing     Problem: Skin/Tissue Integrity - Adult  Goal: Skin integrity remains intact  Description: 1.  Monitor for areas of redness and/or skin breakdown  2.  Assess vascular access sites hourly  3.  Every 4-6 hours minimum:  Change oxygen saturation probe site  4.  Every 4-6 hours:  If on nasal continuous positive airway pressure, respiratory therapy assess nares and determine need for appliance change or resting period  4/2/2025 2128 by Ashleigh Underwood RN  Outcome:

## 2025-04-03 NOTE — PLAN OF CARE
As per nurse, patient started having fresh blood from oropharyngeal suction. Went to evaluate the patient. Patient is very resistant to opening her mouth. Stat H&H ordered and is unchanged from baseline. General surgery, Dr. Tuttle came to do routine EGD which did not show any active GI bleed but found active oozing from hard palate. Cleaned up with swab stick, still showing active oozing from palate. Cauterized the bleeding points with silver nitrate sticks. No more active bleeding noted.         Americo Vargas MD

## 2025-04-03 NOTE — OP NOTE
[facility]  UPPER ENDOSCOPY REPORT    DATE OF PROCEDURE: 4/3/25     SURGEON: Pradip Tuttle M.D.    ASSISTANT: none    PREOPERATIVE DIAGNOSIS: Rule out Upper GI bleed    POSTOPERATIVE DIAGNOSIS: No bleeding from duodenum, stomach or esophagus, antral gastritis    OPERATION: Esophagogastroduodenoscopy with biopsies    ANESTHESIA: Local monitored anesthesia. (refer to Anesthesia record for details)    ESTIMATED BLOOD LOSS:  0 ml    COMPLICATIONS: None    SPECIMENS:  Was Obtained: antral biopsies    HISTORY: The patient is a 79 y.o. year old female with history of above preop diagnosis.  I was asked to perform an EGD secondary to concern for upper GI bleed.  The nurse suctioned bright red blood from the patient's mouth.  I recommended esophagogastroduodenoscopy with possible biopsy and I explained the risk, benefits, expected outcome, and alternatives to the procedure.  Risks included but are not limited to bleeding, infection, respiratory distress, hypotension, and perforation of the esophagus, stomach, or duodenum.  Patient understands and is in agreement.    PROCEDURE: The patient was connected to the monitors and given supplemental oxygen by nasal cannula. The patient was sedated.  The gastroscope was inserted orally and advanced under direct vision through the esophagus, through the stomach, through the pylorus, and into the descending duodenum.      Findings:  Duodenum:     Descending: normal--no blood seen    Bulb: normal    Stomach:    Antrum: abnormal: moderate gastritis and biopsies were taken    Body: normal    Fundus: normal    Esophagus: abnormal: small hiatal hernia    Larynx: normal    The scope was removed and the patient tolerated the procedure well.     IMPRESSION/PLAN:   Antral Gastritis--Continue Protonix 40mg daily, await biopsies  No evidence of upper GI bleed  Discussed with MICU team, who were at bedside while I was scoping  Loni Yanph (son) at [phone]    Pradip  MD Parag, FACS  4/3/2025  1:59 PM

## 2025-04-03 NOTE — PROGRESS NOTES
[facility]- [facility]  [facility] Progress Report  PATIENT: Nikki Moffett  MEDICAL RECORD NUMBER: [id number]  DATE OF SERVICE:  4/3/2025  ATTENDING ELECTROPHYSIOLOGIST: Nakita Miranda MD   PRIMARY ELECTROPHYSIOLOGIST: Gelacio Hendricks MD  REFERRING PHYSICIAN:  Tiesha Panda MD  CHIEF COMPLAINT: Altered mental status    HPI: This is a 79 y.o. female with a history of  persistent atrial fibrillation and atrial flutter, cerebrovascular accident and hyperlipidemia.   The patient presented to the hospital on 12/21/23 due to right sided weakness and facial droop. She diagnosed with acute left MCA stroke with M2 occlusion. She was not a candidate for tPA and underwent mechanical thrombectomy by IR team on 12/21/23. Prior to intervention, patient was diagnosed with AF with RVR with heart rate in the 180s. Patient underwent emergency DC-cardioversion to normal sinus rhythm and was initiated on IV Amiodarone which later on transited to oral Amiodarone. Echocardiogram 2/29/24 showed LV EF of 40-45%.  She was started on GDMT and was discharged home on Amiodarone loading dose, Eliquis and Metoprolol.   She was last seen in Cardiology's office on 2/22/24. She subsequently underwent nuclear stress test on 2/29/24 which showed no ischemia and LV EF of 69%. Repeat echocardiogram on 2/29/24 showed LV EF 55-60%, moderate LVH and mild LAE. She was scheduled for follow up with Cardiology but did not follow through.   Son called on 3/3/25 stated that the patient was not doing well She was not eating well, shortness of breath with exertion and reported palpitations. She was seen in IM resident clinic on 3/4/35 and was noted to be in AFL with  bpm. She was advised to come in to ED. In ED she was noted to be in AFL with  bpm.She was started on IV Cardizem drip.CXR showed bilateral pleural effusions and possible right lower lobe opacity. Patient received  Valve: Normal RVSP. The estimated RVSP is 32 mmHg.    Left Atrium: Left atrium is mildly dilated.    Interatrial Septum: Agitated saline study was negative with and without provocation.    Right Atrium: Right atrium is mildly dilated.    Pericardium: No pericardial effusion.    Image quality is suboptimal. Contrast used: Definity. Technically difficult study with poor endocardial visualization.     I have independently reviewed all of the ECGs and rhythm strips per above     Assessment/Plan: This is a 79 y.o. female with     1. Persistent atrial fibrillation and atypical atrial flutter  - [id number] of 5.  - Diagnosed with AF with RVR when presented with acute left MCA stroke with M2 occlusion status post mechanical thrombectomy on 12/21/23. Patient underwent emergency DC-cardioversion to normal sinus rhythm and was initiated on IV Amiodarone which later on transited to oral Amiodarone at that time.  - Was discharged home on Amiodarone, Toprol XL and Eliquis.  - Has not been taking Amiodarone and Toprol XL for some time between 4/24/24 and now.  - Reported may miss Eliquis for 1 to 2 doses lately.  - Presented in AFL with RVR with ongoing AFL with RVR on IV and oral Amiodarone.  - S/p DC-cardioversion 3/7/25.    -Recurrence of atrial flutter/fibrillation with rapid ventricular rate on 3/29/2025-IV amiodarone started for better rate control and possible conversion to sinus rhythm.  However IV and oral amiodarone have caused the development of persistent atrial flutter with 2 1 AV block making it difficult to control her rate.  She was taken off IV and oral amiodarone overnight with no significant increase in heart rate.  In fact heart rate has been lower overnight    Oral digoxin and Lopressor added for rate control today  Patient now in sinus rhythm since this a.m.      2. 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  - LV EF 40-45% on TTE 12/22/23.  - LV EF 55-60% on TTE 2/29/24.  - LV

## 2025-04-03 NOTE — PROGRESS NOTES
Date : 2025  Time:     Patient Name: brett guallpa   Patient : 1945  Procedure: EGD  Verify Correct Patient by two identifiers:  Yes  Verbal agreement by all team members on the procedure to be done?  Yes  Correct position?   Yes  Correct site?   Yes  Correct site and side of surgery as evidenced by markings made?    N/A  Correct implants, imaging data and/or special equipment available?    Yes

## 2025-04-03 NOTE — PLAN OF CARE
Problem: Chronic Conditions and Co-morbidities  Goal: Patient's chronic conditions and co-morbidity symptoms are monitored and maintained or improved  4/3/2025 1934 by Ashleigh Underwood RN  Outcome:  Not Progressing  4/3/2025 1047 by Tonia Garcia RN  Outcome: Not Progressing     Problem: Safety - Adult  Goal: Free from fall injury  4/3/2025 1934 by Ashleigh Underwood RN  Outcome: Progressing  4/3/2025 1047 by Tonia Garcia RN  Outcome: Progressing     Problem: Skin/Tissue Integrity  Goal: Skin integrity remains intact  Description: 1.  Monitor for areas of redness and/or skin breakdown  2.  Assess vascular access sites hourly  3.  Every 4-6 hours minimum:  Change oxygen saturation probe site  4.  Every 4-6 hours:  If on nasal continuous positive airway pressure, respiratory therapy assess nares and determine need for appliance change or resting period  4/3/2025 1934 by Ashleigh Underwood RN  Outcome: Progressing  4/3/2025 1047 by Tonia Garcia RN  Outcome: Progressing     Problem: Nutrition Deficit:  Goal: Optimize nutritional status  4/3/2025 1934 by Ashleigh Underwood RN  Outcome: Progressing  4/3/2025 1047 by Tonia Garcia RN  Outcome: Progressing     Problem: ABCDS Injury Assessment  Goal: Absence of physical injury  4/3/2025 1934 by Ashleigh Underwood RN  Outcome: Progressing  4/3/2025 1047 by Tonia Garcia RN  Outcome: Progressing     Problem: Neurosensory - Adult  Goal: Achieves stable or improved neurological status  4/3/2025 1047 by Tonia Garcia RN  Outcome: Progressing     Problem: Respiratory - Adult  Goal: Achieves optimal ventilation and oxygenation  4/3/2025 1621 by Jesica Victor RCP  Outcome: Progressing  4/3/2025 1047 by Tonia Garcia RN  Outcome: Progressing     Problem: Cardiovascular - Adult  Goal: Maintains optimal cardiac output and hemodynamic stability  4/3/2025 1047 by Tonia Garcia RN  Outcome: Progressing     Problem: Gastrointestinal - Adult  Goal: Maintains or

## 2025-04-03 NOTE — PLAN OF CARE
Problem: Safety - Adult  Goal: Free from fall injury  4/3/2025 1047 by Tonia Garcia RN  Outcome: Progressing  4/2/2025 2128 by Ashleigh Underwood RN  Outcome: Progressing     Problem: Skin/Tissue Integrity  Goal: Skin integrity remains intact  Description: 1.  Monitor for areas of redness and/or skin breakdown  2.  Assess vascular access sites hourly  3.  Every 4-6 hours minimum:  Change oxygen saturation probe site  4.  Every 4-6 hours:  If on nasal continuous positive airway pressure, respiratory therapy assess nares and determine need for appliance change or resting period  4/3/2025 1047 by Tonia Garcia RN  Outcome: Progressing  4/2/2025 2128 by Ashleigh Underwood RN  Outcome: Progressing     Problem: Nutrition Deficit:  Goal: Optimize nutritional status  Outcome: Progressing     Problem: ABCDS Injury Assessment  Goal: Absence of physical injury  4/3/2025 1047 by Tonia Garcia RN  Outcome: Progressing  4/2/2025 2128 by Ashleigh Underwood RN  Outcome: Progressing     Problem: Neurosensory - Adult  Goal: Achieves stable or improved neurological status  Outcome: Progressing     Problem: Respiratory - Adult  Goal: Achieves optimal ventilation and oxygenation  Outcome: Progressing     Problem: Cardiovascular - Adult  Goal: Maintains optimal cardiac output and hemodynamic stability  Outcome: Progressing     Problem: Gastrointestinal - Adult  Goal: Maintains or returns to baseline bowel function  Outcome: Progressing     Problem: Infection - Adult  Goal: Absence of infection at discharge  Outcome: Progressing     Problem: Pain  Goal: Verbalizes/displays adequate comfort level or baseline comfort level  Outcome: Progressing     Problem: Skin/Tissue Integrity - Adult  Goal: Skin integrity remains intact  Description: 1.  Monitor for areas of redness and/or skin breakdown  2.  Assess vascular access sites hourly  3.  Every 4-6 hours minimum:  Change oxygen saturation probe site  4.  Every 4-6 hours:  If on nasal  continuous positive airway pressure, respiratory therapy assess nares and determine need for appliance change or resting period  4/3/2025 1047 by Tonia Garcia RN  Outcome: Progressing  4/2/2025 2128 by Ashleigh Underwood RN  Outcome: Progressing     Problem: Anxiety  Goal: Will report anxiety at manageable levels  Description: INTERVENTIONS:  1. Administer medication as ordered  2. Teach and rehearse alternative coping skills  3. Provide emotional support with 1:1 interaction with staff  Outcome: Progressing     Problem: Chronic Conditions and Co-morbidities  Goal: Patient's chronic conditions and co-morbidity symptoms are monitored and maintained or improved  4/3/2025 1047 by Tonia Garcia RN  Outcome: Not Progressing  4/2/2025 2128 by Ashleigh Underwood RN  Outcome: Not Progressing     Problem: Discharge Planning  Goal: Discharge to home or other facility with appropriate resources  4/3/2025 1047 by Tonia Garcia RN  Outcome: Not Progressing  4/2/2025 2128 by Ashleigh Underwood RN  Outcome: Not Progressing

## 2025-04-03 NOTE — PLAN OF CARE
Problem: Respiratory - Adult  Goal: Achieves optimal ventilation and oxygenation  4/3/2025 1621 by Jesica Victor RCP  Outcome: Progressing

## 2025-04-03 NOTE — PROGRESS NOTES
INPATIENT CARDIOLOGY FOLLOW-UP    Name: Nikki Moffett    Age: 79 y.o.    Date of Admission: 3/4/2025  5:05 PM    Date of Service: 4/3/2025    Chief Complaint: Follow-up for CMF, CHF    Interim History:  No new overnight cardiac complaints. Currently with no complaints of CP, SOB, palpitations, dizziness, or lightheadedness. SR on telemetry.    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)    Occipital stroke (HCC)    Atrial flutter (HCC)    Paroxysmal atrial fibrillation (HCC)    H/O: stroke    Atrial flutter with rapid ventricular response (HCC)    Decompensated heart failure (HCC)    Chronic atrial fibrillation (HCC)    VHD (valvular heart disease)    ANTONIO (acute kidney injury)    Palliative care encounter    Metabolic encephalopathy    Persistent atrial fibrillation (HCC)       Allergies:  No Known Allergies    Current Medications:  Current Facility-Administered Medications   Medication Dose Route Frequency Provider Last Rate Last Admin    0.9 % sodium chloride infusion   IntraVENous PRN Americo Vargas MD        sacubitril-valsartan (ENTRESTO) 24-26 MG per tablet 0.5 tablet  0.5 tablet Oral BID Americo Vargas MD        pantoprazole (PROTONIX) 40 mg in sodium chloride (PF) 0.9 % 10 mL injection  40 mg IntraVENous Q12H Americo Vargas MD        white petrolatum ointment   Topical BID Adan Sandoval MD        digoxin (LANOXIN) tablet 125 mcg  125 mcg Oral Daily Nakita Miranda MD   125 mcg at 04/03/25 0822    0.9 % sodium chloride infusion   IntraVENous PRN Americo Vargas MD        metoprolol tartrate  cardioversion on 3/7/2025 and was successfully converted to normal sinus rhythm, patient is back in A-fib with RVR unable to take amiodarone due to elevated liver enzymes  CHADS2 Vasc score-5 on Eliquis for anticoagulation  Status post CVA, recent acute CVA following cardioversion noted on the MRI brain  Moderate anemia hemoglobin 7.2  Hyperlipidemia on Lipitor  CKD with ANTONIO, on hemodialysis creatinine 2.3  Intermittent hypotension  Hypothyroidism HRT  Multiple comorbid conditions  Guarded prognosis.       Plan:   Cardiac cath was canceled due to multiple issues including acute drop in hemoglobin from 9.3-7.5 and also recent stroke and interventionalists are concerned about risk of stroke from cardiac cath.  Patient needs GI evaluation to evaluate source of GI bleeding and also input from the neurology service about the timing of cardiac cath.  Okay to restart Eliquis 5 mg p.o. twice daily if okay with surgery service  Repeat echo showed slightly improved ejection fraction EF now around 33%.  Patient also converted to normal sinus rhythm overnight.  Not on GDMT due to hypotension, blood pressure reasonably stable 109/56 off Levophed still on midodrine.  Discussed with medicine resident to start Entresto 24/26 mg half tablet daily.  Initiated on metoprolol 12.5 mg every 8 hours for rate control and also digoxin 125 mcg daily.  Volume management as per renal service  ER notes also noted that patient was not taking medications other than Eliquis.  Continue midodrine 20 mg p.o. 3 times a day for hypotension  Palliative care is on board.  Rest as per other consultants.      More  than 50 minutes  was spent counseling the patient, reviewing the medications, results, assessment and the rationale for the above recommendations.       NOTE:  This report was transcribed using voice recognition software.  Every effort was made to ensure accuracy; however, inadvertent computerized transcription errors may be present.     Francheska

## 2025-04-03 NOTE — PROGRESS NOTES
Dialysis stopped after 1 hour and 20 minutes of treatment. Ordered by ICU doctor.  No fluid removed.  Pt to have EGD dt bleeding.

## 2025-04-03 NOTE — PROGRESS NOTES
[facility]  Internal Medicine Residency / House Medicine Service    Attending Physician Statement  I have discussed the case, including pertinent history and exam findings with the resident and the team.  I have seen and examined the patient and the key elements of the encounter have been performed by me.  I agree with the assessment, plan and orders as documented by the resident.      VS stable   Responded favorably to blood transfusion  Cardiac cath on hold as EF has been improving with HR reduction  She is in NSR today  Mental status exam same   Meds reviewed and digoxin now .125 mg a day  And Entresto continues  Off apixiban  Impression: Improving LV function                      Remote history of Influenza A                       Remote and acute CVA's, dense right hemiparesis  Plan: Continue same ICU care  Remainder of medical problems as per resident note.      Dalton Sosa MD FRCP [facility]  Internal Medicine Residency Faculty

## 2025-04-03 NOTE — PROGRESS NOTES
[facility]   Department of Internal Medicine   Internal Medicine Residency  MICU Progress Note    Patient:  Nikki Moffett 79 y.o. female   MRN: [id number]       Date of Service: 4/3/2025    Allergy: Patient has no known allergies.    Subjective     Patient was seen and examined this morning at bedside in no acute distress.   Overnight,   Posttransfusion H&H 9.3 >> 7.2 this morning  K replaced  Lopressor dose held for MAP in high 50s        Objective       I & O - 24hr:    Intake/Output Summary (Last 24 hours) at 4/3/2025 0910  Last data filed at 4/3/2025 0854  Gross per 24 hour   Intake 2064.18 ml   Output 1100 ml   Net 964.18 ml       Physical Exam  Vitals: /70   Pulse (!) 105   Temp 98.4 °F (36.9 °C)   Resp 22   Ht 1.575 m (5' 2\")   Wt 69.7 kg (153 lb 10.6 oz)   SpO2 100%   BMI 28.10 kg/m²     I & O - 24hr: I/O this shift:  In: 500 [Blood:500]  Out: -    General Appearance:  Awake, lethargic, does not follow commands  HEENT:  Head: Normal, normocephalic, atraumatic.  Neck:  Tracheostomy tube in place  Lung: rhonchi bilaterally   Heart: Irregular rhythm, tachycardia  Abdomen: soft, non-tender; bowel sounds normal; no masses,  no organomegaly  Extremities:  edema 1+ pitting bilaterally    Neurologic: Mental status: Awake, lethargic, does not follow command      Medications     Continuous Infusions:   sodium chloride      sodium chloride      sodium chloride      [Held by provider] heparin (PORCINE) Infusion Stopped (04/03/25 0728)    norepinephrine Stopped (04/02/25 0843)    sodium chloride Stopped (03/31/25 1403)    dextrose       Scheduled Meds:   sacubitril-valsartan  0.5 tablet Oral BID    digoxin  125 mcg Oral Daily    metoprolol tartrate  12.5 mg Oral 3 times per day    atorvastatin  80 mg PEG Tube Nightly    midodrine  20 mg PEG Tube TID WC    thiamine  100 mg Per G Tube Daily    vitamin D  2,000 Units PEG Tube Daily    [Held by provider] apixaban  5 mg Oral BID    pantoprazole    Bilateral pleural effusions. The 1 on the left is slightly smaller than the   prior study.  Findings suggest resolving CHF         XR CHEST PORTABLE   Final Result   Bilateral pleural effusions.      Possible right lower lobe opacity.              Resident's Assessment and Plan     Assessment and Plan:     Acute encephalopathy, likely metabolic; improving              History of L MCA stroke status post mechanical thrombectomy 12/2023              3/13/2025 CT head: Small insult left frontal parietal region of indeterminate chronicity              3/15/2025 MRI brain: Acute infarct L occipital lobe              3/15/2025 MRA head: Mild stenosis V4 segment L vertebral artery  3/18/2025 EEG with generalized background slowing, triphasic waves nonspecific but possibly suggestive of severe metabolic/toxic encephalopathy or dementia, no seizures.  Intubated for airway protection on 3/16/2025 2/2 worsening encephalopathy, failed extubation s/p trach PEG on 3/26/2025  Bilateral moderate pleural effusion likely 2/2 HFrEF              S/p right-sided thoracentesis 3/26/2025, 750 cc, positive lights criteria, likely altered              S/p left-sided thoracentesis 3/27/225, 400 cc, negative lights criteria  Shock likely cardiogenic, infection r/o,CIRCI r/o;on midodrine, restarted on pressors              Cortisol stim test: Total increase 11.4 (14.8 > 20.6 > 26.2)-unlikely CIRCI  Paroxysmal A-fib/flutter with RVR, status post DCCV 3/7/2025, [id number] 5  HFrEF, EF 10 to 15% on 3/7/2025 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  Elevated high-sensitivity troponin in the setting of A-fib RVR, ESRD requiring HD              161 > 149 (3/16/2025 48 > 49) asymptomatic, no EKG changes              Lexiscan 2/7/2024 LV poststress normal, post EF 69%, negative for ischemia  Prolonged QTc, 537  Hyperlipidemia  Acute on chronic Normocytic anemia, multifactorial, status post 3 unit PRBC

## 2025-04-03 NOTE — PROGRESS NOTES
Nephrology Progress Note  4/3/2025 8:41 AM  Subjective:   Admit Date: 3/4/2025  PCP: Tiesha aPnda MD    Subjective    3/24/25:: Patient seen and examined ; continues to require pressors; remains intubated    3/25: remains intubated, still requiring pressors; HD in progress, off sedation now    3/26: Trach and PEG this a.m.; continues to require pressors; being weaned; for thoracentesis today    3/27: No new acute issues from overnight    3/28: No new acute issues from overnight; vasopressors, being weaned    3/29: No new acute issues from overnight; remains off pressors    3/30: Amiodarone drip restarted due to atrial fibrillation; EP consulted; no other acute issues from overnight    3/31: No new c/o; remains off pressors; on amio drip; in A fib RVR     4/1: Episode of A-fib RVR earlier this a.m. with hypotension; required 250 cc IV fluid NS bolus; was given Lopressor  Noted hemoglobin this a.m. 6.3 PRBC transfusion for 1 unit    4/2: Episode of A-fib with heart rates in the 160s with low BP last evening requiring 250 cc normal; patient dosed with digoxin; possible LHC    4/3: Hemoglobin with drop this a.m.; from 9.3-7.5; LHC canceled    Diet: Diet NPO    Data:   Scheduled Meds:   digoxin  125 mcg Oral Daily    metoprolol tartrate  12.5 mg Oral 3 times per day    atorvastatin  80 mg PEG Tube Nightly    midodrine  20 mg PEG Tube TID WC    thiamine  100 mg Per G Tube Daily    vitamin D  2,000 Units PEG Tube Daily    [Held by provider] apixaban  5 mg Oral BID    pantoprazole (PROTONIX) 40 mg in sodium chloride (PF) 0.9 % 10 mL injection  40 mg IntraVENous Daily    insulin lispro  0-4 Units SubCUTAneous Q6H    sodium chloride flush  5-40 mL IntraVENous 2 times per day     Continuous Infusions:   sodium chloride      sodium chloride      sodium chloride      [Held by provider] heparin (PORCINE) Infusion Stopped (04/03/25 0773)    norepinephrine Stopped (04/02/25 0843)    sodium chloride Stopped (03/31/25 1403)     HISTORY: ORDERING SYSTEM PROVIDED HISTORY: pleural effusion TECHNOLOGIST PROVIDED HISTORY: Reason for exam:->pleural effusion FINDINGS: Heart is mildly enlarged.  There are bilateral pleural effusions.  The 1 on the left is slightly smaller than the prior study.  Degree of cephalization of the pulmonary vascularity is less.     Bilateral pleural effusions. The 1 on the left is slightly smaller than the prior study.  Findings suggest resolving CHF     XR CHEST PORTABLE  Result Date: 3/4/2025  EXAMINATION: ONE XRAY VIEW OF THE CHEST 3/4/2025 6:33 pm COMPARISON: 12/22/2023 HISTORY: ORDERING SYSTEM PROVIDED HISTORY: arythmia TECHNOLOGIST PROVIDED HISTORY: Reason for exam:->arythmia FINDINGS: Possible right lower lobe opacity..  Bilateral pleural effusions.  No pneumothorax.  Stable heart size.  The osseous structures are without acute process.     Bilateral pleural effusions. Possible right lower lobe opacity.       Objective:   Vitals: /70   Pulse 76   Temp 98.4 °F (36.9 °C)   Resp 18   Ht 1.575 m (5' 2\")   Wt 69.7 kg (153 lb 10.6 oz)   SpO2 100%   BMI 28.10 kg/m²   General appearance: intubated, awake  HEENT: Head: Normocephalic, no lesions, without obvious abnormality., trach  Neck: no adenopathy, no carotid bruit, no JVD, supple, symmetrical, trachea midline, and thyroid not enlarged, symmetric, no tenderness/mass/nodules; + trach  Lungs: diminished breath sounds bilaterally  Heart: regular rate and rhythm, S1, S2 normal, no murmur, click, rub or gallop  Abdomen: soft, non-tender; bowel sounds normal; no masses,  no organomegaly  Extremities: edema ++  Skin:  No rashes or lesions  Neurologic: awake        Patient Active Problem List:     Stroke due to embolism of left middle cerebral artery (HCC)     Occipital stroke (HCC)     Atrial flutter (HCC)     Paroxysmal atrial fibrillation (HCC)     H/O: stroke     Atrial flutter with rapid ventricular response (HCC)     Decompensated heart failure (HCC)

## 2025-04-03 NOTE — PROGRESS NOTES
[facility]  Internal Medicine Residency Program  Progress Note - House Team 1    Patient:  Nikki Moffett 79 y.o. female MRN: [id number]     Date of Service: 4/3/2025     CC:   Chief Complaint   Patient presents with    Altered Mental Status     \"Mood swings, memory loss and fatigue\" per patients grandson    Chest Pain     Ongoing for a couple weeks per family     Overnight events: Off Levophed, had an episode of MAP in 50s, Lopressor held. HR arround 60-70s.     Subjective     Patient seen at bedside this morning. She was awake, able to track and is able to follow commands when asked to close her eyes. ROS cannot be accurately done she has a trach and PEG.     Objective     Physical Exam:  Vitals: BP (!) 104/46   Pulse 69   Temp 98.1 °F (36.7 °C) (Axillary)   Resp 21   Ht 1.575 m (5' 2\")   Wt 69.7 kg (153 lb 10.6 oz)   SpO2 100%   BMI 28.10 kg/m²     I & O - 24hr: I/O this shift:  In: 560 [Blood:500; NG/GT:60]  Out: 0    General Appearance: alert and appears stated age  HEENT:  Head: Normocephalic, no lesions, without obvious abnormality.  Neck: supple, symmetrical, trachea midline  Lung:  rhonchi bilaterally  Heart: irregularly irregular rhythm, rate 60-80s  Abdomen: soft, non-tender; bowel sounds normal; no masses,  no organomegaly  Extremities:  extremities normal, atraumatic, no cyanosis or edema  Musculokeletal: No joint swelling, ROM normal in all joints of extremities.   Neurologic: Mental status: awake, intermittently follows commands    Pertinent Labs & Imaging Studies     Recent Labs     04/01/25  1547 04/01/25  1548 04/02/25  0446 04/02/25  1529 04/02/25  1655 04/03/25  0504 04/03/25  0955   HGB 7.4*   < > 7.2*   < > 9.3* 7.5* 9.2*   HCT 22.7*   < > 22.6*   < > 28.7* 22.7* 28.2*   WBC 11.6*  --  10.1  --   --  9.0  --      --  148  --   --  172  --     < > = values in this interval not displayed.       Recent Labs     04/02/25  0004 04/02/25  0446 04/03/25  0504   NA  131* 131* 131*   K 3.1* 3.9 3.5   CL 95* 96* 95*   CO2 21* 21* 20*   BUN 39* 43* 53*   CREATININE 2.1* 2.3* 2.9*   MG 1.9 1.9 2.5   PHOS 2.6 3.0 2.5   CALCIUM 8.2* 8.2* 7.8*   GLUCOSE 166* 135* 92       Recent Labs     04/01/25  0616 04/02/25  0446 04/03/25  0504   ALT 9 14 16   AST 37* 43* 43*       Results       Procedure Component Value Units Date/Time    Culture, Body Fluid (with Gram Stain) [[id number]] Collected: 03/27/25 1405    Order Status: Completed Specimen: Pleural Fluid Updated: 03/30/25 1209     Specimen Description .PLEURAL FLUID     Direct Exam Gram stain performed on unspun fluid.      NO EPITHELIAL CELLS      FEW Polymorphonuclear leukocytes      NO ORGANISMS SEEN     Culture NO GROWTH    Gram stain [[id number]] Collected: 03/27/25 0600    Order Status: Canceled Specimen: Body Fluid     Culture, Body Fluid (with Gram Stain) [[id number]]     Order Status: Canceled Specimen: Body Fluid     Culture, Body Fluid (with Gram Stain) [[id number]] Collected: 03/26/25 1510    Order Status: Completed Specimen: Pleural Fluid Updated: 03/29/25 0644     Specimen Description .PLEURAL FLUID     Special Requests RIGHT PLEURAL EFFUSION     Direct Exam Gram stain performed on unspun fluid.      NO EPITHELIAL CELLS      FEW Polymorphonuclear leukocytes      NO ORGANISMS SEEN     Culture NO GROWTH    Gram stain [[id number]] Collected: 03/25/25 0945    Order Status: Canceled Specimen: Body Fluid     Culture, Body Fluid (with Gram Stain) [[id number]]     Order Status: Canceled Specimen: Body Fluid     Culture, Blood 2 [[id number]] Collected: 03/23/25 1547    Order Status: Completed Specimen: Blood Updated: 03/28/25 1657     Specimen Description .BLOOD RIGHT     Special Requests          Culture NO GROWTH 5 DAYS    Culture, Blood 1 [[id number]] Collected: 03/23/25 1542    Order Status: Completed Specimen: Blood Updated: 03/28/25 1648     Specimen Description .BLOOD LEFT     Special Requests          Culture NO GROWTH 5  Start Date End Date Taking? Authorizing Provider   ELIQUIS 5 MG TABS tablet TAKE 1 TABLET BY MOUTH TWICE DAILY 2/7/25   Francheska Carnes MD   citalopram (CELEXA) 10 MG tablet Take 1 tablet by mouth daily  Patient not taking: Reported on 3/4/2025 4/23/24   Thuan Santos MD   amiodarone (CORDARONE) 200 MG tablet Take 1 tablet by mouth daily 2/22/24 3/4/25  Francheska Carnes MD   atorvastatin (LIPITOR) 80 MG tablet Take 1 tablet by mouth nightly 2/22/24   Francheska Carnes MD   metoprolol succinate (TOPROL XL) 25 MG extended release tablet Take 1 tablet by mouth daily  Patient not taking: Reported on 3/4/2025 2/22/24   Francheska Carnes MD   sacubitril-valsartan (ENTRESTO) 49-51 MG per tablet Take 1 tablet by mouth 2 times daily  Patient not taking: Reported on 3/4/2025 2/22/24   Francheska Carnes MD   melatonin (RA MELATONIN) 3 MG TABS tablet Take 1 tablet by mouth daily  Patient not taking: Reported on 3/4/2025 1/9/24   Cm Silveira MD       Resident's Assessment and Plan     Nikki Moffett is a 79 y.o. female    Assessment:  Acute encephalopathy likely metabolic, improving  Imaging this admission 3/13/25 CT head showed small insult L frontal parietal region of indeterminate chronicity, 3/15/25 MRI brain acute infarct L occipital lobe, MRA head mild stenosis L vertebral artery  EEG 3/18/25 EEG showed no seizures, suggestive of severe metabolic/toxic encephalopathy  Hx of L MCA stroke s/p mechanical thrombectomy 12/2023  On trach and peg 3/26  Intubated for airway protection 3/16 2/2 worsening encephalopathy  Bilateral moderate pleural effusions likely 2/2 HFrEF s/p R-sided and L-sided thoracentesis  Shock requiring pressor support likely cardiogenic, infectious  Paroxysmal atrial fibrillation/flutter with RVR, s/p DCCV 3/7/25  [id number] score 5  HFrEF EF10-15%  Elevated troponin in the setting of Afib in RVR and ESRD on HD  Prolonged Qtc (latest 562)  HLD  Transaminitis,

## 2025-04-04 VITALS
WEIGHT: 151.9 LBS | HEART RATE: 58 BPM | TEMPERATURE: 97.1 F | RESPIRATION RATE: 24 BRPM | HEIGHT: 62 IN | DIASTOLIC BLOOD PRESSURE: 47 MMHG | OXYGEN SATURATION: 99 % | SYSTOLIC BLOOD PRESSURE: 98 MMHG | BODY MASS INDEX: 27.95 KG/M2

## 2025-04-04 LAB
ABO/RH: NORMAL
ALBUMIN SERPL-MCNC: 2.6 G/DL (ref 3.5–5.2)
ALP SERPL-CCNC: 113 U/L (ref 35–104)
ALT SERPL-CCNC: 18 U/L (ref 0–32)
ANION GAP SERPL CALCULATED.3IONS-SCNC: 14 MMOL/L (ref 7–16)
ANTIBODY SCREEN: NEGATIVE
ARM BAND NUMBER: NORMAL
AST SERPL-CCNC: 33 U/L (ref 0–31)
BASOPHILS # BLD: 0 K/UL (ref 0–0.2)
BASOPHILS NFR BLD: 0 % (ref 0–2)
BILIRUB DIRECT SERPL-MCNC: 0.4 MG/DL (ref 0–0.3)
BILIRUB INDIRECT SERPL-MCNC: 0.5 MG/DL (ref 0–1)
BILIRUB SERPL-MCNC: 0.9 MG/DL (ref 0–1.2)
BLOOD BANK BLOOD PRODUCT EXPIRATION DATE: NORMAL
BLOOD BANK DISPENSE STATUS: NORMAL
BLOOD BANK ISBT PRODUCT BLOOD TYPE: 5100
BLOOD BANK ISBT PRODUCT BLOOD TYPE: 9500
BLOOD BANK ISBT PRODUCT BLOOD TYPE: 9500
BLOOD BANK PRODUCT CODE: NORMAL
BLOOD BANK SAMPLE EXPIRATION: NORMAL
BLOOD BANK UNIT TYPE AND RH: NORMAL
BPU ID: NORMAL
BUN SERPL-MCNC: 45 MG/DL (ref 6–23)
CALCIUM SERPL-MCNC: 8.4 MG/DL (ref 8.6–10.2)
CHLORIDE SERPL-SCNC: 96 MMOL/L (ref 98–107)
CO2 SERPL-SCNC: 21 MMOL/L (ref 22–29)
COMPONENT: NORMAL
CREAT SERPL-MCNC: 2.7 MG/DL (ref 0.5–1)
CROSSMATCH RESULT: NORMAL
DATE LAST DOSE: ABNORMAL
DIGOXIN DOSE TIME: ABNORMAL
DIGOXIN DOSE: ABNORMAL MG
DIGOXIN SERPL-MCNC: 4.8 NG/ML (ref 0.8–2)
EKG ATRIAL RATE: 147 BPM
EKG ATRIAL RATE: 75 BPM
EKG P AXIS: 94 DEGREES
EKG P-R INTERVAL: 180 MS
EKG Q-T INTERVAL: 286 MS
EKG Q-T INTERVAL: 438 MS
EKG QRS DURATION: 130 MS
EKG QRS DURATION: 138 MS
EKG QTC CALCULATION (BAZETT): 445 MS
EKG QTC CALCULATION (BAZETT): 489 MS
EKG R AXIS: -4 DEGREES
EKG R AXIS: 44 DEGREES
EKG T AXIS: 110 DEGREES
EKG T AXIS: 142 DEGREES
EKG VENTRICULAR RATE: 146 BPM
EKG VENTRICULAR RATE: 75 BPM
EOSINOPHIL # BLD: 0.49 K/UL (ref 0.05–0.5)
EOSINOPHILS RELATIVE PERCENT: 4 % (ref 0–6)
ERYTHROCYTE [DISTWIDTH] IN BLOOD BY AUTOMATED COUNT: 19.5 % (ref 11.5–15)
GFR, ESTIMATED: 18 ML/MIN/1.73M2
GLUCOSE BLD-MCNC: 70 MG/DL (ref 74–99)
GLUCOSE BLD-MCNC: 74 MG/DL (ref 74–99)
GLUCOSE BLD-MCNC: 78 MG/DL (ref 74–99)
GLUCOSE BLD-MCNC: 85 MG/DL (ref 74–99)
GLUCOSE SERPL-MCNC: 68 MG/DL (ref 74–99)
HCT VFR BLD AUTO: 30.8 % (ref 34–48)
HGB BLD-MCNC: 10 G/DL (ref 11.5–15.5)
LYMPHOCYTES NFR BLD: 0.49 K/UL (ref 1.5–4)
LYMPHOCYTES RELATIVE PERCENT: 4 % (ref 20–42)
MAGNESIUM SERPL-MCNC: 2.3 MG/DL (ref 1.6–2.6)
MCH RBC QN AUTO: 27.9 PG (ref 26–35)
MCHC RBC AUTO-ENTMCNC: 32.5 G/DL (ref 32–34.5)
MCV RBC AUTO: 85.8 FL (ref 80–99.9)
MONOCYTES NFR BLD: 0.97 K/UL (ref 0.1–0.95)
MONOCYTES NFR BLD: 9 % (ref 2–12)
NEUTROPHILS NFR BLD: 83 % (ref 43–80)
NEUTS SEG NFR BLD: 9.25 K/UL (ref 1.8–7.3)
PHOSPHATE SERPL-MCNC: 3.1 MG/DL (ref 2.5–4.5)
PLATELET # BLD AUTO: 212 K/UL (ref 130–450)
PMV BLD AUTO: 10.5 FL (ref 7–12)
POTASSIUM SERPL-SCNC: 4.2 MMOL/L (ref 3.5–5)
PROT SERPL-MCNC: 5.6 G/DL (ref 6.4–8.3)
RBC # BLD AUTO: 3.59 M/UL (ref 3.5–5.5)
RBC # BLD: ABNORMAL 10*6/UL
SODIUM SERPL-SCNC: 131 MMOL/L (ref 132–146)
TRANSFUSION STATUS: NORMAL
UNIT DIVISION: 0
UNIT ISSUE DATE/TIME: NORMAL
WBC OTHER # BLD: 11.2 K/UL (ref 4.5–11.5)

## 2025-04-04 PROCEDURE — 6360000002 HC RX W HCPCS

## 2025-04-04 PROCEDURE — 99291 CRITICAL CARE FIRST HOUR: CPT | Performed by: INTERNAL MEDICINE

## 2025-04-04 PROCEDURE — 6370000000 HC RX 637 (ALT 250 FOR IP): Performed by: INTERNAL MEDICINE

## 2025-04-04 PROCEDURE — 6370000000 HC RX 637 (ALT 250 FOR IP)

## 2025-04-04 PROCEDURE — 83735 ASSAY OF MAGNESIUM: CPT

## 2025-04-04 PROCEDURE — 80053 COMPREHEN METABOLIC PANEL: CPT

## 2025-04-04 PROCEDURE — 93010 ELECTROCARDIOGRAM REPORT: CPT | Performed by: INTERNAL MEDICINE

## 2025-04-04 PROCEDURE — 6360000002 HC RX W HCPCS: Performed by: INTERNAL MEDICINE

## 2025-04-04 PROCEDURE — 84100 ASSAY OF PHOSPHORUS: CPT

## 2025-04-04 PROCEDURE — 82962 GLUCOSE BLOOD TEST: CPT

## 2025-04-04 PROCEDURE — 94003 VENT MGMT INPAT SUBQ DAY: CPT

## 2025-04-04 PROCEDURE — 85025 COMPLETE CBC W/AUTO DIFF WBC: CPT

## 2025-04-04 PROCEDURE — 2500000003 HC RX 250 WO HCPCS: Performed by: INTERNAL MEDICINE

## 2025-04-04 PROCEDURE — 80162 ASSAY OF DIGOXIN TOTAL: CPT

## 2025-04-04 PROCEDURE — 2500000003 HC RX 250 WO HCPCS

## 2025-04-04 PROCEDURE — 82248 BILIRUBIN DIRECT: CPT

## 2025-04-04 PROCEDURE — 99231 SBSQ HOSP IP/OBS SF/LOW 25: CPT | Performed by: INTERNAL MEDICINE

## 2025-04-04 RX ORDER — DIGOXIN 125 MCG
62.5 TABLET ORAL EVERY OTHER DAY
Status: DISCONTINUED | OUTPATIENT
Start: 2025-04-05 | End: 2025-04-04 | Stop reason: HOSPADM

## 2025-04-04 RX ORDER — LANOLIN ALCOHOL/MO/W.PET/CERES
100 CREAM (GRAM) TOPICAL DAILY
Status: ON HOLD | DISCHARGE
Start: 2025-04-05 | End: 2025-07-04

## 2025-04-04 RX ORDER — DIGOXIN 125 MCG
125 TABLET ORAL EVERY OTHER DAY
Status: DISCONTINUED | OUTPATIENT
Start: 2025-04-05 | End: 2025-04-04

## 2025-04-04 RX ORDER — HEPARIN 100 UNIT/ML
300 SYRINGE INTRAVENOUS PRN
Status: DISCONTINUED | OUTPATIENT
Start: 2025-04-04 | End: 2025-04-04 | Stop reason: HOSPADM

## 2025-04-04 RX ORDER — CHOLECALCIFEROL (VITAMIN D3) 50 MCG
2000 TABLET ORAL DAILY
Status: ON HOLD | DISCHARGE
Start: 2025-04-05 | End: 2025-07-04

## 2025-04-04 RX ORDER — DIGOXIN 0.06 MG/1
62.5 TABLET ORAL EVERY OTHER DAY
Status: ON HOLD | DISCHARGE
Start: 2025-04-05

## 2025-04-04 RX ORDER — METOPROLOL TARTRATE 25 MG/1
12.5 TABLET, FILM COATED ORAL EVERY 8 HOURS SCHEDULED
Status: ON HOLD | DISCHARGE
Start: 2025-04-04

## 2025-04-04 RX ORDER — MIDODRINE HYDROCHLORIDE 10 MG/1
20 TABLET ORAL
Status: ON HOLD | DISCHARGE
Start: 2025-04-04 | End: 2025-07-03

## 2025-04-04 RX ORDER — ATORVASTATIN CALCIUM 80 MG/1
80 TABLET, FILM COATED ORAL NIGHTLY
Status: ON HOLD | DISCHARGE
Start: 2025-04-04

## 2025-04-04 RX ORDER — DIGOXIN 125 MCG
125 TABLET ORAL EVERY OTHER DAY
DISCHARGE
Start: 2025-04-05 | End: 2025-04-04 | Stop reason: HOSPADM

## 2025-04-04 RX ORDER — PANTOPRAZOLE SODIUM 40 MG/1
40 TABLET, DELAYED RELEASE ORAL
Qty: 60 TABLET | Refills: 5 | Status: ON HOLD | DISCHARGE
Start: 2025-04-04

## 2025-04-04 RX ADMIN — MIDODRINE HYDROCHLORIDE 20 MG: 10 TABLET ORAL at 16:31

## 2025-04-04 RX ADMIN — SACUBITRIL AND VALSARTAN 0.5 TABLET: 24; 26 TABLET, FILM COATED ORAL at 09:42

## 2025-04-04 RX ADMIN — APIXABAN 5 MG: 5 TABLET, FILM COATED ORAL at 09:42

## 2025-04-04 RX ADMIN — SODIUM CHLORIDE, PRESERVATIVE FREE 10 ML: 5 INJECTION INTRAVENOUS at 07:36

## 2025-04-04 RX ADMIN — SODIUM CHLORIDE, PRESERVATIVE FREE 40 MG: 5 INJECTION INTRAVENOUS at 07:35

## 2025-04-04 RX ADMIN — HEPARIN 300 UNITS: 100 SYRINGE at 13:45

## 2025-04-04 RX ADMIN — METOPROLOL TARTRATE 12.5 MG: 25 TABLET, FILM COATED ORAL at 14:35

## 2025-04-04 RX ADMIN — Medication 2000 UNITS: at 07:35

## 2025-04-04 RX ADMIN — PETROLATUM: 420 OINTMENT TOPICAL at 07:36

## 2025-04-04 RX ADMIN — Medication 100 MG: at 07:35

## 2025-04-04 RX ADMIN — MIDODRINE HYDROCHLORIDE 20 MG: 10 TABLET ORAL at 11:55

## 2025-04-04 RX ADMIN — HEPARIN 300 UNITS: 100 SYRINGE at 11:21

## 2025-04-04 RX ADMIN — MIDODRINE HYDROCHLORIDE 20 MG: 10 TABLET ORAL at 07:35

## 2025-04-04 ASSESSMENT — PULMONARY FUNCTION TESTS
PIF_VALUE: 18
PIF_VALUE: 34
PIF_VALUE: 34
PIF_VALUE: 36
PIF_VALUE: 37
PIF_VALUE: 17
PIF_VALUE: 37
PIF_VALUE: 29
PIF_VALUE: 17
PIF_VALUE: 23
PIF_VALUE: 26
PIF_VALUE: 18
PIF_VALUE: 38
PIF_VALUE: 33
PIF_VALUE: 25
PIF_VALUE: 31
PIF_VALUE: 33
PIF_VALUE: 34
PIF_VALUE: 2
PIF_VALUE: 36
PIF_VALUE: 23
PIF_VALUE: 32

## 2025-04-04 ASSESSMENT — PAIN SCALES - GENERAL
PAINLEVEL_OUTOF10: 0

## 2025-04-04 NOTE — DISCHARGE SUMMARY
[facility]  Discharge Summary    PCP: Tiesha Panda MD    Admit Date:3/4/2025  Discharge Date: 4/4/2025    Chief Complaint   Patient presents with    Altered Mental Status     \"Mood swings, memory loss and fatigue\" per patients grandson    Chest Pain     Ongoing for a couple weeks per family         Admission Diagnosis:   Atrial fibrillation  HFimpEF, LVEF 55-60%  ANTONIO stage III, likely hypovolemic  Hyperkalemia  Prediabetes, A1c 5.7%  Bilateral pleural effusion  Medication noncompliance  History of left MCA stroke s/p mechanical thrombectomy 12/2023    Discharge Diagnosis:  Acute encephalopathy, likely metabolic; improving  Intubated for airway protection 3/16/2025 2/2 worsening encephalopathy, failed extubation s/p trach and PEG 3/26/2025  Bilateral moderate pleural effusion likely 2/2 HFrEF  Shock likely cardiogenic, infection, resolved  Paroxysmal A-fib/flutter with RVR, status post DCCV 3/7/2025  HFrEF, EF 10 to 15% 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 difficile  History of hepatitis C  Subclinical hypothyroidism  Influenza A infection  Persistent leukocytosis likely 2/2 steroid use, cardiogenic shock; improved  HAGMA 2/2 lactic acidosis, resolved  Elevated INR s/p 4 units of FFP, vitamin K; improved  Acute hyponatremia likely delusional, improved  Hypoalbuminemia, improved  Transaminitis likely 2/2 shock liver, improved      Hospital Course: Patient presented to the ED with sudden onset fatigue and weakness.  She had decreased oral intake over the past few  (LIPITOR) 80 MG tablet Take 1 tablet by mouth nightly  Qty: 90 tablet, Refills: 3      metoprolol succinate (TOPROL XL) 25 MG extended release tablet Take 1 tablet by mouth daily  Qty: 90 tablet, Refills: 3    Comments: **Patient requests 90 days supply**  Associated Diagnoses: Atrial flutter, unspecified type (HCC); Cardiomyopathy, unspecified type (HCC)      sacubitril-valsartan (ENTRESTO) 49-51 MG per tablet Take 1 tablet by mouth 2 times daily  Qty: 60 tablet, Refills: 5    Associated Diagnoses: Atrial flutter, unspecified type (HCC); Cardiomyopathy, unspecified type (HCC)      melatonin (RA MELATONIN) 3 MG TABS tablet Take 1 tablet by mouth daily  Qty: 30 tablet, Refills: 2    Associated Diagnoses: Difficulty sleeping             Activity: bedrest  Diet: renal diet    Follow-up appointments:   Transfer to [facility]    Patient Instructions:              Internal medicine    Follow ups  Please follow up with the internal medicine clinic at [facility] within 12 days of discharge.You will receive a phone call within 7 days from discharge.  Please keep all other follow up appointments:  Future Appointments   Date Time Provider Department Center   3/13/2026 12:00 PM Dominik Castillo MD [facility] NEURO Neurology -        Changes in healthcare   Please take all medications as indicated  Diet: renal diet   Activity: bedrest  New Medications started during this hospital stay  Digoxin 62.5 mcg every other day  Metoprolol tartrate 25 mg tab 0.5 tab every 8 hours  Midodrine 10 mg tab 2 tablets 3 times daily  Pantoprazole 40 mg tab 2 times daily  Entresto 24-26 mg 0.5 tab daily  Thiamine 100 mg tab daily  Vitamin D 2,000 units by PEG tube daily  Ointment topically  Changes to your medications  Atorvastatin 80 mg tab by PEG tube  Medications you should stop taking   Amiodarone  Citalopram  Melatonin  Metoprolol succinate  Entresto 49-51  Additional labs, testing or imaging needed after discharge   Digoxin  exercise. Start out slowly and  increase the amount you walk as tolerated  If you become short of breath, dizzy or have chest pain; stop, sit down, and rest.  If you feel \"wiped out\" the day after you exercise, walk at a slower pace or for a shorter distance. You can gradually increase the pace or amount of time.            (Do not exercise right after a meal or in extreme temperatures, such as above 85 degrees, if the air is really humid, or wind chill is less than 20 degrees)                                             ADDITIONAL INFORMATION:  Avoid getting sick from colds and the flu. Stay away from friends or family that you know may have a contagious illness  Get plenty of rest   Get a flu shot each year.  Get a pneumococcal vaccine shot. If you have had one before, ask your doctor whether you need another dose.       My Goal for Self-management of Heart Failure Includes 5 steps :    1. Notice a change in symptoms ( weight gain, short of breath, leg swelling, decreased activity level, bloating....)    2. Evaluate the change: (use the Heart Failure Zones )     3. Decide to take action: decide what your options are, such as: (call your doctor for an extra visit, take a prescribed medication, such as your water pill if your doctor has given you directions to do so, CALL YOUR DOCTOR)    4. Come up with a strategy:  (now you call the doctor for advice / appointment. This is where you take action!!!  Do not wait, catch the symptom early and treat it before it worsens.    5. Evaluate the response: The next day, check your Heart Failure Zones: are you in the GREEN ZONE (safe zone)?  Worsening symptoms of YELLOW ZONE? Or have you moved to the RED ZONE and need to call 911 or go to the Emergency Room for evaluation? Call your doctor's office to update them on your symptoms of heart failure.       Mehreen Painter MD  PGY 1   11:09 AM 4/4/2025

## 2025-04-04 NOTE — PROGRESS NOTES
[facility]  Internal Medicine Residency / House Medicine Service    Attending Physician Statement  I have discussed the case, including pertinent history and exam findings with the resident and the team.  I have seen and examined the patient and the key elements of the encounter have been performed by me.  I agree with the assessment, plan and orders as documented by the resident.      More alert and following commands  Right sided dense paresis  Hx of Trach and PEG and vent support  Receiving Nepro and tolerating  Rectal tube in place   VS stable  Digoxin level >4 and discussed with pharmacist  And hx of Anuric renal failure with dialysis  Eliquis 5 mg bid for A Fib again , Hb 10.0  Cardiac cath on hold  Plan; Same ICU care     Remainder of medical problems as per resident note.      Dalton Sosa MD FRCP [facility]  Internal Medicine Residency Faculty

## 2025-04-04 NOTE — PLAN OF CARE
Problem: Safety - Adult  Goal: Free from fall injury  Outcome: Progressing     Problem: Skin/Tissue Integrity  Goal: Skin integrity remains intact  Description: 1.  Monitor for areas of redness and/or skin breakdown  2.  Assess vascular access sites hourly  3.  Every 4-6 hours minimum:  Change oxygen saturation probe site  4.  Every 4-6 hours:  If on nasal continuous positive airway pressure, respiratory therapy assess nares and determine need for appliance change or resting period  Outcome: Progressing     Problem: Nutrition Deficit:  Goal: Optimize nutritional status  Outcome: Progressing     Problem: ABCDS Injury Assessment  Goal: Absence of physical injury  Outcome: Progressing     Problem: Metabolic/Fluid and Electrolytes - Adult  Goal: Electrolytes maintained within normal limits  Outcome: Progressing     Problem: Hematologic - Adult  Goal: Maintains hematologic stability  Outcome: Progressing     Problem: Respiratory - Adult  Goal: Achieves optimal ventilation and oxygenation  Outcome: Progressing     Problem: Cardiovascular - Adult  Goal: Maintains optimal cardiac output and hemodynamic stability  Outcome: Progressing     Problem: Gastrointestinal - Adult  Goal: Maintains or returns to baseline bowel function  Outcome: Progressing     Problem: Pain  Goal: Verbalizes/displays adequate comfort level or baseline comfort level  Outcome: Progressing     Problem: Skin/Tissue Integrity - Adult  Goal: Skin integrity remains intact  Description: 1.  Monitor for areas of redness and/or skin breakdown  2.  Assess vascular access sites hourly  3.  Every 4-6 hours minimum:  Change oxygen saturation probe site  4.  Every 4-6 hours:  If on nasal continuous positive airway pressure, respiratory therapy assess nares and determine need for appliance change or resting period  Outcome: Progressing     Problem: Anxiety  Goal: Will report anxiety at manageable levels  Description: INTERVENTIONS:  1. Administer medication as  support work.  Outcome: Progressing     Problem: Chronic Conditions and Co-morbidities  Goal: Patient's chronic conditions and co-morbidity symptoms are monitored and maintained or improved  Outcome: Not Progressing     Problem: Discharge Planning  Goal: Discharge to home or other facility with appropriate resources  Outcome: Not Progressing

## 2025-04-04 NOTE — PROGRESS NOTES
[facility]   Department of Internal Medicine   Internal Medicine Residency  MICU Progress Note    Patient:  Nikki Moffett 79 y.o. female   MRN: [id number]       Date of Service: 4/4/2025    Allergy: Patient has no known allergies.    Subjective     Patient was seen and examined this morning at bedside in no acute distress. Patient trached, nods no to pain.   Overnight,   Tube feeds started, no neurology recs  BG was 68, will check after tube feeds  Bradycardia overnight      Objective       I & O - 24hr:    Intake/Output Summary (Last 24 hours) at 4/4/2025 0943  Last data filed at 4/3/2025 1843  Gross per 24 hour   Intake 618.42 ml   Output 300 ml   Net 318.42 ml       Physical Exam  Vitals: /63   Pulse 71   Temp 98 °F (36.7 °C) (Axillary)   Resp 22   Ht 1.575 m (5' 2\")   Wt 68.9 kg (151 lb 14.4 oz)   SpO2 100%   BMI 27.78 kg/m²     I & O - 24hr: No intake/output data recorded.   General Appearance:  Awake, lethargic, does not follow commands  HEENT:  Head: Normal, normocephalic, atraumatic.  Neck:  Tracheostomy tube in place  Lung: rhonchi bilaterally   Heart: regular rhythm, rate, intermittent bradycardia  Abdomen: soft, non-tender; bowel sounds normal; no masses,  no organomegaly  Extremities:  edema 1+ pitting bilaterally    Neurologic: Mental status: Awake, lethargic, does not follow command      Medications     Continuous Infusions:   sodium chloride      sodium chloride      sodium chloride      sodium chloride Stopped (03/31/25 1403)    dextrose       Scheduled Meds:   [START ON 4/5/2025] digoxin  125 mcg Oral Every Other Day    pantoprazole (PROTONIX) 40 mg in sodium chloride (PF) 0.9 % 10 mL injection  40 mg IntraVENous Q12H    white petrolatum   Topical BID    sacubitril-valsartan  0.5 tablet Oral Daily    metoprolol tartrate  12.5 mg Oral 3 times per day    atorvastatin  80 mg PEG Tube Nightly    midodrine  20 mg PEG Tube TID WC    thiamine  100 mg Per G Tube Daily    vitamin  protonix  Diet:   Diet NPO  ADULT TUBE FEEDING; PEG; Renal Formula; Continuous; 10; Yes; 10; Q 4 hours; 30; 30; Q 4 hours   Bowel regimen: as needed  Pain management: as needed  Code status: Full Code   Disposition: Admitted to ICU  Family: updated as available    Americo Vargas MD, PGY-2   Attending physician: Dr. Sandoval

## 2025-04-04 NOTE — PROGRESS NOTES
Nephrology Progress Note  4/4/2025 8:44 AM  Subjective:   Admit Date: 3/4/2025  PCP: Tiesha Panda MD    Subjective    3/24/25:: Patient seen and examined ; continues to require pressors; remains intubated    3/25: remains intubated, still requiring pressors; HD in progress, off sedation now    3/26: Trach and PEG this a.m.; continues to require pressors; being weaned; for thoracentesis today    3/27: No new acute issues from overnight    3/28: No new acute issues from overnight; vasopressors, being weaned    3/29: No new acute issues from overnight; remains off pressors    3/30: Amiodarone drip restarted due to atrial fibrillation; EP consulted; no other acute issues from overnight    3/31: No new c/o; remains off pressors; on amio drip; in A fib RVR     4/1: Episode of A-fib RVR earlier this a.m. with hypotension; required 250 cc IV fluid NS bolus; was given Lopressor  Noted hemoglobin this a.m. 6.3 PRBC transfusion for 1 unit    4/2: Episode of A-fib with heart rates in the 160s with low BP last evening requiring 250 cc normal; patient dosed with digoxin; possible LHC    4/3: Hemoglobin with drop this a.m.; from 9.3-7.5; LHC canceled    4/4: Bleeding episode in mouth yesterday requiring urgent general surgical consult.  EGD performed no bleeding from duodenal stomach or esophagus and fell gastritis noted bleeding felt to be from    Diet: Diet NPO  ADULT TUBE FEEDING; PEG; Renal Formula; Continuous; 10; Yes; 10; Q 4 hours; 30; 30; Q 4 hours    Data:   Scheduled Meds:   pantoprazole (PROTONIX) 40 mg in sodium chloride (PF) 0.9 % 10 mL injection  40 mg IntraVENous Q12H    white petrolatum   Topical BID    sacubitril-valsartan  0.5 tablet Oral Daily    [Held by provider] digoxin  125 mcg Oral Daily    [Held by provider] metoprolol tartrate  12.5 mg Oral 3 times per day    atorvastatin  80 mg PEG Tube Nightly    midodrine  20 mg PEG Tube TID WC    thiamine  100 mg Per G Tube Daily    vitamin D  2,000 Units PEG Tube

## 2025-04-04 NOTE — PROGRESS NOTES
daily  Patient not taking: Reported on 3/4/2025 2/22/24   Francheska Carnes MD   sacubitril-valsartan (ENTRESTO) 49-51 MG per tablet Take 1 tablet by mouth 2 times daily  Patient not taking: Reported on 3/4/2025 2/22/24   Francheska Carnes MD   melatonin (RA MELATONIN) 3 MG TABS tablet Take 1 tablet by mouth daily  Patient not taking: Reported on 3/4/2025 1/9/24   Cm Silveira MD       Resident's Assessment and Plan     Nikki Moffett is a 79 y.o. female    Assessment:  Acute encephalopathy likely metabolic, improving  Imaging this admission 3/13/25 CT head showed small insult L frontal parietal region of indeterminate chronicity, 3/15/25 MRI brain acute infarct L occipital lobe, MRA head mild stenosis L vertebral artery  EEG 3/18/25 EEG showed no seizures, suggestive of severe metabolic/toxic encephalopathy  Hx of L MCA stroke s/p mechanical thrombectomy 12/2023  On trach and peg 3/26  Intubated for airway protection 3/16 2/2 worsening encephalopathy  Bilateral moderate pleural effusions likely 2/2 HFrEF s/p R-sided and L-sided thoracentesis  Shock requiring pressor support likely cardiogenic, infectious  Paroxysmal atrial fibrillation/flutter with RVR, s/p DCCV 3/7/25  [id number] score 5  HFrEF 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-15% 2/2 tachycardia-induced cardiomyopathy vs underlying ischemic etiology  Elevated troponin in the setting of Afib in RVR and ESRD on HD  Prolonged Qtc (latest 562)  HLD  Transaminitis, improving  Normocytic anemia  EGD 4/3/25 showed antral gastritis, no active bleeding  Hgb stable this morning  Thrombocytopenia  Anuric ANTONIO stage III, s/p CVVHD  Prediabetes, A1c 6.0%  Subclinical hypothyroidism, stable  Vit D deficiency  Diarrhea 2/2 low suspicion for C diff    Plan:  Primary care per MICU service  Eliquis resumed  S/p EGD 4/3/25 by General surgery service, no acute surgical intervention needed  S/p trach & PEG, trach suture removal 4/3  Continue midodrine 20 mg TID, off Levophed, continue to

## 2025-04-04 NOTE — PROGRESS NOTES
End of Weaning attempt:     beginning at 0901 and ending at 1107.     HR 71 bpm  RR 12bpm  SPO2  100%    Patient with prolonged periods of apnea noted.  Patient placed back on AC settings at this time.  Nursing staff noted.    Lulu gomes RCP RRT       04/04/25 1107   Patient Observation   Pulse 80   Respirations 23   SpO2 100 %   Vent Information   Ventilator ID [id number]   Vent Mode AC/VC   Ventilator Settings   Vt (Set, mL) 280 mL   Resp Rate (Set) 12 bpm   PEEP/CPAP (cmH2O) 5   FiO2  40 %   Peak Inspiratory Flow (Set) 45 L/sec   Vent Patient Data (Readings)   Vt (Measured) 309 mL   Peak Inspiratory Pressure (cmH2O) 34 cmH2O   Rate Measured 21 br/min   Minute Volume (L/min) 6.58 Liters   Peak Inspiratory Flow (lpm) 45 L/sec   Mean Airway Pressure (cmH2O) 11 cmH20   Plateau Pressure (cm H2O) 24 cm H2O   Driving Pressure 19   I:E Ratio 1:3.10   Flow Sensitivity 3 L/min   Static Compliance (L/cm H2O) 21   Backup Apnea On   Backup Rate 12 Breaths Per Minute   Backup Vt 280   Vent Alarm Settings   High Pressure (cmH2O) 45 cmH2O   Low Minute Volume (lpm) 3 L/min   High Minute Volume (lpm) 12 L/min   Low Exhaled Vt (ml) 200 mL   High Exhaled Vt (ml) 550 mL   RR High (bpm) 35 br/min   Apnea (secs) 20 secs   Additional Respiratoray Assessments   Humidification Source Heated wire   Humidification Temp 36.9   Ambu Bag With Mask At Bedside Yes   Surgical Airway  03/26/25 Timur Cuffed   Placement Date/Time: 03/26/25 1009   Present on Admission/Arrival: No  Placed By: (c) Licensed provider  Surgical Airway Type: Tracheostomy  Brand: Timur  Style: Cuffed  Size: 8   Status Secured

## 2025-04-04 NOTE — DISCHARGE INSTR - COC
Continuity of Care Form    Patient Name: Nikki Moffett   :  1945  MRN:  [id number]    Admit date:  3/4/2025  Discharge date:  ***    Code Status Order: Full Code   Advance Directives:     Admitting Physician:  Dalton Sosa MD  PCP: Tiesha Panda MD    Discharging Nurse: ***  Discharging Hospital Unit/Room#: 4414/4414-A  Discharging Unit Phone Number: ***    Emergency Contact:   Extended Emergency Contact Information  Primary Emergency Contact: Shreyas Moffett  Mobile Phone: [phone]  Relation: Child  Secondary Emergency Contact: Mauricio Moffett  Mobile Phone: [phone]  Relation: Grandchild    Past Surgical History:  Past Surgical History:   Procedure Laterality Date    IR MECHANICAL ART THROMBECTOMY INTRACRANIAL  2023    IR MECHANICAL ART THROMBECTOMY INTRACRANIAL 2023 Dominik Castillo MD [facility]    IR TUNNELED CVC PLACE WO SQ PORT/PUMP > 5 YEARS  2025    IR TUNNELED CVC PLACE WO SQ PORT/PUMP > 5 YEARS 3/20/2025 ClarenceNatalya MD [facility]    THORACENTESIS Right 2025    750cc clear yellow fluid    TRACHEAL SURGERY N/A 3/26/2025    TRACHEOTOMY PERCUTANEOUS BRONCHOSCOPY performed by Ryne Bloom MD at [facility] ENDOSCOPY    UPPER GASTROINTESTINAL ENDOSCOPY N/A 3/26/2025    ESOPHAGOGASTRODUODENOSCOPY PERCUTANEOUS ENDOSCOPIC GASTROSTOMY TUBE INSERTION performed by Ryne Bloom MD at [facility] ENDOSCOPY    UPPER GASTROINTESTINAL ENDOSCOPY N/A 4/3/2025    ESOPHAGOGASTRODUODENOSCOPY BIOPSY performed by Pradip Tuttle MD at [facility] ENDOSCOPY       Immunization History:   Immunization History   Administered Date(s) Administered    Pneumococcal, PCV20, PREVNAR 20, (age 6w+), IM, 0.5mL 2024    TDaP, ADACEL (age 10y-64y), BOOSTRIX (age 10y+), IM, 0.5mL 2024       Active Problems:  Patient Active Problem List   Diagnosis Code    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

## 2025-04-04 NOTE — PROGRESS NOTES
INPATIENT CARDIOLOGY FOLLOW-UP    Name: Nikki Moffett    Age: 79 y.o.    Date of Admission: 3/4/2025  5:05 PM    Date of Service: 4/4/2025    Chief Complaint: Follow-up for CMF, CHF    Interim History:  No new overnight cardiac complaints.  Patient remains in sinus rhythm.  Patient nurse told me she is being discharged to long-term care facility.  She had an EGD on 4/3/2025 and was noted to have a bleeding lesion in the palate that was cauterized otherwise no acute findings were noted.  Currently with no complaints of CP, SOB, palpitations, dizziness, or lightheadedness. SR on telemetry.    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)    Occipital stroke (HCC)    Atrial flutter (HCC)    Paroxysmal atrial fibrillation (HCC)    H/O: stroke    Atrial flutter with rapid ventricular response (HCC)    Decompensated heart failure (HCC)    Chronic atrial fibrillation (HCC)    VHD (valvular heart disease)    ANTONIO (acute kidney injury)    Palliative care encounter    Metabolic encephalopathy    Persistent atrial fibrillation (HCC)    Acute systolic heart failure (HCC)       Allergies:  No Known Allergies    Current Medications:  Current Facility-Administered Medications   Medication Dose Route Frequency Provider Last Rate Last Admin    [START ON 4/5/2025] digoxin (LANOXIN) tablet 125 mcg  125 mcg Oral Every Other Day Nakita Miranda MD        heparin (PF) 100 UNIT/ML injection 300 Units  300 Units IntraCATHeter SHERICEN Adan Sandoval MD   300 Units at 04/04/25 1121    heparin (PF) 100 UNIT/ML injection 300 Units  300 Units IntraCATHeter

## 2025-04-04 NOTE — PROGRESS NOTES
Patients dentures were found in the drawers next to the bed. I called patient's son, Shreyas, he said he will come  the denture tomorrow, 4/5/25. They are in a denture cup with patient's optio label on the top. I will place the dentures at the nurses station where I told Shreyas where they will be.

## 2025-04-04 NOTE — PROGRESS NOTES
GENERAL SURGERY  DAILY PROGRESS NOTE  4/4/2025    Chief Complaint   Patient presents with    Altered Mental Status     \"Mood swings, memory loss and fatigue\" per patients grandson    Chest Pain     Ongoing for a couple weeks per family       Subjective:  No acute issues overnight.  No signs of bleeding overnight.  Hemoglobin remained stable 9.9-8.9.    Objective:  BP (!) 97/43   Pulse 65   Temp 98 °F (36.7 °C) (Axillary)   Resp 24   Ht 1.575 m (5' 2\")   Wt 68.9 kg (151 lb 14.4 oz)   SpO2 100%   BMI 27.78 kg/m²     GENERAL:  Laying in bed,   LUNGS: AC/VC  CARDIOVASCULAR:  RR  ABDOMEN:  Soft, non-tender, non-distended  EXTREMITIES: No edema or swelling  SKIN: Warm and dry    Assessment/Plan:  79 y.o. female previous trach and PEG on 3/26 with concern for GI bleed.  EGD on 4/3 no signs of bleeding, antral gastritis    No overt signs of bleeding overnight  EGD x 2 (3/26, 4/3) both negative for signs of bleeding  No other acute surgical intervention needed  Will see as needed okay for tube feeds from surgical point of view    Electronically signed by Morena Jaramillo DO on 4/4/2025 at 7:57 AM

## 2025-04-04 NOTE — CARE COORDINATION
Care Coordination: LOS 31 DAYS-  PRECERT FOR [facility] EXPIRES TODAY.  Transfer for [facility] was held for cardiology for cath. Eliquis was held and plan was cath on Thursday. H&H drop 9.3-7.5.  Per cardiology- repeat EF is 33%, pt has converted to NSR. Okay to restart Eliquis per cardiology-  Cardiology also recommending GI consult and neuro service input regarding timing of cath.  GI can follow pt at [facility]. Pt can be brought back for cath when all are in agreement.  Will check on discharge disposition.    Electronically signed by Cristela Bradford RN on 4/4/2025 at 8:04 AM     ADDENDUM: OK per ICU team for transfer to [facility] today, can check with neurology regarding timing to return to Hospital for Cath.  No plans per surgery as no overt signs of bleeding overnight EGD x 2 negative for signs of bleeding, okay for tf from surgical point of view. I have messaged attending for dc disposition  Electronically signed by Cristela Bradford RN on 4/4/2025 at 9:42 AM     ADDENDUM: Spoke to Dr Sosa and team, Okay to dc to [facility]- I asked for discharge.  Pt will go to room 307 and okay to set pt up.    Electronically signed by Cristela Bradford RN on 4/4/2025 at 10:01 AM     ADDENDUM: Dr Simpson here and aware and will be scheduled for HD at [facility].  Physicians set up with DEBRA Gee is aware, and I called son Shreyas and he has already spoke to [facility]. Ambulance transport on chart  House team will place orders  in about 30 minutes    Electronically signed by Cristela Bradford RN on 4/4/2025 at 10:16 AM

## 2025-04-04 NOTE — CONSULTS
NEUROLOGY CONSULT NOTE      Requesting Physician: ANTONIA CARCAMO      Reason for Consult:  Recent stroke, cardiology needs recommendation for [facility] timing, patient known to service    History of Present Illness:    Nikki Moffett is a 79 y.o. female  with extensive medical history and extended hospital stay. Patient's medical history is significant for Afib/Aflutter, CVA, HLD, multiple previous hospital admissions and trach/peg. Patient was initially admitted on 12/2023 for right sided weakness and facial droop 2/2 lt MAC stroke with M2 occlusion. She had a mechanical thrombectomy done at the time admission. Patient has Afib with RVR during the hospital stay, which was converted back into sinus and was discharged on oral antiarrhythmics and plan to further f/u with cardiology as outpatient. Patient was following with them inconsistently and \"also missed few dosage of medication\" from the chart review. Patient came to the IM clinic on 3/4 where she was noted to be in Afib with RVR, hence was transferred to the ED and then got admitted to the MICU. Cardiology and EP has been following the patient and managing accordingly. Patient has been admitted to the ICU since then. During all these events, patient had significant decrease in her Cardiac function. Neurology is consulted as per cardiology to have lisa for left heart cath as she had stroke recently.    Past Medical History:        Diagnosis Date    A-fib (HCC)     HFrEF (heart failure with reduced ejection fraction) (HCC)     HLD (hyperlipidemia)     HTN (hypertension)     Interatrial cardiac shunt     on echo 2023    L MCA stroke 2023    s/p thrombectomy           Procedure Laterality Date    IR MECHANICAL ART THROMBECTOMY INTRACRANIAL  12/21/2023    IR MECHANICAL ART THROMBECTOMY INTRACRANIAL 12/21/2023 Dominik Castillo MD [facility]    IR TUNNELED CVC PLACE WO SQ PORT/PUMP > 5 YEARS  03/20/2025    IR TUNNELED CVC PLACE WO SQ PORT/PUMP > 5  -- -- 61 23 100 % --   04/03/25 2200 (!) 105/57 -- -- 75 24 100 % --   04/03/25 2110 -- -- -- 64 -- -- --   04/03/25 2100 110/61 -- -- 75 21 100 % --   04/03/25 2057 -- -- -- 75 19 100 % --   04/03/25 2000 (!) 109/49 98 °F (36.7 °C) Axillary 57 20 100 % --   04/03/25 1900 (!) 111/50 -- -- 65 19 100 % --   04/03/25 1800 (!) 99/49 -- -- 55 26 100 % --   04/03/25 1700 (!) 109/56 -- -- 69 21 100 % --   04/03/25 1619 -- -- -- 56 21 100 % --   04/03/25 1600 (!) 86/41 98 °F (36.7 °C) Axillary 58 21 100 % --   04/03/25 1500 (!) 85/40 -- -- 59 25 100 % --   04/03/25 1416 -- -- -- -- 18 -- --   04/03/25 1403 (!) 85/44 -- -- 67 -- -- --   04/03/25 1400 (!) 88/45 -- -- 70 24 99 % --       Body mass index is 27.78 kg/m².     HEENT: Normocephalic, atraumatic, no lesions or abnormalities noted.    Neck:  Trach in place  Lungs:  clear to auscultation  bilaterally      Neurologic Exam   Mental Status:  Patient was alert, responsive and following commands   Motor Exam:  3-4/5 in LUE,2/5 in RUE  Reflexes:  Symmetric bilaterally  Labs:    CBC with Differential:    Lab Results   Component Value Date/Time    WBC 11.2 04/04/2025 05:01 AM    RBC 3.59 04/04/2025 05:01 AM    HGB 10.0 04/04/2025 05:01 AM    HCT 30.8 04/04/2025 05:01 AM     04/04/2025 05:01 AM    MCV 85.8 04/04/2025 05:01 AM    MCH 27.9 04/04/2025 05:01 AM    MCHC 32.5 04/04/2025 05:01 AM    RDW 19.5 04/04/2025 05:01 AM    NRBC 3 03/12/2025 05:28 AM    METASPCT 1 03/09/2025 03:57 AM    LYMPHOPCT 4 04/04/2025 05:01 AM    MONOPCT 9 04/04/2025 05:01 AM    EOSPCT 4 04/04/2025 05:01 AM    BASOPCT 0 04/04/2025 05:01 AM    MONOSABS 0.97 04/04/2025 05:01 AM    LYMPHSABS 0.49 04/04/2025 05:01 AM    EOSABS 0.49 04/04/2025 05:01 AM    BASOSABS 0.00 04/04/2025 05:01 AM     Hemoglobin/Hematocrit:    Lab Results   Component Value Date/Time    HGB 10.0 04/04/2025 05:01 AM    HCT 30.8 04/04/2025 05:01 AM     CMP:    Lab Results   Component Value Date/Time     04/04/2025 05:01 AM     K 4.2 04/04/2025 05:01 AM    CL 96 04/04/2025 05:01 AM    CO2 21 04/04/2025 05:01 AM    BUN 45 04/04/2025 05:01 AM    CREATININE 2.7 04/04/2025 05:01 AM    GFRAA >60 10/06/2017 12:12 PM    LABGLOM 18 04/04/2025 05:01 AM    LABGLOM 59 12/26/2023 09:18 AM    GLUCOSE 68 04/04/2025 05:01 AM    CALCIUM 8.4 04/04/2025 05:01 AM    BILITOT 0.9 04/04/2025 05:01 AM    ALKPHOS 113 04/04/2025 05:01 AM    AST 33 04/04/2025 05:01 AM    ALT 18 04/04/2025 05:01 AM     Sodium:    Lab Results   Component Value Date/Time     04/04/2025 05:01 AM     Potassium:    Lab Results   Component Value Date/Time    K 4.2 04/04/2025 05:01 AM         Radiology:  Echo (TTE) complete (PRN contrast/bubble/strain/3D)  Result Date: 4/2/2025    Left Ventricle: Normal left ventricular systolic function with a visually estimated EF of 30 - 35%. Left ventricle size is normal. Normal wall thickness. Ventricular mass is normal. Findings consistent with concentric remodeling. Severe global hypokinesis present. E/e' ratio >11, suggesting increased LAP.   Right Ventricle: Right ventricle is mildly dilated. Normal systolic function.   Aortic Valve: No significant stenosis.   Mitral Valve: Mild annular calcification. Mild regurgitation.   Tricuspid Valve: Mild to moderate regurgitation. Mildly elevated RVSP, consistent with mild pulmonary hypertension. The estimated RVSP is 48 mmHg.   Left Atrium: Left atrium is severely dilated.   Extracardiac: Medium sized left pleural effusion.   Image quality is adequate. Procedure performed with the patient in a supine position.     XR CHEST PORTABLE  Result Date: 3/29/2025  EXAMINATION: ONE XRAY VIEW OF THE CHEST 3/29/2025 3:18 pm HISTORY: ORDERING SYSTEM PROVIDED HISTORY: shortness of breath TECHNOLOGIST PROVIDED HISTORY: Reason for exam:->shortness of breath FINDINGS: Tracheostomy tube in good position. Catheter for hemodialysis placed to the internal jugular vein with the tip in the superior vena cava/right

## 2025-04-04 NOTE — PROGRESS NOTES
[facility]YSICI[facility]- [facility] Elect[facility]  Inpatient Progress Report  PATIENT: Nikki Moffett  MEDICAL RECORD NUMBER: [id number]  DATE OF SERVICE:  4/4/2025  ATTENDING ELECTROPHYSIOLOGIST: Nakita Miranda MD   PRIMARY ELECTROPHYSIOLOGIST: Gelacio Hendricks MD  REFERRING PHYSICIAN:  Tiesha Panda MD  CHIEF COMPLAINT: Altered mental status    HPI: This is a 79 y.o. female with a history of  persistent atrial fibrillation and atrial flutter, cerebrovascular accident and hyperlipidemia.   The patient presented to the hospital on 12/21/23 due to right sided weakness and facial droop. She diagnosed with acute left MCA stroke with M2 occlusion. She was not a candidate for tPA and underwent mechanical thrombectomy by IR team on 12/21/23. Prior to intervention, patient was diagnosed with AF with RVR with heart rate in the 180s. Patient underwent emergency DC-cardioversion to normal sinus rhythm and was initiated on IV Amiodarone which later on transited to oral Amiodarone. Echocardiogram 2/29/24 showed LV EF of 40-45%.  She was started on GDMT and was discharged home on Amiodarone loading dose, Eliquis and Metoprolol.   She was last seen in Cardiology's office on 2/22/24. She subsequently underwent nuclear stress test on 2/29/24 which showed no ischemia and LV EF of 69%. Repeat echocardiogram on 2/29/24 showed LV EF 55-60%, moderate LVH and mild LAE. She was scheduled for follow up with Cardiology but did not follow through.   Son called on 3/3/25 stated that the patient was not doing well She was not eating well, shortness of breath with exertion and reported palpitations. She was seen in IM resident clinic on 3/4/35 and was noted to be in AFL with  bpm. She was advised to come in to ED. In ED she was noted to be in AFL with  bpm.She was started on IV Cardizem drip.CXR showed bilateral pleural effusions and possible right lower lobe opacity. Patient received

## 2025-04-04 NOTE — PROGRESS NOTES
04/04/25 1541   Patient Observation   Pulse 58   Respirations 20   SpO2 100 %   Breath Sounds   Breath Sounds Bilateral Diminished   Right Upper Lobe Diminished   Right Middle Lobe Diminished   Right Lower Lobe Diminished   Left Upper Lobe Diminished   Left Lower Lobe Diminished   Vent Information   Ventilator ID [id number]   Vent Mode AC/VC   Ventilator Settings   Vt (Set, mL) 280 mL   Resp Rate (Set) 12 bpm   PEEP/CPAP (cmH2O) 5   FiO2  40 %   Vent Patient Data (Readings)   Vt (Measured) 308 mL   Peak Inspiratory Pressure (cmH2O) 23 cmH2O   Rate Measured 13 br/min   Minute Volume (L/min) 3.76 Liters   Mean Airway Pressure (cmH2O) 8 cmH20   Plateau Pressure (cm H2O) 17 cm H2O   Driving Pressure 12   I:E Ratio 1:1.90   Static Compliance (L/cm H2O) 26   Backup Apnea On   Backup Rate 12 Breaths Per Minute   Backup Vt 280   Backup I Time 20   Vent Alarm Settings   High Pressure (cmH2O) 45 cmH2O   Low Minute Volume (lpm) 3 L/min   High Minute Volume (lpm) 12 L/min   Low Exhaled Vt (ml) 200 mL   High Exhaled Vt (ml) 550 mL   RR High (bpm) 35 br/min   Apnea (secs) 20 secs   Additional Respiratoray Assessments   Humidification Source Heated wire   Humidification Temp 37.1   Circuit Condensation Not drained   Ambu Bag With Mask At Bedside Yes

## 2025-04-04 NOTE — PROGRESS NOTES
Respiratory Wean Start time:  Order to wean ventilator Yes (if no, contact provider)    Patient placed on PS of 12, PEEP 5 cmH2O, FIO2 40 at 0901AM.    HR 72 bpm  RR 22 bpm  SPO2 100%      Will continue to attempt to wean based on patient tolerance.        04/04/25 0901   Patient Observation   Pulse 71   Respirations 22   SpO2 100 %   Vent Information   Ventilator ID [id number]   Equipment Changed (S)  Expiratory Filter   Vent Mode (S)  CPAP/PS   Ventilator Settings   PEEP/CPAP (cmH2O) 5   FiO2  40 %   Pressure Support (cm H2O) (S)  12 cm H2O   Vent Patient Data (Readings)   Vt (Measured) 237 mL   Peak Inspiratory Pressure (cmH2O) 18 cmH2O   Rate Measured 22 br/min   Minute Volume (L/min) 5.03 Liters   Mean Airway Pressure (cmH2O) 8 cmH20   Plateau Pressure (cm H2O) 20 cm H2O   Driving Pressure 15   I:E Ratio 1:2.20   Flow Sensitivity 3 L/min   Static Compliance (L/cm H2O) 21   Backup Apnea On   Backup Rate 12 Breaths Per Minute   Backup Vt 280   Vent Alarm Settings   High Pressure (cmH2O) 45 cmH2O   Low Minute Volume (lpm) 3 L/min   High Minute Volume (lpm) 12 L/min   Low Exhaled Vt (ml) 200 mL   High Exhaled Vt (ml) 550 mL   RR High (bpm) 35 br/min   Apnea (secs) 20 secs   Additional Respiratoray Assessments   Humidification Source Heated wire   Humidification Temp 36.8   Ambu Bag With Mask At Bedside Yes   Surgical Airway  03/26/25 Timur Cuffed   Placement Date/Time: 03/26/25 1009   Present on Admission/Arrival: No  Placed By: (c) Licensed provider  Surgical Airway Type: Tracheostomy  Brand: Timur  Style: Cuffed  Size: 8   Status Secured           Performed by  Lulu gomes RCP RRT

## 2025-04-04 NOTE — PROGRESS NOTES
Physical Therapy    Pt on PT caseload.  Attempted PT treatment, discussed with RN.  RN stated pt was discharging in ~20 minutes.  Unable to complete PT treatment.    Chema Coleman, PT, DPT  [id number]

## 2025-04-07 NOTE — OP NOTE
was reintroduced into the stomach. The PEG tube was seen in good position without evidence of bleeding. The gastroscope was removed. The PEG tube was cut to size, 2.5 cm at the skin, and fit with a bumper, clamp, and feeding apparatus. The patient tolerated the procedure well without immediate complication.        Dr. Bloom was present for the entire procedure.      Plan:  Okay to use PEG tube for feeds and meds starting at 1400 today, 3/26  Continue PPI  Please call with any further questions or concerns      Electronically signed by Kyaw Rubio DO on 3/26/2025 at 10:20 AM

## 2025-04-09 LAB
EKG ATRIAL RATE: 75 BPM
EKG P AXIS: 94 DEGREES
EKG P-R INTERVAL: 180 MS
EKG Q-T INTERVAL: 438 MS
EKG QRS DURATION: 138 MS
EKG QTC CALCULATION (BAZETT): 489 MS
EKG R AXIS: -4 DEGREES
EKG T AXIS: 110 DEGREES
EKG VENTRICULAR RATE: 75 BPM

## 2025-04-10 LAB — SURGICAL PATHOLOGY REPORT: NORMAL

## 2025-04-25 ENCOUNTER — HOSPITAL ENCOUNTER (OUTPATIENT)
Dept: ULTRASOUND IMAGING | Age: 80
Discharge: HOME | End: 2025-04-27

## 2025-04-25 VITALS
OXYGEN SATURATION: 99 % | RESPIRATION RATE: 20 BRPM | DIASTOLIC BLOOD PRESSURE: 72 MMHG | HEART RATE: 78 BPM | SYSTOLIC BLOOD PRESSURE: 110 MMHG

## 2025-04-25 PROCEDURE — 32555 ASPIRATE PLEURA W/ IMAGING: CPT

## 2025-04-25 NOTE — DISCHARGE INSTRUCTIONS
Thoracentesis: What to Expect at Home  Your Recovery  Thoracentesis (say \"[id number]-bua-HALEIGH-sis\") is a procedure to remove fluid from the space between the lungs and the chest wall (pleural space). This procedure may also be called a \"chest tap.\" It's normal to have a small amount of fluid in the pleural space. But too much fluid can build up because of problems such as infection, heart failure, or lung cancer. The procedure may have been done to help with shortness of breath and pain caused by the fluid buildup. Or you may have had this procedure so the doctor could test the fluid to find the cause of the buildup.  Your chest may be sore where the doctor put the needle or catheter into your skin (the puncture site). This usually gets better after a day or two. You can go back to work or your normal activities as soon as you feel up to it.  If a large amount of pleural fluid was removed during the procedure, you will probably be able to breathe more easily.  If more pleural fluid collects and needs to be removed, another thoracentesis may be done later.  This care sheet gives you a general idea about how long it will take for you to recover. But each person recovers at a different pace. Follow the steps below to feel better as quickly as possible.  How can you care for yourself at home?  Activity    Rest when you feel tired. Getting enough sleep will help you recover.     Avoid strenuous activities, such as bicycle riding, jogging, weight lifting, or aerobic exercise, until your doctor says it is okay.     You may shower. Do not take a bath until the puncture site has healed, or until your doctor tells you it is okay.     Ask your doctor when you can drive again.     You may need to take 1 or 2 days off from work. It depends on the type of work you do and how you feel.   Diet    You can eat your normal diet.     Drink plenty of fluids (unless your doctor tells you not to).   Medicines    Your doctor will tell you

## 2025-05-05 LAB
EKG ATRIAL RATE: 127 BPM
EKG ATRIAL RATE: 58 BPM
EKG P AXIS: 27 DEGREES
EKG P-R INTERVAL: 150 MS
EKG Q-T INTERVAL: 364 MS
EKG Q-T INTERVAL: 480 MS
EKG QRS DURATION: 130 MS
EKG QRS DURATION: 138 MS
EKG QTC CALCULATION (BAZETT): 471 MS
EKG QTC CALCULATION (BAZETT): 537 MS
EKG R AXIS: -12 DEGREES
EKG R AXIS: -17 DEGREES
EKG T AXIS: 167 DEGREES
EKG T AXIS: 77 DEGREES
EKG VENTRICULAR RATE: 131 BPM
EKG VENTRICULAR RATE: 58 BPM

## 2025-05-30 ENCOUNTER — HOSPITAL ENCOUNTER (OUTPATIENT)
Dept: GENERAL RADIOLOGY | Age: 80
End: 2025-05-30
Payer: MEDICARE

## 2025-05-30 VITALS
RESPIRATION RATE: 20 BRPM | DIASTOLIC BLOOD PRESSURE: 68 MMHG | SYSTOLIC BLOOD PRESSURE: 141 MMHG | OXYGEN SATURATION: 100 % | HEART RATE: 78 BPM

## 2025-05-30 LAB
APPEARANCE FLD: NORMAL
BODY FLD TYPE: NORMAL
CLOT CHECK: NORMAL
COLOR FLD: YELLOW
GLUCOSE FLD-MCNC: 102 MG/DL
LDH FLD L TO P-CCNC: 171 U/L
MONOCYTES NFR FLD: 49 %
NEUTROPHILS NFR FLD: 52 %
PROT FLD-MCNC: 4.3 G/DL
RBC # FLD: <2000 CELLS/UL
SPECIMEN TYPE: NORMAL
WBC # FLD: 171 CELLS/UL

## 2025-05-30 PROCEDURE — 87070 CULTURE OTHR SPECIMN AEROBIC: CPT

## 2025-05-30 PROCEDURE — 88112 CYTOPATH CELL ENHANCE TECH: CPT

## 2025-05-30 PROCEDURE — 87205 SMEAR GRAM STAIN: CPT

## 2025-05-30 PROCEDURE — 6360000002 HC RX W HCPCS: Performed by: RADIOLOGY

## 2025-05-30 PROCEDURE — 88305 TISSUE EXAM BY PATHOLOGIST: CPT

## 2025-05-30 PROCEDURE — 2709999900 FL REMOVAL CENTRAL VENOUS CATH

## 2025-05-30 PROCEDURE — 89051 BODY FLUID CELL COUNT: CPT

## 2025-05-30 PROCEDURE — 83986 ASSAY PH BODY FLUID NOS: CPT

## 2025-05-30 PROCEDURE — 82945 GLUCOSE OTHER FLUID: CPT

## 2025-05-30 PROCEDURE — 84157 ASSAY OF PROTEIN OTHER: CPT

## 2025-05-30 PROCEDURE — 83615 LACTATE (LD) (LDH) ENZYME: CPT

## 2025-05-30 RX ORDER — LIDOCAINE HYDROCHLORIDE 10 MG/ML
INJECTION, SOLUTION INFILTRATION; PERINEURAL PRN
Status: COMPLETED | OUTPATIENT
Start: 2025-05-30 | End: 2025-05-30

## 2025-05-30 RX ORDER — LIDOCAINE HYDROCHLORIDE 20 MG/ML
INJECTION, SOLUTION INFILTRATION; PERINEURAL PRN
Status: COMPLETED | OUTPATIENT
Start: 2025-05-30 | End: 2025-05-30

## 2025-05-30 RX ADMIN — LIDOCAINE HYDROCHLORIDE 8 ML: 10 INJECTION, SOLUTION INFILTRATION; PERINEURAL at 10:19

## 2025-05-30 RX ADMIN — LIDOCAINE HYDROCHLORIDE 9 ML: 20 INJECTION, SOLUTION INFILTRATION; PERINEURAL at 10:02

## 2025-05-30 NOTE — OR NURSING
Prior to procedure Dr. Cox in to speak with pt, procedure explained in detail including risks, consent form signed.  Patient positioned on fluoro table supine with O2 and monitoring equipment attached. Patient prepped and draped per protocol.  Using ultrasound and fluoro, patient scanned and images reviewed by       . (size) Italian tube placed        side internal jugular with ultrasound guidance by Dr (  )  @  (time), Catheter sutured in place using        .  Both ports with blood return, then flushed with saline and heparin.  Patient tolerated well. Procedure completed @ (  ).  Report called to floor nurse, RN.  Pt out of department via transport.

## 2025-05-30 NOTE — DISCHARGE INSTRUCTIONS
Thoracentesis: What to Expect at Home  Your Recovery  Thoracentesis (say \"uovb-gu-det-HALEIGH-sis\") is a procedure to remove fluid from the space between the lungs and the chest wall (pleural space). This procedure may also be called a \"chest tap.\" It's normal to have a small amount of fluid in the pleural space. But too much fluid can build up because of problems such as infection, heart failure, or lung cancer. The procedure may have been done to help with shortness of breath and pain caused by the fluid buildup. Or you may have had this procedure so the doctor could test the fluid to find the cause of the buildup.  Your chest may be sore where the doctor put the needle or catheter into your skin (the puncture site). This usually gets better after a day or two. You can go back to work or your normal activities as soon as you feel up to it.  If a large amount of pleural fluid was removed during the procedure, you will probably be able to breathe more easily.  If more pleural fluid collects and needs to be removed, another thoracentesis may be done later.  This care sheet gives you a general idea about how long it will take for you to recover. But each person recovers at a different pace. Follow the steps below to feel better as quickly as possible.  How can you care for yourself at home?  Activity    Rest when you feel tired. Getting enough sleep will help you recover.     Avoid strenuous activities, such as bicycle riding, jogging, weight lifting, or aerobic exercise, until your doctor says it is okay.     You may shower. Do not take a bath until the puncture site has healed, or until your doctor tells you it is okay.     Ask your doctor when you can drive again.     You may need to take 1 or 2 days off from work. It depends on the type of work you do and how you feel.   Diet    You can eat your normal diet.     Drink plenty of fluids (unless your doctor tells you not to).   Medicines    Your doctor will tell you  if and when you can restart your medicines. You will also get instructions about taking any new medicines.     If you stopped taking aspirin or some other blood thinner, your doctor will tell you when to start taking it again.     Be safe with medicines. Take pain medicines exactly as directed.  If you are not taking a prescription pain medicine, ask your doctor if you can take an over-the-counter medicine.  If the doctor gave you a prescription medicine for pain, take it as prescribed.  Store your prescription pain medicines where no one else can get to them. When you are done using them, dispose of them quickly and safely. Your local pharmacy or hospital may have a drop-off site.     If you think your pain medicine is making you sick to your stomach:  Take your medicine after meals (unless your doctor has told you not to).  Ask your doctor for a different pain medicine.     If your doctor prescribed antibiotics, take them as directed. Do not stop taking them just because you feel better. You need to take the full course of antibiotics.   Care of the puncture site    Wash the area daily with warm, soapy water, and pat it dry. Don't use hydrogen peroxide or alcohol, which may delay healing. You may cover the area with a gauze bandage if it weeps or rubs against clothing. Change the bandage every day.     Keep the area clean and dry.   Follow-up care is a key part of your treatment and safety. Be sure to make and go to all appointments, and call your doctor if you are having problems. It's also a good idea to know your test results and keep a list of the medicines you take.  When should you call for help?   Call 911 anytime you think you may need emergency care. For example, call if:    You passed out (lost consciousness).     You have severe trouble breathing.     You have sudden chest pain and shortness of breath, or you cough up blood.   Call your doctor now or seek immediate medical care if:    You have shortness

## 2025-05-30 NOTE — OR NURSING
Patient arrived from [facility] specialty to IR for ultrasound guided left thoracentesis. Vitals obtained and consent signed per son on phone. Dr Cox  in to speak with the patient about the procedure. Ultrasound images taken with little fluid noted on left. One step 5 Maori x 7 cm valved use to left posterior chest.  1% Lidocaine administered to procedural site.  catheter connected to vacuum suction bottle and removed 550 cc of cloudy yehuda. .  Patient tolerated procedure well. 550 ml drained Centesis catheter removed post procedure.  Puncture site cleansed and dry dressing applied occlusive dressing with petroleum gauze applied.. Post procedure vitals obtained. Nurse to nurse report called. Patient transported out of the dept and EMT staff back to [facility] with all personal belongings and no complications. Remains trached on vent  oxygen 40% AC 18. Vitals taken. Report called to [facility]. Awaiting if doctor wants to have fluids sent away. Report given to Marlin at [facility], fluids sent per Dr. Natarajan orders.

## 2025-06-01 LAB
MICROORGANISM SPEC CULT: NORMAL
MICROORGANISM/AGENT SPEC: NORMAL
SERVICE CMNT-IMP: NORMAL
SPECIMEN DESCRIPTION: NORMAL

## 2025-06-02 LAB
APPEARANCE FLD: NORMAL
BODY FLD TYPE: NORMAL
CLOT CHECK: NORMAL
COLOR FLD: YELLOW
MANUAL DIF COMMENT FLD-IMP: NORMAL
MICROORGANISM SPEC CULT: NO GROWTH
MICROORGANISM/AGENT SPEC: NORMAL
MONOCYTES NFR FLD: 49 %
NEUTROPHILS NFR FLD: 52 %
NON-GYN CYTOLOGY REPORT: NORMAL
RBC # FLD: <2000 CELLS/UL
SERVICE CMNT-IMP: NORMAL
SPECIMEN DESCRIPTION: NORMAL
WBC # FLD: 171 CELLS/UL

## 2025-06-10 LAB — REPORT STATUS: NORMAL

## 2025-06-20 ENCOUNTER — APPOINTMENT (OUTPATIENT)
Dept: CT IMAGING | Age: 80
DRG: 377 | End: 2025-06-20
Payer: MEDICARE

## 2025-06-20 ENCOUNTER — APPOINTMENT (OUTPATIENT)
Dept: GENERAL RADIOLOGY | Age: 80
DRG: 377 | End: 2025-06-20
Payer: MEDICARE

## 2025-06-20 ENCOUNTER — HOSPITAL ENCOUNTER (INPATIENT)
Age: 80
LOS: 3 days | Discharge: NURSING, REHAB OR LONG-TERM CARE | DRG: 377 | End: 2025-06-26
Attending: EMERGENCY MEDICINE | Admitting: STUDENT IN AN ORGANIZED HEALTH CARE EDUCATION/TRAINING PROGRAM
Payer: MEDICARE

## 2025-06-20 DIAGNOSIS — K92.2 LOWER GI BLEED: Primary | ICD-10-CM

## 2025-06-20 DIAGNOSIS — D64.9 ANEMIA, UNSPECIFIED TYPE: ICD-10-CM

## 2025-06-20 PROBLEM — Z86.73 HISTORY OF CVA (CEREBROVASCULAR ACCIDENT): Chronic | Status: ACTIVE | Noted: 2024-03-01

## 2025-06-20 PROBLEM — I48.0 PAF (PAROXYSMAL ATRIAL FIBRILLATION) (HCC): Chronic | Status: ACTIVE | Noted: 2024-03-01

## 2025-06-20 PROBLEM — I50.20 HFREF (HEART FAILURE WITH REDUCED EJECTION FRACTION) (HCC): Chronic | Status: ACTIVE | Noted: 2025-06-20

## 2025-06-20 PROBLEM — J96.11 CHRONIC RESPIRATORY FAILURE WITH HYPOXIA (HCC): Chronic | Status: ACTIVE | Noted: 2025-06-20

## 2025-06-20 PROBLEM — E83.41 HYPERMAGNESEMIA: Status: ACTIVE | Noted: 2025-06-20

## 2025-06-20 PROBLEM — N18.31 CKD STAGE 3A, GFR 45-59 ML/MIN (HCC): Chronic | Status: ACTIVE | Noted: 2025-06-20

## 2025-06-20 PROBLEM — Z86.19 HISTORY OF HEPATITIS C: Chronic | Status: ACTIVE | Noted: 2025-06-20

## 2025-06-20 PROBLEM — R79.89 ELEVATED TROPONIN: Status: ACTIVE | Noted: 2025-06-20

## 2025-06-20 PROBLEM — E83.42 HYPOMAGNESEMIA: Status: ACTIVE | Noted: 2025-06-20

## 2025-06-20 PROBLEM — D62 ACUTE ON CHRONIC BLOOD LOSS ANEMIA: Status: ACTIVE | Noted: 2025-06-20

## 2025-06-20 PROBLEM — D69.6 THROMBOCYTOPENIA: Chronic | Status: ACTIVE | Noted: 2025-06-20

## 2025-06-20 LAB
ALBUMIN SERPL-MCNC: 3.6 G/DL (ref 3.5–5.2)
ALP SERPL-CCNC: 69 U/L (ref 35–104)
ALT SERPL-CCNC: 18 U/L (ref 0–32)
AMMONIA PLAS-SCNC: 14 UMOL/L (ref 11–51)
ANION GAP SERPL CALCULATED.3IONS-SCNC: 15 MMOL/L (ref 7–16)
AST SERPL-CCNC: 31 U/L (ref 0–31)
BASOPHILS # BLD: 0.04 K/UL (ref 0–0.2)
BASOPHILS NFR BLD: 0 % (ref 0–2)
BILIRUB SERPL-MCNC: 0.4 MG/DL (ref 0–1.2)
BNP SERPL-MCNC: 1396 PG/ML (ref 0–450)
BUN SERPL-MCNC: 88 MG/DL (ref 6–23)
CALCIUM SERPL-MCNC: 10.5 MG/DL (ref 8.6–10.2)
CHLORIDE SERPL-SCNC: 101 MMOL/L (ref 98–107)
CO2 SERPL-SCNC: 23 MMOL/L (ref 22–29)
CREAT SERPL-MCNC: 1.8 MG/DL (ref 0.5–1)
DIGOXIN SERPL-MCNC: 0.6 NG/ML (ref 0.8–2)
EKG ATRIAL RATE: 95 BPM
EKG P AXIS: 83 DEGREES
EKG P-R INTERVAL: 208 MS
EKG Q-T INTERVAL: 398 MS
EKG QRS DURATION: 126 MS
EKG QTC CALCULATION (BAZETT): 500 MS
EKG R AXIS: 134 DEGREES
EKG T AXIS: 125 DEGREES
EKG VENTRICULAR RATE: 95 BPM
EOSINOPHIL # BLD: 0.29 K/UL (ref 0.05–0.5)
EOSINOPHILS RELATIVE PERCENT: 2 % (ref 0–6)
ERYTHROCYTE [DISTWIDTH] IN BLOOD BY AUTOMATED COUNT: 16.3 % (ref 11.5–15)
GFR, ESTIMATED: 29 ML/MIN/1.73M2
GLUCOSE SERPL-MCNC: 114 MG/DL (ref 74–99)
HCT VFR BLD AUTO: 18.9 % (ref 34–48)
HCT VFR BLD AUTO: 27 % (ref 34–48)
HCT VFR BLD AUTO: 27.6 % (ref 34–48)
HGB BLD-MCNC: 6.1 G/DL (ref 11.5–15.5)
HGB BLD-MCNC: 8.8 G/DL (ref 11.5–15.5)
HGB BLD-MCNC: 8.9 G/DL (ref 11.5–15.5)
IMM GRANULOCYTES # BLD AUTO: 0.09 K/UL (ref 0–0.58)
IMM GRANULOCYTES NFR BLD: 1 % (ref 0–5)
INR PPP: 1.2
LACTATE BLDV-SCNC: 0.9 MMOL/L (ref 0.5–2.2)
LIPASE SERPL-CCNC: 87 U/L (ref 13–60)
LYMPHOCYTES NFR BLD: 2.46 K/UL (ref 1.5–4)
LYMPHOCYTES RELATIVE PERCENT: 20 % (ref 20–42)
MAGNESIUM SERPL-MCNC: 2.8 MG/DL (ref 1.6–2.6)
MCH RBC QN AUTO: 29.5 PG (ref 26–35)
MCHC RBC AUTO-ENTMCNC: 32.3 G/DL (ref 32–34.5)
MCV RBC AUTO: 91.3 FL (ref 80–99.9)
MONOCYTES NFR BLD: 0.65 K/UL (ref 0.1–0.95)
MONOCYTES NFR BLD: 5 % (ref 2–12)
NEUTROPHILS NFR BLD: 72 % (ref 43–80)
NEUTS SEG NFR BLD: 8.97 K/UL (ref 1.8–7.3)
PARTIAL THROMBOPLASTIN TIME: 33.8 SEC (ref 24.5–35.1)
PLATELET # BLD AUTO: 314 K/UL (ref 130–450)
PMV BLD AUTO: 10.6 FL (ref 7–12)
POTASSIUM SERPL-SCNC: 4.3 MMOL/L (ref 3.5–5)
PROT SERPL-MCNC: 7.7 G/DL (ref 6.4–8.3)
PROTHROMBIN TIME: 12.1 SEC (ref 9.3–12.4)
RBC # BLD AUTO: 2.07 M/UL (ref 3.5–5.5)
SODIUM SERPL-SCNC: 139 MMOL/L (ref 132–146)
TROPONIN I SERPL HS-MCNC: 624 NG/L (ref 0–14)
TROPONIN I SERPL HS-MCNC: 666 NG/L (ref 0–14)
WBC OTHER # BLD: 12.5 K/UL (ref 4.5–11.5)

## 2025-06-20 PROCEDURE — 83690 ASSAY OF LIPASE: CPT

## 2025-06-20 PROCEDURE — 2580000003 HC RX 258

## 2025-06-20 PROCEDURE — 99285 EMERGENCY DEPT VISIT HI MDM: CPT

## 2025-06-20 PROCEDURE — 30233N1 TRANSFUSION OF NONAUTOLOGOUS RED BLOOD CELLS INTO PERIPHERAL VEIN, PERCUTANEOUS APPROACH: ICD-10-PCS | Performed by: SURGERY

## 2025-06-20 PROCEDURE — 86920 COMPATIBILITY TEST SPIN: CPT

## 2025-06-20 PROCEDURE — 6360000002 HC RX W HCPCS

## 2025-06-20 PROCEDURE — 85610 PROTHROMBIN TIME: CPT

## 2025-06-20 PROCEDURE — 85014 HEMATOCRIT: CPT

## 2025-06-20 PROCEDURE — G0378 HOSPITAL OBSERVATION PER HR: HCPCS

## 2025-06-20 PROCEDURE — 6370000000 HC RX 637 (ALT 250 FOR IP)

## 2025-06-20 PROCEDURE — P9016 RBC LEUKOCYTES REDUCED: HCPCS

## 2025-06-20 PROCEDURE — 99223 1ST HOSP IP/OBS HIGH 75: CPT | Performed by: SURGERY

## 2025-06-20 PROCEDURE — 86901 BLOOD TYPING SEROLOGIC RH(D): CPT

## 2025-06-20 PROCEDURE — 99223 1ST HOSP IP/OBS HIGH 75: CPT | Performed by: STUDENT IN AN ORGANIZED HEALTH CARE EDUCATION/TRAINING PROGRAM

## 2025-06-20 PROCEDURE — 6360000004 HC RX CONTRAST MEDICATION: Performed by: RADIOLOGY

## 2025-06-20 PROCEDURE — 71045 X-RAY EXAM CHEST 1 VIEW: CPT

## 2025-06-20 PROCEDURE — 83735 ASSAY OF MAGNESIUM: CPT

## 2025-06-20 PROCEDURE — 2500000003 HC RX 250 WO HCPCS

## 2025-06-20 PROCEDURE — 85730 THROMBOPLASTIN TIME PARTIAL: CPT

## 2025-06-20 PROCEDURE — 36556 INSERT NON-TUNNEL CV CATH: CPT

## 2025-06-20 PROCEDURE — 83605 ASSAY OF LACTIC ACID: CPT

## 2025-06-20 PROCEDURE — 96375 TX/PRO/DX INJ NEW DRUG ADDON: CPT

## 2025-06-20 PROCEDURE — 93010 ELECTROCARDIOGRAM REPORT: CPT | Performed by: INTERNAL MEDICINE

## 2025-06-20 PROCEDURE — 96374 THER/PROPH/DIAG INJ IV PUSH: CPT

## 2025-06-20 PROCEDURE — 94002 VENT MGMT INPAT INIT DAY: CPT

## 2025-06-20 PROCEDURE — 6360000002 HC RX W HCPCS: Performed by: STUDENT IN AN ORGANIZED HEALTH CARE EDUCATION/TRAINING PROGRAM

## 2025-06-20 PROCEDURE — 5A1955Z RESPIRATORY VENTILATION, GREATER THAN 96 CONSECUTIVE HOURS: ICD-10-PCS | Performed by: SURGERY

## 2025-06-20 PROCEDURE — 85025 COMPLETE CBC W/AUTO DIFF WBC: CPT

## 2025-06-20 PROCEDURE — 85018 HEMOGLOBIN: CPT

## 2025-06-20 PROCEDURE — 80162 ASSAY OF DIGOXIN TOTAL: CPT

## 2025-06-20 PROCEDURE — 06HY33Z INSERTION OF INFUSION DEVICE INTO LOWER VEIN, PERCUTANEOUS APPROACH: ICD-10-PCS | Performed by: SURGERY

## 2025-06-20 PROCEDURE — 36430 TRANSFUSION BLD/BLD COMPNT: CPT

## 2025-06-20 PROCEDURE — 96361 HYDRATE IV INFUSION ADD-ON: CPT

## 2025-06-20 PROCEDURE — 82140 ASSAY OF AMMONIA: CPT

## 2025-06-20 PROCEDURE — 74174 CTA ABD&PLVS W/CONTRAST: CPT

## 2025-06-20 PROCEDURE — 84484 ASSAY OF TROPONIN QUANT: CPT

## 2025-06-20 PROCEDURE — 83880 ASSAY OF NATRIURETIC PEPTIDE: CPT

## 2025-06-20 PROCEDURE — 93005 ELECTROCARDIOGRAM TRACING: CPT

## 2025-06-20 PROCEDURE — 86850 RBC ANTIBODY SCREEN: CPT

## 2025-06-20 RX ORDER — BUMETANIDE 0.25 MG/ML
1 INJECTION, SOLUTION INTRAMUSCULAR; INTRAVENOUS 2 TIMES DAILY
Status: DISCONTINUED | OUTPATIENT
Start: 2025-06-20 | End: 2025-06-20

## 2025-06-20 RX ORDER — DIGOXIN 125 MCG
62.5 TABLET ORAL
Status: ON HOLD | COMMUNITY
End: 2025-06-26 | Stop reason: HOSPADM

## 2025-06-20 RX ORDER — ACETAMINOPHEN 160 MG
2000 TABLET,DISINTEGRATING ORAL EVERY MORNING
Status: ON HOLD | COMMUNITY

## 2025-06-20 RX ORDER — PROMETHAZINE HYDROCHLORIDE 25 MG/ML
6.25 INJECTION, SOLUTION INTRAMUSCULAR; INTRAVENOUS EVERY 6 HOURS PRN
Status: DISCONTINUED | OUTPATIENT
Start: 2025-06-20 | End: 2025-06-20

## 2025-06-20 RX ORDER — SODIUM CHLORIDE 0.9 % (FLUSH) 0.9 %
5-40 SYRINGE (ML) INJECTION EVERY 12 HOURS SCHEDULED
Status: DISCONTINUED | OUTPATIENT
Start: 2025-06-20 | End: 2025-06-26 | Stop reason: HOSPADM

## 2025-06-20 RX ORDER — SODIUM CHLORIDE 0.9 % (FLUSH) 0.9 %
5-40 SYRINGE (ML) INJECTION PRN
Status: DISCONTINUED | OUTPATIENT
Start: 2025-06-20 | End: 2025-06-26 | Stop reason: HOSPADM

## 2025-06-20 RX ORDER — ACETAMINOPHEN 325 MG/10.15ML
325 LIQUID ORAL EVERY 6 HOURS PRN
Status: ON HOLD | COMMUNITY

## 2025-06-20 RX ORDER — THIAMINE MONONITRATE (VIT B1) 100 MG
100 TABLET ORAL EVERY MORNING
Status: ON HOLD | COMMUNITY

## 2025-06-20 RX ORDER — QUETIAPINE FUMARATE 25 MG/1
25 TABLET, FILM COATED ORAL NIGHTLY
Status: ON HOLD | COMMUNITY

## 2025-06-20 RX ORDER — OMEPRAZOLE 20 MG/1
40 TABLET, DELAYED RELEASE ORAL EVERY MORNING
Status: ON HOLD | COMMUNITY
End: 2025-06-26 | Stop reason: HOSPADM

## 2025-06-20 RX ORDER — ZIPRASIDONE MESYLATE 20 MG/ML
10 INJECTION, POWDER, LYOPHILIZED, FOR SOLUTION INTRAMUSCULAR EVERY 12 HOURS PRN
Status: DISCONTINUED | OUTPATIENT
Start: 2025-06-20 | End: 2025-06-26 | Stop reason: HOSPADM

## 2025-06-20 RX ORDER — QUETIAPINE FUMARATE 25 MG/1
25 TABLET, FILM COATED ORAL NIGHTLY
Status: DISCONTINUED | OUTPATIENT
Start: 2025-06-20 | End: 2025-06-26 | Stop reason: HOSPADM

## 2025-06-20 RX ORDER — BUMETANIDE 1 MG/1
1 TABLET ORAL EVERY MORNING
Status: ON HOLD | COMMUNITY
End: 2025-06-26 | Stop reason: HOSPADM

## 2025-06-20 RX ORDER — SODIUM CHLORIDE 9 MG/ML
INJECTION, SOLUTION INTRAVENOUS PRN
Status: DISCONTINUED | OUTPATIENT
Start: 2025-06-20 | End: 2025-06-23

## 2025-06-20 RX ORDER — ACETAMINOPHEN 650 MG/1
650 SUPPOSITORY RECTAL EVERY 6 HOURS PRN
Status: DISCONTINUED | OUTPATIENT
Start: 2025-06-20 | End: 2025-06-26 | Stop reason: HOSPADM

## 2025-06-20 RX ORDER — SODIUM CHLORIDE 9 MG/ML
INJECTION, SOLUTION INTRAVENOUS PRN
Status: DISCONTINUED | OUTPATIENT
Start: 2025-06-20 | End: 2025-06-26 | Stop reason: HOSPADM

## 2025-06-20 RX ORDER — PROCHLORPERAZINE EDISYLATE 5 MG/ML
5 INJECTION INTRAMUSCULAR; INTRAVENOUS EVERY 6 HOURS PRN
Status: DISCONTINUED | OUTPATIENT
Start: 2025-06-20 | End: 2025-06-26 | Stop reason: HOSPADM

## 2025-06-20 RX ORDER — SODIUM CHLORIDE 9 MG/ML
75 INJECTION, SOLUTION INTRAVENOUS CONTINUOUS
Status: ON HOLD | COMMUNITY
End: 2025-06-26 | Stop reason: HOSPADM

## 2025-06-20 RX ORDER — CLONAZEPAM 2 MG/1
2 TABLET ORAL 2 TIMES DAILY
Status: DISCONTINUED | OUTPATIENT
Start: 2025-06-20 | End: 2025-06-26 | Stop reason: HOSPADM

## 2025-06-20 RX ORDER — MIDODRINE HYDROCHLORIDE 2.5 MG/1
7.5 TABLET ORAL
Status: DISCONTINUED | OUTPATIENT
Start: 2025-06-20 | End: 2025-06-26 | Stop reason: HOSPADM

## 2025-06-20 RX ORDER — 0.9 % SODIUM CHLORIDE 0.9 %
1000 INTRAVENOUS SOLUTION INTRAVENOUS ONCE
Status: COMPLETED | OUTPATIENT
Start: 2025-06-20 | End: 2025-06-20

## 2025-06-20 RX ORDER — ROSUVASTATIN CALCIUM 5 MG/1
5 TABLET, COATED ORAL EVERY MORNING
Status: ON HOLD | COMMUNITY

## 2025-06-20 RX ORDER — HEPARIN SODIUM 5000 [USP'U]/ML
5000 INJECTION, SOLUTION INTRAVENOUS; SUBCUTANEOUS EVERY 8 HOURS SCHEDULED
Status: ON HOLD | COMMUNITY
End: 2025-06-26 | Stop reason: HOSPADM

## 2025-06-20 RX ORDER — CHLORHEXIDINE GLUCONATE ORAL RINSE 1.2 MG/ML
15 SOLUTION DENTAL 2 TIMES DAILY
Status: DISCONTINUED | OUTPATIENT
Start: 2025-06-20 | End: 2025-06-26 | Stop reason: HOSPADM

## 2025-06-20 RX ORDER — CLONAZEPAM 2 MG/1
2 TABLET ORAL 2 TIMES DAILY
Status: ON HOLD | COMMUNITY

## 2025-06-20 RX ORDER — LANOLIN ALCOHOL/MO/W.PET/CERES
100 CREAM (GRAM) TOPICAL EVERY MORNING
Status: DISCONTINUED | OUTPATIENT
Start: 2025-06-21 | End: 2025-06-26 | Stop reason: HOSPADM

## 2025-06-20 RX ORDER — BUMETANIDE 0.25 MG/ML
1 INJECTION, SOLUTION INTRAMUSCULAR; INTRAVENOUS 2 TIMES DAILY
Status: COMPLETED | OUTPATIENT
Start: 2025-06-20 | End: 2025-06-20

## 2025-06-20 RX ORDER — IOPAMIDOL 755 MG/ML
75 INJECTION, SOLUTION INTRAVASCULAR
Status: COMPLETED | OUTPATIENT
Start: 2025-06-20 | End: 2025-06-20

## 2025-06-20 RX ORDER — PANTOPRAZOLE SODIUM 40 MG/1
40 TABLET, DELAYED RELEASE ORAL
Status: DISCONTINUED | OUTPATIENT
Start: 2025-06-20 | End: 2025-06-20

## 2025-06-20 RX ORDER — ACETAMINOPHEN 325 MG/1
650 TABLET ORAL EVERY 6 HOURS PRN
Status: DISCONTINUED | OUTPATIENT
Start: 2025-06-20 | End: 2025-06-26 | Stop reason: HOSPADM

## 2025-06-20 RX ORDER — ONDANSETRON 2 MG/ML
4 INJECTION INTRAMUSCULAR; INTRAVENOUS ONCE
Status: COMPLETED | OUTPATIENT
Start: 2025-06-20 | End: 2025-06-20

## 2025-06-20 RX ORDER — SACUBITRIL AND VALSARTAN 24; 26 MG/1; MG/1
1 TABLET, FILM COATED ORAL 2 TIMES DAILY
Status: ON HOLD | COMMUNITY
End: 2025-06-26 | Stop reason: HOSPADM

## 2025-06-20 RX ORDER — MIDODRINE HYDROCHLORIDE 5 MG/1
7.5 TABLET ORAL 3 TIMES DAILY
Status: ON HOLD | COMMUNITY

## 2025-06-20 RX ORDER — PANTOPRAZOLE SODIUM 40 MG/10ML
40 INJECTION, POWDER, LYOPHILIZED, FOR SOLUTION INTRAVENOUS EVERY 12 HOURS
Status: ON HOLD | COMMUNITY
End: 2025-06-26 | Stop reason: HOSPADM

## 2025-06-20 RX ADMIN — MIDODRINE HYDROCHLORIDE 7.5 MG: 2.5 TABLET ORAL at 15:56

## 2025-06-20 RX ADMIN — SODIUM CHLORIDE, PRESERVATIVE FREE 10 ML: 5 INJECTION INTRAVENOUS at 11:18

## 2025-06-20 RX ADMIN — BUMETANIDE 1 MG: 0.25 INJECTION INTRAMUSCULAR; INTRAVENOUS at 15:57

## 2025-06-20 RX ADMIN — IOPAMIDOL 75 ML: 755 INJECTION, SOLUTION INTRAVENOUS at 10:52

## 2025-06-20 RX ADMIN — SODIUM CHLORIDE 1000 ML: 0.9 INJECTION, SOLUTION INTRAVENOUS at 07:49

## 2025-06-20 RX ADMIN — 0.12% CHLORHEXIDINE GLUCONATE 15 ML: 1.2 RINSE ORAL at 19:49

## 2025-06-20 RX ADMIN — ZIPRASIDONE MESYLATE 10 MG: 20 INJECTION, POWDER, LYOPHILIZED, FOR SOLUTION INTRAMUSCULAR at 14:38

## 2025-06-20 RX ADMIN — SODIUM CHLORIDE, PRESERVATIVE FREE 10 ML: 5 INJECTION INTRAVENOUS at 20:02

## 2025-06-20 RX ADMIN — ONDANSETRON 4 MG: 2 INJECTION, SOLUTION INTRAMUSCULAR; INTRAVENOUS at 07:50

## 2025-06-20 RX ADMIN — CLONAZEPAM 2 MG: 2 TABLET ORAL at 19:48

## 2025-06-20 RX ADMIN — BUMETANIDE 1 MG: 0.25 INJECTION INTRAMUSCULAR; INTRAVENOUS at 19:49

## 2025-06-20 RX ADMIN — SODIUM CHLORIDE 40 MG: 9 INJECTION INTRAMUSCULAR; INTRAVENOUS; SUBCUTANEOUS at 19:49

## 2025-06-20 RX ADMIN — SODIUM CHLORIDE 80 MG: 9 INJECTION, SOLUTION INTRAMUSCULAR; INTRAVENOUS; SUBCUTANEOUS at 07:50

## 2025-06-20 RX ADMIN — QUETIAPINE FUMARATE 25 MG: 25 TABLET ORAL at 19:48

## 2025-06-20 RX ADMIN — SACUBITRIL AND VALSARTAN 1 TABLET: 24; 26 TABLET, FILM COATED ORAL at 20:16

## 2025-06-20 ASSESSMENT — PULMONARY FUNCTION TESTS
PIF_VALUE: 28
PIF_VALUE: 28
PIF_VALUE: 32

## 2025-06-20 ASSESSMENT — PAIN SCALES - GENERAL
PAINLEVEL_OUTOF10: 0
PAINLEVEL_OUTOF10: 0

## 2025-06-20 ASSESSMENT — LIFESTYLE VARIABLES
HOW MANY STANDARD DRINKS CONTAINING ALCOHOL DO YOU HAVE ON A TYPICAL DAY: PATIENT DOES NOT DRINK
HOW OFTEN DO YOU HAVE A DRINK CONTAINING ALCOHOL: NEVER

## 2025-06-20 ASSESSMENT — PAIN SCALES - WONG BAKER: WONGBAKER_NUMERICALRESPONSE: NO HURT

## 2025-06-20 NOTE — ED NOTES
Radiology Procedure Waiver   Name: Nikki Moffett  : 1945  MRN: [id number]    Date:  25    Time: 10:16 AM EDT    Benefits of immediately proceeding with Radiology exam(s) without pre-testing outweigh the risks or are not indicated as specified below and therefore the following is/are being waived:    [] Pregnancy test   [] Patients LMP on-time and regular.   [] Patient had Tubal Ligation or has other Contraception Device.   [] Patient  is Menopausal or Premenarcheal.    [] Patient had Full or Partial Hysterectomy.    [] Protocol for Iodine allergy    [] MRI Questionnaire     [x] BUN/Creatinine   [] Patient age w/no hx of renal dysfunction.   [] Patient on Dialysis.   [] Recent Normal Labs.  Electronically signed by Pretty Betts DO on 25 at 10:16 AM EDT

## 2025-06-20 NOTE — ED NOTES
ED to Inpatient Handoff Report    Notified tad that electronic handoff available and patient ready for transport to room 443.    Safety Risks: Risk of falls and nonverbal will shake head    Patient in Restraints: no    Constant Observer or Patient : no    Telemetry Monitoring Ordered :Yes           Order to transfer to unit without monitor:NO    Last MEWS: 1 Time completed: 1142    Deterioration Index Score:   Predictive Model Details          27 (Normal)  Factor Value    Calculated 6/20/2025 11:43 46% Age 79 years old    Deterioration Index Model 14% Respiratory rate 14     12% Hematocrit abnormal (18.9 %)     10% WBC count abnormal (12.5 k/uL)     7% Potassium 4.3 mmol/L     5% Pulse oximetry 100 %     4% BUN abnormal (88 mg/dL)     2% Systolic 118     1% Temperature 98.9 °F (37.2 °C)     0% Pulse 81     0% Sodium 139 mmol/L        Vitals:    06/20/25 0847 06/20/25 0902 06/20/25 0911 06/20/25 1142   BP: 104/64 100/88 (!) 122/94 (!) 118/42   Pulse: 92 92 91 81   Resp: 20 18 19 14   Temp: 98.7 °F (37.1 °C)   98.9 °F (37.2 °C)   TempSrc:       SpO2: 100% 100% 100% 100%   Weight:       Height:             Opportunity for questions and clarification was provided.

## 2025-06-20 NOTE — CONSENT
Informed Consent for Blood Component Transfusion Note    I have discussed with the patient the rationale for blood component transfusion; its benefits in treating or preventing fatigue, organ damage, or death; and its risk which includes mild transfusion reactions, rare risk of blood borne infection, or more serious but rare reactions. I have discussed the alternatives to transfusion, including the risk and consequences of not receiving transfusion. The patient had an opportunity to ask questions and had agreed to proceed with transfusion of blood components.    Electronically signed by Pretty Betts DO on 6/20/25 at 8:46 AM EDT

## 2025-06-20 NOTE — ED PROVIDER NOTES
interpreted by me Dr. Ventura. [JN]      ED Course User Index  [JN] Adams Ventura DO        Social Determinants affecting Dx or Tx: Nursing home resident.  Chronic trach and ventilator support.    Records Reviewed: Nursing home records sent with patient.    I am the Primary Clinician of Record.    CONSULTS: (Who and What was discussed)  IP CONSULT TO GENERAL SURGERY  IP CONSULT TO HOSPITALIST  IP CONSULT TO SOCIAL WORK  IP CONSULT TO VASCULAR ACCESS TEAM    FINAL IMPRESSION      1. Lower GI bleed    2. Anemia, unspecified type          DISPOSITION/PLAN   DISPOSITION Admitted 06/20/2025 10:25:09 AM    PATIENT REFERRED TO:  No follow-up provider specified.    DISCHARGE MEDICATIONS:  Current Discharge Medication List               (Please note that portions of this note were completed with a voice recognition program.  Efforts were made to edit the dictations but occasionally words are mis-transcribed.)    Pretty Betts DO (electronically signed)

## 2025-06-20 NOTE — H&P
[facility] Hospitalist Group   History and Physical      CHIEF COMPLAINT: Hematochezia    History of Present Illness:  79 y.o. female with a history of PAF on Eliquis s/p DCCV 3/7/25, HFrEF, respiratory failure s/p trach and PEG 3/26/25 ESRD on HD, hypothyroidism, anemia, thrombocytopenia, prediabetes, Hx hepatitis C, presents with hematochezia.  Onset this AM reported by staff at [facility]. Pt has hx PAF for which she is on eliquis. Previously hospitalized at [facility] 3/4-4/4 (see synopsis below). Hgb 6.1 in ED requiring transfusion.   Pt to be admitted for further evaluation and treatment of acute on chronic anemia and GI bleed.     Pertinent abnormal labs: WBC 13.1, RBC 2.32, Hgb 6.1, HCT 21.5, glucose 120, BUN 89, creatinine 1.7 (previously 2.1), GFR 30, calcium 10.4, albumin 3.4, AST 43, magnesium 2.8, digoxin level 0.6, lipase 87, troponin 666.    ED course: Zofran 4 mg, Protonix 80 mg, 1L NSS bolus.    Hospitalization 3/4-4/4 at [facility] for A-fib RVR and suspected pneumonia.  DCCV completed 3/7/2025 and TTE noted EF 10-15%.  Patient was transferred to ICU with acute respiratory failure, worsening renal and hepatic function.  Required pressors and was started on CVVHD.  CT head done for persistent encephalopathy which noted small insult to the left parietal region.  EEG showed triphasic waves nonspecific possibly suggestive of severe metabolic/toxic encephalopathy or dementia.  Trach and PEG 3/26 as well as right thoracentesis with 750 mL drained.  3/27 patient had left thoracentesis with 400 mL pleural fluid drained.  Patient was to have left heart cath but had acute drop in hemoglobin.  At this time general surgery did EGD which showed antral gastritis and no signs of acute bleeding.  Eliquis was resumed.  Cardiology recommended waiting 3 months for left heart cath due to acute stroke.  [facility]      Informant(s) for H&P: Pt and EMR    REVIEW OF

## 2025-06-21 ENCOUNTER — ANESTHESIA EVENT (OUTPATIENT)
Dept: OPERATING ROOM | Age: 80
End: 2025-06-21
Payer: MEDICARE

## 2025-06-21 PROBLEM — J96.11 CHRONIC HYPOXIC RESPIRATORY FAILURE (HCC): Status: ACTIVE | Noted: 2025-06-20

## 2025-06-21 PROBLEM — I50.22 CHRONIC HFREF (HEART FAILURE WITH REDUCED EJECTION FRACTION) (HCC): Status: ACTIVE | Noted: 2025-06-21

## 2025-06-21 LAB
ABO/RH: NORMAL
ANION GAP SERPL CALCULATED.3IONS-SCNC: 18 MMOL/L (ref 7–16)
ANTIBODY SCREEN: NEGATIVE
ARM BAND NUMBER: NORMAL
BASOPHILS # BLD: 0.07 K/UL (ref 0–0.2)
BASOPHILS NFR BLD: 1 % (ref 0–2)
BLOOD BANK BLOOD PRODUCT EXPIRATION DATE: NORMAL
BLOOD BANK BLOOD PRODUCT EXPIRATION DATE: NORMAL
BLOOD BANK DISPENSE STATUS: NORMAL
BLOOD BANK DISPENSE STATUS: NORMAL
BLOOD BANK ISBT PRODUCT BLOOD TYPE: 5100
BLOOD BANK ISBT PRODUCT BLOOD TYPE: 5100
BLOOD BANK PRODUCT CODE: NORMAL
BLOOD BANK PRODUCT CODE: NORMAL
BLOOD BANK SAMPLE EXPIRATION: NORMAL
BLOOD BANK UNIT TYPE AND RH: NORMAL
BLOOD BANK UNIT TYPE AND RH: NORMAL
BPU ID: NORMAL
BPU ID: NORMAL
BUN SERPL-MCNC: 75 MG/DL (ref 6–23)
CALCIUM SERPL-MCNC: 10.2 MG/DL (ref 8.6–10.2)
CHLORIDE SERPL-SCNC: 102 MMOL/L (ref 98–107)
CO2 SERPL-SCNC: 21 MMOL/L (ref 22–29)
COMPONENT: NORMAL
COMPONENT: NORMAL
CREAT SERPL-MCNC: 1.9 MG/DL (ref 0.5–1)
CROSSMATCH RESULT: NORMAL
CROSSMATCH RESULT: NORMAL
EOSINOPHIL # BLD: 0.29 K/UL (ref 0.05–0.5)
EOSINOPHILS RELATIVE PERCENT: 3 % (ref 0–6)
ERYTHROCYTE [DISTWIDTH] IN BLOOD BY AUTOMATED COUNT: 15.8 % (ref 11.5–15)
FERRITIN SERPL-MCNC: 214 NG/ML
GFR, ESTIMATED: 28 ML/MIN/1.73M2
GLUCOSE SERPL-MCNC: 82 MG/DL (ref 74–99)
HCT VFR BLD AUTO: 25.9 % (ref 34–48)
HCT VFR BLD AUTO: 26.1 % (ref 34–48)
HCT VFR BLD AUTO: 26.2 % (ref 34–48)
HCT VFR BLD AUTO: 26.3 % (ref 34–48)
HGB BLD-MCNC: 8.3 G/DL (ref 11.5–15.5)
HGB BLD-MCNC: 8.6 G/DL (ref 11.5–15.5)
HGB BLD-MCNC: 8.6 G/DL (ref 11.5–15.5)
HGB BLD-MCNC: 8.8 G/DL (ref 11.5–15.5)
IMM GRANULOCYTES # BLD AUTO: 0.06 K/UL (ref 0–0.58)
IMM GRANULOCYTES NFR BLD: 1 % (ref 0–5)
IRON SATN MFR SERPL: 19 % (ref 15–50)
IRON SERPL-MCNC: 76 UG/DL (ref 37–145)
LYMPHOCYTES NFR BLD: 2.57 K/UL (ref 1.5–4)
LYMPHOCYTES RELATIVE PERCENT: 23 % (ref 20–42)
MAGNESIUM SERPL-MCNC: 2.6 MG/DL (ref 1.6–2.6)
MCH RBC QN AUTO: 29.6 PG (ref 26–35)
MCHC RBC AUTO-ENTMCNC: 33.5 G/DL (ref 32–34.5)
MCV RBC AUTO: 88.6 FL (ref 80–99.9)
MONOCYTES NFR BLD: 0.76 K/UL (ref 0.1–0.95)
MONOCYTES NFR BLD: 7 % (ref 2–12)
NEUTROPHILS NFR BLD: 67 % (ref 43–80)
NEUTS SEG NFR BLD: 7.69 K/UL (ref 1.8–7.3)
PHOSPHATE SERPL-MCNC: 4.2 MG/DL (ref 2.5–4.5)
PLATELET # BLD AUTO: 262 K/UL (ref 130–450)
PMV BLD AUTO: 10.5 FL (ref 7–12)
POTASSIUM SERPL-SCNC: 4.1 MMOL/L (ref 3.5–5)
RBC # BLD AUTO: 2.97 M/UL (ref 3.5–5.5)
SODIUM SERPL-SCNC: 141 MMOL/L (ref 132–146)
TIBC SERPL-MCNC: 400 UG/DL (ref 250–450)
TRANSFUSION STATUS: NORMAL
TRANSFUSION STATUS: NORMAL
UNIT DIVISION: 0
UNIT DIVISION: 0
UNIT ISSUE DATE/TIME: NORMAL
UNIT ISSUE DATE/TIME: NORMAL
UNIT TAG COMMENT: NORMAL
UNIT TAG COMMENT: NORMAL
WBC OTHER # BLD: 11.4 K/UL (ref 4.5–11.5)

## 2025-06-21 PROCEDURE — 82728 ASSAY OF FERRITIN: CPT

## 2025-06-21 PROCEDURE — 94003 VENT MGMT INPAT SUBQ DAY: CPT

## 2025-06-21 PROCEDURE — 85018 HEMOGLOBIN: CPT

## 2025-06-21 PROCEDURE — G0378 HOSPITAL OBSERVATION PER HR: HCPCS

## 2025-06-21 PROCEDURE — 84100 ASSAY OF PHOSPHORUS: CPT

## 2025-06-21 PROCEDURE — 80048 BASIC METABOLIC PNL TOTAL CA: CPT

## 2025-06-21 PROCEDURE — 6370000000 HC RX 637 (ALT 250 FOR IP): Performed by: SURGERY

## 2025-06-21 PROCEDURE — 97161 PT EVAL LOW COMPLEX 20 MIN: CPT

## 2025-06-21 PROCEDURE — 2500000003 HC RX 250 WO HCPCS: Performed by: INTERNAL MEDICINE

## 2025-06-21 PROCEDURE — 99232 SBSQ HOSP IP/OBS MODERATE 35: CPT | Performed by: SURGERY

## 2025-06-21 PROCEDURE — 83550 IRON BINDING TEST: CPT

## 2025-06-21 PROCEDURE — 6360000002 HC RX W HCPCS

## 2025-06-21 PROCEDURE — 99223 1ST HOSP IP/OBS HIGH 75: CPT | Performed by: INTERNAL MEDICINE

## 2025-06-21 PROCEDURE — 6370000000 HC RX 637 (ALT 250 FOR IP)

## 2025-06-21 PROCEDURE — 6370000000 HC RX 637 (ALT 250 FOR IP): Performed by: STUDENT IN AN ORGANIZED HEALTH CARE EDUCATION/TRAINING PROGRAM

## 2025-06-21 PROCEDURE — 83540 ASSAY OF IRON: CPT

## 2025-06-21 PROCEDURE — 99232 SBSQ HOSP IP/OBS MODERATE 35: CPT | Performed by: STUDENT IN AN ORGANIZED HEALTH CARE EDUCATION/TRAINING PROGRAM

## 2025-06-21 PROCEDURE — 85025 COMPLETE CBC W/AUTO DIFF WBC: CPT

## 2025-06-21 PROCEDURE — 83735 ASSAY OF MAGNESIUM: CPT

## 2025-06-21 PROCEDURE — 2500000003 HC RX 250 WO HCPCS

## 2025-06-21 PROCEDURE — 85014 HEMATOCRIT: CPT

## 2025-06-21 RX ORDER — METOPROLOL TARTRATE 1 MG/ML
2.5 INJECTION, SOLUTION INTRAVENOUS EVERY 6 HOURS
Status: DISCONTINUED | OUTPATIENT
Start: 2025-06-21 | End: 2025-06-23

## 2025-06-21 RX ORDER — BUMETANIDE 1 MG/1
1 TABLET ORAL DAILY
Status: DISCONTINUED | OUTPATIENT
Start: 2025-06-21 | End: 2025-06-26 | Stop reason: HOSPADM

## 2025-06-21 RX ADMIN — CLONAZEPAM 2 MG: 2 TABLET ORAL at 09:01

## 2025-06-21 RX ADMIN — CLONAZEPAM 2 MG: 2 TABLET ORAL at 21:52

## 2025-06-21 RX ADMIN — SODIUM CHLORIDE 40 MG: 9 INJECTION INTRAMUSCULAR; INTRAVENOUS; SUBCUTANEOUS at 21:52

## 2025-06-21 RX ADMIN — Medication 100 MG: at 09:01

## 2025-06-21 RX ADMIN — MIDODRINE HYDROCHLORIDE 7.5 MG: 2.5 TABLET ORAL at 12:22

## 2025-06-21 RX ADMIN — 0.12% CHLORHEXIDINE GLUCONATE 15 ML: 1.2 RINSE ORAL at 21:52

## 2025-06-21 RX ADMIN — SODIUM CHLORIDE 40 MG: 9 INJECTION INTRAMUSCULAR; INTRAVENOUS; SUBCUTANEOUS at 09:01

## 2025-06-21 RX ADMIN — SACUBITRIL AND VALSARTAN 1 TABLET: 24; 26 TABLET, FILM COATED ORAL at 21:59

## 2025-06-21 RX ADMIN — QUETIAPINE FUMARATE 25 MG: 25 TABLET ORAL at 21:52

## 2025-06-21 RX ADMIN — MIDODRINE HYDROCHLORIDE 7.5 MG: 2.5 TABLET ORAL at 09:00

## 2025-06-21 RX ADMIN — MIDODRINE HYDROCHLORIDE 7.5 MG: 2.5 TABLET ORAL at 15:57

## 2025-06-21 RX ADMIN — SODIUM CHLORIDE, PRESERVATIVE FREE 10 ML: 5 INJECTION INTRAVENOUS at 21:53

## 2025-06-21 RX ADMIN — METOPROLOL TARTRATE 2.5 MG: 5 INJECTION INTRAVENOUS at 21:52

## 2025-06-21 RX ADMIN — BUMETANIDE 1 MG: 1 TABLET ORAL at 09:01

## 2025-06-21 RX ADMIN — SODIUM CHLORIDE, PRESERVATIVE FREE 10 ML: 5 INJECTION INTRAVENOUS at 09:01

## 2025-06-21 RX ADMIN — METOPROLOL TARTRATE 2.5 MG: 5 INJECTION INTRAVENOUS at 12:22

## 2025-06-21 RX ADMIN — POLYETHYLENE GLYCOL-3350 AND ELECTROLYTES 4000 ML: 236; 6.74; 5.86; 2.97; 22.74 POWDER, FOR SOLUTION ORAL at 12:22

## 2025-06-21 RX ADMIN — SACUBITRIL AND VALSARTAN 1 TABLET: 24; 26 TABLET, FILM COATED ORAL at 09:00

## 2025-06-21 RX ADMIN — 0.12% CHLORHEXIDINE GLUCONATE 15 ML: 1.2 RINSE ORAL at 09:00

## 2025-06-21 ASSESSMENT — PAIN SCALES - WONG BAKER: WONGBAKER_NUMERICALRESPONSE: NO HURT

## 2025-06-21 ASSESSMENT — PULMONARY FUNCTION TESTS
PIF_VALUE: 36
PIF_VALUE: 26
PIF_VALUE: 25
PIF_VALUE: 26

## 2025-06-21 ASSESSMENT — PAIN SCALES - GENERAL: PAINLEVEL_OUTOF10: 0

## 2025-06-22 ENCOUNTER — ANESTHESIA (OUTPATIENT)
Dept: OPERATING ROOM | Age: 80
End: 2025-06-22
Payer: MEDICARE

## 2025-06-22 LAB
ANION GAP SERPL CALCULATED.3IONS-SCNC: 27 MMOL/L (ref 7–16)
B-OH-BUTYR SERPL-MCNC: 2.2 MMOL/L (ref 0.02–0.27)
BNP SERPL-MCNC: 1892 PG/ML (ref 0–450)
BUN SERPL-MCNC: 70 MG/DL (ref 6–23)
CALCIUM SERPL-MCNC: 10.3 MG/DL (ref 8.6–10.2)
CHLORIDE SERPL-SCNC: 103 MMOL/L (ref 98–107)
CO2 SERPL-SCNC: 18 MMOL/L (ref 22–29)
CREAT SERPL-MCNC: 2.2 MG/DL (ref 0.5–1)
GFR, ESTIMATED: 23 ML/MIN/1.73M2
GLUCOSE SERPL-MCNC: 72 MG/DL (ref 74–99)
HCT VFR BLD AUTO: 24.4 % (ref 34–48)
HCT VFR BLD AUTO: 24.6 % (ref 34–48)
HCT VFR BLD AUTO: 25.2 % (ref 34–48)
HGB BLD-MCNC: 8.1 G/DL (ref 11.5–15.5)
HGB BLD-MCNC: 8.2 G/DL (ref 11.5–15.5)
HGB BLD-MCNC: 8.4 G/DL (ref 11.5–15.5)
MAGNESIUM SERPL-MCNC: 2.5 MG/DL (ref 1.6–2.6)
POTASSIUM SERPL-SCNC: 4.3 MMOL/L (ref 3.5–5)
SODIUM SERPL-SCNC: 148 MMOL/L (ref 132–146)

## 2025-06-22 PROCEDURE — 3700000000 HC ANESTHESIA ATTENDED CARE: Performed by: SURGERY

## 2025-06-22 PROCEDURE — 45378 DIAGNOSTIC COLONOSCOPY: CPT | Performed by: SURGERY

## 2025-06-22 PROCEDURE — 6360000002 HC RX W HCPCS

## 2025-06-22 PROCEDURE — G0378 HOSPITAL OBSERVATION PER HR: HCPCS

## 2025-06-22 PROCEDURE — 3600007501: Performed by: SURGERY

## 2025-06-22 PROCEDURE — 7100000010 HC PHASE II RECOVERY - FIRST 15 MIN: Performed by: SURGERY

## 2025-06-22 PROCEDURE — 3600007511: Performed by: SURGERY

## 2025-06-22 PROCEDURE — 83735 ASSAY OF MAGNESIUM: CPT

## 2025-06-22 PROCEDURE — 85018 HEMOGLOBIN: CPT

## 2025-06-22 PROCEDURE — 2500000003 HC RX 250 WO HCPCS

## 2025-06-22 PROCEDURE — 6360000002 HC RX W HCPCS: Performed by: SURGERY

## 2025-06-22 PROCEDURE — 3700000001 HC ADD 15 MINUTES (ANESTHESIA): Performed by: SURGERY

## 2025-06-22 PROCEDURE — 80048 BASIC METABOLIC PNL TOTAL CA: CPT

## 2025-06-22 PROCEDURE — 2500000003 HC RX 250 WO HCPCS: Performed by: SURGERY

## 2025-06-22 PROCEDURE — 99232 SBSQ HOSP IP/OBS MODERATE 35: CPT | Performed by: SURGERY

## 2025-06-22 PROCEDURE — 2500000003 HC RX 250 WO HCPCS: Performed by: INTERNAL MEDICINE

## 2025-06-22 PROCEDURE — 94640 AIRWAY INHALATION TREATMENT: CPT

## 2025-06-22 PROCEDURE — 0DJD8ZZ INSPECTION OF LOWER INTESTINAL TRACT, VIA NATURAL OR ARTIFICIAL OPENING ENDOSCOPIC: ICD-10-PCS | Performed by: SURGERY

## 2025-06-22 PROCEDURE — 82010 KETONE BODYS QUAN: CPT

## 2025-06-22 PROCEDURE — 6370000000 HC RX 637 (ALT 250 FOR IP): Performed by: SURGERY

## 2025-06-22 PROCEDURE — 6370000000 HC RX 637 (ALT 250 FOR IP)

## 2025-06-22 PROCEDURE — 2709999900 HC NON-CHARGEABLE SUPPLY: Performed by: SURGERY

## 2025-06-22 PROCEDURE — 99232 SBSQ HOSP IP/OBS MODERATE 35: CPT | Performed by: INTERNAL MEDICINE

## 2025-06-22 PROCEDURE — 7100000011 HC PHASE II RECOVERY - ADDTL 15 MIN: Performed by: SURGERY

## 2025-06-22 PROCEDURE — 94003 VENT MGMT INPAT SUBQ DAY: CPT

## 2025-06-22 PROCEDURE — 85014 HEMATOCRIT: CPT

## 2025-06-22 PROCEDURE — 2580000003 HC RX 258

## 2025-06-22 PROCEDURE — 6370000000 HC RX 637 (ALT 250 FOR IP): Performed by: STUDENT IN AN ORGANIZED HEALTH CARE EDUCATION/TRAINING PROGRAM

## 2025-06-22 PROCEDURE — 99232 SBSQ HOSP IP/OBS MODERATE 35: CPT | Performed by: STUDENT IN AN ORGANIZED HEALTH CARE EDUCATION/TRAINING PROGRAM

## 2025-06-22 PROCEDURE — 83880 ASSAY OF NATRIURETIC PEPTIDE: CPT

## 2025-06-22 RX ORDER — PHENYLEPHRINE HCL IN 0.9% NACL 1 MG/10 ML
SYRINGE (ML) INTRAVENOUS
Status: DISCONTINUED | OUTPATIENT
Start: 2025-06-22 | End: 2025-06-22 | Stop reason: SDUPTHER

## 2025-06-22 RX ORDER — SODIUM CHLORIDE 9 MG/ML
INJECTION, SOLUTION INTRAVENOUS
Status: DISCONTINUED | OUTPATIENT
Start: 2025-06-22 | End: 2025-06-22 | Stop reason: SDUPTHER

## 2025-06-22 RX ADMIN — CLONAZEPAM 2 MG: 2 TABLET ORAL at 20:45

## 2025-06-22 RX ADMIN — CLONAZEPAM 2 MG: 2 TABLET ORAL at 09:06

## 2025-06-22 RX ADMIN — ZIPRASIDONE MESYLATE 10 MG: 20 INJECTION, POWDER, LYOPHILIZED, FOR SOLUTION INTRAMUSCULAR at 18:39

## 2025-06-22 RX ADMIN — MIDODRINE HYDROCHLORIDE 7.5 MG: 2.5 TABLET ORAL at 12:56

## 2025-06-22 RX ADMIN — QUETIAPINE FUMARATE 25 MG: 25 TABLET ORAL at 20:45

## 2025-06-22 RX ADMIN — SODIUM CHLORIDE: 9 INJECTION, SOLUTION INTRAVENOUS at 10:33

## 2025-06-22 RX ADMIN — SODIUM CHLORIDE 40 MG: 9 INJECTION INTRAMUSCULAR; INTRAVENOUS; SUBCUTANEOUS at 20:45

## 2025-06-22 RX ADMIN — Medication 100 MG: at 09:06

## 2025-06-22 RX ADMIN — SODIUM CHLORIDE 40 MG: 9 INJECTION INTRAMUSCULAR; INTRAVENOUS; SUBCUTANEOUS at 09:06

## 2025-06-22 RX ADMIN — 0.12% CHLORHEXIDINE GLUCONATE 15 ML: 1.2 RINSE ORAL at 09:06

## 2025-06-22 RX ADMIN — Medication 100 MCG: at 10:40

## 2025-06-22 RX ADMIN — SODIUM CHLORIDE, PRESERVATIVE FREE 10 ML: 5 INJECTION INTRAVENOUS at 09:07

## 2025-06-22 RX ADMIN — 0.12% CHLORHEXIDINE GLUCONATE 15 ML: 1.2 RINSE ORAL at 20:52

## 2025-06-22 RX ADMIN — METOPROLOL TARTRATE 2.5 MG: 5 INJECTION INTRAVENOUS at 20:51

## 2025-06-22 RX ADMIN — METOPROLOL TARTRATE 2.5 MG: 5 INJECTION INTRAVENOUS at 04:21

## 2025-06-22 RX ADMIN — BUMETANIDE 1 MG: 1 TABLET ORAL at 09:06

## 2025-06-22 RX ADMIN — SODIUM CHLORIDE, PRESERVATIVE FREE 10 ML: 5 INJECTION INTRAVENOUS at 20:53

## 2025-06-22 RX ADMIN — MIDODRINE HYDROCHLORIDE 7.5 MG: 2.5 TABLET ORAL at 16:03

## 2025-06-22 RX ADMIN — SACUBITRIL AND VALSARTAN 1 TABLET: 24; 26 TABLET, FILM COATED ORAL at 20:45

## 2025-06-22 RX ADMIN — MIDODRINE HYDROCHLORIDE 7.5 MG: 2.5 TABLET ORAL at 09:06

## 2025-06-22 ASSESSMENT — PULMONARY FUNCTION TESTS
PIF_VALUE: 28
PIF_VALUE: 30
PIF_VALUE: 27
PIF_VALUE: 27
PIF_VALUE: 28
PIF_VALUE: 31
PIF_VALUE: 26

## 2025-06-22 NOTE — OP NOTE
Colonoscopy Op Note  PATIENT: Nikki Moffett    DATE OF PROCEDURE: 6/22/2025    SURGEON: Bro Pringle DO    PREOPERATIVE DIAGNOSIS: Lower GI bleed    POSTOPERATIVE DIAGNOSIS: Diverticulosis, no blood or bleeding noted    OPERATION: Procedure(s):  COLONOSCOPY DIAGNOSTIC    ANESTHESIA: Local monitored anesthesia.     ESTIMATED BLOOD LOSS: nil     COMPLICATIONS: None.     SPECIMENS:   * No specimens in log *    HISTORY: The patient is a 79 y.o. year old female with history of above preop diagnosis.  I recommended colonoscopy with possible biopsy or polypectomy and I explained the risk, benefits, expected outcome, and alternatives to the procedure.  Risks included but are not limited to bleeding, infection, respiratory distress, hypotension, and perforation of the colon.  The patient understands and is in agreement.        PROCEDURE: The patient was given IV conscious sedation per anesthesia. The patient was given supplemental oxygen by nasal cannula.  The colonoscope was inserted per rectum and advanced under direct vision to the cecum without difficulty, identified by appendiceal orifice, ileocecal valve, and light transillumination.  The prep was fair so exam was adequate.    FINDINGS:    JEN: normal    Terminal Ileum: normal    Colon: diverticulosis, no old or fresh blood, no active bleeding    Rectum/Anus: examined in normal and retroflexed positions - normal    The colon was decompressed and the scope was removed.  The withdraw time was approximately 12 minutes.  The patient tolerated the procedure well.     ASSESSMENT/PLAN:   Diverticulosis - likely cause of lower GI bleed but no active bleeding on this examination  Likely provoked by anticoagulation  OK for diet  Monitor for any signs of repeat bleeding  Anticoagulation per primary service but this could provoke repeat bleeding    Bro Pringle DO  06/22/25  10:55 AM

## 2025-06-23 LAB
ANION GAP SERPL CALCULATED.3IONS-SCNC: 22 MMOL/L (ref 7–16)
BUN SERPL-MCNC: 69 MG/DL (ref 6–23)
CALCIUM SERPL-MCNC: 10.2 MG/DL (ref 8.6–10.2)
CHLORIDE SERPL-SCNC: 104 MMOL/L (ref 98–107)
CO2 SERPL-SCNC: 18 MMOL/L (ref 22–29)
CREAT SERPL-MCNC: 2.4 MG/DL (ref 0.5–1)
GFR, ESTIMATED: 20 ML/MIN/1.73M2
GLUCOSE SERPL-MCNC: 99 MG/DL (ref 74–99)
HCT VFR BLD AUTO: 25.6 % (ref 34–48)
HCT VFR BLD AUTO: 26.2 % (ref 34–48)
HGB BLD-MCNC: 8.1 G/DL (ref 11.5–15.5)
HGB BLD-MCNC: 8.4 G/DL (ref 11.5–15.5)
MAGNESIUM SERPL-MCNC: 2.4 MG/DL (ref 1.6–2.6)
POTASSIUM SERPL-SCNC: 3.9 MMOL/L (ref 3.5–5)
SODIUM SERPL-SCNC: 144 MMOL/L (ref 132–146)

## 2025-06-23 PROCEDURE — 85018 HEMOGLOBIN: CPT

## 2025-06-23 PROCEDURE — 99233 SBSQ HOSP IP/OBS HIGH 50: CPT | Performed by: INTERNAL MEDICINE

## 2025-06-23 PROCEDURE — 94003 VENT MGMT INPAT SUBQ DAY: CPT

## 2025-06-23 PROCEDURE — 99232 SBSQ HOSP IP/OBS MODERATE 35: CPT | Performed by: SURGERY

## 2025-06-23 PROCEDURE — 6360000002 HC RX W HCPCS: Performed by: NURSE PRACTITIONER

## 2025-06-23 PROCEDURE — 83735 ASSAY OF MAGNESIUM: CPT

## 2025-06-23 PROCEDURE — 6370000000 HC RX 637 (ALT 250 FOR IP): Performed by: STUDENT IN AN ORGANIZED HEALTH CARE EDUCATION/TRAINING PROGRAM

## 2025-06-23 PROCEDURE — 2500000003 HC RX 250 WO HCPCS: Performed by: SURGERY

## 2025-06-23 PROCEDURE — 99232 SBSQ HOSP IP/OBS MODERATE 35: CPT | Performed by: STUDENT IN AN ORGANIZED HEALTH CARE EDUCATION/TRAINING PROGRAM

## 2025-06-23 PROCEDURE — 6370000000 HC RX 637 (ALT 250 FOR IP): Performed by: INTERNAL MEDICINE

## 2025-06-23 PROCEDURE — 85014 HEMATOCRIT: CPT

## 2025-06-23 PROCEDURE — 6370000000 HC RX 637 (ALT 250 FOR IP): Performed by: SURGERY

## 2025-06-23 PROCEDURE — 1200000000 HC SEMI PRIVATE

## 2025-06-23 PROCEDURE — 80048 BASIC METABOLIC PNL TOTAL CA: CPT

## 2025-06-23 RX ORDER — CARVEDILOL 3.12 MG/1
3.12 TABLET ORAL 2 TIMES DAILY WITH MEALS
Status: DISCONTINUED | OUTPATIENT
Start: 2025-06-23 | End: 2025-06-26 | Stop reason: HOSPADM

## 2025-06-23 RX ORDER — LANSOPRAZOLE 30 MG/1
30 TABLET, ORALLY DISINTEGRATING, DELAYED RELEASE ORAL
Status: DISCONTINUED | OUTPATIENT
Start: 2025-06-23 | End: 2025-06-26 | Stop reason: HOSPADM

## 2025-06-23 RX ORDER — HALOPERIDOL 5 MG/ML
5 INJECTION INTRAMUSCULAR ONCE
Status: COMPLETED | OUTPATIENT
Start: 2025-06-23 | End: 2025-06-23

## 2025-06-23 RX ADMIN — SODIUM CHLORIDE, PRESERVATIVE FREE 10 ML: 5 INJECTION INTRAVENOUS at 07:53

## 2025-06-23 RX ADMIN — SACUBITRIL AND VALSARTAN 1 TABLET: 24; 26 TABLET, FILM COATED ORAL at 07:53

## 2025-06-23 RX ADMIN — HALOPERIDOL LACTATE 5 MG: 5 INJECTION, SOLUTION INTRAMUSCULAR at 04:24

## 2025-06-23 RX ADMIN — QUETIAPINE FUMARATE 25 MG: 25 TABLET ORAL at 19:57

## 2025-06-23 RX ADMIN — Medication 100 MG: at 07:52

## 2025-06-23 RX ADMIN — METOPROLOL TARTRATE 2.5 MG: 5 INJECTION INTRAVENOUS at 11:36

## 2025-06-23 RX ADMIN — MIDODRINE HYDROCHLORIDE 7.5 MG: 2.5 TABLET ORAL at 07:52

## 2025-06-23 RX ADMIN — METOPROLOL TARTRATE 2.5 MG: 5 INJECTION INTRAVENOUS at 04:24

## 2025-06-23 RX ADMIN — LANSOPRAZOLE 30 MG: 30 TABLET, ORALLY DISINTEGRATING ORAL at 07:53

## 2025-06-23 RX ADMIN — ACETAMINOPHEN 650 MG: 325 TABLET ORAL at 19:56

## 2025-06-23 RX ADMIN — CARVEDILOL 3.12 MG: 3.12 TABLET, FILM COATED ORAL at 18:11

## 2025-06-23 RX ADMIN — CLONAZEPAM 2 MG: 2 TABLET ORAL at 19:57

## 2025-06-23 RX ADMIN — CLONAZEPAM 2 MG: 2 TABLET ORAL at 07:53

## 2025-06-23 RX ADMIN — 0.12% CHLORHEXIDINE GLUCONATE 15 ML: 1.2 RINSE ORAL at 19:56

## 2025-06-23 RX ADMIN — BUMETANIDE 1 MG: 1 TABLET ORAL at 07:53

## 2025-06-23 RX ADMIN — 0.12% CHLORHEXIDINE GLUCONATE 15 ML: 1.2 RINSE ORAL at 07:52

## 2025-06-23 RX ADMIN — SODIUM CHLORIDE, PRESERVATIVE FREE 10 ML: 5 INJECTION INTRAVENOUS at 19:57

## 2025-06-23 ASSESSMENT — PULMONARY FUNCTION TESTS
PIF_VALUE: 300
PIF_VALUE: 27
PIF_VALUE: 30
PIF_VALUE: 26
PIF_VALUE: 29

## 2025-06-23 NOTE — CARE COORDINATION
CARE MANAGEMENT..... [facility] from [facility]. Messaged [facility] via [facility] to see if facility can accept back. Will follow

## 2025-06-24 LAB
ANION GAP SERPL CALCULATED.3IONS-SCNC: 18 MMOL/L (ref 7–16)
BASOPHILS # BLD: 0.06 K/UL (ref 0–0.2)
BASOPHILS NFR BLD: 1 % (ref 0–2)
BNP SERPL-MCNC: 5619 PG/ML (ref 0–450)
BUN SERPL-MCNC: 67 MG/DL (ref 6–23)
CALCIUM SERPL-MCNC: 10.7 MG/DL (ref 8.6–10.2)
CHLORIDE SERPL-SCNC: 105 MMOL/L (ref 98–107)
CO2 SERPL-SCNC: 19 MMOL/L (ref 22–29)
CREAT SERPL-MCNC: 2.2 MG/DL (ref 0.5–1)
EOSINOPHIL # BLD: 0.46 K/UL (ref 0.05–0.5)
EOSINOPHILS RELATIVE PERCENT: 5 % (ref 0–6)
ERYTHROCYTE [DISTWIDTH] IN BLOOD BY AUTOMATED COUNT: 15.7 % (ref 11.5–15)
GFR, ESTIMATED: 23 ML/MIN/1.73M2
GLUCOSE SERPL-MCNC: 127 MG/DL (ref 74–99)
HCT VFR BLD AUTO: 27.4 % (ref 34–48)
HGB BLD-MCNC: 8.4 G/DL (ref 11.5–15.5)
IMM GRANULOCYTES # BLD AUTO: 0.03 K/UL (ref 0–0.58)
IMM GRANULOCYTES NFR BLD: 0 % (ref 0–5)
LYMPHOCYTES NFR BLD: 2.34 K/UL (ref 1.5–4)
LYMPHOCYTES RELATIVE PERCENT: 27 % (ref 20–42)
MAGNESIUM SERPL-MCNC: 2.3 MG/DL (ref 1.6–2.6)
MCH RBC QN AUTO: 28.9 PG (ref 26–35)
MCHC RBC AUTO-ENTMCNC: 30.7 G/DL (ref 32–34.5)
MCV RBC AUTO: 94.2 FL (ref 80–99.9)
MONOCYTES NFR BLD: 0.51 K/UL (ref 0.1–0.95)
MONOCYTES NFR BLD: 6 % (ref 2–12)
NEUTROPHILS NFR BLD: 61 % (ref 43–80)
NEUTS SEG NFR BLD: 5.38 K/UL (ref 1.8–7.3)
PHOSPHATE SERPL-MCNC: 5.7 MG/DL (ref 2.5–4.5)
PLATELET # BLD AUTO: 262 K/UL (ref 130–450)
PMV BLD AUTO: 9.9 FL (ref 7–12)
POTASSIUM SERPL-SCNC: 3.8 MMOL/L (ref 3.5–5)
RBC # BLD AUTO: 2.91 M/UL (ref 3.5–5.5)
SODIUM SERPL-SCNC: 142 MMOL/L (ref 132–146)
WBC OTHER # BLD: 8.8 K/UL (ref 4.5–11.5)

## 2025-06-24 PROCEDURE — 6370000000 HC RX 637 (ALT 250 FOR IP): Performed by: SURGERY

## 2025-06-24 PROCEDURE — 80048 BASIC METABOLIC PNL TOTAL CA: CPT

## 2025-06-24 PROCEDURE — 6370000000 HC RX 637 (ALT 250 FOR IP): Performed by: INTERNAL MEDICINE

## 2025-06-24 PROCEDURE — 2500000003 HC RX 250 WO HCPCS: Performed by: SURGERY

## 2025-06-24 PROCEDURE — 83735 ASSAY OF MAGNESIUM: CPT

## 2025-06-24 PROCEDURE — 6370000000 HC RX 637 (ALT 250 FOR IP): Performed by: STUDENT IN AN ORGANIZED HEALTH CARE EDUCATION/TRAINING PROGRAM

## 2025-06-24 PROCEDURE — 85025 COMPLETE CBC W/AUTO DIFF WBC: CPT

## 2025-06-24 PROCEDURE — 99232 SBSQ HOSP IP/OBS MODERATE 35: CPT | Performed by: STUDENT IN AN ORGANIZED HEALTH CARE EDUCATION/TRAINING PROGRAM

## 2025-06-24 PROCEDURE — 97165 OT EVAL LOW COMPLEX 30 MIN: CPT

## 2025-06-24 PROCEDURE — 97530 THERAPEUTIC ACTIVITIES: CPT

## 2025-06-24 PROCEDURE — 1200000000 HC SEMI PRIVATE

## 2025-06-24 PROCEDURE — 84100 ASSAY OF PHOSPHORUS: CPT

## 2025-06-24 PROCEDURE — 83880 ASSAY OF NATRIURETIC PEPTIDE: CPT

## 2025-06-24 PROCEDURE — 99233 SBSQ HOSP IP/OBS HIGH 50: CPT | Performed by: INTERNAL MEDICINE

## 2025-06-24 PROCEDURE — 94003 VENT MGMT INPAT SUBQ DAY: CPT

## 2025-06-24 PROCEDURE — 36415 COLL VENOUS BLD VENIPUNCTURE: CPT

## 2025-06-24 RX ADMIN — BUMETANIDE 1 MG: 1 TABLET ORAL at 10:32

## 2025-06-24 RX ADMIN — CARVEDILOL 3.12 MG: 3.12 TABLET, FILM COATED ORAL at 10:32

## 2025-06-24 RX ADMIN — Medication 100 MG: at 10:31

## 2025-06-24 RX ADMIN — CLONAZEPAM 2 MG: 2 TABLET ORAL at 19:53

## 2025-06-24 RX ADMIN — MIDODRINE HYDROCHLORIDE 7.5 MG: 2.5 TABLET ORAL at 16:50

## 2025-06-24 RX ADMIN — SODIUM CHLORIDE, PRESERVATIVE FREE 10 ML: 5 INJECTION INTRAVENOUS at 19:54

## 2025-06-24 RX ADMIN — QUETIAPINE FUMARATE 25 MG: 25 TABLET ORAL at 19:53

## 2025-06-24 RX ADMIN — CARVEDILOL 3.12 MG: 3.12 TABLET, FILM COATED ORAL at 16:50

## 2025-06-24 RX ADMIN — 0.12% CHLORHEXIDINE GLUCONATE 15 ML: 1.2 RINSE ORAL at 19:54

## 2025-06-24 RX ADMIN — SODIUM CHLORIDE, PRESERVATIVE FREE 10 ML: 5 INJECTION INTRAVENOUS at 10:32

## 2025-06-24 RX ADMIN — CLONAZEPAM 2 MG: 2 TABLET ORAL at 10:32

## 2025-06-24 RX ADMIN — 0.12% CHLORHEXIDINE GLUCONATE 15 ML: 1.2 RINSE ORAL at 10:31

## 2025-06-24 RX ADMIN — MIDODRINE HYDROCHLORIDE 7.5 MG: 2.5 TABLET ORAL at 12:40

## 2025-06-24 RX ADMIN — LANSOPRAZOLE 30 MG: 30 TABLET, ORALLY DISINTEGRATING ORAL at 05:48

## 2025-06-24 ASSESSMENT — PULMONARY FUNCTION TESTS
PIF_VALUE: 24
PIF_VALUE: 24
PIF_VALUE: 34
PIF_VALUE: 22
PIF_VALUE: 29

## 2025-06-24 NOTE — ACP (ADVANCE CARE PLANNING)
.Advance Care Planning   Healthcare Decision Maker:    Primary Decision Maker: Shreyas Moffett - Child - [phone]    Secondary Decision Maker: Mauricio Moffett - Grandchild - [phone]

## 2025-06-24 NOTE — DISCHARGE INSTR - COC
Continuity of Care Form    Patient Name: Nikki Moffett   :  1945  MRN:  [id number]    Admit date:  2025  Discharge date:  2025    Code Status Order: Full Code   Advance Directives:     Admitting Physician:  Donald Headley MD  PCP: Tiesha Panda MD    Discharging Nurse: Lainey Rowley RN  Discharging Hospital Unit/Room#: 0443/0443-A  Discharging Unit Phone Number: [phone]    Emergency Contact:   Extended Emergency Contact Information  Primary Emergency Contact: Shreyas Moffett  Address: [address] [place] [address] of [facility]  Home Phone: [phone]  Mobile Phone: [phone]  Relation: Child  Secondary Emergency Contact: Mauricio Moffett  Home Phone: [phone]  Mobile Phone: [phone]  Relation: Grandchild    Past Surgical History:  Past Surgical History:   Procedure Laterality Date    COLONOSCOPY N/A 2025    COLONOSCOPY DIAGNOSTIC performed by Bro Pringle DO at [facility] OR    IR MECHANICAL ART THROMBECTOMY INTRACRANIAL  2023    IR MECHANICAL ART THROMBECTOMY INTRACRANIAL 2023 Dominik aCstillo MD [facility] SPECIAL PROCEDURES    IR TUNNELED CVC PLACE WO SQ PORT/PUMP > 5 YEARS  2025    IR TUNNELED CVC PLACE WO SQ PORT/PUMP > 5 YEARS 3/20/2025 Clarence, Natalya Haines MD [facility] SPECIAL PROCEDURES    THORACENTESIS Right 2025    750cc clear yellow fluid    TRACHEAL SURGERY N/A 3/26/2025    TRACHEOTOMY PERCUTANEOUS BRONCHOSCOPY performed by Ryne Bloom MD at [facility] ENDOSCOPY    UPPER GASTROINTESTINAL ENDOSCOPY N/A 3/26/2025    ESOPHAGOGASTRODUODENOSCOPY PERCUTANEOUS ENDOSCOPIC GASTROSTOMY TUBE INSERTION performed by Ryne Bloom MD at [facility] ENDOSCOPY    UPPER GASTROINTESTINAL ENDOSCOPY N/A 4/3/2025    ESOPHAGOGASTRODUODENOSCOPY BIOPSY performed by Pradip Tuttle MD at [facility] ENDOSCOPY       Immunization History:   Immunization History   Administered Date(s) Administered    Pneumococcal, PCV20, PREVNAR 20, (age 6w+), IM,

## 2025-06-24 NOTE — CARE COORDINATION
CARE MANAGEMENT....Patient admitted from [facility]. Trach/Vent/Peg tube with tube feeds. Cardio adjusting meds. Surgery on for GIB. Spoke with son, Shreyas [phone], and confirmed that she will return. Verified with [facility] that patient is accepted back with precert. PT/OT on consult -requested to see. Once therapy notes documented then facility can start precert. N [address] in [facility]. Ambulance forms in chart. Will follow.

## 2025-06-25 LAB
ANION GAP SERPL CALCULATED.3IONS-SCNC: 19 MMOL/L (ref 7–16)
BASOPHILS # BLD: 0.07 K/UL (ref 0–0.2)
BASOPHILS NFR BLD: 1 % (ref 0–2)
BUN SERPL-MCNC: 81 MG/DL (ref 8–23)
CALCIUM SERPL-MCNC: 10.3 MG/DL (ref 8.8–10.2)
CHLORIDE SERPL-SCNC: 105 MMOL/L (ref 98–107)
CO2 SERPL-SCNC: 17 MMOL/L (ref 22–29)
CREAT SERPL-MCNC: 2.5 MG/DL (ref 0.5–1)
EOSINOPHIL # BLD: 0.47 K/UL (ref 0.05–0.5)
EOSINOPHILS RELATIVE PERCENT: 5 % (ref 0–6)
ERYTHROCYTE [DISTWIDTH] IN BLOOD BY AUTOMATED COUNT: 16 % (ref 11.5–15)
GFR, ESTIMATED: 20 ML/MIN/1.73M2
GLUCOSE SERPL-MCNC: 107 MG/DL (ref 74–99)
HCT VFR BLD AUTO: 26.5 % (ref 34–48)
HGB BLD-MCNC: 8.2 G/DL (ref 11.5–15.5)
IMM GRANULOCYTES # BLD AUTO: 0.04 K/UL (ref 0–0.58)
IMM GRANULOCYTES NFR BLD: 0 % (ref 0–5)
LYMPHOCYTES NFR BLD: 2.86 K/UL (ref 1.5–4)
LYMPHOCYTES RELATIVE PERCENT: 28 % (ref 20–42)
MAGNESIUM SERPL-MCNC: 2.3 MG/DL (ref 1.6–2.4)
MCH RBC QN AUTO: 29.2 PG (ref 26–35)
MCHC RBC AUTO-ENTMCNC: 30.9 G/DL (ref 32–34.5)
MCV RBC AUTO: 94.3 FL (ref 80–99.9)
MONOCYTES NFR BLD: 0.53 K/UL (ref 0.1–0.95)
MONOCYTES NFR BLD: 5 % (ref 2–12)
NEUTROPHILS NFR BLD: 62 % (ref 43–80)
NEUTS SEG NFR BLD: 6.4 K/UL (ref 1.8–7.3)
PHOSPHATE SERPL-MCNC: 5.4 MG/DL (ref 2.5–4.5)
PLATELET # BLD AUTO: 277 K/UL (ref 130–450)
PMV BLD AUTO: 10.3 FL (ref 7–12)
POTASSIUM SERPL-SCNC: 3.8 MMOL/L (ref 3.5–5.1)
RBC # BLD AUTO: 2.81 M/UL (ref 3.5–5.5)
SODIUM SERPL-SCNC: 141 MMOL/L (ref 136–145)
WBC OTHER # BLD: 10.4 K/UL (ref 4.5–11.5)

## 2025-06-25 PROCEDURE — 6360000002 HC RX W HCPCS: Performed by: SURGERY

## 2025-06-25 PROCEDURE — 6370000000 HC RX 637 (ALT 250 FOR IP): Performed by: INTERNAL MEDICINE

## 2025-06-25 PROCEDURE — 99232 SBSQ HOSP IP/OBS MODERATE 35: CPT | Performed by: INTERNAL MEDICINE

## 2025-06-25 PROCEDURE — 51701 INSERT BLADDER CATHETER: CPT

## 2025-06-25 PROCEDURE — 51798 US URINE CAPACITY MEASURE: CPT

## 2025-06-25 PROCEDURE — 6370000000 HC RX 637 (ALT 250 FOR IP): Performed by: SURGERY

## 2025-06-25 PROCEDURE — 84100 ASSAY OF PHOSPHORUS: CPT

## 2025-06-25 PROCEDURE — 85025 COMPLETE CBC W/AUTO DIFF WBC: CPT

## 2025-06-25 PROCEDURE — 99232 SBSQ HOSP IP/OBS MODERATE 35: CPT | Performed by: STUDENT IN AN ORGANIZED HEALTH CARE EDUCATION/TRAINING PROGRAM

## 2025-06-25 PROCEDURE — 83735 ASSAY OF MAGNESIUM: CPT

## 2025-06-25 PROCEDURE — 36415 COLL VENOUS BLD VENIPUNCTURE: CPT

## 2025-06-25 PROCEDURE — 2500000003 HC RX 250 WO HCPCS: Performed by: SURGERY

## 2025-06-25 PROCEDURE — 87086 URINE CULTURE/COLONY COUNT: CPT

## 2025-06-25 PROCEDURE — 6370000000 HC RX 637 (ALT 250 FOR IP): Performed by: STUDENT IN AN ORGANIZED HEALTH CARE EDUCATION/TRAINING PROGRAM

## 2025-06-25 PROCEDURE — 80048 BASIC METABOLIC PNL TOTAL CA: CPT

## 2025-06-25 PROCEDURE — 1200000000 HC SEMI PRIVATE

## 2025-06-25 PROCEDURE — 94003 VENT MGMT INPAT SUBQ DAY: CPT

## 2025-06-25 RX ORDER — SODIUM BICARBONATE 650 MG/1
1300 TABLET ORAL 2 TIMES DAILY
Status: DISCONTINUED | OUTPATIENT
Start: 2025-06-25 | End: 2025-06-26 | Stop reason: HOSPADM

## 2025-06-25 RX ADMIN — CLONAZEPAM 2 MG: 2 TABLET ORAL at 19:58

## 2025-06-25 RX ADMIN — CARVEDILOL 3.12 MG: 3.12 TABLET, FILM COATED ORAL at 07:47

## 2025-06-25 RX ADMIN — ZIPRASIDONE MESYLATE 10 MG: 20 INJECTION, POWDER, LYOPHILIZED, FOR SOLUTION INTRAMUSCULAR at 16:20

## 2025-06-25 RX ADMIN — SODIUM CHLORIDE, PRESERVATIVE FREE 10 ML: 5 INJECTION INTRAVENOUS at 07:48

## 2025-06-25 RX ADMIN — MIDODRINE HYDROCHLORIDE 7.5 MG: 2.5 TABLET ORAL at 15:49

## 2025-06-25 RX ADMIN — MIDODRINE HYDROCHLORIDE 7.5 MG: 2.5 TABLET ORAL at 11:13

## 2025-06-25 RX ADMIN — SODIUM BICARBONATE 1300 MG: 650 TABLET ORAL at 14:58

## 2025-06-25 RX ADMIN — QUETIAPINE FUMARATE 25 MG: 25 TABLET ORAL at 19:58

## 2025-06-25 RX ADMIN — 0.12% CHLORHEXIDINE GLUCONATE 15 ML: 1.2 RINSE ORAL at 19:59

## 2025-06-25 RX ADMIN — Medication 100 MG: at 07:47

## 2025-06-25 RX ADMIN — CLONAZEPAM 2 MG: 2 TABLET ORAL at 07:47

## 2025-06-25 RX ADMIN — LANSOPRAZOLE 30 MG: 30 TABLET, ORALLY DISINTEGRATING ORAL at 06:06

## 2025-06-25 RX ADMIN — SODIUM BICARBONATE 1300 MG: 650 TABLET ORAL at 19:58

## 2025-06-25 RX ADMIN — MIDODRINE HYDROCHLORIDE 7.5 MG: 2.5 TABLET ORAL at 07:47

## 2025-06-25 RX ADMIN — 0.12% CHLORHEXIDINE GLUCONATE 15 ML: 1.2 RINSE ORAL at 07:47

## 2025-06-25 RX ADMIN — ACETAMINOPHEN 650 MG: 325 TABLET ORAL at 22:16

## 2025-06-25 RX ADMIN — BUMETANIDE 1 MG: 1 TABLET ORAL at 07:47

## 2025-06-25 ASSESSMENT — PULMONARY FUNCTION TESTS
PIF_VALUE: 30
PIF_VALUE: 32
PIF_VALUE: 29
PIF_VALUE: 32
PIF_VALUE: 33
PIF_VALUE: 23

## 2025-06-25 ASSESSMENT — PAIN SCALES - GENERAL: PAINLEVEL_OUTOF10: 0

## 2025-06-25 NOTE — CARE COORDINATION
CARE MANAGEMENT.... PT 6/OT 6. Requested [facility] to start precert. N [facility] in [facility]. Ambulance forms in chart. Will follow.

## 2025-06-25 NOTE — CONSULTS
Nephrology Consult Note      Reason for Consult: ANTONIO on CKD  Date of Service: 6/25/2025   Chief Complaint: had concerns including Rectal Bleeding (Bleeding since this AM).  History Obtained From: patient electronic medical record     History of Presenting illness:  Nikki Moffett is a 79 y.o. female with a past medical history of CVA, hypertension, hyperlipidemia, prior ANTONIO with renal failure, A-fib.  She is well-known to our service and has previously been followed at [facility].    They presented on 6/20/2025 complaining of rectal bleeding.  She was sent from [facility] for hematochezia.  Renal function progressively worsened with initial creatinine of 1.8 and progressive uptrend to 2.5 mg/dL.  Of note she does have prior history of prerenal ANTONIO leading to hemodialysis and subsequent liberation from dialysis with a baseline creatinine around 1.8 mg/dL at that time.  Nephrology was consulted for ANTONIO.     Past Medical History:   Past Medical History:   Diagnosis Date    A-fib (HCC)     HFrEF (heart failure with reduced ejection fraction) (HCC)     HLD (hyperlipidemia)     HTN (hypertension)     Interatrial cardiac shunt     on echo 2023    L MCA stroke 2023    s/p thrombectomy       Past Surgical History:   Past Surgical History:   Procedure Laterality Date    COLONOSCOPY N/A 6/22/2025    COLONOSCOPY DIAGNOSTIC performed by Bro Pringle DO at [facility] OR    IR MECHANICAL ART THROMBECTOMY INTRACRANIAL  12/21/2023    IR MECHANICAL ART THROMBECTOMY INTRACRANIAL 12/21/2023 Dominik Castillo MD [facility] SPECIAL PROCEDURES    IR TUNNELED CVC PLACE WO SQ PORT/PUMP > 5 YEARS  03/20/2025    IR TUNNELED CVC PLACE WO SQ PORT/PUMP > 5 YEARS 3/20/2025 Clarence, Natalya Haines MD [facility] SPECIAL PROCEDURES    THORACENTESIS Right 03/26/2025    750cc clear yellow fluid    TRACHEAL SURGERY N/A 3/26/2025    TRACHEOTOMY PERCUTANEOUS BRONCHOSCOPY performed by Ryne Bloom MD at [facility] ENDOSCOPY    UPPER 
General Surgery   Consult Note      Patient's Name/Date of Birth: Nikki Moffett / 1945    Date: June 20, 2025     PCP: Tiesha Panda MD     Chief Complaint:   Chief Complaint   Patient presents with    Rectal Bleeding     Bleeding since this AM     HPI:   Nikki Moffett is a 79 y.o. female who was sent in from [facility] for rectal bleeding for which general surgery was consulted. Pt is non-verbal therefore history is very limited. She shakes her head in agreement when asked about abdominal pain.     PMHx includes HFrEF, HLD, HTN, left MCA stroke s/p thrombectomy 2023, A-fib on Eliquis.  Patient is well-known to the general surgery service and was seen in March 2025 for tracheostomy and PEG tube placement.  She underwent percutaneous tracheostomy insertion as well as EGD with PEG tube placement on 3/26/2025 with findings of mild gastritis at that time.  She was seen again at the beginning of April and underwent an EGD on 4/3 for concerns of GI bleeding with findings of antral gastritis.  Patient was seen again on 5/1 by Dr. Flowers for acute anemia and GI bleeding.    Patient is afebrile, was tachycardic 102-107 but that resolved, hemodynamically stable with /94.  She is trach dependent.  Labs reviewed with BUN 88, CR 1.8, lactate 0.9, lipase 87, WBC 12.5, Hgb 6.1, MCV 91.3. Hgb on 3/7/25 was 12.6 and has decreased since then but remained stable around 7-8. She received 2 uPRBCs in the ED.     Patient Active Problem List   Diagnosis    Stroke due to embolism of left middle cerebral artery (HCC)    Occipital stroke (HCC)    Atrial flutter (HCC)    Paroxysmal atrial fibrillation (HCC)    H/O: stroke    Atrial flutter with rapid ventricular response (HCC)    Decompensated heart failure (HCC)    Chronic atrial fibrillation (HCC)    VHD (valvular heart disease)    ANTONIO (acute kidney injury)    Palliative care encounter    Metabolic encephalopathy    Persistent atrial fibrillation (HCC)    Acute systolic heart 
Patient unable to answer   Social Connections: Unknown (4/11/2025)    Received from [facility]    Social Connection and Isolation Panel [NHANES]     Frequency of Communication with Friends and Family: Three times a week     Frequency of Social Gatherings with Friends and Family: Three times a week     Attends Tenriism Services: Never     Active Member of Clubs or Organizations: No     Attends Club or Organization Meetings: Never     Marital Status: Patient declined   Intimate Partner Violence: At Risk (4/4/2025)    Received from [facility]    Domestic Abuse Assessment     Do you feel safe in your relationships at home?: Unable to assess     Physical Abuse: Unable to assess     [facility] Domestic Abuse - Type of Abuse: Not on file     [facility] Domestic Abuse - Time Frame: Not on file     [facility] Domestic Abuse - Signs and Symptoms: Not on file     Verbal Abuse: Unable to assess     Possible abuse reported to:: [facility]   Housing Stability: Low Risk  (6/20/2025)    Housing Stability Vital Sign     Unable to Pay for Housing in the Last Year: No     Number of Times Moved in the Last Year: 1     Homeless in the Last Year: No       Allergies:  No Known Allergies    Home Medications:  Prior to Admission medications    Medication Sig Start Date End Date Taking? Authorizing Provider   bumetanide (BUMEX) 1 MG tablet Take 1 tablet by mouth every morning   Yes Jose Fowler MD   vitamin D (VITAMIN D3) 50 MCG (2000 UT) CAPS capsule Take 1 capsule by mouth every morning   Yes Jose Fowler MD   clonazePAM (KLONOPIN) 2 MG tablet Take 1 tablet by mouth 2 times daily.   Yes Jose Fowler MD   digoxin (LANOXIN) 125 MCG tablet Take 0.5 tablets by mouth three times a week AM  *MON-WED-FRI*   Yes Jose Fowler MD   heparin, porcine, 5000 UNIT/ML injection Inject 1 mL into the skin every 8 hours   Yes Jose Fowler MD   midodrine (PROAMATINE) 5 MG tablet Take 1.5 tablets by mouth 3 times daily

## 2025-06-26 VITALS
RESPIRATION RATE: 13 BRPM | HEIGHT: 62 IN | HEART RATE: 74 BPM | OXYGEN SATURATION: 100 % | TEMPERATURE: 97.7 F | SYSTOLIC BLOOD PRESSURE: 138 MMHG | DIASTOLIC BLOOD PRESSURE: 77 MMHG | BODY MASS INDEX: 26.3 KG/M2 | WEIGHT: 142.9 LBS

## 2025-06-26 LAB
ANION GAP SERPL CALCULATED.3IONS-SCNC: 16 MMOL/L (ref 7–16)
BASOPHILS # BLD: 0.05 K/UL (ref 0–0.2)
BASOPHILS NFR BLD: 1 % (ref 0–2)
BNP SERPL-MCNC: 2964 PG/ML (ref 0–450)
BUN SERPL-MCNC: 79 MG/DL (ref 8–23)
CALCIUM SERPL-MCNC: 10.5 MG/DL (ref 8.8–10.2)
CHLORIDE SERPL-SCNC: 104 MMOL/L (ref 98–107)
CO2 SERPL-SCNC: 20 MMOL/L (ref 22–29)
CREAT SERPL-MCNC: 2.3 MG/DL (ref 0.5–1)
EOSINOPHIL # BLD: 0.39 K/UL (ref 0.05–0.5)
EOSINOPHILS RELATIVE PERCENT: 5 % (ref 0–6)
ERYTHROCYTE [DISTWIDTH] IN BLOOD BY AUTOMATED COUNT: 15.5 % (ref 11.5–15)
GFR, ESTIMATED: 21 ML/MIN/1.73M2
GLUCOSE SERPL-MCNC: 98 MG/DL (ref 74–99)
HCT VFR BLD AUTO: 29 % (ref 34–48)
HGB BLD-MCNC: 8.9 G/DL (ref 11.5–15.5)
IMM GRANULOCYTES # BLD AUTO: <0.03 K/UL (ref 0–0.58)
IMM GRANULOCYTES NFR BLD: 0 % (ref 0–5)
LYMPHOCYTES NFR BLD: 2.08 K/UL (ref 1.5–4)
LYMPHOCYTES RELATIVE PERCENT: 25 % (ref 20–42)
MAGNESIUM SERPL-MCNC: 2.3 MG/DL (ref 1.6–2.4)
MCH RBC QN AUTO: 28.6 PG (ref 26–35)
MCHC RBC AUTO-ENTMCNC: 30.7 G/DL (ref 32–34.5)
MCV RBC AUTO: 93.2 FL (ref 80–99.9)
MICROORGANISM SPEC CULT: ABNORMAL
MONOCYTES NFR BLD: 0.41 K/UL (ref 0.1–0.95)
MONOCYTES NFR BLD: 5 % (ref 2–12)
NEUTROPHILS NFR BLD: 65 % (ref 43–80)
NEUTS SEG NFR BLD: 5.47 K/UL (ref 1.8–7.3)
PHOSPHATE SERPL-MCNC: 5.1 MG/DL (ref 2.5–4.5)
PLATELET # BLD AUTO: 266 K/UL (ref 130–450)
PMV BLD AUTO: 10.5 FL (ref 7–12)
POTASSIUM SERPL-SCNC: 3.5 MMOL/L (ref 3.5–5.1)
RBC # BLD AUTO: 3.11 M/UL (ref 3.5–5.5)
SERVICE CMNT-IMP: ABNORMAL
SODIUM SERPL-SCNC: 141 MMOL/L (ref 136–145)
SPECIMEN DESCRIPTION: ABNORMAL
WBC OTHER # BLD: 8.4 K/UL (ref 4.5–11.5)

## 2025-06-26 PROCEDURE — 6370000000 HC RX 637 (ALT 250 FOR IP): Performed by: SURGERY

## 2025-06-26 PROCEDURE — 83735 ASSAY OF MAGNESIUM: CPT

## 2025-06-26 PROCEDURE — 51798 US URINE CAPACITY MEASURE: CPT

## 2025-06-26 PROCEDURE — 36415 COLL VENOUS BLD VENIPUNCTURE: CPT

## 2025-06-26 PROCEDURE — 85025 COMPLETE CBC W/AUTO DIFF WBC: CPT

## 2025-06-26 PROCEDURE — 6370000000 HC RX 637 (ALT 250 FOR IP): Performed by: INTERNAL MEDICINE

## 2025-06-26 PROCEDURE — 80048 BASIC METABOLIC PNL TOTAL CA: CPT

## 2025-06-26 PROCEDURE — 83880 ASSAY OF NATRIURETIC PEPTIDE: CPT

## 2025-06-26 PROCEDURE — 99239 HOSP IP/OBS DSCHRG MGMT >30: CPT | Performed by: INTERNAL MEDICINE

## 2025-06-26 PROCEDURE — 84100 ASSAY OF PHOSPHORUS: CPT

## 2025-06-26 PROCEDURE — 99232 SBSQ HOSP IP/OBS MODERATE 35: CPT | Performed by: INTERNAL MEDICINE

## 2025-06-26 PROCEDURE — 94003 VENT MGMT INPAT SUBQ DAY: CPT

## 2025-06-26 PROCEDURE — 6370000000 HC RX 637 (ALT 250 FOR IP): Performed by: STUDENT IN AN ORGANIZED HEALTH CARE EDUCATION/TRAINING PROGRAM

## 2025-06-26 PROCEDURE — 51701 INSERT BLADDER CATHETER: CPT

## 2025-06-26 RX ORDER — BUMETANIDE 1 MG/1
1 TABLET ORAL DAILY
Qty: 30 TABLET | Refills: 0 | Status: ON HOLD
Start: 2025-06-26

## 2025-06-26 RX ORDER — SODIUM BICARBONATE 650 MG/1
1300 TABLET ORAL 2 TIMES DAILY
Qty: 120 TABLET | Refills: 2 | Status: ON HOLD | OUTPATIENT
Start: 2025-06-26

## 2025-06-26 RX ORDER — CHLORHEXIDINE GLUCONATE ORAL RINSE 1.2 MG/ML
15 SOLUTION DENTAL 2 TIMES DAILY
Qty: 420 ML | Refills: 0 | Status: ON HOLD
Start: 2025-06-26 | End: 2025-07-10

## 2025-06-26 RX ORDER — LANSOPRAZOLE 30 MG/1
30 TABLET, ORALLY DISINTEGRATING, DELAYED RELEASE ORAL
Qty: 30 TABLET | Refills: 1 | Status: ON HOLD
Start: 2025-06-27

## 2025-06-26 RX ORDER — CARVEDILOL 3.12 MG/1
3.12 TABLET ORAL 2 TIMES DAILY WITH MEALS
Qty: 60 TABLET | Refills: 3 | Status: ON HOLD
Start: 2025-06-26

## 2025-06-26 RX ADMIN — QUETIAPINE FUMARATE 25 MG: 25 TABLET ORAL at 19:57

## 2025-06-26 RX ADMIN — SODIUM BICARBONATE 1300 MG: 650 TABLET ORAL at 07:41

## 2025-06-26 RX ADMIN — Medication 100 MG: at 07:41

## 2025-06-26 RX ADMIN — MIDODRINE HYDROCHLORIDE 7.5 MG: 2.5 TABLET ORAL at 12:53

## 2025-06-26 RX ADMIN — 0.12% CHLORHEXIDINE GLUCONATE 15 ML: 1.2 RINSE ORAL at 07:41

## 2025-06-26 RX ADMIN — CARVEDILOL 3.12 MG: 3.12 TABLET, FILM COATED ORAL at 15:36

## 2025-06-26 RX ADMIN — LANSOPRAZOLE 30 MG: 30 TABLET, ORALLY DISINTEGRATING ORAL at 06:37

## 2025-06-26 RX ADMIN — 0.12% CHLORHEXIDINE GLUCONATE 15 ML: 1.2 RINSE ORAL at 19:57

## 2025-06-26 RX ADMIN — CLONAZEPAM 2 MG: 2 TABLET ORAL at 19:57

## 2025-06-26 RX ADMIN — CLONAZEPAM 2 MG: 2 TABLET ORAL at 07:41

## 2025-06-26 RX ADMIN — MIDODRINE HYDROCHLORIDE 7.5 MG: 2.5 TABLET ORAL at 15:36

## 2025-06-26 RX ADMIN — ACETAMINOPHEN 650 MG: 325 TABLET ORAL at 07:41

## 2025-06-26 RX ADMIN — MIDODRINE HYDROCHLORIDE 7.5 MG: 2.5 TABLET ORAL at 07:41

## 2025-06-26 RX ADMIN — CARVEDILOL 3.12 MG: 3.12 TABLET, FILM COATED ORAL at 07:41

## 2025-06-26 RX ADMIN — SODIUM BICARBONATE 1300 MG: 650 TABLET ORAL at 19:57

## 2025-06-26 ASSESSMENT — PULMONARY FUNCTION TESTS
PIF_VALUE: 23
PIF_VALUE: 26
PIF_VALUE: 22
PIF_VALUE: 32

## 2025-06-26 NOTE — CARE COORDINATION
Received notification from Zackery jarvis [facility] that facility is anticipating to receive authorization from [employer] and can accept patient for LTAC stay once received.  Non emergency transportation has been arranged with [facility],  time 7PM.   Patient, facility liaison and bedside nurse notified of scheduled  time.  Facility liaison commits to cancelling transport if auth is not received by 5 PM.  Everton Pritchett updated via phone.  Juan Luis Angelo MSN RN  [facility] Case Management  [phone]

## 2025-06-26 NOTE — DISCHARGE SUMMARY
[facility] Hospitalist       Hospitalist Physician Discharge Summary       [facility]  [address] [address] [phone]        Kimani Hodge MD  [address] [facility] [address]  [phone]    Follow up      Pedro Calreo MD  [address]  [facility] [address]  [facility] [id number] [phone]    Follow up      Tiesha Panda MD  [address] [facility] [id number] [phone]    Follow up        Activity level: As tolerated     Diet: Diet NPO  ADULT TUBE FEEDING; PEG; Renal Formula; Continuous; 20; Yes; 15; Q 4 hours; 35; 30; Q 4 hours    Dispo: [facility]     Condition on Discharge - stable     Patient ID:  Nikki Moffett  [id number]  79 y.o.  1945    Admit date: 6/20/2025    Discharge date and time:  6/26/2025  3:18 PM    Admission Diagnoses: Principal Problem:    Acute on chronic blood loss anemia  Active Problems:    PAF (paroxysmal atrial fibrillation) (HCC)    History of CVA (cerebrovascular accident)    ANTONIO (acute kidney injury)    Metabolic encephalopathy    CKD stage 3a, GFR 45-59 ml/min (HCC)    Hypermagnesemia    Elevated troponin    HFrEF (heart failure with reduced ejection fraction) (HCC)    Chronic hypoxic respiratory failure (HCC)    Thrombocytopenia    History of hepatitis C    Lower GI bleed    Chronic HFrEF (heart failure with reduced ejection fraction) (HCC)  Resolved Problems:    * No resolved hospital problems. *      Discharge Diagnoses: Principal Problem:    Acute on chronic blood loss anemia  Active Problems:    PAF (paroxysmal atrial fibrillation) (HCC)    History of CVA (cerebrovascular accident)    ANTONIO (acute kidney injury)    Metabolic encephalopathy    CKD stage 3a, GFR 45-59 ml/min (HCC)    Hypermagnesemia    Elevated troponin    HFrEF (heart failure with reduced ejection fraction) (HCC)    Chronic hypoxic respiratory failure (HCC)    Thrombocytopenia    History of hepatitis C    Lower

## 2025-06-26 NOTE — CARE COORDINATION
CARE MANAGEMENT.... Received call from Adams with [facility] ([phone]). Said he anticipates that he will receive auth today and is requesting patient to set up for 6pm transport. If unable to dc today or transfer at 6pm call him at [phone]. Nursing updated and will check for dc order. N 17 in epic. Ambulance forms in chart. Will follow.

## 2025-06-27 NOTE — PLAN OF CARE
Problem: ABCDS Injury Assessment  Goal: Absence of physical injury  6/26/2025 0901 by Lainey Rowley, RN  Outcome: Progressing  6/26/2025 0422 by Chris Bradley RN  Outcome: Progressing     Problem: Skin/Tissue Integrity  Goal: Skin integrity remains intact  Description: 1.  Monitor for areas of redness and/or skin breakdown  2.  Assess vascular access sites hourly  3.  Every 4-6 hours minimum:  Change oxygen saturation probe site  4.  Every 4-6 hours:  If on nasal continuous positive airway pressure, respiratory therapy assess nares and determine need for appliance change or resting period  6/26/2025 0901 by Lainey Rowley, RN  Outcome: Progressing  6/26/2025 0422 by Chris Bradley, RN  Outcome: Progressing     
  Problem: Safety - Medical Restraint  Goal: Remains free of injury from restraints (Restraint for Interference with Medical Device)  Description: INTERVENTIONS:  1. Determine that other, less restrictive measures have been tried or would not be effective before applying the restraint  2. Evaluate the patient's condition at the time of restraint application  3. Inform patient/family regarding the reason for restraint  4. Q2H: Monitor safety, psychosocial status, comfort, nutrition and hydration  6/21/2025 0949 by Marisol Katz RN  Outcome: Progressing     Problem: ABCDS Injury Assessment  Goal: Absence of physical injury  6/21/2025 0949 by Marisol Katz RN  Outcome: Progressing     Problem: Discharge Planning  Goal: Discharge to home or other facility with appropriate resources  6/21/2025 0949 by Marisol Katz RN  Outcome: Progressing     Problem: Skin/Tissue Integrity  Goal: Skin integrity remains intact  Description: 1.  Monitor for areas of redness and/or skin breakdown  2.  Assess vascular access sites hourly  3.  Every 4-6 hours minimum:  Change oxygen saturation probe site  4.  Every 4-6 hours:  If on nasal continuous positive airway pressure, respiratory therapy assess nares and determine need for appliance change or resting period  6/21/2025 0949 by Marisol Katz RN  Outcome: Progressing     Problem: Chronic Conditions and Co-morbidities  Goal: Patient's chronic conditions and co-morbidity symptoms are monitored and maintained or improved  6/21/2025 0949 by Marisol Katz RN  Outcome: Progressing     Problem: Pain  Goal: Verbalizes/displays adequate comfort level or baseline comfort level  6/21/2025 0949 by Marisol Katz RN  Outcome: Progressing     Problem: Nutrition Deficit:  Goal: Optimize nutritional status  6/21/2025 0949 by Marisol Katz RN  Outcome: Progressing     Problem: Safety - Adult  Goal: Free from fall injury  6/21/2025 0949 by Marisol Katz RN  Outcome: 
  Problem: Safety - Medical Restraint  Goal: Remains free of injury from restraints (Restraint for Interference with Medical Device)  Description: INTERVENTIONS:  1. Determine that other, less restrictive measures have been tried or would not be effective before applying the restraint  2. Evaluate the patient's condition at the time of restraint application  3. Inform patient/family regarding the reason for restraint  4. Q2H: Monitor safety, psychosocial status, comfort, nutrition and hydration  6/22/2025 1129 by Marisol Katz RN  Outcome: Progressing     Problem: ABCDS Injury Assessment  Goal: Absence of physical injury  6/22/2025 1129 by Marisol Katz RN  Outcome: Progressing     Problem: Discharge Planning  Goal: Discharge to home or other facility with appropriate resources  6/22/2025 1129 by Marisol Katz RN  Outcome: Progressing     Problem: Skin/Tissue Integrity  Goal: Skin integrity remains intact  Description: 1.  Monitor for areas of redness and/or skin breakdown  2.  Assess vascular access sites hourly  3.  Every 4-6 hours minimum:  Change oxygen saturation probe site  4.  Every 4-6 hours:  If on nasal continuous positive airway pressure, respiratory therapy assess nares and determine need for appliance change or resting period  6/22/2025 1129 by Marisol Katz RN  Outcome: Progressing     Problem: Chronic Conditions and Co-morbidities  Goal: Patient's chronic conditions and co-morbidity symptoms are monitored and maintained or improved  6/22/2025 1129 by Marisol Katz RN  Outcome: Progressing     Problem: Pain  Goal: Verbalizes/displays adequate comfort level or baseline comfort level  6/22/2025 1129 by Marisol Katz RN  Outcome: Progressing     Problem: Nutrition Deficit:  Goal: Optimize nutritional status  6/22/2025 1129 by Marisol Katz RN  Outcome: Progressing     Problem: Safety - Adult  Goal: Free from fall injury  6/22/2025 1129 by Marisol Katz RN  Outcome: 
  Problem: Safety - Medical Restraint  Goal: Remains free of injury from restraints (Restraint for Interference with Medical Device)  Description: INTERVENTIONS:  1. Determine that other, less restrictive measures have been tried or would not be effective before applying the restraint  2. Evaluate the patient's condition at the time of restraint application  3. Inform patient/family regarding the reason for restraint  4. Q2H: Monitor safety, psychosocial status, comfort, nutrition and hydration  6/22/2025 2212 by Ana Luisa Mcmahon RN  Outcome: Progressing  6/22/2025 1129 by Marisol Katz RN  Outcome: Progressing     Problem: ABCDS Injury Assessment  Goal: Absence of physical injury  6/22/2025 2212 by Ana Luisa Mcmahon RN  Outcome: Progressing  6/22/2025 1129 by Marisol Katz RN  Outcome: Progressing     Problem: Discharge Planning  Goal: Discharge to home or other facility with appropriate resources  6/22/2025 2212 by Ana Luisa Mcmahon RN  Outcome: Progressing  6/22/2025 1129 by Marisol Katz RN  Outcome: Progressing     Problem: Skin/Tissue Integrity  Goal: Skin integrity remains intact  Description: 1.  Monitor for areas of redness and/or skin breakdown  2.  Assess vascular access sites hourly  3.  Every 4-6 hours minimum:  Change oxygen saturation probe site  4.  Every 4-6 hours:  If on nasal continuous positive airway pressure, respiratory therapy assess nares and determine need for appliance change or resting period  6/22/2025 2212 by Ana Luisa Mcmahon RN  Outcome: Progressing  6/22/2025 1129 by Marisol Katz RN  Outcome: Progressing     Problem: Chronic Conditions and Co-morbidities  Goal: Patient's chronic conditions and co-morbidity symptoms are monitored and maintained or improved  6/22/2025 2212 by Ana Luisa Mcmahon RN  Outcome: Progressing  6/22/2025 1129 by Marisol Katz RN  Outcome: Progressing     Problem: Pain  Goal: Verbalizes/displays adequate comfort level or baseline comfort 
  Problem: Safety - Medical Restraint  Goal: Remains free of injury from restraints (Restraint for Interference with Medical Device)  Description: INTERVENTIONS:  1. Determine that other, less restrictive measures have been tried or would not be effective before applying the restraint  2. Evaluate the patient's condition at the time of restraint application  3. Inform patient/family regarding the reason for restraint  4. Q2H: Monitor safety, psychosocial status, comfort, nutrition and hydration  6/24/2025 1052 by Ann-Marie De Anda RN  Outcome: Progressing     Problem: ABCDS Injury Assessment  Goal: Absence of physical injury  6/24/2025 1052 by Ann-Marie De Anda RN  Outcome: Progressing     Problem: Discharge Planning  Goal: Discharge to home or other facility with appropriate resources  6/24/2025 1052 by Ann-Marie De Anda RN  Outcome: Progressing  6/24/2025 0256 by Chris Bradley RN  Outcome: Progressing     
  Problem: Safety - Medical Restraint  Goal: Remains free of injury from restraints (Restraint for Interference with Medical Device)  Description: INTERVENTIONS:  1. Determine that other, less restrictive measures have been tried or would not be effective before applying the restraint  2. Evaluate the patient's condition at the time of restraint application  3. Inform patient/family regarding the reason for restraint  4. Q2H: Monitor safety, psychosocial status, comfort, nutrition and hydration  6/24/2025 2120 by Chris Bradley RN  Outcome: Progressing     Problem: ABCDS Injury Assessment  Goal: Absence of physical injury  6/24/2025 2120 by Chris Bradley, RN  Outcome: Progressing     Problem: Discharge Planning  Goal: Discharge to home or other facility with appropriate resources  6/24/2025 2120 by Chris Bradley, RN  Outcome: Progressing     
  Problem: Safety - Medical Restraint  Goal: Remains free of injury from restraints (Restraint for Interference with Medical Device)  Description: INTERVENTIONS:  1. Determine that other, less restrictive measures have been tried or would not be effective before applying the restraint  2. Evaluate the patient's condition at the time of restraint application  3. Inform patient/family regarding the reason for restraint  4. Q2H: Monitor safety, psychosocial status, comfort, nutrition and hydration  6/25/2025 0951 by Manuela Berry RN  Outcome: Progressing     Problem: ABCDS Injury Assessment  Goal: Absence of physical injury  6/25/2025 0951 by Manuela Berry RN  Outcome: Progressing     Problem: Discharge Planning  Goal: Discharge to home or other facility with appropriate resources  6/25/2025 0951 by Manuela Berry RN  Outcome: Progressing     Problem: Skin/Tissue Integrity  Goal: Skin integrity remains intact  Description: 1.  Monitor for areas of redness and/or skin breakdown  2.  Assess vascular access sites hourly  3.  Every 4-6 hours minimum:  Change oxygen saturation probe site  4.  Every 4-6 hours:  If on nasal continuous positive airway pressure, respiratory therapy assess nares and determine need for appliance change or resting period  6/24/2025 2120 by Chris Bradley RN  Outcome: Progressing     Problem: Chronic Conditions and Co-morbidities  Goal: Patient's chronic conditions and co-morbidity symptoms are monitored and maintained or improved  6/25/2025 0951 by Manuela Berry RN  Outcome: Progressing     Problem: Pain  Goal: Verbalizes/displays adequate comfort level or baseline comfort level  6/25/2025 0951 by Manuela Berry RN  Outcome: Progressing     Problem: Nutrition Deficit:  Goal: Optimize nutritional status  6/25/2025 0951 by Manuela Berry RN  Outcome: Progressing     Problem: Safety - Adult  Goal: Free from fall injury  6/25/2025 0951 by 
  Problem: Safety - Medical Restraint  Goal: Remains free of injury from restraints (Restraint for Interference with Medical Device)  Description: INTERVENTIONS:  1. Determine that other, less restrictive measures have been tried or would not be effective before applying the restraint  2. Evaluate the patient's condition at the time of restraint application  3. Inform patient/family regarding the reason for restraint  4. Q2H: Monitor safety, psychosocial status, comfort, nutrition and hydration  Outcome: Adequate for Discharge     Problem: ABCDS Injury Assessment  Goal: Absence of physical injury  6/26/2025 2155 by Melinda Poe, RN  Outcome: Adequate for Discharge  6/26/2025 0901 by Lainey Rowley RN  Outcome: Progressing     Problem: Discharge Planning  Goal: Discharge to home or other facility with appropriate resources  Outcome: Adequate for Discharge     Problem: Skin/Tissue Integrity  Goal: Skin integrity remains intact  Description: 1.  Monitor for areas of redness and/or skin breakdown  2.  Assess vascular access sites hourly  3.  Every 4-6 hours minimum:  Change oxygen saturation probe site  4.  Every 4-6 hours:  If on nasal continuous positive airway pressure, respiratory therapy assess nares and determine need for appliance change or resting period  6/26/2025 2155 by Melinda Poe, RN  Outcome: Adequate for Discharge  6/26/2025 0901 by Lainey Rowley RN  Outcome: Progressing     Problem: Chronic Conditions and Co-morbidities  Goal: Patient's chronic conditions and co-morbidity symptoms are monitored and maintained or improved  Outcome: Adequate for Discharge     Problem: Pain  Goal: Verbalizes/displays adequate comfort level or baseline comfort level  Outcome: Adequate for Discharge     Problem: Nutrition Deficit:  Goal: Optimize nutritional status  Outcome: Adequate for Discharge     Problem: Safety - Adult  Goal: Free from fall injury  Outcome: Adequate for Discharge     
  Problem: Safety - Medical Restraint  Goal: Remains free of injury from restraints (Restraint for Interference with Medical Device)  Description: INTERVENTIONS:  1. Determine that other, less restrictive measures have been tried or would not be effective before applying the restraint  2. Evaluate the patient's condition at the time of restraint application  3. Inform patient/family regarding the reason for restraint  4. Q2H: Monitor safety, psychosocial status, comfort, nutrition and hydration  Outcome: Progressing     
  Problem: Safety - Medical Restraint  Goal: Remains free of injury from restraints (Restraint for Interference with Medical Device)  Description: INTERVENTIONS:  1. Determine that other, less restrictive measures have been tried or would not be effective before applying the restraint  2. Evaluate the patient's condition at the time of restraint application  3. Inform patient/family regarding the reason for restraint  4. Q2H: Monitor safety, psychosocial status, comfort, nutrition and hydration  Outcome: Progressing     Problem: ABCDS Injury Assessment  Goal: Absence of physical injury  Outcome: Progressing     
  Problem: Safety - Medical Restraint  Goal: Remains free of injury from restraints (Restraint for Interference with Medical Device)  Description: INTERVENTIONS:  1. Determine that other, less restrictive measures have been tried or would not be effective before applying the restraint  2. Evaluate the patient's condition at the time of restraint application  3. Inform patient/family regarding the reason for restraint  4. Q2H: Monitor safety, psychosocial status, comfort, nutrition and hydration  Outcome: Progressing     Problem: ABCDS Injury Assessment  Goal: Absence of physical injury  Outcome: Progressing     Problem: Discharge Planning  Goal: Discharge to home or other facility with appropriate resources  Outcome: Progressing     
  Problem: Safety - Medical Restraint  Goal: Remains free of injury from restraints (Restraint for Interference with Medical Device)  Description: INTERVENTIONS:  1. Determine that other, less restrictive measures have been tried or would not be effective before applying the restraint  2. Evaluate the patient's condition at the time of restraint application  3. Inform patient/family regarding the reason for restraint  4. Q2H: Monitor safety, psychosocial status, comfort, nutrition and hydration  Outcome: Progressing     Problem: ABCDS Injury Assessment  Goal: Absence of physical injury  Outcome: Progressing     Problem: Discharge Planning  Goal: Discharge to home or other facility with appropriate resources  Outcome: Progressing     Problem: Skin/Tissue Integrity  Goal: Skin integrity remains intact  Description: 1.  Monitor for areas of redness and/or skin breakdown  2.  Assess vascular access sites hourly  3.  Every 4-6 hours minimum:  Change oxygen saturation probe site  4.  Every 4-6 hours:  If on nasal continuous positive airway pressure, respiratory therapy assess nares and determine need for appliance change or resting period  Outcome: Progressing     Problem: Chronic Conditions and Co-morbidities  Goal: Patient's chronic conditions and co-morbidity symptoms are monitored and maintained or improved  Outcome: Progressing     Problem: Pain  Goal: Verbalizes/displays adequate comfort level or baseline comfort level  Outcome: Progressing     Problem: Nutrition Deficit:  Goal: Optimize nutritional status  Outcome: Progressing     Problem: Safety - Adult  Goal: Free from fall injury  Outcome: Progressing     
Progressing

## 2025-06-27 NOTE — PROGRESS NOTES
[facility] Hospitalist Progress Note    Admitting Date and Time: 6/20/2025  6:36 AM  Admit Dx: Lower GI bleed [K92.2]  Anemia, unspecified type [D64.9]  Acute on chronic blood loss anemia [D62]    Subjective:  Patient is being followed for Lower GI bleed [K92.2]  Anemia, unspecified type [D64.9]  Acute on chronic blood loss anemia [D62]   Pt was seen and examined today. Denies any new issues    ROS: denies fever, chills, cp, sob, n/v, HA unless stated above.     bumetanide  1 mg Oral Daily    metoprolol  2.5 mg IntraVENous Q6H    sodium chloride flush  5-40 mL IntraVENous 2 times per day    pantoprazole (PROTONIX) 40 mg in sodium chloride (PF) 0.9 % 10 mL injection  40 mg IntraVENous Q12H    chlorhexidine  15 mL Mouth/Throat BID    clonazePAM  2 mg PEG Tube BID    midodrine  7.5 mg PEG Tube TID WC    QUEtiapine  25 mg PEG Tube Nightly    sacubitril-valsartan  1 tablet PEG Tube BID    thiamine  100 mg Oral QAM     sodium chloride, , PRN  sodium chloride flush, 5-40 mL, PRN  sodium chloride, , PRN  acetaminophen, 650 mg, Q6H PRN   Or  acetaminophen, 650 mg, Q6H PRN  prochlorperazine, 5 mg, Q6H PRN  ziprasidone, 10 mg, Q12H PRN         Objective:    BP (!) 93/58   Pulse (!) 106   Temp 97.7 °F (36.5 °C) (Oral)   Resp 18   Ht 1.575 m (5' 2\")   Wt 68 kg (150 lb)   SpO2 100%   BMI 27.44 kg/m²     General Appearance: Somnolent and in no acute distress  Skin: warm and dry  Head: normocephalic and atraumatic  Pulmonary/Chest: clear to auscultation bilaterally- no wheezes, rales or rhonchi, normal air movement, no respiratory distress  Cardiovascular: normal rate, normal S1 and S2   Abdomen: soft, non-tender, non-distended, normal bowel sounds  Extremities: no cyanosis, no clubbing and no edema    Recent Labs     06/20/25  0458 06/20/25  0700 06/21/25  0300    139 141   K 4.4 4.3 4.1    101 102   CO2 22 23 21*   BUN 89* 88* 75*   CREATININE 1.7* 1.8* 1.9*   GLUCOSE 120* 114* 82   CALCIUM 10.4* 10.5* 
       [facility] Hospitalist Progress Note    Admitting Date and Time: 6/20/2025  6:36 AM  Admit Dx: Lower GI bleed [K92.2]  Anemia, unspecified type [D64.9]  Acute on chronic blood loss anemia [D62]    Subjective:  Patient is being followed for Lower GI bleed [K92.2]  Anemia, unspecified type [D64.9]  Acute on chronic blood loss anemia [D62]   Pt was seen and examined today. Denies any new issues.    ROS: denies fever, chills, cp, sob, n/v, HA unless stated above.     lansoprazole  30 mg Oral QAM AC    carvedilol  3.125 mg Oral BID WC    bumetanide  1 mg Oral Daily    sodium chloride flush  5-40 mL IntraVENous 2 times per day    chlorhexidine  15 mL Mouth/Throat BID    clonazePAM  2 mg PEG Tube BID    midodrine  7.5 mg PEG Tube TID WC    QUEtiapine  25 mg PEG Tube Nightly    [Held by provider] sacubitril-valsartan  1 tablet PEG Tube BID    thiamine  100 mg Oral QAM     sodium chloride flush, 5-40 mL, PRN  sodium chloride, , PRN  acetaminophen, 650 mg, Q6H PRN   Or  acetaminophen, 650 mg, Q6H PRN  prochlorperazine, 5 mg, Q6H PRN  ziprasidone, 10 mg, Q12H PRN         Objective:    /85   Pulse 69   Temp 97.7 °F (36.5 °C) (Oral)   Resp 18   Ht 1.575 m (5' 2\")   Wt 68 kg (150 lb)   SpO2 100%   BMI 27.44 kg/m²     General Appearance: Alert and in no acute distress, non verbal (baseline)  Skin: warm and dry  Head: normocephalic and atraumatic  Pulmonary/Chest: clear to auscultation bilaterally- no wheezes, rales or rhonchi, normal air movement, no respiratory distress  Cardiovascular: normal rate, normal S1 and S2   Abdomen: soft, non-tender, non-distended, normal bowel sounds  Extremities: no cyanosis, no clubbing and no edema    Recent Labs     06/22/25  0418 06/23/25  0513 06/24/25  0612   * 144 142   K 4.3 3.9 3.8    104 105   CO2 18* 18* 19*   BUN 70* 69* 67*   CREATININE 2.2* 2.4* 2.2*   GLUCOSE 72* 99 127*   CALCIUM 10.3* 10.2 10.7*       Recent Labs     06/23/25  0513 06/23/25  1747 
       [facility] Hospitalist Progress Note    Admitting Date and Time: 6/20/2025  6:36 AM  Admit Dx: Lower GI bleed [K92.2]  Anemia, unspecified type [D64.9]  Acute on chronic blood loss anemia [D62]    Subjective:  Patient is being followed for Lower GI bleed [K92.2]  Anemia, unspecified type [D64.9]  Acute on chronic blood loss anemia [D62]   Pt was seen and examined today. Denies any new issues.    ROS: denies fever, chills, cp, sob, n/v, HA unless stated above.     lansoprazole  30 mg Oral QAM AC    carvedilol  3.125 mg Oral BID WC    [Held by provider] bumetanide  1 mg Oral Daily    sodium chloride flush  5-40 mL IntraVENous 2 times per day    chlorhexidine  15 mL Mouth/Throat BID    clonazePAM  2 mg PEG Tube BID    midodrine  7.5 mg PEG Tube TID WC    QUEtiapine  25 mg PEG Tube Nightly    [Held by provider] sacubitril-valsartan  1 tablet PEG Tube BID    thiamine  100 mg Oral QAM     sodium chloride flush, 5-40 mL, PRN  sodium chloride, , PRN  acetaminophen, 650 mg, Q6H PRN   Or  acetaminophen, 650 mg, Q6H PRN  prochlorperazine, 5 mg, Q6H PRN  ziprasidone, 10 mg, Q12H PRN         Objective:    /65   Pulse 75   Temp 97.9 °F (36.6 °C) (Axillary)   Resp 18   Ht 1.575 m (5' 2\")   Wt 68 kg (150 lb)   SpO2 100%   BMI 27.44 kg/m²     General Appearance: Alert and in no acute distress, non verbal (baseline)  Skin: warm and dry  Head: normocephalic and atraumatic  Pulmonary/Chest: clear to auscultation bilaterally- no wheezes, rales or rhonchi, normal air movement, no respiratory distress  Cardiovascular: normal rate, normal S1 and S2   Abdomen: soft, non-tender, non-distended, normal bowel sounds  Extremities: no cyanosis, no clubbing and no edema    Recent Labs     06/23/25  0513 06/24/25  0612 06/25/25  0850    142 141   K 3.9 3.8 3.8    105 105   CO2 18* 19* 17*   BUN 69* 67* 81*   CREATININE 2.4* 2.2* 2.5*   GLUCOSE 99 127* 107*   CALCIUM 10.2 10.7* 10.3*       Recent Labs     
       [facility] Hospitalist Progress Note    Admitting Date and Time: 6/20/2025  6:36 AM  Admit Dx: Lower GI bleed [K92.2]  Anemia, unspecified type [D64.9]  Acute on chronic blood loss anemia [D62]    Subjective:  Patient is being followed for Lower GI bleed [K92.2]  Anemia, unspecified type [D64.9]  Acute on chronic blood loss anemia [D62]   Pt was seen and examined today. Denies any new issues    ROS: denies fever, chills, cp, sob, n/v, HA unless stated above.     bumetanide  1 mg Oral Daily    metoprolol  2.5 mg IntraVENous Q6H    sodium chloride flush  5-40 mL IntraVENous 2 times per day    pantoprazole (PROTONIX) 40 mg in sodium chloride (PF) 0.9 % 10 mL injection  40 mg IntraVENous Q12H    chlorhexidine  15 mL Mouth/Throat BID    clonazePAM  2 mg PEG Tube BID    midodrine  7.5 mg PEG Tube TID WC    QUEtiapine  25 mg PEG Tube Nightly    sacubitril-valsartan  1 tablet PEG Tube BID    thiamine  100 mg Oral QAM     sodium chloride, , PRN  sodium chloride flush, 5-40 mL, PRN  sodium chloride, , PRN  acetaminophen, 650 mg, Q6H PRN   Or  acetaminophen, 650 mg, Q6H PRN  prochlorperazine, 5 mg, Q6H PRN  ziprasidone, 10 mg, Q12H PRN         Objective:    /62   Pulse 82   Temp 97.8 °F (36.6 °C) (Oral)   Resp 18   Ht 1.575 m (5' 2\")   Wt 68 kg (150 lb)   SpO2 100%   BMI 27.44 kg/m²     General Appearance: Alert and in no acute distress, non verbal (baseline)  Skin: warm and dry  Head: normocephalic and atraumatic  Pulmonary/Chest: clear to auscultation bilaterally- no wheezes, rales or rhonchi, normal air movement, no respiratory distress  Cardiovascular: normal rate, normal S1 and S2   Abdomen: soft, non-tender, non-distended, normal bowel sounds  Extremities: no cyanosis, no clubbing and no edema    Recent Labs     06/20/25  0700 06/21/25  0300 06/22/25  0418    141 148*   K 4.3 4.1 4.3    102 103   CO2 23 21* 18*   BUN 88* 75* 70*   CREATININE 1.8* 1.9* 2.2*   GLUCOSE 114* 82 72*   CALCIUM 
       [facility] Hospitalist Progress Note    Admitting Date and Time: 6/20/2025  6:36 AM  Admit Dx: Lower GI bleed [K92.2]  Anemia, unspecified type [D64.9]  Acute on chronic blood loss anemia [D62]    Subjective:  Patient is being followed for Lower GI bleed [K92.2]  Anemia, unspecified type [D64.9]  Acute on chronic blood loss anemia [D62]   Pt was seen and examined today. Denies any new issues.    ROS: denies fever, chills, cp, sob, n/v, HA unless stated above.     lansoprazole  30 mg Oral QAM AC    bumetanide  1 mg Oral Daily    metoprolol  2.5 mg IntraVENous Q6H    sodium chloride flush  5-40 mL IntraVENous 2 times per day    chlorhexidine  15 mL Mouth/Throat BID    clonazePAM  2 mg PEG Tube BID    midodrine  7.5 mg PEG Tube TID WC    QUEtiapine  25 mg PEG Tube Nightly    sacubitril-valsartan  1 tablet PEG Tube BID    thiamine  100 mg Oral QAM     sodium chloride flush, 5-40 mL, PRN  sodium chloride, , PRN  acetaminophen, 650 mg, Q6H PRN   Or  acetaminophen, 650 mg, Q6H PRN  prochlorperazine, 5 mg, Q6H PRN  ziprasidone, 10 mg, Q12H PRN         Objective:    /77   Pulse 89   Temp 98.6 °F (37 °C) (Oral)   Resp 18   Ht 1.575 m (5' 2\")   Wt 68 kg (150 lb)   SpO2 100%   BMI 27.44 kg/m²     General Appearance: Alert and in no acute distress, non verbal (baseline)  Skin: warm and dry  Head: normocephalic and atraumatic  Pulmonary/Chest: clear to auscultation bilaterally- no wheezes, rales or rhonchi, normal air movement, no respiratory distress  Cardiovascular: normal rate, normal S1 and S2   Abdomen: soft, non-tender, non-distended, normal bowel sounds  Extremities: no cyanosis, no clubbing and no edema    Recent Labs     06/21/25  0300 06/22/25  0418 06/23/25  0513    148* 144   K 4.1 4.3 3.9    103 104   CO2 21* 18* 18*   BUN 75* 70* 69*   CREATININE 1.9* 2.2* 2.4*   GLUCOSE 82 72* 99   CALCIUM 10.2 10.3* 10.2       Recent Labs     06/21/25  0300 06/21/25  1232 06/22/25  1302 
     [facility] Hospitalist   Progress Note    Admitting Date and Time: 6/20/2025  6:36 AM  Admit Dx: Lower GI bleed [K92.2]  Anemia, unspecified type [D64.9]  Acute on chronic blood loss anemia [D62]    Seen for follow on problems as listed below     Subjective:Admitted from [facility] with acute on chronic anemia and concern of GIB . Appears frail ,no CP ,sob or abd pain. D/w nursing.Patient not communicating fully , able to answer few questions with y/ n .      Frail appearing ,    sodium bicarbonate  1,300 mg Oral BID    lansoprazole  30 mg Oral QAM AC    carvedilol  3.125 mg Oral BID WC    [Held by provider] bumetanide  1 mg Oral Daily    sodium chloride flush  5-40 mL IntraVENous 2 times per day    chlorhexidine  15 mL Mouth/Throat BID    clonazePAM  2 mg PEG Tube BID    midodrine  7.5 mg PEG Tube TID WC    QUEtiapine  25 mg PEG Tube Nightly    [Held by provider] sacubitril-valsartan  1 tablet PEG Tube BID    thiamine  100 mg Oral QAM     sodium chloride flush, 5-40 mL, PRN  sodium chloride, , PRN  acetaminophen, 650 mg, Q6H PRN   Or  acetaminophen, 650 mg, Q6H PRN  prochlorperazine, 5 mg, Q6H PRN  ziprasidone, 10 mg, Q12H PRN         Objective:    BP (!) 117/57   Pulse 82   Temp 98 °F (36.7 °C) (Axillary)   Resp 18   Ht 1.575 m (5' 2\")   Wt 64.8 kg (142 lb 14.4 oz)   SpO2 99%   BMI 26.14 kg/m²   General Appearance: alert and oriented to person, place and time, frail appearing, in no acute distress  Skin: warm and dry  Head: normocephalic and atraumatic  Eyes: , extraocular eye movements intact, conjunctivae normal  Neck: supple and non-tender without mass, trach in place   Pulmonary/Chest: clear to auscultation bilaterally  Cardiovascular: normal rate,,normal S1 and S2,  Abdomen: soft, non-tender, non-distended, normal bowel sounds, no masses or organomegaly, PEG in place   Extremities: no cyanosis, clubbing Neurologic:unable to assess - does not follow commands and with limited 
    General Surgery Progress Note    Subjective:  At start of prep, she passed blood per rectum but this has since cleared up    Objective:  Vital signs reviewed  Gen: awake, alert, in NAD  HEENT: NCAT. Trach in place  Resp: non-labored breathing  CV: tachy, distal pulses intact  Abd: soft, nondistended, nontender, peg in place  Skin: warm, well perfused    Assessment/Plan:  Lower GI bleed  Colonoscopy today  Continue H&H trend  Hold anticoagulation    Discussed with patient's son, Shreyas, who is in agreement with the procedure.  Appreciate cardiology recs    I have explained the risks, benefits, alternatives, and potential complications associated with the proposed procedure to be performed and other issues when applicable with the patient/responsible party prior to the procedure. All of their questions were answered as appropriate and to their satisfaction. They understand and agree to the procedure.       Bro Pringle DO  9:25 AM  
    General Surgery Progress Note    Subjective:  Had some bloody bowel movements overnight. Otherwise stable    Objective:  Vital signs reviewed  Gen: awake, alert, in NAD  HEENT: NCAT. Trach in place  Resp: non-labored breathing  CV: tachy, distal pulses intact  Abd: soft, nondistended, nontender, peg in place  Skin: warm, well perfused    Assessment/Plan:  Lower GI bleed  Will schedule for diagnostic colonoscopy tomorrow  Bowel prep via PEG today  Continue H&H trend  Hold anticoagulation    Discussed with patient's son, Shreyas, who is in agreement with the procedure.  Consult cardiology for periprocedural recommendations    I have explained the risks, benefits, alternatives, and potential complications associated with the proposed procedure to be performed and other issues when applicable with the patient/responsible party prior to the procedure. All of their questions were answered as appropriate and to their satisfaction. They understand and agree to the procedure.       Bro Pringle DO  9:01 AM  
    INPATIENT CARDIOLOGY FOLLOW-UP    Name: Nikki Moffett    Age: 79 y.o.    Date of Service: 6/24/2025    Chief Complaint: Follow-up for chronic HFrEF, GI bleed, AF on OAC    Referring Physician: Donald Headley MD    History of Present Illness:  Nikki Moffett is a 79 y.o. female (known to Dr. Carnes) who presented to [facility] on 6/20/25 for further evaluation of GI bleed and acute on chronic anemia (s/p PRBC's). Her extensive PMH is outlined in detail below (see A/P below). +decreased functional capacity / recent prolonged hospitalization / +trach/PEG/on vent. No respiratory distress. No apparent chest pain or palpitations. BP stable. SR on EKG and telemetry.    Review of Systems:   Cardiac: As per HPI  General: No fever, chills  Pulmonary: As per HPI  HEENT: No visual disturbances, +PEG  GI: No nausea, vomiting  : No dysuria, hematuria  Endocrine: +hypothyroidism, no DM  Musculoskeletal: TEJADA x 4, no focal motor deficits  Skin: Intact, no rashes  Neuro: No headache, seizures  Psych: Currently with no depression, anxiety    Past Medical History:  Past Medical History:   Diagnosis Date    A-fib (HCC)     HFrEF (heart failure with reduced ejection fraction) (HCC)     HLD (hyperlipidemia)     HTN (hypertension)     Interatrial cardiac shunt     on echo 2023    L MCA stroke 2023    s/p thrombectomy       Past Surgical History:  Past Surgical History:   Procedure Laterality Date    COLONOSCOPY N/A 6/22/2025    COLONOSCOPY DIAGNOSTIC performed by Bro Pringle DO at [facility] OR    IR MECHANICAL ART THROMBECTOMY INTRACRANIAL  12/21/2023    IR MECHANICAL ART THROMBECTOMY INTRACRANIAL 12/21/2023 Dominik Castillo MD [facility]    IR TUNNELED CVC PLACE WO SQ PORT/PUMP > 5 YEARS  03/20/2025    IR TUNNELED CVC PLACE WO SQ PORT/PUMP > 5 YEARS 3/20/2025 Clarence, Natalya Haines MD [facility]    THORACENTESIS Right 03/26/2025    750cc clear yellow fluid    TRACHEAL SURGERY N/A 3/26/2025    TRACHEOTOMY 
    INPATIENT CARDIOLOGY FOLLOW-UP    Name: Nikki Moffett    Age: 79 y.o.    Date of Service: 6/25/2025    Chief Complaint: Follow-up for chronic HFrEF, GI bleed, AF on OAC    Referring Physician: Donald Headley MD    History of Present Illness:  Nikki Moffett is a 79 y.o. female (known to Dr. Carnes) who presented to [facility] on 6/20/25 for further evaluation of GI bleed and acute on chronic anemia (s/p PRBC's). Her extensive PMH is outlined in detail below (see A/P below). +decreased functional capacity / recent prolonged hospitalization / +trach/PEG/on vent. No respiratory distress. No apparent chest pain or palpitations. BP controlled/intermittent hypotension. SR on EKG and telemetry.    Review of Systems:   Cardiac: As per HPI  General: No fever, chills  Pulmonary: As per HPI  HEENT: No visual disturbances, +PEG  GI: No nausea, vomiting  : No dysuria, hematuria  Endocrine: +hypothyroidism, no DM  Musculoskeletal: TEJADA x 4, no focal motor deficits  Skin: Intact, no rashes  Neuro: No headache, seizures  Psych: Currently with no depression, anxiety    Past Medical History:  Past Medical History:   Diagnosis Date    A-fib (HCC)     HFrEF (heart failure with reduced ejection fraction) (HCC)     HLD (hyperlipidemia)     HTN (hypertension)     Interatrial cardiac shunt     on echo 2023    L MCA stroke 2023    s/p thrombectomy       Past Surgical History:  Past Surgical History:   Procedure Laterality Date    COLONOSCOPY N/A 6/22/2025    COLONOSCOPY DIAGNOSTIC performed by Bro Pringle DO at [facility] OR    IR MECHANICAL ART THROMBECTOMY INTRACRANIAL  12/21/2023    IR MECHANICAL ART THROMBECTOMY INTRACRANIAL 12/21/2023 Dominik Castillo MD [facility]    IR TUNNELED CVC PLACE WO SQ PORT/PUMP > 5 YEARS  03/20/2025    IR TUNNELED CVC PLACE WO SQ PORT/PUMP > 5 YEARS 3/20/2025 Clarence, Natalya Haines MD [facility]    THORACENTESIS Right 03/26/2025    750cc clear yellow fluid    TRACHEAL SURGERY 
   06/22/25 1149   NICU Vent Information   Equipment Changed (S)  Expiratory Filter   Vent Type 980   Vent Mode AC/VC   Vt (Set, mL) 400 mL   Vt (Measured) 401 mL   Resp Rate (Set) 18 bpm   Rate Measured 18 br/min   Minute Volume (L/min) 7.3 Liters   Peak Flow 55 L/min   FiO2  40 %   Peak Inspiratory Pressure (cmH2O) 31 cmH2O   I:E Ratio 1:3.2   PEEP/CPAP (cmH2O) 6   Mean Airway Pressure (cmH2O) 12 cmH20   Plateau Pressure 20 cmH20   Static Compliance 29 mL/cmH2O   Additional Respiratory Assessments   Pulse 64   Respirations 18   SpO2 100 %   Position Semi-Webb's   Humidification Source Heated wire   Humidification Temp 37   Circuit Condensation Drained   Vent Alarm Settings   Low Pressure (cmH2O)   (red cord in wall)   High Pressure (cmH2O) 40 cmH2O   Low Minute Volume (lpm) 2 L/min   High Minute Volume (lpm) 18 L/min   Low Exhaled Vt (ml) 300 mL   High Exhaled Vt (ml) 800 mL   RR High (bpm) 35 br/min   Apnea (secs) 20 secs   Surgical Airway  Shiley Cuffed   No placement date or time found.   Present on Admission/Arrival: Yes  Placement Verified By: Auscultation  Surgical Airway Type: Tracheostomy  Brand: Shiley  Style: Cuffed  Size: (c) 6   Status Secured       
2 RN skin check complete with DEBRA Skinner.   
2 RN skin check completed with Beverley RICHARDSON  
2 RN skin check completed with DEBRA Jacobsen.  
2 RN skin check completed, no new issues   
2 RN skin check done with Manuela RICHARDSON.   
4 Eyes Skin Assessment     NAME:  Nikki Moffett  YOB: 1945  MEDICAL RECORD NUMBER:  [id number]    The patient is being assessed for  Admission    I agree that at least one RN has performed a thorough Head to Toe Skin Assessment on the patient. ALL assessment sites listed below have been assessed.      Areas assessed by both nurses:    Head, Face, Ears, Shoulders, Back, Chest, Arms, Elbows, Hands, Sacrum. Buttock, Coccyx, Ischium, Legs. Feet and Heels, and Under Medical Devices         Does the Patient have a Wound? No noted wound(s) redness/excoriation on buttocks.       Marty Prevention initiated by RN: Yes  Wound Care Orders initiated by RN: No    Pressure Injury (Stage 3,4, Unstageable, DTI, NWPT, and Complex wounds) if present, place Wound referral order by RN under : No    New Ostomies, if present place, Ostomy referral order under : No     Nurse 1 eSignature: Electronically signed by Favian Doshi RN on 6/20/25 at 5:05 PM EDT    **SHARE this note so that the co-signing nurse can place an eSignature**    Nurse 2 eSignature: Electronically signed by Karis Daly RN on 6/20/25 at 5:33 PM EDT  
Comprehensive Nutrition Assessment    Type and Reason for Visit:  Initial, Positive nutrition screen    Nutrition Recommendations/Plan:   When medically feasible, recommend resume TF via PEG:    TF RECOMMENDATION: Renal TF (Nepro) to goal rate 35 ml/hr with water flushes 30 ml Q 4 hr  This will provide: 840 ml/d, 1512 rosalee, 68 g pro, 789 ml total free water  This regimen will meet ~100% energy and protein needs    Will continue inpatient monitoring POC     Malnutrition Assessment:  Malnutrition Status:  At risk for malnutrition (06/20/25 1433)    Context:  Chronic Illness     Findings of the 6 clinical characteristics of malnutrition:  Energy Intake:  Mild decrease in energy intake (NPO since admit)  Weight Loss:  Unable to assess (fluid status fluctations with CKD, CHF, previous HD needed)     Body Fat Loss:  Mild body fat loss     Muscle Mass Loss:  Unable to assess (expected loss s/p CVA, bed-bound)    Fluid Accumulation:  Unable to assess (multiple factors affecting fluid status)     Strength:  Not Performed    Nutrition Assessment:    Pt admit from [facility] 2/2 acute on chronic anemia. Pt was having bright red blood per rectum at [facility] and Dr. Flowers saw her there and recommended against endoscopic eval (likely diverticular) - per GS consult. No further signs of blood and OK for TF per GS. PMHx=CVA, trach/vent, hep C, CHF, CKD-3, A-fib. Will provide TF recommendation for when EN medically feasible.    Nutrition Related Findings:    trach/vent, PEG, BUN/creat 88/1.8, I/O 1.1, +BS, bedbound Wound Type: Multiple (wounds on previous admission - no documention in flowsheet yet)       Current Nutrition Intake & Therapies:    Average Meal Intake: NPO  Average Supplements Intake: NPO  Diet NPO    Anthropometric Measures:  Height: 157.5 cm (5' 2\")  Ideal Body Weight (IBW): 110 lbs (50 kg)    Admission Body Weight: 60.3 kg (133 lb) (6/19 at [facility])  Current Body Weight: 60.3 kg (133 lb), 120.9 % IBW. Weight Source: Bed 
Comprehensive Nutrition Assessment    Type and Reason for Visit:  Reassess    Nutrition Recommendations/Plan:   Recommend continue current TF regimen, as tolerated.  Current EN meets ~100% energy and protein needs  Will continue inpatient monitoring POC     Malnutrition Assessment:  Malnutrition Status:  At risk for malnutrition (06/20/25 1433)    Context:  Chronic Illness     Findings of the 6 clinical characteristics of malnutrition:  Energy Intake:  Mild decrease in energy intake (NPO since admit)  Weight Loss:  Unable to assess (fluid status fluctations with CKD, CHF, previous HD needed)     Body Fat Loss:  Mild body fat loss     Muscle Mass Loss:  Unable to assess (expected loss s/p CVA, bed-bound)    Fluid Accumulation:  Unable to assess (multiple factors affecting fluid status)     Strength:  Not Performed    Nutrition Assessment:    Pt s/p 6/23 colonoscopy-bleeding likely diverticular. OK for diet (TF) per GS. Remains intubated and on TF via PEG.  Recommend continue current EN and will continue inpatient monitoring POC.    Nutrition Related Findings:    PEG to TF, diarrhea, trach/vent, I/O WNL, trace LE edema, abd +BS, BUN/creat 67/2.2, GFR 23, BNP 5619 Wound Type: Multiple (areas of redness per flowsheet)       Current Nutrition Intake & Therapies:    Average Meal Intake: NPO  Average Supplements Intake: NPO  Current Tube Feeding (TF) Orders:  Feeding Route: PEG  Formula: Renal Formula  Schedule: Continuous  Feeding Regimen: 35 ml/hr = 840 ml/d  Additives/Modulars: None  Water Flushes: 30 ml Q 4 hr =180 ml/d  Current TF Provides: 1512 rosalee, 68 g pro, 789 ml total free water    Anthropometric Measures:  Height: 157.5 cm (5' 2\")  Ideal Body Weight (IBW): 110 lbs (50 kg)    Admission Body Weight: 60.3 kg (133 lb) (6/19 at [facility])  Current Body Weight: 60.3 kg (133 lb), 120.9 % IBW. Weight Source: Bed scale (6/19 [facility])  Current BMI (kg/m2): 24.3  Usual Body Weight: 64.1 kg (141 lb 5 oz) (3/20 
Database initiated. Patient is nonverbal vent/trach nods her head but not appropriate to the questions asked. [facility] comes in from [facility]. Very little info provided from paperwork. From the chart history I believe she is NPO with a Peg tube and is bed bound.    
Dr. Headley on floor, updated of most recent bladder scan with new orders received.   
Dr. Headley on floor, updated on bladder scan results.   
Dr. Pringle messaged via perfect serve regarding multiple large bright red bloody bowel movements.   
General Surgery   Daily Progress Note      Patient's Name/Date of Birth: Nikki Moffett / 1945    Date: June 23, 2025       Subjective: No more bleeding overnight. Agitated     Objective:  /78   Pulse 89   Temp 98.4 °F (36.9 °C) (Axillary)   Resp 18   Ht 1.575 m (5' 2\")   Wt 68 kg (150 lb)   SpO2 94%   BMI 27.44 kg/m²   Labs:  Recent Labs     06/20/25  0700 06/20/25  1400 06/21/25  0300 06/21/25  1232 06/22/25  1302 06/22/25  2100 06/23/25  0513   WBC 12.5*  --  11.4  --   --   --   --    HGB 6.1*   < > 8.8*   < > 8.1* 8.2* 8.1*   HCT 18.9*   < > 26.3*   < > 24.4* 24.6* 25.6*    < > = values in this interval not displayed.     Recent Labs     06/20/25  0700 06/21/25  0300 06/22/25  0418    141 148*   K 4.3 4.1 4.3    102 103   CO2 23 21* 18*   BUN 88* 75* 70*   CREATININE 1.8* 1.9* 2.2*     Recent Labs     06/20/25  0700   ALKPHOS 69   ALT 18   AST 31   BILITOT 0.4   LIPASE 87*         General appearance: NAD  Head: NCAT, PERRL, EOMI, pink conjunctiva  Neck: supple, no masses  Lungs: symmetric chest rise, no audible wheezes  Heart: warm throughout  Abdomen: soft, non-distended, non-tender to palpation throughout  Skin: no visible lesions  Extremities: extremities normal, atraumatic, no cyanosis or edema      Assessment/Plan:  Nikki Moffett is a 79 y.o. female with rectal bleeding    - Ok for diet as tolerated from our pov   - Cscope 6/22: Diverticulosis  - No other scopes planned  - Monitor H&H, transfuse prn per primary   - Will defer timing of anticoagulation to primary team, patient has risk of re-bleed, but ok to resume from gen surg pov  - Will be available as needed       Meghna Goldstein DO  General Surgery Resident        Attending Physician Statement:    I have examined the patient and performed the key aspects of physical exam, reviewed the record (including all new notes, pertinent radiology images which are independently reviewed and laboratory findings), and discussed the 
María Abarca, WAQAR messaged regarding increased agitation.   
Message left for son Shreyas regarding procedure consent.   
Message sent to Dr Dominguez, with surgery, regarding ct abd/pelv results.  No new orders at this time.   
Message sent to Dr. Headley in regards to patient son at bedside and requesting to speak with a doctor.   
Nephrology Attending   Inpatient Progress Note    Admit Date: 2025                   PCP: Tiesha Panda MD    History of presenting illness:   As per our consult this admission:  Nikki Moffett is a 79 y.o. female with a past medical history of CVA, hypertension, hyperlipidemia, prior ANTONOI with renal failure, A-fib.  She is well-known to our service and has previously been followed at [facility].     They presented on 2025 complaining of rectal bleeding.  She was sent from [facility] for hematochezia.  Renal function progressively worsened with initial creatinine of 1.8 and progressive uptrend to 2.5 mg/dL.  Of note she does have prior history of prerenal ANTONIO leading to hemodialysis and subsequent liberation from dialysis with a baseline creatinine around 1.8 mg/dL at that time.  Nephrology was consulted for ANTONIO.     Subjective:   2025: No new issues today.  No new complaints.    Vitals:  VITALS: BP (!) 118/93   Pulse 78   Temp 97.6 °F (36.4 °C) (Axillary)   Resp 18   Ht 1.575 m (5' 2\")   Wt 64.8 kg (142 lb 14.4 oz)   SpO2 100%   BMI 26.14 kg/m²   24HR INTAKE/OUTPUT:   Intake/Output Summary (Last 24 hours) at 2025 1345  Last data filed at 2025 0213  Gross per 24 hour   Intake 274 ml   Output 550 ml   Net -276 ml     CURRENT PULSE OXIMETRY: SpO2: 100 %  24HR PULSE OXIMETRY RANGE: SpO2  Av.8 %  Min: 99 %  Max: 100 %       I/O:   I/O last 3 completed shifts:  In: 274 [NG/GT:274]  Out: 1150 [Urine:1150]  No intake/output data recorded.    Medications:    IV:   sodium chloride        sodium bicarbonate  1,300 mg Oral BID    lansoprazole  30 mg Oral QAM AC    carvedilol  3.125 mg Oral BID WC    [Held by provider] bumetanide  1 mg Oral Daily    sodium chloride flush  5-40 mL IntraVENous 2 times per day    chlorhexidine  15 mL Mouth/Throat BID    clonazePAM  2 mg PEG Tube BID    midodrine  7.5 mg PEG Tube TID WC    QUEtiapine  25 mg PEG Tube Nightly    
New consult placed to cardiology via perfect serve.  
New consult sent to Dr. Galeano via perfect serve.   
Notified Colette ZAVALETA of patient being bladder scanned for 429ml and straight cathed for 350ml.  
Occupational Therapy  OCCUPATIONAL THERAPY INITIAL EVALUATION    [facility]   [address] [place], [place]         Date:2025                                                  Patient Name: Nikki Moffett    MRN: [id number]    : 1945    Room: [address] [place] [address]      Evaluating OT: Yana Shelton OTR/L [id number]       Referring Provider:Bro Pringle DO     Specific Provider Orders/Date: OT eval and treat 25      Diagnosis: Acute Blood Loss Anemia     Surgery: none      Pertinent Medical History:       Past Medical History:   Diagnosis Date    A-fib (HCC)     HFrEF (heart failure with reduced ejection fraction) (HCC)     HLD (hyperlipidemia)     HTN (hypertension)     Interatrial cardiac shunt     on echo     L MCA stroke     s/p thrombectomy         Past Surgical History:   Procedure Laterality Date    COLONOSCOPY N/A 2025    COLONOSCOPY DIAGNOSTIC performed by Bro Pringle DO at [facility] OR    IR MECHANICAL ART THROMBECTOMY INTRACRANIAL  2023    IR MECHANICAL ART THROMBECTOMY INTRACRANIAL 2023 Dominik Castillo MD [facility] SPECIAL PROCEDURES    IR TUNNELED CVC PLACE WO SQ PORT/PUMP > 5 YEARS  2025    IR TUNNELED CVC PLACE WO SQ PORT/PUMP > 5 YEARS 3/20/2025 ClarenceNatalya MD [facility] SPECIAL PROCEDURES    THORACENTESIS Right 2025    750cc clear yellow fluid    TRACHEAL SURGERY N/A 3/26/2025    TRACHEOTOMY PERCUTANEOUS BRONCHOSCOPY performed by Ryne Bloom MD at [facility] ENDOSCOPY    UPPER GASTROINTESTINAL ENDOSCOPY N/A 3/26/2025    ESOPHAGOGASTRODUODENOSCOPY PERCUTANEOUS ENDOSCOPIC GASTROSTOMY TUBE INSERTION performed by Ryne Bloom MD at [facility] ENDOSCOPY    UPPER GASTROINTESTINAL ENDOSCOPY N/A 4/3/2025    ESOPHAGOGASTRODUODENOSCOPY BIOPSY performed by Pradip Tuttle MD at [facility] ENDOSCOPY      Precautions:  Fall Risk, trach ,vent, peg tube ,contact isolation    Assessment of current deficits:    [] Functional 
Physical Therapy  Facility/Department: [address] INTERMEDIATE 1  Physical Therapy Treatment Note    Name: Nikki Moffett  : 1945  MRN: [id number]  Date of Service: 2025      Patient Diagnosis(es): The primary encounter diagnosis was Lower GI bleed. A diagnosis of Anemia, unspecified type was also pertinent to this visit.  Past Medical History:  has a past medical history of A-fib (HCC), HFrEF (heart failure with reduced ejection fraction) (HCC), HLD (hyperlipidemia), HTN (hypertension), Interatrial cardiac shunt, and L MCA stroke.  Past Surgical History:  has a past surgical history that includes IR MECHANICAL ART THROMBECTOMY INTRACRANIAL (2023); IR TUNNELED CVC PLACE WO SQ PORT/PUMP > 5 YEARS (2025); thoracentesis (Right, 2025); Tracheal surgery (N/A, 3/26/2025); Upper gastrointestinal endoscopy (N/A, 3/26/2025); Upper gastrointestinal endoscopy (N/A, 4/3/2025); and Colonoscopy (N/A, 2025).      Evaluating Therapist: Annmarie Castellano PT    Room #:  0443/0443-A  Diagnosis:  Lower GI bleed [K92.2]  Anemia, unspecified type [D64.9]  Acute on chronic blood loss anemia [D62]  PMHx/PSHx:  Afib, CVA, HTN, trach, PEG  Precautions:  falls, alarm, trach, PEG, contact isolation    Social:  Pt admitted from [facility]. Pt had prolonged complicated hospital stay beginning in 2025. Prior to that pt lived with family and was independent     Initial Evaluation  Date: 25 Treatment  2025    Short Term/ Long Term   Goals   Was pt agreeable to Eval/treatment?       Does pt have pain? No indication of pain No indication of pain    Bed Mobility  Rolling: dependent  Supine<>long sit dependent  Scooting: dependent Rolling: Dep A  Supine <> sit: Dep A  Scooting: Dep A seated to EOB and supine up in bed Mod assist   Transfers Sit to stand: NT  Stand to sit: NT  Stand pivot: NT NT Max assist   Ambulation    NT  N/A   Stair Negotiation  Ascended and descended  NT   N/A   LE strength     Pt inconsistent 
Physical Therapy  Facility/Department: [address] INTERMEDIATE 1  Physical Therapy Initial Assessment    Name: Nikki Moffett  : 1945  MRN: [id number]  Date of Service: 2025      Patient Diagnosis(es): The primary encounter diagnosis was Lower GI bleed. A diagnosis of Anemia, unspecified type was also pertinent to this visit.  Past Medical History:  has a past medical history of A-fib (HCC), HFrEF (heart failure with reduced ejection fraction) (HCC), HLD (hyperlipidemia), HTN (hypertension), Interatrial cardiac shunt, and L MCA stroke.  Past Surgical History:  has a past surgical history that includes IR MECHANICAL ART THROMBECTOMY INTRACRANIAL (2023); IR TUNNELED CVC PLACE WO SQ PORT/PUMP > 5 YEARS (2025); thoracentesis (Right, 2025); Tracheal surgery (N/A, 3/26/2025); Upper gastrointestinal endoscopy (N/A, 3/26/2025); and Upper gastrointestinal endoscopy (N/A, 4/3/2025).      Evaluating Therapist: Annmarie Castellano PT    Room #:  0443/0443-A  Diagnosis:  Lower GI bleed [K92.2]  Anemia, unspecified type [D64.9]  Acute on chronic blood loss anemia [D62]  PMHx/PSHx:  Afib, CVA, HTN, trach, PEG  Precautions:  falls, alarm, trach, PEG, contact isolation    Social:  Pt admitted from [facility]. Pt had prolonged complicated hospital stay beginning in 2025. Prior to that pt lived with family and was independent     Initial Evaluation  Date: 25 Treatment      Short Term/ Long Term   Goals   Was pt agreeable to Eval/treatment?       Does pt have pain? No indication of pain     Bed Mobility  Rolling: dependent  Supine<>long sit dependent  Scooting: dependent  Mod assist   Transfers Sit to stand: NT  Stand to sit: NT  Stand pivot: NT  Max assist   Ambulation    NT  N/A   Stair Negotiation  Ascended and descended  NT   N/A   LE strength     Pt inconsistent with command follow. At least 2+/5 from observation of movement during session.    3+/5   balance      dependent     AM-PAC Raw score               
Procedure consent signed with patient's son for colonoscopy  
[facility] via [facility] vent. Telepack removed. Pt has no belongings.  
Report called to Deborah RICHARDSON at [facility] PAS to pickup at 1900. NITA completed and in chart.  Electronically signed by Lainey Rowley RN on 6/26/2025 at 6:12 PM    
Report given to alexandrea on [facility].   
Triple lumen femoral catheter removed without incident per Dr. Headley's order. Tip intact. No bleeding noted at site.   
Yes Jose Fowler MD   midodrine (PROAMATINE) 5 MG tablet Take 1.5 tablets by mouth 3 times daily Indications: HLD FOR SBP>120   Yes Jose Fowler MD   nitroglycerin (NITRO-BID) 2 % ointment Place 0.5 inches onto the skin in the morning and 0.5 inches at noon and 0.5 inches in the evening.   Yes Jose Fowler MD   omeprazole (PRILOSEC OTC) 20 MG tablet Take 2 tablets by mouth every morning   Yes Jose Fowler MD   pantoprazole (PROTONIX) 40 MG injection Infuse 40 mg intravenously in the morning and 40 mg in the evening.   Yes Jose Fowler MD   QUEtiapine (SEROQUEL) 25 MG tablet Take 1 tablet by mouth nightly   Yes Jose Fowler MD   rosuvastatin (CRESTOR) 5 MG tablet Take 1 tablet by mouth every morning   Yes Jose Fowler MD   sacubitril-valsartan (ENTRESTO) 24-26 MG per tablet Take 1 tablet by mouth 2 times daily   Yes Jose Fowler MD   vitamin B-1 (THIAMINE) 100 MG tablet Take 1 tablet by mouth every morning   Yes Jose Fowler MD   sodium chloride 0.9 % infusion Infuse 75 mL/hr intravenously continuous   Yes Jose Fowler MD   Acetaminophen 325 MG/10.15ML SOLN Take 325 mg by mouth every 6 hours as needed for Fever or Pain (MILD PAIN; TEMP > OR = 101F)   Yes Jose Fowler MD   melatonin 3 MG TABS tablet Take 2 tablets by mouth nightly as needed (SLEEP)   Yes Jose Fowlre MD       Current Medications:  Current Facility-Administered Medications   Medication Dose Route Frequency Provider Last Rate Last Admin    sodium bicarbonate tablet 1,300 mg  1,300 mg Oral BID Pedro Calero MD   1,300 mg at 06/26/25 0741    lansoprazole (PREVACID SOLUTAB) disintegrating tablet 30 mg  30 mg Oral QAM AC Donald Headley MD   30 mg at 06/26/25 0637    carvedilol (COREG) tablet 3.125 mg  3.125 mg Oral BID  Kimani Hodge MD   3.125 mg at 06/26/25 0741    [Held by provider] bumetanide (BUMEX) tablet 1 mg  1 mg Oral Daily 
infusion   IntraVENous PRN Bro Pringle DO        acetaminophen (TYLENOL) tablet 650 mg  650 mg Oral Q6H PRN Bro Pringle DO        Or    acetaminophen (TYLENOL) suppository 650 mg  650 mg Rectal Q6H PRN Bro Pringle DO        pantoprazole (PROTONIX) 40 mg in sodium chloride (PF) 0.9 % 10 mL injection  40 mg IntraVENous Q12H Bro Pringle DO   40 mg at 06/22/25 0906    chlorhexidine (PERIDEX) 0.12 % solution 15 mL  15 mL Mouth/Throat BID Bro Pringle DO   15 mL at 06/22/25 0906    clonazePAM (KLONOPIN) tablet 2 mg  2 mg PEG Tube BID Bro Pringle DO   2 mg at 06/22/25 0906    midodrine (PROAMATINE) tablet 7.5 mg  7.5 mg PEG Tube TID WC Bro Pringle DO   7.5 mg at 06/22/25 1256    QUEtiapine (SEROQUEL) tablet 25 mg  25 mg PEG Tube Nightly Bro Pringle DO   25 mg at 06/21/25 2152    sacubitril-valsartan (ENTRESTO) 24-26 MG per tablet 1 tablet  1 tablet PEG Tube BID Bro Pringle DO   1 tablet at 06/21/25 2159    thiamine tablet 100 mg  100 mg Oral QAM Bro Pringle DO   100 mg at 06/22/25 0906    prochlorperazine (COMPAZINE) injection 5 mg  5 mg IntraVENous Q6H PRN Bro Pringle DO        ziprasidone (GEODON) injection 10 mg  10 mg IntraMUSCular Q12H PRN Bro Pringle DO   10 mg at 06/20/25 1438      sodium chloride      sodium chloride         Physical Exam:  BP (!) 108/55   Pulse 64   Temp 96.8 °F (36 °C) (Axillary)   Resp 18   Ht 1.575 m (5' 2\")   Wt 68 kg (150 lb)   SpO2 100%   BMI 27.44 kg/m²   Wt Readings from Last 3 Encounters:   06/20/25 68 kg (150 lb)   04/04/25 68.9 kg (151 lb 14.4 oz)   03/04/25 56.5 kg (124 lb 8 oz)     Appearance: Awake, no acute respiratory distress on trach/vent  Skin: Intact, no rash  Head: Normocephalic, atraumatic  Eyes: EOMI, no conjunctival erythema  ENMT: No pharyngeal erythema, MMM, no rhinorrhea  Neck: Supple, no carotid bruits  Lungs: Decreased BS B/L, no wheezing  Cardiac: Regular rate and rhythm, +S1S2, 2/6 systolic 
dilated.    Pericardium: No pericardial effusion.    Image quality is suboptimal. Contrast used: Lumason.    Echocardiogram reviewed: 3/9/25 (Dr. Alexandre)    Limited study to assess biventricular function    Left Ventricle: Severely reduced left ventricular systolic function with a visually estimated EF of 10 -15%.    Right Ventricle: Mildly reduced systolic function.    Mitral Valve: Mild annular calcification.    Tricuspid Valve: Moderate to severe regurgitation.    Image quality is adequate.    Echocardiogram reviewed: 4/2/25 (Dr. Carnes)    Left Ventricle: Normal left ventricular systolic function with a visually estimated EF of 30 - 35%. Left ventricle size is normal. Normal wall thickness. Ventricular mass is normal. Findings consistent with concentric remodeling. Severe global hypokinesis present. E/e' ratio >11, suggesting increased LAP.    Right Ventricle: Right ventricle is mildly dilated. Normal systolic function.    Aortic Valve: No significant stenosis.    Mitral Valve: Mild annular calcification. Mild regurgitation.    Tricuspid Valve: Mild to moderate regurgitation. Mildly elevated RVSP, consistent with mild pulmonary hypertension. The estimated RVSP is 48 mmHg.    Left Atrium: Left atrium is severely dilated.    Extracardiac: Medium sized left pleural effusion.    Image quality is adequate. Procedure performed with the patient in a supine position.    CXR: 6/20/25  1. Significant reduction of the left pleural effusion and atelectasis.  2. Mild interstitial prominence within the lungs concerning for pulmonary edema or fluid overload.    ASSESSMENT / PLAN:  GI bleed / acute on chronic anemia (Hgb 6.1 --> PRBC's --> 8.8 --> 8.1 --> 8.1)  Colonoscopy (6/22/25): Diverticulosis, no bleeding noted  Chronic hypoxic respiratory failure -- +trach/vent (s/p trach/PEG in 3/2025)  Chronic HFrEF  Negative stress test in 2/2024  Cardiomyopathy -- possible tachycardia induced CM component  Chronic LBBB  Chronically

## 2025-07-20 PROBLEM — R79.89 ELEVATED TROPONIN: Status: RESOLVED | Noted: 2025-06-20 | Resolved: 2025-07-20

## (undated) DEVICE — BRONCHOSCOPY PACK: Brand: MEDLINE INDUSTRIES, INC.

## (undated) DEVICE — SPONGE GZ W4XL4IN RAYON POLY CVR W/NONWOVEN FAB STRL 2/PK

## (undated) DEVICE — CONNECTOR IRRIGATION AUXILIARY H2O JET W/ PRT MTL THRD HYDR

## (undated) DEVICE — DEFENDO AIR WATER SUCTION AND BIOPSY VALVE KIT FOR  OLYMPUS: Brand: DEFENDO AIR/WATER/SUCTION AND BIOPSY VALVE

## (undated) DEVICE — GRADUATE TRIANG MEASURE 1000ML BLK PRNT

## (undated) DEVICE — SINGLE USE SUCTION VALVE MAJ-209: Brand: SINGLE USE SUCTION VALVE (STERILE)

## (undated) DEVICE — GAUZE,SPONGE,4"X4",8PLY,STRL,LF,10/TRAY: Brand: MEDLINE

## (undated) DEVICE — CANNULA NSL CANN NSL L25FT TBNG AD O2 SUP SFT UC

## (undated) DEVICE — SINGLE USE BIOPSY VALVE MAJ-210: Brand: SINGLE USE BIOPSY VALVE (STERILE)

## (undated) DEVICE — PAD, DEFIB, ADULT, RADIOTRAN, PHYSIO, LO: Brand: MEDLINE

## (undated) DEVICE — ADAPTER TBNG DIA15MM SWVL FBROPT BRONCHSCP TERM 2 AXIS PEEP

## (undated) DEVICE — BITEBLOCK 54FR W/ DENT RIM BLOX

## (undated) DEVICE — AIR/WATER CLEANING ADAPTER FOR OLYMPUS® GI ENDOSCOPE: Brand: BULLDOG®

## (undated) DEVICE — GAUZE,SPONGE,POST-OP,4X3,STRL,LF: Brand: MEDLINE

## (undated) DEVICE — Device

## (undated) DEVICE — DOUBLE  SWIVEL ELBOW 15M - DOUBLE FLIP TOP CAP WITH SEAL - 22M/15F: Brand: DOUBLE  SWIVEL ELBOW 15M - DOUBLE FLIP TOP CAP WITH SEAL - 22M/15F

## (undated) DEVICE — SOLUTION IRRIG 500ML 0.9% SOD CHLO USP POUR PLAS BTL

## (undated) DEVICE — ENDOSCOPIC KIT 1.1+ OP4 NO CP DE

## (undated) DEVICE — CONTAINER SPEC 60ML PH 7NEUTRAL BUFF FRMLN RDY TO USE

## (undated) DEVICE — ENDOVIVE SFT PEG KIT PULL WENFIT 20F BX2

## (undated) DEVICE — BLUE RHINO G2-MULTI PERCUTANEOUS TRACHEOSTOMY INTRODUCER TRAY: Brand: BLUE RHINO